# Patient Record
Sex: MALE | Race: WHITE | NOT HISPANIC OR LATINO | Employment: FULL TIME | URBAN - METROPOLITAN AREA
[De-identification: names, ages, dates, MRNs, and addresses within clinical notes are randomized per-mention and may not be internally consistent; named-entity substitution may affect disease eponyms.]

---

## 2017-02-21 ENCOUNTER — ALLSCRIPTS OFFICE VISIT (OUTPATIENT)
Dept: OTHER | Facility: OTHER | Age: 57
End: 2017-02-21

## 2017-02-27 ENCOUNTER — ALLSCRIPTS OFFICE VISIT (OUTPATIENT)
Dept: OTHER | Facility: OTHER | Age: 57
End: 2017-02-27

## 2017-04-18 ENCOUNTER — ALLSCRIPTS OFFICE VISIT (OUTPATIENT)
Dept: OTHER | Facility: OTHER | Age: 57
End: 2017-04-18

## 2017-05-02 ENCOUNTER — ALLSCRIPTS OFFICE VISIT (OUTPATIENT)
Dept: OTHER | Facility: OTHER | Age: 57
End: 2017-05-02

## 2017-05-02 DIAGNOSIS — Z12.5 ENCOUNTER FOR SCREENING FOR MALIGNANT NEOPLASM OF PROSTATE: ICD-10-CM

## 2017-06-28 ENCOUNTER — ALLSCRIPTS OFFICE VISIT (OUTPATIENT)
Dept: OTHER | Facility: OTHER | Age: 57
End: 2017-06-28

## 2017-08-02 ENCOUNTER — GENERIC CONVERSION - ENCOUNTER (OUTPATIENT)
Dept: OTHER | Facility: OTHER | Age: 57
End: 2017-08-02

## 2017-08-30 ENCOUNTER — ALLSCRIPTS OFFICE VISIT (OUTPATIENT)
Dept: OTHER | Facility: OTHER | Age: 57
End: 2017-08-30

## 2017-09-01 ENCOUNTER — APPOINTMENT (OUTPATIENT)
Dept: LAB | Facility: CLINIC | Age: 57
End: 2017-09-01
Payer: COMMERCIAL

## 2017-09-01 DIAGNOSIS — R53.83 OTHER FATIGUE: ICD-10-CM

## 2017-09-01 DIAGNOSIS — E11.65 TYPE 2 DIABETES MELLITUS WITH HYPERGLYCEMIA (HCC): ICD-10-CM

## 2017-09-01 DIAGNOSIS — R06.09 OTHER FORMS OF DYSPNEA: ICD-10-CM

## 2017-09-01 DIAGNOSIS — M25.50 PAIN IN JOINT: ICD-10-CM

## 2017-09-01 LAB
ALBUMIN SERPL BCP-MCNC: 3.6 G/DL (ref 3.5–5)
ALP SERPL-CCNC: 135 U/L (ref 46–116)
ALT SERPL W P-5'-P-CCNC: 41 U/L (ref 12–78)
ANION GAP SERPL CALCULATED.3IONS-SCNC: 9 MMOL/L (ref 4–13)
AST SERPL W P-5'-P-CCNC: 17 U/L (ref 5–45)
BILIRUB SERPL-MCNC: 1.2 MG/DL (ref 0.2–1)
BUN SERPL-MCNC: 16 MG/DL (ref 5–25)
CALCIUM SERPL-MCNC: 9.4 MG/DL (ref 8.3–10.1)
CHLORIDE SERPL-SCNC: 104 MMOL/L (ref 100–108)
CO2 SERPL-SCNC: 26 MMOL/L (ref 21–32)
CREAT SERPL-MCNC: 1.76 MG/DL (ref 0.6–1.3)
ERYTHROCYTE [DISTWIDTH] IN BLOOD BY AUTOMATED COUNT: 13.3 % (ref 11.6–15.1)
EST. AVERAGE GLUCOSE BLD GHB EST-MCNC: 166 MG/DL
GFR SERPL CREATININE-BSD FRML MDRD: 42 ML/MIN/1.73SQ M
GLUCOSE P FAST SERPL-MCNC: 131 MG/DL (ref 65–99)
HBA1C MFR BLD: 7.4 % (ref 4.2–6.3)
HCT VFR BLD AUTO: 49.8 % (ref 36.5–49.3)
HGB BLD-MCNC: 16.3 G/DL (ref 12–17)
MCH RBC QN AUTO: 29 PG (ref 26.8–34.3)
MCHC RBC AUTO-ENTMCNC: 32.7 G/DL (ref 31.4–37.4)
MCV RBC AUTO: 89 FL (ref 82–98)
PLATELET # BLD AUTO: 305 THOUSANDS/UL (ref 149–390)
PMV BLD AUTO: 9.8 FL (ref 8.9–12.7)
POTASSIUM SERPL-SCNC: 4 MMOL/L (ref 3.5–5.3)
PROT SERPL-MCNC: 7.9 G/DL (ref 6.4–8.2)
RBC # BLD AUTO: 5.62 MILLION/UL (ref 3.88–5.62)
SODIUM SERPL-SCNC: 139 MMOL/L (ref 136–145)
WBC # BLD AUTO: 7.68 THOUSAND/UL (ref 4.31–10.16)

## 2017-09-01 PROCEDURE — 85027 COMPLETE CBC AUTOMATED: CPT

## 2017-09-01 PROCEDURE — 36415 COLL VENOUS BLD VENIPUNCTURE: CPT

## 2017-09-01 PROCEDURE — 80053 COMPREHEN METABOLIC PANEL: CPT

## 2017-09-01 PROCEDURE — 83036 HEMOGLOBIN GLYCOSYLATED A1C: CPT

## 2017-09-06 ENCOUNTER — ALLSCRIPTS OFFICE VISIT (OUTPATIENT)
Dept: OTHER | Facility: OTHER | Age: 57
End: 2017-09-06

## 2017-09-18 ENCOUNTER — TRANSCRIBE ORDERS (OUTPATIENT)
Dept: ADMINISTRATIVE | Facility: HOSPITAL | Age: 57
End: 2017-09-18

## 2017-09-18 ENCOUNTER — ALLSCRIPTS OFFICE VISIT (OUTPATIENT)
Dept: OTHER | Facility: OTHER | Age: 57
End: 2017-09-18

## 2017-09-18 ENCOUNTER — TRANSCRIBE ORDERS (OUTPATIENT)
Dept: LAB | Facility: CLINIC | Age: 57
End: 2017-09-18

## 2017-09-18 ENCOUNTER — GENERIC CONVERSION - ENCOUNTER (OUTPATIENT)
Dept: OTHER | Facility: OTHER | Age: 57
End: 2017-09-18

## 2017-09-18 ENCOUNTER — HOSPITAL ENCOUNTER (OUTPATIENT)
Dept: RADIOLOGY | Facility: HOSPITAL | Age: 57
Discharge: HOME | End: 2017-09-18
Payer: COMMERCIAL

## 2017-09-18 ENCOUNTER — APPOINTMENT (OUTPATIENT)
Dept: LAB | Facility: CLINIC | Age: 57
End: 2017-09-18
Payer: COMMERCIAL

## 2017-09-18 DIAGNOSIS — R06.09 OTHER FORMS OF DYSPNEA: ICD-10-CM

## 2017-09-18 DIAGNOSIS — R53.83 OTHER FATIGUE: ICD-10-CM

## 2017-09-18 DIAGNOSIS — M25.50 PAIN IN JOINT: ICD-10-CM

## 2017-09-18 LAB
ANION GAP SERPL CALCULATED.3IONS-SCNC: 13 MMOL/L (ref 4–13)
BUN SERPL-MCNC: 18 MG/DL (ref 5–25)
CALCIUM SERPL-MCNC: 9.5 MG/DL (ref 8.3–10.1)
CHLORIDE SERPL-SCNC: 101 MMOL/L (ref 100–108)
CO2 SERPL-SCNC: 24 MMOL/L (ref 21–32)
CREAT SERPL-MCNC: 1.49 MG/DL (ref 0.6–1.3)
CRP SERPL QL: 6.3 MG/L
ERYTHROCYTE [SEDIMENTATION RATE] IN BLOOD: 7 MM/HOUR (ref 0–10)
GFR SERPL CREATININE-BSD FRML MDRD: 51 ML/MIN/1.73SQ M
GLUCOSE SERPL-MCNC: 93 MG/DL (ref 65–140)
POTASSIUM SERPL-SCNC: 3.7 MMOL/L (ref 3.5–5.3)
SODIUM SERPL-SCNC: 138 MMOL/L (ref 136–145)
VIT B12 SERPL-MCNC: 433 PG/ML (ref 100–900)

## 2017-09-18 PROCEDURE — 84403 ASSAY OF TOTAL TESTOSTERONE: CPT

## 2017-09-18 PROCEDURE — 80048 BASIC METABOLIC PNL TOTAL CA: CPT

## 2017-09-18 PROCEDURE — 71020 HB CHEST X-RAY 2VW FRONTAL&LATL: CPT

## 2017-09-18 PROCEDURE — 86038 ANTINUCLEAR ANTIBODIES: CPT

## 2017-09-18 PROCEDURE — 36415 COLL VENOUS BLD VENIPUNCTURE: CPT

## 2017-09-18 PROCEDURE — 86140 C-REACTIVE PROTEIN: CPT

## 2017-09-18 PROCEDURE — 84402 ASSAY OF FREE TESTOSTERONE: CPT

## 2017-09-18 PROCEDURE — 82607 VITAMIN B-12: CPT

## 2017-09-18 PROCEDURE — 86430 RHEUMATOID FACTOR TEST QUAL: CPT

## 2017-09-18 PROCEDURE — 85652 RBC SED RATE AUTOMATED: CPT

## 2017-09-19 ENCOUNTER — TRANSCRIBE ORDERS (OUTPATIENT)
Dept: ADMINISTRATIVE | Facility: HOSPITAL | Age: 57
End: 2017-09-19

## 2017-09-19 DIAGNOSIS — R09.89 OTHER DYSPNEA AND RESPIRATORY ABNORMALITY: Primary | ICD-10-CM

## 2017-09-19 DIAGNOSIS — R53.83 FATIGUE, UNSPECIFIED TYPE: ICD-10-CM

## 2017-09-19 DIAGNOSIS — R06.09 OTHER DYSPNEA AND RESPIRATORY ABNORMALITY: Primary | ICD-10-CM

## 2017-09-19 LAB
RHEUMATOID FACT SER QL LA: NEGATIVE
TESTOST FREE SERPL-MCNC: 5.7 PG/ML (ref 7.2–24)
TESTOST SERPL-MCNC: 177 NG/DL (ref 264–916)

## 2017-09-20 ENCOUNTER — GENERIC CONVERSION - ENCOUNTER (OUTPATIENT)
Dept: OTHER | Facility: OTHER | Age: 57
End: 2017-09-20

## 2017-09-20 LAB — RYE IGE QN: NEGATIVE

## 2017-09-22 ENCOUNTER — ALLSCRIPTS OFFICE VISIT (OUTPATIENT)
Dept: OTHER | Facility: OTHER | Age: 57
End: 2017-09-22

## 2017-09-28 ENCOUNTER — HOSPITAL ENCOUNTER (OUTPATIENT)
Dept: NON INVASIVE DIAGNOSTICS | Facility: CLINIC | Age: 57
Discharge: HOME | End: 2017-09-28
Payer: COMMERCIAL

## 2017-09-28 DIAGNOSIS — R09.89 OTHER DYSPNEA AND RESPIRATORY ABNORMALITY: ICD-10-CM

## 2017-09-28 DIAGNOSIS — R53.83 FATIGUE, UNSPECIFIED TYPE: ICD-10-CM

## 2017-09-28 DIAGNOSIS — R06.09 OTHER DYSPNEA AND RESPIRATORY ABNORMALITY: ICD-10-CM

## 2017-09-28 PROCEDURE — 93306 TTE W/DOPPLER COMPLETE: CPT

## 2017-09-29 ENCOUNTER — GENERIC CONVERSION - ENCOUNTER (OUTPATIENT)
Dept: OTHER | Facility: OTHER | Age: 57
End: 2017-09-29

## 2017-10-06 ENCOUNTER — ALLSCRIPTS OFFICE VISIT (OUTPATIENT)
Dept: OTHER | Facility: OTHER | Age: 57
End: 2017-10-06

## 2017-11-01 DIAGNOSIS — N17.9 ACUTE KIDNEY FAILURE (HCC): ICD-10-CM

## 2017-11-01 DIAGNOSIS — M25.511 PAIN IN RIGHT SHOULDER: ICD-10-CM

## 2017-11-01 DIAGNOSIS — R53.83 OTHER FATIGUE: ICD-10-CM

## 2017-11-01 DIAGNOSIS — M75.01 ADHESIVE CAPSULITIS OF RIGHT SHOULDER: ICD-10-CM

## 2017-11-01 DIAGNOSIS — E55.9 VITAMIN D DEFICIENCY: ICD-10-CM

## 2017-11-01 DIAGNOSIS — M75.02 ADHESIVE CAPSULITIS OF LEFT SHOULDER: ICD-10-CM

## 2017-11-03 ENCOUNTER — ALLSCRIPTS OFFICE VISIT (OUTPATIENT)
Dept: OTHER | Facility: OTHER | Age: 57
End: 2017-11-03

## 2017-11-04 NOTE — PROGRESS NOTES
Assessment  1  Shoulder pain, right (049 41) (M20 511)   2  Depression with anxiety (300 4) (F41 8)   · since his 35s  was on lithium, Prozac  tried Wellbutrin  common SE prologned orgasm   3  Morbid or severe obesity due to excess calories (278 01) (E66 01)   4  Hypogonadism, male (257 2) (E29 1)    Plan  Depression with anxiety    · BuPROPion HCl ER (XL) 150 MG Oral Tablet Extended Release 24 Hour; TAKE 1  TABLET BY MOUTH EVERY DAY   · Call [phone] if: The symptoms seem worse ; Status:Complete;   Done:  32NDI2732  Fatigue    · Testosterone Cypionate 200 MG/ML Intramuscular Solution  Hypogonadism, male    · (1) TESTOSTERONE, FREE (DIRECT) AND TOTAL; Status:Active; Requested  for:68Uht3600;   Shoulder pain, right    · *1 - [facility]  *  Status: Active  Requested for:  24KDR4595  Care Summary provided  : Yes    Discussion/Summary    Depression  He has tried multiple medications in the past, untreated in the past few years  Agrees to try medication again since symptoms affecting work  Trial of Wellbutrin, if with side effects try sertraline  Hypogonadism  Recheck testosterone levels next month, injection given today  Consider stopping treatment since symptoms minimally improved  Shoulder pain, right  Suspect tendinitis, refer to ortho  Prefer to avoid NSAIDs, apply ice or take Tylenol prn  CKD  Avoid NSAIDs, recheck labs  JEFF on CPAP  Obesity  f/u next month  The patient was counseled regarding instructions for management,-- impressions  Chief Complaint  pt is here to talk about testosterone      History of Present Illness  HPI: Mr Ruchi Damian complains of right shoulder pain since 2 weeks ago  is described as sharp, occasionally radiating down his arm, would disrupt sleep  He has occasional pain when reaching out for something or with certain movements  Pain would wake him up from sleep, thinks this has somewhat affected his sleep at night  He does use the CPAP regularly   He takes Aleve as needed  feels a little better since starting testosterone treatment  He has a little bit more libido  However, he continues to feel very very tired  He sleeps about 7 or 8 hours at night, would take an hour nap during his lunch break  He feels this is affecting his work, has increased difficulty with concentration  He admits he does not have any motivation, feels he is dragging every day  recalls being treated extensively for depression in his 35s  He was on lithium at one point, recalls being on Wellbutrin, Prozac and other medications  He has not been on medication for the past several years, felt he was doing well until a few months ago  Depression (Follow-Up): The patient states his depression has worsened since the last visit  He describes this as severe  Interval Symptoms: worsened depressed mood,-- worsened inability to perform normal activities,-- worsened loss of energy-- and-- denies anxiety  Social Support: the patient has good social support  Medications: The patient is not currently on any medications for his depression  Hypogonadism, Primary: The patient is being seen for follow-up of primary hypogonadism  The etiology is idiopathic  Current treatment includes intramuscular testosterone  Symptoms:  fatigue,-- lack of motivation-- and-- trouble concentrating, but-- no low libido  Shoulder Pain: The patient is being seen for an initial evaluation of shoulder pain  Symptoms:  shoulder pain-- and-- shoulder stiffness, but-- no decreased range of motion at the shoulder  The patient is currently experiencing symptoms  Associated symptoms:  arm pain, but-- no neck pain  Review of Systems    Constitutional: feeling poorly-- and-- feeling tired, but-- as noted in HPI  Cardiovascular: no chest pain  Respiratory: no cough  Musculoskeletal: arthralgias-- and-- joint stiffness, but-- as noted in HPI  Neurological: no headache-- and-- no dizziness  Active Problems  1  Acquired pes planus (734) (M21 40)   2  Acute renal failure (584 9) (N17 9)   3  Arthralgia (719 40) (M25 50)   4  BPH without urinary obstruction (600 00) (N40 0)   5  Bunion (727 1) (M21 619)   6  Callus (700) (L84)   7  Chronic systolic congestive heart failure (428 22,428 0) (I50 22)   8  Depression with anxiety (300 4) (F41 8)   9  Diabetes mellitus with neurological manifestation (250 60) (E11 49)   10  Diabetes type 2, uncontrolled (250 02) (E11 65)   11  Difficulty walking (719 7) (R26 2)   12  Dyspnea on exertion (786 09) (R06 09)   13  Encounter for prostate cancer screening (V76 44) (Z12 5)   14  Encounter for screening for malignant neoplasm of colon (V76 51) (Z12 11)   15  Fatigue (780 79) (R53 83)   16  Foot pain, bilateral (729 5) (M79 671,M79 672)   17  Hallux valgus (735 0) (M20 10)   18  Hematuria (599 70) (R31 9)   19  Hyperlipidemia (272 4) (E78 5)   20  Hypertension (401 9) (I10)   21  Hypogonadism, male (257 2) (E29 1)   22  Insomnia (780 52) (G47 00)   23  Morbid or severe obesity due to excess calories (278 01) (E66 01)   24  Need for pneumococcal vaccination (V03 82) (Z23)   25  Need for prophylactic vaccination and inoculation against influenza (V04 81) (Z23)   26  Nephrolithiasis (592 0) (N20 0)   27  Onychomycosis (110 1) (B35 1)   28  JEFF (obstructive sleep apnea) (327 23) (G47 33)   29  Sciatica (724 3) (M54 30)   30  Screening for colon cancer (V76 51) (Z12 11)   31  Seasonal allergies (477 9) (J30 2)   32  Spinal stenosis (724 00) (M48 00)   33  Tinea pedis (110 4) (B35 3)   34  Type 2 diabetes mellitus (250 00) (E11 9)   35  Vitamin D deficiency (268 9) (E55 9)    Past Medical History  Active Problems And Past Medical History Reviewed: The active problems and past medical history were reviewed and updated today        Social History   · Denied: History of Alcohol Use (History)   · Current Some Day Smoker (305 1)   · Denied: History of Exercise Habits   · Marital History - Currently    · Tobacco use (305 1) (Z72 0)   · Working Full Time  The social history was reviewed and is unchanged  Current Meds   1  AmLODIPine Besylate 10 MG Oral Tablet; TAKE 1 TABLET EVERY MORNING; Therapy: 98PZT1120 to (Oral Aron)  Requested for: 51YBH5698; Last   Rx:70Paw0155 Ordered   2  Aditya Breeze 2 Test In [employer]; test blood sugar twice daiy; Therapy: 70OIL5659 to (Leslie Verduzco)  Requested for: 32FNX2657; Last   Rx:27Vla9374 Ordered   3  CloNIDine HCl - 0 3 MG Oral Tablet; take 1 tablet twice a day; Therapy: 51LCH0924 to (Oral Aron)  Requested for: 61UUM5490; Last   Rx:90Aqd9198 Ordered   4  Enalapril Maleate 20 MG Oral Tablet; take 1 tablet twice a day; Therapy: 50PYZ9674 to (Oral Aron)  Requested for: 78BZA7547; Last   Rx:55Nyr1445 Ordered   5  Glimepiride 4 MG Oral Tablet; Take 1 tablet daily; Therapy: 80Iji3403 to (Oral Aron)  Requested for: 81OAC2692; Last   Rx:43Glu9270 Ordered   6  Januvia 100 MG Oral Tablet; take 1 tablet by mouth daily; Therapy: 60DLY7816 to (Evaluate:41Aji4899)  Requested for: 80KRZ2642; Last   Rx:15Xvm9183 Ordered   7  Jardiance 10 MG Oral Tablet; take 1 tablet by mouth every morning; Therapy: 94PDU3797 to (Oral Aron)  Requested for: 04DUB4381; Last   Rx:92Ulh2825 Ordered   8  Ketoconazole 2 % External Cream; APPLY A THIN LAYER TO AFFECTED AREA(S) TWICE   DAILY; Therapy: 22YZE3154 to ([phone])  Requested for: 44EEL0244; Last   Rx:55Jrs6052 Ordered   9  NovoFine 32G X 6 MM Miscellaneous; USE AS DIRECTED; Therapy: 20OEB0888 to ([phone])  Requested for: 12Zol3654; Last   Rx:19Zut3410 Ordered   10  OneTouch Verio In [employer]; TEST TWICE DAILY; Therapy: 71FEU2469 to (Evaluate:21Jun2016)  Requested for: 74FBK6026; Last    Rx:62Axh4868 Ordered   11  OneTouch Verio w/Device Kit; USE AS DIRECTED; Therapy: 10KQY6940 to (Last Rx:24Nov2015)  Requested for: 80FXK4402 Ordered   12  Tamsulosin HCl - 0 4 MG Oral Capsule; take 1 capsule daily; Therapy: 01WMP1454 to (Billie Ramey)  Requested for: 05ASJ4079; Last    Rx:63Htj6278 Ordered   13  Testosterone Cypionate 200 MG/ML Intramuscular Solution; inject IM every 2 weeks; Therapy: 14PWA3626 to (Last Rx:76Yje4665) Ordered   14  Vitamin D3 2000 UNIT Oral Capsule; TAKE 1 CAPSULE Daily; Therapy: 06ORJ5941 to (Last Rx:29Mar2016)  Requested for: 29Mar2016 Ordered    The medication list was reviewed and updated today  Allergies  1  No Known Drug Allergies    Vitals   Recorded: 80PLB7531 12:48PM   Temperature 98 4 F   Heart Rate 76   Respiration 18   Systolic 603   Diastolic 82   Height 5 ft 10 in   Weight 290 lb 6 oz   BMI Calculated 41 66   BSA Calculated 2 45   O2 Saturation 96     Physical Exam    Constitutional   General appearance: Abnormal   obese-- and-- appears tired  Head and Face   Head and face: Normal     Eyes   Pupils and irises: Equal, round, reactive to light  Pulmonary   Respiratory effort: No increased work of breathing or signs of respiratory distress  Auscultation of lungs: Clear to auscultation  Cardiovascular   Auscultation of heart: Normal rate and rhythm, normal S1 and S2, no murmurs  Abdomen   Abdomen: Non-tender, no masses  Musculoskeletal   Gait and station: Normal     Inspection/palpation of joints, bones, and muscles: Abnormal  -- mild tenderness right bicipital tendon  Neurologic   Cortical function: Normal mental status  Psychiatric   Mood and affect: Abnormal   Mood and Affect: depressed        Future Appointments    Date/Time Provider Specialty Site   12/15/2017 01:00 PM Lokesh Villarreal MD Internal Medicine [facility] INTER[facility] MED   12/29/2017 11:15 AM Liz Hoffman DPM Podiatry [facility]   11/17/2017 01:15 PM Nurse Talita Schedule  [facility] INTERNAL [facility]     Signatures   Electronically signed by : Brennan Ayala MD; Nov  3 2017 3:56PM EST                       (Author)

## 2017-11-17 ENCOUNTER — ALLSCRIPTS OFFICE VISIT (OUTPATIENT)
Dept: OTHER | Facility: OTHER | Age: 57
End: 2017-11-17

## 2017-11-29 ENCOUNTER — APPOINTMENT (OUTPATIENT)
Dept: RADIOLOGY | Facility: CLINIC | Age: 57
End: 2017-11-29
Payer: COMMERCIAL

## 2017-11-29 ENCOUNTER — GENERIC CONVERSION - ENCOUNTER (OUTPATIENT)
Dept: OTHER | Facility: OTHER | Age: 57
End: 2017-11-29

## 2017-11-29 DIAGNOSIS — M25.511 PAIN IN RIGHT SHOULDER: ICD-10-CM

## 2017-11-29 PROCEDURE — 73030 X-RAY EXAM OF SHOULDER: CPT

## 2017-12-08 DIAGNOSIS — E29.1 TESTICULAR HYPOFUNCTION: ICD-10-CM

## 2017-12-09 ENCOUNTER — APPOINTMENT (OUTPATIENT)
Dept: LAB | Facility: CLINIC | Age: 57
End: 2017-12-09
Payer: COMMERCIAL

## 2017-12-09 ENCOUNTER — TRANSCRIBE ORDERS (OUTPATIENT)
Dept: LAB | Facility: CLINIC | Age: 57
End: 2017-12-09

## 2017-12-09 DIAGNOSIS — N17.9 ACUTE KIDNEY FAILURE (HCC): ICD-10-CM

## 2017-12-09 DIAGNOSIS — E29.1 TESTICULAR HYPOFUNCTION: ICD-10-CM

## 2017-12-09 DIAGNOSIS — R53.83 OTHER FATIGUE: ICD-10-CM

## 2017-12-09 DIAGNOSIS — E55.9 VITAMIN D DEFICIENCY: ICD-10-CM

## 2017-12-09 LAB
25(OH)D3 SERPL-MCNC: 9.8 NG/ML (ref 30–100)
ALBUMIN SERPL BCP-MCNC: 4 G/DL (ref 3.5–5)
ALP SERPL-CCNC: 98 U/L (ref 46–116)
ALT SERPL W P-5'-P-CCNC: 35 U/L (ref 12–78)
ANION GAP SERPL CALCULATED.3IONS-SCNC: 9 MMOL/L (ref 4–13)
AST SERPL W P-5'-P-CCNC: 14 U/L (ref 5–45)
BILIRUB SERPL-MCNC: 1 MG/DL (ref 0.2–1)
BUN SERPL-MCNC: 17 MG/DL (ref 5–25)
CALCIUM SERPL-MCNC: 8.9 MG/DL (ref 8.3–10.1)
CHLORIDE SERPL-SCNC: 105 MMOL/L (ref 100–108)
CO2 SERPL-SCNC: 24 MMOL/L (ref 21–32)
CREAT SERPL-MCNC: 1.4 MG/DL (ref 0.6–1.3)
GFR SERPL CREATININE-BSD FRML MDRD: 55 ML/MIN/1.73SQ M
GLUCOSE P FAST SERPL-MCNC: 165 MG/DL (ref 65–99)
POTASSIUM SERPL-SCNC: 4.1 MMOL/L (ref 3.5–5.3)
PROT SERPL-MCNC: 8 G/DL (ref 6.4–8.2)
SODIUM SERPL-SCNC: 138 MMOL/L (ref 136–145)
TSH SERPL DL<=0.05 MIU/L-ACNC: 2.23 UIU/ML (ref 0.36–3.74)
VIT B12 SERPL-MCNC: 1081 PG/ML (ref 100–900)

## 2017-12-09 PROCEDURE — 80053 COMPREHEN METABOLIC PANEL: CPT

## 2017-12-09 PROCEDURE — 36415 COLL VENOUS BLD VENIPUNCTURE: CPT

## 2017-12-09 PROCEDURE — 82607 VITAMIN B-12: CPT

## 2017-12-09 PROCEDURE — 84443 ASSAY THYROID STIM HORMONE: CPT

## 2017-12-09 PROCEDURE — 82306 VITAMIN D 25 HYDROXY: CPT

## 2017-12-09 PROCEDURE — 84402 ASSAY OF FREE TESTOSTERONE: CPT

## 2017-12-09 PROCEDURE — 84403 ASSAY OF TOTAL TESTOSTERONE: CPT

## 2017-12-11 LAB
TESTOST FREE SERPL-MCNC: 7.4 PG/ML (ref 7.2–24)
TESTOST SERPL-MCNC: 203 NG/DL (ref 264–916)

## 2017-12-15 ENCOUNTER — ALLSCRIPTS OFFICE VISIT (OUTPATIENT)
Dept: OTHER | Facility: OTHER | Age: 57
End: 2017-12-15

## 2017-12-16 NOTE — PROGRESS NOTES
Assessment  1  JEFF (obstructive sleep apnea) (327 23) (G47 33)   · moderate   2  Morbid or severe obesity due to excess calories (278 01) (E66 01)   3  Diabetes mellitus with neurological manifestation (250 60) (E11 49)   4  Depression with anxiety (300 4) (F41 8)   · since his 35s  was on lithium, Prozac  tried Wellbutrin  common SE prologned orgasm   5  Type 2 diabetes mellitus (250 00) (E11 9)   6  Shoulder pain, right (719 41) (M25 511)   7  Hypertension (401 9) (I10)   8  Hypogonadism, male (257 2) (E29 1)   9  Difficulty concentrating (799 51) (R41 840)    Plan  Difficulty concentrating    · Methylphenidate HCl - 10 MG Oral Tablet; Take 1 tablet twice daily  Vitamin D deficiency    · Vitamin D (Ergocalciferol) 54000 UNIT Oral Capsule; TAKE ONE CAPSULE BY MOUTH ONCE AWEEK    Discussion/Summary  Discussion Summary:   1  Difficulty with concentrating  ADD score was 61/120  Trial of methylphenidate, can take twice a day p r n    A gradually titrate dose p r n 2  Depression  Tolerating Wellbutrin, recommend to continue this for now  3  Hypogonadism  Testosterone levels have improved; however, minimal improvement of symptoms  We have decided to stop injections for now  4  Adhesive capsulitis, right  Keep appointment with physical therapy next week  Instructed to start ROM exercises at home  Recommend to take Vicodin prior to therapy sessions  5  Nephrolithiasis, s/p pyelonephritis  No recent issues  6  Acute renal failure  Renal function improved  Crea remains elevated, avoid NSAIDs  7  JEFF  Recommend to see sleep specialist 8  HTN  BP stable on clonidine, amlodipine and enalapril  9  Obesity  Lost 4 lbs in the past month  10  DM  On Jardiance, Januvia and glimepiride  Due for microalb, on ACE  Sees podiatry regularly  11  Hyperlipidemia  TG slightly elevated  12  Elevated hemoglobin/hematocrit  Stable  13  BPH  On Flomax  14  Vitamin D deficiency  Start weekly replacement  15  HM  Due for eye exam and colonoscopy  Follow up in 1 month or prn  Counseling Documentation With Imm: The patient was counseled regarding diagnostic results,-- instructions for management,-- risk factor reductions  Chief Complaint  Chief Complaint Chronic Condition [facility]: Patient is here today for follow up of chronic conditions described in HPI  History of Present Illness  HPI: Mr Dereck Kocher still complains of feeling very tired  He started taking Wellbutrin, has been taking it daily for almost five weeks  He does not notice any change in sit in his symptoms  He continues to have difficulty focusing and concentrating at work  He denies any memory loss  He usually MRIs is a stack cards prior to sleeping and would go through this instead of counting she to help fall asleep  He reports that his right shoulder is getting worse  He has physical therapy scheduled next week  He has not tried Vicodin as given to him by Ortho  Obesity (Follow-Up): The patient is being seen for follow-up of obesity  The patient reports doing well and a weight loss of 4 pounds  Interval symptoms:  denies poor eating habits  The patient is not currently on medication for this problem  Depression (Follow-Up): The patient states his depression has been stable since the last visit  Interval Symptoms: stable depressed mood-- and-- denies anxiety  Social Support: the patient has good social support  Medications: the patient is adherent with his medication regimen  Review of Systems  Complete-Male:  Constitutional: feeling poorly-- and-- feeling tired  Cardiovascular: no chest pain  Respiratory: no cough  Gastrointestinal: no abdominal pain  Genitourinary: no dysuria  Musculoskeletal: arthralgias-- and-- joint stiffness, but-- as noted in HPI  Neurological: no headache-- and-- no dizziness  Psychiatric: depression  Endocrine: no muscle weakness  Active Problems  1  Acquired pes planus (734) (M21 40)   2  Acute renal failure (584 9) (N17 9)   3   Adhesive capsulitis of both shoulders (726 0) (M75 01,M75 02)   4  Arthralgia (719 40) (M25 50)   5  BPH without urinary obstruction (600 00) (N40 0)   6  Bunion (727 1) (M21 619)   7  Callus (700) (L84)   8  Chronic systolic congestive heart failure (428 22,428 0) (I50 22)   9  Depression with anxiety (300 4) (F41 8)   10  Diabetes mellitus with neurological manifestation (250 60) (E11 49)   11  Diabetes type 2, uncontrolled (250 02) (E11 65)   12  Difficulty walking (719 7) (R26 2)   13  Dyspnea on exertion (786 09) (R06 09)   14  Encounter for prostate cancer screening (V76 44) (Z12 5)   15  Encounter for screening for malignant neoplasm of colon (V76 51) (Z12 11)   16  Fatigue (780 79) (R53 83)   17  Foot pain, bilateral (729 5) (M79 671,M79 672)   18  Hallux valgus (735 0) (M20 10)   19  Hematuria (599 70) (R31 9)   20  Hyperlipidemia (272 4) (E78 5)   21  Hypertension (401 9) (I10)   22  Hypogonadism, male (257 2) (E29 1)   23  Insomnia (780 52) (G47 00)   24  Morbid or severe obesity due to excess calories (278 01) (E66 01)   25  Need for pneumococcal vaccination (V03 82) (Z23)   26  Need for prophylactic vaccination and inoculation against influenza (V04 81) (Z23)   27  Nephrolithiasis (592 0) (N20 0)   28  Onychomycosis (110 1) (B35 1)   29  JEFF (obstructive sleep apnea) (327 23) (G47 33)   30  Sciatica (724 3) (M54 30)   31  Screening for colon cancer (V76 51) (Z12 11)   32  Seasonal allergies (477 9) (J30 2)   33  Shoulder pain, right (719 41) (M25 511)   34  Spinal stenosis (724 00) (M48 00)   35  Tinea pedis (110 4) (B35 3)   36  Type 2 diabetes mellitus (250 00) (E11 9)   37  Vitamin D deficiency (268 9) (E55 9)    Past Medical History  1  History of Cellulitis of left leg (682 6) (L03 116)   2  History of chest pain (V13 89) (Z87 898)   3  History of diarrhea (V12 79) (Z87 898)   4  History of onychomycosis (V12 09) (Z86 19)   5  History of onychomycosis (V12 09) (Z86 19)   6  History of Limb pain (729 5) (M79 609)   7  History of Neck pain (723 1) (M54 2)   8  Need for pneumococcal vaccination (V03 82) (Z23)   9  History of Nephrolithiasis (V13 01)   10  History of Numbness On The Sole Of The Right Foot   11  History of Pain and swelling of left lower leg (729 5,729 81) (M79 662,M79 89)   12  History of Pain of lower extremity (729 5) (M79 606)   13  History of Pain, hand joint, right (719 44) (M79 641)   14  Sciatica (724 3) (M54 30)  Active Problems And Past Medical History Reviewed: The active problems and past medical history were reviewed and updated today  Surgical History  1  History of Knee Arthroscopy (Therapeutic)   2  History of Knee Surgery   3  History of Needle Biopsy Of Prostate    Family History  Mother    1  Family history of Alzheimer Disease   2  Family history of Family Health Status Of Mother - Alive  Father    3  Family history of Family Health Status Of Father -   Family History    4  Family history of Adopted   5  Denied: Family history of Colon Cancer   6  Denied: Family history of Crohn's Disease   7  Denied: Family history of Liver Cancer    Social History   · Denied: History of Alcohol Use (History)   · Current Some Day Smoker (305 1)   · Denied: History of Exercise Habits   · Marital History - Currently    · Tobacco use (305 1) (Z72 0)   · Working Full Time  Social History Reviewed: The social history was reviewed and is unchanged  Current Meds   1  AmLODIPine Besylate 10 MG Oral Tablet; TAKE 1 TABLET EVERY MORNING; Therapy: 97NGC7852 to (Leonila Blanco)  Requested for: 40BDG4092; Last Rx:66Gky8475 Ordered   2  Aditya Breeze 2 Test In [employer]; test blood sugar twice daiy; Therapy: 53ICI5834 to (Hanh Chan)  Requested for: 48VJQ8000; Last Rx:99Csx2563 Ordered   3  BuPROPion HCl ER (XL) 150 MG Oral Tablet Extended Release 24 Hour; take 1 tablet by mouth every day; Therapy: 40KUB5357 to (Evaluate:2018)  Requested for: 74GDL7723; Last Rx:99Anh4927 Ordered   4  CloNIDine HCl - 0 3 MG Oral Tablet; take 1 tablet twice a day; Therapy: 93HBI6247 to (Geismar Bre)  Requested for: 74MZS0636; Last Rx:82Doq0141 Ordered   5  Enalapril Maleate 20 MG Oral Tablet; take 1 tablet twice a day; Therapy: 59JML4894 to (Geismar Bre)  Requested for: 89NQQ9954; Last Rx:84Voq2055 Ordered   6  Glimepiride 4 MG Oral Tablet; Take 1 tablet daily; Therapy: 63Lnx2892 to (Geismar Bre)  Requested for: 61VMT9643; Last Rx:06Sep2017 Ordered   7  Januvia 100 MG Oral Tablet; take 1 tablet by mouth daily; Therapy: 08PEG4520 to (Evaluate:18Edo3436)  Requested for: 31WNJ5407; Last Rx:00Rdy0601 Ordered   8  Jardiance 10 MG Oral Tablet; take 1 tablet by mouth every morning; Therapy: 58FMB1474 to ([facility])  Requested for: 98TRW9052; Last Rx:06Sep2017 Ordered   9  Ketoconazole 2 % External Cream; APPLY A THIN LAYER TO AFFECTED AREA(S) TWICE DAILY; Therapy: 71QBI1886 to (225 27 752)  Requested for: 28Oct2016; Last Rx:28Oct2016 Ordered   10  NovoFine 32G X 6 MM Miscellaneous; USE AS DIRECTED; Therapy: 31KLH1251 to (75 363 135)  Requested for: 25Apr2014; Last Rx:71Bne2684  Ordered   11  OneTouch Verio In [employer]; TEST TWICE DAILY; Therapy: 32GTX8168 to (Evaluate:21Jun2016)  Requested for: 98QKV7982; Last Rx:72Onk6920  Ordered   12  OneTouch Verio w/Device Kit; USE AS DIRECTED; Therapy: 94HNY8704 to (Last Rx:24Nov2015)  Requested for: 86PHQ1134 Ordered   13  Tamsulosin HCl - 0 4 MG Oral Capsule; take 1 capsule daily; Therapy: 08FBX3599 to (Geismar Bre)  Requested for: 96CGK3800; Last Rx:22Rpr8263  Ordered   14  Testosterone Cypionate 200 MG/ML Intramuscular Solution; inject IM every 2 weeks; Therapy: 13CQF2341 to (Last Rx:20Sep2017) Ordered   15  Vicodin 5-300 MG Oral Tablet; One by mouth  half hour prior to physical therapy; Therapy: 89PSN6662 to (Evaluate:18Jan2018); Last Rx:29Nov2017 Ordered   16   Vitamin D3 2000 UNIT Oral Capsule; TAKE 1 CAPSULE Daily; Therapy: 11NIW9042 to (Last Rx:29Mar2016)  Requested for: 29Mar2016 Ordered  Medication List Reviewed: The medication list was reviewed and updated today  Allergies  1  No Known Drug Allergies    Vitals  Vital Signs    Recorded: 60Jvn9458 12:56PM   Temperature 97 6 F   Heart Rate 68   Respiration 18   Systolic 568   Diastolic 84   Height 5 ft 10 in   Weight 286 lb 4 oz   BMI Calculated 41 07   BSA Calculated 2 43   O2 Saturation 97     Physical Exam   Constitutional  General appearance: No acute distress, well appearing and well nourished  comfortable,-- obese,-- appears tired-- and-- well hydrated  Head and Face  Head and face: Normal    Eyes  Pupils and irises: Equal, round, reactive to light  Ears, Nose, Mouth, and Throat  External inspection of ears and nose: Normal    Pulmonary  Respiratory effort: No increased work of breathing or signs of respiratory distress  Auscultation of lungs: Clear to auscultation  Cardiovascular  Auscultation of heart: Normal rate and rhythm, normal S1 and S2, no murmurs  Examination of extremities for edema and/or varicosities: Normal    Abdomen  Abdomen: Non-tender, no masses  Musculoskeletal  Gait and station: Normal    Neurologic  Cortical function: Abnormal   The patient achieved a score of 25 on the MoCA  Psychiatric  Orientation to person, place and time: Normal    Mood and affect: Abnormal   Mood and Affect: not anxious,-- concerned,-- depressed-- and-- not tearful  -- ADD score 61        Results/Data  (1) COMPREHENSIVE METABOLIC PANEL [id number] 83:65WZ [facility] Order Number: [id number]     Test Name Result Flag Reference   SODIUM 138 mmol/L  136-145   POTASSIUM 4 1 mmol/L  3 5-5 3   CHLORIDE 105 mmol/L  100-108   CARBON DIOXIDE 24 mmol/L  21-32   ANION GAP (CALC) 9 mmol/L  4-13   BLOOD UREA NITROGEN 17 mg/dL  5-25   CREATININE 1 40 mg/dL H 0 60-1 30   Standardized to IDMS reference method   CALCIUM 8 9 mg/dL  8 3-10 1 BILI, TOTAL 1 00 mg/dL  0 20-1 00   ALK PHOSPHATAS 98 U/L     ALT (SGPT) 35 U/L  12-78   Specimen collection should occur prior to Sulfasalazine administration due to the potential for falsely depressed results  AST(SGOT) 14 U/L  5-45   Specimen collection should occur prior to Sulfasalazine administration due to the potential for falsely depressed results  ALBUMIN 4 0 g/dL  3 5-5 0   TOTAL PROTEIN 8 0 g/dL  6 4-8 2   eGFR 55 ml/min/1 73sq m     [facility] Disease Education Program recommendations are as follows: GFR calculation is accurate only with a steady state creatinine Chronic Kidney disease less than 60 ml/min/1 73 sq  meters Kidney failure less than 15 ml/min/1 73 sq  meters  GLUCOSE FASTING 165 mg/dL H 65-99   Specimen collection should occur prior to Sulfasalazine administration due to the potential for falsely depressed results  Specimen collection should occur prior to Sulfapyridine administration due to the potential for falsely elevated results  (1) TSH 76OVG7906 07:14AM Stephanie Wander Order Number: [id number]     Test Name Result Flag Reference   TSH 2 235 uIU/mL  0 358-3 740   Patients undergoing fluorescein dye angiography may retain small amounts of fluorescein in the body for 48-72 hours post procedure  Samples containing fluorescein can produce falsely depressed TSH values  If the patient had this procedure,a specimen should be resubmitted post fluorescein clearance       (1) VITAMIN D 25-HYDROXY 44NUG5911 07:14AM Marvel Lombard TW Order Number: [id number]     Test Name Result Flag Reference   VIT D 25-HYDROX 9 8 ng/mL L 30 0-100 0   This assay is a certified procedure of the CDC Vitamin D Standardization Certification Program (VDSCP)   Deficiency <20ng/ml  Insufficiency 20-30ng/ml  Sufficient  ng/ml   *Patients undergoing fluorescein dye angiography may retain small amounts of fluorescein in the body for 48-72 hours post procedure  Samples containing fluorescein can produce falsely elevated Vitamin D values  If the patient had this procedure, a specimen should be resubmitted post fluorescein clearance  (1) VITAMIN B12 41CNH8028 07:14AM Bennie Jones Order Number: [id number]     Test Name Result Flag Reference   VITAMIN B12 1081 pg/mL H 100-900       Health Management  Screening for colon cancer   COLONOSCOPY; every 2 years; Last 45Mpc2560; Next Due: 55Rmb5577; Overdue  Type 2 diabetes mellitus   *VB - Eye Exam; every 1 year; Last 04AFB1128; Next Due: 55ENO7251; Overdue  *VB - Foot Exam; every 1 year; Last 94JYK0235; Next Due: 05CLS2760; Active    Future Appointments    Date/Time Provider Specialty Site   01/16/2018 05:30 PM Tj Villarreal MD Internal Medicine [facility] INTERNAL MED   12/29/2017 11:15 AM Ac Salinas DPM Podiatry [facility]   01/22/2018 01:00 PM Renuka Valerio DO Orthopedic Surgery [facility] Kimberli Wood     Verified Results  (1) COMPREHENSIVE METABOLIC PANEL 44FFB5577 14:45KY Bennie Jones Order Number: [id number]     Test Name Result Flag Reference   SODIUM 138 mmol/L  136-145   POTASSIUM 4 1 mmol/L  3 5-5 3   CHLORIDE 105 mmol/L  100-108   CARBON DIOXIDE 24 mmol/L  21-32   ANION GAP (CALC) 9 mmol/L  4-13   BLOOD UREA NITROGEN 17 mg/dL  5-25   CREATININE 1 40 mg/dL H 0 60-1 30   Standardized to IDMS reference method   CALCIUM 8 9 mg/dL  8 3-10 1   BILI, TOTAL 1 00 mg/dL  0 20-1 00   ALK PHOSPHATAS 98 U/L     ALT (SGPT) 35 U/L  12-78   Specimen collection should occur prior to Sulfasalazine administration due to the potential for falsely depressed results  AST(SGOT) 14 U/L  5-45   Specimen collection should occur prior to Sulfasalazine administration due to the potential for falsely depressed results     ALBUMIN 4 0 g/dL  3 5-5 0   TOTAL PROTEIN 8 0 g/dL  6 4-8 2   eGFR 55 ml/min/1 73sq m     National Kidney Disease Education Program recommendations are as follows: GFR calculation is accurate only with a steady state creatinine Chronic Kidney disease less than 60 ml/min/1 73 sq  meters Kidney failure less than 15 ml/min/1 73 sq  meters  GLUCOSE FASTING 165 mg/dL H 65-99   Specimen collection should occur prior to Sulfasalazine administration due to the potential for falsely depressed results  Specimen collection should occur prior to Sulfapyridine administration due to the potential for falsely elevated results  (1) VITAMIN D 25-HYDROXY [id number] 07:14AM [facility] Order Number: [id number]     Test Name Result Flag Reference   VIT D 25-HYDROX 9 8 ng/mL L 30 0-100 0   This assay is a certified procedure of the CDC Vitamin D Standardization Certification Program (VDSCP)   Deficiency <20ng/ml  Insufficiency 20-30ng/ml  Sufficient  ng/ml   *Patients undergoing fluorescein dye angiography may retain small amounts of fluorescein in the body for 48-72 hours post procedure  Samples containing fluorescein can produce falsely elevated Vitamin D values  If the patient had this procedure, a specimen should be resubmitted post fluorescein clearance  (1) TESTOSTERONE, FREE (DIRECT) AND TOTAL 18MGJ4586 07:14AM [facility] Order Number: [id number]     Test Name Result Flag Reference   FREE TESTOSTERONE, DIRECT 7 4 pg/mL  7 2 - 24 0   TESTOSTERONE (TOTAL) 203 ng/dL L 264 - 65   Adult male reference interval is based on a population of healthy nonobese males (BMI <30) between 23and 44years old  [address] [place] [address], [address] [place] [address] [phone]  PMID: [id number]    Performed at:  [address] [place] [address]  [id number] : Aleks Calderon MD, Phone:  [phone]       Signatures   Electronically signed by : Marina Sanabria MD; Dec 15 2017  2:42PM EST                       (Author)

## 2017-12-18 ENCOUNTER — APPOINTMENT (OUTPATIENT)
Dept: PHYSICAL THERAPY | Facility: CLINIC | Age: 57
End: 2017-12-18
Payer: COMMERCIAL

## 2017-12-18 ENCOUNTER — GENERIC CONVERSION - ENCOUNTER (OUTPATIENT)
Dept: OBGYN CLINIC | Facility: CLINIC | Age: 57
End: 2017-12-18

## 2017-12-18 DIAGNOSIS — M75.01 ADHESIVE CAPSULITIS OF RIGHT SHOULDER: ICD-10-CM

## 2017-12-18 DIAGNOSIS — M75.02 ADHESIVE CAPSULITIS OF LEFT SHOULDER: ICD-10-CM

## 2017-12-18 PROCEDURE — G8991 OTHER PT/OT GOAL STATUS: HCPCS

## 2017-12-18 PROCEDURE — 97140 MANUAL THERAPY 1/> REGIONS: CPT

## 2017-12-18 PROCEDURE — 97110 THERAPEUTIC EXERCISES: CPT

## 2017-12-18 PROCEDURE — 97162 PT EVAL MOD COMPLEX 30 MIN: CPT

## 2017-12-18 PROCEDURE — G8990 OTHER PT/OT CURRENT STATUS: HCPCS

## 2017-12-25 ENCOUNTER — GENERIC CONVERSION - ENCOUNTER (OUTPATIENT)
Dept: OBGYN CLINIC | Facility: CLINIC | Age: 57
End: 2017-12-25

## 2017-12-26 ENCOUNTER — APPOINTMENT (OUTPATIENT)
Dept: PHYSICAL THERAPY | Facility: CLINIC | Age: 57
End: 2017-12-26
Payer: COMMERCIAL

## 2017-12-26 PROCEDURE — 97140 MANUAL THERAPY 1/> REGIONS: CPT

## 2017-12-26 PROCEDURE — 97110 THERAPEUTIC EXERCISES: CPT

## 2017-12-29 ENCOUNTER — ALLSCRIPTS OFFICE VISIT (OUTPATIENT)
Dept: OTHER | Facility: OTHER | Age: 57
End: 2017-12-29

## 2017-12-29 ENCOUNTER — APPOINTMENT (OUTPATIENT)
Dept: PHYSICAL THERAPY | Facility: CLINIC | Age: 57
End: 2017-12-29
Payer: COMMERCIAL

## 2017-12-29 PROCEDURE — 97140 MANUAL THERAPY 1/> REGIONS: CPT

## 2017-12-29 PROCEDURE — 97110 THERAPEUTIC EXERCISES: CPT

## 2017-12-30 NOTE — PROGRESS NOTES
Assessment   1  Callus (700) (L84)   2  Diabetes mellitus with neurological manifestation (250 60) (E11 49)    Plan    · Follow-up visit in 9 weeks Evaluation and Treatment  Follow-up  Status: Hold For -    Scheduling  Requested for: 58Zcy9397   · Diabetes Foot Exam Performed; Status:Complete;   Done: 36YUS7878 11:26AM   · Inspect your feet daily ; Status:Complete;   Done: 34TXX6668 11:26AM   · It is important to take good care of your feet if you have diabetes ; Status:Complete;      Done: 27SZO1164 11:26AM    Discussion/Summary   The patient was counseled regarding instructions for management,-- prognosis,-- patient and family education,-- risks and benefits of treatment options,-- importance of compliance with treatment  Patient is able to Self-Care  The treatment plan was reviewed with the patient/guardian  The patient/guardian understands and agrees with the treatment plan      Chief Complaint   Patient needs full diabetic foot exam       History of Present Illness   HPI: Patient presents for pedal evaluation  Patient complains of pain in his feet and toes with ambulation  Patient is a morbidly obese diabetic who has some electric sensations in his feet on occasion  Review of Systems        Constitutional: not feeling tired  Eyes: no eyesight problems  ENT: no nasal discharge  Cardiovascular: no chest pain,-- no palpitations-- and-- no extremity edema  Respiratory: no shortness of breath-- and-- no cough  Gastrointestinal: no abdominal pain-- and-- no constipation  Genitourinary: no dysuria-- and-- no urinary hesitancy  Integumentary: no skin wound  Neurological: no tingling-- and-- no dizziness  Psychiatric: sleeping better, stopped taking trazodone, but-- no sleep disturbances  Endocrine: no feelings of weakness  Active Problems   1  Acquired pes planus (734) (M21 40)   2  Acute renal failure (584 9) (N17 9)   3   Adhesive capsulitis of both shoulders (726 0) (M75 01,M75 02)   4  Arthralgia (719 40) (M25 50)   5  BPH without urinary obstruction (600 00) (N40 0)   6  Bunion (727 1) (M21 619)   7  Callus (700) (L84)   8  Chronic systolic congestive heart failure (428 22,428 0) (I50 22)   9  Depression with anxiety (300 4) (F41 8)   10  Diabetes mellitus with neurological manifestation (250 60) (E11 49)   11  Diabetes type 2, uncontrolled (250 02) (E11 65)   12  Difficulty concentrating (799 51) (R41 840)   13  Difficulty walking (719 7) (R26 2)   14  Dyspnea on exertion (786 09) (R06 09)   15  Encounter for prostate cancer screening (V76 44) (Z12 5)   16  Encounter for screening for malignant neoplasm of colon (V76 51) (Z12 11)   17  Fatigue (780 79) (R53 83)   18  Foot pain, bilateral (729 5) (M79 671,M79 672)   19  Hallux valgus (735 0) (M20 10)   20  Hematuria (599 70) (R31 9)   21  Hyperlipidemia (272 4) (E78 5)   22  Hypertension (401 9) (I10)   23  Hypogonadism, male (257 2) (E29 1)   24  Insomnia (780 52) (G47 00)   25  Morbid or severe obesity due to excess calories (278 01) (E66 01)   26  Need for pneumococcal vaccination (V03 82) (Z23)   27  Need for prophylactic vaccination and inoculation against influenza (V04 81) (Z23)   28  Nephrolithiasis (592 0) (N20 0)   29  Onychomycosis (110 1) (B35 1)   30  JEFF (obstructive sleep apnea) (327 23) (G47 33)   31  Sciatica (724 3) (M54 30)   32  Screening for colon cancer (V76 51) (Z12 11)   33  Seasonal allergies (477 9) (J30 2)   34  Shoulder pain, right (719 41) (M25 511)   35  Spinal stenosis (724 00) (M48 00)   36  Tinea pedis (110 4) (B35 3)   37  Type 2 diabetes mellitus (250 00) (E11 9)   38   Vitamin D deficiency (268 9) (E55 9)    Past Medical History    · History of Cellulitis of left leg (682 6) (L03 116)   · History of chest pain (V13 89) (B60 841)   · History of diarrhea (V12 79) (C82 099)   · History of onychomycosis (V12 09) (Z86 19)   · History of onychomycosis (V12 09) (Z86 19)   · History of Limb pain (729 5) (M79 609)   · History of Neck pain (723 1) (M54 2)   · Need for pneumococcal vaccination (V03 82) (Z23)   · History of Nephrolithiasis (V13 01)   · History of Numbness On The Sole Of The Right Foot   · History of Pain and swelling of left lower leg (729 5,729 81) (M79 662,M79 89)   · History of Pain of lower extremity (729 5) (M79 606)   · History of Pain, hand joint, right (719 44) (M79 641)   · Sciatica (724 3) (M54 30)     The active problems and past medical history were reviewed and updated today  Surgical History    · History of Knee Arthroscopy (Therapeutic)   · History of Knee Surgery   · History of Needle Biopsy Of Prostate    Family History   Mother    · Family history of Alzheimer Disease   · Family history of Family Health Status Of Mother - Alive  Father    · Family history of Family Health Status Of Father -   Family History    · Family history of Adopted   · Denied: Family history of Colon Cancer   · Denied: Family history of Crohn's Disease   · Denied: Family history of Liver Cancer    Social History    · Denied: History of Alcohol Use (History)   · Current Some Day Smoker (305 1)   · Denied: History of Exercise Habits   · Marital History - Currently    · Tobacco use (305 1) (Z72 0)   · Working Full Time  The social history was reviewed and is unchanged  Current Meds    1  AmLODIPine Besylate 10 MG Oral Tablet; TAKE 1 TABLET EVERY MORNING; Therapy: 12MVB4251 to (Pauly Damian)  Requested for: 42CKO5651; Last     Rx:00Vzq5564 Ordered   2  Aditya Breeze 2 Test In [facility]; test blood sugar twice daiy; Therapy: 92EWE2549 to (Geena Gueydan)  Requested for: 88TZF2422; Last     Rx:38Ijw0582 Ordered   3  BuPROPion HCl ER (XL) 150 MG Oral Tablet Extended Release 24 Hour; take 1 tablet     by mouth every day; Therapy: 21WSQ2981 to (Evaluate:2018)  Requested for: 69RBY8491; Last     Rx:23Qbi4516 Ordered   4   CloNIDine HCl - 0 3 MG Oral Tablet; take 1 tablet twice a day; Therapy: 20CDF0060 to ([facility])  Requested for: 18MKN3467; Last     Rx:26Ear2979 Ordered   5  Enalapril Maleate 20 MG Oral Tablet; take 1 tablet twice a day; Therapy: 90GJB1480 to ([facility])  Requested for: 44NNU6947; Last     Rx:68Ace2235 Ordered   6  Glimepiride 4 MG Oral Tablet; Take 1 tablet daily; Therapy: 14Hzv8491 to ([facility])  Requested for: 14UBG9322; Last     Rx:18Uhc1205 Ordered   7  Januvia 100 MG Oral Tablet; take 1 tablet by mouth daily; Therapy: 78ZEF6746 to (Evaluate:53Wpw0641)  Requested for: 84XGX4578; Last     Rx:06Pqf6002 Ordered   8  Jardiance 10 MG Oral Tablet; take 1 tablet by mouth every morning; Therapy: 18TPX9439 to ([facility])  Requested for: 38KYF1601; Last     Rx:90Lnh6003 Ordered   9  Ketoconazole 2 % External Cream; APPLY A THIN LAYER TO AFFECTED AREA(S)     TWICE DAILY; Therapy: 40ZII4687 to ([phone])  Requested for: 28Oct2016; Last     Rx:28Oct2016 Ordered   10  Methylphenidate HCl - 10 MG Oral Tablet; Take 1 tablet twice daily; Therapy: 33ZNL9202 to (Evaluate:14Jan2018); Last Rx:44Okz7611 Ordered   11  NovoFine 32G X 6 MM Miscellaneous; USE AS DIRECTED; Therapy: 82GDT1640 to [phone])  Requested for: 25Apr2014; Last      Rx:48Jde0523 Ordered   12  OneTouch Verio In [employer]; TEST TWICE DAILY; Therapy: 93PVG4673 to (Evaluate:21Jun2016)  Requested for: 64SXM3839; Last      Rx:96Nau8645 Ordered   13  OneTouch Verio w/Device Kit; USE AS DIRECTED; Therapy: 16PTF6209 to (Last Rx:24Nov2015)  Requested for: 05QZU5576 Ordered   14  Tamsulosin HCl - 0 4 MG Oral Capsule; take 1 capsule daily; Therapy: 12RXR8538 to (José Luis Camacho)  Requested for: 49YAY1280; Last      Rx:06Sep2017 Ordered   15  Testosterone Cypionate 200 MG/ML Intramuscular Solution; inject IM every 2 weeks; Therapy: 70QLT7659 to (Last Rx:20Sep2017) Ordered   16  Vicodin 5-300 MG Oral Tablet; One by mouth  half hour prior to physical therapy; Therapy: 59YHZ0522 to (Evaluate:18Jan2018); Last Rx:29Nov2017 Ordered   17  Vitamin D (Ergocalciferol) 94713 UNIT Oral Capsule; TAKE ONE CAPSULE BY MOUTH      ONCE A WEEK; Therapy: 17AHE3803 to (Domitila Dye)  Requested for: 82SXU4841; Last      Rx:34Hki2639 Ordered   18  Vitamin D3 2000 UNIT Oral Capsule; TAKE 1 CAPSULE Daily; Therapy: 96YKF5394 to (Last Rx:29Mar2016)  Requested for: 29Mar2016 Ordered     The medication list was reviewed and updated today  Allergies   1  No Known Drug Allergies    Vitals    Recorded: 29Dec2017 11:15AM   Respiration 17   Height 5 ft 10 in   Weight 286 lb    BMI Calculated 41 04   BSA Calculated 2 43     Physical Exam   Left Foot: Appearance: Normal except as noted: excessive pronation-- and-- pes planus  Great toe deformities include a bunion  Tenderness: None except the great toe-- and-- distal first metatarsal     Right Foot: Appearance: Normal except as noted: excessive pronation-- and-- pes planus  Great toe deformities include a bunion  Tenderness: None except the great toe-- and-- distal first metatarsal     Left Ankle: ROM: limited ROM in all planes    Right Ankle: ROM: limited ROM in all planes    Neurological Exam: performed  Light touch was decreased bilaterally  Vibratory sensation was decreased in both first metatarsophalangeal joints  Vascular Exam: performed Dorsalis pedis pulses were present bilaterally  Posterior tibial pulses were present bilaterally  Elevation Pallor: absent bilaterally  Dependence rubor was absent bilaterally  Edema: none  Toenails: All of the toenails were elongated,-- hypertrophied,-- discolored-- and-- Ptotic  Both first toenails were tender-- and-- Positive onychogryphosis  Socks and shoes removed, Right Foot Findings: swollen, erythematous and dry        The sensory exam showed diminished vibratory sensation at the level of the toes  Diminished tactile sensation with monofilament testing throughout the right foot  Socks and shoes removed, Left Foot Findings: swollen, erythematous and dry  The sensory exam showed diminished vibratory sensation at the level of the toes  Diminished tactile sensation with monofilament testing throughout the left foot  Capillary refills findings on the right were normal in the toes  Pulses:      2+ in the posterior tibialis on the right      2+ in the dorsalis pedis on the right  Capillary refills findings on the left were normal in the toes  Pulses:      1+ in the posterior tibialis on the left      1+ in the dorsalis pedis on the left  Assign Risk Category: 2: Loss of protective sensation with or without weakness, deformity, callus, pre-ulcer, or history of ulceration  High risk  Hyperkeratosis: present on both first toes,-- present on both first sub metatarsals,-- present on both third sub metatarsals-- and-- Severe profound moccasin tinea pedis bilateral     Shoe Gear Evaluation: performed ()  Recommendation(s): SAS style  Procedure   The patient was educated on the diabetic foot and good glucose control  All mycotic nails debrided   Bilateral pre-ulcerative lesions debrided today without pain or complication      Future Appointments      Date/Time Provider Specialty Site   01/16/2018 05:30 PM Dottie Patterson MD Internal Medicine [facility] INTERNAL MED   01/22/2018 01:00 PM Marisel Tyler DO Orthopedic Surgery [facility] [address]     Signatures    Electronically signed by : Kimberly Butts DPM; Dec 29 2017 11:27AM EST                       (Author)

## 2018-01-01 DIAGNOSIS — M75.01 ADHESIVE CAPSULITIS OF RIGHT SHOULDER: ICD-10-CM

## 2018-01-01 DIAGNOSIS — I10 ESSENTIAL (PRIMARY) HYPERTENSION: ICD-10-CM

## 2018-01-01 DIAGNOSIS — M75.02 ADHESIVE CAPSULITIS OF LEFT SHOULDER: ICD-10-CM

## 2018-01-01 DIAGNOSIS — E11.9 TYPE 2 DIABETES MELLITUS WITHOUT COMPLICATIONS (HCC): ICD-10-CM

## 2018-01-02 ENCOUNTER — APPOINTMENT (OUTPATIENT)
Dept: PHYSICAL THERAPY | Facility: CLINIC | Age: 58
End: 2018-01-02
Payer: COMMERCIAL

## 2018-01-02 PROCEDURE — 97110 THERAPEUTIC EXERCISES: CPT

## 2018-01-02 PROCEDURE — 97140 MANUAL THERAPY 1/> REGIONS: CPT

## 2018-01-03 ENCOUNTER — APPOINTMENT (OUTPATIENT)
Dept: PHYSICAL THERAPY | Facility: CLINIC | Age: 58
End: 2018-01-03
Payer: COMMERCIAL

## 2018-01-08 ENCOUNTER — APPOINTMENT (OUTPATIENT)
Dept: PHYSICAL THERAPY | Facility: CLINIC | Age: 58
End: 2018-01-08
Payer: COMMERCIAL

## 2018-01-08 PROCEDURE — 97140 MANUAL THERAPY 1/> REGIONS: CPT

## 2018-01-08 PROCEDURE — 97110 THERAPEUTIC EXERCISES: CPT

## 2018-01-10 ENCOUNTER — APPOINTMENT (OUTPATIENT)
Dept: PHYSICAL THERAPY | Facility: CLINIC | Age: 58
End: 2018-01-10
Payer: COMMERCIAL

## 2018-01-10 PROCEDURE — 97110 THERAPEUTIC EXERCISES: CPT

## 2018-01-10 PROCEDURE — 97140 MANUAL THERAPY 1/> REGIONS: CPT

## 2018-01-10 NOTE — PROGRESS NOTES
Assessment    1  Diarrhea (787 91) (R19 7)   2  Type 2 diabetes mellitus (250 00) (E11 9)   3  Morbid or severe obesity due to excess calories (278 01) (E66 01)   4  Diabetes mellitus with neurological manifestation (250 60) (E11 49)   5  Hypertension (401 9) (I10)    Plan  Diabetes type 2, uncontrolled    · Begin or continue regular aerobic exercise  Gradually work up to at least 3 sessions of 30  minutes of exercise a week ; Status:Complete;   Done: 38YCM8046   · Inspect your feet daily ; Status:Complete;   Done: 78EDX2165   · (1) COMPREHENSIVE METABOLIC PANEL; Status:Active; Requested for:07Nov2016;   Hypertension    · (1) CBC/PLT/DIFF; Status:Active; Requested for:07Nov2016;   Type 2 diabetes mellitus    · (1) HEMOGLOBIN A1C; Status:Active; Requested for:07Nov2016;    · (1) MICROALBUMIN CREATININE RATIO, RANDOM URINE; Status:Active; Requested  for:07Nov2016;   Vitamin D deficiency    · (1) VITAMIN D 25-HYDROXY; Status:Active; Requested for:07Nov2016;     Discussion/Summary  Impression: health maintenance visit  Currently, he has an inadequate exercise regimen  Prostate cancer screening: prostate cancer screening is current  Colorectal cancer screening: colorectal cancer screening is current  The immunizations are up to date  He was advised to be evaluated by an ophthalmologist  Advice and education were given regarding aerobic exercise and weight loss  1  Diarrhea  Symptoms have resolved since stopping dairy intake  May be lactose intolerant, instructed to gradually introduce dairy back to his diet  2  Obesity  Congratulated with 10 lb weight loss, he is motivated to lose more  3  DM  A1c still improving, now at 7% on Jardiance, Januvia and glimepiride  (+) microalb, on enalapril  4  HTN  BP controlled with clonidine, amlodipine and enalapril  5  Hyperlipidemia  LDL slightly elevated  6  Insomnia, JEFF  Symptoms about the same, no meds, declined sleep study  7  BPH  On Flomax  8  HM  Updated  Follow up in 5 months or prn  Chief Complaint  physical      History of Present Illness  HM, Adult Male: The patient is being seen for a health maintenance evaluation  General Health: The patient's health since the last visit is described as fair  He complains of vision problems  Immunizations status: up to date  Lifestyle:  He consumes a diverse and healthy diet  He has weight concerns  He does not exercise regularly  Screening: Prostate cancer screening includes prostate-specific antigen testing last year  Colorectal cancer screening includes a colonoscopy within the past ten years  Metabolic screening includes lipid profile performed , glucose screening performed  and thyroid function test performed   Cardiovascular risk factors: hypertension, diabetes, stress, obesity and sedentary lifestyle  HPI: Mr Reyna Forrest reports having loose stools for about 1 5 weeks  He did not have any abdominal pain, cramping or bloating  Stools describes as soft and non bloody, "like pudding " No fever or chills, no nausea or vomiting  He thinks it is because he was having too much dairy products on a daily basis  Instead of coffee, he started drink fruit smoothies, noticed a bowel movement a few hours later  He was taking Imodium at bedtime and in the morning, stopped taking it a few days ago with improved bowels  He stopped dairy intake a few days ago and has not had loose stools since  He checks his sugars regularly, usually in the 150s  He sees the podiatrist every 8 weeks for nail care  He is hoping to get better insurance, paying off last visit and tests  He reports his leg is now back to normal, no redness or pain  Obesity (Follow-Up): The patient is being seen for follow-up of obesity  The patient reports doing well and a weight loss of 10 pounds  Interval symptoms:  improved poor eating habits  Associated symptoms: no constipation   The patient is not currently on medication for this problem  Diet plan: low calorie diet, limited junk food and limited high sugar drinks  Diabetes Type II (Follow-Up): The patient states he has been doing well with his Type II Diabetes control since the last visit  Comorbid Illnesses: hypertension, hyperlipidemia and obesity  Symptoms: The patient is currently asymptomatic  Associated symptoms include no polyuria  Home monitoring: The patient checks his blood sugars regularly  Glycemic control has been good  Medications: the patient is adherent with his medication regimen  Review of Systems    Constitutional: no fever and not feeling poorly  Eyes: no eyesight problems  Cardiovascular: no chest pain, no palpitations and no extremity edema  Respiratory: no shortness of breath and no cough  Gastrointestinal: no abdominal pain and no constipation  Genitourinary: no dysuria and no incontinence  Musculoskeletal: no arthralgias  Integumentary: no rashes and no itching  Neurological: no numbness and no dizziness  Psychiatric: sleep disturbances  Endocrine: no feelings of weakness  Hematologic/Lymphatic: no tendency for easy bruising  Active Problems    1  Acquired pes planus (734) (M21 40)   2  BPH without urinary obstruction (600 00) (N40 0)   3  Bunion (727 1) (M20 10)   4  Cellulitis of left leg (682 6) (L03 116)   5  Chronic systolic congestive heart failure (428 22,428 0) (I50 22)   6  Depression with anxiety (300 4) (F41 8)   7  Diabetes mellitus with neurological manifestation (250 60) (E11 49)   8  Diabetes type 2, uncontrolled (250 02) (E11 65)   9  Diarrhea (787 91) (R19 7)   10  Difficulty walking (719 7) (R26 2)   11  Encounter for screening for malignant neoplasm of colon (V76 51) (Z12 11)   12  Hallux valgus (735 0) (M20 10)   13  Hyperlipidemia (272 4) (E78 5)   14  Hypertension (401 9) (I10)   15  Insomnia (780 52) (G47 00)   16  Limb pain (729 5) (M79 609)   17   Morbid or severe obesity due to excess calories (278 01) (E66 01)   18  Need for pneumococcal vaccination (V03 82) (Z23)   19  Need for prophylactic vaccination and inoculation against influenza (V04 81) (Z23)   20  Onychomycosis (110 1) (B35 1)   21  Pain and swelling of left lower leg (729 5,729 81) (M79 662,M79 89)   22  Pain of lower extremity (729 5) (M79 606)   23  Pain, hand joint, right (719 44) (M79 641)   24  Sciatica (724 3) (M54 30)   25  Screening for colon cancer (V76 51) (Z12 11)   26  Seasonal allergies (477 9) (J30 2)   27  Spinal stenosis (724 00) (M48 00)   28  Tinea pedis (110 4) (B35 3)   29  Type 2 diabetes mellitus (250 00) (E11 9)   30  Vitamin D deficiency (268 9) (E55 9)    Past Medical History    · History of chest pain (V13 89) (D07 009)   · History of Neck pain (723 1) (M54 2)   · Need for pneumococcal vaccination (V03 82) (Z23)   · History of Nephrolithiasis (V13 01)   · History of Numbness On The Sole Of The Right Foot    The active problems and past medical history were reviewed and updated today  Surgical History    · History of Knee Arthroscopy   · History of Knee Surgery   · History of Needle Biopsy Of Prostate    Family History  Mother    · Family history of Alzheimer Disease   · Family history of Family Health Status Of Mother - Alive  Father    · Family history of Family Health Status Of Father -   Family History    · Family history of Adopted   · Denied: Family history of Colon Cancer   · Denied: Family history of Crohn's Disease   · Denied: Family history of Liver Cancer    Social History    · Denied: History of Alcohol Use (History)   · Current Some Day Smoker (305 1)   · Denied: History of Exercise Habits   · Marital History - Currently    · Tobacco use (305 1) (Z72 0)   · Working Full Time  The social history was reviewed and is unchanged  Current Meds   1  AmLODIPine Besylate 10 MG Oral Tablet; TAKE 1 TABLET EVERY MORNING;    Therapy: 63QFE8740 to (Evaluate:84Rkz2076)  Requested for: 52ARL9009; Last   Rx:49Fyl7884 Ordered   2  Aditya Breeze 2 Test In [employer] [facility]; test blood sugar twice daiy; Therapy: 47ZFS1263 to (Allan Carter)  Requested for: 53AUD0911; Last   Rx:98Wsi1065 Ordered   3  CloNIDine HCl - 0 3 MG Oral Tablet; take 1 tablet twice a day; Therapy: 17LVM6402 to (Evaluate:04Npn0835)  Requested for: 21UQY4220; Last   Rx:00Kmk4951 Ordered   4  Enalapril Maleate 20 MG Oral Tablet; take 1 tablet twice a day; Therapy: 52ZQE2074 to (Evaluate:62Dso0369)  Requested for: 69FIQ7251; Last   Rx:40Bay9929 Ordered   5  Glimepiride 4 MG Oral Tablet; Take 1 tablet daily; Therapy: 26Jje7608 to (Evaluate:84Qqw6383)  Requested for: 64DQK6914; Last   Rx:01Fzy9020 Ordered   6  Januvia 100 MG Oral Tablet; Take 1 tablet daily; Therapy: 78XKH7360 to (Evaluate:11Mpl1810)  Requested for: 57VDF0861; Last   Rx:83Xqi5713 Ordered   7  Jardiance 10 MG Oral Tablet; TAKE 1 TABLET EVERY MORNING; Therapy: 72AGI0219 to (Evaluate:92Bdg7659)  Requested for: 84TVI7152; Last   Rx:04Mar2016 Ordered   8  NovoFine 32G X 6 MM Miscellaneous; USE AS DIRECTED; Therapy: 22CCW8573 to ([phone] [phone])  Requested for: 64Sjf7616; Last   Rx:65Rsq2715 Ordered   9  [facility]; TEST TWICE DAILY; Therapy: 77FCY0966 to (Evaluate:21Jun2016)  Requested for: 01NAQ5868; Last   Rx:47Bau9137 Ordered   10  OneTouch Verio w/Device Kit; USE AS DIRECTED; Therapy: 19MYX0301 to (Last Rx:24Nov2015)  Requested for: 89SXA5105 Ordered   11  Tamsulosin HCl - 0 4 MG Oral Capsule; take 1 capsule daily; Therapy: 21XUT8293 to (Evaluate:12Nov2016)  Requested for: 45OKP7278; Last    Rx:46Tpq7361 Ordered   12  Vitamin D3 2000 UNIT Oral Capsule; TAKE 1 CAPSULE Daily; Therapy: 03SNF0318 to (Last Rx:29Mar2016)  Requested for: 29Mar2016 Ordered    The medication list was reviewed and updated today  Allergies    1   No Known Drug Allergies    Vitals   Recorded: 73JAC8635 02:54PM   Heart Rate 83   Respiration 18 Systolic 313   Diastolic 80   Height 5 ft 10 in   Weight 297 lb    BMI Calculated 42 61   BSA Calculated 2 47   O2 Saturation 96     Physical Exam    Constitutional   General appearance: No acute distress, well appearing and well nourished  comfortable, obese and clothing appropriate  Head and Face   Head and face: Normal     Eyes   Pupils and irises: Equal, round, reactive to light  Ears, Nose, Mouth, and Throat   External inspection of ears and nose: Normal     Neck   Neck: Supple, symmetric, trachea midline, no masses  Pulmonary   Respiratory effort: No increased work of breathing or signs of respiratory distress  Auscultation of lungs: Clear to auscultation  Cardiovascular   Auscultation of heart: Normal rate and rhythm, normal S1 and S2, no murmurs  Examination of extremities for edema and/or varicosities: Normal     Abdomen   Abdomen: Non-tender, no masses  Musculoskeletal   Gait and station: Normal     Skin   Skin and subcutaneous tissue: Normal without rashes or lesions  Neurologic   Cranial nerves: Cranial nerves 2-12 intact  Cortical function: Normal mental status  Psychiatric   Orientation to person, place and time: Normal     Mood and affect: Normal        Results/Data  Yvonneshshana 29GKQ1112 03:18PM User, Ahs     Test Name Result Flag Reference   SBIRT Screen - Tobacco Screening Result Positive         Health Management  Screening for colon cancer   COLONOSCOPY; every 2 years; Last 35Ifz1605; Next Due: 88Vbt6803; Overdue  Type 2 diabetes mellitus   *VB - Eye Exam; every 1 year; Last 00DEW2833; Next Due: 21GSQ5298; Overdue  *VB-Foot Exam; every 1 year; Last 31Set8347; Next Due: 09Ylk4288;  Active    Future Appointments    Date/Time Provider Specialty Site   11/30/2016 04:00 PM Lary Trinidad MD Internal Medicine [facility] INTERNAL MED   07/01/2016 11:00 AM Smooth Devi DPM Podiatry [facility]     Signatures   Electronically signed by : Octavio Rubio MD; Jun 27 2016  3:38PM EST                       (Author)

## 2018-01-11 NOTE — MISCELLANEOUS
Message  GI Reminder Recall Kristofer Tremayne:   Date: 04/26/2016   Dear Saloni Toth:     Review of our records shows you are due for the following: Colonoscopy (With two-day prep)  Please call the following office to schedule your appointment:   [address] [facility], [facility] [address], [address] [place] [address]  [phone]  We look forward to hearing from you! Sincerely,         Signatures   Electronically signed by :  Humera Kennedy, ; Apr 26 2016  2:22PM EST                       (Author)

## 2018-01-11 NOTE — PROGRESS NOTES
Chief Complaint  Patient was here to get his first dose of his testosterone injection  Patient preferred getting on his belly vs on his buttock      Active Problems    1  Acquired pes planus (734) (M21 40)   2  Acute renal failure (584 9) (N17 9)   3  Arthralgia (719 40) (M25 50)   4  BPH without urinary obstruction (600 00) (N40 0)   5  Bunion (727 1) (M21 619)   6  Callus (700) (L84)   7  Chronic systolic congestive heart failure (428 22,428 0) (I50 22)   8  Depression with anxiety (300 4) (F41 8)   9  Diabetes mellitus with neurological manifestation (250 60) (E11 49)   10  Diabetes type 2, uncontrolled (250 02) (E11 65)   11  Difficulty walking (719 7) (R26 2)   12  Dyspnea on exertion (786 09) (R06 09)   13  Encounter for prostate cancer screening (V76 44) (Z12 5)   14  Encounter for screening for malignant neoplasm of colon (V76 51) (Z12 11)   15  Fatigue (780 79) (R53 83)   16  Foot pain, bilateral (729 5) (M79 671,M79 672)   17  Hallux valgus (735 0) (M20 10)   18  Hematuria (599 70) (R31 9)   19  Hyperlipidemia (272 4) (E78 5)   20  Hypertension (401 9) (I10)   21  Hypogonadism, male (257 2) (E29 1)   22  Insomnia (780 52) (G47 00)   23  Morbid or severe obesity due to excess calories (278 01) (E66 01)   24  Need for pneumococcal vaccination (V03 82) (Z23)   25  Need for prophylactic vaccination and inoculation against influenza (V04 81) (Z23)   26  Nephrolithiasis (592 0) (N20 0)   27  Onychomycosis (110 1) (B35 1)   28  JEFF (obstructive sleep apnea) (327 23) (G47 33)   29  Sciatica (724 3) (M54 30)   30  Screening for colon cancer (V76 51) (Z12 11)   31  Seasonal allergies (477 9) (J30 2)   32  Spinal stenosis (724 00) (M48 00)   33  Tinea pedis (110 4) (B35 3)   34  Type 2 diabetes mellitus (250 00) (E11 9)   35  Vitamin D deficiency (268 9) (E55 9)    Current Meds   1  AmLODIPine Besylate 10 MG Oral Tablet; TAKE 1 TABLET EVERY MORNING;    Therapy: 14NOE3696 to (Zoë Roman)  Requested for: 57VVU7222; Last   RH:17NWA3437 Ordered   2  Aditya Breeze 2 Test In [facility]; test blood sugar twice daiy; Therapy: 66CVC9466 to (Memo Ty)  Requested for: 62LZD8664; Last   Rx:67Dya8133 Ordered   3  CloNIDine HCl - 0 3 MG Oral Tablet; take 1 tablet twice a day; Therapy: 93HZB7854 to (Faisal Darden)  Requested for: 28OTO7715; Last   Rx:65Reb5413 Ordered   4  Enalapril Maleate 20 MG Oral Tablet ([employer]); take 1 tablet twice a day; Therapy: 14XXT1040 to (Faisal Darden)  Requested for: 56CIQ1995; Last   Rx:37Wcy0259 Ordered   5  Glimepiride 4 MG Oral Tablet; Take 1 tablet daily; Therapy: 37Tyb7661 to (Faisal Darden)  Requested for: 90XSB1464; Last   Rx:88Xky2482 Ordered   6  Januvia 100 MG Oral Tablet; take 1 tablet by mouth daily; Therapy: 70DZQ1355 to (Evaluate:52Cwi0922)  Requested for: 69BIN9003; Last   Rx:39Bue9979 Ordered   7  Jardiance 10 MG Oral Tablet; take 1 tablet by mouth every morning; Therapy: 49LJT1613 to (Faisal Darden)  Requested for: 10SVP4205; Last   Rx:72Bvz6201 Ordered   8  Ketoconazole 2 % External Cream; APPLY A THIN LAYER TO AFFECTED AREA(S)   TWICE DAILY; Therapy: 23QND1815 to (21 )  Requested for: 07TJC4541; Last   Rx:33Uie7463 Ordered   9  NovoFine 32G X 6 MM Miscellaneous; USE AS DIRECTED; Therapy: 66UEE3982 to (03 17 74 30 53)  Requested for: 05Kuf0634; Last   Rx:33Hmr2515 Ordered   10  OneTouch Verio In [facility]; TEST TWICE DAILY; Therapy: 58NSS2763 to (Evaluate:52Xzp5809)  Requested for: 44HNK0103; Last    Rx:35Jcd2408 Ordered   11  OneTouch Verio w/Device Kit; USE AS DIRECTED; Therapy: 04VRW0925 to (Last Rx:24Nov2015)  Requested for: 87BQN3483 Ordered   12  Tamsulosin HCl - 0 4 MG Oral Capsule (Flomax); take 1 capsule daily; Therapy: 62ISH5938 to (Faisal Darden)  Requested for: 90BCQ7269; Last    Rx:06Sep2017 Ordered   13   Testosterone Cypionate 200 MG/ML Intramuscular Solution; inject IM every 2 weeks; Therapy: 26MZD0169 to (Last Rx:20Sep2017) Ordered   14  Vitamin D3 2000 UNIT Oral Capsule; TAKE 1 CAPSULE Daily; Therapy: 90YAI3909 to (Last Rx:29Mar2016)  Requested for: 76CQT2237 Ordered    Allergies    1   No Known Drug Allergies    Future Appointments    Date/Time Provider Specialty Site   01/10/2018 04:00 PM Jose Mendez MD Internal Medicine [facility] INTERNAL MED   11/01/2017 02:15 PM Lizz Silva DPM Podiatry [facility]   10/06/2017 01:30 PM Nurse Samantha Schedule  [facility] INTERNAL MED     Signatures   Electronically signed by : Ramirez Acosta; Sep 22 2017  2:20PM EST                       (Co-author)    Electronically signed by : Jacobo Craig MD; Sep 22 2017  4:36PM EST                       (Author)

## 2018-01-12 VITALS
DIASTOLIC BLOOD PRESSURE: 88 MMHG | HEIGHT: 70 IN | HEART RATE: 70 BPM | SYSTOLIC BLOOD PRESSURE: 136 MMHG | TEMPERATURE: 98.3 F | WEIGHT: 284 LBS | OXYGEN SATURATION: 97 % | RESPIRATION RATE: 16 BRPM | BODY MASS INDEX: 40.66 KG/M2

## 2018-01-13 VITALS
OXYGEN SATURATION: 96 % | DIASTOLIC BLOOD PRESSURE: 84 MMHG | HEART RATE: 80 BPM | BODY MASS INDEX: 42.31 KG/M2 | WEIGHT: 295.5 LBS | RESPIRATION RATE: 18 BRPM | HEIGHT: 70 IN | SYSTOLIC BLOOD PRESSURE: 138 MMHG

## 2018-01-13 VITALS — HEIGHT: 70 IN | HEART RATE: 80 BPM | BODY MASS INDEX: 40.94 KG/M2 | WEIGHT: 286 LBS | RESPIRATION RATE: 16 BRPM

## 2018-01-13 VITALS
HEART RATE: 76 BPM | HEIGHT: 70 IN | TEMPERATURE: 98.4 F | OXYGEN SATURATION: 96 % | RESPIRATION RATE: 18 BRPM | BODY MASS INDEX: 41.57 KG/M2 | DIASTOLIC BLOOD PRESSURE: 82 MMHG | WEIGHT: 290.38 LBS | SYSTOLIC BLOOD PRESSURE: 132 MMHG

## 2018-01-13 VITALS — WEIGHT: 295 LBS | BODY MASS INDEX: 42.23 KG/M2 | HEIGHT: 70 IN | RESPIRATION RATE: 18 BRPM

## 2018-01-13 NOTE — RESULT NOTES
Verified Results  ECHO COMPLETE WITH CONTRAST IF INDICATED 70OAM6220 02:27PM Kathi Rios     Test Name Result Flag Reference   ECHO COMPLETE WITH CONTRAST IF INDICATED (Report)     [facility], [address] [place] [address]   [phone]     Transthoracic Echocardiogram   2D, M-mode, Doppler, and Color Doppler     Study date: 28-Sep-2017     Patient: Joanna Nielsen   MR number: [id number]   Account number: [de-identified]   : 1960   Age: 62 years   Gender: Male   Status: Outpatient   Location: [address] [place] [address]   Height: 70 in   Weight: 285 3 lb   BP: 138/ 88 mmHg     Indications: Dyspnea     Diagnoses: R06 00 - Dyspnea, unspecified     Sonographer: MOHINDER Herr   Primary Physician: Delio Wilson   Referring Physician: Zayra Appanoose: [facility]   Interpreting Physician: Smith Gonzalez MD     SUMMARY     LEFT VENTRICLE:   Systolic function was normal  Ejection fraction was estimated to be 60 %  There were no regional wall motion abnormalities  HISTORY: PRIOR HISTORY: Hypertension, JEFF, smoker, obesity     PROCEDURE: The study was performed in the [address] [place] [address]  This was a routine study  The transthoracic approach was used  The study included complete 2D imaging, M-mode, complete spectral Doppler, and color Doppler  The   heart rate was 62 bpm, at the start of the study  Images were obtained from the parasternal, apical, subcostal, and suprasternal notch acoustic windows  Image quality was adequate  LEFT VENTRICLE: Size was normal  Systolic function was normal  Ejection fraction was estimated to be 60 %  There were no regional wall motion abnormalities   Wall thickness was normal  DOPPLER: Left ventricular diastolic function parameters   were normal      RIGHT VENTRICLE: The size was normal  Systolic function was normal  Wall thickness was normal      LEFT ATRIUM: Size was normal      RIGHT ATRIUM: Size was normal      MITRAL VALVE: Valve structure was normal  There was normal leaflet separation  DOPPLER: The transmitral velocity was within the normal range  There was no evidence for stenosis  There was no regurgitation  AORTIC VALVE: The valve was trileaflet  Leaflets exhibited normal thickness and normal cuspal separation  DOPPLER: Transaortic velocity was within the normal range  There was no evidence for stenosis  There was no regurgitation  TRICUSPID VALVE: The valve structure was normal  There was normal leaflet separation  DOPPLER: The transtricuspid velocity was within the normal range  There was no evidence for stenosis  There was no regurgitation  PULMONIC VALVE: Leaflets exhibited normal thickness, no calcification, and normal cuspal separation  DOPPLER: The transpulmonic velocity was within the normal range  There was no regurgitation  PERICARDIUM: There was no pericardial effusion  AORTA: The root exhibited normal size  SYSTEMIC VEINS: IVC: The inferior vena cava was normal in size and course   Respirophasic changes were normal      SYSTEM MEASUREMENT TABLES     2D   %FS: 48 22 %   AV Diam: 3 28 cm   EDV(Teich): 118 5 ml   EF(Cube): 86 12 %   EF(Teich): 79 4 %   ESV(Cube): 17 4 ml   ESV(Teich): 24 41 ml   IVSd: 1 39 cm   LA Area: 15 87 cm2   LA Diam: 3 77 cm   LVEDV MOD A4C: 82 13 ml   LVEF MOD A4C: 64 47 %   LVESV MOD A4C: 29 18 ml   LVIDd: 5 cm   LVIDs: 2 59 cm   LVLd A4C: 8 05 cm   LVLs A4C: 6 65 cm   LVOT Diam: 1 98 cm   LVPWd: 1 01 cm   RA Area: 14 81 cm2   RV Diam: 3 91 cm   SI(Cube): 44 42 ml/m2   SI(Teich): 38 72 ml/m2   SV MOD A4C: 52 94 ml   SV(Cube): 107 95 ml   SV(Teich): 94 09 ml     MM   TAPSE: 2 5 cm     PW   E': 0 08 m/s   E/E': 8 55   MV A Matheus: 0 86 m/s   MV Dec [place]: 4 11 m/s2   MV DecT: 173 11 ms   MV E Matheus: 0 71 m/s   MV E/A Ratio: 0 83     [employer] Accredited Echocardiography Laboratory     Prepared and electronically signed by     Vee Estrada MD   Signed [id number] 17:34:59

## 2018-01-13 NOTE — RESULT NOTES
Verified Results  VAS LOWER LIMB VENOUS DUPLEX STUDY, UNILATERAL/LIMITED 30GIB1038 02:55PM Dottie Patterson    Order Number: [id number]     Test Name Result Flag Reference   VAS LOWER LIMB VENOUS DUPLEX STUDY, UNILATERAL/LIMITED (Report)     [facility] REPORT   CLINICAL:   Indications: Left Swelling of Limb [R22 4]  Left Limb Pain [M79 609]  with   redness and erythema starting 2 days ago  Risk Factors: The patient has history of obesity  Clinical:   Left Lower Limb   There is complaint of pain, edema, inflammation and pigmentation  CONCLUSION:   Impression:   RIGHT LOWER LIMB LIMITED: NORMAL   Evaluation shows no evidence of thrombus in the common femoral vein  Doppler evaluation shows a normal response to augmentation maneuvers  LEFT LOWER LIMB: NORMAL   No evidence of acute or chronic deep vein thrombosis   No evidence of superficial thrombophlebitis noted  Doppler evaluation shows a normal response to augmentation maneuvers  Popliteal, posterior tibial and anterior tibial arterial Doppler waveforms are   triphasic  No prior study for comparison        SIGNATURE:   Electronically Signed by: Reji Reyes MD on 2016-05-12 05:34:40 PM

## 2018-01-14 VITALS — BODY MASS INDEX: 42.66 KG/M2 | HEIGHT: 70 IN | WEIGHT: 298 LBS | HEART RATE: 74 BPM | RESPIRATION RATE: 17 BRPM

## 2018-01-14 NOTE — RESULT NOTES
Verified Results  (1) EVI SCREEN W/REFLEX TO TITER/PATTERN 79Ydk7291 04:40PM Chico Nguyen Order Number: [id number]     Test Name Result Flag Reference   EVI SCREEN  Negative  Negative     (1) TESTOSTERONE, FREE (DIRECT) AND TOTAL 22Krf9894 04:40PM Chico Nguyen Order Number: [id number]     Test Name Result Flag Reference   FREE TESTOSTERONE, DIRECT 5 7 pg/mL L 7 2 - 24 0   TESTOSTERONE (TOTAL) 177 ng/dL L 264 - 65   Adult male reference interval is based on a population of  healthy nonobese males (BMI <30) between 23and 44years old  [address] [place] [address], [address] [place] [address] [phone]  PMID: [id number]      Performed at:  [address]  [id number]  : Jasmyn Coy MD, Phone:  [phone]

## 2018-01-15 ENCOUNTER — APPOINTMENT (OUTPATIENT)
Dept: PHYSICAL THERAPY | Facility: CLINIC | Age: 58
End: 2018-01-15
Payer: COMMERCIAL

## 2018-01-15 VITALS
SYSTOLIC BLOOD PRESSURE: 134 MMHG | TEMPERATURE: 97.9 F | BODY MASS INDEX: 42.68 KG/M2 | OXYGEN SATURATION: 95 % | DIASTOLIC BLOOD PRESSURE: 90 MMHG | WEIGHT: 298.13 LBS | HEIGHT: 70 IN | HEART RATE: 74 BPM | RESPIRATION RATE: 18 BRPM

## 2018-01-15 VITALS — HEART RATE: 88 BPM | WEIGHT: 293 LBS | RESPIRATION RATE: 17 BRPM | BODY MASS INDEX: 41.95 KG/M2 | HEIGHT: 70 IN

## 2018-01-15 PROCEDURE — G8991 OTHER PT/OT GOAL STATUS: HCPCS

## 2018-01-15 PROCEDURE — 97140 MANUAL THERAPY 1/> REGIONS: CPT

## 2018-01-15 PROCEDURE — G8990 OTHER PT/OT CURRENT STATUS: HCPCS

## 2018-01-15 PROCEDURE — 97110 THERAPEUTIC EXERCISES: CPT

## 2018-01-15 NOTE — PROGRESS NOTES
Chief Complaint  Pt was here to get his testosterone shot      Active Problems    1  Acquired pes planus (734) (M21 40)   2  Acute renal failure (584 9) (N17 9)   3  Arthralgia (719 40) (M25 50)   4  BPH without urinary obstruction (600 00) (N40 0)   5  Bunion (727 1) (M21 619)   6  Callus (700) (L84)   7  Chronic systolic congestive heart failure (428 22,428 0) (I50 22)   8  Depression with anxiety (300 4) (F41 8)   9  Diabetes mellitus with neurological manifestation (250 60) (E11 49)   10  Diabetes type 2, uncontrolled (250 02) (E11 65)   11  Difficulty walking (719 7) (R26 2)   12  Dyspnea on exertion (786 09) (R06 09)   13  Encounter for prostate cancer screening (V76 44) (Z12 5)   14  Encounter for screening for malignant neoplasm of colon (V76 51) (Z12 11)   15  Fatigue (780 79) (R53 83)   16  Foot pain, bilateral (729 5) (M79 671,M79 672)   17  Hallux valgus (735 0) (M20 10)   18  Hematuria (599 70) (R31 9)   19  Hyperlipidemia (272 4) (E78 5)   20  Hypertension (401 9) (I10)   21  Hypogonadism, male (257 2) (E29 1)   22  Insomnia (780 52) (G47 00)   23  Morbid or severe obesity due to excess calories (278 01) (E66 01)   24  Need for pneumococcal vaccination (V03 82) (Z23)   25  Need for prophylactic vaccination and inoculation against influenza (V04 81) (Z23)   26  Nephrolithiasis (592 0) (N20 0)   27  Onychomycosis (110 1) (B35 1)   28  JEFF (obstructive sleep apnea) (327 23) (G47 33)   29  Sciatica (724 3) (M54 30)   30  Screening for colon cancer (V76 51) (Z12 11)   31  Seasonal allergies (477 9) (J30 2)   32  Spinal stenosis (724 00) (M48 00)   33  Tinea pedis (110 4) (B35 3)   34  Type 2 diabetes mellitus (250 00) (E11 9)   35  Vitamin D deficiency (268 9) (E55 9)    Current Meds   1  AmLODIPine Besylate 10 MG Oral Tablet; TAKE 1 TABLET EVERY MORNING; Therapy: 14OFD6201 to (Rajani Asencio)  Requested for: 00UKJ8977; Last   Rx:09Roj7082 Ordered   2   [employer] 2 Test In [employer]; test blood sugar twice daiy; Therapy: 21HAJ8987 to (Griffin File)  Requested for: 74ETH3401; Last   Rx:29Grc9529 Ordered   3  CloNIDine HCl - 0 3 MG Oral Tablet; take 1 tablet twice a day; Therapy: 98KWH0675 to (Sushil Tobar)  Requested for: 78VEI8244; Last   Rx:73Rvm6500 Ordered   4  Enalapril Maleate 20 MG Oral Tablet; take 1 tablet twice a day; Therapy: 17OQD8351 to (Sushil Tobar)  Requested for: 21EMJ1589; Last   Rx:24Zwo3360 Ordered   5  Glimepiride 4 MG Oral Tablet; Take 1 tablet daily; Therapy: 56Pgz1882 to (Sushil Tobar)  Requested for: 94AOD1186; Last   Rx:59Jju9498 Ordered   6  Januvia 100 MG Oral Tablet; take 1 tablet by mouth daily; Therapy: 03GTZ3183 to (Evaluate:54Syd4958)  Requested for: 18WAH0329; Last   Rx:37Vky8747 Ordered   7  Jardiance 10 MG Oral Tablet; take 1 tablet by mouth every morning; Therapy: 61MEK6221 to (Sushil Tobar)  Requested for: 70UQP8028; Last   Rx:94Zds9724 Ordered   8  Ketoconazole 2 % External Cream; APPLY A THIN LAYER TO AFFECTED AREA(S)   TWICE DAILY; Therapy: 14THY7857 to [phone])  Requested for: 36NBY1672; Last   Rx:32Enf8634 Ordered   9  NovoFine 32G X 6 MM Miscellaneous; USE AS DIRECTED; Therapy: 31CHO8630 to ()  Requested for: 25Apr2014; Last   Rx:97Vbp0102 Ordered   10  OneTouch Verio In [employer]; TEST TWICE DAILY; Therapy: 24KLE4050 to (Evaluate:21Jun2016)  Requested for: 87YXF8608; Last    Rx:22Eik8369 Ordered   11  OneTouch Verio w/Device Kit; USE AS DIRECTED; Therapy: 15JGX7805 to (Last Rx:24Nov2015)  Requested for: 47FKJ7575 Ordered   12  Tamsulosin HCl - 0 4 MG Oral Capsule; take 1 capsule daily; Therapy: 19UPO4364 to (Sushil Tobar)  Requested for: 08ZXH6162; Last    Rx:93Kuw9818 Ordered   13  Testosterone Cypionate 200 MG/ML Intramuscular Solution; inject IM every 2 weeks; Therapy: 91VLU1995 to (Last Rx:20Sep2017) Ordered   14   Vitamin D3 2000 UNIT Oral Capsule; TAKE 1 CAPSULE Daily; Therapy: 83KCT8280 to (Last Rx:29Mar2016)  Requested for: 08RNK1795 Ordered    Allergies    1   No Known Drug Allergies    Plan  Fatigue    · Testosterone Cypionate 200 MG/ML Intramuscular Solution    Future Appointments    Date/Time Provider Specialty Site   12/15/2017 01:00 PM Yelena Villarreal MD Internal Medicine [facility]   11/01/2017 02:15 PM Francisca Grover DPM Podiatry [facility]   10/20/2017 01:00 PM Juan Newby, Nurse Schedule  [facility]     Signatures   Electronically signed by : Ramirez Sullivan; Oct  6 2017  1:37PM EST                       (Co-author)    Electronically signed by : Wing Elle MD; Oct  6 2017  1:38PM EST                       (Author)

## 2018-01-15 NOTE — PROGRESS NOTES
Chief Complaint  Patient is here to get his testosterone shot      Active Problems    1  Acquired pes planus (734) (M21 40)   2  Acute renal failure (584 9) (N17 9)   3  Arthralgia (719 40) (M25 50)   4  BPH without urinary obstruction (600 00) (N40 0)   5  Bunion (727 1) (M21 619)   6  Callus (700) (L84)   7  Chronic systolic congestive heart failure (428 22,428 0) (I50 22)   8  Depression with anxiety (300 4) (F41 8)   9  Diabetes mellitus with neurological manifestation (250 60) (E11 49)   10  Diabetes type 2, uncontrolled (250 02) (E11 65)   11  Difficulty walking (719 7) (R26 2)   12  Dyspnea on exertion (786 09) (R06 09)   13  Encounter for prostate cancer screening (V76 44) (Z12 5)   14  Encounter for screening for malignant neoplasm of colon (V76 51) (Z12 11)   15  Fatigue (780 79) (R53 83)   16  Foot pain, bilateral (729 5) (M79 671,M79 672)   17  Hallux valgus (735 0) (M20 10)   18  Hematuria (599 70) (R31 9)   19  Hyperlipidemia (272 4) (E78 5)   20  Hypertension (401 9) (I10)   21  Hypogonadism, male (257 2) (E29 1)   22  Insomnia (780 52) (G47 00)   23  Morbid or severe obesity due to excess calories (278 01) (E66 01)   24  Need for pneumococcal vaccination (V03 82) (Z23)   25  Need for prophylactic vaccination and inoculation against influenza (V04 81) (Z23)   26  Nephrolithiasis (592 0) (N20 0)   27  Onychomycosis (110 1) (B35 1)   28  JEFF (obstructive sleep apnea) (327 23) (G47 33)   29  Sciatica (724 3) (M54 30)   30  Screening for colon cancer (V76 51) (Z12 11)   31  Seasonal allergies (477 9) (J30 2)   32  Shoulder pain, right (719 41) (M25 511)   33  Spinal stenosis (724 00) (M48 00)   34  Tinea pedis (110 4) (B35 3)   35  Type 2 diabetes mellitus (250 00) (E11 9)   36  Vitamin D deficiency (268 9) (E55 9)    Current Meds   1  AmLODIPine Besylate 10 MG Oral Tablet; TAKE 1 TABLET EVERY MORNING;    Therapy: 07ZCD0429 to (Fabiana Pringle)  Requested for: 07YSF0075; Last   Rx:27Qgk3196 Ordered 2  Aditya Breeze 2 Test In [employer]; test blood sugar twice daiy; Therapy: 44JHC1737 to ([facility])  Requested for: 73XLO2517; Last   Rx:40Euv1575 Ordered   3  BuPROPion HCl ER (XL) 150 MG Oral Tablet Extended Release 24 Hour; TAKE 1   TABLET BY MOUTH EVERY DAY; Therapy: 08QCP8945 to (Last Rx:03Nov2017)  Requested for: 12VXK2265 Ordered   4  CloNIDine HCl - 0 3 MG Oral Tablet; take 1 tablet twice a day; Therapy: 73JXD3055 to (Chelsie Bernabe)  Requested for: 53OXB8418; Last   Rx:45Zyc3243 Ordered   5  Enalapril Maleate 20 MG Oral Tablet (Vasotec); take 1 tablet twice a day; Therapy: 14WKI6437 to (Chelsie Bernabe)  Requested for: 42FTY9803; Last   Rx:33Cxy3345 Ordered   6  Glimepiride 4 MG Oral Tablet; Take 1 tablet daily; Therapy: 09Tfv3371 to (Chelsie Bernabe)  Requested for: 40GQL8050; Last   Rx:82Whe5230 Ordered   7  Januvia 100 MG Oral Tablet; take 1 tablet by mouth daily; Therapy: 16WFF7964 to (Evaluate:11Mlt9556)  Requested for: 54EDE9967; Last   Rx:67Luf8852 Ordered   8  Jardiance 10 MG Oral Tablet; take 1 tablet by mouth every morning; Therapy: 00HFY6305 to (Chelsie Bernabe)  Requested for: 36XPA3129; Last   Rx:22Jod6431 Ordered   9  Ketoconazole 2 % External Cream; APPLY A THIN LAYER TO AFFECTED AREA(S)   TWICE DAILY; Therapy: 93FZA7844 to ([phone])  Requested for: 28Oct2016; Last   Rx:28Oct2016 Ordered   10  NovoFine 32G X 6 MM Miscellaneous; USE AS DIRECTED; Therapy: 58PUA6745 to ([phone])  Requested for: 25Apr2014; Last    Rx:17Rql7647 Ordered   11  OneTouch Verio In [employer]; TEST TWICE DAILY; Therapy: 70NYK4721 to (Evaluate:21Jun2016)  Requested for: 73JLM5472; Last    Rx:31Wdb4131 Ordered   12  OneTouch Verio w/Device Kit; USE AS DIRECTED; Therapy: 58WLQ9735 to (Last Rx:24Nov2015)  Requested for: 25VPE8432 Ordered   13  Tamsulosin HCl - 0 4 MG Oral Capsule (Flomax); take 1 capsule daily;     Therapy: 65TFH8288 to (Maria Antonia Oneill)  Requested for: 61HWG7162; Last    Rx:41Vzh3638 Ordered   14  Testosterone Cypionate 200 MG/ML Intramuscular Solution; inject IM every 2 weeks; Therapy: 21VTC4792 to (Last Rx:25Vis6484) Ordered   15  Vitamin D3 2000 UNIT Oral Capsule; TAKE 1 CAPSULE Daily; Therapy: 59PHS9154 to (Last Rx:29Mar2016)  Requested for: 17ECI0865 Ordered    Allergies    1   No Known Drug Allergies    Future Appointments    Date/Time Provider Specialty Site   12/15/2017 01:00 PM Allison Villarreal MD Internal Medicine [facility] INTERNAL MED   12/29/2017 11:15 AM Ajay Naqvi DPM Podiatry [facility]   11/22/2017 10:00 AM Kathy Chapin DO Orthopedic Surgery [facility] [address]     Signatures   Electronically signed by : Ramirez Sanchez; Nov 17 2017  1:19PM EST                       (Co-author)    Electronically signed by : Sophia Medeiros MD; Nov 17 2017  3:41PM EST                       (Author)

## 2018-01-16 ENCOUNTER — GENERIC CONVERSION - ENCOUNTER (OUTPATIENT)
Dept: OTHER | Facility: OTHER | Age: 58
End: 2018-01-16

## 2018-01-17 ENCOUNTER — APPOINTMENT (OUTPATIENT)
Dept: PHYSICAL THERAPY | Facility: CLINIC | Age: 58
End: 2018-01-17
Payer: COMMERCIAL

## 2018-01-17 PROCEDURE — 97110 THERAPEUTIC EXERCISES: CPT

## 2018-01-18 NOTE — RESULT NOTES
Message   let patient know hand x-ray results are back and look fine  recommend to continue with current treatment plan & see hand specialist for evaluation     Verified Results  * XR HAND 3+ VIEW RIGHT 73LED4571 07:00AM Griselda Sheldon    Order Number: [id number]   Performing Comments: right hand digits pain for 2 weeks with localized swelling of proximal 5th digit - r/o bony etiology     Test Name Result Flag Reference   XR HAND 3+ VW RIGHT (Report)     RIGHT HAND     INDICATION: Joint pain and swelling  COMPARISON: None     VIEWS: 3; 3 images     For the purposes of institution wide universal language the following terms will apply: (thumb=1st digit/finger, index finger=2nd digit/finger, long finger=3rd digit/finger, ring=4th digit/finger and small finger=5th digit/finger)     FINDINGS:     There is no acute fracture or dislocation  No degenerative changes  No lytic or blastic lesions are seen  Soft tissues are unremarkable  IMPRESSION:     Normal examination         Workstation performed: [id number]     Signed by:   Elisabeth Hill MD   2/19/16

## 2018-01-22 ENCOUNTER — GENERIC CONVERSION - ENCOUNTER (OUTPATIENT)
Dept: OTHER | Facility: OTHER | Age: 58
End: 2018-01-22

## 2018-01-22 ENCOUNTER — ALLSCRIPTS OFFICE VISIT (OUTPATIENT)
Dept: OTHER | Facility: OTHER | Age: 58
End: 2018-01-22

## 2018-01-22 ENCOUNTER — APPOINTMENT (OUTPATIENT)
Dept: PHYSICAL THERAPY | Facility: CLINIC | Age: 58
End: 2018-01-22
Payer: COMMERCIAL

## 2018-01-22 VITALS
DIASTOLIC BLOOD PRESSURE: 84 MMHG | SYSTOLIC BLOOD PRESSURE: 130 MMHG | OXYGEN SATURATION: 97 % | HEIGHT: 70 IN | BODY MASS INDEX: 40.98 KG/M2 | WEIGHT: 286.25 LBS | RESPIRATION RATE: 18 BRPM | HEART RATE: 68 BPM | TEMPERATURE: 97.6 F

## 2018-01-22 VITALS
WEIGHT: 286 LBS | RESPIRATION RATE: 16 BRPM | HEIGHT: 70 IN | OXYGEN SATURATION: 98 % | SYSTOLIC BLOOD PRESSURE: 138 MMHG | DIASTOLIC BLOOD PRESSURE: 88 MMHG | TEMPERATURE: 97.9 F | HEART RATE: 80 BPM | BODY MASS INDEX: 40.94 KG/M2

## 2018-01-22 VITALS
WEIGHT: 290.38 LBS | HEIGHT: 70 IN | SYSTOLIC BLOOD PRESSURE: 177 MMHG | HEART RATE: 86 BPM | BODY MASS INDEX: 41.57 KG/M2 | DIASTOLIC BLOOD PRESSURE: 112 MMHG

## 2018-01-23 VITALS — BODY MASS INDEX: 40.94 KG/M2 | HEIGHT: 70 IN | RESPIRATION RATE: 17 BRPM | WEIGHT: 286 LBS

## 2018-01-23 NOTE — PROGRESS NOTES
Plan   Adhesive capsulitis of both shoulders    · *1 - [facility]  * continue with current treatment please work    on internal external rotation as much as possible  Patient has significantly improved in    the 8 weeks that he has been working with you  2 times a week    to 3 times a week if needed for next 6-8 weeks if necessary  Status: Active  Requested    for: 62RHQ8687  Care Summary provided  : Yes    Chief Complaint   1  Shoulder Pain    Discussion/Summary      Right shoulder subacromial injection Continue with physical therapy follow up with us in another 8 weeks  I did talk to her about possibly give him an injection in the glenohumeral joint if he still having pain that requires it  At this point we gave him a injection in the subacromial space I think that will work nicely for him  History of Present Illness   Patient comes back with regards to his right shoulder he has adhesive capsulitis  He has been doing physical therapy  He reports his motion has improved and his pain is improved somewhat but not where he would like it to be  He is able to sleep on that side more than he had ever been since the irritation      Active Problems   1  Acquired pes planus (734) (M21 40)   2  Adhesive capsulitis of both shoulders (726 0) (M75 01,M75 02)   3  Arthralgia (719 40) (M25 50)   4  BPH without urinary obstruction (600 00) (N40 0)   5  Bunion (727 1) (M21 619)   6  Callus (700) (L84)   7  Chronic systolic congestive heart failure (428 22,428 0) (I50 22)   8  Depression with anxiety (300 4) (F41 8)   9  Diabetes mellitus with neurological manifestation (250 60) (E11 49)   10  Diabetes type 2, uncontrolled (250 02) (E11 65)   11  Difficulty concentrating (799 51) (R41 840)   12  Difficulty walking (719 7) (R26 2)   13  Encounter for prostate cancer screening (V76 44) (Z12 5)   14  Encounter for screening for malignant neoplasm of colon (V76 51) (Z12 11)   15   Fatigue (780 79) (R53 83) 16  Foot pain, bilateral (729 5) (M79 671,M79 672)   17  Hallux valgus (735 0) (M20 10)   18  Hyperlipidemia (272 4) (E78 5)   19  Hypertension (401 9) (I10)   20  Hypogonadism, male (257 2) (E29 1)   21  Insomnia (780 52) (G47 00)   22  Morbid obesity with body mass index (BMI) of 40 0 to 44 9 in adult (278 01,V85 41)      (E66 01,Z68 41)   23  Need for pneumococcal vaccination (V03 82) (Z23)   24  Need for prophylactic vaccination and inoculation against influenza (V04 81) (Z23)   25  Nephrolithiasis (592 0) (N20 0)   26  Onychomycosis (110 1) (B35 1)   27  JEFF (obstructive sleep apnea) (327 23) (G47 33)   28  Sciatica (724 3) (M54 30)   29  Screening for colon cancer (V76 51) (Z12 11)   30  Seasonal allergies (477 9) (J30 2)   31  Spinal stenosis (724 00) (M48 00)   32  Tinea pedis (110 4) (B35 3)   33   Vitamin D deficiency (268 9) (E55 9)    Past Medical History    · History of Cellulitis of left leg (682 6) (L03 116)   · History of Dyspnea on exertion (786 09) (R06 09)   · History of acute renal failure (V13 09) (Z87 448)   · History of chest pain (V13 89) (P84 485)   · History of diarrhea (V12 79) (Z05 623)   · History of onychomycosis (V12 09) (Z86 19)   · History of onychomycosis (V12 09) (Z86 19)   · History of Limb pain (729 5) (M79 609)   · History of Neck pain (723 1) (M54 2)   · Need for pneumococcal vaccination (V03 82) (Z23)   · History of Nephrolithiasis (V13 01)   · History of Numbness On The Sole Of The Right Foot   · History of Pain and swelling of left lower leg (729 5,729 81) (M79 662,M79 89)   · History of Pain of lower extremity (729 5) (M79 606)   · History of Pain, hand joint, right (719 44) (M79 641)   · Sciatica (724 3) (M54 30)    Surgical History    · History of Knee Arthroscopy (Therapeutic)   · History of Knee Surgery   · History of Needle Biopsy Of Prostate    Family History   Mother    · Family history of Alzheimer Disease   · Family history of Family Health Status Of Mother - Alive  Father    · Family history of Family Health Status Of Father -   Family History    · Family history of Adopted   · Denied: Family history of Colon Cancer   · Denied: Family history of Crohn's Disease   · Denied: Family history of Liver Cancer    Social History    · Denied: History of Alcohol Use (History)   · Current Some Day Smoker (305 1)   · Denied: History of Exercise Habits   · Marital History - Currently    · Tobacco use (305 1) (Z72 0)   · Working Full Time    Current Meds    1  AmLODIPine Besylate 10 MG Oral Tablet; TAKE 1 TABLET EVERY MORNING; Therapy: 71XYM1907 to (Jai Ponce)  Requested for: 53TSJ5742; Last     Rx:76Pua4282 Ordered   2  Aditya Breeze 2 Test In [employer]; test blood sugar twice daiy; Therapy: 15XLF7375 to (Fawn Braden)  Requested for: 94FRP1009; Last     Rx:2015 Ordered   3  BuPROPion HCl ER (XL) 150 MG Oral Tablet Extended Release 24 Hour; take 1 tablet     by mouth every day; Therapy: 11TDP2286 to (Nafisa Santiago)  Requested for: 30SBL2084; Last     Rx:2018 Ordered   4  CloNIDine HCl - 0 3 MG Oral Tablet; take 1 tablet twice a day; Therapy: 58RIE6450 to (Jai Ponce)  Requested for: 38UOE8633; Last     Rx:23Vsf9121 Ordered   5  Enalapril Maleate 20 MG Oral Tablet (Vasotec); take 1 tablet twice a day; Therapy: 58ENY2924 to (Jai Ponce)  Requested for: 49XKG1148; Last     Rx:94Kcm7938 Ordered   6  Glimepiride 4 MG Oral Tablet; Take 1 tablet daily; Therapy: 58Ymr5768 to (Jai Ponce)  Requested for: 47SUX9719; Last     Rx:23Eqj5876 Ordered   7  Januvia 100 MG Oral Tablet; take 1 tablet by mouth daily; Therapy: 20TYF4676 to (Evaluate:63Jqr2455)  Requested for: 27DGZ3834; Last     Rx:82Gdc0977 Ordered   8  Jardiance 10 MG Oral Tablet; take 1 tablet by mouth every morning; Therapy: 03LFU4824 to (Jai Ponce)  Requested for: 50MXH6489; Last     Rx:53Nzw7846 Ordered   9  Methylphenidate HCl - 10 MG Oral Tablet; take 1 tablet in am;     Therapy: 92NWJ1006 to (Evaluate:47Uwe6499); Last Rx:16Jan2018 Ordered   10  NovoFine 32G X 6 MM Miscellaneous; USE AS DIRECTED; Therapy: 21AIB9779 to ([phone])  Requested for: 53Ius5986; Last      Rx:68Ggq9983 Ordered   11  OneTouch Verio In [employer]; TEST TWICE DAILY; Therapy: 48OQQ9005 to (Evaluate:21Jun2016)  Requested for: 25CTM4342; Last      Rx:61Jdy5220 Ordered   12  OneTouch Verio w/Device Kit; USE AS DIRECTED; Therapy: 40XXB8685 to (Last Rx:24Nov2015)  Requested for: 65YEB4611 Ordered   13  Sertraline HCl - 50 MG Oral Tablet; TAKE 1 TABLET DAILY; Therapy: 40VWJ4601 to 322 2925)  Requested for: 21WDW5486; Last      Rx:16Jan2018 Ordered   14  Tamsulosin HCl - 0 4 MG Oral Capsule (Flomax); take 1 capsule daily; Therapy: 38LPW0163 to (Ashli Plump)  Requested for: 87RAG5563; Last      Rx:60Zqx1724 Ordered   15  Testosterone Cypionate 200 MG/ML Intramuscular Solution; inject IM every 2 weeks; Therapy: 35FJB9230 to (Last Rx:56Rql6607) Ordered   16  Vicodin 5-300 MG Oral Tablet; One by mouth  half hour prior to physical therapy; Therapy: 28CNV5602 to (Evaluate:18Jan2018); Last Rx:29Nov2017 Ordered   17  Vitamin D (Ergocalciferol) 30676 UNIT Oral Capsule; TAKE ONE CAPSULE BY MOUTH      ONCE A WEEK; Therapy: 26LQL2619 to ([facility])  Requested for: 18XVI6766; Last      Rx:47Pgw8435 Ordered   18  Vitamin D3 2000 UNIT Oral Capsule; TAKE 1 CAPSULE Daily; Therapy: 74QPC5580 to (Last Rx:29Mar2016)  Requested for: 12RVR9394 Ordered    Allergies   1  No Known Drug Allergies    Vitals    Recorded: 74AMQ9472 12:42PM   Heart Rate 85   Systolic 553   Diastolic 98   Height 5 ft 10 in   Weight 292 lb 8 0 oz   BMI Calculated 41 97   BSA Calculated 2 46   Pain Scale 7     Physical Exam   To about 80Â° and internally about 60 or 70Â°  Last time he was here is only 45Â°        Rotate him Passively I am able to externally      Procedure      Procedure: Injection of the right subacromial bursa  Indication:  inflammation  Potential complications include bleeding  Risk were discussed with the patient  Verbal consent was obtained prior to the procedure  Alcohol was used to prep the area  ethyl chloride spray was used as a topical anesthetic  Using sterile technique, the aspiration/injection needle was then directed from a posterior aspect  A 22-gauge was used to inject 1 mL of 1% Lidocaine,-- 1 mL 0 25% Bupivacaine-- and-- 1 mL of 6mg/mL betamethasone  A bandage was applied  the patient tolerated the procedure well  Complications: none        Future Appointments      Date/Time Provider Specialty Site   02/23/2018 11:15 AM Ajay Na[facility]     Signatures    Electronically signed by : Maria Antonia Holder DO; Jan 22 2018  1:03PM EST                       (Author)

## 2018-01-24 ENCOUNTER — OFFICE VISIT (OUTPATIENT)
Dept: PHYSICAL THERAPY | Facility: CLINIC | Age: 58
End: 2018-01-24
Payer: COMMERCIAL

## 2018-01-24 VITALS
HEART RATE: 84 BPM | RESPIRATION RATE: 18 BRPM | TEMPERATURE: 97.9 F | DIASTOLIC BLOOD PRESSURE: 80 MMHG | OXYGEN SATURATION: 98 % | SYSTOLIC BLOOD PRESSURE: 134 MMHG | BODY MASS INDEX: 41.7 KG/M2 | WEIGHT: 291.25 LBS | HEIGHT: 70 IN

## 2018-01-24 DIAGNOSIS — M75.01 ADHESIVE BURSITIS OF RIGHT SHOULDER: Primary | ICD-10-CM

## 2018-01-24 DIAGNOSIS — M25.511 ACUTE PAIN OF RIGHT SHOULDER: ICD-10-CM

## 2018-01-24 PROCEDURE — 97140 MANUAL THERAPY 1/> REGIONS: CPT | Performed by: PHYSICAL THERAPIST

## 2018-01-24 PROCEDURE — 97010 HOT OR COLD PACKS THERAPY: CPT | Performed by: PHYSICAL THERAPIST

## 2018-01-24 PROCEDURE — 97110 THERAPEUTIC EXERCISES: CPT | Performed by: PHYSICAL THERAPIST

## 2018-01-24 NOTE — PROGRESS NOTES
Daily Note     Today's date: 2018  Patient name: Ava Dunlap  : 1960  MRN: [id number]  Referring provider: Ellie Sal DO  Dx:   Encounter Diagnoses   Name Primary?  Adhesive bursitis of right shoulder Yes    Acute pain of right shoulder                 Subjective: He states his shoulder had about 1 day of relief of pain after injection then pain to the arm started to come back  He still feels pain down arm to hand when he reaches out  Objective: See treatment diary below  Pulleys showed flexion to get to about 150  PROM only to 50 flex, 75 abd, 60 ER      Assessment: Tolerated treatment poor  Patient had pain with TE and did not tolerate manual too well this visit  Patient given 5 min break before       Plan: Continue per plan of care  and Progress treatment as tolerated        Precautions: high irritability, HTN, DM    Daily Treatment Diary     Pulleys fb TE fb MHP fb MT    Manual              PROM 15 min            Light GH mobs inferior 5 min                                                       Exercise Diary              scap retraction 20x            Pulleys 3 min            Table slides- flex 20x            Finger ladder             Wall slides             Pball rollouts- abd 2 x10            Wand ER                                                                                                                                                                                          Modalities              MHP to start 10 min

## 2018-01-29 ENCOUNTER — OFFICE VISIT (OUTPATIENT)
Dept: PHYSICAL THERAPY | Facility: CLINIC | Age: 58
End: 2018-01-29
Payer: COMMERCIAL

## 2018-01-29 DIAGNOSIS — M75.01 ADHESIVE BURSITIS OF RIGHT SHOULDER: Primary | ICD-10-CM

## 2018-01-29 DIAGNOSIS — M25.511 ACUTE PAIN OF RIGHT SHOULDER: ICD-10-CM

## 2018-01-29 PROCEDURE — 97140 MANUAL THERAPY 1/> REGIONS: CPT | Performed by: PHYSICAL THERAPIST

## 2018-01-29 PROCEDURE — 97110 THERAPEUTIC EXERCISES: CPT | Performed by: PHYSICAL THERAPIST

## 2018-01-29 NOTE — PROGRESS NOTES
Daily Note      Today's date: 2018  Patient name: Ml Narayan  : 1960  MRN: [id number]  Referring provider: Ioana Vaca DO  Dx:        Encounter Diagnoses   Name Primary?  Adhesive bursitis of right shoulder Yes    Acute pain of right shoulder           Subjective: He states his shoulder had about 1 day of relief of pain after injection then pain to the arm started to come back  He still feels pain down arm to hand when he reaches out          Objective: See treatment diary below  Pulleys showed flexion to get to about 150  PROM only to 50 flex, 75 abd, 60 ER        Assessment: Tolerated treatment poor  Patient had pain with TE and did not tolerate manual too well this visit  Patient given 5 min break before         Plan: Continue per plan of care    and Progress treatment as tolerated        Precautions: high irritability, HTN, DM     Daily Treatment Diary      Pulleys fb TE fb MHP fb MT     Manual                     PROM 15 min  15 min                   Light GH mobs inferior 5 min  5 min                                                                                                 Exercise Diary                     scap retraction 20x  20x                   Pulleys 3 min  3 min                   Table slides- flex 20x                     Finger ladder   5x                    Wall slides                       Pball rollouts- abd 2 x10  2x10                   Wand ER    5 :10                                                                                                                                                                                                                                                                                                                                                 Modalities                        MHP to start 10 min    10 min

## 2018-01-31 ENCOUNTER — OFFICE VISIT (OUTPATIENT)
Dept: PHYSICAL THERAPY | Facility: CLINIC | Age: 58
End: 2018-01-31
Payer: COMMERCIAL

## 2018-01-31 DIAGNOSIS — M75.01 ADHESIVE BURSITIS OF RIGHT SHOULDER: Primary | ICD-10-CM

## 2018-01-31 DIAGNOSIS — M25.511 ACUTE PAIN OF RIGHT SHOULDER: ICD-10-CM

## 2018-01-31 PROCEDURE — 97140 MANUAL THERAPY 1/> REGIONS: CPT | Performed by: PHYSICAL THERAPIST

## 2018-01-31 PROCEDURE — 97110 THERAPEUTIC EXERCISES: CPT | Performed by: PHYSICAL THERAPIST

## 2018-02-01 NOTE — PROGRESS NOTES
Encounter Date: 2018 6:00 PM   Daily Note      Today's date: 2018  Patient name: Lazaro Gaitan  : 1960  MRN: [id number]  Referring provider: Nida Waggoner DO  Dx:           Encounter Diagnoses   Name Primary?  Adhesive bursitis of right shoulder Yes    Acute pain of right shoulder           Subjective: He states he was a bit sore after stretching lv into crossbody abd as this and abduction are his hardest movements  Objective: See treatment diary below  Pulleys showed flexion to get to about 150  PROM to 110 flex, 80 abd, 50 ER        Assessment: Tolerated treatment fairly well  Patient had pain with TE but did tolerate MT better  Plan: Continue per plan of care  and Progress treatment as tolerated         Precautions: high irritability, HTN, DM     Daily Treatment Diary      Pulleys fb TE fb MHP fb MT     Manual                   PROM 15 min  15 min  15 min                 Light GH mobs inferior 5 min  5 min  5 min                                                                                               Exercise Diary                   scap retraction 20x  20x  d/c                 Pulleys 3 min  3 min  3 min                 Table slides- flex 20x                     Finger ladder   5x   np                 Wall slides                       Pball rollouts- abd 2 x10  2x10  2x10                 Wand ER    5 :10  5 :10                  AROM flex      2x10                                                                                                                                                                                                                                                                                                                       Modalities                      MHP 10 min before MT    10 min 10 min

## 2018-02-04 DIAGNOSIS — E11.40 TYPE 2 DIABETES MELLITUS WITH DIABETIC NEUROPATHY, WITHOUT LONG-TERM CURRENT USE OF INSULIN (HCC): Primary | ICD-10-CM

## 2018-02-05 ENCOUNTER — OFFICE VISIT (OUTPATIENT)
Dept: PHYSICAL THERAPY | Facility: CLINIC | Age: 58
End: 2018-02-05
Payer: COMMERCIAL

## 2018-02-05 DIAGNOSIS — M25.511 ACUTE PAIN OF RIGHT SHOULDER: ICD-10-CM

## 2018-02-05 DIAGNOSIS — M75.01 ADHESIVE BURSITIS OF RIGHT SHOULDER: Primary | ICD-10-CM

## 2018-02-05 PROBLEM — E66.01 MORBID OBESITY WITH BODY MASS INDEX (BMI) OF 40.0 TO 44.9 IN ADULT (HCC): Status: ACTIVE | Noted: 2018-01-16

## 2018-02-05 PROCEDURE — 97140 MANUAL THERAPY 1/> REGIONS: CPT | Performed by: PHYSICAL THERAPIST

## 2018-02-05 PROCEDURE — 97110 THERAPEUTIC EXERCISES: CPT | Performed by: PHYSICAL THERAPIST

## 2018-02-05 RX ORDER — SITAGLIPTIN 100 MG/1
TABLET, FILM COATED ORAL
Qty: 30 TABLET | Refills: 5 | Status: SHIPPED | OUTPATIENT
Start: 2018-02-05 | End: 2018-06-13 | Stop reason: SDUPTHER

## 2018-02-06 NOTE — PROGRESS NOTES
Daily Note      Today's date: 2018  Patient name: Patsie Osgood  : 1960  MRN: [id number]  Referring provider: Lorna Ordonez DO  Dx:           Encounter Diagnoses   Name Primary?  Adhesive bursitis of right shoulder Yes    Acute pain of right shoulder           Subjective: He states he was a bit sore after stretching and he did have some difficulty sleeping last night due to pain  Objective: See treatment diary below  Pulleys showed flexion to get to about 150  PROM to 120 flex, 110 abd, 60 ER        Assessment: Tolerated treatment fairly well  Patient had pain with TE but did tolerate MT better  Plan: Continue per plan of care  and Progress treatment as tolerated         Precautions: high irritability, HTN, DM     Daily Treatment Diary      Pulleys fb TE fb MHP fb MT     Manual    2/5               PROM 15 min  15 min  15 min  12 min               Light GH mobs inferior 5 min  5 min  5 min  5 min                                                                                             Exercise Diary    2/5               scap retraction 20x  20x  d/c                 Pulleys 3 min  3 min  3 min  5 min               Table slides- flex 20x      20x               Finger ladder   5x   np                 Wall slides                       Pball rollouts- abd 2 x10  2x10  2x10  2x10               Wand ER    5 :10  5 :10  5 :10                AROM flex      2x10  5x                                                                                                                                                                                                                                                                                                                     Modalities       2/5               MHP 10 min before MT    10 min 10 min   10 min

## 2018-02-07 ENCOUNTER — TELEPHONE (OUTPATIENT)
Dept: INTERNAL MEDICINE CLINIC | Facility: CLINIC | Age: 58
End: 2018-02-07

## 2018-02-07 DIAGNOSIS — I10 ESSENTIAL HYPERTENSION: ICD-10-CM

## 2018-02-07 DIAGNOSIS — N40.0 BPH WITHOUT URINARY OBSTRUCTION: Primary | ICD-10-CM

## 2018-02-07 DIAGNOSIS — E11.40 TYPE 2 DIABETES MELLITUS WITH DIABETIC NEUROPATHY, WITHOUT LONG-TERM CURRENT USE OF INSULIN (HCC): ICD-10-CM

## 2018-02-07 PROBLEM — N20.0 NEPHROLITHIASIS: Status: ACTIVE | Noted: 2017-09-06

## 2018-02-07 PROBLEM — E29.1 HYPOGONADISM, MALE: Status: ACTIVE | Noted: 2017-09-20

## 2018-02-07 PROBLEM — M75.01 ADHESIVE CAPSULITIS OF BOTH SHOULDERS: Status: ACTIVE | Noted: 2017-11-29

## 2018-02-07 PROBLEM — M75.02 ADHESIVE CAPSULITIS OF BOTH SHOULDERS: Status: ACTIVE | Noted: 2017-11-29

## 2018-02-07 RX ORDER — TAMSULOSIN HYDROCHLORIDE 0.4 MG/1
1 CAPSULE ORAL DAILY
COMMUNITY
Start: 2012-07-31 | End: 2018-02-07 | Stop reason: SDUPTHER

## 2018-02-07 RX ORDER — AMLODIPINE BESYLATE 10 MG/1
1 TABLET ORAL DAILY
COMMUNITY
Start: 2012-12-04 | End: 2018-02-07 | Stop reason: SDUPTHER

## 2018-02-07 RX ORDER — ENALAPRIL MALEATE 20 MG/1
1 TABLET ORAL 2 TIMES DAILY
COMMUNITY
Start: 2012-07-31 | End: 2018-02-07 | Stop reason: SDUPTHER

## 2018-02-07 RX ORDER — CLONIDINE HYDROCHLORIDE 0.3 MG/1
0.3 TABLET ORAL 2 TIMES DAILY
Qty: 180 TABLET | Refills: 1 | Status: SHIPPED | OUTPATIENT
Start: 2018-02-07 | End: 2018-06-13 | Stop reason: SDUPTHER

## 2018-02-07 RX ORDER — GLIMEPIRIDE 4 MG/1
1 TABLET ORAL DAILY
COMMUNITY
Start: 2014-02-21 | End: 2018-02-07 | Stop reason: SDUPTHER

## 2018-02-07 RX ORDER — CLONIDINE HYDROCHLORIDE 0.3 MG/1
1 TABLET ORAL 2 TIMES DAILY
COMMUNITY
Start: 2012-07-31 | End: 2018-02-07 | Stop reason: SDUPTHER

## 2018-02-07 RX ORDER — AMLODIPINE BESYLATE 10 MG/1
10 TABLET ORAL DAILY
Qty: 90 TABLET | Refills: 1 | Status: SHIPPED | OUTPATIENT
Start: 2018-02-07 | End: 2018-06-13 | Stop reason: SDUPTHER

## 2018-02-07 RX ORDER — TAMSULOSIN HYDROCHLORIDE 0.4 MG/1
0.4 CAPSULE ORAL DAILY
Qty: 90 CAPSULE | Refills: 1 | Status: SHIPPED | OUTPATIENT
Start: 2018-02-07 | End: 2018-06-13 | Stop reason: SDUPTHER

## 2018-02-07 RX ORDER — ENALAPRIL MALEATE 20 MG/1
20 TABLET ORAL 2 TIMES DAILY
Qty: 90 TABLET | Refills: 1 | Status: SHIPPED | OUTPATIENT
Start: 2018-02-07 | End: 2018-06-13 | Stop reason: SDUPTHER

## 2018-02-07 RX ORDER — GLIMEPIRIDE 4 MG/1
4 TABLET ORAL DAILY
Qty: 90 TABLET | Refills: 1 | Status: SHIPPED | OUTPATIENT
Start: 2018-02-07 | End: 2018-06-13 | Stop reason: SDUPTHER

## 2018-02-07 NOTE — TELEPHONE ENCOUNTER
Pt  Needs refills for: Clonidine 0 3mg one tab bid; Glimeperide 4mg one tab qd; Enalapril 20mg one tab bid; Amlodipine 10mg one tab q am; Tamsulosin 0 4mg one tab qd

## 2018-02-19 ENCOUNTER — OFFICE VISIT (OUTPATIENT)
Dept: PHYSICAL THERAPY | Facility: CLINIC | Age: 58
End: 2018-02-19
Payer: COMMERCIAL

## 2018-02-19 DIAGNOSIS — E11.9 TYPE 2 DIABETES MELLITUS WITHOUT COMPLICATIONS (HCC): ICD-10-CM

## 2018-02-19 DIAGNOSIS — M75.01 ADHESIVE BURSITIS OF RIGHT SHOULDER: Primary | ICD-10-CM

## 2018-02-19 DIAGNOSIS — I10 ESSENTIAL (PRIMARY) HYPERTENSION: ICD-10-CM

## 2018-02-19 DIAGNOSIS — M25.511 ACUTE PAIN OF RIGHT SHOULDER: ICD-10-CM

## 2018-02-19 PROCEDURE — 97110 THERAPEUTIC EXERCISES: CPT | Performed by: PHYSICAL THERAPIST

## 2018-02-19 PROCEDURE — 97140 MANUAL THERAPY 1/> REGIONS: CPT | Performed by: PHYSICAL THERAPIST

## 2018-02-19 NOTE — PROGRESS NOTES
Daily Note      Today's date: 2018  Patient name: Joanne Lindsay  : 1960  MRN: [id number]  Referring provider: Leonid Vasques DO  Dx:           Encounter Diagnoses   Name Primary?  Adhesive bursitis of right shoulder Yes    Acute pain of right shoulder           Subjective: He states he has been improving well and he states         Objective: See treatment diary below  Pulleys showed flexion to get to about 150  PROM to 120 flex, 110 abd, 60 ER        Assessment: Tolerated treatment fairly well  Patient had pain with TE but did tolerate MT better  Plan: Continue per plan of care  and Progress treatment as tolerated         Precautions: high irritability, HTN, DM     Daily Treatment Diary      Pulleys fb TE fb MHP fb MT     Manual               PROM 15 min  15 min  15 min  12 min  15 min             Light GH mobs inferior 5 min  5 min  5 min  5 min  5 min                                                                                           Exercise Diary               scap retraction 20x  20x  d/c                 Pulleys 3 min  3 min  3 min  5 min  5 min             wall slides- flex/abd 20x      20x  10x             Finger ladder   5x   np    10x             Wall slides                       Pball rollouts- abd 2 x10  2x10  2x10  2x10  2x10             Wall ER ST    5 :10  5 :10  5 :10  5 :10              AROM flex      2x10  5x  2x10              AROM abd          10x                                                                                                                                                                                                                                                                                           Modalities                  MHP 10 min before MT    10 min 10 min   10 min  10 min

## 2018-02-21 ENCOUNTER — OFFICE VISIT (OUTPATIENT)
Dept: PHYSICAL THERAPY | Facility: CLINIC | Age: 58
End: 2018-02-21
Payer: COMMERCIAL

## 2018-02-21 DIAGNOSIS — M25.511 ACUTE PAIN OF RIGHT SHOULDER: ICD-10-CM

## 2018-02-21 DIAGNOSIS — M75.01 ADHESIVE BURSITIS OF RIGHT SHOULDER: Primary | ICD-10-CM

## 2018-02-21 PROCEDURE — 97112 NEUROMUSCULAR REEDUCATION: CPT | Performed by: PHYSICAL THERAPIST

## 2018-02-21 PROCEDURE — 97140 MANUAL THERAPY 1/> REGIONS: CPT | Performed by: PHYSICAL THERAPIST

## 2018-02-21 PROCEDURE — 97110 THERAPEUTIC EXERCISES: CPT | Performed by: PHYSICAL THERAPIST

## 2018-02-21 NOTE — PROGRESS NOTES
Daily Note      Today's date: 2018  Patient name: Farshad El  : 1960  MRN: [id number]  Referring provider: Jorge Pulido DO  Dx:           Encounter Diagnoses   Name Primary?  Adhesive bursitis of right shoulder Yes    Acute pain of right shoulder           Subjective: He states he has been improving well and he states he still has some tightness at end range  Objective: See treatment diary below  PROM to 160 flex, 130 abd, 70 ER        Assessment: Tolerated treatment fairly well  Patient had pain with abduction and         Plan: Continue per plan of care  and Progress treatment as tolerated         Precautions: high irritability, HTN, DM     Daily Treatment Diary      Pulleys fb TE fb MHP fb MT     Manual             PROM 15 min  15 min  15 min  12 min  15 min 10 min           Light GH mobs inferior 5 min  5 min  5 min  5 min  5 min  5 min                                                                                         Exercise Diary             scap retraction 20x  20x  d/c                 Pulleys 3 min  3 min  3 min  5 min  5 min  5 min           wall slides- flex/abd 20x      20x  10x  10x           Finger ladder   5x   np    10x  10x                                  Pball rollouts- abd 2 x10  2x10  2x10  2x10  2x10  2x10           Wall ER ST    5 :10  5 :10  5 :10  5 :10  5 :10            AROM flex      2x10  5x  2x10  10x            AROM abd          10x  10x            TB ER nv                        IR strap ST            5x :10                                                                                                                                                                                                                                         Modalities       2/           MHP 10 min before MT    10 min 10 min   10 min  10 min  10 min

## 2018-02-23 ENCOUNTER — OFFICE VISIT (OUTPATIENT)
Dept: PODIATRY | Facility: CLINIC | Age: 58
End: 2018-02-23
Payer: COMMERCIAL

## 2018-02-23 DIAGNOSIS — M79.672 PAIN IN BOTH FEET: ICD-10-CM

## 2018-02-23 DIAGNOSIS — M77.41 METATARSALGIA OF BOTH FEET: ICD-10-CM

## 2018-02-23 DIAGNOSIS — M77.42 METATARSALGIA OF BOTH FEET: ICD-10-CM

## 2018-02-23 DIAGNOSIS — M79.671 PAIN IN BOTH FEET: ICD-10-CM

## 2018-02-23 DIAGNOSIS — M21.969 ACQUIRED DEFORMITY OF FOOT, UNSPECIFIED LATERALITY: Primary | ICD-10-CM

## 2018-02-23 DIAGNOSIS — E11.42 DIABETIC POLYNEUROPATHY ASSOCIATED WITH TYPE 2 DIABETES MELLITUS (HCC): ICD-10-CM

## 2018-02-23 PROCEDURE — 99213 OFFICE O/P EST LOW 20 MIN: CPT | Performed by: PODIATRIST

## 2018-02-23 NOTE — PROGRESS NOTES
Assessment/Plan:    No problem-specific Assessment & Plan notes found for this encounter  Discussion/Summary   The patient was counseled regarding instructions for management,-- prognosis,-- patient and family education,-- risks and benefits of treatment options,-- importance of compliance with treatment  Patient is able to Self-Care  The treatment plan was reviewed with the patient/guardian  The patient/guardian understands and agrees with the treatment plan      Chief Complaint   Patient needs full diabetic foot exam       History of Present Illness   HPI: Patient presents for pedal evaluation  Patient complains of pain in his feet and toes with ambulation  Patient is a morbidly obese diabetic who has some electric sensations in his feet on occasion  Review of Systems        Constitutional: not feeling tired  Eyes: no eyesight problems  ENT: no nasal discharge  Cardiovascular: no chest pain,-- no palpitations-- and-- no extremity edema  Respiratory: no shortness of breath-- and-- no cough  Gastrointestinal: no abdominal pain-- and-- no constipation  Genitourinary: no dysuria-- and-- no urinary hesitancy  Integumentary: no skin wound  Neurological: no tingling-- and-- no dizziness  Psychiatric: sleeping better, stopped taking trazodone, but-- no sleep disturbances  Endocrine: no feelings of weakness  Active Problems   1  Acquired pes planus (734) (M21 40)   2  Acute renal failure (584 9) (N17 9)   3  Adhesive capsulitis of both shoulders (726 0) (M75 01,M75 02)   4  Arthralgia (719 40) (M25 50)   5  BPH without urinary obstruction (600 00) (N40 0)   6  Bunion (727 1) (M21 619)   7  Callus (700) (L84)   8  Chronic systolic congestive heart failure (428 22,428 0) (I50 22)   9  Depression with anxiety (300 4) (F41 8)   10  Diabetes mellitus with neurological manifestation (250 60) (E11 49)   11  Diabetes type 2, uncontrolled (250 02) (E11 65)   12  Difficulty concentrating (799 51) (R41 840)   13  Difficulty walking (719 7) (R26 2)   14  Dyspnea on exertion (786 09) (R06 09)   15  Encounter for prostate cancer screening (V76 44) (Z12 5)   16  Encounter for screening for malignant neoplasm of colon (V76 51) (Z12 11)   17  Fatigue (780 79) (R53 83)   18  Foot pain, bilateral (729 5) (M79 671,M79 672)   19  Hallux valgus (735 0) (M20 10)   20  Hematuria (599 70) (R31 9)   21  Hyperlipidemia (272 4) (E78 5)   22  Hypertension (401 9) (I10)   23  Hypogonadism, male (257 2) (E29 1)   24  Insomnia (780 52) (G47 00)   25  Morbid or severe obesity due to excess calories (278 01) (E66 01)   26  Need for pneumococcal vaccination (V03 82) (Z23)   27  Need for prophylactic vaccination and inoculation against influenza (V04 81) (Z23)   28  Nephrolithiasis (592 0) (N20 0)   29  Onychomycosis (110 1) (B35 1)   30  JEFF (obstructive sleep apnea) (327 23) (G47 33)   31  Sciatica (724 3) (M54 30)   32  Screening for colon cancer (V76 51) (Z12 11)   33  Seasonal allergies (477 9) (J30 2)   34  Shoulder pain, right (719 41) (M25 511)   35  Spinal stenosis (724 00) (M48 00)   36  Tinea pedis (110 4) (B35 3)   37  Type 2 diabetes mellitus (250 00) (E11 9)   38   Vitamin D deficiency (268 9) (E55 9)     Past Medical History    · History of Cellulitis of left leg (682 6) (L03 116)   · History of chest pain (V13 89) (F54 433)   · History of diarrhea (V12 79) (V96 307)   · History of onychomycosis (V12 09) (Z86 19)   · History of onychomycosis (V12 09) (Z86 19)   · History of Limb pain (729 5) (M79 609)   · History of Neck pain (723 1) (M54 2)   · Need for pneumococcal vaccination (V03 82) (Z23)   · History of Nephrolithiasis (V13 01)   · History of Numbness On The Sole Of The Right Foot   · History of Pain and swelling of left lower leg (729 5,729 81) (M79 662,M79 89)   · History of Pain of lower extremity (729 5) (M79 606)   · History of Pain, hand joint, right (719 44) (M79 641)   · Sciatica (724 3) (M54 30)     The active problems and past medical history were reviewed and updated today  Surgical History    · History of Knee Arthroscopy (Therapeutic)   · History of Knee Surgery   · History of Needle Biopsy Of Prostate     Family History   Mother    · Family history of Alzheimer Disease   · Family history of Family Health Status Of Mother - Alive  Father    · Family history of Family Health Status Of Father -   Family History    · Family history of Adopted   · Denied: Family history of Colon Cancer   · Denied: Family history of Crohn's Disease   · Denied: Family history of Liver Cancer     Social History    · Denied: History of Alcohol Use (History)   · Current Some Day Smoker (305 1)   · Denied: History of Exercise Habits   · Marital History - Currently    · Tobacco use (305 1) (Z72 0)   · Working Full Time  The social history was reviewed and is unchanged  Current Meds    1  AmLODIPine Besylate 10 MG Oral Tablet; TAKE 1 TABLET EVERY MORNING; Therapy: 10RKW3843 to (Carnella Halsted)  Requested for: 60EFE3772; Last     Rx:12Cin2239 Ordered   2  Aditya Breeze 2 Test In [employer]; test blood sugar twice daiy; Therapy: 68STK6794 to (Marcia Wynn)  Requested for: 74JHK2569; Last     Rx:46Pqu9217 Ordered   3  BuPROPion HCl ER (XL) 150 MG Oral Tablet Extended Release 24 Hour; take 1 tablet     by mouth every day; Therapy: 62RBF9183 to (Evaluate:2018)  Requested for: 20LFT2779; Last     Rx:66Xhc0369 Ordered   4  CloNIDine HCl - 0 3 MG Oral Tablet; take 1 tablet twice a day; Therapy: 63NJZ7795 to (Carnella Halsted)  Requested for: 81QZB0724; Last     Rx:35Wdh1462 Ordered   5  Enalapril Maleate 20 MG Oral Tablet; take 1 tablet twice a day; Therapy: 83PNM9606 to (Carnella Halsted)  Requested for: 11MNM1946; Last     Rx:44Ghx5206 Ordered   6  Glimepiride 4 MG Oral Tablet; Take 1 tablet daily;      Therapy: 83Qxd6018 to (Carnella Halsted) Requested for: 21BAC6451; Last     Rx:55Hcq7303 Ordered   7  Januvia 100 MG Oral Tablet; take 1 tablet by mouth daily; Therapy: 47FMM4298 to (Evaluate:79Aqg4494)  Requested for: 53XHE1721; Last     Rx:56Oyo8704 Ordered   8  Jardiance 10 MG Oral Tablet; take 1 tablet by mouth every morning; Therapy: 87OCX3228 to (Brenden Hernandez)  Requested for: 28AKA1345; Last     Rx:01Caw7876 Ordered   9  Ketoconazole 2 % External Cream; APPLY A THIN LAYER TO AFFECTED AREA(S)     TWICE DAILY; Therapy: 47SAR5363 to ([phone])  Requested for: 28Oct2016; Last     Rx:28Oct2016 Ordered   10  Methylphenidate HCl - 10 MG Oral Tablet; Take 1 tablet twice daily; Therapy: 67TDD0797 to (Evaluate:14Jan2018); Last Rx:64Syo5099 Ordered   11  NovoFine 32G X 6 MM Miscellaneous; USE AS DIRECTED; Therapy: 69VJH9508 to [phone])  Requested for: 78Oey4999; Last      Rx:73Zmc2872 Ordered   12  OneTouch Verio In [employer]; TEST TWICE DAILY; Therapy: 25SKZ9106 to (Evaluate:21Jun2016)  Requested for: 88FBC8621; Last      Rx:67Nsz2230 Ordered   13  OneTouch Verio w/Device Kit; USE AS DIRECTED; Therapy: 67PMY4725 to (Last Rx:45Zpt7967)  Requested for: 05HVL5030 Ordered   14  Tamsulosin HCl - 0 4 MG Oral Capsule; take 1 capsule daily; Therapy: 30HFU8657 to (Brenden Hernandez)  Requested for: 71PUO8866; Last      Rx:82Ggh5127 Ordered   15  Testosterone Cypionate 200 MG/ML Intramuscular Solution; inject IM every 2 weeks; Therapy: 60GLO8891 to (Last Rx:96Yqu7079) Ordered   16  Vicodin 5-300 MG Oral Tablet; One by mouth  half hour prior to physical therapy; Therapy: 16KUC4354 to (Evaluate:18Jan2018); Last Rx:51Uet2295 Ordered   17  Vitamin D (Ergocalciferol) 98855 UNIT Oral Capsule; TAKE ONE CAPSULE BY MOUTH      ONCE A WEEK; Therapy: 48VSV5520 to (Vanetta Mcburney)  Requested for: 37TLC8674; Last      Rx:10Khn1413 Ordered   18   Vitamin D3 2000 UNIT Oral Capsule; TAKE 1 CAPSULE Daily; Therapy: 18JMT7312 to (Last Rx:29Mar2016)  Requested for: 29Mar2016 Ordered     The medication list was reviewed and updated today  Allergies   1  No Known Drug Allergies     Vitals     Recorded: 17Dnp5105 11:15AM   Respiration 17   Height 5 ft 10 in   Weight 286 lb    BMI Calculated 41 04   BSA Calculated 2 43      Physical Exam   Left Foot: Appearance: Normal except as noted: excessive pronation-- and-- pes planus  Great toe deformities include a bunion  Tenderness: None except the great toe-- and-- distal first metatarsal     Right Foot: Appearance: Normal except as noted: excessive pronation-- and-- pes planus  Great toe deformities include a bunion  Tenderness: None except the great toe-- and-- distal first metatarsal     Left Ankle: ROM: limited ROM in all planes    Right Ankle: ROM: limited ROM in all planes    Neurological Exam: performed  Light touch was decreased bilaterally  Vibratory sensation was decreased in both first metatarsophalangeal joints  Vascular Exam: performed Dorsalis pedis pulses were present bilaterally  Posterior tibial pulses were present bilaterally  Elevation Pallor: absent bilaterally  Dependence rubor was absent bilaterally  Edema: none  Toenails: All of the toenails were elongated,-- hypertrophied,-- discolored-- and-- Ptotic  Both first toenails were tender-- and-- Positive onychogryphosis  Socks and shoes removed, Right Foot Findings: swollen, erythematous and dry  The sensory exam showed diminished vibratory sensation at the level of the toes  Diminished tactile sensation with monofilament testing throughout the right foot  Socks and shoes removed, Left Foot Findings: swollen, erythematous and dry  The sensory exam showed diminished vibratory sensation at the level of the toes  Diminished tactile sensation with monofilament testing throughout the left foot  Capillary refills findings on the right were normal in the toes  Pulses:      2+ in the posterior tibialis on the right      2+ in the dorsalis pedis on the right  Capillary refills findings on the left were normal in the toes  Pulses:      1+ in the posterior tibialis on the left      1+ in the dorsalis pedis on the left  Assign Risk Category: 2: Loss of protective sensation with or without weakness, deformity, callus, pre-ulcer, or history of ulceration  High risk  Hyperkeratosis: present on both first toes,-- present on both first sub metatarsals,-- present on both third sub metatarsals-- and-- Severe profound moccasin tinea pedis bilateral     Shoe Gear Evaluation: performed ()  Recommendation(s): SAS style  Procedure   The patient was educated on the diabetic foot and good glucose control  All mycotic nails debrided  Bilateral pre-ulcerative lesions debrided today without pain or complication      There are no diagnoses linked to this encounter  Subjective:      Patient ID: Schuyler Saldaña is a 62 y o  male  HPI    The following portions of the patient's history were reviewed and updated as appropriate: allergies, current medications, past family history, past medical history, past social history, past surgical history and problem list     Review of Systems      Objective:      Foot ExamPhysical Exam

## 2018-02-24 PROBLEM — R53.83 FATIGUE: Status: ACTIVE | Noted: 2017-09-06

## 2018-02-26 ENCOUNTER — OFFICE VISIT (OUTPATIENT)
Dept: PHYSICAL THERAPY | Facility: CLINIC | Age: 58
End: 2018-02-26
Payer: COMMERCIAL

## 2018-02-26 DIAGNOSIS — M25.511 ACUTE PAIN OF RIGHT SHOULDER: ICD-10-CM

## 2018-02-26 DIAGNOSIS — M75.01 ADHESIVE BURSITIS OF RIGHT SHOULDER: Primary | ICD-10-CM

## 2018-02-26 PROCEDURE — 97140 MANUAL THERAPY 1/> REGIONS: CPT | Performed by: PHYSICAL THERAPIST

## 2018-02-27 ENCOUNTER — OFFICE VISIT (OUTPATIENT)
Dept: INTERNAL MEDICINE CLINIC | Facility: CLINIC | Age: 58
End: 2018-02-27
Payer: COMMERCIAL

## 2018-02-27 VITALS
OXYGEN SATURATION: 96 % | SYSTOLIC BLOOD PRESSURE: 130 MMHG | RESPIRATION RATE: 18 BRPM | TEMPERATURE: 97.9 F | HEART RATE: 72 BPM | DIASTOLIC BLOOD PRESSURE: 76 MMHG | HEIGHT: 71 IN | WEIGHT: 288.6 LBS | BODY MASS INDEX: 40.4 KG/M2

## 2018-02-27 DIAGNOSIS — E66.01 MORBID OBESITY WITH BODY MASS INDEX (BMI) OF 40.0 TO 44.9 IN ADULT (HCC): ICD-10-CM

## 2018-02-27 DIAGNOSIS — F41.8 DEPRESSION WITH ANXIETY: ICD-10-CM

## 2018-02-27 DIAGNOSIS — Z71.9 HEALTH COUNSELING: ICD-10-CM

## 2018-02-27 DIAGNOSIS — M75.01 ADHESIVE CAPSULITIS OF BOTH SHOULDERS: ICD-10-CM

## 2018-02-27 DIAGNOSIS — E11.42 DIABETIC POLYNEUROPATHY ASSOCIATED WITH TYPE 2 DIABETES MELLITUS (HCC): Primary | ICD-10-CM

## 2018-02-27 DIAGNOSIS — M75.02 ADHESIVE CAPSULITIS OF BOTH SHOULDERS: ICD-10-CM

## 2018-02-27 DIAGNOSIS — G47.33 OSA (OBSTRUCTIVE SLEEP APNEA): ICD-10-CM

## 2018-02-27 DIAGNOSIS — I10 ESSENTIAL HYPERTENSION: ICD-10-CM

## 2018-02-27 PROCEDURE — 99214 OFFICE O/P EST MOD 30 MIN: CPT | Performed by: INTERNAL MEDICINE

## 2018-02-27 RX ORDER — METHYLPHENIDATE HYDROCHLORIDE 10 MG/1
1 TABLET ORAL DAILY
COMMUNITY
Start: 2017-12-15 | End: 2018-02-27 | Stop reason: SDUPTHER

## 2018-02-27 RX ORDER — SERTRALINE HYDROCHLORIDE 100 MG/1
100 TABLET, FILM COATED ORAL DAILY
Qty: 90 TABLET | Refills: 1 | Status: SHIPPED | OUTPATIENT
Start: 2018-02-27 | End: 2018-04-30 | Stop reason: SDUPTHER

## 2018-02-27 RX ORDER — SERTRALINE HYDROCHLORIDE 100 MG/1
50 TABLET, FILM COATED ORAL DAILY
Qty: 30 TABLET | Refills: 0 | Status: SHIPPED | OUTPATIENT
Start: 2018-02-27 | End: 2018-02-27 | Stop reason: SDUPTHER

## 2018-02-27 RX ORDER — BUPROPION HYDROCHLORIDE 150 MG/1
1 TABLET ORAL DAILY
COMMUNITY
Start: 2017-11-03 | End: 2018-08-03 | Stop reason: SDUPTHER

## 2018-02-27 RX ORDER — METHYLPHENIDATE HYDROCHLORIDE 10 MG/1
10 TABLET ORAL DAILY
Qty: 30 TABLET | Refills: 0 | Status: SHIPPED | OUTPATIENT
Start: 2018-02-27 | End: 2018-06-13 | Stop reason: ALTCHOICE

## 2018-02-27 RX ORDER — SERTRALINE HYDROCHLORIDE 100 MG/1
100 TABLET, FILM COATED ORAL DAILY
Qty: 30 TABLET | Refills: 0 | Status: SHIPPED | OUTPATIENT
Start: 2018-02-27 | End: 2018-02-27 | Stop reason: SDUPTHER

## 2018-02-27 NOTE — PROGRESS NOTES
Assessment/Plan:    Depression with anxiety  Symptoms slightly improved, increase sertraline to 100 mg daily, continue with bupropion 150 mg  Diabetic polyneuropathy associated with type 2 diabetes mellitus ([address] )  Awaiting lab results  Due for eye exam  On Jardiance, Januvia and glimepiride  On ACE  Hypertension  BP controlled on clonidine, amlodipine and enalapril  Morbid obesity with body mass index (BMI) of 40 0 to 44 9 in [facility])  Lost 3 lb since last visit  Vitamin D deficiency  Complete weekly replacement in start daily supplement  Adhesive capsulitis of both shoulders  Complete physical therapy then follow up with Ortho  BPH without urinary obstruction  On Flomax  Diagnoses and all orders for this visit:    Diabetic polyneuropathy associated with type 2 diabetes mellitus (HCC)    JEFF (obstructive sleep apnea)    Essential hypertension    Adhesive capsulitis of both shoulders    Morbid obesity with body mass index (BMI) of 40 0 to 44 9 in [facility])    Depression with anxiety  -     methylphenidate (RITALIN) 10 mg tablet; Take 1 tablet (10 mg total) by mouth daily Max Daily Amount: 10 mg    Health counseling  Comments:  Due for colonoscopy  Other orders  -     buPROPion (WELLBUTRIN XL) 150 mg 24 hr tablet; Take 1 tablet by mouth daily  -     Discontinue: methylphenidate (RITALIN) 10 mg tablet; Take 1 tablet by mouth daily  -     sertraline (ZOLOFT) 50 mg tablet; Take 1 tablet by mouth daily  -     Empagliflozin (JARDIANCE) 10 MG TABS; Take 1 tablet by mouth daily          Subjective:      Patient ID: Farzaneh Juarez is a 62 y o  male  Mr Jesica Castellano is feeling a bit better  He feels he is less tired  He is able to concentrate more when he is on the medication  He ran out of it and the sertraline about a week ago, noticed the difference, mostly durding work  He noticed that he is not making as much mistakes at work anymore    Taking the Ritalin during the weekend also helps him focus  He reports sleep has been good  He finds himself eating out more often during lunch break, not taking naps anymore  He reports that work is alert more stressful, worried about job security  He reports his right shoulder is much better, still going to physical therapy twice a week  The following portions of the patient's history were reviewed and updated as appropriate: allergies, current medications, past medical history, past social history and problem list     Review of Systems   Constitutional: Positive for fatigue  Negative for activity change and appetite change  Respiratory: Negative for cough and shortness of breath  Cardiovascular: Negative for chest pain  Gastrointestinal: Negative for abdominal pain  Musculoskeletal: Positive for arthralgias  Neurological: Negative for weakness  Psychiatric/Behavioral: Negative for behavioral problems, confusion, dysphoric mood and sleep disturbance  The patient is not nervous/anxious  Objective:      /76   Pulse 72   Temp 97 9 °F (36 6 °C)   Resp 18   Ht 5' 10 5" (1 791 m)   Wt 131 kg (288 lb 9 6 oz)   SpO2 96%   BMI 40 82 kg/m²          Physical Exam   Constitutional: He is oriented to person, place, and time  He appears well-developed and well-nourished  HENT:   Head: Normocephalic and atraumatic  Mouth/Throat: Uvula is midline and mucous membranes are normal    Eyes: Conjunctivae are normal  Pupils are equal, round, and reactive to light  Neck: Neck supple  Cardiovascular: Normal rate, regular rhythm and normal heart sounds  Pulmonary/Chest: Effort normal  He has no wheezes  He has no rhonchi  Abdominal: Soft  Bowel sounds are normal    Neurological: He is alert and oriented to person, place, and time  Skin: Skin is warm  No rash noted  Psychiatric: He has a normal mood and affect  His behavior is normal    Nursing note and vitals reviewed

## 2018-02-27 NOTE — ASSESSMENT & PLAN NOTE
Symptoms slightly improved, increase sertraline to 100 mg daily, continue with bupropion 150 mg  Will continue Ritalin for now  PDMP reviewed

## 2018-02-27 NOTE — PROGRESS NOTES
Daily Note      Today's date: 2018  Patient name: Foreign Lyn  : 1960  MRN: [id number]  Referring provider: Liyah Thomas DO  Dx:           Encounter Diagnoses   Name Primary?  Adhesive bursitis of right shoulder Yes    Acute pain of right shoulder           Subjective: Patient had to leave appt early this visit due to attending to his dog  He is very pleased with his progress thus far  Objective: See treatment diary below  PROM to 160 flex, 140 abd, 70 ER  IR still painful at about 35        Assessment: Tolerated treatment fairly well and progressed with band exercises  Plan: Continue per plan of care  and Progress treatment as tolerated         Precautions: high irritability, HTN, DM     Daily Treatment Diary      Pulleys fb TE fb MHP fb MT     Manual             PROM 8min  15 min  15 min  12 min  15 min 10 min           Light GH mobs inferior 3  min  5 min  5 min  5 min  5 min  5 min                                                                                         Exercise Diary                                 Pulleys 3/3 min  3 min  3 min  5 min  5 min  5 min           wall slides- flex/abd 20x      20x  10x  10x           Finger ladder  10x 5x   np    10x  10x                                                   Wall ER ST    5 :10  5 :10  5 :10  5 :10  5 :10            AROM flex- add wt nv  2x10    2x10  5x  2x10  10x            AROM abd          10x  10x            TB ER RTB  2x10                      IR strap ST            5x :10                                                                                                                                                                                                                                         Modalities       2/           MHP 10 min before MT np-due to time  10 min 10 min   10 min  10 min  10 min

## 2018-03-01 ENCOUNTER — OFFICE VISIT (OUTPATIENT)
Dept: PHYSICAL THERAPY | Facility: CLINIC | Age: 58
End: 2018-03-01
Payer: COMMERCIAL

## 2018-03-01 DIAGNOSIS — M25.511 ACUTE PAIN OF RIGHT SHOULDER: ICD-10-CM

## 2018-03-01 DIAGNOSIS — M75.01 ADHESIVE BURSITIS OF RIGHT SHOULDER: Primary | ICD-10-CM

## 2018-03-01 PROCEDURE — 97140 MANUAL THERAPY 1/> REGIONS: CPT

## 2018-03-01 PROCEDURE — 97110 THERAPEUTIC EXERCISES: CPT

## 2018-03-01 NOTE — PROGRESS NOTES
Daily Note     Today's date: 3/1/2018  Patient name: Ml Narayan  : 1960  MRN: [id number]  Referring provider: Ioana Vaca DO  Dx:   Encounter Diagnosis     ICD-10-CM    1  Adhesive bursitis of right shoulder M75 01    2  Acute pain of right shoulder M25 511                   Subjective: Patient states that he has noticed an improvement with his ROM and less pain but continues to feel some discomfort when reaching behind his head  Objective: See treatment diary below  Patient requested to leave early and was accommodated    Manual    2/5  2/19  2/21  3/1         PROM 8min  15 min  15 min  12 min  15 min 10 min  10 min          Light GH mobs inferior 3  min  5 min  5 min  5 min  5 min  5 min  np                                                                                       Exercise Diary    2/5  2/19  2/21  3/1                             Pulleys 3/3 min  3 min  3 min  5 min  5 min  5 min  5 min          wall slides- flex/abd 20x      20x  10x  10x           Finger ladder  10x 5x   np    10x  10x  10x                                                 Wall ER ST    5 :10  5 :10  5 :10  5 :10  5 :10  5 x :10           AROM flex- add wt nv  2x10    2x10  5x  2x10  10x  10           AROM abd          10x  10x  10          TB ER RTB  2x10            x 15          IR strap ST            5x :10  5x :10                                                                                                                                                                                                                                    Assessment: Tolerated treatment well  Patient exhibited good technique with therapeutic exercises      Plan: Continue per plan of care

## 2018-03-03 LAB — HBA1C MFR BLD HPLC: 9.2 %

## 2018-03-05 ENCOUNTER — TELEPHONE (OUTPATIENT)
Dept: INTERNAL MEDICINE CLINIC | Facility: CLINIC | Age: 58
End: 2018-03-05

## 2018-03-05 ENCOUNTER — OFFICE VISIT (OUTPATIENT)
Dept: PHYSICAL THERAPY | Facility: CLINIC | Age: 58
End: 2018-03-05
Payer: COMMERCIAL

## 2018-03-05 DIAGNOSIS — E11.40 TYPE 2 DIABETES MELLITUS WITH DIABETIC NEUROPATHY, WITHOUT LONG-TERM CURRENT USE OF INSULIN (HCC): Primary | ICD-10-CM

## 2018-03-05 DIAGNOSIS — M25.511 ACUTE PAIN OF RIGHT SHOULDER: Primary | ICD-10-CM

## 2018-03-05 DIAGNOSIS — M75.01 ADHESIVE BURSITIS OF RIGHT SHOULDER: ICD-10-CM

## 2018-03-05 PROCEDURE — 97110 THERAPEUTIC EXERCISES: CPT | Performed by: PHYSICAL THERAPIST

## 2018-03-05 PROCEDURE — 97112 NEUROMUSCULAR REEDUCATION: CPT | Performed by: PHYSICAL THERAPIST

## 2018-03-05 PROCEDURE — 97140 MANUAL THERAPY 1/> REGIONS: CPT | Performed by: PHYSICAL THERAPIST

## 2018-03-05 NOTE — PROGRESS NOTES
Daily Note     Today's date: 3/5/2018  Patient name: Leslie Jones  : 1960  MRN: [id number]  Referring provider: Pamela Jasmine DO  Dx:   Encounter Diagnosis     ICD-10-CM    1  Acute pain of right shoulder M25 511    2  Adhesive bursitis of right shoulder M75 01                   Subjective: Patient feels he is making progress, consistently compliant with HEP    Objective: See treatment diary below  Patient requested to leave early and was accommodated    Manual    2/5  2/19  2/21  3/1  3/5       PROM 8min  15 min  15 min  12 min  15 min 10 min  10 min   10 min       Light GH mobs inferior 3  min  5 min  5 min  5 min  5 min  5 min  np                                                                                       Exercise Diary  2/26  1/29  1/31  2/5  2/19  2/21  3/1  3                           Pulleys 3/3 min  3 min  3 min  5 min  5 min  5 min  5 min  5 min       wall slides- flex/abd 20x      20x  10x  10x           Finger ladder  10x 5x   np    10x  10x  10x  10x                                               Wall ER ST    5 :10  5 :10  5 :10  5 :10  5 :10  5 x :10   5x`10        AROM flex- add wt nv  2x10    2x10  5x  2x10  10x  10   10x 1#2        AROM abd          10x  10x  10  10x 1#        TB ER RTB  2x10            x 15  GTB 15x        IR strap ST            5x :10  5x :10   5x:10                                                                                                                                                                                                                                 Assessment: Tolerated treatment well  Patient exhibited good technique with therapeutic exercises      Plan: Continue per plan of care

## 2018-03-06 NOTE — TELEPHONE ENCOUNTER
Notified patient, patient stated to can send new rx to mail order  Does not need samples  Provided # to schedule Us

## 2018-03-08 ENCOUNTER — OFFICE VISIT (OUTPATIENT)
Dept: PHYSICAL THERAPY | Facility: CLINIC | Age: 58
End: 2018-03-08
Payer: COMMERCIAL

## 2018-03-08 DIAGNOSIS — M25.511 ACUTE PAIN OF RIGHT SHOULDER: Primary | ICD-10-CM

## 2018-03-08 DIAGNOSIS — M75.01 ADHESIVE BURSITIS OF RIGHT SHOULDER: ICD-10-CM

## 2018-03-08 PROCEDURE — 97110 THERAPEUTIC EXERCISES: CPT | Performed by: PHYSICAL THERAPIST

## 2018-03-08 PROCEDURE — 97140 MANUAL THERAPY 1/> REGIONS: CPT | Performed by: PHYSICAL THERAPIST

## 2018-03-08 NOTE — PROGRESS NOTES
Daily Note     Today's date: 3/8/2018  Patient name: Linda Lesches  : 1960  MRN: [id number]  Referring provider: Nicole Truong DO  Dx:   Encounter Diagnosis     ICD-10-CM    1  Acute pain of right shoulder M25 511    2  Adhesive bursitis of right shoulder M75 01                   Subjective: Patient presents today stating he has noticed significant improvement in his shoulder  Very content with his progression  Objective: See treatment diary below  Patient requested to leave early and was accommodated    Manual  2/26  1/29  1/31  2/5  2/19  2/21  3/1  3  3/8         PROM 8min  15 min  15 min  12 min  15 min 10 min  10 min   10 min  10 mins     Light GH mobs inferior 3  min  5 min  5 min  5 min  5 min  5 min  np                                                                                       Exercise Diary    2/5  2/19  2/21  3/1  3  3/8                         Pulleys 3/3 min  3 min  3 min  5 min  5 min  5 min  5 min  5 min  5 mins     wall slides- flex/abd 20x      20x  10x  10x      10x     Finger ladder  10x 5x   np    10x  10x  10x  10x  10x                                             Wall ER ST    5 :10  5 :10  5 :10  5 :10  5 :10  5 x :10   5x`10  5 x 10 min      AROM flex- add wt nv  2x10    2x10  5x  2x10  10x  10   10x 1#2  10 2#      AROM abd          10x  10x  10  10x 1#  10 1#      TB ER RTB  2x10            x 15  GTB 15x  GTB 15      IR strap ST            5x :10  5x :10   5x:10  5 x 10                                                                                                                                                                                                                               Assessment: Minor restriction with ER otherwise progressing very well with ROM  Plan: Continue per plan of care

## 2018-03-09 RX ORDER — ERGOCALCIFEROL 1.25 MG/1
CAPSULE ORAL
Qty: 12 CAPSULE | Refills: 0 | OUTPATIENT
Start: 2018-03-09

## 2018-03-10 ENCOUNTER — HOSPITAL ENCOUNTER (OUTPATIENT)
Dept: ULTRASOUND IMAGING | Facility: HOSPITAL | Age: 58
Discharge: HOME | End: 2018-03-10
Payer: COMMERCIAL

## 2018-03-10 PROCEDURE — 76770 US EXAM ABDO BACK WALL COMP: CPT

## 2018-03-12 ENCOUNTER — OFFICE VISIT (OUTPATIENT)
Dept: PHYSICAL THERAPY | Facility: CLINIC | Age: 58
End: 2018-03-12
Payer: COMMERCIAL

## 2018-03-12 DIAGNOSIS — M25.511 ACUTE PAIN OF RIGHT SHOULDER: ICD-10-CM

## 2018-03-12 DIAGNOSIS — M75.01 ADHESIVE BURSITIS OF RIGHT SHOULDER: Primary | ICD-10-CM

## 2018-03-12 PROCEDURE — 97140 MANUAL THERAPY 1/> REGIONS: CPT | Performed by: PHYSICAL THERAPIST

## 2018-03-12 PROCEDURE — G8992 OTHER PT/OT  D/C STATUS: HCPCS | Performed by: PHYSICAL THERAPIST

## 2018-03-12 PROCEDURE — 97110 THERAPEUTIC EXERCISES: CPT | Performed by: PHYSICAL THERAPIST

## 2018-03-12 PROCEDURE — G8991 OTHER PT/OT GOAL STATUS: HCPCS | Performed by: PHYSICAL THERAPIST

## 2018-03-12 NOTE — PROGRESS NOTES
Daily Note     Today's date: 3/12/2018  Patient name: Renetta Arreola  : 1960  MRN: [id number]  Referring provider: Kera Garcia DO  Dx:   Encounter Diagnosis     ICD-10-CM    1  Adhesive bursitis of right shoulder M75 01    2  Acute pain of right shoulder M25 511                   Subjective: Patient presents today stating he feels major improvements  Objective: See treatment diary below      Manual  2/26  1/29  1/31  2/5  2/19  2/21  3/1  3/5  3/8      3/12   PROM 8min  15 min  15 min  12 min  15 min 10 min  10 min   10 min  10 mins  10 min   Light GH mobs inferior 3  min  5 min  5 min  5 min  5 min  5 min  np                                                                                       Exercise Diary    2/5  2/19  2/21  3/1  3/5  3/8  3/12                       Pulleys 3/3 min  3 min  3 min  5 min  5 min  5 min  5 min  5 min  5 mins  3 min   wall slides- flex/abd 20x      20x  10x  10x      10x     Finger ladder  10x 5x   np    10x  10x  10x  10x  10x                                             Wall ER ST    5 :10  5 :10  5 :10  5 :10  5 :10  5 x :10   5x`10  5 x 10 min      AROM flex- add wt nv  2x10    2x10  5x  2x10  10x  10   10x 1#2  10 2#  #2    AROM abd          10x  10x  10  10x 1#  10 1#  #2    TB ER RTB  2x10            x 15  GTB 15x  GTB 15  GTB 2x15    IR strap ST            5x :10  5x :10   5x:10  5 x 10  5x :10                                                                                                                                                                                                                             Assessment: Minor restriction with ER otherwise progressing very well with ROM  Strength and AROM WNL  PROM slightly limited at end range  Plan: Continue per plan of care  Patient will be d/c to Hep at this time and was updated his HEP

## 2018-03-13 ENCOUNTER — TELEPHONE (OUTPATIENT)
Dept: INTERNAL MEDICINE CLINIC | Facility: CLINIC | Age: 58
End: 2018-03-13

## 2018-03-13 NOTE — TELEPHONE ENCOUNTER
----- Message from Hadley Bergman MD sent at 3/13/2018 11:23 AM EDT -----  Kidney ultrasound showed multiple cyst in both kidneys, no further work up necessary  Will just monitor kidney function

## 2018-03-14 ENCOUNTER — APPOINTMENT (OUTPATIENT)
Dept: PHYSICAL THERAPY | Facility: CLINIC | Age: 58
End: 2018-03-14
Payer: COMMERCIAL

## 2018-04-13 ENCOUNTER — OFFICE VISIT (OUTPATIENT)
Dept: INTERNAL MEDICINE CLINIC | Facility: CLINIC | Age: 58
End: 2018-04-13
Payer: COMMERCIAL

## 2018-04-13 VITALS
OXYGEN SATURATION: 95 % | TEMPERATURE: 97.9 F | RESPIRATION RATE: 18 BRPM | SYSTOLIC BLOOD PRESSURE: 138 MMHG | WEIGHT: 297.4 LBS | HEIGHT: 71 IN | DIASTOLIC BLOOD PRESSURE: 88 MMHG | BODY MASS INDEX: 41.64 KG/M2 | HEART RATE: 100 BPM

## 2018-04-13 DIAGNOSIS — E66.01 MORBID OBESITY WITH BODY MASS INDEX (BMI) OF 40.0 TO 44.9 IN ADULT (HCC): ICD-10-CM

## 2018-04-13 DIAGNOSIS — E11.42 DIABETIC POLYNEUROPATHY ASSOCIATED WITH TYPE 2 DIABETES MELLITUS (HCC): ICD-10-CM

## 2018-04-13 DIAGNOSIS — E55.9 VITAMIN D DEFICIENCY: ICD-10-CM

## 2018-04-13 DIAGNOSIS — E78.49 OTHER HYPERLIPIDEMIA: Primary | ICD-10-CM

## 2018-04-13 PROCEDURE — 99214 OFFICE O/P EST MOD 30 MIN: CPT | Performed by: INTERNAL MEDICINE

## 2018-04-13 NOTE — ASSESSMENT & PLAN NOTE
Symptoms improved later in the day, may be due to recent medication change  Overall fatigue is not as bad as a few months ago

## 2018-04-13 NOTE — PROGRESS NOTES
Assessment/Plan: Morbid obesity with body mass index (BMI) of 40 0 to 44 9 in adult ([facility])  Gained 9 lbs since last visit  Discussed    Diabetic polyneuropathy associated with type 2 diabetes mellitus ([facility] )  Increased urination since Jardiance increased  Recheck A1c, also on Januvia and glimepiride  Depression with anxiety  Sleep patterns changed since medication change  Instructed to take sertraline and bupropion at night, if no change in 2 weeks, take sertraline at night, bupropion in AM     Hypertension  BP controlled on clonidine, amlodipine and enalapril    Adhesive capsulitis of both shoulders  Resolved  Completed therapy  Instructed to do ROM exercises regularly at home  Fatigue  Symptoms improved later in the day, may be due to recent medication change  Overall fatigue is not as bad as a few months ago  Diagnoses and all orders for this visit:    Other hyperlipidemia  -     Basic metabolic panel; Future  -     Lipid panel; Future    Morbid obesity with body mass index (BMI) of 40 0 to 44 9 in adult [facility])    Vitamin D deficiency  -     Vitamin D 25 hydroxy; Future    Diabetic polyneuropathy associated with type 2 diabetes mellitus ([facility] )  -     HEMOGLOBIN A1C W/ EAG ESTIMATION; Future  -     Basic metabolic panel; Future          Subjective:      Patient ID: Mecca Almanza is a 62 y o  male  Mr José Shanks is feeling alright  He noticed that he is waking up 1-2x at night, feels not rested when he wakes in the morning  He is using his CPAP every night  He feels tired in the morning, symptoms would improve by late morning and early afternoon  He reports he has been stress eating, blames it on work  He drinks coffee and soda every morning  He drinks a 2 L of diet coke almost daily  Since increasing started ends, he noticed that he has been urinating every hour in the morning  No symptoms at night  He also noticed low back and right hip pain when he walks more than a block  This has not happened in the past   Denies any recent trauma  The following portions of the patient's history were reviewed and updated as appropriate: allergies, current medications, past medical history, past social history and problem list     Review of Systems   Constitutional: Negative for appetite change and fatigue  HENT: Negative for congestion, hearing loss and postnasal drip  Eyes: Negative  Respiratory: Negative for cough, chest tightness and shortness of breath  Cardiovascular: Negative for chest pain, palpitations and leg swelling  Gastrointestinal: Negative for abdominal pain and constipation  Genitourinary: Negative for dysuria, frequency and urgency  Musculoskeletal: Negative for arthralgias  Skin: Negative for rash and wound  Neurological: Negative for dizziness, numbness and headaches  Hematological: Negative for adenopathy  Does not bruise/bleed easily  Psychiatric/Behavioral: Positive for sleep disturbance  The patient is not nervous/anxious  Objective:      /88   Pulse 100   Temp 97 9 °F (36 6 °C)   Resp 18   Ht 5' 10 5" (1 791 m)   Wt 135 kg (297 lb 6 4 oz)   SpO2 95%   BMI 42 07 kg/m²          Physical Exam   Constitutional: He is oriented to person, place, and time  He appears well-developed and well-nourished  HENT:   Head: Normocephalic and atraumatic  Mouth/Throat: Mucous membranes are normal    Eyes: Conjunctivae are normal  Pupils are equal, round, and reactive to light  Neck: Neck supple  Cardiovascular: Normal rate, regular rhythm and normal heart sounds  No edema  Pulmonary/Chest: Effort normal  He has no wheezes  He has no rhonchi  Abdominal: Soft  Bowel sounds are normal    Neurological: He is alert and oriented to person, place, and time  Skin: Skin is warm  No rash noted  Psychiatric: He has a normal mood and affect  His behavior is normal    Nursing note and vitals reviewed          Medications and lab results reviewed with patient

## 2018-04-13 NOTE — PATIENT INSTRUCTIONS
Take sertraline and bupropion at bedtime for next 2 weeks  If no improvement, take sertraline at night, bupropion in the morning  Stop drinking sodas!

## 2018-04-13 NOTE — ASSESSMENT & PLAN NOTE
Sleep patterns changed since medication change    Instructed to take sertraline and bupropion at night, if no change in 2 weeks, take sertraline at night, bupropion in AM

## 2018-04-18 NOTE — PROGRESS NOTES
Andria Wilson has been compliant with attending PT and home exercise program since initial eval   Andria Wilson  has made good functional progress with physical therapy  he was educated and updated in their home exercise program  Archer Gitelman will be discharged from formal therapy but will follow up as needed

## 2018-04-20 ENCOUNTER — OFFICE VISIT (OUTPATIENT)
Dept: PODIATRY | Facility: CLINIC | Age: 58
End: 2018-04-20
Payer: COMMERCIAL

## 2018-04-20 VITALS
DIASTOLIC BLOOD PRESSURE: 88 MMHG | RESPIRATION RATE: 17 BRPM | BODY MASS INDEX: 41.64 KG/M2 | HEIGHT: 71 IN | WEIGHT: 297.4 LBS | SYSTOLIC BLOOD PRESSURE: 138 MMHG

## 2018-04-20 DIAGNOSIS — M79.672 PAIN IN BOTH FEET: ICD-10-CM

## 2018-04-20 DIAGNOSIS — B35.1 ONYCHOMYCOSIS: ICD-10-CM

## 2018-04-20 DIAGNOSIS — E11.40 TYPE 2 DIABETES MELLITUS WITH DIABETIC NEUROPATHY, WITHOUT LONG-TERM CURRENT USE OF INSULIN (HCC): Primary | ICD-10-CM

## 2018-04-20 DIAGNOSIS — L84 CORNS: ICD-10-CM

## 2018-04-20 DIAGNOSIS — M77.42 METATARSALGIA OF BOTH FEET: ICD-10-CM

## 2018-04-20 DIAGNOSIS — M79.671 PAIN IN BOTH FEET: ICD-10-CM

## 2018-04-20 DIAGNOSIS — M77.41 METATARSALGIA OF BOTH FEET: ICD-10-CM

## 2018-04-20 PROCEDURE — 99213 OFFICE O/P EST LOW 20 MIN: CPT | Performed by: PODIATRIST

## 2018-04-20 NOTE — PROGRESS NOTES
Procedures   Foot Exam     Discussion/Summary   The patient was counseled regarding instructions for management,-- prognosis,-- patient and family education,-- risks and benefits of treatment options,-- importance of compliance with treatment     Patient is able to Self-Care     The treatment plan was reviewed with the patient/guardian  The patient/guardian understands and agrees with the treatment plan      Chief Complaint   Patient needs full diabetic foot exam       History of Present Illness   HPI: Patient presents for pedal evaluation  Patient complains of pain in his feet and toes with ambulation   Patient is a morbidly obese diabetic who has some electric sensations in his feet on occasion       Review of Systems        Constitutional: not feeling tired       Eyes: no eyesight problems       ENT: no nasal discharge       Cardiovascular: no chest pain,-- no palpitations-- and-- no extremity edema       Respiratory: no shortness of breath-- and-- no cough       Gastrointestinal: no abdominal pain-- and-- no constipation       Genitourinary: no dysuria-- and-- no urinary hesitancy       Integumentary: no skin wound       Neurological: no tingling-- and-- no dizziness       Psychiatric: sleeping better, stopped taking trazodone, but-- no sleep disturbances       Endocrine: no feelings of weakness       Active Problems   1  Acquired pes planus (734) (M21 40)   2  Acute renal failure (584 9) (N17 9)   3  Adhesive capsulitis of both shoulders (726 0) (M75 01,M75 02)   4  Arthralgia (719 40) (M25 50)   5  BPH without urinary obstruction (600 00) (N40 0)   6  Bunion (727 1) (M21 619)   7  Callus (700) (L84)   8  FQJKOBE systolic congestive heart failure (428 22,428 0) (I50 22)   9  Depression with anxiety (300 4) (F41 8)   10  Diabetes mellitus with neurological manifestation (250 60) (E11 49)   11  Diabetes type 2, uncontrolled (250 02) (E11 65)   12  Difficulty concentrating (799 51) (R41 840)   13  Difficulty walking (719 7) (R26 2)   14  Dyspnea on exertion (786 09) (R06 09)   15  Encounter for prostate cancer screening (V76 44) (Z12 5)   16  Encounter for screening for malignant neoplasm of colon (V76 51) (Z12 11)   17  Fatigue (780 79) (R53 83)   18  Foot pain, bilateral (729 5) (M79 671,M79 672)   19  Hallux valgus (735 0) (M20 10)   20  Hematuria (599 70) (R31 9)   21  Hyperlipidemia (272 4) (E78 5)   22  Hypertension (401 9) (I10)   23  Hypogonadism, male (257 2) (E29 1)   24  Insomnia (780 52) (G47 00)   25  Morbid or severe obesity due to excess calories (278 01) (E66 01)   26  Need for pneumococcal vaccination (V03 82) (Z23)   27  Need for prophylactic vaccination and inoculation against influenza (V04 81) (Z23)   28  Nephrolithiasis (592 0) (N20 0)   29  Onychomycosis (110 1) (B35 1)   30  JEFF (obstructive sleep apnea) (327 23) (G47 33)   31  Sciatica (724 3) (M54 30)   32  Screening for colon cancer (V76 51) (Z12 11)   33  Seasonal allergies (477 9) (J30 2)   34  Shoulder pain, right (719 41) (M25 511)   35  Spinal stenosis (724 00) (M48 00)   36  Tinea pedis (110 4) (B35 3)   37  Type 2 diabetes mellitus (250 00) (E11 9)   38  Vitamin D deficiency (268 9) (E55 9)     Past Medical History    · History of Cellulitis of left leg (682 6) (L03 116)   · History of chest pain (V13 89) (D53 577)   · History of diarrhea (V12 79) (D74 225)   · History of onychomycosis (V12 09) (Z86 19)   · History of onychomycosis (V12 09) (Z86 19)   · History of Limb pain (729 5) (M79 609)   · History of Neck pain (723 1) (M54 2)   · Need for pneumococcal vaccination (V03 82) (Z23)   · History of Nephrolithiasis (V13 01)   · History of Numbness On The Sole Of The Right Foot   · History of Pain and swelling of left lower leg (729 5,729 81) (M79 662,M79 89)   · History of Pain of lower extremity (729 5) (M79 606)   · History of Pain, hand joint, right (719 44) (M79 641)   · Sciatica (724 3) (M54 30)     The active problems and past medical history were reviewed and updated today       Surgical History    · History of Knee Arthroscopy (Therapeutic)   · History of Knee Surgery   · History of Needle Biopsy Of Prostate     Family History   Mother    · Family history of Alzheimer Disease   · Family history of Family Health Status Of Mother - Alive  Father    · Family history of Family Health Status Of Father -   Family History    · Family history of Adopted   · Denied: Family history of Colon Cancer   · Denied: Family history of Crohn's Disease   · Denied: Family history of Liver Cancer     Social History    · Denied: History of Alcohol Use (History)   · Current Some Day Smoker (305 1)   · Denied: History of Exercise Habits   · Marital History - Currently    · Tobacco use (305 1) (Z72 0)   · Working Full Time  The social history was reviewed and is unchanged       Current Meds    1  AmLODIPine Besylate 10 MG Oral Tablet; TAKE 1 TABLET EVERY MORNING;     Therapy: 54GLD1835 to (Maru Etienne)  Requested for: 50CYX8301; Last     Rx:63Ucx3875 Ordered   2  Aditya Breeze 2 Test In Vitro Disk; test blood sugar twice daiy;     Therapy: 23EUR8727 to (Dany Coelho)  Requested for: 29XJO2315; Last     Rx:2015 Ordered   3  BuPROPion HCl ER (XL) 150 MG Oral Tablet Extended Release 24 Hour; take 1 tablet     by mouth every day;     Therapy: 12RSA5241 to (Evaluate:2018)  Requested for: 36HTL3667; Last     Rx:65Ufj5170 Ordered   4  CloNIDine HCl - 0 3 MG Oral Tablet; take 1 tablet twice a day;     Therapy: 59TPJ2628 to (Maru Etienne)  Requested for: 65CZK3057; Last     Rx:12Kwm0211 Ordered   5  Enalapril Maleate 20 MG Oral Tablet; take 1 tablet twice a day;     Therapy: 38SZR9994 to (Maru Etienne)  Requested for: 05DTV0641; Last     Rx:86Tzm8332 Ordered   6  Glimepiride 4 MG Oral Tablet;  Take 1 tablet daily;     Therapy: 29XEI7887 to (Maru Etienne)  Requested for: 41ATJ7382; Last     Rx:83Tgt1326 Ordered   7  Januvia 100 MG Oral Tablet; take 1 tablet by mouth daily;     Therapy: 51BWS3551 to (Evaluate:85Iqm1485)  Requested for: 59Mcz7488; Last     Rx:99Esz9322 Ordered   8  Jardiance 10 MG Oral Tablet; take 1 tablet by mouth every morning;     Therapy: 34ATM8891 to (Evaluate:05Mar2018)  Requested for: 50TJN2659; Last     Rx:51Ebv7911 Ordered   9  Ketoconazole 2 % External Cream; APPLY A THIN LAYER TO AFFECTED AREA(S)     TWICE DAILY;     Therapy: 67Pyb1700 to (Evaluate:26Jan2017)  Requested for: 55Wpu9463; Last     Rx:50Qaw5868 Ordered   10  Methylphenidate HCl - 10 MG Oral Tablet; Take 1 tablet twice daily;      Therapy: 19XUG4276 to (Evaluate:14Jan2018); Last Rx:45Dzv3599 Ordered   11  NovoFine 32G X 6 MM Miscellaneous; USE AS DIRECTED;      Therapy: 78MNF1075 to (ZXUGNNCZ:66YGG1007)  Requested for: 03Szb4662; Last      Rx:36Fvp8062 Ordered   12  OneTouch Verio In Vitro Strip; TEST TWICE DAILY;      Therapy: 53XSL4316 to (Evaluate:21Jun2016)  Requested for: 28AAD7218; Last      Rx:89Ysm9775 Ordered   13  OneTouch Verio w/Device Kit; USE AS DIRECTED;      Therapy: 64QIC5665 to (Last Rx:24Nov2015)  Requested for: 24Nov2015 Ordered   14  Tamsulosin HCl - 0 4 MG Oral Capsule; take 1 capsule daily;      Therapy: 56JCQ6441 to (Evaluate:05Mar2018)  Requested for: 54FQK0297; Last      Rx:34Caj2858 Ordered   15  Testosterone Cypionate 200 MG/ML Intramuscular Solution; inject IM every 2 weeks;      Therapy: 35GPC6220 to (Last Rx:51Ngk4037) Ordered   16  Vicodin 5-300 MG Oral Tablet; One by mouth  half hour prior to physical therapy;      Therapy: 97BYD1634 to (Evaluate:18Jan2018);  Last Rx:29Nov2017 Ordered   17  Vitamin D (Ergocalciferol) 30070 UNIT Oral Capsule; TAKE ONE CAPSULE BY MOUTH      ONCE A WEEK;      Therapy: 21FUF6769 to (Alfredo Awan)  Requested for: 96Yne0453; Last      Rx:11Owv5329 Ordered   18  Vitamin D3 2000 UNIT Oral Capsule; TAKE 1 CAPSULE Daily;      Therapy: 68TQT8283 to (Last Rx:80Cuo6498)  Requested for: 03DPJ0076 Ordered     The medication list was reviewed and updated today       Allergies   1  No Known Drug Allergies     Vitals        Respiration 17   Height 5 ft 10 in   Weight 286 lb    BMI Calculated 41 04   BSA Calculated 2 43      Physical Exam   Left Foot: Appearance: Normal except as noted: excessive pronation-- and-- pes planus  Great toe deformities include a bunion  Tenderness: None except the great toe-- and-- distal first metatarsal     Right Foot: Appearance: Normal except as noted: excessive pronation-- and-- pes planus  Great toe deformities include a bunion  Tenderness: None except the great toe-- and-- distal first metatarsal     Left Ankle: ROM: limited ROM in all planes    Right Ankle: ROM: limited ROM in all planes    Neurological Exam: performed  Light touch was decreased bilaterally  Vibratory sensation was decreased in both first metatarsophalangeal joints     Vascular Exam: performed Dorsalis pedis pulses were present bilaterally  Posterior tibial pulses were present bilaterally  Elevation Pallor: absent bilaterally  Dependence rubor was absent bilaterally  Edema: none     Toenails: All of the toenails were elongated,-- hypertrophied,-- discolored-- and-- Ptotic  Both first toenails were tender-- and-- Positive onychogryphosis          Socks and shoes removed, Right Foot Findings: swollen, erythematous and dry       The sensory exam showed diminished vibratory sensation at the level of the toes  Diminished tactile sensation with monofilament testing throughout the right foot       Socks and shoes removed, Left Foot Findings: swollen, erythematous and dry       The sensory exam showed diminished vibratory sensation at the level of the toes  Diminished tactile sensation with monofilament testing throughout the left foot      Capillary refills findings on the right were normal in the toes        Pulses:      2+ in the posterior tibialis on the right      2+ in the dorsalis pedis on the right       Capillary refills findings on the left were normal in the toes       Pulses:      1+ in the posterior tibialis on the left      1+ in the dorsalis pedis on the left       Assign Risk Category: 2: Loss of protective sensation with or without weakness, deformity, callus, pre-ulcer, or history of ulceration  High risk     Hyperkeratosis: present on both first toes,-- present on both first sub metatarsals,-- present on both third sub metatarsals-- and-- Severe profound moccasin tinea pedis bilateral     Shoe Gear Evaluation: performed ()  Recommendation(s): SAS style       Procedure   The patient was educated on the diabetic foot and good glucose control  All mycotic nails debrided   Bilateral pre-ulcerative lesions debrided today without pain or complication

## 2018-04-29 DIAGNOSIS — F41.8 DEPRESSION WITH ANXIETY: ICD-10-CM

## 2018-04-30 RX ORDER — SERTRALINE HYDROCHLORIDE 100 MG/1
100 TABLET, FILM COATED ORAL DAILY
Qty: 90 TABLET | Refills: 1 | Status: SHIPPED | OUTPATIENT
Start: 2018-04-30 | End: 2018-09-13 | Stop reason: SDUPTHER

## 2018-06-03 PROBLEM — M25.50 ARTHRALGIA: Status: ACTIVE | Noted: 2017-09-18

## 2018-06-13 ENCOUNTER — OFFICE VISIT (OUTPATIENT)
Dept: INTERNAL MEDICINE CLINIC | Facility: CLINIC | Age: 58
End: 2018-06-13
Payer: COMMERCIAL

## 2018-06-13 VITALS
HEART RATE: 84 BPM | TEMPERATURE: 98.6 F | HEIGHT: 71 IN | DIASTOLIC BLOOD PRESSURE: 70 MMHG | SYSTOLIC BLOOD PRESSURE: 128 MMHG | OXYGEN SATURATION: 95 % | BODY MASS INDEX: 42.39 KG/M2 | RESPIRATION RATE: 18 BRPM | WEIGHT: 302.8 LBS

## 2018-06-13 DIAGNOSIS — E66.01 MORBID OBESITY WITH BODY MASS INDEX (BMI) OF 40.0 TO 44.9 IN ADULT (HCC): ICD-10-CM

## 2018-06-13 DIAGNOSIS — F41.8 DEPRESSION WITH ANXIETY: Primary | ICD-10-CM

## 2018-06-13 DIAGNOSIS — I10 ESSENTIAL HYPERTENSION: ICD-10-CM

## 2018-06-13 DIAGNOSIS — E11.40 TYPE 2 DIABETES MELLITUS WITH DIABETIC NEUROPATHY, WITHOUT LONG-TERM CURRENT USE OF INSULIN (HCC): ICD-10-CM

## 2018-06-13 DIAGNOSIS — E11.42 DIABETIC POLYNEUROPATHY ASSOCIATED WITH TYPE 2 DIABETES MELLITUS (HCC): ICD-10-CM

## 2018-06-13 DIAGNOSIS — E78.49 OTHER HYPERLIPIDEMIA: ICD-10-CM

## 2018-06-13 DIAGNOSIS — N40.0 BPH WITHOUT URINARY OBSTRUCTION: ICD-10-CM

## 2018-06-13 PROCEDURE — 99396 PREV VISIT EST AGE 40-64: CPT | Performed by: INTERNAL MEDICINE

## 2018-06-13 RX ORDER — TAMSULOSIN HYDROCHLORIDE 0.4 MG/1
0.4 CAPSULE ORAL DAILY
Qty: 90 CAPSULE | Refills: 1 | Status: SHIPPED | OUTPATIENT
Start: 2018-06-13 | End: 2018-09-13 | Stop reason: SDUPTHER

## 2018-06-13 RX ORDER — AMLODIPINE BESYLATE 10 MG/1
10 TABLET ORAL DAILY
Qty: 90 TABLET | Refills: 1 | Status: SHIPPED | OUTPATIENT
Start: 2018-06-13 | End: 2018-09-13 | Stop reason: SDUPTHER

## 2018-06-13 RX ORDER — GLIMEPIRIDE 4 MG/1
4 TABLET ORAL DAILY
Qty: 90 TABLET | Refills: 1 | Status: SHIPPED | OUTPATIENT
Start: 2018-06-13 | End: 2018-09-13 | Stop reason: SDUPTHER

## 2018-06-13 RX ORDER — ENALAPRIL MALEATE 20 MG/1
20 TABLET ORAL 2 TIMES DAILY
Qty: 90 TABLET | Refills: 1 | Status: SHIPPED | OUTPATIENT
Start: 2018-06-13 | End: 2018-08-28 | Stop reason: SDUPTHER

## 2018-06-13 RX ORDER — CLONIDINE HYDROCHLORIDE 0.3 MG/1
0.3 TABLET ORAL 2 TIMES DAILY
Qty: 180 TABLET | Refills: 1 | Status: SHIPPED | OUTPATIENT
Start: 2018-06-13 | End: 2018-09-13 | Stop reason: SDUPTHER

## 2018-06-13 NOTE — PROGRESS NOTES
Assessment/Plan: Morbid obesity with body mass index (BMI) of 40 0 to 44 9 in [facility])  Gained another 5 lb since last visit  He is more active  Discussed portion control, limit sodas  Fatigue  Improved, he has more energy recently  Diabetic polyneuropathy associated with type 2 diabetes mellitus ([facility] 75 )  Due for A1c, was 9 2%  Already maximized on dreading ends and Januvia, glimepiride can be increased to 4 mg b i d  Hypertension  BP stable on clonidine, amlodipine and enalapril  Depression with anxiety  Symptoms improved, maintain sertraline and bupropion QHS  JEFF (obstructive sleep apnea)  Using CPAP qHS  Diagnoses and all orders for this visit:    Depression with anxiety    Essential hypertension  -     amLODIPine (NORVASC) 10 mg tablet; Take 1 tablet (10 mg total) by mouth daily  -     cloNIDine (CATAPRES) 0 3 mg tablet; Take 1 tablet (0 3 mg total) by mouth 2 (two) times a day  -     enalapril (VASOTEC) 20 mg tablet; Take 1 tablet (20 mg total) by mouth 2 (two) times a day    Type 2 diabetes mellitus with diabetic neuropathy, without long-term current use of insulin ([facility])  -     Empagliflozin 25 MG TABS; Take 1 tablet (25 mg total) by mouth every morning  -     enalapril (VASOTEC) 20 mg tablet; Take 1 tablet (20 mg total) by mouth 2 (two) times a day  -     glimepiride (AMARYL) 4 mg tablet; Take 1 tablet (4 mg total) by mouth daily  -     sitaGLIPtin (JANUVIA) 100 mg tablet; Take 1 tablet (100 mg total) by mouth daily    BPH without urinary obstruction  -     tamsulosin (FLOMAX) 0 4 mg; Take 1 capsule (0 4 mg total) by mouth daily    Diabetic polyneuropathy associated with type 2 diabetes mellitus ([place] [address] )    Morbid obesity with body mass index (BMI) of 40 0 to 44 9 in [facility])    Other hyperlipidemia      Follow up in 3 months or as needed  Subjective:      Patient ID: Elizabeth Ramos is a 62 y o  male  Mr Roland Church is feeling well    He reports that he has more energy lately, has been mowing the lawn once a week and doing more yd work  He feels that he is more active compared to a few months ago  He sleeps well  He has not changed his eating habits  He continues to drink about 2 L of diet soda 5 days a week at work  He does not really check his sugars  He takes all his medications regularly  The following portions of the patient's history were reviewed and updated as appropriate: allergies, current medications, past medical history, past social history and problem list     Review of Systems   Constitutional: Negative for appetite change and fatigue  HENT: Negative for congestion, hearing loss and postnasal drip  Eyes: Negative  Respiratory: Negative for cough, chest tightness and shortness of breath  Cardiovascular: Negative for chest pain, palpitations and leg swelling  Gastrointestinal: Negative for abdominal pain and constipation  Genitourinary: Negative for dysuria, frequency and urgency  Musculoskeletal: Positive for arthralgias  Skin: Negative for rash and wound  Neurological: Negative for dizziness, numbness and headaches  Hematological: Negative for adenopathy  Does not bruise/bleed easily  Psychiatric/Behavioral: Negative for sleep disturbance  The patient is not nervous/anxious  Objective:      /70   Pulse 84   Temp 98 6 °F (37 °C)   Resp 18   Ht 5' 10 5" (1 791 m)   Wt (!) 137 kg (302 lb 12 8 oz)   SpO2 95%   BMI 42 83 kg/m²          Physical Exam   Constitutional: He is oriented to person, place, and time  He appears well-developed and well-nourished  HENT:   Head: Normocephalic and atraumatic  Mouth/Throat: Mucous membranes are normal    Eyes: Conjunctivae are normal  Pupils are equal, round, and reactive to light  Neck: Neck supple  Cardiovascular: Normal rate, regular rhythm and normal heart sounds  No edema  Pulmonary/Chest: Effort normal  He has no wheezes  He has no rhonchi     Abdominal: Soft  Bowel sounds are normal    Neurological: He is alert and oriented to person, place, and time  Skin: Skin is warm  No rash noted  Psychiatric: He has a normal mood and affect  His behavior is normal    Nursing note and vitals reviewed

## 2018-06-13 NOTE — ASSESSMENT & PLAN NOTE
Due for A1c, was 9 2%  Already maximized on dreading ends and Januvia, glimepiride can be increased to 4 mg b i d

## 2018-06-15 ENCOUNTER — OFFICE VISIT (OUTPATIENT)
Dept: PODIATRY | Facility: CLINIC | Age: 58
End: 2018-06-15
Payer: COMMERCIAL

## 2018-06-15 VITALS
DIASTOLIC BLOOD PRESSURE: 70 MMHG | HEIGHT: 71 IN | RESPIRATION RATE: 17 BRPM | SYSTOLIC BLOOD PRESSURE: 128 MMHG | WEIGHT: 302.8 LBS | HEART RATE: 84 BPM | BODY MASS INDEX: 42.39 KG/M2

## 2018-06-15 DIAGNOSIS — M77.42 METATARSALGIA OF BOTH FEET: ICD-10-CM

## 2018-06-15 DIAGNOSIS — M79.671 PAIN IN BOTH FEET: Primary | ICD-10-CM

## 2018-06-15 DIAGNOSIS — E11.42 DIABETIC POLYNEUROPATHY ASSOCIATED WITH TYPE 2 DIABETES MELLITUS (HCC): ICD-10-CM

## 2018-06-15 DIAGNOSIS — B35.1 ONYCHOMYCOSIS: ICD-10-CM

## 2018-06-15 DIAGNOSIS — M77.41 METATARSALGIA OF BOTH FEET: ICD-10-CM

## 2018-06-15 DIAGNOSIS — M21.969 ACQUIRED DEFORMITY OF FOOT, UNSPECIFIED LATERALITY: ICD-10-CM

## 2018-06-15 DIAGNOSIS — M79.672 PAIN IN BOTH FEET: Primary | ICD-10-CM

## 2018-06-15 PROCEDURE — 99213 OFFICE O/P EST LOW 20 MIN: CPT | Performed by: PODIATRIST

## 2018-06-15 NOTE — PROGRESS NOTES
Procedures   Foot Exam       Discussion/Summary   The patient was counseled regarding instructions for management,-- prognosis,-- patient and family education,-- risks and benefits of treatment options,-- importance of compliance with treatment     Patient is able to Self-Care     The treatment plan was reviewed with the patient/guardian  The patient/guardian understands and agrees with the treatment plan      Chief Complaint   Patient needs full diabetic foot exam       History of Present Illness   HPI: Patient presents for pedal evaluation  Patient complains of pain in his feet and toes with ambulation   Patient is a morbidly obese diabetic who has some electric sensations in his feet on occasion       Review of Systems        Constitutional: not feeling tired       Eyes: no eyesight problems       ENT: no nasal discharge       Cardiovascular: no chest pain,-- no palpitations-- and-- no extremity edema       Respiratory: no shortness of breath-- and-- no cough       Gastrointestinal: no abdominal pain-- and-- no constipation       Genitourinary: no dysuria-- and-- no urinary hesitancy       Integumentary: no skin wound       Neurological: no tingling-- and-- no dizziness       Psychiatric: sleeping better, stopped taking trazodone, but-- no sleep disturbances       Endocrine: no feelings of weakness       Active Problems   1  Acquired pes planus (734) (M21 40)   2  Acute renal failure (584 9) (N17 9)   3  Adhesive capsulitis of both shoulders (726 0) (M75 01,M75 02)   4  Arthralgia (719 40) (M25 50)   5  BPH without urinary obstruction (600 00) (N40 0)   6  Bunion (727 1) (M21 619)   7  Callus (700) (L84)   3  BPMWMNH systolic congestive heart failure (428 22,428 0) (I50 22)   9  Depression with anxiety (300 4) (F41 8)   10  Diabetes mellitus with neurological manifestation (250 60) (E11 49)   11  Diabetes type 2, uncontrolled (250 02) (E11 65)   12  Difficulty concentrating (799 51) (R41 840)   13  Difficulty walking (719 7) (R26 2)   14  Dyspnea on exertion (786 09) (R06 09)   15  Encounter for prostate cancer screening (V76 44) (Z12 5)   16  Encounter for screening for malignant neoplasm of colon (V76 51) (Z12 11)   17  Fatigue (780 79) (R53 83)   18  Foot pain, bilateral (729 5) (M79 671,M79 672)   19  Hallux valgus (735 0) (M20 10)   20  Hematuria (599 70) (R31 9)   21  Hyperlipidemia (272 4) (E78 5)   22  Hypertension (401 9) (I10)   23  Hypogonadism, male (257 2) (E29 1)   24  Insomnia (780 52) (G47 00)   25  Morbid or severe obesity due to excess calories (278 01) (E66 01)   26  Need for pneumococcal vaccination (V03 82) (Z23)   27  Need for prophylactic vaccination and inoculation against influenza (V04 81) (Z23)   28  Nephrolithiasis (592 0) (N20 0)   29  Onychomycosis (110 1) (B35 1)   30  JEFF (obstructive sleep apnea) (327 23) (G47 33)   31  Sciatica (724 3) (M54 30)   32  Screening for colon cancer (V76 51) (Z12 11)   33  Seasonal allergies (477 9) (J30 2)   34  Shoulder pain, right (719 41) (M25 511)   35  Spinal stenosis (724 00) (M48 00)   36  Tinea pedis (110 4) (B35 3)   37  Type 2 diabetes mellitus (250 00) (E11 9)   38  Vitamin D deficiency (268 9) (E55 9)     Past Medical History    · History of Cellulitis of left leg (682 6) (L03 116)   · History of chest pain (V13 89) (I01 386)   · History of diarrhea (V12 79) (H46 045)   · History of onychomycosis (V12 09) (Z86 19)   · History of onychomycosis (V12 09) (Z86 19)   · History of Limb pain (729 5) (M79 609)   · History of Neck pain (723 1) (M54 2)   · Need for pneumococcal vaccination (V03 82) (Z23)   · History of Nephrolithiasis (V13 01)   · History of Numbness On The Sole Of The Right Foot   · History of Pain and swelling of left lower leg (729 5,729 81) (M79 662,M79 89)   · History of Pain of lower extremity (729 5) (M79 606)   · History of Pain, hand joint, right (719 44) (M79 641)   · Sciatica (724 3) (M54 30)     The active problems and past medical history were reviewed and updated today       Surgical History    · History of Knee Arthroscopy (Therapeutic)   · History of Knee Surgery   · History of Needle Biopsy Of Prostate     Family History   Mother    · Family history of Alzheimer Disease   · Family history of Family Health Status Of Mother - Alive  Father    · Family history of Family Health Status Of Father -   Family History    · Family history of Adopted   · Denied: Family history of Colon Cancer   · Denied: Family history of Crohn's Disease   · Denied: Family history of Liver Cancer     Social History    · Denied: History of Alcohol Use (History)   · Current Some Day Smoker (305 1)   · Denied: History of Exercise Habits   · Marital History - Currently    · Tobacco use (305 1) (Z72 0)   · Working Full Time  The social history was reviewed and is unchanged       Current Meds    1  AmLODIPine Besylate 10 MG Oral Tablet; TAKE 1 TABLET EVERY MORNING;     Therapy: 43JZK6149 to (Steffany Anger)  Requested for: 53LMT0353; Last     Rx:59Rux7406 Ordered   2  Aditya Breeze 2 Test In Vitro Disk; test blood sugar twice daiy;     Therapy: 33EIK1405 to (Hanh Chan)  Requested for: 77FGD2735; Last     Rx:47Orn6946 Ordered   3  BuPROPion HCl ER (XL) 150 MG Oral Tablet Extended Release 24 Hour; take 1 tablet     by mouth every day;     Therapy: 73LHD4838 to (Evaluate:2018)  Requested for: 55SCD4797; Last     Rx:73Olf1322 Ordered   4  CloNIDine HCl - 0 3 MG Oral Tablet; take 1 tablet twice a day;     Therapy: 60VJR4876 to (Steffany Anger)  Requested for: 91NDM3777; Last     Rx:78Bgd3359 Ordered   5  Enalapril Maleate 20 MG Oral Tablet; take 1 tablet twice a day;     Therapy: 67ORZ5923 to (Steffany Anger)  Requested for: 24IMJ2934; Last     Rx:08Ruz3776 Ordered   6  Glimepiride 4 MG Oral Tablet;  Take 1 tablet daily;     Therapy: 55WTL0676 to (Steffany Anger)  Requested for: 04AKL1505; Last     Rx:08Qig9905 Ordered   7  Januvia 100 MG Oral Tablet; take 1 tablet by mouth daily;     Therapy: 67MYC6209 to (Evaluate:10Feb2018)  Requested for: 47Yvd9109; Last     Rx:94Xrp4116 Ordered   8  Jardiance 10 MG Oral Tablet; take 1 tablet by mouth every morning;     Therapy: 87SVP6265 to (Evaluate:05Mar2018)  Requested for: 73HMM1728; Last     Rx:50Cuo3624 Ordered   9  Ketoconazole 2 % External Cream; APPLY A THIN LAYER TO AFFECTED AREA(S)     TWICE DAILY;     Therapy: 28Oct2016 to (Evaluate:26Jan2017)  Requested for: 28Oct2016; Last     Rx:09Oly1659 Ordered   10  Methylphenidate HCl - 10 MG Oral Tablet; Take 1 tablet twice daily;      Therapy: 59NRU4348 to (Evaluate:14Jan2018); Last Rx:28Esf9182 Ordered   11  NovoFine 32G X 6 MM Miscellaneous; USE AS DIRECTED;      Therapy: 94GYG0927 to (ZHHIJENX:88OLP9560)  Requested for: 32Vux9685; Last      Rx:46Bxl0213 Ordered   12  OneTouch Verio In Vitro Strip; TEST TWICE DAILY;      Therapy: 58CWB8658 to (Evaluate:21Jun2016)  Requested for: 52YPA7264; Last      Rx:07Pfz0650 Ordered   13  OneTouch Verio w/Device Kit; USE AS DIRECTED;      Therapy: 53VGY8612 to (Last Rx:24Nov2015)  Requested for: 24Nov2015 Ordered   14  Tamsulosin HCl - 0 4 MG Oral Capsule; take 1 capsule daily;      Therapy: 85RSI2518 to (Evaluate:05Mar2018)  Requested for: 30DFP2988; Last      Rx:90Xli7291 Ordered   15  Testosterone Cypionate 200 MG/ML Intramuscular Solution; inject IM every 2 weeks;      Therapy: 96NRC2591 to (Last Rx:15Okv2616) Ordered   16  Vicodin 5-300 MG Oral Tablet; One by mouth  half hour prior to physical therapy;      Therapy: 39IAW7798 to (Evaluate:18Jan2018);  Last Rx:29Nov2017 Ordered   17  Vitamin D (Ergocalciferol) 45221 UNIT Oral Capsule; TAKE ONE CAPSULE BY MOUTH      ONCE A WEEK;      Therapy: 82GQM4048 to (Himanshu Hebert)  Requested for: 39Eqi5945; Last      Rx:98Zwr9232 Ordered   18  Vitamin D3 2000 UNIT Oral Capsule; TAKE 1 CAPSULE Daily;      Therapy: 12RXB1546 to (Last Rx:86Voy7587)  Requested for: 71CMY2361 Ordered     The medication list was reviewed and updated today       Allergies   1  No Known Drug Allergies     Vitals         Respiration 17   Height 5 ft 10 in   Weight 286 lb    BMI Calculated 41 04   BSA Calculated 2 43      Physical Exam   Left Foot: Appearance: Normal except as noted: excessive pronation-- and-- pes planus  Great toe deformities include a bunion  Tenderness: None except the great toe-- and-- distal first metatarsal     Right Foot: Appearance: Normal except as noted: excessive pronation-- and-- pes planus  Great toe deformities include a bunion  Tenderness: None except the great toe-- and-- distal first metatarsal     Left Ankle: ROM: limited ROM in all planes    Right Ankle: ROM: limited ROM in all planes    Neurological Exam: performed  Light touch was decreased bilaterally  Vibratory sensation was decreased in both first metatarsophalangeal joints     Vascular Exam: performed Dorsalis pedis pulses were present bilaterally  Posterior tibial pulses were present bilaterally  Elevation Pallor: absent bilaterally  Dependence rubor was absent bilaterally  Edema: none     Toenails: All of the toenails were elongated,-- hypertrophied,-- discolored-- and-- Ptotic  Both first toenails were tender-- and-- Positive onychogryphosis          Socks and shoes removed, Right Foot Findings: swollen, erythematous and dry       The sensory exam showed diminished vibratory sensation at the level of the toes  Diminished tactile sensation with monofilament testing throughout the right foot       Socks and shoes removed, Left Foot Findings: swollen, erythematous and dry       The sensory exam showed diminished vibratory sensation at the level of the toes  Diminished tactile sensation with monofilament testing throughout the left foot      Capillary refills findings on the right were normal in the toes        Pulses:      2+ in the posterior tibialis on the right      2+ in the dorsalis pedis on the right       Capillary refills findings on the left were normal in the toes       Pulses:      1+ in the posterior tibialis on the left      1+ in the dorsalis pedis on the left       Assign Risk Category: 2: Loss of protective sensation with or without weakness, deformity, callus, pre-ulcer, or history of ulceration  High risk     Hyperkeratosis: present on both first toes,-- present on both first sub metatarsals,-- present on both third sub metatarsals-- and-- Severe profound moccasin tinea pedis bilateral     Shoe Gear Evaluation: performed ()  Recommendation(s): SAS style       Procedure   The patient was educated on the diabetic foot and good glucose control  All mycotic nails debrided   Bilateral pre-ulcerative lesions debrided today without pain or complication

## 2018-06-16 ENCOUNTER — APPOINTMENT (OUTPATIENT)
Dept: LAB | Facility: CLINIC | Age: 58
End: 2018-06-16
Payer: COMMERCIAL

## 2018-06-16 DIAGNOSIS — E11.42 DIABETIC POLYNEUROPATHY ASSOCIATED WITH TYPE 2 DIABETES MELLITUS (HCC): ICD-10-CM

## 2018-06-16 DIAGNOSIS — E78.49 OTHER HYPERLIPIDEMIA: ICD-10-CM

## 2018-06-16 DIAGNOSIS — E55.9 VITAMIN D DEFICIENCY: ICD-10-CM

## 2018-06-16 LAB
25(OH)D3 SERPL-MCNC: 12.6 NG/ML (ref 30–100)
ANION GAP SERPL CALCULATED.3IONS-SCNC: 11 MMOL/L (ref 4–13)
BUN SERPL-MCNC: 20 MG/DL (ref 5–25)
CALCIUM SERPL-MCNC: 9.1 MG/DL (ref 8.3–10.1)
CHLORIDE SERPL-SCNC: 105 MMOL/L (ref 100–108)
CHOLEST SERPL-MCNC: 182 MG/DL (ref 50–200)
CO2 SERPL-SCNC: 24 MMOL/L (ref 21–32)
CREAT SERPL-MCNC: 1.54 MG/DL (ref 0.6–1.3)
EST. AVERAGE GLUCOSE BLD GHB EST-MCNC: 223 MG/DL
GFR SERPL CREATININE-BSD FRML MDRD: 49 ML/MIN/1.73SQ M
GLUCOSE P FAST SERPL-MCNC: 253 MG/DL (ref 65–99)
HBA1C MFR BLD: 9.4 % (ref 4.2–6.3)
HDLC SERPL-MCNC: 31 MG/DL (ref 40–60)
LDLC SERPL CALC-MCNC: 98 MG/DL (ref 0–100)
NONHDLC SERPL-MCNC: 151 MG/DL
POTASSIUM SERPL-SCNC: 4 MMOL/L (ref 3.5–5.3)
SODIUM SERPL-SCNC: 140 MMOL/L (ref 136–145)
TRIGL SERPL-MCNC: 264 MG/DL

## 2018-06-16 PROCEDURE — 80048 BASIC METABOLIC PNL TOTAL CA: CPT

## 2018-06-16 PROCEDURE — 36415 COLL VENOUS BLD VENIPUNCTURE: CPT

## 2018-06-16 PROCEDURE — 83036 HEMOGLOBIN GLYCOSYLATED A1C: CPT

## 2018-06-16 PROCEDURE — 80061 LIPID PANEL: CPT

## 2018-06-16 PROCEDURE — 82306 VITAMIN D 25 HYDROXY: CPT

## 2018-06-19 ENCOUNTER — TELEPHONE (OUTPATIENT)
Dept: INTERNAL MEDICINE CLINIC | Facility: CLINIC | Age: 58
End: 2018-06-19

## 2018-06-19 DIAGNOSIS — E55.9 VITAMIN D DEFICIENCY: Primary | ICD-10-CM

## 2018-06-19 RX ORDER — ERGOCALCIFEROL 1.25 MG/1
50000 CAPSULE ORAL WEEKLY
Qty: 12 CAPSULE | Refills: 0 | Status: SHIPPED | OUTPATIENT
Start: 2018-06-19 | End: 2019-11-22 | Stop reason: ALTCHOICE

## 2018-06-19 NOTE — TELEPHONE ENCOUNTER
Sugars worse despite med adjustment  Please take glimepiride twice a day  Continue Januvia and Jardiance (empagliflozin)    Need to start another medication, an injection once a week or daily that is not insulin  Please check what insurance covers: Victoza, Trulicity, Bydureon  Need to start high dose weekly vitamin D again, sent to pharmacy  Kidney function stable  Cholesterol ok, triglycerides a bit high  No medication changes

## 2018-06-20 NOTE — TELEPHONE ENCOUNTER
Pt called and states Trulicity will be the least expensive and he has a prescription card for it as well from the company   [address]

## 2018-06-20 NOTE — TELEPHONE ENCOUNTER
Good  I can give you a sample of the starting dose  Since it is an injection once a week, can come in for teaching

## 2018-06-21 ENCOUNTER — CLINICAL SUPPORT (OUTPATIENT)
Dept: INTERNAL MEDICINE CLINIC | Facility: CLINIC | Age: 58
End: 2018-06-21

## 2018-06-21 DIAGNOSIS — E11.42 TYPE 2 DIABETES MELLITUS WITH DIABETIC POLYNEUROPATHY, UNSPECIFIED WHETHER LONG TERM INSULIN USE (HCC): Primary | ICD-10-CM

## 2018-06-21 PROCEDURE — RECHECK

## 2018-06-21 NOTE — PROGRESS NOTES
Pt is here for Trulicity samples and training  Showed pt how to use Trulicity with demo pen, pt had no questions at this time

## 2018-07-11 ENCOUNTER — TELEPHONE (OUTPATIENT)
Dept: INTERNAL MEDICINE CLINIC | Facility: CLINIC | Age: 58
End: 2018-07-11

## 2018-07-11 DIAGNOSIS — E11.40 TYPE 2 DIABETES MELLITUS WITH DIABETIC NEUROPATHY, WITHOUT LONG-TERM CURRENT USE OF INSULIN (HCC): Primary | ICD-10-CM

## 2018-08-03 DIAGNOSIS — F33.1 MODERATE EPISODE OF RECURRENT MAJOR DEPRESSIVE DISORDER (HCC): Primary | ICD-10-CM

## 2018-08-03 RX ORDER — BUPROPION HYDROCHLORIDE 150 MG/1
TABLET ORAL
Qty: 90 TABLET | Refills: 1 | Status: SHIPPED | OUTPATIENT
Start: 2018-08-03 | End: 2018-09-13 | Stop reason: SDUPTHER

## 2018-08-10 ENCOUNTER — OFFICE VISIT (OUTPATIENT)
Dept: PODIATRY | Facility: CLINIC | Age: 58
End: 2018-08-10
Payer: COMMERCIAL

## 2018-08-10 VITALS
HEIGHT: 71 IN | WEIGHT: 302.8 LBS | SYSTOLIC BLOOD PRESSURE: 128 MMHG | DIASTOLIC BLOOD PRESSURE: 70 MMHG | HEART RATE: 84 BPM | BODY MASS INDEX: 42.39 KG/M2 | RESPIRATION RATE: 17 BRPM

## 2018-08-10 DIAGNOSIS — M77.42 METATARSALGIA OF BOTH FEET: Primary | ICD-10-CM

## 2018-08-10 DIAGNOSIS — M21.969 ACQUIRED DEFORMITY OF FOOT, UNSPECIFIED LATERALITY: ICD-10-CM

## 2018-08-10 DIAGNOSIS — E11.42 DIABETIC POLYNEUROPATHY ASSOCIATED WITH TYPE 2 DIABETES MELLITUS (HCC): ICD-10-CM

## 2018-08-10 DIAGNOSIS — L84 CORNS: ICD-10-CM

## 2018-08-10 DIAGNOSIS — M77.41 METATARSALGIA OF BOTH FEET: Primary | ICD-10-CM

## 2018-08-10 DIAGNOSIS — M79.671 PAIN IN BOTH FEET: ICD-10-CM

## 2018-08-10 DIAGNOSIS — M79.672 PAIN IN BOTH FEET: ICD-10-CM

## 2018-08-10 PROCEDURE — 99213 OFFICE O/P EST LOW 20 MIN: CPT | Performed by: PODIATRIST

## 2018-08-10 NOTE — PROGRESS NOTES
Procedures   Foot Exam     Discussion/Summary   The patient was counseled regarding instructions for management,-- prognosis,-- patient and family education,-- risks and benefits of treatment options,-- importance of compliance with treatment     Patient is able to Self-Care     The treatment plan was reviewed with the patient/guardian  The patient/guardian understands and agrees with the treatment plan      Chief Complaint   Patient needs full diabetic foot exam       History of Present Illness   HPI: Patient presents for pedal evaluation  Patient complains of pain in his feet and toes with ambulation   Patient is a morbidly obese diabetic who has some electric sensations in his feet on occasion       Review of Systems        Constitutional: not feeling tired       Eyes: no eyesight problems       ENT: no nasal discharge       Cardiovascular: no chest pain,-- no palpitations-- and-- no extremity edema       Respiratory: no shortness of breath-- and-- no cough       Gastrointestinal: no abdominal pain-- and-- no constipation       Genitourinary: no dysuria-- and-- no urinary hesitancy       Integumentary: no skin wound       Neurological: no tingling-- and-- no dizziness       Psychiatric: sleeping better, stopped taking trazodone, but-- no sleep disturbances       Endocrine: no feelings of weakness       Active Problems   1  Acquired pes planus (734) (M21 40)   2  Acute renal failure (584 9) (N17 9)   3  Adhesive capsulitis of both shoulders (726 0) (M75 01,M75 02)   4  Arthralgia (719 40) (M25 50)   5  BPH without urinary obstruction (600 00) (N40 0)   6  Bunion (727 1) (M21 619)   7  Callus (700) (L84)   0  HONRBUE systolic congestive heart failure (428 22,428 0) (I50 22)   9  Depression with anxiety (300 4) (F41 8)   10  Diabetes mellitus with neurological manifestation (250 60) (E11 49)   11  Diabetes type 2, uncontrolled (250 02) (E11 65)   12  Difficulty concentrating (799 51) (R41 840)   13  Difficulty walking (719 7) (R26 2)   14  Dyspnea on exertion (786 09) (R06 09)   15  Encounter for prostate cancer screening (V76 44) (Z12 5)   16  Encounter for screening for malignant neoplasm of colon (V76 51) (Z12 11)   17  Fatigue (780 79) (R53 83)   18  Foot pain, bilateral (729 5) (M79 671,M79 672)   19  Hallux valgus (735 0) (M20 10)   20  Hematuria (599 70) (R31 9)   21  Hyperlipidemia (272 4) (E78 5)   22  Hypertension (401 9) (I10)   23  Hypogonadism, male (257 2) (E29 1)   24  Insomnia (780 52) (G47 00)   25  Morbid or severe obesity due to excess calories (278 01) (E66 01)   26  Need for pneumococcal vaccination (V03 82) (Z23)   27  Need for prophylactic vaccination and inoculation against influenza (V04 81) (Z23)   28  Nephrolithiasis (592 0) (N20 0)   29  Onychomycosis (110 1) (B35 1)   30  JEFF (obstructive sleep apnea) (327 23) (G47 33)   31  Sciatica (724 3) (M54 30)   32  Screening for colon cancer (V76 51) (Z12 11)   33  Seasonal allergies (477 9) (J30 2)   34  Shoulder pain, right (719 41) (M25 511)   35  Spinal stenosis (724 00) (M48 00)   36  Tinea pedis (110 4) (B35 3)   37  Type 2 diabetes mellitus (250 00) (E11 9)   38  Vitamin D deficiency (268 9) (E55 9)     Past Medical History    · History of Cellulitis of left leg (682 6) (L03 116)   · History of chest pain (V13 89) (F26 907)   · History of diarrhea (V12 79) (S99 472)   · History of onychomycosis (V12 09) (Z86 19)   · History of onychomycosis (V12 09) (Z86 19)   · History of Limb pain (729 5) (M79 609)   · History of Neck pain (723 1) (M54 2)   · Need for pneumococcal vaccination (V03 82) (Z23)   · History of Nephrolithiasis (V13 01)   · History of Numbness On The Sole Of The Right Foot   · History of Pain and swelling of left lower leg (729 5,729 81) (M79 662,M79 89)   · History of Pain of lower extremity (729 5) (M79 606)   · History of Pain, hand joint, right (719 44) (M79 641)   · Sciatica (724 3) (M54 30)     The active problems and past medical history were reviewed and updated today       Surgical History    · History of Knee Arthroscopy (Therapeutic)   · History of Knee Surgery   · History of Needle Biopsy Of Prostate     Family History   Mother    · Family history of Alzheimer Disease   · Family history of Family Health Status Of Mother - Alive  Father    · Family history of Family Health Status Of Father -   Family History    · Family history of Adopted   · Denied: Family history of Colon Cancer   · Denied: Family history of Crohn's Disease   · Denied: Family history of Liver Cancer     Social History    · Denied: History of Alcohol Use (History)   · Current Some Day Smoker (305 1)   · Denied: History of Exercise Habits   · Marital History - Currently    · Tobacco use (305 1) (Z72 0)   · Working Full Time  The social history was reviewed and is unchanged       Current Meds    1  AmLODIPine Besylate 10 MG Oral Tablet; TAKE 1 TABLET EVERY MORNING;     Therapy: 06IZW0122 to (Allen Gillis)  Requested for: 88LMD1909; Last     Rx:43Afv3327 Ordered   2  Aditya Breeze 2 Test In Vitro Disk; test blood sugar twice daiy;     Therapy: 05FIQ1217 to (Samantha Roberson)  Requested for: 56BXR4422; Last     Rx:59Koe4777 Ordered   3  BuPROPion HCl ER (XL) 150 MG Oral Tablet Extended Release 24 Hour; take 1 tablet     by mouth every day;     Therapy: 59FWC3318 to (Evaluate:2018)  Requested for: 90DYF0510; Last     Rx:89Uli9731 Ordered   4  CloNIDine HCl - 0 3 MG Oral Tablet; take 1 tablet twice a day;     Therapy: 48FLZ6505 to (Allen Gillis)  Requested for: 93TNS5313; Last     Rx:76Xaj6929 Ordered   5  Enalapril Maleate 20 MG Oral Tablet; take 1 tablet twice a day;     Therapy: 49GUV3453 to (Allen Gillis)  Requested for: 78YEX9706; Last     Rx:77Kuk1587 Ordered   6  Glimepiride 4 MG Oral Tablet;  Take 1 tablet daily;     Therapy: 23UMF9116 to (Allen Gillis)  Requested for: 24ZSH2099; Last     Rx:08Caz1806 Ordered   7  Januvia 100 MG Oral Tablet; take 1 tablet by mouth daily;     Therapy: 63XTW9113 to (Evaluate:10Feb2018)  Requested for: 56Fmc0326; Last     Rx:14Dto0723 Ordered   8  Jardiance 10 MG Oral Tablet; take 1 tablet by mouth every morning;     Therapy: 99PDO3992 to (Evaluate:05Mar2018)  Requested for: 51DDK2089; Last     Rx:62Xaf2337 Ordered   9  Ketoconazole 2 % External Cream; APPLY A THIN LAYER TO AFFECTED AREA(S)     TWICE DAILY;     Therapy: 28Oct2016 to (Evaluate:26Jan2017)  Requested for: 28Oct2016; Last     Rx:71Eub8553 Ordered   10  Methylphenidate HCl - 10 MG Oral Tablet; Take 1 tablet twice daily;      Therapy: 30BAF2499 to (Evaluate:14Jan2018); Last Rx:28Ywy0809 Ordered   11  NovoFine 32G X 6 MM Miscellaneous; USE AS DIRECTED;      Therapy: 85QTR6021 to (JEJXHOYU:23GJA2157)  Requested for: 25Apr2014; Last      Rx:05Dty1317 Ordered   12  OneTouch Verio In Vitro Strip; TEST TWICE DAILY;      Therapy: 24RGH8166 to (Evaluate:21Jun2016)  Requested for: 03IGF1372; Last      Rx:34Vrl3061 Ordered   13  OneTouch Verio w/Device Kit; USE AS DIRECTED;      Therapy: 72XCM8940 to (Last Rx:24Nov2015)  Requested for: 24Nov2015 Ordered   14  Tamsulosin HCl - 0 4 MG Oral Capsule; take 1 capsule daily;      Therapy: 50EUF8598 to (Evaluate:05Mar2018)  Requested for: 80GNX2255; Last      Rx:87Ixa8718 Ordered   15  Testosterone Cypionate 200 MG/ML Intramuscular Solution; inject IM every 2 weeks;      Therapy: 73EGU2579 to (Last Rx:11Cxr6901) Ordered   16  Vicodin 5-300 MG Oral Tablet; One by mouth  half hour prior to physical therapy;      Therapy: 98EJC1705 to (Evaluate:18Jan2018);  Last Rx:29Nov2017 Ordered   17  Vitamin D (Ergocalciferol) 00301 UNIT Oral Capsule; TAKE ONE CAPSULE BY MOUTH      ONCE A WEEK;      Therapy: 87IPS9119 to (Carmen Mcdermott)  Requested for: 16Puf8633; Last      Rx:10Owa1085 Ordered   18  Vitamin D3 2000 UNIT Oral Capsule; TAKE 1 CAPSULE Daily;      Therapy: 76PWT6476 to (Last Rx:62Jfl8257)  Requested for: 89GLX9349 Ordered     The medication list was reviewed and updated today       Allergies   1  No Known Drug Allergies     Vitals         Respiration 17   Height 5 ft 10 in   Weight 286 lb    BMI Calculated 41 04   BSA Calculated 2 43      Physical Exam   Left Foot: Appearance: Normal except as noted: excessive pronation-- and-- pes planus  Great toe deformities include a bunion  Tenderness: None except the great toe-- and-- distal first metatarsal     Right Foot: Appearance: Normal except as noted: excessive pronation-- and-- pes planus  Great toe deformities include a bunion  Tenderness: None except the great toe-- and-- distal first metatarsal     Left Ankle: ROM: limited ROM in all planes    Right Ankle: ROM: limited ROM in all planes    Neurological Exam: performed  Light touch was decreased bilaterally  Vibratory sensation was decreased in both first metatarsophalangeal joints     Vascular Exam: performed Dorsalis pedis pulses were present bilaterally  Posterior tibial pulses were present bilaterally  Elevation Pallor: absent bilaterally  Dependence rubor was absent bilaterally  Edema: none     Toenails: All of the toenails were elongated,-- hypertrophied,-- discolored-- and-- Ptotic  Both first toenails were tender-- and-- Positive onychogryphosis          Socks and shoes removed, Right Foot Findings: swollen, erythematous and dry       The sensory exam showed diminished vibratory sensation at the level of the toes  Diminished tactile sensation with monofilament testing throughout the right foot       Socks and shoes removed, Left Foot Findings: swollen, erythematous and dry       The sensory exam showed diminished vibratory sensation at the level of the toes  Diminished tactile sensation with monofilament testing throughout the left foot      Capillary refills findings on the right were normal in the toes        Pulses:      2+ in the posterior tibialis on the right      2+ in the dorsalis pedis on the right       Capillary refills findings on the left were normal in the toes       Pulses:      1+ in the posterior tibialis on the left      1+ in the dorsalis pedis on the left       Assign Risk Category: 2: Loss of protective sensation with or without weakness, deformity, callus, pre-ulcer, or history of ulceration  High risk     Hyperkeratosis: present on both first toes,-- present on both first sub metatarsals,-- present on both third sub metatarsals-- and-- Severe profound moccasin tinea pedis bilateral     Shoe Gear Evaluation: performed ()  Recommendation(s): SAS style       Procedure   The patient was educated on the diabetic foot and good glucose control  All mycotic nails debrided   Bilateral pre-ulcerative lesions debrided today without pain or complication

## 2018-08-28 DIAGNOSIS — I10 ESSENTIAL HYPERTENSION: ICD-10-CM

## 2018-08-28 DIAGNOSIS — E11.40 TYPE 2 DIABETES MELLITUS WITH DIABETIC NEUROPATHY, WITHOUT LONG-TERM CURRENT USE OF INSULIN (HCC): ICD-10-CM

## 2018-08-28 RX ORDER — ENALAPRIL MALEATE 20 MG/1
TABLET ORAL
Qty: 180 TABLET | Refills: 1 | Status: SHIPPED | OUTPATIENT
Start: 2018-08-28 | End: 2018-09-13 | Stop reason: SDUPTHER

## 2018-09-04 DIAGNOSIS — E55.9 VITAMIN D DEFICIENCY: ICD-10-CM

## 2018-09-05 RX ORDER — ERGOCALCIFEROL 1.25 MG/1
CAPSULE ORAL
Qty: 12 CAPSULE | Refills: 0 | OUTPATIENT
Start: 2018-09-05

## 2018-09-13 ENCOUNTER — OFFICE VISIT (OUTPATIENT)
Dept: INTERNAL MEDICINE CLINIC | Facility: CLINIC | Age: 58
End: 2018-09-13
Payer: COMMERCIAL

## 2018-09-13 VITALS
DIASTOLIC BLOOD PRESSURE: 76 MMHG | RESPIRATION RATE: 18 BRPM | OXYGEN SATURATION: 98 % | WEIGHT: 295.4 LBS | SYSTOLIC BLOOD PRESSURE: 132 MMHG | HEIGHT: 71 IN | HEART RATE: 72 BPM | TEMPERATURE: 98.5 F | BODY MASS INDEX: 41.35 KG/M2

## 2018-09-13 DIAGNOSIS — E11.40 TYPE 2 DIABETES MELLITUS WITH DIABETIC NEUROPATHY, WITHOUT LONG-TERM CURRENT USE OF INSULIN (HCC): ICD-10-CM

## 2018-09-13 DIAGNOSIS — N40.0 BPH WITHOUT URINARY OBSTRUCTION: ICD-10-CM

## 2018-09-13 DIAGNOSIS — F33.1 MODERATE EPISODE OF RECURRENT MAJOR DEPRESSIVE DISORDER (HCC): ICD-10-CM

## 2018-09-13 DIAGNOSIS — Z23 NEED FOR INFLUENZA VACCINATION: ICD-10-CM

## 2018-09-13 DIAGNOSIS — I10 ESSENTIAL HYPERTENSION: ICD-10-CM

## 2018-09-13 DIAGNOSIS — F41.8 DEPRESSION WITH ANXIETY: ICD-10-CM

## 2018-09-13 DIAGNOSIS — E66.01 MORBID OBESITY WITH BODY MASS INDEX (BMI) OF 40.0 TO 44.9 IN ADULT (HCC): Primary | ICD-10-CM

## 2018-09-13 DIAGNOSIS — E78.2 MIXED HYPERLIPIDEMIA: ICD-10-CM

## 2018-09-13 PROBLEM — M75.01 ADHESIVE CAPSULITIS OF BOTH SHOULDERS: Status: RESOLVED | Noted: 2017-11-29 | Resolved: 2018-09-13

## 2018-09-13 PROBLEM — M75.02 ADHESIVE CAPSULITIS OF BOTH SHOULDERS: Status: RESOLVED | Noted: 2017-11-29 | Resolved: 2018-09-13

## 2018-09-13 PROCEDURE — 90471 IMMUNIZATION ADMIN: CPT

## 2018-09-13 PROCEDURE — 3075F SYST BP GE 130 - 139MM HG: CPT | Performed by: INTERNAL MEDICINE

## 2018-09-13 PROCEDURE — 90682 RIV4 VACC RECOMBINANT DNA IM: CPT

## 2018-09-13 PROCEDURE — 99214 OFFICE O/P EST MOD 30 MIN: CPT | Performed by: INTERNAL MEDICINE

## 2018-09-13 PROCEDURE — 4010F ACE/ARB THERAPY RXD/TAKEN: CPT | Performed by: INTERNAL MEDICINE

## 2018-09-13 PROCEDURE — 3008F BODY MASS INDEX DOCD: CPT | Performed by: INTERNAL MEDICINE

## 2018-09-13 PROCEDURE — 3078F DIAST BP <80 MM HG: CPT | Performed by: INTERNAL MEDICINE

## 2018-09-13 RX ORDER — SERTRALINE HYDROCHLORIDE 100 MG/1
100 TABLET, FILM COATED ORAL DAILY
Qty: 90 TABLET | Refills: 1 | Status: SHIPPED | OUTPATIENT
Start: 2018-09-13 | End: 2019-01-14 | Stop reason: SDUPTHER

## 2018-09-13 RX ORDER — BUPROPION HYDROCHLORIDE 150 MG/1
150 TABLET ORAL DAILY
Qty: 90 TABLET | Refills: 1 | Status: SHIPPED | OUTPATIENT
Start: 2018-09-13 | End: 2019-01-14 | Stop reason: SDUPTHER

## 2018-09-13 RX ORDER — ENALAPRIL MALEATE 20 MG/1
20 TABLET ORAL 2 TIMES DAILY
Qty: 180 TABLET | Refills: 1 | Status: SHIPPED | OUTPATIENT
Start: 2018-09-13 | End: 2019-01-14 | Stop reason: SDUPTHER

## 2018-09-13 RX ORDER — AMLODIPINE BESYLATE 10 MG/1
10 TABLET ORAL DAILY
Qty: 90 TABLET | Refills: 1 | Status: SHIPPED | OUTPATIENT
Start: 2018-09-13 | End: 2018-11-19 | Stop reason: SDUPTHER

## 2018-09-13 RX ORDER — CLONIDINE HYDROCHLORIDE 0.3 MG/1
0.3 TABLET ORAL 2 TIMES DAILY
Qty: 180 TABLET | Refills: 1 | Status: SHIPPED | OUTPATIENT
Start: 2018-09-13 | End: 2019-01-14 | Stop reason: SDUPTHER

## 2018-09-13 RX ORDER — TAMSULOSIN HYDROCHLORIDE 0.4 MG/1
0.4 CAPSULE ORAL DAILY
Qty: 90 CAPSULE | Refills: 1 | Status: SHIPPED | OUTPATIENT
Start: 2018-09-13 | End: 2018-11-19 | Stop reason: SDUPTHER

## 2018-09-13 RX ORDER — GLIMEPIRIDE 4 MG/1
4 TABLET ORAL 2 TIMES DAILY
Qty: 180 TABLET | Refills: 1 | Status: SHIPPED | OUTPATIENT
Start: 2018-09-13 | End: 2019-01-14 | Stop reason: SDUPTHER

## 2018-09-13 NOTE — ASSESSMENT & PLAN NOTE
Lab Results   Component Value Date    HGBA1C 9 4 (H) 06/16/2018       No results for input(s): POCGLU in the last 72 hours      Blood Sugar Average: Last 72 hrs:

## 2018-09-13 NOTE — PROGRESS NOTES
Assessment/Plan:    Diabetic polyneuropathy associated with type 2 diabetes mellitus ([facility] 75 )  Repeat A1c, he has lost some weight since starting Trulicity  On Januvia, glimepiride, Jardiance  Morbid obesity with body mass index (BMI) of 40 0 to 44 9 in [facility])  Lost 7 lbs since last visit  Encouraged to walk more at work  Hypertension  BP stable on amlodipine, clonidine and enalapril  Mixed hyperlipidemia  TG slightly elevated  Not on medication  Depression with anxiety  Stable, on sertraline and bupropion  Fatigue  Stable  JEFF on CPAP  Compliant  Diabetes mellitus with neurological manifestation ([facility] 75 )  Lab Results   Component Value Date    HGBA1C 9 4 (H) 06/16/2018       No results for input(s): POCGLU in the last 72 hours  Blood Sugar Average: Last 72 hrs:         Diagnoses and all orders for this visit:    Morbid obesity with body mass index (BMI) of 40 0 to 44 9 in [facility])    Essential hypertension  -     amLODIPine (NORVASC) 10 mg tablet; Take 1 tablet (10 mg total) by mouth daily  -     cloNIDine (CATAPRES) 0 3 mg tablet; Take 1 tablet (0 3 mg total) by mouth 2 (two) times a day  -     enalapril (VASOTEC) 20 mg tablet; Take 1 tablet (20 mg total) by mouth 2 (two) times a day  -     CBC and differential  -     Comprehensive metabolic panel    Moderate episode of recurrent major depressive disorder (HCC)  -     buPROPion (WELLBUTRIN XL) 150 mg 24 hr tablet; Take 1 tablet (150 mg total) by mouth daily    Type 2 diabetes mellitus with diabetic neuropathy, without long-term current use of insulin (HCC)  -     Dulaglutide 0 75 MG/0 5ML SOPN; Inject 0 5 mL (0 75 mg total) under the skin once a week  -     enalapril (VASOTEC) 20 mg tablet; Take 1 tablet (20 mg total) by mouth 2 (two) times a day  -     glimepiride (AMARYL) 4 mg tablet; Take 1 tablet (4 mg total) by mouth 2 (two) times a day  -     sitaGLIPtin (JANUVIA) 100 mg tablet;  Take 1 tablet (100 mg total) by mouth daily  - Empagliflozin 25 MG TABS; Take 1 tablet (25 mg total) by mouth every morning  -     Comprehensive metabolic panel  -     Hemoglobin A1C    BPH without urinary obstruction  -     tamsulosin (FLOMAX) 0 4 mg; Take 1 capsule (0 4 mg total) by mouth daily    Depression with anxiety  -     sertraline (ZOLOFT) 100 mg tablet; Take 1 tablet (100 mg total) by mouth daily    Mixed hyperlipidemia    Need for influenza vaccination  -     influenza vaccine, [id number], quadrivalent, recombinant, PF, 0 5 mL, for patients 18 yr+ (FLUBLOK)      Follow up in 4 months or as needed  Subjective:      Patient ID: Natalya Pete is a 62 y o  male  Mr Natalya Yang has been feeling alright  He reports that he does not sleep well at night sometimes, a few days a week  He remains tired when he wakes in the morning  He feels his mood is better, does not nap during the day anymore  He stays in his car during lunch break to avoid eating out  He continues to drink about a liter of soda daily  He does not exercise on a regular basis  He reports that his sugars have gone down since starting Trulicity  He does not check this regularly  He uses his CPAP every night  The following portions of the patient's history were reviewed and updated as appropriate: allergies, current medications, past medical history, past social history and problem list     Review of Systems   Constitutional: Positive for fatigue  Negative for activity change and appetite change  HENT: Negative for congestion  Respiratory: Negative for cough and shortness of breath  Cardiovascular: Negative for chest pain, palpitations and leg swelling  Gastrointestinal: Negative for abdominal pain and constipation  Genitourinary: Negative for difficulty urinating  Musculoskeletal: Negative for arthralgias  Neurological: Negative for dizziness and weakness  Psychiatric/Behavioral: Positive for sleep disturbance  Negative for dysphoric mood   The patient is not nervous/anxious  Objective:      /76   Pulse 72   Temp 98 5 °F (36 9 °C)   Resp 18   Ht 5' 10 5" (1 791 m)   Wt 134 kg (295 lb 6 4 oz)   SpO2 98%   BMI 41 79 kg/m²          Physical Exam   Constitutional: He is oriented to person, place, and time  He appears well-developed and well-nourished  HENT:   Head: Normocephalic and atraumatic  Mouth/Throat: Mucous membranes are normal    Eyes: Conjunctivae are normal  Pupils are equal, round, and reactive to light  Neck: Neck supple  Cardiovascular: Normal rate, regular rhythm and normal heart sounds  No edema  Pulmonary/Chest: Effort normal  He has no wheezes  He has no rhonchi  Abdominal: Soft  Bowel sounds are normal    Neurological: He is alert and oriented to person, place, and time  Skin: Skin is warm  No rash noted  Psychiatric: He has a normal mood and affect  His behavior is normal    Nursing note and vitals reviewed

## 2018-09-15 ENCOUNTER — APPOINTMENT (OUTPATIENT)
Dept: LAB | Facility: CLINIC | Age: 58
End: 2018-09-15
Payer: COMMERCIAL

## 2018-09-15 LAB
ALBUMIN SERPL BCP-MCNC: 3.5 G/DL (ref 3.5–5)
ALP SERPL-CCNC: 88 U/L (ref 46–116)
ALT SERPL W P-5'-P-CCNC: 48 U/L (ref 12–78)
ANION GAP SERPL CALCULATED.3IONS-SCNC: 7 MMOL/L (ref 4–13)
AST SERPL W P-5'-P-CCNC: 116 U/L (ref 5–45)
BASOPHILS # BLD AUTO: 0.06 THOUSANDS/ΜL (ref 0–0.1)
BASOPHILS NFR BLD AUTO: 1 % (ref 0–1)
BILIRUB SERPL-MCNC: 1.2 MG/DL (ref 0.2–1)
BUN SERPL-MCNC: 19 MG/DL (ref 5–25)
CALCIUM SERPL-MCNC: 8.5 MG/DL (ref 8.3–10.1)
CHLORIDE SERPL-SCNC: 103 MMOL/L (ref 100–108)
CO2 SERPL-SCNC: 25 MMOL/L (ref 21–32)
CREAT SERPL-MCNC: 1.58 MG/DL (ref 0.6–1.3)
EOSINOPHIL # BLD AUTO: 0.13 THOUSAND/ΜL (ref 0–0.61)
EOSINOPHIL NFR BLD AUTO: 1 % (ref 0–6)
ERYTHROCYTE [DISTWIDTH] IN BLOOD BY AUTOMATED COUNT: 12.8 % (ref 11.6–15.1)
EST. AVERAGE GLUCOSE BLD GHB EST-MCNC: 197 MG/DL
GFR SERPL CREATININE-BSD FRML MDRD: 48 ML/MIN/1.73SQ M
GLUCOSE P FAST SERPL-MCNC: 175 MG/DL (ref 65–99)
HBA1C MFR BLD: 8.5 % (ref 4.2–6.3)
HCT VFR BLD AUTO: 48.6 % (ref 36.5–49.3)
HGB BLD-MCNC: 16.1 G/DL (ref 12–17)
IMM GRANULOCYTES # BLD AUTO: 0.05 THOUSAND/UL (ref 0–0.2)
IMM GRANULOCYTES NFR BLD AUTO: 0 % (ref 0–2)
LYMPHOCYTES # BLD AUTO: 1.73 THOUSANDS/ΜL (ref 0.6–4.47)
LYMPHOCYTES NFR BLD AUTO: 14 % (ref 14–44)
MCH RBC QN AUTO: 30.5 PG (ref 26.8–34.3)
MCHC RBC AUTO-ENTMCNC: 33.1 G/DL (ref 31.4–37.4)
MCV RBC AUTO: 92 FL (ref 82–98)
MONOCYTES # BLD AUTO: 1.18 THOUSAND/ΜL (ref 0.17–1.22)
MONOCYTES NFR BLD AUTO: 10 % (ref 4–12)
NEUTROPHILS # BLD AUTO: 9.04 THOUSANDS/ΜL (ref 1.85–7.62)
NEUTS SEG NFR BLD AUTO: 74 % (ref 43–75)
NRBC BLD AUTO-RTO: 0 /100 WBCS
PLATELET # BLD AUTO: 279 THOUSANDS/UL (ref 149–390)
PMV BLD AUTO: 9.3 FL (ref 8.9–12.7)
POTASSIUM SERPL-SCNC: 4.2 MMOL/L (ref 3.5–5.3)
PROT SERPL-MCNC: 7.3 G/DL (ref 6.4–8.2)
RBC # BLD AUTO: 5.28 MILLION/UL (ref 3.88–5.62)
SODIUM SERPL-SCNC: 135 MMOL/L (ref 136–145)
WBC # BLD AUTO: 12.19 THOUSAND/UL (ref 4.31–10.16)

## 2018-09-15 PROCEDURE — 36415 COLL VENOUS BLD VENIPUNCTURE: CPT | Performed by: INTERNAL MEDICINE

## 2018-09-15 PROCEDURE — 85025 COMPLETE CBC W/AUTO DIFF WBC: CPT | Performed by: INTERNAL MEDICINE

## 2018-09-15 PROCEDURE — 80053 COMPREHEN METABOLIC PANEL: CPT | Performed by: INTERNAL MEDICINE

## 2018-09-15 PROCEDURE — 3045F PR MOST RECENT HEMOGLOBIN A1C LEVEL 7.0-9.0%: CPT | Performed by: INTERNAL MEDICINE

## 2018-09-15 PROCEDURE — 83036 HEMOGLOBIN GLYCOSYLATED A1C: CPT | Performed by: INTERNAL MEDICINE

## 2018-09-18 ENCOUNTER — TELEPHONE (OUTPATIENT)
Dept: INTERNAL MEDICINE CLINIC | Facility: CLINIC | Age: 58
End: 2018-09-18

## 2018-09-18 DIAGNOSIS — E11.40 TYPE 2 DIABETES MELLITUS WITH DIABETIC NEUROPATHY, WITHOUT LONG-TERM CURRENT USE OF INSULIN (HCC): ICD-10-CM

## 2018-09-18 NOTE — TELEPHONE ENCOUNTER
A1c improved from 9 2 to 8 5%  Will increase Trulicity, new dose sent to   [place]  Kidney function stable  Avoid NSAIDs such as ibuprofen, naproxen, Advil, Aleve or Motrin  Take Tylenol for pain  One of the liver enzymes a bit high, will just monitor this      Rest of labs ok

## 2018-10-10 DIAGNOSIS — E11.40 TYPE 2 DIABETES MELLITUS WITH DIABETIC NEUROPATHY, WITHOUT LONG-TERM CURRENT USE OF INSULIN (HCC): ICD-10-CM

## 2018-10-12 RX ORDER — DULAGLUTIDE 1.5 MG/.5ML
INJECTION, SOLUTION SUBCUTANEOUS
Qty: 2 ML | Refills: 0 | OUTPATIENT
Start: 2018-10-12

## 2018-10-12 NOTE — TELEPHONE ENCOUNTER
Spoke to pharmacist(female) at [facility] at 6:20pm    rx sent on 9/18 for 3+ mos supply    rx will be filled from order on 9/18, no refill needed

## 2018-10-19 ENCOUNTER — OFFICE VISIT (OUTPATIENT)
Dept: PODIATRY | Facility: CLINIC | Age: 58
End: 2018-10-19
Payer: COMMERCIAL

## 2018-10-19 VITALS — HEIGHT: 71 IN | BODY MASS INDEX: 41.35 KG/M2 | RESPIRATION RATE: 17 BRPM | WEIGHT: 295.4 LBS

## 2018-10-19 DIAGNOSIS — B35.1 ONYCHOMYCOSIS: ICD-10-CM

## 2018-10-19 DIAGNOSIS — M21.969 ACQUIRED DEFORMITY OF FOOT, UNSPECIFIED LATERALITY: ICD-10-CM

## 2018-10-19 DIAGNOSIS — M21.40 ACQUIRED FLAT FOOT, UNSPECIFIED LATERALITY: ICD-10-CM

## 2018-10-19 DIAGNOSIS — L84 CORNS: ICD-10-CM

## 2018-10-19 DIAGNOSIS — M77.41 METATARSALGIA OF BOTH FEET: Primary | ICD-10-CM

## 2018-10-19 DIAGNOSIS — M77.42 METATARSALGIA OF BOTH FEET: Primary | ICD-10-CM

## 2018-10-19 PROCEDURE — 99213 OFFICE O/P EST LOW 20 MIN: CPT | Performed by: PODIATRIST

## 2018-11-19 DIAGNOSIS — I10 ESSENTIAL HYPERTENSION: ICD-10-CM

## 2018-11-19 DIAGNOSIS — N40.0 BPH WITHOUT URINARY OBSTRUCTION: ICD-10-CM

## 2018-11-19 RX ORDER — AMLODIPINE BESYLATE 10 MG/1
TABLET ORAL
Qty: 90 TABLET | Refills: 1 | Status: SHIPPED | OUTPATIENT
Start: 2018-11-19 | End: 2019-07-29 | Stop reason: SDUPTHER

## 2018-11-19 RX ORDER — TAMSULOSIN HYDROCHLORIDE 0.4 MG/1
CAPSULE ORAL
Qty: 90 CAPSULE | Refills: 1 | Status: SHIPPED | OUTPATIENT
Start: 2018-11-19 | End: 2019-07-29 | Stop reason: SDUPTHER

## 2018-12-28 ENCOUNTER — OFFICE VISIT (OUTPATIENT)
Dept: PODIATRY | Facility: CLINIC | Age: 58
End: 2018-12-28
Payer: COMMERCIAL

## 2018-12-28 VITALS
DIASTOLIC BLOOD PRESSURE: 76 MMHG | HEART RATE: 72 BPM | WEIGHT: 295 LBS | RESPIRATION RATE: 17 BRPM | BODY MASS INDEX: 41.3 KG/M2 | HEIGHT: 71 IN | SYSTOLIC BLOOD PRESSURE: 132 MMHG

## 2018-12-28 DIAGNOSIS — M79.672 PAIN IN BOTH FEET: ICD-10-CM

## 2018-12-28 DIAGNOSIS — E11.42 DIABETIC POLYNEUROPATHY ASSOCIATED WITH TYPE 2 DIABETES MELLITUS (HCC): ICD-10-CM

## 2018-12-28 DIAGNOSIS — M77.41 METATARSALGIA OF BOTH FEET: ICD-10-CM

## 2018-12-28 DIAGNOSIS — M21.969 ACQUIRED DEFORMITY OF FOOT, UNSPECIFIED LATERALITY: Primary | ICD-10-CM

## 2018-12-28 DIAGNOSIS — M79.671 PAIN IN BOTH FEET: ICD-10-CM

## 2018-12-28 DIAGNOSIS — M77.42 METATARSALGIA OF BOTH FEET: ICD-10-CM

## 2018-12-28 PROCEDURE — 99213 OFFICE O/P EST LOW 20 MIN: CPT | Performed by: PODIATRIST

## 2018-12-28 NOTE — PROGRESS NOTES
Procedures   Foot Exam       Discussion/Summary   The patient was counseled regarding instructions for management,-- prognosis,-- patient and family education,-- risks and benefits of treatment options,-- importance of compliance with treatment     Patient is able to Self-Care     The treatment plan was reviewed with the patient/guardian  The patient/guardian understands and agrees with the treatment plan      Chief Complaint   Patient needs full diabetic foot exam   Patient has pain upon ambulation     History of Present Illness   HPI: Patient presents for pedal evaluation  Patient complains of pain in his feet and toes with ambulation   Patient is a morbidly obese diabetic who has some electric sensations in his feet on occasion       Review of Systems        Constitutional: not feeling tired       Eyes: no eyesight problems       ENT: no nasal discharge       Cardiovascular: no chest pain,-- no palpitations-- and-- no extremity edema       Respiratory: no shortness of breath-- and-- no cough       Gastrointestinal: no abdominal pain-- and-- no constipation       Genitourinary: no dysuria-- and-- no urinary hesitancy       Integumentary: no skin wound       Neurological: no tingling-- and-- no dizziness       Psychiatric: sleeping better, stopped taking trazodone, but-- no sleep disturbances       Endocrine: no feelings of weakness       Active Problems   1  Acquired pes planus (734) (M21 40)   2  Acute renal failure (584 9) (N17 9)   3  Adhesive capsulitis of both shoulders (726 0) (M75 01,M75 02)   4  Arthralgia (719 40) (M25 50)   5  BPH without urinary obstruction (600 00) (N40 0)   6  Bunion (727 1) (M21 619)   7  Callus (700) (L84)   7  YKYPJIK systolic congestive heart failure (428 22,428 0) (I50 22)   9  Depression with anxiety (300 4) (F41 8)   10  Diabetes mellitus with neurological manifestation (250 60) (E11 49)   11  Diabetes type 2, uncontrolled (250 02) (E11 65)   12  Difficulty concentrating (799 51) (R41 840)   13  Difficulty walking (719 7) (R26 2)   14  Dyspnea on exertion (786 09) (R06 09)   15  Encounter for prostate cancer screening (V76 44) (Z12 5)   16  Encounter for screening for malignant neoplasm of colon (V76 51) (Z12 11)   17  Fatigue (780 79) (R53 83)   18  Foot pain, bilateral (729 5) (M79 671,M79 672)   19  Hallux valgus (735 0) (M20 10)   20  Hematuria (599 70) (R31 9)   21  Hyperlipidemia (272 4) (E78 5)   22  Hypertension (401 9) (I10)   23  Hypogonadism, male (257 2) (E29 1)   24  Insomnia (780 52) (G47 00)   25  Morbid or severe obesity due to excess calories (278 01) (E66 01)   26  Need for pneumococcal vaccination (V03 82) (Z23)   27  Need for prophylactic vaccination and inoculation against influenza (V04 81) (Z23)   28  Nephrolithiasis (592 0) (N20 0)   29  Onychomycosis (110 1) (B35 1)   30  JEFF (obstructive sleep apnea) (327 23) (G47 33)   31  Sciatica (724 3) (M54 30)   32  Screening for colon cancer (V76 51) (Z12 11)   33  Seasonal allergies (477 9) (J30 2)   34  Shoulder pain, right (719 41) (M25 511)   35  Spinal stenosis (724 00) (M48 00)   36  Tinea pedis (110 4) (B35 3)   37  Type 2 diabetes mellitus (250 00) (E11 9)   38  Vitamin D deficiency (268 9) (E55 9)     Past Medical History    · History of Cellulitis of left leg (682 6) (L03 116)   · History of chest pain (V13 89) (K25 921)   · History of diarrhea (V12 79) (D83 491)   · History of onychomycosis (V12 09) (Z86 19)   · History of onychomycosis (V12 09) (Z86 19)   · History of Limb pain (729 5) (M79 609)   · History of Neck pain (723 1) (M54 2)   · Need for pneumococcal vaccination (V03 82) (Z23)   · History of Nephrolithiasis (V13 01)   · History of Numbness On The Sole Of The Right Foot   · History of Pain and swelling of left lower leg (729 5,729 81) (M79 662,M79 89)   · History of Pain of lower extremity (729 5) (M79 606)   · History of Pain, hand joint, right (719 44) (M79 641)   · Sciatica (724 3) (M54 30)     The active problems and past medical history were reviewed and updated today       Surgical History    · History of Knee Arthroscopy (Therapeutic)   · History of Knee Surgery   · History of Needle Biopsy Of Prostate     Family History   Mother    · Family history of Alzheimer Disease   · Family history of Family Health Status Of Mother - Alive  Father    · Family history of Family Health Status Of Father -   Family History    · Family history of Adopted   · Denied: Family history of Colon Cancer   · Denied: Family history of Crohn's Disease   · Denied: Family history of Liver Cancer     Social History    · Denied: History of Alcohol Use (History)   · Current Some Day Smoker (305 1)   · Denied: History of Exercise Habits   · Marital History - Currently    · Tobacco use (305 1) (Z72 0)   · Working Full Time  The social history was reviewed and is unchanged       Current Meds    1  AmLODIPine Besylate 10 MG Oral Tablet; TAKE 1 TABLET EVERY MORNING;     Therapy: 11VOU2737 to (Kelsy Ave)  Requested for: 10YZK3858; Last     Rx:31Jug3175 Ordered   2  Aditya Breeze 2 Test In Vitro Disk; test blood sugar twice daiy;     Therapy: 21GKM0886 to (Waldemar Barrett)  Requested for: 47KYS0711; Last     Rx:27Ecy7945 Ordered   3  BuPROPion HCl ER (XL) 150 MG Oral Tablet Extended Release 24 Hour; take 1 tablet     by mouth every day;     Therapy: 05NBY4714 to (Evaluate:2018)  Requested for: 16HRV8709; Last     Rx:16Wph0550 Ordered   4  CloNIDine HCl - 0 3 MG Oral Tablet; take 1 tablet twice a day;     Therapy: 75GVQ2959 to (Kelsy White)  Requested for: 71WNP3720; Last     Rx:65Cby0529 Ordered   5  Enalapril Maleate 20 MG Oral Tablet; take 1 tablet twice a day;     Therapy: 59FMV8595 to (Kelsy Ave)  Requested for: 55LPQ5903; Last     Rx:83Koy1285 Ordered   6  Glimepiride 4 MG Oral Tablet;  Take 1 tablet daily;     Therapy: 54SXD2545 to (Evaluate:2018)  Requested for: 29ZBE3333; Last     Rx:2017 Ordered   7  Januvia 100 MG Oral Tablet; take 1 tablet by mouth daily;     Therapy: 11TJK4781 to (Evaluate:95Fkd7089)  Requested for: 64Doe4658; Last     Rx:82Wbp8015 Ordered   8  Jardiance 10 MG Oral Tablet; take 1 tablet by mouth every morning;     Therapy: 44NND6569 to (Evaluate:2018)  Requested for: 47MFC6229; Last     Rx:00Wot6156 Ordered   9  Ketoconazole 2 % External Cream; APPLY A THIN LAYER TO AFFECTED AREA(S)     TWICE DAILY;     Therapy: 2016 to (Evaluate:2017)  Requested for: 05Sol5735; Last     Rx:64Oht4780 Ordered   10  Methylphenidate HCl - 10 MG Oral Tablet; Take 1 tablet twice daily;      Therapy: 26SWQ7779 to (Evaluate:2018); Last Rx:73Jwh5856 Ordered   11  NovoFine 32G X 6 MM Miscellaneous; USE AS DIRECTED;      Therapy: 74XAH6368 to ([id number])  Requested for: 2014; Last      Rx:57Xzh2468 Ordered   12  OneTouch Verio In Vitro Strip; TEST TWICE DAILY;      Therapy: 70MDS4398 to (Evaluate:2016)  Requested for: 60ZDR5899; Last      Rx:16Qme6456 Ordered   13  OneTouch Verio w/Device Kit; USE AS DIRECTED;      Therapy: 78ZXT9223 to (Last Rx:2015)  Requested for: 2015 Ordered   14  Tamsulosin HCl - 0 4 MG Oral Capsule; take 1 capsule daily;      Therapy: 58SVR1344 to (Evaluate:2018)  Requested for: 74JCG8747; Last      Rx:17Seu7464 Ordered   15  Testosterone Cypionate 200 MG/ML Intramuscular Solution; inject IM every 2 weeks;      Therapy: 43GIS7575 to (Last Rx:00Xke7567) Ordered   16  Vicodin 5-300 MG Oral Tablet; One by mouth  half hour prior to physical therapy;      Therapy: 46CBK8984 to (Evaluate:2018);  Last Rx:2017 Ordered   17  Vitamin D (Ergocalciferol) 17936 UNIT Oral Capsule; TAKE ONE CAPSULE BY MOUTH      ONCE A WEEK;      Therapy: 91XUW3756 to (Deanne Davies)  Requested for: 38Lnn1467; Last      Rx:16Qpn9704 Ordered   18  Vitamin D3 2000 UNIT Oral Capsule; TAKE 1 CAPSULE Daily;    Therapy: 59BJX4500 to (Last Rx:29Mar2016)  Requested for: 29Mar2016 Ordered     The medication list was reviewed and updated today       Allergies   1  No Known Drug Allergies     Vitals         Respiration 17   Height 5 ft 10 in   Weight 286 lb    BMI Calculated 41 04   BSA Calculated 2 43      Physical Exam   Left Foot: Appearance: Normal except as noted: excessive pronation-- and-- pes planus  Great toe deformities include a bunion  Tenderness: None except the great toe-- and-- distal first metatarsal     Right Foot: Appearance: Normal except as noted: excessive pronation-- and-- pes planus  Great toe deformities include a bunion  Tenderness: None except the great toe-- and-- distal first metatarsal     Left Ankle: ROM: limited ROM in all planes    Right Ankle: ROM: limited ROM in all planes    Neurological Exam: performed  Light touch was decreased bilaterally  Vibratory sensation was decreased in both first metatarsophalangeal joints     Vascular Exam: performed Dorsalis pedis pulses were present bilaterally  Posterior tibial pulses were present bilaterally  Elevation Pallor: absent bilaterally  Dependence rubor was absent bilaterally  Edema: none     Toenails: All of the toenails were elongated,-- hypertrophied,-- discolored-- and-- Ptotic  Both first toenails were tender-- and-- Positive onychogryphosis          Socks and shoes removed, Right Foot Findings: swollen, erythematous and dry       The sensory exam showed diminished vibratory sensation at the level of the toes  Diminished tactile sensation with monofilament testing throughout the right foot       Socks and shoes removed, Left Foot Findings: swollen, erythematous and dry       The sensory exam showed diminished vibratory sensation at the level of the toes  Diminished tactile sensation with monofilament testing throughout the left foot      Capillary refills findings on the right were normal in the toes        Pulses:      2+ in the posterior tibialis on the right      2+ in the dorsalis pedis on the right       Capillary refills findings on the left were normal in the toes       Pulses:      1+ in the posterior tibialis on the left      1+ in the dorsalis pedis on the left       Assign Risk Category: 2: Loss of protective sensation with or without weakness, deformity, callus, pre-ulcer, or history of ulceration  High risk     Hyperkeratosis: present on both first toes,-- present on both first sub metatarsals,-- present on both third sub metatarsals-- and-- Severe profound moccasin tinea pedis bilateral     Shoe Gear Evaluation: performed ()  Recommendation(s): SAS style       Procedure   The patient was educated on the diabetic foot and good glucose control  All mycotic nails debrided   Bilateral pre-ulcerative lesions debrided today without pain or complication

## 2019-01-14 ENCOUNTER — OFFICE VISIT (OUTPATIENT)
Dept: INTERNAL MEDICINE CLINIC | Facility: CLINIC | Age: 59
End: 2019-01-14
Payer: COMMERCIAL

## 2019-01-14 VITALS
OXYGEN SATURATION: 98 % | DIASTOLIC BLOOD PRESSURE: 70 MMHG | RESPIRATION RATE: 18 BRPM | HEART RATE: 74 BPM | SYSTOLIC BLOOD PRESSURE: 136 MMHG | HEIGHT: 71 IN | WEIGHT: 279.8 LBS | TEMPERATURE: 98.6 F | BODY MASS INDEX: 39.17 KG/M2

## 2019-01-14 DIAGNOSIS — E11.40 TYPE 2 DIABETES MELLITUS WITH DIABETIC NEUROPATHY, WITHOUT LONG-TERM CURRENT USE OF INSULIN (HCC): ICD-10-CM

## 2019-01-14 DIAGNOSIS — E66.01 MORBID OBESITY WITH BODY MASS INDEX (BMI) OF 40.0 TO 44.9 IN ADULT (HCC): ICD-10-CM

## 2019-01-14 DIAGNOSIS — F41.8 DEPRESSION WITH ANXIETY: ICD-10-CM

## 2019-01-14 DIAGNOSIS — E55.9 VITAMIN D DEFICIENCY: ICD-10-CM

## 2019-01-14 DIAGNOSIS — F33.1 MODERATE EPISODE OF RECURRENT MAJOR DEPRESSIVE DISORDER (HCC): ICD-10-CM

## 2019-01-14 DIAGNOSIS — G47.33 OSA ON CPAP: ICD-10-CM

## 2019-01-14 DIAGNOSIS — Z99.89 OSA ON CPAP: ICD-10-CM

## 2019-01-14 DIAGNOSIS — E78.2 MIXED HYPERLIPIDEMIA: ICD-10-CM

## 2019-01-14 DIAGNOSIS — Z79.899 ENCOUNTER FOR LONG-TERM (CURRENT) USE OF MEDICATIONS: ICD-10-CM

## 2019-01-14 DIAGNOSIS — R10.84 GENERALIZED ABDOMINAL PAIN: Primary | ICD-10-CM

## 2019-01-14 DIAGNOSIS — I10 ESSENTIAL HYPERTENSION: ICD-10-CM

## 2019-01-14 PROCEDURE — 4010F ACE/ARB THERAPY RXD/TAKEN: CPT | Performed by: INTERNAL MEDICINE

## 2019-01-14 PROCEDURE — 99214 OFFICE O/P EST MOD 30 MIN: CPT | Performed by: INTERNAL MEDICINE

## 2019-01-14 RX ORDER — RANITIDINE 300 MG/1
300 TABLET ORAL
Qty: 30 TABLET | Refills: 0 | Status: SHIPPED | OUTPATIENT
Start: 2019-01-14 | End: 2019-02-11 | Stop reason: SDUPTHER

## 2019-01-14 RX ORDER — RANITIDINE 300 MG/1
300 TABLET ORAL
Qty: 30 TABLET | Refills: 0 | Status: SHIPPED | OUTPATIENT
Start: 2019-01-14 | End: 2019-01-14 | Stop reason: SDUPTHER

## 2019-01-14 RX ORDER — ENALAPRIL MALEATE 20 MG/1
20 TABLET ORAL 2 TIMES DAILY
Qty: 180 TABLET | Refills: 1 | Status: SHIPPED | OUTPATIENT
Start: 2019-01-14 | End: 2019-09-12 | Stop reason: SDUPTHER

## 2019-01-14 RX ORDER — GLIMEPIRIDE 4 MG/1
4 TABLET ORAL 2 TIMES DAILY
Qty: 180 TABLET | Refills: 1 | Status: SHIPPED | OUTPATIENT
Start: 2019-01-14 | End: 2019-02-06 | Stop reason: SDUPTHER

## 2019-01-14 RX ORDER — SERTRALINE HYDROCHLORIDE 100 MG/1
100 TABLET, FILM COATED ORAL DAILY
Qty: 90 TABLET | Refills: 1 | Status: SHIPPED | OUTPATIENT
Start: 2019-01-14 | End: 2019-03-07 | Stop reason: SDUPTHER

## 2019-01-14 RX ORDER — BUPROPION HYDROCHLORIDE 150 MG/1
150 TABLET ORAL DAILY
Qty: 90 TABLET | Refills: 0 | Status: SHIPPED | OUTPATIENT
Start: 2019-01-14 | End: 2019-04-30 | Stop reason: SDUPTHER

## 2019-01-14 RX ORDER — CLONIDINE HYDROCHLORIDE 0.3 MG/1
0.3 TABLET ORAL 2 TIMES DAILY
Qty: 180 TABLET | Refills: 1 | Status: SHIPPED | OUTPATIENT
Start: 2019-01-14 | End: 2019-10-13 | Stop reason: SDUPTHER

## 2019-01-14 NOTE — PROGRESS NOTES
Assessment/Plan: Morbid obesity with body mass index (BMI) of 40 0 to 44 9 in adult [facility])  Congratulated with 16 lbs weight loss since last visit  He has cut back on junk food and soda  Diabetes mellitus with neurological manifestation ([facility])  Recheck A1c, due for A1c  On glimepiride, Trulicity, Januvia and Jardiance  Essential hypertension  BP controlled on current regimen: amlodipine, enalapril bid, clonidine bid  Mixed hyperlipidemia  Lipids due, not on medication  JEFF on CPAP  Compliant  Depression with anxiety  Stable, on sertraline and bupropion  Diagnoses and all orders for this visit:    Generalized abdominal pain  Comments:  Diarrhea has resolved  Differential Dx: gastritis, constipation  Start ranitidine daily, take stool softener for next few days  Orders:  -     UA (URINE) with reflex to Microscopic  -     US abdomen complete; Future  -     Discontinue: ranitidine (ZANTAC) 300 MG tablet; Take 1 tablet (300 mg total) by mouth daily at bedtime  -     ranitidine (ZANTAC) 300 MG tablet; Take 1 tablet (300 mg total) by mouth daily at bedtime    Moderate episode of recurrent major depressive disorder (HCC)  -     buPROPion (WELLBUTRIN XL) 150 mg 24 hr tablet; Take 1 tablet (150 mg total) by mouth daily    Essential hypertension  -     cloNIDine (CATAPRES) 0 3 mg tablet; Take 1 tablet (0 3 mg total) by mouth 2 (two) times a day  -     enalapril (VASOTEC) 20 mg tablet; Take 1 tablet (20 mg total) by mouth 2 (two) times a day  -     CBC and differential  -     Comprehensive metabolic panel  -     Lipid panel  -     TSH, 3rd generation with Free T4 reflex    Type 2 diabetes mellitus with diabetic neuropathy, without long-term current use of insulin ([facility])  -     Dulaglutide 0 75 MG/0 5ML SOPN; Inject 1 mL (1 5 mg total) under the skin once a week  -     Empagliflozin 25 MG TABS; Take 1 tablet (25 mg total) by mouth every morning  -     enalapril (VASOTEC) 20 mg tablet;  Take 1 tablet (20 mg total) by mouth 2 (two) times a day  -     glimepiride (AMARYL) 4 mg tablet; Take 1 tablet (4 mg total) by mouth 2 (two) times a day  -     sitaGLIPtin (JANUVIA) 100 mg tablet; Take 1 tablet (100 mg total) by mouth daily  -     Comprehensive metabolic panel  -     Hemoglobin A1C  -     Microalbumin / creatinine urine ratio    Depression with anxiety  -     sertraline (ZOLOFT) 100 mg tablet; Take 1 tablet (100 mg total) by mouth daily    Encounter for long-term (current) use of medications  -     Vitamin B12    Morbid obesity with body mass index (BMI) of 40 0 to 44 9 in adult [facility])    Mixed hyperlipidemia  -     Comprehensive metabolic panel  -     Lipid panel  -     TSH, 3rd generation with Free T4 reflex    JEFF on CPAP    Vitamin D deficiency  -     Vitamin D 25 hydroxy      Recommend liquid diet for next 24 to 48 hours then soft diet  Avoid junk, oily and fried foods, no dairy  Maintain adequate daily fluid intake  Follow up in 2 months or as needed  Subjective:      Patient ID: Leslie Jones is a 62 y o  male  Mr Jensen Sauceda is accompanied by his wife today  He complains of abdominal pain, started about 5 days ago  He reports having diarrhea for 2 days, about 5 movements a day, stools were very loose and water  Afterwards, he has not had a bowel movement since  He still does not have an appetite, did eat pizza about 3 days ago  Denies any nausea or vomiting  No fevers, denies any bloody stools  He reports he is urinating better, not as dark, no symptoms  Prior to this, he was doing well  Wife reports that he is eating much better, not drinking as much soda  He has lost some weight since his last visit  The following portions of the patient's history were reviewed and updated as appropriate: allergies, current medications, past medical history, past social history and problem list     Review of Systems   Constitutional: Positive for activity change and appetite change   Negative for fatigue and fever  HENT: Negative for congestion  Eyes: Negative  Respiratory: Negative for cough, chest tightness and shortness of breath  Cardiovascular: Negative for chest pain, palpitations and leg swelling  Gastrointestinal: Positive for abdominal distention, abdominal pain and constipation  Negative for nausea, rectal pain and vomiting  Genitourinary: Negative for dysuria, frequency and urgency  Musculoskeletal: Negative for arthralgias  Skin: Negative for rash and wound  Neurological: Negative for dizziness, numbness and headaches  Psychiatric/Behavioral: Negative for dysphoric mood and sleep disturbance  The patient is not nervous/anxious  Objective:      /70   Pulse 74   Temp 98 6 °F (37 °C)   Resp 18   Ht 5' 10 5" (1 791 m)   Wt 127 kg (279 lb 12 8 oz)   SpO2 98%   BMI 39 58 kg/m²          Physical Exam   Constitutional: He is oriented to person, place, and time  He appears well-developed and well-nourished  HENT:   Head: Normocephalic and atraumatic  Mouth/Throat: Mucous membranes are normal    Eyes: Pupils are equal, round, and reactive to light  Conjunctivae are normal    Neck: Neck supple  Cardiovascular: Normal rate, regular rhythm and normal heart sounds  No edema  Pulmonary/Chest: Effort normal  He has no wheezes  He has no rhonchi  Abdominal: Soft  He exhibits distension  Bowel sounds are increased  There is tenderness in the epigastric area and left upper quadrant  There is no rebound and no CVA tenderness  Neurological: He is alert and oriented to person, place, and time  Skin: Skin is warm  No rash noted  Psychiatric: He has a normal mood and affect  His behavior is normal    Nursing note and vitals reviewed

## 2019-01-14 NOTE — PATIENT INSTRUCTIONS
Start clear liquid diet for 1-2 days then start soft diet  You can restart regular diet once symptoms have resolved  Start ranitidine once a day  Clear Liquid Diet   WHAT YOU NEED TO KNOW:   A clear liquid diet is made up of clear liquids and foods that are liquid at room temperature  The clear liquid diet provides liquids, sugar, salt, and some nutrients until you can eat solid food  The clear liquid diet does not provide all the nutrients, vitamins, minerals, or calories that your body needs  Your healthcare provider will tell you when you can start to eat regular foods     DISCHARGE INSTRUCTIONS:   Liquids and foods to include:   · Clear juices (such as apple, cranberry, or grape), strained citrus juices or fruit punch    · Coffee or tea (without cream or milk)    · Water    · Clear sports drinks or soft drinks, such as ginger ale, lemon-lime soda, or club soda (no cola or root beer)    · Clear broth, bouillon, or consommé    · Plain popsicles (no popsicles with pureed fruit or fiber)    · Hard candy    · Flavored gelatin without fruit  Liquids and foods to avoid:   · All solid foods, such as bread, pasta, fruit, vegetables, dairy, and protein foods    · Beverages containing alcohol    · Dairy products, such as milk, hot cocoa, buttermilk, and cream    · Fruit smoothies, nectars, fruit juices with pulp, and prune juice    · Tomato and vegetable juices    · Soups that contain vegetables, noodles, rice, or meat  Sample menu for a clear liquid diet:   · Breakfast:  1 cup of juice, ¾ cup of clear broth, ½ cup of lemon gelatin, and 1 cup of coffee with sugar    · Morning snack:  1 cup of a clear sports drink    · Lunch:  ½ cup of juice, ¾ cup of clear broth, ¾ cup of lemon-lime soda, and 1 popsicle (equals about 2 ounces of liquid)    · Afternoon snack:  1 popsicle     · Evening meal:  ½ cup of juice, ¾ cup of clear broth, ¾ cup of ginger ale, ½ cup of flavored gelatin, and 1 cup of herbal tea with honey or sugar    · Evening snack:  1 cup of flavored gelatin  Risks: The clear liquid diet does not provide all the nutrients you need  You may need to drink a nutrition supplement if you have to follow this diet for more than 3 days  If you do not follow this diet, you may continue to have diarrhea, nausea, and vomiting if you were asked to follow this diet because of these problems  © 2017 [employer] Information is for End User's use only and may not be sold, redistributed or otherwise used for commercial purposes  All illustrations and images included in CareNotes® are the copyrighted property of [employer]  or Zachary Harrison  The above information is an  only  It is not intended as medical advice for individual conditions or treatments  Talk to your doctor, nurse or pharmacist before following any medical regimen to see if it is safe and effective for you  Soft Diet   WHAT YOU NEED TO KNOW:   A soft diet is made up of foods that are soft and easy to chew and swallow  These foods may be chopped, ground, mashed, pureed, and moist  You may need to follow this diet if you have had certain types of surgery, such as head, neck, or stomach surgery  You may also need to follow this diet if you have problems with your teeth or mouth that make it hard for you to chew or swallow food  Your dietitian will tell you how to follow this diet and what consistency of liquids you may have  DISCHARGE INSTRUCTIONS:   How to prepare soft food:   · Cut food into small pieces that are ½ inch or smaller in size because they are easier to swallow  · Use chicken broth, beef broth, gravy, or sauces to cook or moisten meats and vegetables  Cook vegetables until they are soft enough to be mashed with a fork  · Use a  to grind or puree foods to make them easier to chew and swallow  · Use fruit juice to blend fruit       · Strain soups that have pieces of meat or vegetables that are larger than ½ inch    Foods you can include:   · Breads, cereals, rice, and pasta:      ¨ Breads, muffins, pancakes, or waffles moistened with syrup, jelly, margarine or butter    ¨ Moist dry or cooked cereal    ¨ Macaroni, pasta, noodles, or rice    ¨ Saltine crackers moistened in soup or other liquid    · Fruits and vegetables:      ¨ Applesauce or canned fruit without seeds or skin    ¨ Cooked fruits or ripe, soft peeled fruits, such as bananas, peaches, or melon    ¨ Soft, well-cooked vegetables without seeds or skin    · Meat and other protein sources:      ¨ Poached, scrambled, or cooked eggs    ¨ Moist, tender meat, fish, or poultry that is ground or chopped into small pieces    ¨ Soups with small soft pieces of vegetables and meat    ¨ Tofu or well-cooked, slightly mashed, moist legumes, such as baked beans    · Dairy:      ¨ Cheese (in sauces or melted in other dishes), cottage cheese, or ricotta cheese    ¨ Milk or milk drinks, milkshakes    ¨ Ice cream, sherbet, or frozen yogurt without fruit or nuts    ¨ Yogurt (plain or with soft fruits)    · Desserts:      ¨ Gelatin dessert with soft canned fruit, pudding, or custard    ¨ Fruit cobbler with soft breading or crumb mixture (no seeds or nuts), or fruit pie with soft bottom crust only    ¨ Soft, moist cake or cookie that has been moistened in milk, coffee, or other liquid  Foods to avoid:  Avoid any foods that are hard for you to chew or swallow, such as the following:  · Starches:      ¨ Dry bread, toast, crackers, and cereal    ¨ Cereal, cake, and breads with coconut, dried fruit, nuts, and other seeds    ¨ Corn, potato, and tortilla chips and taco shells    ¨ Breads with tough crusts, such as bagels, [employer] bread, and sourdough bread    ¨ Popcorn    · Vegetables:      ¨ Corn and peas    ¨ Raw, hard vegetables that cannot be mashed easily, such as carrots, broccoli, cauliflower, and celery    ¨ Crisp fried vegetables, such as potatoes    · Fruits:      ¨ Raw, crisp fruits, such as apples and pears and dried fruit    ¨ Stringy fruits, such as pineapple and santa    ¨ Cooked fruit with skin and seeds    · Dairy, meats, and protein foods:      ¨ Yogurt or ice cream with coconut, nuts, and granola    ¨ Dry meats (beef jerky) and tough meats (such as cid, sausage, hot dogs, and bratwurst)    ¨ Casseroles with large chunks of meat    ¨ Peanut butter (creamy and crunchy)  © 2017 [employer]  Information is for End User's use only and may not be sold, redistributed or otherwise used for commercial purposes  All illustrations and images included in CareNotes® are the copyrighted property of [employer]  or [employer] Harrison  The above information is an  only  It is not intended as medical advice for individual conditions or treatments  Talk to your doctor, nurse or pharmacist before following any medical regimen to see if it is safe and effective for you

## 2019-01-16 ENCOUNTER — HOSPITAL ENCOUNTER (OUTPATIENT)
Dept: ULTRASOUND IMAGING | Facility: HOSPITAL | Age: 59
Discharge: HOME | End: 2019-01-16
Payer: COMMERCIAL

## 2019-01-16 ENCOUNTER — LAB (OUTPATIENT)
Dept: LAB | Facility: CLINIC | Age: 59
End: 2019-01-16
Payer: COMMERCIAL

## 2019-01-16 DIAGNOSIS — R10.84 GENERALIZED ABDOMINAL PAIN: ICD-10-CM

## 2019-01-16 LAB
25(OH)D3 SERPL-MCNC: 21.6 NG/ML (ref 30–100)
ALBUMIN SERPL BCP-MCNC: 3.9 G/DL (ref 3.5–5)
ALP SERPL-CCNC: 86 U/L (ref 46–116)
ALT SERPL W P-5'-P-CCNC: 71 U/L (ref 12–78)
ANION GAP SERPL CALCULATED.3IONS-SCNC: 11 MMOL/L (ref 4–13)
AST SERPL W P-5'-P-CCNC: 40 U/L (ref 5–45)
BASOPHILS # BLD AUTO: 0.04 THOUSANDS/ΜL (ref 0–0.1)
BASOPHILS NFR BLD AUTO: 1 % (ref 0–1)
BILIRUB SERPL-MCNC: 1.2 MG/DL (ref 0.2–1)
BILIRUB UR QL STRIP: ABNORMAL
BUN SERPL-MCNC: 13 MG/DL (ref 5–25)
CALCIUM SERPL-MCNC: 9.3 MG/DL (ref 8.3–10.1)
CHLORIDE SERPL-SCNC: 103 MMOL/L (ref 100–108)
CHOLEST SERPL-MCNC: 130 MG/DL (ref 50–200)
CLARITY UR: CLEAR
CO2 SERPL-SCNC: 25 MMOL/L (ref 21–32)
COLOR UR: YELLOW
CREAT SERPL-MCNC: 1.44 MG/DL (ref 0.6–1.3)
CREAT UR-MCNC: 232 MG/DL
EOSINOPHIL # BLD AUTO: 0.16 THOUSAND/ΜL (ref 0–0.61)
EOSINOPHIL NFR BLD AUTO: 2 % (ref 0–6)
ERYTHROCYTE [DISTWIDTH] IN BLOOD BY AUTOMATED COUNT: 13 % (ref 11.6–15.1)
EST. AVERAGE GLUCOSE BLD GHB EST-MCNC: 171 MG/DL
GFR SERPL CREATININE-BSD FRML MDRD: 53 ML/MIN/1.73SQ M
GLUCOSE P FAST SERPL-MCNC: 52 MG/DL (ref 65–99)
GLUCOSE UR STRIP-MCNC: ABNORMAL MG/DL
HBA1C MFR BLD: 7.6 % (ref 4.2–6.3)
HCT VFR BLD AUTO: 52.3 % (ref 36.5–49.3)
HDLC SERPL-MCNC: 26 MG/DL (ref 40–60)
HGB BLD-MCNC: 17.6 G/DL (ref 12–17)
HGB UR QL STRIP.AUTO: NEGATIVE
IMM GRANULOCYTES # BLD AUTO: 0.05 THOUSAND/UL (ref 0–0.2)
IMM GRANULOCYTES NFR BLD AUTO: 1 % (ref 0–2)
KETONES UR STRIP-MCNC: ABNORMAL MG/DL
LDLC SERPL CALC-MCNC: 85 MG/DL (ref 0–100)
LEUKOCYTE ESTERASE UR QL STRIP: NEGATIVE
LYMPHOCYTES # BLD AUTO: 1.58 THOUSANDS/ΜL (ref 0.6–4.47)
LYMPHOCYTES NFR BLD AUTO: 20 % (ref 14–44)
MCH RBC QN AUTO: 30.2 PG (ref 26.8–34.3)
MCHC RBC AUTO-ENTMCNC: 33.7 G/DL (ref 31.4–37.4)
MCV RBC AUTO: 90 FL (ref 82–98)
MICROALBUMIN UR-MCNC: 105 MG/L (ref 0–20)
MICROALBUMIN/CREAT 24H UR: 45 MG/G CREATININE (ref 0–30)
MONOCYTES # BLD AUTO: 0.72 THOUSAND/ΜL (ref 0.17–1.22)
MONOCYTES NFR BLD AUTO: 9 % (ref 4–12)
NEUTROPHILS # BLD AUTO: 5.32 THOUSANDS/ΜL (ref 1.85–7.62)
NEUTS SEG NFR BLD AUTO: 67 % (ref 43–75)
NITRITE UR QL STRIP: NEGATIVE
NONHDLC SERPL-MCNC: 104 MG/DL
NRBC BLD AUTO-RTO: 0 /100 WBCS
PH UR STRIP.AUTO: 5.5 [PH] (ref 4.5–8)
PLATELET # BLD AUTO: 370 THOUSANDS/UL (ref 149–390)
PMV BLD AUTO: 9.2 FL (ref 8.9–12.7)
POTASSIUM SERPL-SCNC: 4 MMOL/L (ref 3.5–5.3)
PROT SERPL-MCNC: 8.2 G/DL (ref 6.4–8.2)
PROT UR STRIP-MCNC: NEGATIVE MG/DL
RBC # BLD AUTO: 5.82 MILLION/UL (ref 3.88–5.62)
SODIUM SERPL-SCNC: 139 MMOL/L (ref 136–145)
SP GR UR STRIP.AUTO: >=1.03 (ref 1–1.03)
TRIGL SERPL-MCNC: 94 MG/DL
TSH SERPL DL<=0.05 MIU/L-ACNC: 1.06 UIU/ML (ref 0.36–3.74)
UROBILINOGEN UR QL STRIP.AUTO: 0.2 E.U./DL
VIT B12 SERPL-MCNC: 1015 PG/ML (ref 100–900)
WBC # BLD AUTO: 7.87 THOUSAND/UL (ref 4.31–10.16)

## 2019-01-16 PROCEDURE — 82043 UR ALBUMIN QUANTITATIVE: CPT | Performed by: INTERNAL MEDICINE

## 2019-01-16 PROCEDURE — 82570 ASSAY OF URINE CREATININE: CPT | Performed by: INTERNAL MEDICINE

## 2019-01-16 PROCEDURE — 3060F POS MICROALBUMINURIA REV: CPT | Performed by: INTERNAL MEDICINE

## 2019-01-16 PROCEDURE — 84443 ASSAY THYROID STIM HORMONE: CPT | Performed by: INTERNAL MEDICINE

## 2019-01-16 PROCEDURE — 82607 VITAMIN B-12: CPT | Performed by: INTERNAL MEDICINE

## 2019-01-16 PROCEDURE — 3045F PR MOST RECENT HEMOGLOBIN A1C LEVEL 7.0-9.0%: CPT | Performed by: INTERNAL MEDICINE

## 2019-01-16 PROCEDURE — 85025 COMPLETE CBC W/AUTO DIFF WBC: CPT | Performed by: INTERNAL MEDICINE

## 2019-01-16 PROCEDURE — 83036 HEMOGLOBIN GLYCOSYLATED A1C: CPT | Performed by: INTERNAL MEDICINE

## 2019-01-16 PROCEDURE — 81003 URINALYSIS AUTO W/O SCOPE: CPT | Performed by: INTERNAL MEDICINE

## 2019-01-16 PROCEDURE — 82306 VITAMIN D 25 HYDROXY: CPT | Performed by: INTERNAL MEDICINE

## 2019-01-16 PROCEDURE — 80061 LIPID PANEL: CPT | Performed by: INTERNAL MEDICINE

## 2019-01-16 PROCEDURE — 36415 COLL VENOUS BLD VENIPUNCTURE: CPT | Performed by: INTERNAL MEDICINE

## 2019-01-16 PROCEDURE — 80053 COMPREHEN METABOLIC PANEL: CPT | Performed by: INTERNAL MEDICINE

## 2019-01-16 PROCEDURE — 76700 US EXAM ABDOM COMPLETE: CPT

## 2019-01-17 ENCOUNTER — TELEPHONE (OUTPATIENT)
Dept: INTERNAL MEDICINE CLINIC | Facility: CLINIC | Age: 59
End: 2019-01-17

## 2019-01-17 ENCOUNTER — TRANSCRIBE ORDERS (OUTPATIENT)
Dept: LAB | Facility: CLINIC | Age: 59
End: 2019-01-17

## 2019-01-17 ENCOUNTER — OFFICE VISIT (OUTPATIENT)
Dept: LAB | Facility: CLINIC | Age: 59
End: 2019-01-17
Payer: COMMERCIAL

## 2019-01-17 DIAGNOSIS — R10.84 GENERALIZED ABDOMINAL PAIN: Primary | ICD-10-CM

## 2019-01-17 DIAGNOSIS — E11.40 TYPE 2 DIABETES MELLITUS WITH DIABETIC NEUROPATHY, WITHOUT LONG-TERM CURRENT USE OF INSULIN (HCC): ICD-10-CM

## 2019-01-17 DIAGNOSIS — N28.1 BILATERAL RENAL CYSTS: Primary | ICD-10-CM

## 2019-01-17 DIAGNOSIS — R10.84 GENERALIZED ABDOMINAL PAIN: ICD-10-CM

## 2019-01-17 PROCEDURE — 93005 ELECTROCARDIOGRAM TRACING: CPT

## 2019-01-17 NOTE — TELEPHONE ENCOUNTER
You wrote from Trulicity 2 05SK/7 9DR inject 1 5mg once a week so the patient would have to use 2 pens to equal the 1 5mg  If you write the script for Trulicity 1 0OF/0 4OS inject 1 5mg once a week the patient would only have to use 1 pen  They come 4 pens in a box so 1 box would be for 1 month vs  2 boxes for 1 month  The pharmacy said this would be cheaper for the patient and would require proir-authorization

## 2019-01-17 NOTE — TELEPHONE ENCOUNTER
Ultrasound showed gallstones  Since with more frequent abdominal pain, recommend to see surgery  I can refer you to one  Also seen, several kidney cysts   Repeat ultrasound in 6 months    Lab results:  Sugars and cholesterol much better  Liver enzymes are normal, kidney function stable  Need to drink more water during the day  Vitamin D is better, continue daily supplement  Rest of labs ok

## 2019-01-18 LAB
ATRIAL RATE: 82 BPM
ATRIAL RATE: 82 BPM
P AXIS: -22 DEGREES
P AXIS: 20 DEGREES
PR INTERVAL: 164 MS
PR INTERVAL: 166 MS
QRS AXIS: -18 DEGREES
QRS AXIS: -23 DEGREES
QRSD INTERVAL: 96 MS
QRSD INTERVAL: 98 MS
QT INTERVAL: 374 MS
QT INTERVAL: 378 MS
QTC INTERVAL: 436 MS
QTC INTERVAL: 441 MS
T WAVE AXIS: -19 DEGREES
T WAVE AXIS: -23 DEGREES
VENTRICULAR RATE: 82 BPM
VENTRICULAR RATE: 82 BPM

## 2019-01-18 PROCEDURE — 93010 ELECTROCARDIOGRAM REPORT: CPT | Performed by: INTERNAL MEDICINE

## 2019-02-04 NOTE — PRE-PROCEDURE INSTRUCTIONS
Pre-Surgery Instructions:   Medication Instructions    amLODIPine (NORVASC) 10 mg tablet Instructed patient per Anesthesia Guidelines   buPROPion (WELLBUTRIN XL) 150 mg 24 hr tablet Instructed patient per Anesthesia Guidelines   cloNIDine (CATAPRES) 0 3 mg tablet Instructed patient per Anesthesia Guidelines   Dulaglutide (TRULICITY) 1 5 PA/6 8PY SOPN Instructed patient per Anesthesia Guidelines   Empagliflozin 25 MG TABS Instructed patient per Anesthesia Guidelines   enalapril (VASOTEC) 20 mg tablet Instructed patient per Anesthesia Guidelines   ergocalciferol (VITAMIN D2) 50,000 units Instructed patient per Anesthesia Guidelines   glimepiride (AMARYL) 4 mg tablet Instructed patient per Anesthesia Guidelines   ranitidine (ZANTAC) 300 MG tablet Instructed patient per Anesthesia Guidelines   sertraline (ZOLOFT) 100 mg tablet Instructed patient per Anesthesia Guidelines   sitaGLIPtin (JANUVIA) 100 mg tablet Instructed patient per Anesthesia Guidelines   tamsulosin (FLOMAX) 0 4 mg Instructed patient per Anesthesia Guidelines      Pre op and showering instructions reviewed-Patient has hibiclens

## 2019-02-06 DIAGNOSIS — E11.40 TYPE 2 DIABETES MELLITUS WITH DIABETIC NEUROPATHY, WITHOUT LONG-TERM CURRENT USE OF INSULIN (HCC): ICD-10-CM

## 2019-02-06 RX ORDER — GLIMEPIRIDE 4 MG/1
4 TABLET ORAL 2 TIMES DAILY
Qty: 180 TABLET | Refills: 1 | Status: SHIPPED | OUTPATIENT
Start: 2019-02-06 | End: 2019-03-07 | Stop reason: SDUPTHER

## 2019-02-07 ENCOUNTER — ANESTHESIA EVENT (OUTPATIENT)
Dept: PERIOP | Facility: HOSPITAL | Age: 59
End: 2019-02-07
Payer: COMMERCIAL

## 2019-02-07 NOTE — ANESTHESIA PREPROCEDURE EVALUATION
FBS-93  Review of Systems/Medical History          Cardiovascular  EKG reviewed, Hyperlipidemia, Hypertension , CHF (Distant Past) ,   Comment: EF-60%-2017,  Pulmonary  Smoker cigar smoker  , Shortness of breath, Sleep apnea CPAP,   Comment: 3/week  Last 2 days ago     GI/Hepatic    Bowel prep       Kidney stones,        Endo/Other  Diabetes ,   Obesity  morbid obesity   GYN       Hematology   Musculoskeletal       Neurology    Diabetic neuropathy, No fibromyalgia   Psychology   Anxiety, Depression ,              Physical Exam    Airway  Comment: Bearded  Mallampati score: II  TM Distance: >3 FB  Neck ROM: full     Dental   No notable dental hx     Cardiovascular      Pulmonary      Other Findings        Anesthesia Plan  ASA Score- 3     Anesthesia Type- general with ASA Monitors  Additional Monitors:   Airway Plan: ETT  Plan Factors-Patient not instructed to abstain from smoking on day of procedure  Patient did not smoke on day of surgery  Induction- intravenous  Postoperative Plan-     Informed Consent- Anesthetic plan and risks discussed with patient and spouse  I personally reviewed this patient with the CRNA  Discussed and agreed on the Anesthesia Plan with the CRNA             Lab Results   Component Value Date    GLUC 93 09/18/2017    GLUF 52 (L) 01/16/2019    ALT 71 01/16/2019    AST 40 01/16/2019    BUN 13 01/16/2019    CALCIUM 9 3 01/16/2019     01/16/2019    CHOL 181 02/01/2014    CO2 25 01/16/2019    CREATININE 1 44 (H) 01/16/2019    HDL 26 (L) 01/16/2019    HCT 52 3 (H) 01/16/2019    HGB 17 6 (H) 01/16/2019    PROT 7 7 02/01/2014    HGBA1C 7 6 (H) 01/16/2019     01/16/2019    K 4 0 01/16/2019    PSA 1 6 02/01/2014     02/01/2014    TRIG 94 01/16/2019    WBC 7 87 01/16/2019

## 2019-02-08 ENCOUNTER — HOSPITAL ENCOUNTER (OUTPATIENT)
Facility: HOSPITAL | Age: 59
Setting detail: OUTPATIENT SURGERY
Discharge: HOME | End: 2019-02-08
Attending: SURGERY | Admitting: SURGERY
Payer: COMMERCIAL

## 2019-02-08 ENCOUNTER — ANESTHESIA (OUTPATIENT)
Dept: PERIOP | Facility: HOSPITAL | Age: 59
End: 2019-02-08
Payer: COMMERCIAL

## 2019-02-08 VITALS
WEIGHT: 279 LBS | TEMPERATURE: 97.6 F | BODY MASS INDEX: 39.06 KG/M2 | DIASTOLIC BLOOD PRESSURE: 63 MMHG | RESPIRATION RATE: 16 BRPM | SYSTOLIC BLOOD PRESSURE: 109 MMHG | HEIGHT: 71 IN | OXYGEN SATURATION: 92 % | HEART RATE: 75 BPM

## 2019-02-08 DIAGNOSIS — K80.10 CALCULUS OF GALLBLADDER WITH CHRONIC CHOLECYSTITIS WITHOUT OBSTRUCTION: ICD-10-CM

## 2019-02-08 LAB — GLUCOSE SERPL-MCNC: 93 MG/DL (ref 65–140)

## 2019-02-08 PROCEDURE — 88304 TISSUE EXAM BY PATHOLOGIST: CPT | Performed by: PATHOLOGY

## 2019-02-08 PROCEDURE — 47562 LAPAROSCOPIC CHOLECYSTECTOMY: CPT | Performed by: PHYSICIAN ASSISTANT

## 2019-02-08 PROCEDURE — 82948 REAGENT STRIP/BLOOD GLUCOSE: CPT

## 2019-02-08 PROCEDURE — S2900 ROBOTIC SURGICAL SYSTEM: HCPCS | Performed by: PHYSICIAN ASSISTANT

## 2019-02-08 RX ORDER — ONDANSETRON 2 MG/ML
INJECTION INTRAMUSCULAR; INTRAVENOUS AS NEEDED
Status: DISCONTINUED | OUTPATIENT
Start: 2019-02-08 | End: 2019-02-08 | Stop reason: SURG

## 2019-02-08 RX ORDER — CEFAZOLIN SODIUM 2 G/50ML
2000 SOLUTION INTRAVENOUS ONCE
Status: COMPLETED | OUTPATIENT
Start: 2019-02-08 | End: 2019-02-08

## 2019-02-08 RX ORDER — MAGNESIUM HYDROXIDE 1200 MG/15ML
LIQUID ORAL AS NEEDED
Status: DISCONTINUED | OUTPATIENT
Start: 2019-02-08 | End: 2019-02-08 | Stop reason: HOSPADM

## 2019-02-08 RX ORDER — ONDANSETRON 2 MG/ML
4 INJECTION INTRAMUSCULAR; INTRAVENOUS ONCE AS NEEDED
Status: DISCONTINUED | OUTPATIENT
Start: 2019-02-08 | End: 2019-02-08 | Stop reason: HOSPADM

## 2019-02-08 RX ORDER — NEOSTIGMINE METHYLSULFATE 1 MG/ML
INJECTION INTRAVENOUS AS NEEDED
Status: DISCONTINUED | OUTPATIENT
Start: 2019-02-08 | End: 2019-02-08 | Stop reason: SURG

## 2019-02-08 RX ORDER — HYDROMORPHONE HCL/PF 1 MG/ML
0.4 SYRINGE (ML) INJECTION
Status: DISCONTINUED | OUTPATIENT
Start: 2019-02-08 | End: 2019-02-08 | Stop reason: HOSPADM

## 2019-02-08 RX ORDER — MEPERIDINE HYDROCHLORIDE 25 MG/ML
25 INJECTION INTRAMUSCULAR; INTRAVENOUS; SUBCUTANEOUS AS NEEDED
Status: DISCONTINUED | OUTPATIENT
Start: 2019-02-08 | End: 2019-02-08 | Stop reason: HOSPADM

## 2019-02-08 RX ORDER — OXYCODONE HYDROCHLORIDE AND ACETAMINOPHEN 5; 325 MG/1; MG/1
2 TABLET ORAL EVERY 4 HOURS PRN
Status: DISCONTINUED | OUTPATIENT
Start: 2019-02-08 | End: 2019-02-08 | Stop reason: HOSPADM

## 2019-02-08 RX ORDER — PROPOFOL 10 MG/ML
INJECTION, EMULSION INTRAVENOUS AS NEEDED
Status: DISCONTINUED | OUTPATIENT
Start: 2019-02-08 | End: 2019-02-08 | Stop reason: SURG

## 2019-02-08 RX ORDER — ONDANSETRON 2 MG/ML
4 INJECTION INTRAMUSCULAR; INTRAVENOUS EVERY 4 HOURS PRN
Status: DISCONTINUED | OUTPATIENT
Start: 2019-02-08 | End: 2019-02-08 | Stop reason: HOSPADM

## 2019-02-08 RX ORDER — SODIUM CHLORIDE 9 MG/ML
125 INJECTION, SOLUTION INTRAVENOUS CONTINUOUS
Status: DISCONTINUED | OUTPATIENT
Start: 2019-02-08 | End: 2019-02-08 | Stop reason: HOSPADM

## 2019-02-08 RX ORDER — BUPIVACAINE HYDROCHLORIDE AND EPINEPHRINE 5; 5 MG/ML; UG/ML
INJECTION, SOLUTION EPIDURAL; INTRACAUDAL; PERINEURAL AS NEEDED
Status: DISCONTINUED | OUTPATIENT
Start: 2019-02-08 | End: 2019-02-08 | Stop reason: HOSPADM

## 2019-02-08 RX ORDER — GLYCOPYRROLATE 0.2 MG/ML
INJECTION INTRAMUSCULAR; INTRAVENOUS AS NEEDED
Status: DISCONTINUED | OUTPATIENT
Start: 2019-02-08 | End: 2019-02-08 | Stop reason: SURG

## 2019-02-08 RX ORDER — KETOROLAC TROMETHAMINE 30 MG/ML
INJECTION, SOLUTION INTRAMUSCULAR; INTRAVENOUS AS NEEDED
Status: DISCONTINUED | OUTPATIENT
Start: 2019-02-08 | End: 2019-02-08 | Stop reason: SURG

## 2019-02-08 RX ORDER — MIDAZOLAM HYDROCHLORIDE 1 MG/ML
INJECTION INTRAMUSCULAR; INTRAVENOUS AS NEEDED
Status: DISCONTINUED | OUTPATIENT
Start: 2019-02-08 | End: 2019-02-08 | Stop reason: SURG

## 2019-02-08 RX ORDER — SODIUM CHLORIDE, SODIUM LACTATE, POTASSIUM CHLORIDE, CALCIUM CHLORIDE 600; 310; 30; 20 MG/100ML; MG/100ML; MG/100ML; MG/100ML
125 INJECTION, SOLUTION INTRAVENOUS CONTINUOUS
Status: DISCONTINUED | OUTPATIENT
Start: 2019-02-08 | End: 2019-02-08 | Stop reason: HOSPADM

## 2019-02-08 RX ORDER — INDOCYANINE GREEN AND WATER 25 MG
5 KIT INJECTION ONCE
Status: COMPLETED | OUTPATIENT
Start: 2019-02-08 | End: 2019-02-08

## 2019-02-08 RX ORDER — SUCCINYLCHOLINE CHLORIDE 20 MG/ML
INJECTION INTRAMUSCULAR; INTRAVENOUS AS NEEDED
Status: DISCONTINUED | OUTPATIENT
Start: 2019-02-08 | End: 2019-02-08 | Stop reason: SURG

## 2019-02-08 RX ORDER — FENTANYL CITRATE 50 UG/ML
INJECTION, SOLUTION INTRAMUSCULAR; INTRAVENOUS AS NEEDED
Status: DISCONTINUED | OUTPATIENT
Start: 2019-02-08 | End: 2019-02-08 | Stop reason: SURG

## 2019-02-08 RX ORDER — OXYCODONE HYDROCHLORIDE AND ACETAMINOPHEN 5; 325 MG/1; MG/1
2 TABLET ORAL EVERY 4 HOURS PRN
Qty: 20 TABLET | Refills: 0 | Status: SHIPPED | OUTPATIENT
Start: 2019-02-08 | End: 2019-03-14 | Stop reason: ALTCHOICE

## 2019-02-08 RX ORDER — ROCURONIUM BROMIDE 10 MG/ML
INJECTION, SOLUTION INTRAVENOUS AS NEEDED
Status: DISCONTINUED | OUTPATIENT
Start: 2019-02-08 | End: 2019-02-08 | Stop reason: SURG

## 2019-02-08 RX ORDER — FENTANYL CITRATE/PF 50 MCG/ML
25 SYRINGE (ML) INJECTION
Status: DISCONTINUED | OUTPATIENT
Start: 2019-02-08 | End: 2019-02-08 | Stop reason: HOSPADM

## 2019-02-08 RX ORDER — MORPHINE SULFATE 10 MG/ML
4 INJECTION, SOLUTION INTRAMUSCULAR; INTRAVENOUS
Status: DISCONTINUED | OUTPATIENT
Start: 2019-02-08 | End: 2019-02-08 | Stop reason: HOSPADM

## 2019-02-08 RX ADMIN — INDOCYANINE GREEN AND WATER 5 MG: KIT at 08:59

## 2019-02-08 RX ADMIN — NEOSTIGMINE METHYLSULFATE 3 MG: 1 INJECTION INTRAVENOUS at 10:55

## 2019-02-08 RX ADMIN — SUCCINYLCHOLINE CHLORIDE 140 MG: 20 INJECTION, SOLUTION INTRAMUSCULAR; INTRAVENOUS at 09:34

## 2019-02-08 RX ADMIN — CEFAZOLIN SODIUM 3000 MG: 2 SOLUTION INTRAVENOUS at 09:25

## 2019-02-08 RX ADMIN — ONDANSETRON 4 MG: 2 INJECTION INTRAMUSCULAR; INTRAVENOUS at 10:44

## 2019-02-08 RX ADMIN — DEXAMETHASONE SODIUM PHOSPHATE 4 MG: 10 INJECTION INTRAMUSCULAR; INTRAVENOUS at 09:40

## 2019-02-08 RX ADMIN — ROCURONIUM BROMIDE 30 MG: 10 INJECTION INTRAVENOUS at 09:44

## 2019-02-08 RX ADMIN — KETOROLAC TROMETHAMINE 30 MG: 30 INJECTION, SOLUTION INTRAMUSCULAR at 10:54

## 2019-02-08 RX ADMIN — PROPOFOL 200 MG: 10 INJECTION, EMULSION INTRAVENOUS at 09:34

## 2019-02-08 RX ADMIN — MIDAZOLAM HYDROCHLORIDE 2 MG: 1 INJECTION, SOLUTION INTRAMUSCULAR; INTRAVENOUS at 09:30

## 2019-02-08 RX ADMIN — GLYCOPYRROLATE 0.4 MG: 0.2 INJECTION, SOLUTION INTRAMUSCULAR; INTRAVENOUS at 10:55

## 2019-02-08 RX ADMIN — FENTANYL CITRATE 100 MCG: 50 INJECTION INTRAMUSCULAR; INTRAVENOUS at 09:34

## 2019-02-08 RX ADMIN — OXYCODONE HYDROCHLORIDE AND ACETAMINOPHEN 2 TABLET: 5; 325 TABLET ORAL at 12:43

## 2019-02-08 RX ADMIN — LIDOCAINE HYDROCHLORIDE 100 MG: 20 INJECTION, SOLUTION INTRAVENOUS at 09:34

## 2019-02-08 RX ADMIN — SODIUM CHLORIDE: 0.9 INJECTION, SOLUTION INTRAVENOUS at 09:32

## 2019-02-08 NOTE — OP NOTE
OPERATIVE REPORT  PATIENT NAME: Bang Drummondllor    :  1960  MRN: [id number]  Pt Location: [facility] OR ROOM 01    SURGERY DATE: 2019    Surgeon(s) and Role:     * Martha Blackwell, DO - Primary     * [place] Davila, [address]  Her assistance was required for exposure retraction throughout the case no qualified resident was available    Preop Diagnosis:  Calculus of gallbladder with chronic cholecystitis without obstruction [K80 10]    Post-Op Diagnosis Codes:     * Calculus of gallbladder with chronic cholecystitis without obstruction [K80 10]    Procedure(s) (LRB):  ROBOTIC ASSISTED LAPAROSCOPIC CHOLECYSTECTOMY (N/A)    Specimen(s):  ID Type Source Tests Collected by Time Destination   1 :  Tissue Gallbladder TISSUE EXAM Cristiano Lizarraga DO 2019 1000        Estimated Blood Loss:   Minimal    Drains:       Anesthesia Type:   General/local    Operative Indications:  Calculus of gallbladder with chronic cholecystitis without obstruction [K80 10]      Operative Findings:  Changes consistent with chronic calculous cholecystitis  Two large stones noted the gallbladder    Review of Systems/Medical History      Cardiovascular  EKG reviewed, Hyperlipidemia, Hypertension , CHF (Distant Past) ,   Comment: EF-60%-2017,  Pulmonary  Smoker cigar smoker  , Shortness of breath, Sleep apnea CPAP,   Comment: 3/week  Last 2 days ago      GI/Hepatic  Bowel prep      Kidney stones,       Endo/Other  Diabetes ,   Obesity  morbid obesity    GYN      Hematology Musculoskeletal      Neurology  Diabetic neuropathy, No fibromyalgia Psychology   Anxiety, Depression ,          Height 71 inches weight 127 kg/279 lb BMI 39  ASA 2  Wound class 2    Complications:   None    Procedure and Technique:  Patient was brought the operative suite and identified by visualization, conversation, by armband  Sequential compression pumps were placed  He was given perioperative antibiotics    Once under anesthesia abdomen is then prepped and draped in a sterile fashion  Time-out was performed and was assured that the prep was dry  Local was instilled just above the umbilicus  Vertical skin incision was made subcutaneous tissues were bluntly dissected with Kocher clamps to the fascia fascia lifted up and divided the midline I carefully poked through the underlying peritoneum gaining access into the abdominal cavity  A 12 mm trocar was placed under direct visualization  Laparoscoped was inserted and under direct visualization 2 8 mm robotic trocars were placed in the appropriate positions under direct visualization  Another 5 mm anterior axillary line trocar was also placed under direct visualization  The robot was then docked  The appropriate grasper and hook and hot cautery instruments were then placed under direct visualization  I then went to the robotic console  Gauze was lifted cephalad  Careful dissection was carried out to identify the cystic duct and cystic artery  These were divided between robotic clips  Once divided the gallbladder was carefully taken off the liver using hook and hot cautery  Once off the liver the careful inspection was carried out there was good hemostasis  I then returned to the operative field  Gallbladder was placed into an Endo-Catch bag  Copious irrigation was carried out  Gallbladder was then brought through the umbilical port site within the Endo-Catch bag  Trocars were then removed releasing pneumoperitoneum fascia umbilical site closed using 0 Vicryl in a figure-of-eight fashion x2  Local was instilled irrigation is carried out 4 Monocryl used to close skin incisions in a subcuticular fashion  Wounds washed and dried  Sterile histoacryl was applied  Was awakened in the operating returned to the recovery area in stable condition having tolerated procedure well     I was present for the entire procedure    Patient Disposition:  PACU     SIGNATURE: Dany Alarcon DO  DATE: February 8, 2019  TIME: 11:03 AM

## 2019-02-11 DIAGNOSIS — R10.84 GENERALIZED ABDOMINAL PAIN: ICD-10-CM

## 2019-02-11 RX ORDER — RANITIDINE 300 MG/1
300 TABLET ORAL
Qty: 90 TABLET | Refills: 0 | Status: SHIPPED | OUTPATIENT
Start: 2019-02-11 | End: 2019-07-17 | Stop reason: ALTCHOICE

## 2019-03-07 DIAGNOSIS — E11.40 TYPE 2 DIABETES MELLITUS WITH DIABETIC NEUROPATHY, WITHOUT LONG-TERM CURRENT USE OF INSULIN (HCC): ICD-10-CM

## 2019-03-07 DIAGNOSIS — F41.8 DEPRESSION WITH ANXIETY: ICD-10-CM

## 2019-03-07 RX ORDER — GLIMEPIRIDE 4 MG/1
TABLET ORAL
Qty: 180 TABLET | Refills: 1 | Status: SHIPPED | OUTPATIENT
Start: 2019-03-07 | End: 2019-10-13 | Stop reason: SDUPTHER

## 2019-03-07 RX ORDER — SERTRALINE HYDROCHLORIDE 100 MG/1
TABLET, FILM COATED ORAL
Qty: 90 TABLET | Refills: 1 | Status: SHIPPED | OUTPATIENT
Start: 2019-03-07 | End: 2020-03-20 | Stop reason: SDUPTHER

## 2019-03-08 ENCOUNTER — OFFICE VISIT (OUTPATIENT)
Dept: PODIATRY | Facility: CLINIC | Age: 59
End: 2019-03-08
Payer: COMMERCIAL

## 2019-03-08 VITALS
HEART RATE: 75 BPM | WEIGHT: 279 LBS | SYSTOLIC BLOOD PRESSURE: 109 MMHG | DIASTOLIC BLOOD PRESSURE: 63 MMHG | HEIGHT: 71 IN | BODY MASS INDEX: 39.06 KG/M2 | RESPIRATION RATE: 17 BRPM

## 2019-03-08 DIAGNOSIS — B35.1 ONYCHOMYCOSIS: ICD-10-CM

## 2019-03-08 DIAGNOSIS — M79.671 PAIN IN BOTH FEET: ICD-10-CM

## 2019-03-08 DIAGNOSIS — M79.672 PAIN IN BOTH FEET: ICD-10-CM

## 2019-03-08 DIAGNOSIS — L84 CORNS: ICD-10-CM

## 2019-03-08 DIAGNOSIS — M21.40 ACQUIRED FLAT FOOT, UNSPECIFIED LATERALITY: ICD-10-CM

## 2019-03-08 DIAGNOSIS — E11.42 DIABETIC POLYNEUROPATHY ASSOCIATED WITH TYPE 2 DIABETES MELLITUS (HCC): Primary | ICD-10-CM

## 2019-03-08 PROCEDURE — 99213 OFFICE O/P EST LOW 20 MIN: CPT | Performed by: PODIATRIST

## 2019-03-08 NOTE — PROGRESS NOTES
Procedures   Foot Exam          Discussion/Summary   The patient was counseled regarding instructions for management,-- prognosis,-- patient and family education,-- risks and benefits of treatment options,-- importance of compliance with treatment     Patient is able to Self-Care     The treatment plan was reviewed with the patient/guardian  The patient/guardian understands and agrees with the treatment plan      Chief Complaint   Patient needs full diabetic foot exam   Patient has pain upon ambulation     History of Present Illness   HPI: Patient presents for pedal evaluation  Patient complains of pain in his feet and toes with ambulation   Patient is a morbidly obese diabetic who has some electric sensations in his feet on occasion       Review of Systems        Constitutional: not feeling tired       Eyes: no eyesight problems       ENT: no nasal discharge       Cardiovascular: no chest pain,-- no palpitations-- and-- no extremity edema       Respiratory: no shortness of breath-- and-- no cough       Gastrointestinal: no abdominal pain-- and-- no constipation       Genitourinary: no dysuria-- and-- no urinary hesitancy       Integumentary: no skin wound       Neurological: no tingling-- and-- no dizziness       Psychiatric: sleeping better, stopped taking trazodone, but-- no sleep disturbances       Endocrine: no feelings of weakness       Active Problems   1  Acquired pes planus (734) (M21 40)   2  Acute renal failure (584 9) (N17 9)   3  Adhesive capsulitis of both shoulders (726 0) (M75 01,M75 02)   4  Arthralgia (719 40) (M25 50)   5  BPH without urinary obstruction (600 00) (N40 0)   6  Bunion (727 1) (M21 619)   7  Callus (700) (L84)   7  HEIATLB systolic congestive heart failure (428 22,428 0) (I50 22)   9  Depression with anxiety (300 4) (F41 8)   10  Diabetes mellitus with neurological manifestation (250 60) (E11 49)   11  Diabetes type 2, uncontrolled (250 02) (E11 65)   12  Difficulty concentrating (799 51) (R41 840)   13  Difficulty walking (719 7) (R26 2)   14  Dyspnea on exertion (786 09) (R06 09)   15  Encounter for prostate cancer screening (V76 44) (Z12 5)   16  Encounter for screening for malignant neoplasm of colon (V76 51) (Z12 11)   17  Fatigue (780 79) (R53 83)   18  Foot pain, bilateral (729 5) (M79 671,M79 672)   19  Hallux valgus (735 0) (M20 10)   20  Hematuria (599 70) (R31 9)   21  Hyperlipidemia (272 4) (E78 5)   22  Hypertension (401 9) (I10)   23  Hypogonadism, male (257 2) (E29 1)   24  Insomnia (780 52) (G47 00)   25  Morbid or severe obesity due to excess calories (278 01) (E66 01)   26  Need for pneumococcal vaccination (V03 82) (Z23)   27  Need for prophylactic vaccination and inoculation against influenza (V04 81) (Z23)   28  Nephrolithiasis (592 0) (N20 0)   29  Onychomycosis (110 1) (B35 1)   30  JEFF (obstructive sleep apnea) (327 23) (G47 33)   31  Sciatica (724 3) (M54 30)   32  Screening for colon cancer (V76 51) (Z12 11)   33  Seasonal allergies (477 9) (J30 2)   34  Shoulder pain, right (719 41) (M25 511)   35  Spinal stenosis (724 00) (M48 00)   36  Tinea pedis (110 4) (B35 3)   37  Type 2 diabetes mellitus (250 00) (E11 9)   38  Vitamin D deficiency (268 9) (E55 9)     Past Medical History    · History of Cellulitis of left leg (682 6) (L03 116)   · History of chest pain (V13 89) (S98 453)   · History of diarrhea (V12 79) (S07 851)   · History of onychomycosis (V12 09) (Z86 19)   · History of onychomycosis (V12 09) (Z86 19)   · History of Limb pain (729 5) (M79 609)   · History of Neck pain (723 1) (M54 2)   · Need for pneumococcal vaccination (V03 82) (Z23)   · History of Nephrolithiasis (V13 01)   · History of Numbness On The Sole Of The Right Foot   · History of Pain and swelling of left lower leg (729 5,729 81) (M79 662,M79 89)   · History of Pain of lower extremity (729 5) (M79 606)   · History of Pain, hand joint, right (719 44) (M79 641)   · Sciatica (724 3) (M54 30)     The active problems and past medical history were reviewed and updated today       Surgical History    · History of Knee Arthroscopy (Therapeutic)   · History of Knee Surgery   · History of Needle Biopsy Of Prostate     Family History   Mother    · Family history of Alzheimer Disease   · Family history of Family Health Status Of Mother - Alive  Father    · Family history of Family Health Status Of Father -   Family History    · Family history of Adopted   · Denied: Family history of Colon Cancer   · Denied: Family history of Crohn's Disease   · Denied: Family history of Liver Cancer     Social History    · Denied: History of Alcohol Use (History)   · Current Some Day Smoker (305 1)   · Denied: History of Exercise Habits   · Marital History - Currently    · Tobacco use (305 1) (Z72 0)   · Working Full Time  The social history was reviewed and is unchanged       CThe medication list was reviewed and updated today       Allergies   1  No Known Drug Allergies     Vitals                                Physical Exam   Left Foot: Appearance: Normal except as noted: excessive pronation-- and-- pes planus  Great toe deformities include a bunion  Tenderness: None except the great toe-- and-- distal first metatarsal     Right Foot: Appearance: Normal except as noted: excessive pronation-- and-- pes planus  Great toe deformities include a bunion  Tenderness: None except the great toe-- and-- distal first metatarsal     Left Ankle: ROM: limited ROM in all planes    Right Ankle: ROM: limited ROM in all planes    Neurological Exam: performed  Light touch was decreased bilaterally  Vibratory sensation was decreased in both first metatarsophalangeal joints     Vascular Exam: performed Dorsalis pedis pulses were present bilaterally  Posterior tibial pulses were present bilaterally  Elevation Pallor: absent bilaterally  Dependence rubor was absent bilaterally  Edema: none     Toenails:  All of the toenails were elongated,-- hypertrophied,-- discolored-- and-- Ptotic  Both first toenails were tender-- and-- Positive onychogryphosis          Socks and shoes removed, Right Foot Findings: swollen, erythematous and dry       The sensory exam showed diminished vibratory sensation at the level of the toes  Diminished tactile sensation with monofilament testing throughout the right foot       Socks and shoes removed, Left Foot Findings: swollen, erythematous and dry       The sensory exam showed diminished vibratory sensation at the level of the toes  Diminished tactile sensation with monofilament testing throughout the left foot      Capillary refills findings on the right were normal in the toes       Pulses:      2+ in the posterior tibialis on the right      2+ in the dorsalis pedis on the right       Capillary refills findings on the left were normal in the toes       Pulses:      1+ in the posterior tibialis on the left      1+ in the dorsalis pedis on the left       Assign Risk Category: 2: Loss of protective sensation with or without weakness, deformity, callus, pre-ulcer, or history of ulceration  High risk     Hyperkeratosis: present on both first toes,-- present on both first sub metatarsals,-- present on both third sub metatarsals-- and-- Severe profound moccasin tinea pedis bilateral     Shoe Gear Evaluation: performed ()  Recommendation(s): SAS style       Procedure   The patient was educated on the diabetic foot and good glucose control  All mycotic nails debrided   Bilateral pre-ulcerative lesions debrided today without pain or complication

## 2019-03-14 ENCOUNTER — OFFICE VISIT (OUTPATIENT)
Dept: INTERNAL MEDICINE CLINIC | Facility: CLINIC | Age: 59
End: 2019-03-14
Payer: COMMERCIAL

## 2019-03-14 VITALS
HEIGHT: 71 IN | DIASTOLIC BLOOD PRESSURE: 80 MMHG | HEART RATE: 79 BPM | BODY MASS INDEX: 38.39 KG/M2 | OXYGEN SATURATION: 98 % | SYSTOLIC BLOOD PRESSURE: 130 MMHG | TEMPERATURE: 97.4 F | WEIGHT: 274.2 LBS | RESPIRATION RATE: 18 BRPM

## 2019-03-14 DIAGNOSIS — R53.83 OTHER FATIGUE: ICD-10-CM

## 2019-03-14 DIAGNOSIS — E78.2 MIXED HYPERLIPIDEMIA: ICD-10-CM

## 2019-03-14 DIAGNOSIS — E66.01 MORBID OBESITY WITH BODY MASS INDEX (BMI) OF 40.0 TO 44.9 IN ADULT (HCC): ICD-10-CM

## 2019-03-14 DIAGNOSIS — E11.42 DIABETIC POLYNEUROPATHY ASSOCIATED WITH TYPE 2 DIABETES MELLITUS (HCC): Primary | ICD-10-CM

## 2019-03-14 DIAGNOSIS — I10 ESSENTIAL HYPERTENSION: ICD-10-CM

## 2019-03-14 DIAGNOSIS — G47.33 OSA ON CPAP: ICD-10-CM

## 2019-03-14 DIAGNOSIS — Z11.59 NEED FOR HEPATITIS C SCREENING TEST: ICD-10-CM

## 2019-03-14 DIAGNOSIS — Z99.89 OSA ON CPAP: ICD-10-CM

## 2019-03-14 DIAGNOSIS — F41.8 DEPRESSION WITH ANXIETY: ICD-10-CM

## 2019-03-14 PROCEDURE — 3079F DIAST BP 80-89 MM HG: CPT | Performed by: INTERNAL MEDICINE

## 2019-03-14 PROCEDURE — 3075F SYST BP GE 130 - 139MM HG: CPT | Performed by: INTERNAL MEDICINE

## 2019-03-14 PROCEDURE — 99214 OFFICE O/P EST MOD 30 MIN: CPT | Performed by: INTERNAL MEDICINE

## 2019-03-14 PROCEDURE — 3008F BODY MASS INDEX DOCD: CPT | Performed by: INTERNAL MEDICINE

## 2019-03-14 NOTE — ASSESSMENT & PLAN NOTE
BP well controlled, monitory for symptoms, may be able to decrease amlodipine  Also on enalapril bid and clonidine bid

## 2019-03-14 NOTE — PATIENT INSTRUCTIONS
Schedule eye exam     Once weekly vitamin D done (12 weeks), start vitamin D3 2000 units daily  Apply petroleum jelly on dry skin  May use lotion as needed

## 2019-03-14 NOTE — PROGRESS NOTES
Assessment/Plan:    Calculus of gallbladder with chronic cholecystitis without obstruction  S/p cholecystectomy  Doing well, no symptoms  Diabetic polyneuropathy associated with type 2 diabetes mellitus ([facility] 75 )  Lab Results   Component Value Date    HGBA1C 7 6 (H) 01/16/2019     Doing well  On Januvia, glimepiride, Trulicity and Jardiance  (+) microalb, on enalapril  Eye exam due  Essential hypertension  BP well controlled, monitory for symptoms, may be able to decrease amlodipine  Also on enalapril bid and clonidine bid  Morbid obesity with body mass index (BMI) of 40 0 to 44 9 in [facility])  Lost another 5 lbs, lost about 28 lbs since 6 months ago  Limit soda intake, start daily walks  BPH without urinary obstruction  On Flomax  Depression with anxiety  Stable, on sertraline and bupropion  JEFF on CPAP  Using CPAP qHS  Wakes up at least once at night  Mixed hyperlipidemia  Lipids at goal, not on medication  Diagnoses and all orders for this visit:    Diabetic polyneuropathy associated with type 2 diabetes mellitus ([place] [address] )  -     CBC and differential; Future  -     Hemoglobin A1C; Future  -     Comprehensive metabolic panel; Future    Other fatigue  -     CBC and differential; Future    JEFF on CPAP    Essential hypertension  -     CBC and differential; Future  -     Comprehensive metabolic panel; Future    Mixed hyperlipidemia    Morbid obesity with body mass index (BMI) of 40 0 to 44 9 in [facility])    Depression with anxiety    Need for hepatitis C screening test  -     Hepatitis C antibody; Future      Follow up in 4 months or as needed  Subjective:      Patient ID: Deandra Riddle is a 62 y o  male  Mr Gonzalo Wood feels well  Reports that he recovered well after his gallbladder surgery  He took a week off from work then return back to work full time  He reports the scab has not fallen out yet    He denies any abdominal pain, discomfort, cramping, no nausea or vomiting  He is apprised to have lost weight since his appetite returned after the surgery  He admits drinking occasional soda, has not been drinking a liter at a time  He does not exercise regularly  He continues to experience frequent fatigue, would wake up at least once at night which she blames on the dog  He uses his CPAP regularly  He denies any chest pain, shortness of breath or other symptoms  The following portions of the patient's history were reviewed and updated as appropriate: allergies, current medications, past medical history, past social history and problem list     Review of Systems   Constitutional: Negative for appetite change and fatigue  HENT: Negative for congestion, hearing loss and postnasal drip  Eyes: Negative  Respiratory: Negative for cough, chest tightness and shortness of breath  Cardiovascular: Negative for chest pain, palpitations and leg swelling  Gastrointestinal: Negative for abdominal pain and constipation  Genitourinary: Negative for dysuria, frequency and urgency  Musculoskeletal: Negative for arthralgias  Skin: Negative for rash and wound  Neurological: Negative for dizziness, numbness and headaches  Hematological: Negative for adenopathy  Does not bruise/bleed easily  Psychiatric/Behavioral: Negative for sleep disturbance  The patient is not nervous/anxious  Objective:      /80   Pulse 79   Temp (!) 97 4 °F (36 3 °C) (Oral)   Resp 18   Ht 5' 10 5" (1 791 m)   Wt 124 kg (274 lb 3 2 oz)   SpO2 98%   BMI 38 79 kg/m²          Physical Exam   Constitutional: He is oriented to person, place, and time  He appears well-developed and well-nourished  HENT:   Head: Normocephalic and atraumatic  Mouth/Throat: Mucous membranes are normal    Eyes: Pupils are equal, round, and reactive to light  Conjunctivae are normal    Neck: Neck supple  Cardiovascular: Normal rate, regular rhythm and normal heart sounds  Pulmonary/Chest: Effort normal  He has no wheezes  He has no rhonchi  Abdominal: Soft  Bowel sounds are normal    Musculoskeletal: He exhibits no edema  Neurological: He is alert and oriented to person, place, and time  Skin: Skin is warm  No rash noted  Psychiatric: He has a normal mood and affect  His behavior is normal    Nursing note and vitals reviewed  Lab &/or imaging results reviewed with patient

## 2019-04-30 DIAGNOSIS — F33.1 MODERATE EPISODE OF RECURRENT MAJOR DEPRESSIVE DISORDER (HCC): ICD-10-CM

## 2019-05-01 RX ORDER — BUPROPION HYDROCHLORIDE 150 MG/1
TABLET ORAL
Qty: 90 TABLET | Refills: 1 | Status: SHIPPED | OUTPATIENT
Start: 2019-05-01 | End: 2019-10-12 | Stop reason: SDUPTHER

## 2019-05-10 ENCOUNTER — OFFICE VISIT (OUTPATIENT)
Dept: PODIATRY | Facility: CLINIC | Age: 59
End: 2019-05-10
Payer: COMMERCIAL

## 2019-05-10 VITALS
BODY MASS INDEX: 38.36 KG/M2 | WEIGHT: 274 LBS | HEIGHT: 71 IN | DIASTOLIC BLOOD PRESSURE: 80 MMHG | SYSTOLIC BLOOD PRESSURE: 130 MMHG

## 2019-05-10 DIAGNOSIS — L84 CORNS: ICD-10-CM

## 2019-05-10 DIAGNOSIS — M79.671 PAIN IN BOTH FEET: ICD-10-CM

## 2019-05-10 DIAGNOSIS — M79.672 PAIN IN BOTH FEET: ICD-10-CM

## 2019-05-10 DIAGNOSIS — E11.42 DIABETIC POLYNEUROPATHY ASSOCIATED WITH TYPE 2 DIABETES MELLITUS (HCC): Primary | ICD-10-CM

## 2019-05-10 DIAGNOSIS — B35.1 ONYCHOMYCOSIS: ICD-10-CM

## 2019-05-10 DIAGNOSIS — M76.71 PERONEAL TENDINITIS OF RIGHT LOWER EXTREMITY: ICD-10-CM

## 2019-05-10 DIAGNOSIS — M77.42 METATARSALGIA OF BOTH FEET: ICD-10-CM

## 2019-05-10 DIAGNOSIS — M77.41 METATARSALGIA OF BOTH FEET: ICD-10-CM

## 2019-05-10 PROCEDURE — 20550 NJX 1 TENDON SHEATH/LIGAMENT: CPT | Performed by: PODIATRIST

## 2019-05-10 PROCEDURE — 99213 OFFICE O/P EST LOW 20 MIN: CPT | Performed by: PODIATRIST

## 2019-05-31 LAB
LEFT EYE DIABETIC RETINOPATHY: NORMAL
RIGHT EYE DIABETIC RETINOPATHY: NORMAL

## 2019-07-02 DIAGNOSIS — E11.40 TYPE 2 DIABETES MELLITUS WITH DIABETIC NEUROPATHY, WITHOUT LONG-TERM CURRENT USE OF INSULIN (HCC): ICD-10-CM

## 2019-07-02 RX ORDER — DULAGLUTIDE 1.5 MG/.5ML
INJECTION, SOLUTION SUBCUTANEOUS
Qty: 6 ML | Refills: 1 | Status: SHIPPED | OUTPATIENT
Start: 2019-07-02 | End: 2019-12-03 | Stop reason: SDUPTHER

## 2019-07-12 ENCOUNTER — OFFICE VISIT (OUTPATIENT)
Dept: PODIATRY | Facility: CLINIC | Age: 59
End: 2019-07-12
Payer: COMMERCIAL

## 2019-07-12 VITALS — WEIGHT: 274 LBS | RESPIRATION RATE: 17 BRPM | HEIGHT: 71 IN | BODY MASS INDEX: 38.36 KG/M2

## 2019-07-12 DIAGNOSIS — E11.40 TYPE 2 DIABETES MELLITUS WITH DIABETIC NEUROPATHY, WITHOUT LONG-TERM CURRENT USE OF INSULIN (HCC): Primary | ICD-10-CM

## 2019-07-12 DIAGNOSIS — M20.12 HALLUX VALGUS OF LEFT FOOT: ICD-10-CM

## 2019-07-12 DIAGNOSIS — M77.42 METATARSALGIA OF BOTH FEET: ICD-10-CM

## 2019-07-12 DIAGNOSIS — M79.672 PAIN IN BOTH FEET: ICD-10-CM

## 2019-07-12 DIAGNOSIS — M79.671 PAIN IN BOTH FEET: ICD-10-CM

## 2019-07-12 DIAGNOSIS — M77.41 METATARSALGIA OF BOTH FEET: ICD-10-CM

## 2019-07-12 DIAGNOSIS — M21.969 ACQUIRED DEFORMITY OF FOOT, UNSPECIFIED LATERALITY: ICD-10-CM

## 2019-07-12 PROCEDURE — 99213 OFFICE O/P EST LOW 20 MIN: CPT | Performed by: PODIATRIST

## 2019-07-12 NOTE — PROGRESS NOTES
Assessment  Pain upon ambulation  Peripheral artery disease  Diabetic neuropathy  Callus formation  Mycosis of nail  Peroneal tendinitis right foot      Plan  Foot exam performed  Nails debrided  Calluses debrided  Patient will stretch daily  Today, 1 cc Kenalog 10 injected area of peroneus brevis tendon course around the lateral malleolus         Chief Complaint   Patient needs full diabetic foot exam   Patient has pain upon ambulation     History of Present Illness   HPI: Patient presents for pedal evaluation  Patient complains of pain in his feet and toes with ambulation   Patient is a morbidly obese diabetic who has some electric sensations in his feet on occasion       Review of Systems        Constitutional: not feeling tired       Eyes: no eyesight problems       ENT: no nasal discharge       Cardiovascular: no chest pain,-- no palpitations-- and-- no extremity edema       Respiratory: no shortness of breath-- and-- no cough       Gastrointestinal: no abdominal pain-- and-- no constipation       Genitourinary: no dysuria-- and-- no urinary hesitancy       Integumentary: no skin wound       Neurological: no tingling-- and-- no dizziness       Psychiatric: sleeping better, stopped taking trazodone, but-- no sleep disturbances       Endocrine: no feelings of weakness       Active Problems   1  Acquired pes planus (734) (M21 40)   2  Acute renal failure (584 9) (N17 9)   3  Adhesive capsulitis of both shoulders (726 0) (M75 01,M75 02)   4  Arthralgia (719 40) (M25 50)   5  BPH without urinary obstruction (600 00) (N40 0)   6  Bunion (727 1) (M21 619)   7  Callus (700) (L84)   5  CULMWNU systolic congestive heart failure (428 22,428 0) (I50 22)   9  Depression with anxiety (300 4) (F41 8)   10  Diabetes mellitus with neurological manifestation (250 60) (E11 49)   11  Diabetes type 2, uncontrolled (250 02) (E11 65)   12  Difficulty concentrating (799 51) (R41 840)   13  Difficulty walking (719 7) (R26 2)   14  Dyspnea on exertion (786 09) (R06 09)   15  Encounter for prostate cancer screening (V76 44) (Z12 5)   16  Encounter for screening for malignant neoplasm of colon (V76 51) (Z12 11)   17  Fatigue (780 79) (R53 83)   18  Foot pain, bilateral (729 5) (M79 671,M79 672)   19  Hallux valgus (735 0) (M20 10)   20  Hematuria (599 70) (R31 9)   21  Hyperlipidemia (272 4) (E78 5)   22  Hypertension (401 9) (I10)   23  Hypogonadism, male (257 2) (E29 1)   24  Insomnia (780 52) (G47 00)   25  Morbid or severe obesity due to excess calories (278 01) (E66 01)   26  Need for pneumococcal vaccination (V03 82) (Z23)   27  Need for prophylactic vaccination and inoculation against influenza (V04 81) (Z23)   28  Nephrolithiasis (592 0) (N20 0)   29  Onychomycosis (110 1) (B35 1)   30  JEFF (obstructive sleep apnea) (327 23) (G47 33)   31  Sciatica (724 3) (M54 30)   32  Screening for colon cancer (V76 51) (Z12 11)   33  Seasonal allergies (477 9) (J30 2)   34  Shoulder pain, right (719 41) (M25 511)   35  Spinal stenosis (724 00) (M48 00)   36  Tinea pedis (110 4) (B35 3)   37  Type 2 diabetes mellitus (250 00) (E11 9)   38  Vitamin D deficiency (268 9) (E55 9)     Past Medical History    · History of Cellulitis of left leg (682 6) (L03 116)   · History of chest pain (V13 89) (W06 533)   · History of diarrhea (V12 79) (C82 061)   · History of onychomycosis (V12 09) (Z86 19)   · History of onychomycosis (V12 09) (Z86 19)   · History of Limb pain (729 5) (M79 609)   · History of Neck pain (723 1) (M54 2)   · Need for pneumococcal vaccination (V03 82) (Z23)   · History of Nephrolithiasis (V13 01)   · History of Numbness On The Sole Of The Right Foot   · History of Pain and swelling of left lower leg (729 5,729 81) (M79 662,M79 89)   · History of Pain of lower extremity (729 5) (M79 606)   · History of Pain, hand joint, right (719 44) (M79 641)   · Sciatica (724 3) (M54 30)     The active problems and past medical history were reviewed and updated today       Surgical History    · History of Knee Arthroscopy (Therapeutic)   · History of Knee Surgery   · History of Needle Biopsy Of Prostate     Family History   Mother    · Family history of Alzheimer Disease   · Family history of Family Health Status Of Mother - Alive  Father    · Family history of Family Health Status Of Father -   Family History    · Family history of Adopted   · Denied: Family history of Colon Cancer   · Denied: Family history of Crohn's Disease   · Denied: Family history of Liver Cancer     Social History    · Denied: History of Alcohol Use (History)   · Current Some Day Smoker (305 1)   · Denied: History of Exercise Habits   · Marital History - Currently    · Tobacco use (305 1) (Z72 0)   · Working Full Time  The social history was reviewed and is unchanged       CThe medication list was reviewed and updated today       Allergies   1  No Known Drug Allergies   Physical Exam   Left Foot: Appearance: Normal except as noted: excessive pronation-- and-- pes planus  Great toe deformities include a bunion  Tenderness: None except the great toe-- and-- distal first metatarsal     Right Foot: Appearance: Normal except as noted: excessive pronation-- and-- pes planus  Great toe deformities include a bunion  Tenderness: None except the great toe-- and-- distal first metatarsal     Left Ankle: ROM: limited ROM in all planes    Right Ankle: ROM: limited ROM in all planes    Neurological Exam: performed  Light touch was decreased bilaterally  Vibratory sensation was decreased in both first metatarsophalangeal joints     Vascular Exam: performed Dorsalis pedis pulses were present bilaterally  Posterior tibial pulses were present bilaterally  Elevation Pallor: absent bilaterally  Dependence rubor was absent bilaterally  Edema: none     Toenails: All of the toenails were elongated,-- hypertrophied,-- discolored-- and-- Ptotic   Both first toenails were tender-- and-- Positive onychogryphosis          Socks and shoes removed, Right Foot Findings: swollen, erythematous and dry       The sensory exam showed diminished vibratory sensation at the level of the toes  Diminished tactile sensation with monofilament testing throughout the right foot       Socks and shoes removed, Left Foot Findings: swollen, erythematous and dry       The sensory exam showed diminished vibratory sensation at the level of the toes  Diminished tactile sensation with monofilament testing throughout the left foot      Capillary refills findings on the right were normal in the toes       Pulses:      2+ in the posterior tibialis on the right      2+ in the dorsalis pedis on the right       Capillary refills findings on the left were normal in the toes       Pulses:      1+ in the posterior tibialis on the left      1+ in the dorsalis pedis on the left       Assign Risk Category: 2: Loss of protective sensation with or without weakness, deformity, callus, pre-ulcer, or history of ulceration  High risk     Hyperkeratosis: present on both first toes,-- present on both first sub metatarsals,-- present on both third sub metatarsals-- and-- Severe profound moccasin tinea pedis bilateral     Shoe Gear Evaluation: performed ()  Recommendation(s): SAS style       Procedure   The patient was educated on the diabetic foot and good glucose control  All mycotic nails debrided   Bilateral pre-ulcerative lesions debrided today without pain or complication

## 2019-07-17 ENCOUNTER — OFFICE VISIT (OUTPATIENT)
Dept: INTERNAL MEDICINE CLINIC | Facility: CLINIC | Age: 59
End: 2019-07-17
Payer: COMMERCIAL

## 2019-07-17 ENCOUNTER — TRANSCRIBE ORDERS (OUTPATIENT)
Dept: LAB | Facility: CLINIC | Age: 59
End: 2019-07-17

## 2019-07-17 ENCOUNTER — APPOINTMENT (OUTPATIENT)
Dept: LAB | Facility: CLINIC | Age: 59
End: 2019-07-17
Payer: COMMERCIAL

## 2019-07-17 VITALS
RESPIRATION RATE: 18 BRPM | BODY MASS INDEX: 38.64 KG/M2 | HEIGHT: 71 IN | SYSTOLIC BLOOD PRESSURE: 132 MMHG | HEART RATE: 68 BPM | WEIGHT: 276 LBS | OXYGEN SATURATION: 98 % | DIASTOLIC BLOOD PRESSURE: 82 MMHG | TEMPERATURE: 97.9 F

## 2019-07-17 DIAGNOSIS — E78.2 MIXED HYPERLIPIDEMIA: ICD-10-CM

## 2019-07-17 DIAGNOSIS — E11.40 TYPE 2 DIABETES MELLITUS WITH DIABETIC NEUROPATHY, WITHOUT LONG-TERM CURRENT USE OF INSULIN (HCC): Primary | ICD-10-CM

## 2019-07-17 DIAGNOSIS — I10 ESSENTIAL HYPERTENSION: ICD-10-CM

## 2019-07-17 DIAGNOSIS — E11.42 DIABETIC POLYNEUROPATHY ASSOCIATED WITH TYPE 2 DIABETES MELLITUS (HCC): ICD-10-CM

## 2019-07-17 DIAGNOSIS — F41.8 DEPRESSION WITH ANXIETY: ICD-10-CM

## 2019-07-17 DIAGNOSIS — Z11.59 NEED FOR HEPATITIS C SCREENING TEST: ICD-10-CM

## 2019-07-17 DIAGNOSIS — R53.83 OTHER FATIGUE: ICD-10-CM

## 2019-07-17 LAB
ALBUMIN SERPL BCP-MCNC: 3.9 G/DL (ref 3.5–5)
ALP SERPL-CCNC: 94 U/L (ref 46–116)
ALT SERPL W P-5'-P-CCNC: 30 U/L (ref 12–78)
ANION GAP SERPL CALCULATED.3IONS-SCNC: 9 MMOL/L (ref 4–13)
AST SERPL W P-5'-P-CCNC: 12 U/L (ref 5–45)
BASOPHILS # BLD AUTO: 0.09 THOUSANDS/ΜL (ref 0–0.1)
BASOPHILS NFR BLD AUTO: 1 % (ref 0–1)
BILIRUB SERPL-MCNC: 1.1 MG/DL (ref 0.2–1)
BUN SERPL-MCNC: 18 MG/DL (ref 5–25)
CALCIUM SERPL-MCNC: 8.7 MG/DL (ref 8.3–10.1)
CHLORIDE SERPL-SCNC: 105 MMOL/L (ref 100–108)
CO2 SERPL-SCNC: 25 MMOL/L (ref 21–32)
CREAT SERPL-MCNC: 1.4 MG/DL (ref 0.6–1.3)
EOSINOPHIL # BLD AUTO: 0.29 THOUSAND/ΜL (ref 0–0.61)
EOSINOPHIL NFR BLD AUTO: 3 % (ref 0–6)
ERYTHROCYTE [DISTWIDTH] IN BLOOD BY AUTOMATED COUNT: 13.2 % (ref 11.6–15.1)
EST. AVERAGE GLUCOSE BLD GHB EST-MCNC: 160 MG/DL
GFR SERPL CREATININE-BSD FRML MDRD: 55 ML/MIN/1.73SQ M
GLUCOSE P FAST SERPL-MCNC: 123 MG/DL (ref 65–99)
HBA1C MFR BLD: 7.2 % (ref 4.2–6.3)
HCT VFR BLD AUTO: 51.4 % (ref 36.5–49.3)
HGB BLD-MCNC: 17 G/DL (ref 12–17)
IMM GRANULOCYTES # BLD AUTO: 0.03 THOUSAND/UL (ref 0–0.2)
IMM GRANULOCYTES NFR BLD AUTO: 0 % (ref 0–2)
LYMPHOCYTES # BLD AUTO: 2.17 THOUSANDS/ΜL (ref 0.6–4.47)
LYMPHOCYTES NFR BLD AUTO: 25 % (ref 14–44)
MCH RBC QN AUTO: 30.9 PG (ref 26.8–34.3)
MCHC RBC AUTO-ENTMCNC: 33.1 G/DL (ref 31.4–37.4)
MCV RBC AUTO: 93 FL (ref 82–98)
MONOCYTES # BLD AUTO: 0.71 THOUSAND/ΜL (ref 0.17–1.22)
MONOCYTES NFR BLD AUTO: 8 % (ref 4–12)
NEUTROPHILS # BLD AUTO: 5.47 THOUSANDS/ΜL (ref 1.85–7.62)
NEUTS SEG NFR BLD AUTO: 63 % (ref 43–75)
NRBC BLD AUTO-RTO: 0 /100 WBCS
PLATELET # BLD AUTO: 324 THOUSANDS/UL (ref 149–390)
PMV BLD AUTO: 9.1 FL (ref 8.9–12.7)
POTASSIUM SERPL-SCNC: 4.3 MMOL/L (ref 3.5–5.3)
PROT SERPL-MCNC: 7.9 G/DL (ref 6.4–8.2)
RBC # BLD AUTO: 5.51 MILLION/UL (ref 3.88–5.62)
SODIUM SERPL-SCNC: 139 MMOL/L (ref 136–145)
WBC # BLD AUTO: 8.76 THOUSAND/UL (ref 4.31–10.16)

## 2019-07-17 PROCEDURE — 3008F BODY MASS INDEX DOCD: CPT | Performed by: INTERNAL MEDICINE

## 2019-07-17 PROCEDURE — 99214 OFFICE O/P EST MOD 30 MIN: CPT | Performed by: INTERNAL MEDICINE

## 2019-07-17 PROCEDURE — 36415 COLL VENOUS BLD VENIPUNCTURE: CPT

## 2019-07-17 PROCEDURE — 80053 COMPREHEN METABOLIC PANEL: CPT

## 2019-07-17 PROCEDURE — 3075F SYST BP GE 130 - 139MM HG: CPT | Performed by: INTERNAL MEDICINE

## 2019-07-17 PROCEDURE — 86803 HEPATITIS C AB TEST: CPT

## 2019-07-17 PROCEDURE — 83036 HEMOGLOBIN GLYCOSYLATED A1C: CPT

## 2019-07-17 PROCEDURE — 85025 COMPLETE CBC W/AUTO DIFF WBC: CPT

## 2019-07-17 NOTE — PROGRESS NOTES
Assessment/Plan:    Diabetic polyneuropathy associated with type 2 diabetes mellitus (HCC)  A1c due  Consider stopping Januvia, maximized on Truliciy, Jardiance and glimepiride  Eye exam updated  On ACE  Essential hypertension  BP stable, on amlodipine, enalapril and clonidine bid  Morbid obesity with body mass index (BMI) of 40 0 to 44 9 in adult ([facility] 75 )  No weight change since last visit  Encouraged to do more outdoor activities  Depression with anxiety  Stable, on sertraline and bupropion  JEFF on CPAP  Using CPAP  Fatigue  Stable  BPH without urinary obstruction  On Flomax  Diagnoses and all orders for this visit:    Type 2 diabetes mellitus with diabetic neuropathy, without long-term current use of insulin ([facility] 75 )    Depression with anxiety    Essential hypertension    Mixed hyperlipidemia    Diabetic polyneuropathy associated with type 2 diabetes mellitus ([facility] 75 )      Follow up in 4 months or as needed  Subjective:      Patient ID: Dalila Munson is a 61 y o  male  Mr Emelyn Bell is feeling well  He reports not feeling as tired like before  He has been working through his lunch hour  He would sleep about once a week during his break  He catches up with sleep during the weekend, would sleep up to 10 hours in a day  He has been taking Metamucil twice a day  He denies any abdominal pain, cramping or bloating  He does not check his sugars, takes all his medications  The following portions of the patient's history were reviewed and updated as appropriate: allergies, current medications, past medical history, past social history and problem list     Review of Systems   Constitutional: Negative for appetite change and fatigue  HENT: Negative for congestion, hearing loss and postnasal drip  Eyes: Negative  Respiratory: Negative for cough, chest tightness and shortness of breath  Cardiovascular: Negative for chest pain, palpitations and leg swelling     Gastrointestinal: Negative for abdominal pain and constipation  Genitourinary: Negative for dysuria and frequency  Musculoskeletal: Negative for arthralgias and gait problem  Skin: Negative for rash and wound  Neurological: Negative for dizziness, numbness and headaches  Psychiatric/Behavioral: Negative for decreased concentration, dysphoric mood and sleep disturbance  The patient is not nervous/anxious  Objective:      /82   Pulse 68   Temp 97 9 °F (36 6 °C)   Resp 18   Ht 5' 10 5" (1 791 m)   Wt 125 kg (276 lb)   SpO2 98%   BMI 39 04 kg/m²          Physical Exam   Constitutional: He is oriented to person, place, and time  He appears well-developed and well-nourished  HENT:   Head: Normocephalic and atraumatic  Mouth/Throat: Mucous membranes are normal    Eyes: Pupils are equal, round, and reactive to light  Conjunctivae are normal    Neck: Neck supple  Cardiovascular: Normal rate, regular rhythm and normal heart sounds  Pulmonary/Chest: Effort normal  He has no wheezes  He has no rhonchi  Abdominal: Soft  Bowel sounds are normal    Musculoskeletal: He exhibits no edema  Neurological: He is alert and oriented to person, place, and time  Skin: Skin is warm  No rash noted  Psychiatric: He has a normal mood and affect  His behavior is normal    Nursing note and vitals reviewed  Lab results reviewed with patient  BMI Counseling: Body mass index is 39 04 kg/m²  Discussed the patient's BMI with him  The BMI is above average  BMI counseling and education was provided to the patient  Nutrition recommendations include reducing fast food intake, consuming healthier snacks and decreasing soda and/or juice intake  Exercise recommendations include exercising 3-5 times per week

## 2019-07-17 NOTE — ASSESSMENT & PLAN NOTE
A1c due  Consider stopping Januvia, maximized on Truliciy, Jardiance and glimepiride  Eye exam updated  On ACE

## 2019-07-18 LAB — HCV AB SER QL: NORMAL

## 2019-07-19 ENCOUNTER — TELEPHONE (OUTPATIENT)
Dept: INTERNAL MEDICINE CLINIC | Facility: CLINIC | Age: 59
End: 2019-07-19

## 2019-07-19 NOTE — TELEPHONE ENCOUNTER
Sugars are better, A1c is 7 2%  Recommend to take 1/2 tablet of Januvia (50 mg instead of full 100 mg) daily  Continue rest of medications  Kidney function stable, rest of labs ok

## 2019-07-29 DIAGNOSIS — I10 ESSENTIAL HYPERTENSION: ICD-10-CM

## 2019-07-29 DIAGNOSIS — N40.0 BPH WITHOUT URINARY OBSTRUCTION: ICD-10-CM

## 2019-07-29 RX ORDER — AMLODIPINE BESYLATE 10 MG/1
TABLET ORAL
Qty: 90 TABLET | Refills: 1 | Status: SHIPPED | OUTPATIENT
Start: 2019-07-29 | End: 2019-10-08 | Stop reason: SDUPTHER

## 2019-07-29 RX ORDER — TAMSULOSIN HYDROCHLORIDE 0.4 MG/1
CAPSULE ORAL
Qty: 90 CAPSULE | Refills: 1 | Status: SHIPPED | OUTPATIENT
Start: 2019-07-29 | End: 2019-10-08 | Stop reason: SDUPTHER

## 2019-07-31 DIAGNOSIS — E11.40 TYPE 2 DIABETES MELLITUS WITH DIABETIC NEUROPATHY, WITHOUT LONG-TERM CURRENT USE OF INSULIN (HCC): ICD-10-CM

## 2019-09-12 DIAGNOSIS — E11.40 TYPE 2 DIABETES MELLITUS WITH DIABETIC NEUROPATHY, WITHOUT LONG-TERM CURRENT USE OF INSULIN (HCC): ICD-10-CM

## 2019-09-12 DIAGNOSIS — I10 ESSENTIAL HYPERTENSION: ICD-10-CM

## 2019-09-12 RX ORDER — ENALAPRIL MALEATE 20 MG/1
20 TABLET ORAL 2 TIMES DAILY
Qty: 180 TABLET | Refills: 1 | Status: SHIPPED | OUTPATIENT
Start: 2019-09-12 | End: 2020-03-20 | Stop reason: SDUPTHER

## 2019-09-17 ENCOUNTER — TELEPHONE (OUTPATIENT)
Dept: INTERNAL MEDICINE CLINIC | Facility: CLINIC | Age: 59
End: 2019-09-17

## 2019-09-17 NOTE — TELEPHONE ENCOUNTER
Please remind px to do bladder ultrasound, as a follow up from the one done earlier this year  Can give # to schedule, ordered already

## 2019-09-20 ENCOUNTER — OFFICE VISIT (OUTPATIENT)
Dept: PODIATRY | Facility: CLINIC | Age: 59
End: 2019-09-20
Payer: COMMERCIAL

## 2019-09-20 VITALS — WEIGHT: 276 LBS | RESPIRATION RATE: 17 BRPM | BODY MASS INDEX: 38.64 KG/M2 | HEIGHT: 71 IN

## 2019-09-20 DIAGNOSIS — M79.671 PAIN IN BOTH FEET: ICD-10-CM

## 2019-09-20 DIAGNOSIS — M21.969 ACQUIRED DEFORMITY OF FOOT, UNSPECIFIED LATERALITY: ICD-10-CM

## 2019-09-20 DIAGNOSIS — E11.42 DIABETIC POLYNEUROPATHY ASSOCIATED WITH TYPE 2 DIABETES MELLITUS (HCC): Primary | ICD-10-CM

## 2019-09-20 DIAGNOSIS — L84 CORNS: ICD-10-CM

## 2019-09-20 DIAGNOSIS — M79.672 PAIN IN BOTH FEET: ICD-10-CM

## 2019-09-20 DIAGNOSIS — B35.1 ONYCHOMYCOSIS: ICD-10-CM

## 2019-09-20 PROCEDURE — 99213 OFFICE O/P EST LOW 20 MIN: CPT | Performed by: PODIATRIST

## 2019-09-20 NOTE — PROGRESS NOTES
Assessment   Pain upon ambulation   Peripheral artery disease   Diabetic neuropathy   Callus formation   Mycosis of nail   Peroneal tendinitis right foot      Plan   Foot exam performed   Nails debrided   Calluses debrided   Patient will stretch daily   Procedures performed without pain or complication         Chief Complaint   Patient needs full diabetic foot exam   Patient has pain upon ambulation     History of Present Illness   HPI: Patient presents for pedal evaluation  Patient complains of pain in his feet and toes with ambulation   Patient is a morbidly obese diabetic who has some electric sensations in his feet on occasion       Review of Systems        Constitutional: not feeling tired       Eyes: no eyesight problems       ENT: no nasal discharge       Cardiovascular: no chest pain,-- no palpitations-- and-- no extremity edema       Respiratory: no shortness of breath-- and-- no cough       Gastrointestinal: no abdominal pain-- and-- no constipation       Genitourinary: no dysuria-- and-- no urinary hesitancy       Integumentary: no skin wound       Neurological: no tingling-- and-- no dizziness       Psychiatric: sleeping better, stopped taking trazodone, but-- no sleep disturbances       Endocrine: no feelings of weakness       Active Problems   1  Acquired pes planus (734) (M21 40)   2  Acute renal failure (584 9) (N17 9)   3  Adhesive capsulitis of both shoulders (726 0) (M75 01,M75 02)   4  Arthralgia (719 40) (M25 50)   5  BPH without urinary obstruction (600 00) (N40 0)   6  Bunion (727 1) (M21 619)   7  Callus (700) (L84)   5  VCXXNKI systolic congestive heart failure (428 22,428 0) (I50 22)   9  Depression with anxiety (300 4) (F41 8)   10  Diabetes mellitus with neurological manifestation (250 60) (E11 49)   11  Diabetes type 2, uncontrolled (250 02) (E11 65)   12  Difficulty concentrating (799 51) (R41 840)   13  Difficulty walking (719 7) (R26 2)   14  Dyspnea on exertion (786 09) (R06 09)   15  Encounter for prostate cancer screening (V76 44) (Z12 5)   16  Encounter for screening for malignant neoplasm of colon (V76 51) (Z12 11)   17  Fatigue (780 79) (R53 83)   18  Foot pain, bilateral (729 5) (M79 671,M79 672)   19  Hallux valgus (735 0) (M20 10)   20  Hematuria (599 70) (R31 9)   21  Hyperlipidemia (272 4) (E78 5)   22  Hypertension (401 9) (I10)   23  Hypogonadism, male (257 2) (E29 1)   24  Insomnia (780 52) (G47 00)   25  Morbid or severe obesity due to excess calories (278 01) (E66 01)   26  Need for pneumococcal vaccination (V03 82) (Z23)   27  Need for prophylactic vaccination and inoculation against influenza (V04 81) (Z23)   28  Nephrolithiasis (592 0) (N20 0)   29  Onychomycosis (110 1) (B35 1)   30  JEFF (obstructive sleep apnea) (327 23) (G47 33)   31  Sciatica (724 3) (M54 30)   32  Screening for colon cancer (V76 51) (Z12 11)   33  Seasonal allergies (477 9) (J30 2)   34  Shoulder pain, right (719 41) (M25 511)   35  Spinal stenosis (724 00) (M48 00)   36  Tinea pedis (110 4) (B35 3)   37  Type 2 diabetes mellitus (250 00) (E11 9)   38  Vitamin D deficiency (268 9) (E55 9)     Past Medical History    · History of Cellulitis of left leg (682 6) (L03 116)   · History of chest pain (V13 89) (G82 346)   · History of diarrhea (V12 79) (B50 741)   · History of onychomycosis (V12 09) (Z86 19)   · History of onychomycosis (V12 09) (Z86 19)   · History of Limb pain (729 5) (M79 609)   · History of Neck pain (723 1) (M54 2)   · Need for pneumococcal vaccination (V03 82) (Z23)   · History of Nephrolithiasis (V13 01)   · History of Numbness On The Sole Of The Right Foot   · History of Pain and swelling of left lower leg (729 5,729 81) (M79 662,M79 89)   · History of Pain of lower extremity (729 5) (M79 606)   · History of Pain, hand joint, right (719 44) (M79 641)   · Sciatica (724 3) (M54 30)     The active problems and past medical history were reviewed and updated today       Surgical History    · History of Knee Arthroscopy (Therapeutic)   · History of Knee Surgery   · History of Needle Biopsy Of Prostate     Family History   Mother    · Family history of Alzheimer Disease   · Family history of Family Health Status Of Mother - Alive  Father    · Family history of Family Health Status Of Father -   Family History    · Family history of Adopted   · Denied: Family history of Colon Cancer   · Denied: Family history of Crohn's Disease   · Denied: Family history of Liver Cancer     Social History    · Denied: History of Alcohol Use (History)   · Current Some Day Smoker (305 1)   · Denied: History of Exercise Habits   · Marital History - Currently    · Tobacco use (305 1) (Z72 0)   · Working Full Time  The social history was reviewed and is unchanged       The medication list was reviewed and updated today       Allergies   1  No Known Drug Allergies   Physical Exam   Left Foot: Appearance: Normal except as noted: excessive pronation-- and-- pes planus  Great toe deformities include a bunion  Tenderness: None except the great toe-- and-- distal first metatarsal     Right Foot: Appearance: Normal except as noted: excessive pronation-- and-- pes planus  Great toe deformities include a bunion  Tenderness: None except the great toe-- and-- distal first metatarsal     Left Ankle: ROM: limited ROM in all planes    Right Ankle: ROM: limited ROM in all planes    Neurological Exam: performed  Light touch was decreased bilaterally  Vibratory sensation was decreased in both first metatarsophalangeal joints     Vascular Exam: performed Dorsalis pedis pulses were present bilaterally  Posterior tibial pulses were present bilaterally  Elevation Pallor: absent bilaterally  Dependence rubor was absent bilaterally  Edema: none     Toenails: All of the toenails were elongated,-- hypertrophied,-- discolored-- and-- Ptotic  Both first toenails were tender-- and-- Positive onychogryphosis           Socks and shoes removed, Right Foot Findings: swollen, erythematous and dry       The sensory exam showed diminished vibratory sensation at the level of the toes  Diminished tactile sensation with monofilament testing throughout the right foot       Socks and shoes removed, Left Foot Findings: swollen, erythematous and dry       The sensory exam showed diminished vibratory sensation at the level of the toes  Diminished tactile sensation with monofilament testing throughout the left foot      Capillary refills findings on the right were normal in the toes       Pulses:      2+ in the posterior tibialis on the right      2+ in the dorsalis pedis on the right       Capillary refills findings on the left were normal in the toes       Pulses:      1+ in the posterior tibialis on the left      1+ in the dorsalis pedis on the left       Assign Risk Category: 2: Loss of protective sensation with or without weakness, deformity, callus, pre-ulcer, or history of ulceration  High risk     Hyperkeratosis: present on both first toes,-- present on both first sub metatarsals,-- present on both third sub metatarsals-- and-- Severe profound moccasin tinea pedis bilateral     Shoe Gear Evaluation: performed ()  Recommendation(s): SAS style       Procedure   The patient was educated on the diabetic foot and good glucose control  All mycotic nails debrided   Bilateral pre-ulcerative lesions debrided today without pain or complication

## 2019-09-24 ENCOUNTER — HOSPITAL ENCOUNTER (OUTPATIENT)
Dept: ULTRASOUND IMAGING | Facility: HOSPITAL | Age: 59
Discharge: HOME | End: 2019-09-24
Payer: COMMERCIAL

## 2019-09-24 DIAGNOSIS — N28.1 BILATERAL RENAL CYSTS: ICD-10-CM

## 2019-09-24 PROCEDURE — 76770 US EXAM ABDO BACK WALL COMP: CPT

## 2019-09-26 ENCOUNTER — TELEPHONE (OUTPATIENT)
Dept: INTERNAL MEDICINE CLINIC | Facility: CLINIC | Age: 59
End: 2019-09-26

## 2019-10-08 DIAGNOSIS — N40.0 BPH WITHOUT URINARY OBSTRUCTION: ICD-10-CM

## 2019-10-08 DIAGNOSIS — I10 ESSENTIAL HYPERTENSION: ICD-10-CM

## 2019-10-08 DIAGNOSIS — E11.40 TYPE 2 DIABETES MELLITUS WITH DIABETIC NEUROPATHY, WITHOUT LONG-TERM CURRENT USE OF INSULIN (HCC): ICD-10-CM

## 2019-10-08 RX ORDER — TAMSULOSIN HYDROCHLORIDE 0.4 MG/1
CAPSULE ORAL
Qty: 90 CAPSULE | Refills: 0 | Status: SHIPPED | OUTPATIENT
Start: 2019-10-08 | End: 2020-01-03

## 2019-10-08 RX ORDER — SITAGLIPTIN 100 MG/1
TABLET, FILM COATED ORAL
Qty: 90 TABLET | Refills: 0 | Status: SHIPPED | OUTPATIENT
Start: 2019-10-08 | End: 2019-11-22 | Stop reason: ALTCHOICE

## 2019-10-08 RX ORDER — AMLODIPINE BESYLATE 10 MG/1
TABLET ORAL
Qty: 90 TABLET | Refills: 0 | Status: SHIPPED | OUTPATIENT
Start: 2019-10-08 | End: 2020-01-03

## 2019-10-08 RX ORDER — DULAGLUTIDE 0.75 MG/.5ML
INJECTION, SOLUTION SUBCUTANEOUS
Qty: 2 ML | Refills: 0 | Status: SHIPPED | OUTPATIENT
Start: 2019-10-08 | End: 2019-11-04 | Stop reason: SDUPTHER

## 2019-10-12 DIAGNOSIS — F33.1 MODERATE EPISODE OF RECURRENT MAJOR DEPRESSIVE DISORDER (HCC): ICD-10-CM

## 2019-10-12 DIAGNOSIS — I10 ESSENTIAL HYPERTENSION: ICD-10-CM

## 2019-10-12 DIAGNOSIS — E11.40 TYPE 2 DIABETES MELLITUS WITH DIABETIC NEUROPATHY, WITHOUT LONG-TERM CURRENT USE OF INSULIN (HCC): ICD-10-CM

## 2019-10-13 RX ORDER — BUPROPION HYDROCHLORIDE 150 MG/1
TABLET ORAL
Qty: 90 TABLET | Refills: 1 | Status: SHIPPED | OUTPATIENT
Start: 2019-10-13 | End: 2020-02-04 | Stop reason: SDUPTHER

## 2019-10-13 RX ORDER — GLIMEPIRIDE 4 MG/1
TABLET ORAL
Qty: 180 TABLET | Refills: 1 | Status: SHIPPED | OUTPATIENT
Start: 2019-10-13 | End: 2020-03-20 | Stop reason: SDUPTHER

## 2019-10-13 RX ORDER — CLONIDINE HYDROCHLORIDE 0.3 MG/1
TABLET ORAL
Qty: 180 TABLET | Refills: 1 | Status: SHIPPED | OUTPATIENT
Start: 2019-10-13 | End: 2020-03-20 | Stop reason: SDUPTHER

## 2019-11-04 DIAGNOSIS — E11.40 TYPE 2 DIABETES MELLITUS WITH DIABETIC NEUROPATHY, WITHOUT LONG-TERM CURRENT USE OF INSULIN (HCC): ICD-10-CM

## 2019-11-04 RX ORDER — DULAGLUTIDE 0.75 MG/.5ML
INJECTION, SOLUTION SUBCUTANEOUS
Qty: 2 ML | Refills: 5 | Status: SHIPPED | OUTPATIENT
Start: 2019-11-04 | End: 2019-11-22 | Stop reason: ALTCHOICE

## 2019-11-15 ENCOUNTER — OFFICE VISIT (OUTPATIENT)
Dept: PODIATRY | Facility: CLINIC | Age: 59
End: 2019-11-15
Payer: COMMERCIAL

## 2019-11-15 VITALS — RESPIRATION RATE: 17 BRPM | WEIGHT: 276 LBS | HEIGHT: 71 IN | BODY MASS INDEX: 38.64 KG/M2

## 2019-11-15 DIAGNOSIS — M79.671 PAIN IN BOTH FEET: ICD-10-CM

## 2019-11-15 DIAGNOSIS — E11.42 DIABETIC POLYNEUROPATHY ASSOCIATED WITH TYPE 2 DIABETES MELLITUS (HCC): Primary | ICD-10-CM

## 2019-11-15 DIAGNOSIS — M21.969 ACQUIRED DEFORMITY OF FOOT, UNSPECIFIED LATERALITY: ICD-10-CM

## 2019-11-15 DIAGNOSIS — M79.672 PAIN IN BOTH FEET: ICD-10-CM

## 2019-11-15 DIAGNOSIS — B35.1 ONYCHOMYCOSIS: ICD-10-CM

## 2019-11-15 PROCEDURE — 99213 OFFICE O/P EST LOW 20 MIN: CPT | Performed by: PODIATRIST

## 2019-11-15 NOTE — PROGRESS NOTES
Assessment   Pain upon ambulation   Peripheral artery disease   Diabetic neuropathy   Callus formation   Mycosis of nail   Peroneal tendinitis right foot      Plan   Foot exam performed   Nails debrided   Calluses debrided   Patient will stretch daily   Procedures performed without pain or complication         Chief Complaint   Patient needs full diabetic foot exam   Patient has pain upon ambulation     History of Present Illness   HPI: Patient presents for pedal evaluation  Patient complains of pain in his feet and toes with ambulation   Patient is a morbidly obese diabetic who has some electric sensations in his feet on occasion       Review of Systems        Constitutional: not feeling tired       Eyes: no eyesight problems       ENT: no nasal discharge       Cardiovascular: no chest pain,-- no palpitations-- and-- no extremity edema       Respiratory: no shortness of breath-- and-- no cough       Gastrointestinal: no abdominal pain-- and-- no constipation       Genitourinary: no dysuria-- and-- no urinary hesitancy       Integumentary: no skin wound       Neurological: no tingling-- and-- no dizziness       Psychiatric: sleeping better, stopped taking trazodone, but-- no sleep disturbances       Endocrine: no feelings of weakness       Active Problems   1  Acquired pes planus (734) (M21 40)   2  Acute renal failure (584 9) (N17 9)   3  Adhesive capsulitis of both shoulders (726 0) (M75 01,M75 02)   4  Arthralgia (719 40) (M25 50)   5  BPH without urinary obstruction (600 00) (N40 0)   6  Bunion (727 1) (M21 619)   7  Callus (700) (L84)   8  VGQDLPN systolic congestive heart failure (428 22,428 0) (I50 22)   9  Depression with anxiety (300 4) (F41 8)   10  Diabetes mellitus with neurological manifestation (250 60) (E11 49)   11  Diabetes type 2, uncontrolled (250 02) (E11 65)   12  Difficulty concentrating (799 51) (R41 840)   13  Difficulty walking (719 7) (R26 2)   14  Dyspnea on exertion (786 09) (R06 09)   15  Encounter for prostate cancer screening (V76 44) (Z12 5)   16  Encounter for screening for malignant neoplasm of colon (V76 51) (Z12 11)   17  Fatigue (780 79) (R53 83)   18  Foot pain, bilateral (729 5) (M79 671,M79 672)   19  Hallux valgus (735 0) (M20 10)   20  Hematuria (599 70) (R31 9)   21  Hyperlipidemia (272 4) (E78 5)   22  Hypertension (401 9) (I10)   23  Hypogonadism, male (257 2) (E29 1)   24  Insomnia (780 52) (G47 00)   25  Morbid or severe obesity due to excess calories (278 01) (E66 01)   26  Need for pneumococcal vaccination (V03 82) (Z23)   27  Need for prophylactic vaccination and inoculation against influenza (V04 81) (Z23)   28  Nephrolithiasis (592 0) (N20 0)   29  Onychomycosis (110 1) (B35 1)   30  JEFF (obstructive sleep apnea) (327 23) (G47 33)   31  Sciatica (724 3) (M54 30)   32  Screening for colon cancer (V76 51) (Z12 11)   33  Seasonal allergies (477 9) (J30 2)   34  Shoulder pain, right (719 41) (M25 511)   35  Spinal stenosis (724 00) (M48 00)   36  Tinea pedis (110 4) (B35 3)   37  Type 2 diabetes mellitus (250 00) (E11 9)   38  Vitamin D deficiency (268 9) (E55 9)     Past Medical History    · History of Cellulitis of left leg (682 6) (L03 116)   · History of chest pain (V13 89) (N95 046)   · History of diarrhea (V12 79) (P13 783)   · History of onychomycosis (V12 09) (Z86 19)   · History of onychomycosis (V12 09) (Z86 19)   · History of Limb pain (729 5) (M79 609)   · History of Neck pain (723 1) (M54 2)   · Need for pneumococcal vaccination (V03 82) (Z23)   · History of Nephrolithiasis (V13 01)   · History of Numbness On The Sole Of The Right Foot   · History of Pain and swelling of left lower leg (729 5,729 81) (M79 662,M79 89)   · History of Pain of lower extremity (729 5) (M79 606)   · History of Pain, hand joint, right (719 44) (M79 641)   · Sciatica (724 3) (M54 30)     The active problems and past medical history were reviewed and updated today       Surgical History    · History of Knee Arthroscopy (Therapeutic)   · History of Knee Surgery   · History of Needle Biopsy Of Prostate     Family History   Mother    · Family history of Alzheimer Disease   · Family history of Family Health Status Of Mother - Alive  Father    · Family history of Family Health Status Of Father -   Family History    · Family history of Adopted   · Denied: Family history of Colon Cancer   · Denied: Family history of Crohn's Disease   · Denied: Family history of Liver Cancer     Social History    · Denied: History of Alcohol Use (History)   · Current Some Day Smoker (305 1)   · Denied: History of Exercise Habits   · Marital History - Currently    · Tobacco use (305 1) (Z72 0)   · Working Full Time  The social history was reviewed and is unchanged       The medication list was reviewed and updated today       Allergies   1  No Known Drug Allergies   Physical Exam   Left Foot: Appearance: Normal except as noted: excessive pronation-- and-- pes planus  Great toe deformities include a bunion  Tenderness: None except the great toe-- and-- distal first metatarsal     Right Foot: Appearance: Normal except as noted: excessive pronation-- and-- pes planus  Great toe deformities include a bunion  Tenderness: None except the great toe-- and-- distal first metatarsal     Left Ankle: ROM: limited ROM in all planes    Right Ankle: ROM: limited ROM in all planes    Neurological Exam: performed  Light touch was decreased bilaterally  Vibratory sensation was decreased in both first metatarsophalangeal joints     Vascular Exam: performed Dorsalis pedis pulses were present bilaterally  Posterior tibial pulses were present bilaterally  Elevation Pallor: absent bilaterally  Dependence rubor was absent bilaterally  Edema: none     Toenails: All of the toenails were elongated,-- hypertrophied,-- discolored-- and-- Ptotic  Both first toenails were tender-- and-- Positive onychogryphosis           Socks and shoes removed, Right Foot Findings: swollen, erythematous and dry       The sensory exam showed diminished vibratory sensation at the level of the toes  Diminished tactile sensation with monofilament testing throughout the right foot       Socks and shoes removed, Left Foot Findings: swollen, erythematous and dry       The sensory exam showed diminished vibratory sensation at the level of the toes  Diminished tactile sensation with monofilament testing throughout the left foot      Capillary refills findings on the right were normal in the toes       Pulses:      2+ in the posterior tibialis on the right      2+ in the dorsalis pedis on the right       Capillary refills findings on the left were normal in the toes       Pulses:      1+ in the posterior tibialis on the left      1+ in the dorsalis pedis on the left       Assign Risk Category: 2: Loss of protective sensation with or without weakness, deformity, callus, pre-ulcer, or history of ulceration  High risk     Hyperkeratosis: present on both first toes,-- present on both first sub metatarsals,-- present on both third sub metatarsals-- and-- Severe profound moccasin tinea pedis bilateral     Shoe Gear Evaluation: performed ()  Recommendation(s): SAS style       Procedure   The patient was educated on the diabetic foot and good glucose control  All mycotic nails debrided   Bilateral pre-ulcerative lesions debrided today without pain or complication

## 2019-11-22 ENCOUNTER — OFFICE VISIT (OUTPATIENT)
Dept: INTERNAL MEDICINE CLINIC | Facility: CLINIC | Age: 59
End: 2019-11-22
Payer: COMMERCIAL

## 2019-11-22 VITALS
DIASTOLIC BLOOD PRESSURE: 76 MMHG | TEMPERATURE: 97 F | BODY MASS INDEX: 39.14 KG/M2 | RESPIRATION RATE: 18 BRPM | OXYGEN SATURATION: 96 % | SYSTOLIC BLOOD PRESSURE: 126 MMHG | WEIGHT: 279.6 LBS | HEIGHT: 71 IN | HEART RATE: 81 BPM

## 2019-11-22 DIAGNOSIS — Z99.89 OSA ON CPAP: ICD-10-CM

## 2019-11-22 DIAGNOSIS — E66.01 MORBID OBESITY WITH BODY MASS INDEX (BMI) OF 40.0 TO 44.9 IN ADULT (HCC): ICD-10-CM

## 2019-11-22 DIAGNOSIS — Z71.9 HEALTH COUNSELING: ICD-10-CM

## 2019-11-22 DIAGNOSIS — E11.40 TYPE 2 DIABETES MELLITUS WITH DIABETIC NEUROPATHY, WITHOUT LONG-TERM CURRENT USE OF INSULIN (HCC): Primary | ICD-10-CM

## 2019-11-22 DIAGNOSIS — Z23 NEED FOR INFLUENZA VACCINATION: ICD-10-CM

## 2019-11-22 DIAGNOSIS — E55.9 VITAMIN D DEFICIENCY: ICD-10-CM

## 2019-11-22 DIAGNOSIS — E11.42 DIABETIC POLYNEUROPATHY ASSOCIATED WITH TYPE 2 DIABETES MELLITUS (HCC): ICD-10-CM

## 2019-11-22 DIAGNOSIS — I10 ESSENTIAL HYPERTENSION: ICD-10-CM

## 2019-11-22 DIAGNOSIS — F41.8 DEPRESSION WITH ANXIETY: ICD-10-CM

## 2019-11-22 DIAGNOSIS — N40.0 BPH WITHOUT URINARY OBSTRUCTION: ICD-10-CM

## 2019-11-22 DIAGNOSIS — G47.33 OSA ON CPAP: ICD-10-CM

## 2019-11-22 PROCEDURE — 90682 RIV4 VACC RECOMBINANT DNA IM: CPT | Performed by: INTERNAL MEDICINE

## 2019-11-22 PROCEDURE — 99214 OFFICE O/P EST MOD 30 MIN: CPT | Performed by: INTERNAL MEDICINE

## 2019-11-22 PROCEDURE — 90471 IMMUNIZATION ADMIN: CPT | Performed by: INTERNAL MEDICINE

## 2019-11-22 NOTE — PROGRESS NOTES
Assessment/Plan:    Diabetic polyneuropathy associated with type 2 diabetes mellitus ([facility] [address] )    Lab Results   Component Value Date    HGBA1C 7 2 (H) 07/17/2019     A1c continues to improve  Stop Januvia  Continue Trulicity, Jardiance and glimepiride  Essential hypertension  BP remains controlled, on amlodipine, enalapril and clonidine  Depression with anxiety  Stable, on bupropion and sertraline  BPH without urinary obstruction  Stable, on Flomax  Morbid obesity with body mass index (BMI) of 40 0 to 44 9 in adult ([facility])  Gained 3 lbs since last visit  JEFF on CPAP  Compliant  Diagnoses and all orders for this visit:    Type 2 diabetes mellitus with diabetic neuropathy, without long-term current use of insulin ([facility])  -     Hemoglobin A1C; Future  -     Comprehensive metabolic panel; Future  -     Lipid panel; Future  -     Microalbumin / creatinine urine ratio; Future    Depression with anxiety    Morbid obesity with body mass index (BMI) of 40 0 to 44 9 in adult [facility])    Essential hypertension  -     Comprehensive metabolic panel; Future  -     CBC and differential; Future  -     Lipid panel; Future  -     TSH, 3rd generation with Free T4 reflex; Future    Need for influenza vaccination  -     influenza vaccine, 0291-0542, quadrivalent, recombinant, PF, 0 5 mL, for patients 18 yr+ ([facility])    JEFF on CPAP    Vitamin D deficiency  -     Vitamin D 25 hydroxy; Future    Diabetic polyneuropathy associated with type 2 diabetes mellitus ([facility] )    BPH without urinary obstruction    Health counseling  Comments:  Flu vaccine today  Follow up in 4 months or as needed  Subjective:      Patient ID: Sol Orozco is a 61 y o  male  Mr Stephon Blankenship has been feeling stressed from work  He has been working more hours, sleeping last   He reports sleeping about 4-5 hours at a time only  He does use his CPAP regularly  He has cut back on soda  He does not walk or exercise regularly    He feels very tired after work  He denies any chest pain, shortness of breath or other symptoms  He does not check his sugars  He has decreased the Januvia to 50 mg  The following portions of the patient's history were reviewed and updated as appropriate: allergies, current medications, past medical history, past social history and problem list     Review of Systems   Constitutional: Positive for fatigue  Negative for appetite change  HENT: Negative for congestion and hearing loss  Eyes: Negative  Negative for visual disturbance  Respiratory: Negative for cough, chest tightness and shortness of breath  Cardiovascular: Negative for chest pain, palpitations and leg swelling  Gastrointestinal: Negative for abdominal pain and constipation  Genitourinary: Negative for dysuria and frequency  Musculoskeletal: Negative for arthralgias  Skin: Negative for rash and wound  Neurological: Negative for dizziness and headaches  Hematological: Negative for adenopathy  Does not bruise/bleed easily  Psychiatric/Behavioral: Negative for decreased concentration and sleep disturbance  The patient is not nervous/anxious  Objective:      /76   Pulse 81   Temp (!) 97 °F (36 1 °C) (Oral)   Resp 18   Ht 5' 10 5" (1 791 m)   Wt 127 kg (279 lb 9 6 oz)   SpO2 96%   BMI 39 55 kg/m²          Physical Exam   Constitutional: He is oriented to person, place, and time  He appears well-developed and well-nourished  HENT:   Head: Normocephalic and atraumatic  Mouth/Throat: Mucous membranes are normal    Eyes: Pupils are equal, round, and reactive to light  Conjunctivae are normal    Cardiovascular: Normal rate, regular rhythm and normal heart sounds  Pulmonary/Chest: Effort normal  He has no wheezes  He has no rhonchi  Abdominal: Soft  Bowel sounds are normal    Musculoskeletal: He exhibits no edema  Neurological: He is alert and oriented to person, place, and time  Skin: Skin is warm   No rash noted    Psychiatric: He has a normal mood and affect  His behavior is normal    Nursing note and vitals reviewed  Lab &/or imaging results reviewed with patient

## 2019-12-03 DIAGNOSIS — E11.40 TYPE 2 DIABETES MELLITUS WITH DIABETIC NEUROPATHY, WITHOUT LONG-TERM CURRENT USE OF INSULIN (HCC): ICD-10-CM

## 2019-12-03 RX ORDER — DULAGLUTIDE 1.5 MG/.5ML
INJECTION, SOLUTION SUBCUTANEOUS
Qty: 6 ML | Refills: 1 | Status: SHIPPED | OUTPATIENT
Start: 2019-12-03 | End: 2020-03-20 | Stop reason: SDUPTHER

## 2020-01-03 DIAGNOSIS — I10 ESSENTIAL HYPERTENSION: ICD-10-CM

## 2020-01-03 DIAGNOSIS — N40.0 BPH WITHOUT URINARY OBSTRUCTION: ICD-10-CM

## 2020-01-03 DIAGNOSIS — E11.40 TYPE 2 DIABETES MELLITUS WITH DIABETIC NEUROPATHY, WITHOUT LONG-TERM CURRENT USE OF INSULIN (HCC): ICD-10-CM

## 2020-01-03 RX ORDER — TAMSULOSIN HYDROCHLORIDE 0.4 MG/1
CAPSULE ORAL
Qty: 90 CAPSULE | Refills: 0 | Status: SHIPPED | OUTPATIENT
Start: 2020-01-03 | End: 2020-03-20 | Stop reason: SDUPTHER

## 2020-01-03 RX ORDER — AMLODIPINE BESYLATE 10 MG/1
TABLET ORAL
Qty: 90 TABLET | Refills: 0 | Status: SHIPPED | OUTPATIENT
Start: 2020-01-03 | End: 2020-03-20 | Stop reason: SDUPTHER

## 2020-01-06 RX ORDER — SITAGLIPTIN 50 MG/1
TABLET, FILM COATED ORAL
Qty: 90 TABLET | Refills: 0 | OUTPATIENT
Start: 2020-01-06

## 2020-01-07 DIAGNOSIS — E11.40 TYPE 2 DIABETES MELLITUS WITH DIABETIC NEUROPATHY, WITHOUT LONG-TERM CURRENT USE OF INSULIN (HCC): ICD-10-CM

## 2020-01-07 RX ORDER — SITAGLIPTIN 100 MG/1
TABLET, FILM COATED ORAL
Qty: 90 TABLET | Refills: 0 | OUTPATIENT
Start: 2020-01-07

## 2020-01-17 ENCOUNTER — OFFICE VISIT (OUTPATIENT)
Dept: PODIATRY | Facility: CLINIC | Age: 60
End: 2020-01-17
Payer: COMMERCIAL

## 2020-01-17 VITALS
BODY MASS INDEX: 39.06 KG/M2 | RESPIRATION RATE: 17 BRPM | HEART RATE: 81 BPM | DIASTOLIC BLOOD PRESSURE: 76 MMHG | SYSTOLIC BLOOD PRESSURE: 126 MMHG | WEIGHT: 279 LBS | HEIGHT: 71 IN

## 2020-01-17 DIAGNOSIS — M79.671 PAIN IN BOTH FEET: ICD-10-CM

## 2020-01-17 DIAGNOSIS — B35.3 TINEA PEDIS OF BOTH FEET: ICD-10-CM

## 2020-01-17 DIAGNOSIS — M21.969 ACQUIRED DEFORMITY OF FOOT, UNSPECIFIED LATERALITY: ICD-10-CM

## 2020-01-17 DIAGNOSIS — M79.672 PAIN IN BOTH FEET: ICD-10-CM

## 2020-01-17 DIAGNOSIS — E11.42 DIABETIC POLYNEUROPATHY ASSOCIATED WITH TYPE 2 DIABETES MELLITUS (HCC): ICD-10-CM

## 2020-01-17 DIAGNOSIS — B35.1 ONYCHOMYCOSIS: Primary | ICD-10-CM

## 2020-01-17 PROCEDURE — 99213 OFFICE O/P EST LOW 20 MIN: CPT | Performed by: PODIATRIST

## 2020-01-17 RX ORDER — KETOCONAZOLE 20 MG/G
CREAM TOPICAL DAILY
Qty: 60 G | Refills: 1 | Status: SHIPPED | OUTPATIENT
Start: 2020-01-17 | End: 2020-09-05

## 2020-01-17 NOTE — PROGRESS NOTES
Assessment   Pain upon ambulation   Peripheral artery disease   Diabetic neuropathy   Callus formation   Mycosis of nail   Peroneal tendinitis right foot      Plan   Foot exam performed   Nails debrided   Calluses debrided   Patient will stretch daily   Procedures performed without pain or complication  Topical antifungal added        Chief Complaint   Patient needs full diabetic foot exam   Patient has pain upon ambulation     History of Present Illness   HPI: Patient presents for pedal evaluation  Patient complains of pain in his feet and toes with ambulation   Patient is a morbidly obese diabetic who has some electric sensations in his feet on occasion       Review of Systems        Constitutional: not feeling tired       Eyes: no eyesight problems       ENT: no nasal discharge       Cardiovascular: no chest pain,-- no palpitations-- and-- no extremity edema       Respiratory: no shortness of breath-- and-- no cough       Gastrointestinal: no abdominal pain-- and-- no constipation       Genitourinary: no dysuria-- and-- no urinary hesitancy       Integumentary: no skin wound       Neurological: no tingling-- and-- no dizziness       Psychiatric: sleeping better, stopped taking trazodone, but-- no sleep disturbances       Endocrine: no feelings of weakness       Active Problems   1  Acquired pes planus (734) (M21 40)   2  Acute renal failure (584 9) (N17 9)   3  Adhesive capsulitis of both shoulders (726 0) (M75 01,M75 02)   4  Arthralgia (719 40) (M25 50)   5  BPH without urinary obstruction (600 00) (N40 0)   6  Bunion (727 1) (M21 619)   7  Callus (700) (L84)   7  QUXPMEZ systolic congestive heart failure (428 22,428 0) (I50 22)   9  Depression with anxiety (300 4) (F41 8)   10  Diabetes mellitus with neurological manifestation (250 60) (E11 49)   11  Diabetes type 2, uncontrolled (250 02) (E11 65)   12  Difficulty concentrating (799 51) (R41 840)   13  Difficulty walking (719 7) (R26 2)   14  Dyspnea on exertion (786 09) (R06 09)   15  Encounter for prostate cancer screening (V76 44) (Z12 5)   16  Encounter for screening for malignant neoplasm of colon (V76 51) (Z12 11)   17  Fatigue (780 79) (R53 83)   18  Foot pain, bilateral (729 5) (M79 671,M79 672)   19  Hallux valgus (735 0) (M20 10)   20  Hematuria (599 70) (R31 9)   21  Hyperlipidemia (272 4) (E78 5)   22  Hypertension (401 9) (I10)   23  Hypogonadism, male (257 2) (E29 1)   24  Insomnia (780 52) (G47 00)   25  Morbid or severe obesity due to excess calories (278 01) (E66 01)   26  Need for pneumococcal vaccination (V03 82) (Z23)   27  Need for prophylactic vaccination and inoculation against influenza (V04 81) (Z23)   28  Nephrolithiasis (592 0) (N20 0)   29  Onychomycosis (110 1) (B35 1)   30  JEFF (obstructive sleep apnea) (327 23) (G47 33)   31  Sciatica (724 3) (M54 30)   32  Screening for colon cancer (V76 51) (Z12 11)   33  Seasonal allergies (477 9) (J30 2)   34  Shoulder pain, right (719 41) (M25 511)   35  Spinal stenosis (724 00) (M48 00)   36  Tinea pedis (110 4) (B35 3)   37  Type 2 diabetes mellitus (250 00) (E11 9)   38  Vitamin D deficiency (268 9) (E55 9)     Past Medical History    · History of Cellulitis of left leg (682 6) (L03 116)   · History of chest pain (V13 89) (P51 078)   · History of diarrhea (V12 79) (C30 279)   · History of onychomycosis (V12 09) (Z86 19)   · History of onychomycosis (V12 09) (Z86 19)   · History of Limb pain (729 5) (M79 609)   · History of Neck pain (723 1) (M54 2)   · Need for pneumococcal vaccination (V03 82) (Z23)   · History of Nephrolithiasis (V13 01)   · History of Numbness On The Sole Of The Right Foot   · History of Pain and swelling of left lower leg (729 5,729 81) (M79 662,M79 89)   · History of Pain of lower extremity (729 5) (M79 606)   · History of Pain, hand joint, right (719 44) (M79 641)   · Sciatica (724 3) (M54 30)     The active problems and past medical history were reviewed and updated today       Surgical History    · History of Knee Arthroscopy (Therapeutic)   · History of Knee Surgery   · History of Needle Biopsy Of Prostate     Family History   Mother    · Family history of Alzheimer Disease   · Family history of Family Health Status Of Mother - Alive  Father    · Family history of Family Health Status Of Father -   Family History    · Family history of Adopted   · Denied: Family history of Colon Cancer   · Denied: Family history of Crohn's Disease   · Denied: Family history of Liver Cancer     Social History    · Denied: History of Alcohol Use (History)   · Current Some Day Smoker (305 1)   · Denied: History of Exercise Habits   · Marital History - Currently    · Tobacco use (305 1) (Z72 0)   · Working Full Time  The social history was reviewed and is unchanged       The medication list was reviewed and updated today       Allergies   1  No Known Drug Allergies   Physical Exam   Left Foot: Appearance: Normal except as noted: excessive pronation-- and-- pes planus  Great toe deformities include a bunion  Tenderness: None except the great toe-- and-- distal first metatarsal     Right Foot: Appearance: Normal except as noted: excessive pronation-- and-- pes planus  Great toe deformities include a bunion  Tenderness: None except the great toe-- and-- distal first metatarsal     Left Ankle: ROM: limited ROM in all planes    Right Ankle: ROM: limited ROM in all planes    Neurological Exam: performed  Light touch was decreased bilaterally  Vibratory sensation was decreased in both first metatarsophalangeal joints     Vascular Exam: performed Dorsalis pedis pulses were present bilaterally  Posterior tibial pulses were present bilaterally  Elevation Pallor: absent bilaterally  Dependence rubor was absent bilaterally  Edema: none     Toenails: All of the toenails were elongated,-- hypertrophied,-- discolored-- and-- Ptotic   Both first toenails were tender-- and-- Positive onychogryphosis          Socks and shoes removed, Right Foot Findings: swollen, erythematous and dry       The sensory exam showed diminished vibratory sensation at the level of the toes  Diminished tactile sensation with monofilament testing throughout the right foot       Socks and shoes removed, Left Foot Findings: swollen, erythematous and dry       The sensory exam showed diminished vibratory sensation at the level of the toes  Diminished tactile sensation with monofilament testing throughout the left foot      Capillary refills findings on the right were normal in the toes       Pulses:      2+ in the posterior tibialis on the right      2+ in the dorsalis pedis on the right       Capillary refills findings on the left were normal in the toes       Pulses:      1+ in the posterior tibialis on the left      1+ in the dorsalis pedis on the left       Assign Risk Category: 2: Loss of protective sensation with or without weakness, deformity, callus, pre-ulcer, or history of ulceration  High risk     Hyperkeratosis: present on both first toes,-- present on both first sub metatarsals,-- present on both third sub metatarsals-- and-- Severe profound moccasin tinea pedis bilateral     Shoe Gear Evaluation: performed ()  Recommendation(s): SAS style       Procedure   The patient was educated on the diabetic foot and good glucose control  All mycotic nails debrided   Bilateral pre-ulcerative lesions debrided today without pain or complication

## 2020-02-04 DIAGNOSIS — F33.1 MODERATE EPISODE OF RECURRENT MAJOR DEPRESSIVE DISORDER (HCC): ICD-10-CM

## 2020-02-05 RX ORDER — BUPROPION HYDROCHLORIDE 150 MG/1
150 TABLET ORAL DAILY
Qty: 90 TABLET | Refills: 1 | Status: SHIPPED | OUTPATIENT
Start: 2020-02-05 | End: 2020-03-20 | Stop reason: SDUPTHER

## 2020-02-12 ENCOUNTER — TELEPHONE (OUTPATIENT)
Dept: INTERNAL MEDICINE CLINIC | Facility: CLINIC | Age: 60
End: 2020-02-12

## 2020-02-12 DIAGNOSIS — E11.40 TYPE 2 DIABETES MELLITUS WITH DIABETIC NEUROPATHY, WITHOUT LONG-TERM CURRENT USE OF INSULIN (HCC): ICD-10-CM

## 2020-03-17 ENCOUNTER — APPOINTMENT (OUTPATIENT)
Dept: LAB | Facility: CLINIC | Age: 60
End: 2020-03-17
Payer: COMMERCIAL

## 2020-03-17 DIAGNOSIS — E55.9 VITAMIN D DEFICIENCY: ICD-10-CM

## 2020-03-17 DIAGNOSIS — I10 ESSENTIAL HYPERTENSION: ICD-10-CM

## 2020-03-17 DIAGNOSIS — E11.40 TYPE 2 DIABETES MELLITUS WITH DIABETIC NEUROPATHY, WITHOUT LONG-TERM CURRENT USE OF INSULIN (HCC): ICD-10-CM

## 2020-03-17 LAB
25(OH)D3 SERPL-MCNC: 34.8 NG/ML (ref 30–100)
ALBUMIN SERPL BCP-MCNC: 3.6 G/DL (ref 3.5–5)
ALP SERPL-CCNC: 79 U/L (ref 46–116)
ALT SERPL W P-5'-P-CCNC: 31 U/L (ref 12–78)
ANION GAP SERPL CALCULATED.3IONS-SCNC: 11 MMOL/L (ref 4–13)
AST SERPL W P-5'-P-CCNC: 14 U/L (ref 5–45)
BASOPHILS # BLD AUTO: 0.06 THOUSANDS/ΜL (ref 0–0.1)
BASOPHILS NFR BLD AUTO: 1 % (ref 0–1)
BILIRUB SERPL-MCNC: 1.05 MG/DL (ref 0.2–1)
BUN SERPL-MCNC: 18 MG/DL (ref 5–25)
CALCIUM SERPL-MCNC: 8.9 MG/DL (ref 8.3–10.1)
CHLORIDE SERPL-SCNC: 107 MMOL/L (ref 100–108)
CHOLEST SERPL-MCNC: 141 MG/DL (ref 50–200)
CO2 SERPL-SCNC: 26 MMOL/L (ref 21–32)
CREAT SERPL-MCNC: 1.43 MG/DL (ref 0.6–1.3)
EOSINOPHIL # BLD AUTO: 0.26 THOUSAND/ΜL (ref 0–0.61)
EOSINOPHIL NFR BLD AUTO: 3 % (ref 0–6)
ERYTHROCYTE [DISTWIDTH] IN BLOOD BY AUTOMATED COUNT: 13.7 % (ref 11.6–15.1)
EST. AVERAGE GLUCOSE BLD GHB EST-MCNC: 189 MG/DL
GFR SERPL CREATININE-BSD FRML MDRD: 53 ML/MIN/1.73SQ M
GLUCOSE P FAST SERPL-MCNC: 239 MG/DL (ref 65–99)
HBA1C MFR BLD: 8.2 %
HCT VFR BLD AUTO: 51.6 % (ref 36.5–49.3)
HDLC SERPL-MCNC: 31 MG/DL
HGB BLD-MCNC: 16.8 G/DL (ref 12–17)
IMM GRANULOCYTES # BLD AUTO: 0.02 THOUSAND/UL (ref 0–0.2)
IMM GRANULOCYTES NFR BLD AUTO: 0 % (ref 0–2)
LDLC SERPL CALC-MCNC: 92 MG/DL (ref 0–100)
LYMPHOCYTES # BLD AUTO: 2.06 THOUSANDS/ΜL (ref 0.6–4.47)
LYMPHOCYTES NFR BLD AUTO: 27 % (ref 14–44)
MCH RBC QN AUTO: 30.6 PG (ref 26.8–34.3)
MCHC RBC AUTO-ENTMCNC: 32.6 G/DL (ref 31.4–37.4)
MCV RBC AUTO: 94 FL (ref 82–98)
MONOCYTES # BLD AUTO: 0.8 THOUSAND/ΜL (ref 0.17–1.22)
MONOCYTES NFR BLD AUTO: 10 % (ref 4–12)
NEUTROPHILS # BLD AUTO: 4.58 THOUSANDS/ΜL (ref 1.85–7.62)
NEUTS SEG NFR BLD AUTO: 59 % (ref 43–75)
NONHDLC SERPL-MCNC: 110 MG/DL
NRBC BLD AUTO-RTO: 0 /100 WBCS
PLATELET # BLD AUTO: 295 THOUSANDS/UL (ref 149–390)
PMV BLD AUTO: 9.3 FL (ref 8.9–12.7)
POTASSIUM SERPL-SCNC: 4.1 MMOL/L (ref 3.5–5.3)
PROT SERPL-MCNC: 7.6 G/DL (ref 6.4–8.2)
RBC # BLD AUTO: 5.49 MILLION/UL (ref 3.88–5.62)
SODIUM SERPL-SCNC: 144 MMOL/L (ref 136–145)
TRIGL SERPL-MCNC: 89 MG/DL
TSH SERPL DL<=0.05 MIU/L-ACNC: 1.88 UIU/ML (ref 0.36–3.74)
WBC # BLD AUTO: 7.78 THOUSAND/UL (ref 4.31–10.16)

## 2020-03-17 PROCEDURE — 80053 COMPREHEN METABOLIC PANEL: CPT

## 2020-03-17 PROCEDURE — 82306 VITAMIN D 25 HYDROXY: CPT

## 2020-03-17 PROCEDURE — 85025 COMPLETE CBC W/AUTO DIFF WBC: CPT

## 2020-03-17 PROCEDURE — 36415 COLL VENOUS BLD VENIPUNCTURE: CPT

## 2020-03-17 PROCEDURE — 84443 ASSAY THYROID STIM HORMONE: CPT

## 2020-03-17 PROCEDURE — 83036 HEMOGLOBIN GLYCOSYLATED A1C: CPT

## 2020-03-17 PROCEDURE — 80061 LIPID PANEL: CPT

## 2020-03-20 ENCOUNTER — OFFICE VISIT (OUTPATIENT)
Dept: INTERNAL MEDICINE CLINIC | Facility: CLINIC | Age: 60
End: 2020-03-20
Payer: COMMERCIAL

## 2020-03-20 ENCOUNTER — OFFICE VISIT (OUTPATIENT)
Dept: PODIATRY | Facility: CLINIC | Age: 60
End: 2020-03-20
Payer: COMMERCIAL

## 2020-03-20 VITALS
DIASTOLIC BLOOD PRESSURE: 86 MMHG | TEMPERATURE: 96.7 F | WEIGHT: 278.2 LBS | RESPIRATION RATE: 18 BRPM | HEART RATE: 67 BPM | HEIGHT: 71 IN | SYSTOLIC BLOOD PRESSURE: 144 MMHG | OXYGEN SATURATION: 98 % | BODY MASS INDEX: 38.95 KG/M2

## 2020-03-20 VITALS
HEART RATE: 81 BPM | WEIGHT: 279 LBS | RESPIRATION RATE: 17 BRPM | DIASTOLIC BLOOD PRESSURE: 76 MMHG | HEIGHT: 71 IN | BODY MASS INDEX: 39.06 KG/M2 | SYSTOLIC BLOOD PRESSURE: 126 MMHG

## 2020-03-20 DIAGNOSIS — E66.01 MORBID OBESITY WITH BODY MASS INDEX (BMI) OF 40.0 TO 44.9 IN ADULT (HCC): ICD-10-CM

## 2020-03-20 DIAGNOSIS — N40.0 BPH WITHOUT URINARY OBSTRUCTION: ICD-10-CM

## 2020-03-20 DIAGNOSIS — M79.672 PAIN IN BOTH FEET: ICD-10-CM

## 2020-03-20 DIAGNOSIS — E11.42 DIABETIC POLYNEUROPATHY ASSOCIATED WITH TYPE 2 DIABETES MELLITUS (HCC): ICD-10-CM

## 2020-03-20 DIAGNOSIS — E78.2 MIXED HYPERLIPIDEMIA: ICD-10-CM

## 2020-03-20 DIAGNOSIS — E11.40 TYPE 2 DIABETES MELLITUS WITH DIABETIC NEUROPATHY, WITHOUT LONG-TERM CURRENT USE OF INSULIN (HCC): ICD-10-CM

## 2020-03-20 DIAGNOSIS — L84 CORNS: ICD-10-CM

## 2020-03-20 DIAGNOSIS — M21.969 ACQUIRED DEFORMITY OF FOOT, UNSPECIFIED LATERALITY: ICD-10-CM

## 2020-03-20 DIAGNOSIS — E11.42 DIABETIC POLYNEUROPATHY ASSOCIATED WITH TYPE 2 DIABETES MELLITUS (HCC): Primary | ICD-10-CM

## 2020-03-20 DIAGNOSIS — Z00.00 HEALTH MAINTENANCE EXAMINATION: Primary | ICD-10-CM

## 2020-03-20 DIAGNOSIS — F41.8 DEPRESSION WITH ANXIETY: ICD-10-CM

## 2020-03-20 DIAGNOSIS — M79.671 PAIN IN BOTH FEET: ICD-10-CM

## 2020-03-20 DIAGNOSIS — B35.1 ONYCHOMYCOSIS: ICD-10-CM

## 2020-03-20 DIAGNOSIS — I10 ESSENTIAL HYPERTENSION: ICD-10-CM

## 2020-03-20 DIAGNOSIS — F33.1 MODERATE EPISODE OF RECURRENT MAJOR DEPRESSIVE DISORDER (HCC): ICD-10-CM

## 2020-03-20 PROBLEM — M76.71 PERONEAL TENDINITIS OF RIGHT LOWER EXTREMITY: Status: RESOLVED | Noted: 2019-05-10 | Resolved: 2020-03-20

## 2020-03-20 PROCEDURE — 99396 PREV VISIT EST AGE 40-64: CPT | Performed by: INTERNAL MEDICINE

## 2020-03-20 PROCEDURE — 3052F HG A1C>EQUAL 8.0%<EQUAL 9.0%: CPT | Performed by: INTERNAL MEDICINE

## 2020-03-20 PROCEDURE — 99213 OFFICE O/P EST LOW 20 MIN: CPT | Performed by: PODIATRIST

## 2020-03-20 RX ORDER — SERTRALINE HYDROCHLORIDE 100 MG/1
100 TABLET, FILM COATED ORAL DAILY
Qty: 90 TABLET | Refills: 1 | Status: SHIPPED | OUTPATIENT
Start: 2020-03-20 | End: 2020-06-21

## 2020-03-20 RX ORDER — GLIMEPIRIDE 4 MG/1
4 TABLET ORAL 2 TIMES DAILY
Qty: 180 TABLET | Refills: 1 | Status: SHIPPED | OUTPATIENT
Start: 2020-03-20 | End: 2020-06-21

## 2020-03-20 RX ORDER — BUPROPION HYDROCHLORIDE 150 MG/1
150 TABLET ORAL DAILY
Qty: 90 TABLET | Refills: 1 | Status: SHIPPED | OUTPATIENT
Start: 2020-03-20 | End: 2020-06-21

## 2020-03-20 RX ORDER — CLONIDINE HYDROCHLORIDE 0.3 MG/1
0.3 TABLET ORAL 2 TIMES DAILY
Qty: 180 TABLET | Refills: 1 | Status: SHIPPED | OUTPATIENT
Start: 2020-03-20 | End: 2020-06-21

## 2020-03-20 RX ORDER — TAMSULOSIN HYDROCHLORIDE 0.4 MG/1
0.4 CAPSULE ORAL DAILY
Qty: 90 CAPSULE | Refills: 1 | Status: SHIPPED | OUTPATIENT
Start: 2020-03-20 | End: 2020-06-21

## 2020-03-20 RX ORDER — ENALAPRIL MALEATE 20 MG/1
20 TABLET ORAL 2 TIMES DAILY
Qty: 180 TABLET | Refills: 1 | Status: SHIPPED | OUTPATIENT
Start: 2020-03-20 | End: 2020-06-21

## 2020-03-20 RX ORDER — AMLODIPINE BESYLATE 10 MG/1
10 TABLET ORAL DAILY
Qty: 90 TABLET | Refills: 1 | Status: SHIPPED | OUTPATIENT
Start: 2020-03-20 | End: 2020-06-21

## 2020-03-20 NOTE — PROGRESS NOTES
Assessment   Pain upon ambulation   Peripheral artery disease   Diabetic neuropathy   Callus formation   Mycosis of nail   Peroneal tendinitis right foot      Plan   Foot exam performed   Nails debrided   Calluses debrided   Patient will stretch daily   Procedures performed without pain or complication  Topical antifungal added        Chief Complaint   Patient needs full diabetic foot exam   Patient has pain upon ambulation  Patient complains of pain in his toes when he wears shoes  He gets pain in the ball of the foot  No history of trauma      History of Present Illness   HPI: Patient presents for pedal evaluation  Patient complains of pain in his feet and toes with ambulation   Patient is a morbidly obese diabetic who has some electric sensations in his feet on occasion       Review of Systems        Constitutional: not feeling tired       Eyes: no eyesight problems       ENT: no nasal discharge       Cardiovascular: no chest pain,-- no palpitations-- and-- no extremity edema       Respiratory: no shortness of breath-- and-- no cough       Gastrointestinal: no abdominal pain-- and-- no constipation       Genitourinary: no dysuria-- and-- no urinary hesitancy       Integumentary: no skin wound       Neurological: no tingling-- and-- no dizziness       Psychiatric: sleeping better, stopped taking trazodone, but-- no sleep disturbances       Endocrine: no feelings of weakness       Active Problems   1  Acquired pes planus (734) (M21 40)   2  Acute renal failure (584 9) (N17 9)   3  Adhesive capsulitis of both shoulders (726 0) (M75 01,M75 02)   4  Arthralgia (719 40) (M25 50)   5  BPH without urinary obstruction (600 00) (N40 0)   6  Bunion (727 1) (M21 619)   7  Callus (700) (L84)   4  QLGEVMP systolic congestive heart failure (428 22,428 0) (I50 22)   9  Depression with anxiety (300 4) (F41 8)   10  Diabetes mellitus with neurological manifestation (250 60) (E11 49)   11  Diabetes type 2, uncontrolled (250 02) (E11 65)   12  Difficulty concentrating (799 51) (R41 840)   13  Difficulty walking (719 7) (R26 2)   14  Dyspnea on exertion (786 09) (R06 09)   15  Encounter for prostate cancer screening (V76 44) (Z12 5)   16  Encounter for screening for malignant neoplasm of colon (V76 51) (Z12 11)   17  Fatigue (780 79) (R53 83)   18  Foot pain, bilateral (729 5) (M79 671,M79 672)   19  Hallux valgus (735 0) (M20 10)   20  Hematuria (599 70) (R31 9)   21  Hyperlipidemia (272 4) (E78 5)   22  Hypertension (401 9) (I10)   23  Hypogonadism, male (257 2) (E29 1)   24  Insomnia (780 52) (G47 00)   25  Morbid or severe obesity due to excess calories (278 01) (E66 01)   26  Need for pneumococcal vaccination (V03 82) (Z23)   27  Need for prophylactic vaccination and inoculation against influenza (V04 81) (Z23)   28  Nephrolithiasis (592 0) (N20 0)   29  Onychomycosis (110 1) (B35 1)   30  JEFF (obstructive sleep apnea) (327 23) (G47 33)   31  Sciatica (724 3) (M54 30)   32  Screening for colon cancer (V76 51) (Z12 11)   33  Seasonal allergies (477 9) (J30 2)   34  Shoulder pain, right (719 41) (M25 511)   35  Spinal stenosis (724 00) (M48 00)   36  Tinea pedis (110 4) (B35 3)   37  Type 2 diabetes mellitus (250 00) (E11 9)   38  Vitamin D deficiency (268 9) (E55 9)     Past Medical History    · History of Cellulitis of left leg (682 6) (L03 116)   · History of chest pain (V13 89) (G55 314)   · History of diarrhea (V12 79) (R82 864)   · History of onychomycosis (V12 09) (Z86 19)   · History of onychomycosis (V12 09) (Z86 19)   · History of Limb pain (729 5) (M79 609)   · History of Neck pain (723 1) (M54 2)   · Need for pneumococcal vaccination (V03 82) (Z23)   · History of Nephrolithiasis (V13 01)   · History of Numbness On The Sole Of The Right Foot   · History of Pain and swelling of left lower leg (729 5,729 81) (M79 662,M79 89)   · History of Pain of lower extremity (729 5) (M79 606)   · History of Pain, hand joint, right (719 44) (M79 641)   · Sciatica (724 3) (M54 30)     The active problems and past medical history were reviewed and updated today       Surgical History    · History of Knee Arthroscopy (Therapeutic)   · History of Knee Surgery   · History of Needle Biopsy Of Prostate     Family History   Mother    · Family history of Alzheimer Disease   · Family history of Family Health Status Of Mother - Alive  Father    · Family history of Family Health Status Of Father -   Family History    · Family history of Adopted   · Denied: Family history of Colon Cancer   · Denied: Family history of Crohn's Disease   · Denied: Family history of Liver Cancer     Social History    · Denied: History of Alcohol Use (History)   · Current Some Day Smoker (305 1)   · Denied: History of Exercise Habits   · Marital History - Currently    · Tobacco use (305 1) (Z72 0)   · Working Full Time  The social history was reviewed and is unchanged       The medication list was reviewed and updated today       Allergies   1  No Known Drug Allergies   Physical Exam   Left Foot: Appearance: Normal except as noted: excessive pronation-- and-- pes planus  Great toe deformities include a bunion  Tenderness: None except the great toe-- and-- distal first metatarsal     Right Foot: Appearance: Normal except as noted: excessive pronation-- and-- pes planus  Great toe deformities include a bunion  Tenderness: None except the great toe-- and-- distal first metatarsal     Left Ankle: ROM: limited ROM in all planes    Right Ankle: ROM: limited ROM in all planes    Neurological Exam: performed  Light touch was decreased bilaterally  Vibratory sensation was decreased in both first metatarsophalangeal joints     Vascular Exam: performed Dorsalis pedis pulses were present bilaterally  Posterior tibial pulses were present bilaterally  Elevation Pallor: absent bilaterally  Dependence rubor was absent bilaterally  Edema: none     Toenails:  All of the toenails were elongated,-- hypertrophied,-- discolored-- and-- Ptotic  Both first toenails were tender-- and-- Positive onychogryphosis          Socks and shoes removed, Right Foot Findings: swollen, erythematous and dry       The sensory exam showed diminished vibratory sensation at the level of the toes  Diminished tactile sensation with monofilament testing throughout the right foot       Socks and shoes removed, Left Foot Findings: swollen, erythematous and dry       The sensory exam showed diminished vibratory sensation at the level of the toes  Diminished tactile sensation with monofilament testing throughout the left foot      Capillary refills findings on the right were normal in the toes       Pulses:      2+ in the posterior tibialis on the right      2+ in the dorsalis pedis on the right       Capillary refills findings on the left were normal in the toes       Pulses:      1+ in the posterior tibialis on the left      1+ in the dorsalis pedis on the left       Assign Risk Category: 2: Loss of protective sensation with or without weakness, deformity, callus, pre-ulcer, or history of ulceration  High risk     Hyperkeratosis: present on both first toes,-- present on both first sub metatarsals,-- present on both third sub metatarsals-- and-- Severe profound moccasin tinea pedis bilateral     Shoe Gear Evaluation: performed ()  Recommendation(s): SAS style  Patient's shoes and socks removed  Assign Risk Category:  Deformity present;  Loss of protective sensation; Weak pulses       Risk: 2

## 2020-03-20 NOTE — ASSESSMENT & PLAN NOTE
Lab Results   Component Value Date    HGBA1C 8 2 (H) 03/17/2020     A1c elevated since stopping Januvia  Maintain on Trulicity, Jardiance and glimepiride  Discussed diet  Discussed starting insulin if symptoms do not improved

## 2020-03-20 NOTE — PROGRESS NOTES
Assessment/Plan:    Diabetic polyneuropathy associated with type 2 diabetes mellitus ([facility] )    Lab Results   Component Value Date    HGBA1C 8 2 (H) 03/17/2020     A1c elevated since stopping Januvia  Maintain on Trulicity, Jardiance and glimepiride  Discussed diet  Discussed starting insulin if symptoms do not improved  Essential hypertension  BP controlled on current regimen: amlodipine, enalapril and clonidine  Depression with anxiety  Fatigue slightly worse recently  On bupropion and sertraline  Mixed hyperlipidemia  Lipids at goal, not on statin  JEFF on CPAP  Compliant, at least 4 hrs qHS  BPH without urinary obstruction  On tamsulosin  Morbid obesity with body mass index (BMI) of 40 0 to 44 9 in adult ([facility] )  No weight change  Diagnoses and all orders for this visit:    Health maintenance examination  Comments:  Updated  Type 2 diabetes mellitus with diabetic neuropathy, without long-term current use of insulin ([facility])  -     Dulaglutide (Trulicity) 1 5 RH/4 2MA SOPN; Inject 0 5 mL (1 5 mg total) under the skin once a week  -     glimepiride (AMARYL) 4 mg tablet; Take 1 tablet (4 mg total) by mouth 2 (two) times a day  -     enalapril (VASOTEC) 20 mg tablet; Take 1 tablet (20 mg total) by mouth 2 (two) times a day  -     Empagliflozin 25 MG TABS; Take 1 tablet (25 mg total) by mouth every morning  -     Microalbumin / creatinine urine ratio; Future  -     Hemoglobin A1C; Future  -     Basic metabolic panel; Future    BPH without urinary obstruction  -     tamsulosin (FLOMAX) 0 4 mg; Take 1 capsule (0 4 mg total) by mouth daily    Depression with anxiety  -     sertraline (ZOLOFT) 100 mg tablet; Take 1 tablet (100 mg total) by mouth daily    Essential hypertension  -     enalapril (VASOTEC) 20 mg tablet; Take 1 tablet (20 mg total) by mouth 2 (two) times a day  -     cloNIDine (CATAPRES) 0 3 mg tablet;  Take 1 tablet (0 3 mg total) by mouth 2 (two) times a day  -     amLODIPine (NORVASC) 10 mg tablet; Take 1 tablet (10 mg total) by mouth daily    Moderate episode of recurrent major depressive disorder (HCC)  -     buPROPion (WELLBUTRIN XL) 150 mg 24 hr tablet; Take 1 tablet (150 mg total) by mouth daily    Diabetic polyneuropathy associated with type 2 diabetes mellitus ([address] )    Mixed hyperlipidemia    Morbid obesity with body mass index (BMI) of 40 0 to 44 9 in adult [facility])      Follow up in 4 months or as needed  Subjective:      Patient ID: Eliazar Goltz is a 61 y o  male  Mr Srini Marin is feeling alright  He reports feeling more tired recently  He sleeps about 4 hours at night using the CPAP  He has been having trouble sleeping through the night  He denies increased depression or anxiety  He reports working through lunch, does not nap during his lunch break like before  He has been stressed with work  He denies other symptoms such as chest pain, shortness of breath  He does not check his sugars  He admits drinking more soda recently, not like before  He had his foot exam with his podiatrist earlier today  The following portions of the patient's history were reviewed and updated as appropriate: allergies, current medications, past medical history, past social history and problem list     Review of Systems   Constitutional: Positive for fatigue  Negative for appetite change  HENT: Negative for congestion and hearing loss  Eyes: Negative  Negative for visual disturbance  Respiratory: Negative for cough, chest tightness and shortness of breath  Cardiovascular: Negative for chest pain, palpitations and leg swelling  Gastrointestinal: Negative for abdominal pain and constipation  Genitourinary: Negative for dysuria and frequency  Musculoskeletal: Negative for arthralgias  Skin: Negative for wound  Neurological: Negative for dizziness and headaches  Psychiatric/Behavioral: Positive for sleep disturbance   Negative for decreased concentration and dysphoric mood  The patient is not nervous/anxious  Objective:      /86   Pulse 67   Temp (!) 96 7 °F (35 9 °C) (Oral)   Resp 18   Ht 5' 10 5" (1 791 m)   Wt 126 kg (278 lb 3 2 oz)   SpO2 98%   BMI 39 35 kg/m²          Physical Exam   Constitutional: He is oriented to person, place, and time  He appears well-developed and well-nourished  HENT:   Head: Normocephalic and atraumatic  Mouth/Throat: Mucous membranes are normal    Eyes: Pupils are equal, round, and reactive to light  Conjunctivae are normal    Neck: Neck supple  Cardiovascular: Normal rate, regular rhythm and normal heart sounds  Pulmonary/Chest: Effort normal  He has no wheezes  He has no rhonchi  Abdominal: Soft  Bowel sounds are normal    Musculoskeletal: He exhibits no edema  Neurological: He is alert and oriented to person, place, and time  Skin: Skin is warm  No rash noted  Psychiatric: He has a normal mood and affect  His behavior is normal  He does not exhibit a depressed mood  Nursing note and vitals reviewed  Lab results reviewed with patient

## 2020-05-22 ENCOUNTER — OFFICE VISIT (OUTPATIENT)
Dept: PODIATRY | Facility: CLINIC | Age: 60
End: 2020-05-22
Payer: COMMERCIAL

## 2020-05-22 VITALS
SYSTOLIC BLOOD PRESSURE: 144 MMHG | WEIGHT: 278 LBS | HEIGHT: 71 IN | DIASTOLIC BLOOD PRESSURE: 86 MMHG | RESPIRATION RATE: 17 BRPM | BODY MASS INDEX: 38.92 KG/M2 | HEART RATE: 67 BPM

## 2020-05-22 DIAGNOSIS — M79.671 PAIN IN BOTH FEET: ICD-10-CM

## 2020-05-22 DIAGNOSIS — M21.969 ACQUIRED DEFORMITY OF FOOT, UNSPECIFIED LATERALITY: ICD-10-CM

## 2020-05-22 DIAGNOSIS — M79.672 PAIN IN BOTH FEET: ICD-10-CM

## 2020-05-22 DIAGNOSIS — L84 CORNS: ICD-10-CM

## 2020-05-22 DIAGNOSIS — B35.1 ONYCHOMYCOSIS: ICD-10-CM

## 2020-05-22 DIAGNOSIS — E11.42 DIABETIC POLYNEUROPATHY ASSOCIATED WITH TYPE 2 DIABETES MELLITUS (HCC): Primary | ICD-10-CM

## 2020-05-22 PROCEDURE — 99213 OFFICE O/P EST LOW 20 MIN: CPT | Performed by: PODIATRIST

## 2020-06-20 DIAGNOSIS — E11.40 TYPE 2 DIABETES MELLITUS WITH DIABETIC NEUROPATHY, WITHOUT LONG-TERM CURRENT USE OF INSULIN (HCC): ICD-10-CM

## 2020-06-20 DIAGNOSIS — F33.1 MODERATE EPISODE OF RECURRENT MAJOR DEPRESSIVE DISORDER (HCC): ICD-10-CM

## 2020-06-20 DIAGNOSIS — F41.8 DEPRESSION WITH ANXIETY: ICD-10-CM

## 2020-06-20 DIAGNOSIS — I10 ESSENTIAL HYPERTENSION: ICD-10-CM

## 2020-06-20 DIAGNOSIS — N40.0 BPH WITHOUT URINARY OBSTRUCTION: ICD-10-CM

## 2020-06-21 PROCEDURE — 4010F ACE/ARB THERAPY RXD/TAKEN: CPT | Performed by: INTERNAL MEDICINE

## 2020-06-21 RX ORDER — CLONIDINE HYDROCHLORIDE 0.3 MG/1
TABLET ORAL
Qty: 180 TABLET | Refills: 1 | Status: SHIPPED | OUTPATIENT
Start: 2020-06-21 | End: 2020-10-27 | Stop reason: SDUPTHER

## 2020-06-21 RX ORDER — SERTRALINE HYDROCHLORIDE 100 MG/1
TABLET, FILM COATED ORAL
Qty: 90 TABLET | Refills: 1 | Status: SHIPPED | OUTPATIENT
Start: 2020-06-21 | End: 2020-10-27 | Stop reason: SDUPTHER

## 2020-06-21 RX ORDER — AMLODIPINE BESYLATE 10 MG/1
TABLET ORAL
Qty: 90 TABLET | Refills: 1 | Status: SHIPPED | OUTPATIENT
Start: 2020-06-21 | End: 2020-10-27 | Stop reason: SDUPTHER

## 2020-06-21 RX ORDER — GLIMEPIRIDE 4 MG/1
TABLET ORAL
Qty: 180 TABLET | Refills: 1 | Status: SHIPPED | OUTPATIENT
Start: 2020-06-21 | End: 2020-10-27 | Stop reason: SDUPTHER

## 2020-06-21 RX ORDER — ENALAPRIL MALEATE 20 MG/1
TABLET ORAL
Qty: 180 TABLET | Refills: 1 | Status: SHIPPED | OUTPATIENT
Start: 2020-06-21 | End: 2020-10-27 | Stop reason: SDUPTHER

## 2020-06-21 RX ORDER — BUPROPION HYDROCHLORIDE 150 MG/1
TABLET ORAL
Qty: 90 TABLET | Refills: 1 | Status: SHIPPED | OUTPATIENT
Start: 2020-06-21 | End: 2020-10-27 | Stop reason: SDUPTHER

## 2020-06-21 RX ORDER — TAMSULOSIN HYDROCHLORIDE 0.4 MG/1
CAPSULE ORAL
Qty: 90 CAPSULE | Refills: 1 | Status: SHIPPED | OUTPATIENT
Start: 2020-06-21 | End: 2020-10-27 | Stop reason: SDUPTHER

## 2020-07-15 ENCOUNTER — TRANSCRIBE ORDERS (OUTPATIENT)
Dept: LAB | Facility: CLINIC | Age: 60
End: 2020-07-15

## 2020-07-15 ENCOUNTER — LAB (OUTPATIENT)
Dept: LAB | Facility: CLINIC | Age: 60
End: 2020-07-15
Payer: COMMERCIAL

## 2020-07-15 DIAGNOSIS — E11.40 TYPE 2 DIABETES MELLITUS WITH DIABETIC NEUROPATHY, WITHOUT LONG-TERM CURRENT USE OF INSULIN (HCC): ICD-10-CM

## 2020-07-15 LAB
ANION GAP SERPL CALCULATED.3IONS-SCNC: 11 MMOL/L (ref 4–13)
BUN SERPL-MCNC: 17 MG/DL (ref 5–25)
CALCIUM SERPL-MCNC: 8.7 MG/DL (ref 8.3–10.1)
CHLORIDE SERPL-SCNC: 103 MMOL/L (ref 100–108)
CO2 SERPL-SCNC: 23 MMOL/L (ref 21–32)
CREAT SERPL-MCNC: 1.32 MG/DL (ref 0.6–1.3)
CREAT UR-MCNC: 48.2 MG/DL
EST. AVERAGE GLUCOSE BLD GHB EST-MCNC: 200 MG/DL
GFR SERPL CREATININE-BSD FRML MDRD: 58 ML/MIN/1.73SQ M
GLUCOSE P FAST SERPL-MCNC: 148 MG/DL (ref 65–99)
HBA1C MFR BLD: 8.6 %
MICROALBUMIN UR-MCNC: 58.5 MG/L (ref 0–20)
MICROALBUMIN/CREAT 24H UR: 121 MG/G CREATININE (ref 0–30)
POTASSIUM SERPL-SCNC: 3.7 MMOL/L (ref 3.5–5.3)
SODIUM SERPL-SCNC: 137 MMOL/L (ref 136–145)

## 2020-07-15 PROCEDURE — 36415 COLL VENOUS BLD VENIPUNCTURE: CPT

## 2020-07-15 PROCEDURE — 3052F HG A1C>EQUAL 8.0%<EQUAL 9.0%: CPT | Performed by: INTERNAL MEDICINE

## 2020-07-15 PROCEDURE — 3060F POS MICROALBUMINURIA REV: CPT | Performed by: INTERNAL MEDICINE

## 2020-07-15 PROCEDURE — 82043 UR ALBUMIN QUANTITATIVE: CPT

## 2020-07-15 PROCEDURE — 80048 BASIC METABOLIC PNL TOTAL CA: CPT

## 2020-07-15 PROCEDURE — 82570 ASSAY OF URINE CREATININE: CPT

## 2020-07-15 PROCEDURE — 83036 HEMOGLOBIN GLYCOSYLATED A1C: CPT

## 2020-07-20 ENCOUNTER — OFFICE VISIT (OUTPATIENT)
Dept: INTERNAL MEDICINE CLINIC | Facility: CLINIC | Age: 60
End: 2020-07-20
Payer: COMMERCIAL

## 2020-07-20 VITALS
WEIGHT: 277 LBS | HEIGHT: 71 IN | TEMPERATURE: 98 F | OXYGEN SATURATION: 97 % | DIASTOLIC BLOOD PRESSURE: 84 MMHG | SYSTOLIC BLOOD PRESSURE: 118 MMHG | BODY MASS INDEX: 38.78 KG/M2 | RESPIRATION RATE: 18 BRPM | HEART RATE: 84 BPM

## 2020-07-20 DIAGNOSIS — E78.2 MIXED HYPERLIPIDEMIA: ICD-10-CM

## 2020-07-20 DIAGNOSIS — I10 ESSENTIAL HYPERTENSION: ICD-10-CM

## 2020-07-20 DIAGNOSIS — E66.01 MORBID OBESITY WITH BODY MASS INDEX (BMI) OF 40.0 TO 44.9 IN ADULT (HCC): ICD-10-CM

## 2020-07-20 DIAGNOSIS — G47.33 OSA ON CPAP: ICD-10-CM

## 2020-07-20 DIAGNOSIS — E11.42 DIABETIC POLYNEUROPATHY ASSOCIATED WITH TYPE 2 DIABETES MELLITUS (HCC): Primary | ICD-10-CM

## 2020-07-20 DIAGNOSIS — N40.0 BPH WITHOUT URINARY OBSTRUCTION: ICD-10-CM

## 2020-07-20 DIAGNOSIS — F41.8 DEPRESSION WITH ANXIETY: ICD-10-CM

## 2020-07-20 DIAGNOSIS — Z99.89 OSA ON CPAP: ICD-10-CM

## 2020-07-20 PROCEDURE — 99214 OFFICE O/P EST MOD 30 MIN: CPT | Performed by: INTERNAL MEDICINE

## 2020-07-20 PROCEDURE — 2022F DILAT RTA XM EVC RTNOPTHY: CPT | Performed by: INTERNAL MEDICINE

## 2020-07-20 PROCEDURE — 3074F SYST BP LT 130 MM HG: CPT | Performed by: INTERNAL MEDICINE

## 2020-07-20 PROCEDURE — 3052F HG A1C>EQUAL 8.0%<EQUAL 9.0%: CPT | Performed by: INTERNAL MEDICINE

## 2020-07-20 PROCEDURE — 3060F POS MICROALBUMINURIA REV: CPT | Performed by: INTERNAL MEDICINE

## 2020-07-20 PROCEDURE — 3008F BODY MASS INDEX DOCD: CPT | Performed by: INTERNAL MEDICINE

## 2020-07-20 PROCEDURE — 3079F DIAST BP 80-89 MM HG: CPT | Performed by: INTERNAL MEDICINE

## 2020-07-20 NOTE — PROGRESS NOTES
Assessment/Plan:    Diabetic polyneuropathy associated with type 2 diabetes mellitus ([place] [address] )    Lab Results   Component Value Date    HGBA1C 8 6 (H) 07/15/2020     A1c slightly worse  Discussed low carb diet, limit fast food and sodas  No medication change  On Trulicity, glimepiride and Jardiance  Discussed starting insulin if no improvement by next visit  Microalb improved, on ACE  Essential hypertension  BP controlled: on amlodipine, clonidine, enalapril  Morbid obesity with body mass index (BMI) of 40 0 to 44 9 in adult ([place] [address] )  No weight change  Depression with anxiety  Stable, on sertraline and bupropion  EJFF on CPAP  Complaint, needs new supplies  BPH without urinary obstruction  On tamsulosin  Diagnoses and all orders for this visit:    Diabetic polyneuropathy associated with type 2 diabetes mellitus ([place] [address] )  -     CBC and differential; Future  -     Comprehensive metabolic panel; Future  -     Hemoglobin A1C; Future    Mixed hyperlipidemia    Morbid obesity with body mass index (BMI) of 40 0 to 44 9 in adult [facility])    Depression with anxiety    JEFF on CPAP    BPH without urinary obstruction    Essential hypertension      Follow up in 4 months or as needed  Subjective:      Patient ID: Foreign Lyn is a 61 y o  male  Mr Leung Phi feels well  He reports that he was out of work for about 3 weeks only, during the pandemic  He remains stressed at work, thinking of retiring  He started drinking soda again, at least a liter a day  He and his wife orders take out at least 3 days a week  He eats fast food frequently  He has been busy with yard work, fixing his garden  He has been using his CPAP for at least 5-6 hours every night  He reports fatigue is about the same  He denies any chest pain, shortness of breath, palpitations or dizziness        The following portions of the patient's history were reviewed and updated as appropriate: allergies, current medications, past medical history, past social history and problem list     Review of Systems   Constitutional: Negative for appetite change and fatigue  HENT: Negative for congestion and hearing loss  Eyes: Negative  Respiratory: Negative for cough, chest tightness and shortness of breath  Cardiovascular: Negative for chest pain, palpitations and leg swelling  Gastrointestinal: Negative for abdominal pain and constipation  Genitourinary: Negative for difficulty urinating, dysuria, frequency and urgency  Musculoskeletal: Negative for arthralgias and gait problem  Neurological: Negative for dizziness and headaches  Psychiatric/Behavioral: Negative for dysphoric mood and sleep disturbance  The patient is not nervous/anxious  Objective:      /84   Pulse 84   Temp 98 °F (36 7 °C) (Tympanic)   Resp 18   Ht 5' 10 5" (1 791 m)   Wt 126 kg (277 lb)   SpO2 97%   BMI 39 18 kg/m²          Physical Exam   Constitutional: He is oriented to person, place, and time  He appears well-developed and well-nourished  HENT:   Head: Normocephalic and atraumatic  Mouth/Throat: Mucous membranes are normal    Eyes: Pupils are equal, round, and reactive to light  Conjunctivae are normal    Neck: Neck supple  Cardiovascular: Normal rate, regular rhythm and normal heart sounds  Pulmonary/Chest: Effort normal  He has no wheezes  He has no rhonchi  Abdominal: Soft  Bowel sounds are normal    Musculoskeletal: He exhibits no edema  Neurological: He is alert and oriented to person, place, and time  Skin: Skin is warm  No rash noted  Psychiatric: He has a normal mood and affect  His behavior is normal    Nursing note and vitals reviewed  Lab results reviewed with patient  BMI Counseling: Body mass index is 39 18 kg/m²  The BMI is above normal  Nutrition recommendations include reducing portion sizes, reducing fast food intake, consuming healthier snacks and decreasing soda and/or juice intake   Exercise recommendations include exercising 3-5 times per week

## 2020-07-20 NOTE — ASSESSMENT & PLAN NOTE
Lab Results   Component Value Date    HGBA1C 8 6 (H) 07/15/2020     A1c slightly worse  Discussed low carb diet, limit fast food and sodas  No medication change  On Trulicity, glimepiride and Jardiance  Discussed starting insulin if no improvement by next visit  Microalb improved, on ACE

## 2020-07-20 NOTE — PATIENT INSTRUCTIONS
Meal Planning with the Plate Method   AMBULATORY CARE:   Meal planning with the plate method  is a simple way for people with diabetes to plan meals  This method can help you eat the right amount of carbohydrates and keep your blood sugar levels under control  Carbohydrates naturally raise your blood sugar level  Your blood sugar level can rise too high if you eat too much carbohydrate at one time  Carbohydrates are found in starches (bread, cereal, starchy vegetables, and beans), fruit, milk, yogurt, and sweets  How to use the plate method to plan meals:   · Draw an imaginary line down the middle of a 9-inch dinner plate  On one side, draw another line to divide that section in half  Your plate will have 3 sections  · Fill the largest section of your plate with non-starchy vegetables  These include broccoli, spinach, cucumbers, peppers, cauliflower, and tomatoes  · Add a starch to 1 of the small sections of your plate  Starches include pasta, rice, whole-grain bread, tortillas, corn, potatoes, and beans  · Add meat or another source of protein to the other small section of your plate  Examples include chicken or turkey without skin, fish, lean beef or pork, low-fat cheese, tofu, or eggs  · Add dairy or fruit to the side of your plate if your meal plan allows  Examples of dairy include skim or 1% milk or low-fat yogurt  If you do not drink milk, you may be able to add another serving of starchy food instead  · Add a low-calorie or calorie-free drink such as water or unsweetened tea or coffee         Serving sizes of foods:   · Non-starchy vegetables:      ¨ ½ cup of cooked vegetables or 1 cup of raw vegetables    ¨ ½ cup of vegetable juice    · Starches:      ¨ 1 ounce of whole-wheat bread or 1 small (6 inch) flour or corn tortilla    ¨ 1 small (4 inch) pancake (about ¼ inch thick)    ¨ ¾ cup of dry, unsweetened, whole-grain ready-to-eat cereal or ¼ cup of low-fat granola    ¨ ½ cup of cooked cereal or oatmeal    ¨ ? cup of rice or pasta     ¨ ½ cup of corn, green peas, sweet potatoes, or mashed potatoes    ¨ ½ cup of cooked beans and peas (garbanzo, charles, kidney, white, split, black-eyed)    · Meat and other protein sources:      ¨ 3 to 4 ounces of any lean meat, fish, or poultry    ¨ ½ cup of tofu or tempeh    ¨ 1 large egg    ¨ 1½ ounces (about 2 tablespoons) of nuts or 2 tablespoons of peanut butter    · Fruit:      ¨ 1 small piece of fresh fruit     ¨ ½ cup of canned or fresh fruit or unsweetened fruit juice    ¨ ¼ cup of dried fruit    · Milk and yogurt:      ¨ 1 cup (8 ounces) of skim or 1% milk    ¨ ¾ cup (6 ounces) of plain, non-fat yogurt  Other guidelines to follow:   · Limit salt and sugar  Choose and prepare foods and drinks with less salt and added sugars  Use the nutrition information on food labels to help you make healthy choices  The percent daily value listed on the food label tells you whether a food is low or high in certain nutrients  A percent daily value of 5% or less means that the food is low in a nutrient  A percent daily value of 20% or more means that the food is high in a nutrient  · Choose healthy fats  Choose healthy fats such as polyunsaturated and monounsaturated fats in place of unhealthy fats  Healthy fats are found in vegetable oils such as soybean, corn, canola, olive, and sunflower oil  Unhealthy fats are saturated fats, trans fats, and cholesterol  Unhealthy fats are found in shortening, butter, stick margarine, and animal fat  · Ask your healthcare provider if alcohol is safe for you  and how much is safe for you  If you choose to drink alcohol, drink it with meals  When you drink alcohol on an empty stomach, your blood sugar may fall to a low level  © 2017 [employer]  Information is for End User's use only and may not be sold, redistributed or otherwise used for commercial purposes   All illustrations and images included in [facility] are the copyrighted property of [employer]  or [employer]  The above information is an  only  It is not intended as medical advice for individual conditions or treatments  Talk to your doctor, nurse or pharmacist before following any medical regimen to see if it is safe and effective for you

## 2020-07-27 ENCOUNTER — OFFICE VISIT (OUTPATIENT)
Dept: PODIATRY | Facility: CLINIC | Age: 60
End: 2020-07-27
Payer: COMMERCIAL

## 2020-07-27 VITALS
HEIGHT: 71 IN | SYSTOLIC BLOOD PRESSURE: 118 MMHG | BODY MASS INDEX: 38.78 KG/M2 | DIASTOLIC BLOOD PRESSURE: 84 MMHG | WEIGHT: 277 LBS | HEART RATE: 84 BPM | RESPIRATION RATE: 17 BRPM

## 2020-07-27 DIAGNOSIS — L84 CORNS: ICD-10-CM

## 2020-07-27 DIAGNOSIS — M21.969 ACQUIRED DEFORMITY OF FOOT, UNSPECIFIED LATERALITY: ICD-10-CM

## 2020-07-27 DIAGNOSIS — B35.1 ONYCHOMYCOSIS: ICD-10-CM

## 2020-07-27 DIAGNOSIS — M79.671 PAIN IN BOTH FEET: ICD-10-CM

## 2020-07-27 DIAGNOSIS — M79.672 PAIN IN BOTH FEET: ICD-10-CM

## 2020-07-27 DIAGNOSIS — E11.42 DIABETIC POLYNEUROPATHY ASSOCIATED WITH TYPE 2 DIABETES MELLITUS (HCC): Primary | ICD-10-CM

## 2020-07-27 PROCEDURE — 99213 OFFICE O/P EST LOW 20 MIN: CPT | Performed by: PODIATRIST

## 2020-08-12 DIAGNOSIS — E11.40 TYPE 2 DIABETES MELLITUS WITH DIABETIC NEUROPATHY, WITHOUT LONG-TERM CURRENT USE OF INSULIN (HCC): ICD-10-CM

## 2020-08-12 RX ORDER — EMPAGLIFLOZIN 25 MG/1
TABLET, FILM COATED ORAL
Qty: 90 TABLET | Refills: 1 | Status: SHIPPED | OUTPATIENT
Start: 2020-08-12 | End: 2020-09-08 | Stop reason: HOSPADM

## 2020-09-05 ENCOUNTER — NURSE TRIAGE (OUTPATIENT)
Dept: OTHER | Facility: OTHER | Age: 60
End: 2020-09-05

## 2020-09-05 ENCOUNTER — APPOINTMENT (EMERGENCY)
Dept: RADIOLOGY | Facility: HOSPITAL | Age: 60
DRG: 872 | End: 2020-09-05
Payer: COMMERCIAL

## 2020-09-05 ENCOUNTER — HOSPITAL ENCOUNTER (INPATIENT)
Facility: HOSPITAL | Age: 60
LOS: 3 days | Discharge: HOME | DRG: 872 | End: 2020-09-08
Attending: EMERGENCY MEDICINE | Admitting: FAMILY MEDICINE
Payer: COMMERCIAL

## 2020-09-05 DIAGNOSIS — N39.0 UTI (URINARY TRACT INFECTION): Primary | ICD-10-CM

## 2020-09-05 DIAGNOSIS — R78.81 BACTEREMIA: ICD-10-CM

## 2020-09-05 PROBLEM — R65.20 SEVERE SEPSIS (HCC): Status: ACTIVE | Noted: 2020-09-05

## 2020-09-05 PROBLEM — E11.9 TYPE 2 DIABETES MELLITUS (HCC): Status: ACTIVE | Noted: 2020-09-05

## 2020-09-05 PROBLEM — A41.9 SEPSIS (HCC): Status: ACTIVE | Noted: 2020-09-05

## 2020-09-05 LAB
ALBUMIN SERPL BCP-MCNC: 4.1 G/DL (ref 3.5–5)
ALP SERPL-CCNC: 86 U/L (ref 46–116)
ALT SERPL W P-5'-P-CCNC: 33 U/L (ref 12–78)
ANION GAP SERPL CALCULATED.3IONS-SCNC: 11 MMOL/L (ref 4–13)
APTT PPP: 29 SECONDS (ref 23–37)
AST SERPL W P-5'-P-CCNC: 18 U/L (ref 5–45)
ATRIAL RATE: 111 BPM
ATRIAL RATE: 114 BPM
BACTERIA UR QL AUTO: ABNORMAL /HPF
BASOPHILS # BLD MANUAL: 0 THOUSAND/UL (ref 0–0.1)
BASOPHILS NFR MAR MANUAL: 0 % (ref 0–1)
BILIRUB SERPL-MCNC: 2.27 MG/DL (ref 0.2–1)
BILIRUB UR QL STRIP: NEGATIVE
BUN SERPL-MCNC: 17 MG/DL (ref 5–25)
CALCIUM SERPL-MCNC: 9.1 MG/DL (ref 8.3–10.1)
CHLORIDE SERPL-SCNC: 102 MMOL/L (ref 100–108)
CLARITY UR: ABNORMAL
CO2 SERPL-SCNC: 20 MMOL/L (ref 21–32)
COLOR UR: YELLOW
CREAT SERPL-MCNC: 1.53 MG/DL (ref 0.6–1.3)
EOSINOPHIL # BLD MANUAL: 0 THOUSAND/UL (ref 0–0.4)
EOSINOPHIL NFR BLD MANUAL: 0 % (ref 0–6)
ERYTHROCYTE [DISTWIDTH] IN BLOOD BY AUTOMATED COUNT: 12.8 % (ref 11.6–15.1)
GFR SERPL CREATININE-BSD FRML MDRD: 49 ML/MIN/1.73SQ M
GLUCOSE SERPL-MCNC: 125 MG/DL (ref 65–140)
GLUCOSE SERPL-MCNC: 195 MG/DL (ref 65–140)
GLUCOSE SERPL-MCNC: 302 MG/DL (ref 65–140)
GLUCOSE SERPL-MCNC: 84 MG/DL (ref 65–140)
GLUCOSE UR STRIP-MCNC: ABNORMAL MG/DL
HCT VFR BLD AUTO: 50.7 % (ref 36.5–49.3)
HGB BLD-MCNC: 17.3 G/DL (ref 12–17)
HGB UR QL STRIP.AUTO: ABNORMAL
INR PPP: 1.07 (ref 0.84–1.19)
KETONES UR STRIP-MCNC: NEGATIVE MG/DL
LACTATE SERPL-SCNC: 1.9 MMOL/L (ref 0.5–2)
LACTATE SERPL-SCNC: 2.1 MMOL/L (ref 0.5–2)
LEUKOCYTE ESTERASE UR QL STRIP: ABNORMAL
LYMPHOCYTES # BLD AUTO: 0.42 THOUSAND/UL (ref 0.6–4.47)
LYMPHOCYTES # BLD AUTO: 2 % (ref 14–44)
MCH RBC QN AUTO: 31.5 PG (ref 26.8–34.3)
MCHC RBC AUTO-ENTMCNC: 34.1 G/DL (ref 31.4–37.4)
MCV RBC AUTO: 92 FL (ref 82–98)
MONOCYTES # BLD AUTO: 0.21 THOUSAND/UL (ref 0–1.22)
MONOCYTES NFR BLD: 1 % (ref 4–12)
NEUTROPHILS # BLD MANUAL: 20.25 THOUSAND/UL (ref 1.85–7.62)
NEUTS BAND NFR BLD MANUAL: 5 % (ref 0–8)
NEUTS SEG NFR BLD AUTO: 92 % (ref 43–75)
NITRITE UR QL STRIP: NEGATIVE
NON-SQ EPI CELLS URNS QL MICRO: ABNORMAL /HPF
NRBC BLD AUTO-RTO: 0 /100 WBCS
P AXIS: 44 DEGREES
P AXIS: 59 DEGREES
PH UR STRIP.AUTO: 5.5 [PH]
PLATELET # BLD AUTO: 240 THOUSANDS/UL (ref 149–390)
PLATELET BLD QL SMEAR: ADEQUATE
PMV BLD AUTO: 9.1 FL (ref 8.9–12.7)
POTASSIUM SERPL-SCNC: 3.8 MMOL/L (ref 3.5–5.3)
PR INTERVAL: 138 MS
PR INTERVAL: 140 MS
PROCALCITONIN SERPL-MCNC: 0.13 NG/ML
PROT SERPL-MCNC: 8.1 G/DL (ref 6.4–8.2)
PROT UR STRIP-MCNC: ABNORMAL MG/DL
PROTHROMBIN TIME: 14 SECONDS (ref 11.6–14.5)
QRS AXIS: 53 DEGREES
QRS AXIS: 57 DEGREES
QRSD INTERVAL: 86 MS
QRSD INTERVAL: 90 MS
QT INTERVAL: 312 MS
QT INTERVAL: 322 MS
QTC INTERVAL: 421 MS
QTC INTERVAL: 428 MS
RBC # BLD AUTO: 5.5 MILLION/UL (ref 3.88–5.62)
RBC #/AREA URNS AUTO: ABNORMAL /HPF
RBC MORPH BLD: NORMAL
SODIUM SERPL-SCNC: 133 MMOL/L (ref 136–145)
SP GR UR STRIP.AUTO: 1.01 (ref 1–1.03)
T WAVE AXIS: -5 DEGREES
T WAVE AXIS: 17 DEGREES
TOTAL CELLS COUNTED SPEC: 100
TROPONIN I SERPL-MCNC: <0.02 NG/ML
UROBILINOGEN UR QL STRIP.AUTO: 0.2 E.U./DL
VENTRICULAR RATE: 106 BPM
VENTRICULAR RATE: 109 BPM
WBC # BLD AUTO: 20.88 THOUSAND/UL (ref 4.31–10.16)
WBC #/AREA URNS AUTO: ABNORMAL /HPF

## 2020-09-05 PROCEDURE — 93010 ELECTROCARDIOGRAM REPORT: CPT | Performed by: INTERNAL MEDICINE

## 2020-09-05 PROCEDURE — 93005 ELECTROCARDIOGRAM TRACING: CPT

## 2020-09-05 PROCEDURE — 81001 URINALYSIS AUTO W/SCOPE: CPT

## 2020-09-05 PROCEDURE — 87040 BLOOD CULTURE FOR BACTERIA: CPT

## 2020-09-05 PROCEDURE — 84484 ASSAY OF TROPONIN QUANT: CPT

## 2020-09-05 PROCEDURE — 82948 REAGENT STRIP/BLOOD GLUCOSE: CPT

## 2020-09-05 PROCEDURE — 99285 EMERGENCY DEPT VISIT HI MDM: CPT

## 2020-09-05 PROCEDURE — 87086 URINE CULTURE/COLONY COUNT: CPT | Performed by: EMERGENCY MEDICINE

## 2020-09-05 PROCEDURE — 85730 THROMBOPLASTIN TIME PARTIAL: CPT

## 2020-09-05 PROCEDURE — 71045 X-RAY EXAM CHEST 1 VIEW: CPT

## 2020-09-05 PROCEDURE — 85027 COMPLETE CBC AUTOMATED: CPT

## 2020-09-05 PROCEDURE — 80053 COMPREHEN METABOLIC PANEL: CPT

## 2020-09-05 PROCEDURE — 87077 CULTURE AEROBIC IDENTIFY: CPT

## 2020-09-05 PROCEDURE — 36415 COLL VENOUS BLD VENIPUNCTURE: CPT

## 2020-09-05 PROCEDURE — 85007 BL SMEAR W/DIFF WBC COUNT: CPT

## 2020-09-05 PROCEDURE — 96365 THER/PROPH/DIAG IV INF INIT: CPT

## 2020-09-05 PROCEDURE — 87186 SC STD MICRODIL/AGAR DIL: CPT

## 2020-09-05 PROCEDURE — 85610 PROTHROMBIN TIME: CPT

## 2020-09-05 PROCEDURE — 83605 ASSAY OF LACTIC ACID: CPT

## 2020-09-05 PROCEDURE — 84145 PROCALCITONIN (PCT): CPT

## 2020-09-05 PROCEDURE — 99223 1ST HOSP IP/OBS HIGH 75: CPT | Performed by: NURSE PRACTITIONER

## 2020-09-05 PROCEDURE — 99285 EMERGENCY DEPT VISIT HI MDM: CPT | Performed by: EMERGENCY MEDICINE

## 2020-09-05 RX ORDER — METHOCARBAMOL 500 MG/1
500 TABLET, FILM COATED ORAL EVERY 8 HOURS PRN
Status: DISCONTINUED | OUTPATIENT
Start: 2020-09-05 | End: 2020-09-08 | Stop reason: HOSPADM

## 2020-09-05 RX ORDER — LISINOPRIL 20 MG/1
40 TABLET ORAL DAILY
Status: DISCONTINUED | OUTPATIENT
Start: 2020-09-06 | End: 2020-09-06

## 2020-09-05 RX ORDER — POLYETHYLENE GLYCOL 3350 17 G/17G
17 POWDER, FOR SOLUTION ORAL DAILY PRN
Status: DISCONTINUED | OUTPATIENT
Start: 2020-09-05 | End: 2020-09-08 | Stop reason: HOSPADM

## 2020-09-05 RX ORDER — AMLODIPINE BESYLATE 10 MG/1
10 TABLET ORAL DAILY
Status: DISCONTINUED | OUTPATIENT
Start: 2020-09-06 | End: 2020-09-08 | Stop reason: HOSPADM

## 2020-09-05 RX ORDER — BUPROPION HYDROCHLORIDE 150 MG/1
150 TABLET ORAL DAILY
Status: DISCONTINUED | OUTPATIENT
Start: 2020-09-05 | End: 2020-09-08 | Stop reason: HOSPADM

## 2020-09-05 RX ORDER — CLONIDINE HYDROCHLORIDE 0.1 MG/1
0.3 TABLET ORAL EVERY 12 HOURS SCHEDULED
Status: DISCONTINUED | OUTPATIENT
Start: 2020-09-05 | End: 2020-09-08 | Stop reason: HOSPADM

## 2020-09-05 RX ORDER — SERTRALINE HYDROCHLORIDE 100 MG/1
100 TABLET, FILM COATED ORAL DAILY
Status: DISCONTINUED | OUTPATIENT
Start: 2020-09-05 | End: 2020-09-08 | Stop reason: HOSPADM

## 2020-09-05 RX ORDER — GLIMEPIRIDE 2 MG/1
4 TABLET ORAL
Status: DISCONTINUED | OUTPATIENT
Start: 2020-09-05 | End: 2020-09-06

## 2020-09-05 RX ORDER — ONDANSETRON 2 MG/ML
4 INJECTION INTRAMUSCULAR; INTRAVENOUS EVERY 6 HOURS PRN
Status: DISCONTINUED | OUTPATIENT
Start: 2020-09-05 | End: 2020-09-08 | Stop reason: HOSPADM

## 2020-09-05 RX ORDER — MAGNESIUM HYDROXIDE/ALUMINUM HYDROXICE/SIMETHICONE 120; 1200; 1200 MG/30ML; MG/30ML; MG/30ML
30 SUSPENSION ORAL EVERY 6 HOURS PRN
Status: DISCONTINUED | OUTPATIENT
Start: 2020-09-05 | End: 2020-09-08 | Stop reason: HOSPADM

## 2020-09-05 RX ORDER — HEPARIN SODIUM 5000 [USP'U]/ML
5000 INJECTION, SOLUTION INTRAVENOUS; SUBCUTANEOUS EVERY 8 HOURS SCHEDULED
Status: DISCONTINUED | OUTPATIENT
Start: 2020-09-05 | End: 2020-09-08 | Stop reason: HOSPADM

## 2020-09-05 RX ORDER — ACETAMINOPHEN 325 MG/1
650 TABLET ORAL EVERY 6 HOURS PRN
Status: DISCONTINUED | OUTPATIENT
Start: 2020-09-05 | End: 2020-09-08 | Stop reason: HOSPADM

## 2020-09-05 RX ORDER — TAMSULOSIN HYDROCHLORIDE 0.4 MG/1
0.4 CAPSULE ORAL
Status: DISCONTINUED | OUTPATIENT
Start: 2020-09-05 | End: 2020-09-08 | Stop reason: HOSPADM

## 2020-09-05 RX ADMIN — CLONIDINE HYDROCHLORIDE 0.3 MG: 0.1 TABLET ORAL at 21:31

## 2020-09-05 RX ADMIN — HEPARIN SODIUM 5000 UNITS: 5000 INJECTION INTRAVENOUS; SUBCUTANEOUS at 10:21

## 2020-09-05 RX ADMIN — TAMSULOSIN HYDROCHLORIDE 0.4 MG: 0.4 CAPSULE ORAL at 16:31

## 2020-09-05 RX ADMIN — SODIUM CHLORIDE, SODIUM LACTATE, POTASSIUM CHLORIDE, AND CALCIUM CHLORIDE 1000 ML: .6; .31; .03; .02 INJECTION, SOLUTION INTRAVENOUS at 07:31

## 2020-09-05 RX ADMIN — ACETAMINOPHEN 650 MG: 325 TABLET, FILM COATED ORAL at 16:31

## 2020-09-05 RX ADMIN — INSULIN LISPRO 8 UNITS: 100 INJECTION, SOLUTION INTRAVENOUS; SUBCUTANEOUS at 12:32

## 2020-09-05 RX ADMIN — ACETAMINOPHEN 650 MG: 325 TABLET, FILM COATED ORAL at 22:38

## 2020-09-05 RX ADMIN — HEPARIN SODIUM 5000 UNITS: 5000 INJECTION INTRAVENOUS; SUBCUTANEOUS at 21:31

## 2020-09-05 RX ADMIN — CEFTRIAXONE SODIUM 1000 MG: 10 INJECTION, POWDER, FOR SOLUTION INTRAVENOUS at 06:34

## 2020-09-05 NOTE — ASSESSMENT & PLAN NOTE
· Presents to ED with urinary frequency, dysuria, fever  Started on [place] on 8/12    · UA:  Innumerable WBC, occasional bacteria  · Continue Rocephin, follow-up culture

## 2020-09-05 NOTE — ED PROVIDER NOTES
History  Chief Complaint   Patient presents with    Fever - 9 weeks to 76 years     HPI     Patient is a pleasant 61 yom with urinary frequency and dysuria that started yesterday  Dysuria is burning in nature  No other associated pain  No rashes  No chest pain or sob  History of kidney stone but he denies any pain whatsoever  This does not feel like one of his stones, simply a UTI  No abdominal pain or tenderness  Patient has a distant history of UTI in the past      + history of bph  MDM suspect UTI, will workup, doubt stone or acute intraabdominal process  Prior to Admission Medications   Prescriptions Last Dose Informant Patient Reported? Taking?    Dulaglutide (Trulicity) 1 5 FS/6 6NU SOPN  Self No Yes   Sig: Inject 0 5 mL (1 5 mg total) under the skin once a week   Jardiance 25 MG TABS   No Yes   Sig: TAKE 1 TABLET(25 MG) BY MOUTH EVERY MORNING   amLODIPine (NORVASC) 10 mg tablet  Self No Yes   Sig: TAKE 1 TABLET(10 MG) BY MOUTH DAILY   buPROPion (WELLBUTRIN XL) 150 mg 24 hr tablet  Self No Yes   Sig: TAKE 1 TABLET(150 MG) BY MOUTH DAILY   cloNIDine (CATAPRES) 0 3 mg tablet  Self No Yes   Sig: TAKE 1 TABLET(0 3 MG) BY MOUTH TWICE DAILY   enalapril (VASOTEC) 20 mg tablet  Self No Yes   Sig: TAKE 1 TABLET(20 MG) BY MOUTH TWICE DAILY   glimepiride (AMARYL) 4 mg tablet  Self No Yes   Sig: TAKE 1 TABLET(4 MG) BY MOUTH TWICE DAILY   sertraline (ZOLOFT) 100 mg tablet  Self No Yes   Sig: TAKE 1 TABLET(100 MG) BY MOUTH DAILY   tamsulosin (FLOMAX) 0 4 mg  Self No Yes   Sig: TAKE 1 CAPSULE(0 4 MG) BY MOUTH DAILY      Facility-Administered Medications: None       Past Medical History:   Diagnosis Date    Acute renal failure (ARF) (HCC)     last assessed - [id number]    Chest pain     last assessed - [id number]    CPAP (continuous positive airway pressure) dependence     Depression     Diabetes mellitus (HCC)     Dyspnea on exertion     last assessed - [id number]    Hypertension     Nephrolithiasis     Numbness of right foot     numbness on the sole of the right foot; since he was young d/t stenosis    Obesity     Onychomycosis     last assessed - [id number]    Sciatica     last assessed - [id number]    Sleep apnea        Past Surgical History:   Procedure Laterality Date    COLONOSCOPY      CYSTOSCOPY W/ LASER LITHOTRIPSY      KNEE ARTHROSCOPY Left 11/2013    MO LAP,CHOLECYSTECTOMY N/A 2/8/2019    Procedure: ROBOTIC ASSISTED LAPAROSCOPIC CHOLECYSTECTOMY;  Surgeon: Lolita Nicolas DO;  [facility];  Service: General    PROSTATE BIOPSY      Needle Biopsy - Negative       Family History   Adopted: Yes   Problem Relation Age of Onset    Alzheimer's disease Mother     Alcohol abuse Neg Hx     Drug abuse Neg Hx     Depression Neg Hx     Substance Abuse Neg Hx     Mental illness Neg Hx      I have reviewed and agree with the history as documented  E-Cigarette/Vaping     E-Cigarette/Vaping Substances     Social History     Tobacco Use    Smoking status: Current Some Day Smoker     Types: Cigars    Smokeless tobacco: Never Used    Tobacco comment: 3 cigars per week   Substance Use Topics    Alcohol use: No    Drug use: No       Review of Systems   Gastrointestinal: Negative for abdominal pain  Genitourinary: Positive for dysuria and frequency  All other systems reviewed and are negative  Physical Exam  Physical Exam  Vitals signs and nursing note reviewed  Constitutional:       Appearance: He is well-developed  He is not diaphoretic  HENT:      Head: Normocephalic and atraumatic  Eyes:      Pupils: Pupils are equal, round, and reactive to light  Neck:      Musculoskeletal: Normal range of motion and neck supple  Cardiovascular:      Rate and Rhythm: Normal rate and regular rhythm  Heart sounds: Normal heart sounds  No murmur  Pulmonary:      Effort: Pulmonary effort is normal  No respiratory distress  Breath sounds: Normal breath sounds   No wheezing  Abdominal:      General: Bowel sounds are normal  There is no distension  Palpations: Abdomen is soft  Tenderness: There is no abdominal tenderness  Musculoskeletal: Normal range of motion  General: No tenderness  Skin:     General: Skin is warm and dry  Findings: No erythema  Neurological:      General: No focal deficit present  Mental Status: He is alert and oriented to person, place, and time        Coordination: Coordination normal    Psychiatric:         Mood and Affect: Mood normal          Behavior: Behavior normal          Vital Signs  ED Triage Vitals   Temperature Pulse Respirations Blood Pressure SpO2   09/05/20 0606 09/05/20 0606 09/05/20 0606 09/05/20 0607 09/05/20 0606   99 4 °F (37 4 °C) (!) 127 22 142/75 94 %      Temp Source Heart Rate Source Patient Position - Orthostatic VS BP Location FiO2 (%)   09/05/20 0606 09/05/20 0606 09/05/20 0606 09/05/20 0606 --   Oral Monitor Sitting Right arm       Pain Score       09/05/20 0606       6           Vitals:    09/05/20 0811 09/05/20 1527 09/05/20 2131 09/05/20 2233   BP: 107/60 132/72 139/73 122/70   Pulse: 78 85  84   Patient Position - Orthostatic VS: Lying Lying  Lying         Visual Acuity      ED Medications  Medications   glimepiride (AMARYL) tablet 4 mg (4 mg Oral Refused 9/5/20 1019)   amLODIPine (NORVASC) tablet 10 mg (has no administration in time range)   buPROPion (WELLBUTRIN XL) 24 hr tablet 150 mg (150 mg Oral Refused 9/5/20 1019)   cloNIDine (CATAPRES) tablet 0 3 mg (0 3 mg Oral Given 9/5/20 2131)   lisinopril (ZESTRIL) tablet 40 mg (has no administration in time range)   sertraline (ZOLOFT) tablet 100 mg (100 mg Oral Refused 9/5/20 1019)   tamsulosin (FLOMAX) capsule 0 4 mg (0 4 mg Oral Given 9/5/20 1631)   ondansetron (ZOFRAN) injection 4 mg (has no administration in time range)   polyethylene glycol (MIRALAX) packet 17 g (has no administration in time range)   aluminum-magnesium hydroxide-simethicone (MYLANTA) 200-200-20 mg/5 mL oral suspension 30 mL (has no administration in time range)   heparin (porcine) subcutaneous injection 5,000 Units (5,000 Units Subcutaneous Given 9/5/20 2131)   insulin lispro (HumaLOG) 100 units/mL subcutaneous injection 2-12 Units ( Subcutaneous Not Given 9/5/20 1636)   insulin lispro (HumaLOG) 100 units/mL subcutaneous injection 1-5 Units (1 Units Subcutaneous Not Given 9/5/20 2127)   acetaminophen (TYLENOL) tablet 650 mg (650 mg Oral Given 9/5/20 2238)   methocarbamol (ROBAXIN) tablet 500 mg (has no administration in time range)   cefTRIAXone (ROCEPHIN) 1,000 mg in dextrose 5 % 50 mL IVPB (has no administration in time range)   ceftriaxone (ROCEPHIN) 1 g/50 mL in dextrose IVPB (0 mg Intravenous Stopped 9/5/20 0704)   lactated ringers bolus 1,000 mL (1,000 mL Intravenous New Bag 9/5/20 0731)       Diagnostic Studies  Results Reviewed     Procedure Component Value Units Date/Time    Blood culture #1 [[id number]] Collected:  09/05/20 5428    Lab Status:  Preliminary result Specimen:  Blood from Arm, Right Updated:  09/05/20 1601     Blood Culture Received in Microbiology Lab  Culture in Progress  Blood culture #2 [[id number]] Collected:  09/05/20 0616    Lab Status:  Preliminary result Specimen:  Blood from Arm, Right Updated:  09/05/20 1601     Blood Culture Received in Microbiology Lab  Culture in Progress  Procalcitonin with AM Reflex [[id number]]  (Normal) Collected:  09/05/20 0616    Lab Status:  Final result Specimen:  Blood from Arm, Right Updated:  09/05/20 1222     Procalcitonin 0 13 ng/ml     Lactic acid 2 Hours [[id number]]  (Normal) Collected:  09/05/20 1002    Lab Status:  Final result Specimen:  Blood from Hand, Right Updated:  09/05/20 1049     LACTIC ACID 1 9 mmol/L     Narrative:       Result may be elevated if tourniquet was used during collection      Lactic acid, plasma [[id number]]     Lab Status:  No result Specimen:  Blood     URINE CULTURE; No; [id number] [place] - Miscellaneous Test [[id number]] Collected:  09/05/20 0700    Lab Status: In process Specimen: Body Fluid from Other Updated:  09/05/20 0701    Lactic acid [[id number]]  (Abnormal) Collected:  09/05/20 0616    Lab Status:  Final result Specimen:  Blood from Arm, Right Updated:  09/05/20 0657     LACTIC ACID 2 1 mmol/L     Narrative:       Result may be elevated if tourniquet was used during collection  CBC and differential [[id number]]  (Abnormal) Collected:  09/05/20 0616    Lab Status:  Final result Specimen:  Blood from Arm, Right Updated:  09/05/20 0655     WBC 20 88 Thousand/uL      RBC 5 50 Million/uL      Hemoglobin 17 3 g/dL      Hematocrit 50 7 %      MCV 92 fL      MCH 31 5 pg      MCHC 34 1 g/dL      RDW 12 8 %      MPV 9 1 fL      Platelets 918 Thousands/uL      nRBC 0 /100 WBCs     Narrative: This is an appended report  These results have been appended to a previously verified report      Troponin I [[id number]]  (Normal) Collected:  09/05/20 0616    Lab Status:  Final result Specimen:  Blood from Arm, Right Updated:  09/05/20 0642     Troponin I <0 02 ng/mL     Urine Microscopic [[id number]]  (Abnormal) Collected:  09/05/20 0618    Lab Status:  Final result Specimen:  Urine, Clean Catch Updated:  09/05/20 0642     RBC, UA 10-20 /hpf      WBC, UA Innumerable /hpf      Epithelial Cells None Seen /hpf      Bacteria, UA Occasional /hpf     Comprehensive metabolic panel [[id number]]  (Abnormal) Collected:  09/05/20 0616    Lab Status:  Final result Specimen:  Blood from Arm, Right Updated:  09/05/20 0639     Sodium 133 mmol/L      Potassium 3 8 mmol/L      Chloride 102 mmol/L      CO2 20 mmol/L      ANION GAP 11 mmol/L      BUN 17 mg/dL      Creatinine 1 53 mg/dL      Glucose 195 mg/dL      Calcium 9 1 mg/dL      AST 18 U/L      ALT 33 U/L      Alkaline Phosphatase 86 U/L      Total Protein 8 1 g/dL      Albumin 4 1 g/dL      Total Bilirubin 2 27 mg/dL      eGFR 49 ml/min/1 73sq m Narrative:       National Kidney Disease Foundation guidelines for Chronic Kidney Disease (CKD):     Stage 1 with normal or high GFR (GFR > 90 mL/min/1 73 square meters)    Stage 2 Mild CKD (GFR = 60-89 mL/min/1 73 square meters)    Stage 3A Moderate CKD (GFR = 45-59 mL/min/1 73 square meters)    Stage 3B Moderate CKD (GFR = 30-44 mL/min/1 73 square meters)    Stage 4 Severe CKD (GFR = 15-29 mL/min/1 73 square meters)    Stage 5 End Stage CKD (GFR <15 mL/min/1 73 square meters)  Note: GFR calculation is accurate only with a steady state creatinine    APTT [[id number]]  (Normal) Collected:  09/05/20 0616    Lab Status:  Final result Specimen:  Blood from Arm, Right Updated:  09/05/20 0635     PTT 29 seconds     Protime-INR [[id number]]  (Normal) Collected:  09/05/20 0616    Lab Status:  Final result Specimen:  Blood from Arm, Right Updated:  09/05/20 3024     Protime 14 0 seconds      INR 1 07    UA w Reflex to Microscopic w Reflex to Culture [[id number]]  (Abnormal) Collected:  09/05/20 0618    Lab Status:  Final result Specimen:  Urine, Clean Catch Updated:  09/05/20 0630     Color, UA Yellow     Clarity, UA Cloudy     Specific Gravity, UA 1 015     pH, UA 5 5     Leukocytes, UA Trace     Nitrite, UA Negative     Protein, UA 30 (1+) mg/dl      Glucose, UA >=1000 (1%) mg/dl      Ketones, UA Negative mg/dl      Urobilinogen, UA 0 2 E U /dl      Bilirubin, UA Negative     Blood, UA Large                 XR chest 1 view portable   Final Result by Mayda Lacy MD (09/05 5597)      No acute cardiopulmonary disease  Workstation performed: [id number]                    Procedures  Procedures         ED Course       US AUDIT      Most Recent Value   Initial Alcohol Screen: US AUDIT-C    1  How often do you have a drink containing alcohol?  0 Filed at: 09/05/2020 0609   2  How many drinks containing alcohol do you have on a typical day you are drinking? 0 Filed at: 09/05/2020 0609   3a  Male UNDER 65:  How often do you have five or more drinks on one occasion? 0 Filed at: 09/05/2020 0609   3b  FEMALE Any Age, or MALE 65+: How often do you have 4 or more drinks on one occassion? 0 Filed at: 09/05/2020 8374   Audit-C Score  0 Filed at: 09/05/2020 7522                  SNEHAL/DAST-10      Most Recent Value   How many times in the past year have you    Used an illegal drug or used a prescription medication for non-medical reasons? Never Filed at: 09/05/2020 0609                                MDM      Disposition  Final diagnoses:   UTI (urinary tract infection)     Time reflects when diagnosis was documented in both MDM as applicable and the Disposition within this note     Time User Action Codes Description Comment    9/5/2020  6:54 AM Annmarie Guerrao, [address] [N39 0] UTI (urinary tract infection)       ED Disposition     ED Disposition Condition Date/Time Comment    Admit Stable Sat Sep 5, 2020  6:54 AM Case was discussed with [facility] and the patient's admission status was agreed to be Admission Status: inpatient status to the service of Dr Lashell Beyer   Follow-up Information    None         Current Discharge Medication List      CONTINUE these medications which have NOT CHANGED    Details   amLODIPine (NORVASC) 10 mg tablet TAKE 1 TABLET(10 MG) BY MOUTH DAILY  Qty: 90 tablet, Refills: 1    Associated Diagnoses: Essential hypertension      buPROPion (WELLBUTRIN XL) 150 mg 24 hr tablet TAKE 1 TABLET(150 MG) BY MOUTH DAILY  Qty: 90 tablet, Refills: 1    Associated Diagnoses:  Moderate episode of recurrent major depressive disorder (HCC)      cloNIDine (CATAPRES) 0 3 mg tablet TAKE 1 TABLET(0 3 MG) BY MOUTH TWICE DAILY  Qty: 180 tablet, Refills: 1    Associated Diagnoses: Essential hypertension      Dulaglutide (Trulicity) 1 5 OZ/6 1BQ SOPN Inject 0 5 mL (1 5 mg total) under the skin once a week  Qty: 6 mL, Refills: 1    Associated Diagnoses: Type 2 diabetes mellitus with diabetic neuropathy, without long-term current use of insulin ([facility])      enalapril (VASOTEC) 20 mg tablet TAKE 1 TABLET(20 MG) BY MOUTH TWICE DAILY  Qty: 180 tablet, Refills: 1    Associated Diagnoses: Type 2 diabetes mellitus with diabetic neuropathy, without long-term current use of insulin ([facility] [address] ); Essential hypertension      glimepiride (AMARYL) 4 mg tablet TAKE 1 TABLET(4 MG) BY MOUTH TWICE DAILY  Qty: 180 tablet, Refills: 1    Associated Diagnoses: Type 2 diabetes mellitus with diabetic neuropathy, without long-term current use of insulin ([facility])      Jardiance 25 MG TABS TAKE 1 TABLET(25 MG) BY MOUTH EVERY MORNING  Qty: 90 tablet, Refills: 1    Associated Diagnoses: Type 2 diabetes mellitus with diabetic neuropathy, without long-term current use of insulin ([facility])      sertraline (ZOLOFT) 100 mg tablet TAKE 1 TABLET(100 MG) BY MOUTH DAILY  Qty: 90 tablet, Refills: 1    Associated Diagnoses: Depression with anxiety      tamsulosin (FLOMAX) 0 4 mg TAKE 1 CAPSULE(0 4 MG) BY MOUTH DAILY  Qty: 90 capsule, Refills: 1    Associated Diagnoses: BPH without urinary obstruction           No discharge procedures on file      PDMP Review     None          ED Provider  Electronically Signed by           Juanis Marina MD  09/06/20 7026

## 2020-09-05 NOTE — TELEPHONE ENCOUNTER
Reason for Disposition   Fever > 100 4 F (38 0 C)    Answer Assessment - Initial Assessment Questions  1  SYMPTOM: "What's the main symptom you're concerned about?" (e g , frequency, incontinence)      Fever burning with urination all night  2  ONSET: "When did the  pain  start?"      7pm  3  PAIN: "Is there any pain?" If so, ask: "How bad is it?" (Scale: 1-10; mild, moderate, severe)      Yes 7  4   CAUSE: "What do you think is causing the symptoms?"      UTI  5  OTHER SYMPTOMS: "Do you have any other symptoms?" (e g , fever, flank pain, blood in urine, pain with urination)      Fever    Protocols used: [facility]

## 2020-09-05 NOTE — TELEPHONE ENCOUNTER
Regarding: UTI plus cold symptoms  ----- Message from [facility] sent at 9/5/2020  5:21 AM EDT -----  "My  has an UTI  He has frequent urination and burning when urinating   I would like to know what to do because now he is having aches, chills, and a fever of 101 98 and is a diabetic "

## 2020-09-05 NOTE — PLAN OF CARE
Problem: Potential for Falls  Goal: Patient will remain free of falls  Description: INTERVENTIONS:  - Assess patient frequently for physical needs  -  Identify cognitive and physical deficits and behaviors that affect risk of falls    -  [place] fall precautions as indicated by assessment   - Educate patient/family on patient safety including physical limitations  - Instruct patient to call for assistance with activity based on assessment  - Modify environment to reduce risk of injury  - Consider OT/PT consult to assist with strengthening/mobility  Outcome: Progressing

## 2020-09-05 NOTE — LETTER
[address]    [facility] [address]  Dept: [phone]    September 8, 2020     Patient: Jadyn Lockhart   YOB: 1960   Date of Visit: 9/5/2020       To Whom it May Concern:    Astrid Spatz is under my professional care  He was seen in the hospital from 9/5/2020   to 09/08/20  He may return to work on 9/14/2020 without limitations  If you have any questions or concerns, please don't hesitate to call           Sincerely,            Jamila Pepper PA-C  [facility]  [phone]

## 2020-09-05 NOTE — ASSESSMENT & PLAN NOTE
Lab Results   Component Value Date    HGBA1C 8 6 (H) 07/15/2020       No results for input(s): POCGLU in the last 72 hours      Blood Sugar Average: Last 72 hrs:  ·  home regimen:  Jardiance daily, glimepiride daily, Trulicity weekly  · Continue glimepiride while inpatient  · Hold jardiance and trulicity   · Accu-Chek, sliding scale

## 2020-09-05 NOTE — H&P
H&P- Linda Lesches 1960, 61 y o  male MRN: [id number]    Unit/Bed#: S -01 Encounter: [id number]    Primary Care Provider: Amie Liu MD   Date and time admitted to hospital: 9/5/2020  5:45 AM        * UTI (urinary tract infection)  Assessment & Plan  · Presents to ED with urinary frequency, dysuria, fever  Started on [place] on 8/12  · UA:  Innumerable WBC, occasional bacteria  · Continue Rocephin, follow-up culture    Severe sepsis [facility])  Assessment & Plan   SIRS criteria:  Tachycardia, leukocytosis, fever   Suspected source:  Urinary   Lactic acid:  2 1, trend to less than 2   End organ damage:  Bilirubin greater than 2   IV Fluids:  1 liter bolus    IV antibiotics:  Rocephin   Follow up on culture results   Monitor vital signs, laboratory studies  [facility]  Assessment & Plan  Lab Results   Component Value Date    HGBA1C 8 6 (H) 07/15/2020       No results for input(s): POCGLU in the last 72 hours  Blood Sugar Average: Last 72 hrs:  ·  home regimen:  Jardiance daily, glimepiride daily, Trulicity weekly  · Continue glimepiride while inpatient  · Hold jardiance and trulicity   · Accu-Chek, sliding scale    Essential hypertension  Assessment & Plan  · Continue home regimen:  Amlodipine, clonidine, enalapril  · BP stable    BPH without urinary obstruction  Assessment & Plan  · Continue Flomax  · Bladder scan  · Retention protocol        VTE Prophylaxis: Heparin  / sequential compression device   Code Status:  Level 1  POLST: POLST is not applicable to this patient  Discussion with family:  Patient, family at bedside    Anticipated Length of Stay:  Patient will be admitted on an Inpatient basis with an anticipated length of stay of  greater than 2 midnights  Justification for Hospital Stay:  IV antibiotics    Total Time for Visit, including Counseling / Coordination of Care: 45 minutes    Greater than 50% of this total time spent on direct patient counseling and coordination of care  Chief Complaint:   Urinary frequency, dysuria, fever     History of Present Illness:    Tanisha Chan is a 61 y o  male with pmh significant for BPH, type 2 diabetes on Jardiance, hypertension, JEFF on CPAP, nephrolithiasis who presents with urinary frequency, dysuria, fever that began 9/4  T max 100 9 pre hospital per patient's family  Does report episodes of "shaking" with fever pre hospital   Started on Jardiance mid August   Denies any chest pain, upper respiratory symptoms, n/v/d, abd pain, constipation  Meeting severe sepsis criteria  Started on rocephin  Review of Systems:    Review of Systems   Constitutional: Positive for chills and fever  HENT: Negative  Eyes: Negative  Respiratory: Negative  Cardiovascular: Positive for leg swelling  Negative for chest pain and palpitations  Gastrointestinal: Negative  Endocrine: Negative  Genitourinary: Positive for dysuria and frequency  Negative for decreased urine volume, difficulty urinating, discharge, flank pain, hematuria and urgency  Musculoskeletal: Negative  Skin: Negative  Neurological: Negative  Hematological: Negative  Psychiatric/Behavioral: Negative          Past Medical and Surgical History:     Past Medical History:   Diagnosis Date    Acute renal failure (ARF) ([facility] )     last assessed - [id number]    Chest pain     last assessed - [id number]    CPAP (continuous positive airway pressure) dependence     Depression     Diabetes mellitus ([facility] )     Dyspnea on exertion     last assessed - [id number]    Hypertension     Nephrolithiasis     Numbness of right foot     numbness on the sole of the right foot; since he was young d/t stenosis    Obesity     Onychomycosis     last assessed - 83Ote2172    Sciatica     last assessed - 10Goy9707    Sleep apnea        Past Surgical History:   Procedure Laterality Date    COLONOSCOPY      CYSTOSCOPY W/ LASER LITHOTRIPSY  KNEE ARTHROSCOPY Left 11/2013    IN LAP,CHOLECYSTECTOMY N/A 2/8/2019    Procedure: ROBOTIC ASSISTED LAPAROSCOPIC CHOLECYSTECTOMY;  Surgeon: Zia Cabrera DO;  [facility];  Service: General    PROSTATE BIOPSY      Needle Biopsy - Negative       Meds/Allergies:    Prior to Admission medications    Medication Sig Start Date End Date Taking? Authorizing Provider   amLODIPine (NORVASC) 10 mg tablet TAKE 1 TABLET(10 MG) BY MOUTH DAILY 6/21/20  Yes Carlos MD Serjio   buPROPion (WELLBUTRIN XL) 150 mg 24 hr tablet TAKE 1 TABLET(150 MG) BY MOUTH DAILY 6/21/20  Yes Carlos MD Serjio   cloNIDine (CATAPRES) 0 3 mg tablet TAKE 1 TABLET(0 3 MG) BY MOUTH TWICE DAILY 6/21/20  Yes Carlos MD Serjio   Dulaglutide (Trulicity) 1 5 PG/6 2NJ SOPN Inject 0 5 mL (1 5 mg total) under the skin once a week 3/20/20  Yes Carlos MD Serjio   enalapril (VASOTEC) 20 mg tablet TAKE 1 TABLET(20 MG) BY MOUTH TWICE DAILY 6/21/20  Yes Carlos MD Serjio   glimepiride (AMARYL) 4 mg tablet TAKE 1 TABLET(4 MG) BY MOUTH TWICE DAILY 6/21/20  Yes Carlos MD Serjio   Jardiance 25 MG TABS TAKE 1 TABLET(25 MG) BY MOUTH EVERY MORNING 8/12/20  Yes Carlos MD Serjio   sertraline (ZOLOFT) 100 mg tablet TAKE 1 TABLET(100 MG) BY MOUTH DAILY 6/21/20  Yes Carlos MD Serjio   tamsulosin (FLOMAX) 0 4 mg TAKE 1 CAPSULE(0 4 MG) BY MOUTH DAILY 6/21/20  Yes Carlos MD Serjio   ketoconazole (NIZORAL) 2 % cream Apply topically daily 1/17/20 9/5/20  Kenyatta Jones DPM     I have reviewed home medications with patient personally      Allergies: No Known Allergies    Social History:     Marital Status: /Civil Union     Substance Use History:   Social History     Substance and Sexual Activity   Alcohol Use No     Social History     Tobacco Use   Smoking Status Current Some Day Smoker    Types: Cigars   Smokeless Tobacco Never Used   Tobacco Comment    3 cigars per week     Social History     Substance and Sexual Activity   Drug Use No       Family History:    non-contributory    Physical Exam:     Vitals:   Blood Pressure: 126/60 (09/05/20 0733)  Pulse: 89 (09/05/20 0733)  Temperature: 99 4 °F (37 4 °C) (09/05/20 0606)  Temp Source: Oral (09/05/20 0606)  Respirations: 18 (09/05/20 0733)  Height: 5' 11" (180 3 cm) (09/05/20 0606)  Weight - Scale: 123 kg (270 lb 11 6 oz) (09/05/20 0609)  SpO2: 93 % (09/05/20 0715)    Physical Exam  Constitutional:       General: He is not in acute distress  Appearance: Normal appearance  HENT:      Head: Normocephalic and atraumatic  Right Ear: External ear normal       Left Ear: External ear normal       Nose: Nose normal       Mouth/Throat:      Pharynx: Oropharynx is clear  Eyes:      Extraocular Movements: Extraocular movements intact  Conjunctiva/sclera: Conjunctivae normal    Neck:      Musculoskeletal: Normal range of motion and neck supple  Cardiovascular:      Rate and Rhythm: Normal rate and regular rhythm  Pulses: Normal pulses  Heart sounds: Normal heart sounds  Pulmonary:      Effort: Pulmonary effort is normal  No respiratory distress  Breath sounds: Normal breath sounds  No wheezing, rhonchi or rales  Abdominal:      General: Bowel sounds are normal  There is no distension  Palpations: Abdomen is soft  Tenderness: There is no abdominal tenderness  There is no right CVA tenderness, left CVA tenderness, guarding or rebound  Musculoskeletal: Normal range of motion  General: No tenderness  Right lower leg: Edema (+1) present  Left lower leg: Edema (+1) present  Skin:     General: Skin is warm and dry  Capillary Refill: Capillary refill takes less than 2 seconds  Neurological:      General: No focal deficit present  Mental Status: He is alert and oriented to person, place, and time     Psychiatric:         Mood and Affect: Mood normal          Behavior: Behavior normal            Additional Data:     Lab Results: I have personally reviewed pertinent reports  Results from last 7 days   Lab Units 09/05/20  0616   WBC Thousand/uL 20 88*   HEMOGLOBIN g/dL 17 3*   HEMATOCRIT % 50 7*   PLATELETS Thousands/uL 240   BANDS PCT % 5   LYMPHO PCT % 2*   MONO PCT % 1*   EOS PCT % 0     Results from last 7 days   Lab Units 09/05/20  0616   SODIUM mmol/L 133*   POTASSIUM mmol/L 3 8   CHLORIDE mmol/L 102   CO2 mmol/L 20*   BUN mg/dL 17   CREATININE mg/dL 1 53*   ANION GAP mmol/L 11   CALCIUM mg/dL 9 1   ALBUMIN g/dL 4 1   TOTAL BILIRUBIN mg/dL 2 27*   ALK PHOS U/L 86   ALT U/L 33   AST U/L 18   GLUCOSE RANDOM mg/dL 195*     Results from last 7 days   Lab Units 09/05/20  0616   INR  1 07             Results from last 7 days   Lab Units 09/05/20  0616   LACTIC ACID mmol/L 2 1*       Imaging: I have personally reviewed pertinent reports  XR chest 1 view portable   Final Result by João Hylton MD (09/05 0371)      No acute cardiopulmonary disease  Workstation performed: [id number]             EKG, Pathology, and Other Studies Reviewed on Admission:   · EKG:  Sinus tachycardia, rate 109    Allscripts / Epic Records Reviewed: Yes     ** Please Note: This note has been constructed using a voice recognition system   **

## 2020-09-05 NOTE — ASSESSMENT & PLAN NOTE
 SIRS criteria:  Tachycardia, leukocytosis, fever   Suspected source:  Urinary   Lactic acid:  2 1, trend to less than 2   End organ damage:  Bilirubin greater than 2   IV Fluids:  1 liter bolus    IV antibiotics:  Rocephin   Follow up on culture results   Monitor vital signs, laboratory studies

## 2020-09-06 ENCOUNTER — APPOINTMENT (INPATIENT)
Dept: CT IMAGING | Facility: HOSPITAL | Age: 60
DRG: 872 | End: 2020-09-06
Payer: COMMERCIAL

## 2020-09-06 PROBLEM — R78.81 BACTEREMIA: Status: ACTIVE | Noted: 2020-09-06

## 2020-09-06 PROBLEM — R79.89 ELEVATED SERUM CREATININE: Status: ACTIVE | Noted: 2020-09-06

## 2020-09-06 LAB
ANION GAP SERPL CALCULATED.3IONS-SCNC: 10 MMOL/L (ref 4–13)
BASOPHILS # BLD AUTO: 0.07 THOUSANDS/ΜL (ref 0–0.1)
BASOPHILS NFR BLD AUTO: 0 % (ref 0–1)
BUN SERPL-MCNC: 23 MG/DL (ref 5–25)
CALCIUM SERPL-MCNC: 8.5 MG/DL (ref 8.3–10.1)
CHLORIDE SERPL-SCNC: 105 MMOL/L (ref 100–108)
CO2 SERPL-SCNC: 22 MMOL/L (ref 21–32)
CREAT SERPL-MCNC: 1.68 MG/DL (ref 0.6–1.3)
EOSINOPHIL # BLD AUTO: 0.02 THOUSAND/ΜL (ref 0–0.61)
EOSINOPHIL NFR BLD AUTO: 0 % (ref 0–6)
ERYTHROCYTE [DISTWIDTH] IN BLOOD BY AUTOMATED COUNT: 13.3 % (ref 11.6–15.1)
GFR SERPL CREATININE-BSD FRML MDRD: 43 ML/MIN/1.73SQ M
GLUCOSE SERPL-MCNC: 119 MG/DL (ref 65–140)
GLUCOSE SERPL-MCNC: 124 MG/DL (ref 65–140)
GLUCOSE SERPL-MCNC: 149 MG/DL (ref 65–140)
GLUCOSE SERPL-MCNC: 172 MG/DL (ref 65–140)
GLUCOSE SERPL-MCNC: 81 MG/DL (ref 65–140)
HCT VFR BLD AUTO: 49.5 % (ref 36.5–49.3)
HGB BLD-MCNC: 16.3 G/DL (ref 12–17)
IMM GRANULOCYTES # BLD AUTO: 0.08 THOUSAND/UL (ref 0–0.2)
IMM GRANULOCYTES NFR BLD AUTO: 1 % (ref 0–2)
LYMPHOCYTES # BLD AUTO: 0.69 THOUSANDS/ΜL (ref 0.6–4.47)
LYMPHOCYTES NFR BLD AUTO: 4 % (ref 14–44)
MCH RBC QN AUTO: 30.6 PG (ref 26.8–34.3)
MCHC RBC AUTO-ENTMCNC: 32.9 G/DL (ref 31.4–37.4)
MCV RBC AUTO: 93 FL (ref 82–98)
MONOCYTES # BLD AUTO: 0.7 THOUSAND/ΜL (ref 0.17–1.22)
MONOCYTES NFR BLD AUTO: 4 % (ref 4–12)
NEUTROPHILS # BLD AUTO: 14.55 THOUSANDS/ΜL (ref 1.85–7.62)
NEUTS SEG NFR BLD AUTO: 91 % (ref 43–75)
NRBC BLD AUTO-RTO: 0 /100 WBCS
PLATELET # BLD AUTO: 214 THOUSANDS/UL (ref 149–390)
PMV BLD AUTO: 9.7 FL (ref 8.9–12.7)
POTASSIUM SERPL-SCNC: 3.9 MMOL/L (ref 3.5–5.3)
PROCALCITONIN SERPL-MCNC: 1.69 NG/ML
RBC # BLD AUTO: 5.33 MILLION/UL (ref 3.88–5.62)
SARS-COV-2 RNA RESP QL NAA+PROBE: NEGATIVE
SODIUM SERPL-SCNC: 137 MMOL/L (ref 136–145)
WBC # BLD AUTO: 16.11 THOUSAND/UL (ref 4.31–10.16)

## 2020-09-06 PROCEDURE — 74176 CT ABD & PELVIS W/O CONTRAST: CPT

## 2020-09-06 PROCEDURE — 99232 SBSQ HOSP IP/OBS MODERATE 35: CPT | Performed by: INTERNAL MEDICINE

## 2020-09-06 PROCEDURE — G1004 CDSM NDSC: HCPCS

## 2020-09-06 PROCEDURE — 80048 BASIC METABOLIC PNL TOTAL CA: CPT | Performed by: NURSE PRACTITIONER

## 2020-09-06 PROCEDURE — 84145 PROCALCITONIN (PCT): CPT

## 2020-09-06 PROCEDURE — 99254 IP/OBS CNSLTJ NEW/EST MOD 60: CPT | Performed by: INTERNAL MEDICINE

## 2020-09-06 PROCEDURE — 85025 COMPLETE CBC W/AUTO DIFF WBC: CPT | Performed by: NURSE PRACTITIONER

## 2020-09-06 PROCEDURE — 87635 SARS-COV-2 COVID-19 AMP PRB: CPT | Performed by: PHYSICIAN ASSISTANT

## 2020-09-06 PROCEDURE — 82948 REAGENT STRIP/BLOOD GLUCOSE: CPT

## 2020-09-06 RX ORDER — ACETAMINOPHEN 325 MG/1
650 TABLET ORAL ONCE
Status: COMPLETED | OUTPATIENT
Start: 2020-09-06 | End: 2020-09-06

## 2020-09-06 RX ORDER — NYSTATIN 100000 [USP'U]/G
POWDER TOPICAL 2 TIMES DAILY
Status: DISCONTINUED | OUTPATIENT
Start: 2020-09-06 | End: 2020-09-08 | Stop reason: HOSPADM

## 2020-09-06 RX ORDER — SODIUM CHLORIDE 9 MG/ML
100 INJECTION, SOLUTION INTRAVENOUS CONTINUOUS
Status: DISCONTINUED | OUTPATIENT
Start: 2020-09-06 | End: 2020-09-07

## 2020-09-06 RX ADMIN — HEPARIN SODIUM 5000 UNITS: 5000 INJECTION INTRAVENOUS; SUBCUTANEOUS at 21:40

## 2020-09-06 RX ADMIN — BUPROPION HYDROCHLORIDE 150 MG: 150 TABLET, FILM COATED, EXTENDED RELEASE ORAL at 08:41

## 2020-09-06 RX ADMIN — SERTRALINE HYDROCHLORIDE 100 MG: 100 TABLET ORAL at 08:41

## 2020-09-06 RX ADMIN — TAMSULOSIN HYDROCHLORIDE 0.4 MG: 0.4 CAPSULE ORAL at 16:17

## 2020-09-06 RX ADMIN — AMLODIPINE BESYLATE 10 MG: 10 TABLET ORAL at 08:48

## 2020-09-06 RX ADMIN — CLONIDINE HYDROCHLORIDE 0.3 MG: 0.1 TABLET ORAL at 08:40

## 2020-09-06 RX ADMIN — HEPARIN SODIUM 5000 UNITS: 5000 INJECTION INTRAVENOUS; SUBCUTANEOUS at 06:50

## 2020-09-06 RX ADMIN — NYSTATIN: 100000 POWDER TOPICAL at 18:21

## 2020-09-06 RX ADMIN — CEFTRIAXONE SODIUM 1000 MG: 10 INJECTION, POWDER, FOR SOLUTION INTRAVENOUS at 15:03

## 2020-09-06 RX ADMIN — GLIMEPIRIDE 4 MG: 2 TABLET ORAL at 08:30

## 2020-09-06 RX ADMIN — SODIUM CHLORIDE 100 ML/HR: 0.9 INJECTION, SOLUTION INTRAVENOUS at 16:21

## 2020-09-06 RX ADMIN — ACETAMINOPHEN 650 MG: 325 TABLET, FILM COATED ORAL at 08:40

## 2020-09-06 RX ADMIN — ACETAMINOPHEN 650 MG: 325 TABLET, FILM COATED ORAL at 19:11

## 2020-09-06 RX ADMIN — ACETAMINOPHEN 650 MG: 325 TABLET, FILM COATED ORAL at 13:55

## 2020-09-06 RX ADMIN — HEPARIN SODIUM 5000 UNITS: 5000 INJECTION INTRAVENOUS; SUBCUTANEOUS at 13:07

## 2020-09-06 RX ADMIN — CLONIDINE HYDROCHLORIDE 0.3 MG: 0.1 TABLET ORAL at 21:40

## 2020-09-06 RX ADMIN — CEFTRIAXONE SODIUM 1000 MG: 10 INJECTION, POWDER, FOR SOLUTION INTRAVENOUS at 07:50

## 2020-09-06 NOTE — UTILIZATION REVIEW
Notification of Inpatient Admission/Inpatient Authorization Request   This is a Notification of Inpatient Admission for [address]  Be advised that this patient was admitted to our facility under Inpatient Status  Contact Saritha Juarez at [phone] for additional admission information  [facility]  DEDICATED -[phone]  Patient Name:   Andria Wilson   YOB: 1960       [address]   [address]:   [address]  Tax ID: [id number]  NPI: [id number] Attending Provider/NPI:  Address:  Phone: Pedro QuinteroRadha [[id number]]  Same as the facility  [phone]   Place of Service Code: 24 Place of Service Name:  [address] [place] [address]   Start Date: 9/5/20 5935     Discharge Date & Time: No discharge date for patient encounter  Type of Admission: Inpatient Status Discharge Disposition   (if discharged): Home/Self Care   Patient Diagnoses: UTI (urinary tract infection) [N39 0]  UTI symptoms [R39 9]     Orders: Admission Orders (From admission, onward)     Ordered        09/05/20 0654  Inpatient Admission (expected length of stay for this patient Order details is greater than two midnights)  Once                    Assigned Utilization Review Contact: Saritha Juarez  Utilization   Network Utilization Review Department  Phone: [phone]; Fax [phone]  Email: [email or web address]   ATTENTION PAYERS: Please call the assigned Utilization  directly with any questions or concerns ALL voicemails in the department are confidential  Send all requests for admission clinical reviews, approved or denied determinations and any other requests to dedicated fax number belonging to the campus where the patient is receiving treatment

## 2020-09-06 NOTE — ASSESSMENT & PLAN NOTE
· One of 2 blood cultures with gram negative rods  · Likely due to UTI  · Infectious disease consulted  · Recommending obtaining CT A/P to r/o pyelo vs stone, obstruction given his h/o nephrolithiasis  · Continue IV ceftriaxone, increase dose to 2g given BMI  · Follow-up on final result of blood cultures and urine culture

## 2020-09-06 NOTE — ASSESSMENT & PLAN NOTE
[facility] Emergency Department    [place]  [address] [address]    Phone:  [phone]    Fax:  [phone]           Mr. Alston Jh   MRN: [id number]    Department:  [facility] Emergency Department   Date of Visit:  4/ · Cr 1 53 on presentation and increased to 1 68 today  · Prior lab work reviewed, baseline Cr appears to be closer to 1 3-1 4  · Denies h/o CKD  · Hold ACE-I   · Avoid nephrotoxins and hypotension  · Monitor intake and output  · Obtain CT scan to rule out obstruction  · Received 1 L bolus of LR on presentation to ED  · Will continue gentle fluids  · Repeat BMP in marge [place] from our patient liason soon after your visit. Also, some patients receive a detailed feedback survey mailed to them a week after the visit. If you receive this, we would really appreciate it if you could take the time to complete it. Thank you!       You [facility] [address] [facility] [id number] ([address]) [facility] [address] [facility]. ([address]) [facility] fields are clear. No diaphragmatic or pleural abnormality. The carmen and mediastinum appear within normal limits. No acute osseous abnormality is seen. [employer]     Sign up for [employer], your secure online medical record.   [employer] will allow you t

## 2020-09-06 NOTE — CONSULTS
Consultation - Infectious Disease   Leslie Jones 61 y o  male MRN: [id number]  Unit/Bed#: S -01 Encounter: [id number]      IMPRESSION & RECOMMENDATIONS:   1  Sepsis, POA  Patient presented on admission with tachycardia, leukocytosis and fever  This is due to problems 2 and 3  White count down trended, fever course protracted  Patient fortunately otherwise hemodynamically stable  Minimal antibiotic exposure  Continue antibiotics as below  Continue to trend fever curve/vitals  Repeat CBC/chemistry tomorrow  Follow up pending culture data  Additional imaging as below  Supportive care as per primary    2  Gram-negative bacteremia  One of 2 blood cultures with gram-negative rods  Patient with minimal antibiotic exposure  Likely due to problem 3  Would rule out ascending infections/stone and obstruction given history of nephrolithiasis and blood on UA  Continue ceftriaxone, dose increased due to 5  Continue to trend fever curve/WBC  Follow up pending culture data  Would obtain CT abdomen  Monitor for further urinary symptoms  Additional interventions pending clinical course  Will adjust antibiotics based on further culture data    3  Complicated urinary tract infection  Patient presented with dysuria along with frequency  Likely source of the above  Rule out ascending infection/stone as above  Possibly related to patient's diabetic medication  Continue antibiotics as above  Monitor for ongoing urinary symptoms  Follow up pending cultures  Consider adjustment of diabetic medication  Care otherwise as per primary    4  Elevated serum creatinine  Patient's creatinine noted to be higher today  Unclear baseline and patient denies any history of chronic kidney disease  Would rule out obstructive pathology as above  Continue ceftriaxone as above  Repeat chemistry tomorrow  Will further dose adjust any antibiotics as needed    5  Poorly controlled diabetes and obesity    Patient noted with hemoglobin A1c of 8 6  Possibly contributed to infection as above  BMI also noted to be 37 which can affect antibiotic dosing  Hodan Lal higher dosing of ceftriaxone, 2 g Q 24  Recommend discussion of weight loss as outpatient  Glucose management as per primary    Above plan discussed in detail with the patient and his wife at bedside  Above plan discussed in detail with primary service advanced practitioner  ID consult service will continue to follow  HISTORY OF PRESENT ILLNESS:  Reason for Consult:  Urinary tract infection and bacteremia    HPI: Delroy Valadez is a 61y o  year old male with diabetes, obesity, hypertension, previous kidney stones and left knee surgery  Patient presented to the hospital with urinary frequency, dysuria and fever which started on 09/04  No other localizing symptoms  Patient met sepsis criteria on admission  He was started on antibiotics and admitted for further interventions  White count 20  Creatinine 1 5  LFTs unremarkable  Chest x-ray negative  No other acute events were noted overnight  Patient remains with low-grade fever today  White blood cell count 16 1  One of 2 blood cultures with gram-negative rods  Additional imaging  Patient's other vitals are stable  Creatinine noted to be higher today  CBC otherwise unremarkable  UA with pyuria and significant blood  On chart review the patient has not been seen by our service previously nor does he have any significant culture data in our system  No other significant data noted in Care everywhere  We are consulted at this time for further assistance in management  On evaluation, reports overall feeling very fatigued  He is aware of ongoing fever  Urine symptoms are slowly improving  Denies having any blood in his urine or any progression of his prostate symptoms  He reports that his urinary stream has been adequate  Denies any significant back pain    Reports that he was last evaluated by Urology and had a stone many years ago  He denies having any prosthetic material in his body he has no other clear/new complaints/localizing complaints on exam   Patient does not have frequent antibiotic exposure and was last in the hospital over a year ago  He does recall his diabetic medications but does not recall any of them being started recently  REVIEW OF SYSTEMS:  A complete 12 point system-based review of systems is negative other than that noted in the HPI  PAST MEDICAL HISTORY:  Past Medical History:   Diagnosis Date    Acute [facility] ([facility]F) ([facility] )     last assessed - [id number]    Chest pain     last assessed - 43MNB9745    CPAP (continuous positive airway pressure) dependence     Depression     Diabetes mellitus ([facility]     Dyspnea on exertion     last assessed - 59Tbt2886    Hypertension     Nephrolithiasis     Numbness of right foot     numbness on the sole of the right foot; since he was young d/t stenosis    Obesity     Onychomycosis     last assessed - 90Jnm4599    Sciatica     last assessed - 42Psd1976    Sleep apnea      Past Surgical History:   Procedure Laterality Date    COLONOSCOPY      CYSTOSCOPY W/ LASER LITHOTRIPSY      KNEE ARTHROSCOPY Left 11/2013    LA LAP,CHOLECYSTECTOMY N/A 2/8/2019    Procedure: ROBOTIC ASSISTED LAPAROSCOPIC CHOLECYSTECTOMY;  Surgeon: Loreto Falcon DO;  [facility];  Service: General    PROSTATE BIOPSY      Needle Biopsy - Negative       FAMILY HISTORY:  Non-contributory    SOCIAL HISTORY:  Social History   Social History     Substance and Sexual Activity   Alcohol Use No     Social History     Substance and Sexual Activity   Drug Use No     Social History     Tobacco Use   Smoking Status Current Some Day Smoker    Types: Cigars   Smokeless Tobacco Never Used   Tobacco Comment    3 cigars per week       ALLERGIES:  No Known Allergies    MEDICATIONS:  All current active medications have been reviewed      PHYSICAL EXAM:  Temp:  [99 8 °F (37 7 °C)-102 °F (38 9 °C)] 102 °F (38 9 °C)  HR:  [84-92] 92  Resp:  [18] 18  BP: (122-165)/(70-74) 165/74  SpO2:  [93 %-95 %] 93 %  Temp (24hrs), Av 5 °F (38 1 °C), Min:99 8 °F (37 7 °C), Max:102 °F (38 9 °C)  Current: Temperature: (!) 102 °F (38 9 °C)    Intake/Output Summary (Last 24 hours) at 2020 1526  Last data filed at 2020 1449  Gross per 24 hour   Intake 1855 ml   Output 800 ml   Net 1055 ml       General Appearance:  Appearing well, nontoxic, and in no distress; patient does appear fatigued  Head:  Normocephalic, without obvious abnormality, atraumatic   Eyes:  Conjunctiva pink and sclera anicteric, both eyes   Nose: Deferred as patient is wearing a mask   Throat: Deferred as patient is wearing a mask   Neck: Supple, symmetrical, no adenopathy, no tenderness/mass/nodules   Back:   Symmetric, no curvature, ROM normal, no CVA tenderness; no spinal or paraspinal muscle tenderness to palpation  Lungs:   Clear to auscultation bilaterally, respirations unlabored on room air   Chest Wall:  No tenderness or deformity   Heart:  RRR; no murmur, rub or gallop appreciated   Abdomen:   Soft, non-tender, non-distended, positive bowel sounds; no suprapubic discomfort on palpation  Extremities: No cyanosis, clubbing or edema   Skin: No rashes or lesions  No draining wounds noted  Lymph nodes: Cervical, supraclavicular nodes normal   Neurologic: Alert and oriented times 3, extremity strength 5/5 and symmetric       LABS, IMAGING, & OTHER STUDIES:  Lab Results:  I have personally reviewed pertinent labs    Results from last 7 days   Lab Units 20  0604 20  0616   WBC Thousand/uL 16 11* 20 88*   HEMOGLOBIN g/dL 16 3 17 3*   PLATELETS Thousands/uL 214 240     Results from last 7 days   Lab Units 20  0604 20  0616   POTASSIUM mmol/L 3 9 3 8   CHLORIDE mmol/L 105 102   CO2 mmol/L 22 20*   BUN mg/dL 23 17   CREATININE mg/dL 1 68* 1 53*   EGFR ml/min/1 73sq m 43 49   CALCIUM mg/dL 8 5 9 1 AST U/L  --  18   ALT U/L  --  33   ALK PHOS U/L  --  86     Results from last 7 days   Lab Units 09/05/20  0633 09/05/20  0616   BLOOD CULTURE  No Growth at 24 hrs   --    GRAM STAIN RESULT   --  Gram negative rods*       Imaging Studies:   I have personally reviewed pertinent imaging study reports and images in PACS  Other Studies:   I have personally reviewed pertinent reports

## 2020-09-06 NOTE — PROGRESS NOTES
Progress Note - Praveena Mesa 1960, 61 y o  male MRN: [id number]    Unit/Bed#: S -01 Encounter: [id number]    Primary Care Provider: Callie Goss MD   Date and time admitted to hospital: 9/5/2020  5:45 AM        * Severe sepsis [facility])  Assessment & Plan   POA as evidenced by tachycardia, leukocytosis with WBC 20 88 and fever  o In the setting of UTI and bacteremia    Lactic acid 2 1 on presentation, improved to 1 9 with IV fluids   Bilirubin greater than 2 on admission   Procalcitonin 0 13 on presentation increased to 1 69 today   Infectious disease consulted   Continue IV antibiotics with ceftriaxone, dose was increased to 2 g q24h per ID   Follow up on blood cultures and urine cultures   Repeat CBC and CMP in AM      Bacteremia  Assessment & Plan  · One of 2 blood cultures with gram negative rods  · Likely due to UTI  · Infectious disease consulted  · Recommending obtaining CT A/P to r/o pyelo vs stone, obstruction given his h/o nephrolithiasis  · Continue IV ceftriaxone, increase dose to 2g given BMI  · Follow-up on final result of blood cultures and urine culture  UTI (urinary tract infection)  Assessment & Plan  · Presents with urinary frequency, dysuria, fever and generalized weakness  · Of note, patient was started on Jardiance on 8/12  · UA:  Innumerable WBC, occasional bacteria  · Continue IV ceftriaxone at increased dose due to BMI  · Follow-up on urine culture    Elevated serum creatinine  Assessment & Plan  · Cr 1 53 on presentation and increased to 1 68 today  · Prior lab work reviewed, baseline Cr appears to be closer to 1 3-1 4  · Denies h/o CKD  · Hold ACE-I   · Avoid nephrotoxins and hypotension  · Monitor intake and output  · Obtain CT scan to rule out obstruction  · Received 1 L bolus of LR on presentation to ED  · Will continue gentle fluids  · Repeat BMP in a m         Essential hypertension  Assessment & Plan  · Continue home medication regimen with amlodipine, clonidine  · Hold ACE-I    · Continue to monitor BP  Type 2 diabetes mellitus [facility])  Assessment & Plan  Lab Results   Component Value Date    HGBA1C 8 6 (H) 07/15/2020       Recent Labs     09/05/20  1632 09/05/20  2116 09/06/20  0748 09/06/20  1113   POCGLU 125 84 124 172*       Blood Sugar Average: Last 72 hrs:  · (P) 161 4 home regimen:  Jardiance daily, glimepiride daily, Trulicity weekly  · Hold jardiance, trulicity and glimepiride while inpatient  · Accu-Chek AC and HS, sliding scale insulin while inpatient    BPH without urinary obstruction  Assessment & Plan  · Continue Flomax  · Monitor urine output  CPAP (continuous positive airway pressure) dependence  Assessment & Plan  · JEFF on CPAP HS  VTE Pharmacologic Prophylaxis:   Pharmacologic: Heparin  Mechanical VTE Prophylaxis in Place: Yes    Patient Centered Rounds: I have performed bedside rounds with nursing staff today  Discussions with Specialists or Other Care Team Provider: nursing and attending physician    Education and Discussions with Family / Patient: patient and pt's wife at bedside    Time Spent for Care: 20 minutes  More than 50% of total time spent on counseling and coordination of care as described above  Current Length of Stay: 1 day(s)    Current Patient Status: Inpatient   Certification Statement: The patient will continue to require additional inpatient hospital stay due to need for IV antibiotics pending cultures and CT A/P    Discharge Plan: likely 48-72 hours    Code Status: Level 1 - Full Code      Subjective:   Patient sitting in chair, wife at bedside  Patient febrile this afternoon with temp of 102  Reports not feeling well but has noticed some improvement in his symptoms overall since yesterday  He continues to note dysuria but reports that he is urinating less frequently  He denies any chest pain or shortness of breath currently    He reports that on presentation yesterday he was feeling short of breath, fatigued and generally weak with minimal exertion  Objective:     Vitals:   Temp (24hrs), Av 5 °F (38 1 °C), Min:99 8 °F (37 7 °C), Max:102 °F (38 9 °C)    Temp:  [99 8 °F (37 7 °C)-102 °F (38 9 °C)] 100 °F (37 8 °C)  HR:  [84-96] 96  Resp:  [18] 18  BP: (119-165)/(68-74) 119/68  SpO2:  [93 %-95 %] 95 %  Body mass index is 37 76 kg/m²  Input and Output Summary (last 24 hours): Intake/Output Summary (Last 24 hours) at 2020 1602  Last data filed at 2020 1449  Gross per 24 hour   Intake 1855 ml   Output 800 ml   Net 1055 ml       Physical Exam:     Physical Exam  Vitals signs and nursing note reviewed  Constitutional:       General: He is not in acute distress  Appearance: He is obese  He is ill-appearing  HENT:      Head: Normocephalic and atraumatic  Eyes:      Conjunctiva/sclera: Conjunctivae normal    Cardiovascular:      Rate and Rhythm: Normal rate and regular rhythm  Pulmonary:      Effort: Pulmonary effort is normal  No respiratory distress  Breath sounds: Normal breath sounds  Abdominal:      General: Bowel sounds are normal  There is distension  Palpations: Abdomen is soft  Tenderness: There is no abdominal tenderness  Musculoskeletal:         General: No swelling or tenderness  Skin:     General: Skin is warm  Neurological:      Mental Status: He is alert and oriented to person, place, and time  Mental status is at baseline     Psychiatric:         Mood and Affect: Mood normal          Behavior: Behavior normal          Additional Data:     Labs:    Results from last 7 days   Lab Units 20  0604   WBC Thousand/uL 16 11*   HEMOGLOBIN g/dL 16 3   HEMATOCRIT % 49 5*   PLATELETS Thousands/uL 214   NEUTROS PCT % 91*   LYMPHS PCT % 4*   MONOS PCT % 4   EOS PCT % 0     Results from last 7 days   Lab Units 20  0604 20  0616   POTASSIUM mmol/L 3 9 3 8   CHLORIDE mmol/L 105 102   CO2 mmol/L 22 20*   BUN mg/dL 23 17   CREATININE mg/dL 1 68* 1 53*   CALCIUM mg/dL 8 5 9 1   ALK PHOS U/L  --  86   ALT U/L  --  33   AST U/L  --  18     Results from last 7 days   Lab Units 09/05/20  0616   INR  1 07       * I Have Reviewed All Lab Data Listed Above  * Additional Pertinent Lab Tests Reviewed: All Labs Within Last 24 Hours Reviewed    Imaging:    Imaging Reports Reviewed Today Include: none  Imaging Personally Reviewed by Myself Includes:  none    Recent Cultures (last 7 days):     Results from last 7 days   Lab Units 09/05/20  0633 09/05/20  0616   BLOOD CULTURE  No Growth at 24 hrs   --    GRAM STAIN RESULT   --  Gram negative rods*       Last 24 Hours Medication List:   Current Facility-Administered Medications   Medication Dose Route Frequency Provider Last Rate    acetaminophen  650 mg Oral Q6H PRN Christian Gan, EVON      aluminum-magnesium hydroxide-simethicone  30 mL Oral Q6H PRN Christian Gan, CRRAHUL      amLODIPine  10 mg Oral Daily EVON Carlos      buPROPion  150 mg Oral Daily Evaristo Carlos [address]      [START ON 9/7/2020] cefTRIAXone  2,000 mg Intravenous Q24H Lisandro White MD      cloNIDine  0 3 mg Oral Q12H [address]      heparin (porcine)  5,000 Units Subcutaneous [facility] [address], [address]      insulin lispro  1-5 Units Subcutaneous HS EVON Carlos      insulin lispro  2-12 Units Subcutaneous TID [facility] EVON Carlos      lisinopril  40 mg Oral Daily Christian Gan, [address]      methocarbamol  500 mg Oral Q8H PRN EVON Carlos      ondansetron  4 mg Intravenous Q6H PRN Christian Gan, CRRAHUL      polyethylene glycol  17 g Oral Daily PRN Christian Gan, CRNP      sertraline  100 mg Oral Daily EVON Carlos      tamsulosin  0 4 mg Oral Daily With [address]          Today, Patient Was Seen By: Chaitanya Raymond PA-C    ** Please Note: Dictation voice to text software may have been used in the creation of this document   **

## 2020-09-06 NOTE — UTILIZATION REVIEW
Initial Clinical Review    Admission: Date/Time/Statement:   Admission Orders (From admission, onward)     Ordered        09/05/20 0654  Inpatient Admission (expected length of stay for this patient Order details is greater than two midnights)  Once                   Orders Placed This Encounter   Procedures    Inpatient Admission (expected length of stay for this patient Order details is greater than two midnights)     Standing Status:   Standing     Number of Occurrences:   1     Order Specific Question:   Admitting Physician     Answer:   David Ortega [[id number]]     Order Specific Question:   Level of Care     Answer:   Med Surg [[id number]]     Order Specific Question:   Estimated length of stay     Answer:   More than 2 Midnights     Order Specific Question:   Certification     Answer:   I certify that inpatient services are medically necessary for this patient for a duration of greater than two midnights  See H&P and MD Progress Notes for additional information about the patient's course of treatment  ED Arrival Information     Expected Arrival Acuity Means of Arrival Escorted By Service Admission Type    9/5/2020 05:53 9/5/2020 05:42 Emergent Walk-In Spouse Hospitalist Emergency    Arrival Complaint    UTI Symptoms Fever, pain, aches hx of kiDney stones        Chief Complaint   Patient presents with    Fever - 9 weeks to 76 years     Assessment/Plan:  60 yo male presents to ED from home due to urinary frequency, dysuria, fever chills beginning 9/4  PMH T2DM started jardiance mid August  Upon arrival, pt is tachycardic   EKG ST  + leukocytosis, WBC 20 58, BUN 17, Creatinine 1 53  T bili elevated 2 27 glucose 195, Lactic acid 2 1 CXR no acute disease, UA innumerable WBC, occasional bacteria  +1 edema bilat LE's noted Admitted as inpatient to med surg for sepsis, UTI  IV rocephin initated IV fluids initated, follow urine and blood cultures Trend VS, WBC   Monitor POC glucose, insulin sliding scale coverage  Hold jardiance and trulicity , continue home BP meds, Check bladder scan, continue flomax  Blood culture result noted + Gram negative rods   Consult infectious disease     ED Triage Vitals   Temperature Pulse Respirations Blood Pressure SpO2   09/05/20 0606 09/05/20 0606 09/05/20 0606 09/05/20 0607 09/05/20 0606   99 4 °F (37 4 °C) (!) 127 22 142/75 94 %      Temp Source Heart Rate Source Patient Position - Orthostatic VS BP Location FiO2 (%)   09/05/20 0606 09/05/20 0606 09/05/20 0606 09/05/20 0606 --   Oral Monitor Sitting Right arm       Pain Score       09/05/20 0606       6          Wt Readings from Last 1 Encounters:   09/05/20 123 kg (270 lb 11 6 oz)     Additional Vital Signs:   Date/Time   Temp   Pulse   Resp   BP    SpO2      09/06/20 1026   100 2 °F (37 9 °C)                   09/06/20 0700   100 7 °F (38 2 °C)Abnormal     92   18   165/74    93 %      09/05/20 2233   100 5 °F (38 1 °C)   84   18   122/70    93 %      09/05/20 1527   100 2 °F (37 9 °C)   85   18   132/72    95 %      09/05/20 0811   99 3 °F (37 4 °C)   78   16   107/60    94 %            Pertinent Labs/Diagnostic Test Results:   EKG 9/5 Sinus tachycardia     CXR 9/5    No acute cardiopulmonary disease        Results from last 7 days   Lab Units 09/06/20  0604 09/05/20  0616   WBC Thousand/uL 16 11* 20 88*   HEMOGLOBIN g/dL 16 3 17 3*   HEMATOCRIT % 49 5* 50 7*   PLATELETS Thousands/uL 214 240   NEUTROS ABS Thousands/µL 14 55*  --    BANDS PCT %  --  5         Results from last 7 days   Lab Units 09/06/20  0604 09/05/20  0616   SODIUM mmol/L 137 133*   POTASSIUM mmol/L 3 9 3 8   CHLORIDE mmol/L 105 102   CO2 mmol/L 22 20*   ANION GAP mmol/L 10 11   BUN mg/dL 23 17   CREATININE mg/dL 1 68* 1 53*   EGFR ml/min/1 73sq m 43 49   CALCIUM mg/dL 8 5 9 1     Results from last 7 days   Lab Units 09/05/20  0616   AST U/L 18   ALT U/L 33   ALK PHOS U/L 86   TOTAL PROTEIN g/dL 8 1   ALBUMIN g/dL 4 1   TOTAL BILIRUBIN mg/dL 2 27* Results from last 7 days   Lab Units 09/06/20  1113 09/06/20  0748 09/05/20  2116 09/05/20  1632 09/05/20  1230   POC GLUCOSE mg/dl 172* 124 84 125 302*     Results from last 7 days   Lab Units 09/06/20  0604 09/05/20  0616   GLUCOSE RANDOM mg/dL 119 195*               Results from last 7 days   Lab Units 09/05/20  0616   TROPONIN I ng/mL <0 02         Results from last 7 days   Lab Units 09/05/20  0616   PROTIME seconds 14 0   INR  1 07   PTT seconds 29         Results from last 7 days   Lab Units 09/06/20  0604 09/05/20  0616   PROCALCITONIN ng/ml 1 69* 0 13     Results from last 7 days   Lab Units 09/05/20  1002 09/05/20  0616   LACTIC ACID mmol/L 1 9 2 1*         Results from last 7 days   Lab Units 09/05/20  0618   CLARITY UA  Cloudy   COLOR UA  Yellow   SPEC GRAV UA  1 015   PH UA  5 5   GLUCOSE UA mg/dl >=1000 (1%)*   KETONES UA mg/dl Negative   BLOOD UA  Large*   PROTEIN UA mg/dl 30 (1+)*   NITRITE UA  Negative   BILIRUBIN UA  Negative   UROBILINOGEN UA E U /dl 0 2   LEUKOCYTES UA  Trace*   WBC UA /hpf Innumerable*   RBC UA /hpf 10-20*   BACTERIA UA /hpf Occasional   EPITHELIAL CELLS WET PREP /hpf None Seen         Results from last 7 days   Lab Units 09/05/20  0633 09/05/20  0616   BLOOD CULTURE  Received in Microbiology Lab  Culture in Progress    --    GRAM STAIN RESULT   --  Gram negative rods*     Results from last 7 days   Lab Units 09/05/20  0616   TOTAL COUNTED  100           ED Treatment:   Medication Administration from 09/05/2020 0539 to 09/05/2020 0750       Date/Time Order Dose Route Action Action by Comments     09/05/2020 0634 ceftriaxone (ROCEPHIN) 1 g/50 mL in dextrose IVPB 1,000 mg Intravenous New [address] Elaine Simmons RN      09/05/2020 5202 lactated ringers bolus 1,000 mL 1,000 mL Intravenous New Bag Elaine Simmons RN         Past Medical History:   Diagnosis Date    Acute renal failure (ARF) [facility])     last assessed - [id number]    Chest pain     last assessed - [id number]  CPAP (continuous positive airway pressure) dependence     Depression     Diabetes mellitus (HCC)     Dyspnea on exertion     last assessed - [id number]    Hypertension     Nephrolithiasis     Numbness of right foot     numbness on the sole of the right foot; since he was young d/t stenosis    Obesity     Onychomycosis     last assessed - [id number]    Sciatica     last assessed - [id number]    Sleep apnea      Present on Admission:   BPH without urinary obstruction   Essential hypertension      Admitting Diagnosis: UTI (urinary tract infection) [N39 0]  UTI symptoms [R39 9]  Age/Sex: 61 y o  male  Admission Orders:  Scheduled Medications:  amLODIPine, 10 mg, Oral, Daily  buPROPion, 150 mg, Oral, Daily  cefTRIAXone, 1,000 mg, Intravenous, Q24H  cloNIDine, 0 3 mg, Oral, Q12H INOCENTE  glimepiride, 4 mg, Oral, Daily With Breakfast  heparin (porcine), 5,000 Units, Subcutaneous, Q8H INOCENTE  insulin lispro, 1-5 Units, Subcutaneous, HS  insulin lispro, 2-12 Units, Subcutaneous, TID AC  lisinopril, 40 mg, Oral, Daily  sertraline, 100 mg, Oral, Daily  tamsulosin, 0 4 mg, Oral, Daily With Dinner      Continuous IV Infusions:     PRN Meds:  acetaminophen, 650 mg, Oral, Q6H PRN  aluminum-magnesium hydroxide-simethicone, 30 mL, Oral, Q6H PRN  methocarbamol, 500 mg, Oral, Q8H PRN  ondansetron, 4 mg, Intravenous, Q6H PRN  polyethylene glycol, 17 g, Oral, Daily PRN    Up with assist    Daily weight, I/O      POC glucose qac and HS, RISS coverage   IP CONSULT TO INFECTIOUS DISEASES    Network Utilization Review Department  [email or web address]  ATTENTION: Please call with any questions or concerns to [phone] and carefully listen to the prompts so that you are directed to the right person   All voicemails are confidential   Mika Orellana all requests for admission clinical reviews, approved or denied determinations and any other requests to dedicated fax number below belonging to the campus where the patient is receiving treatment   List of dedicated fax numbers for the Facilities:  [address] DENIALS (Administrative/Medical Necessity) [phone]   [address] (Maternity/NICU/Pediatrics) [phone]   [facility] [phone]   [facility] [phone]   Naman Lopes [phone]   Katie Grant [phone]   [address] [place], [place] [address] [facility] [phone]   [facility] [phone]   [address] [facility]  [address] [facility] [address] [facility] [phone]

## 2020-09-06 NOTE — ASSESSMENT & PLAN NOTE
· Continue home medication regimen with amlodipine, clonidine  · Hold ACE-I    · Continue to monitor BP

## 2020-09-06 NOTE — ASSESSMENT & PLAN NOTE
Lab Results   Component Value Date    HGBA1C 8 6 (H) 07/15/2020       Recent Labs     09/05/20  1632 09/05/20  2116 09/06/20  0748 09/06/20  1113   POCGLU 125 84 124 172*       Blood Sugar Average: Last 72 hrs:  · (P) 161 4 home regimen:  Jardiance daily, glimepiride daily, Trulicity weekly  · Hold jardiance, trulicity and glimepiride while inpatient  · Accu-Chek AC and HS, sliding scale insulin while inpatient

## 2020-09-06 NOTE — ASSESSMENT & PLAN NOTE
 POA as evidenced by tachycardia, leukocytosis with WBC 20 88 and fever  o In the setting of UTI and bacteremia    Lactic acid 2 1 on presentation, improved to 1 9 with IV fluids   Bilirubin greater than 2 on admission   Procalcitonin 0 13 on presentation increased to 1 69 today   Infectious disease consulted   Continue IV antibiotics with ceftriaxone, dose was increased to 2 g q24h per ID   Follow up on blood cultures and urine cultures     Repeat CBC and CMP in AM

## 2020-09-07 PROBLEM — N18.30 CKD (CHRONIC KIDNEY DISEASE) STAGE 3, GFR 30-59 ML/MIN (HCC): Status: ACTIVE | Noted: 2020-09-07

## 2020-09-07 LAB
ANION GAP SERPL CALCULATED.3IONS-SCNC: 10 MMOL/L (ref 4–13)
BUN SERPL-MCNC: 21 MG/DL (ref 5–25)
CALCIUM SERPL-MCNC: 8.1 MG/DL (ref 8.3–10.1)
CHLORIDE SERPL-SCNC: 101 MMOL/L (ref 100–108)
CO2 SERPL-SCNC: 21 MMOL/L (ref 21–32)
CREAT SERPL-MCNC: 1.35 MG/DL (ref 0.6–1.3)
ERYTHROCYTE [DISTWIDTH] IN BLOOD BY AUTOMATED COUNT: 12.9 % (ref 11.6–15.1)
GFR SERPL CREATININE-BSD FRML MDRD: 57 ML/MIN/1.73SQ M
GLUCOSE SERPL-MCNC: 116 MG/DL (ref 65–140)
GLUCOSE SERPL-MCNC: 118 MG/DL (ref 65–140)
GLUCOSE SERPL-MCNC: 205 MG/DL (ref 65–140)
GLUCOSE SERPL-MCNC: 70 MG/DL (ref 65–140)
GLUCOSE SERPL-MCNC: 73 MG/DL (ref 65–140)
HCT VFR BLD AUTO: 44.6 % (ref 36.5–49.3)
HGB BLD-MCNC: 15.3 G/DL (ref 12–17)
MCH RBC QN AUTO: 31.4 PG (ref 26.8–34.3)
MCHC RBC AUTO-ENTMCNC: 34.3 G/DL (ref 31.4–37.4)
MCV RBC AUTO: 91 FL (ref 82–98)
PLATELET # BLD AUTO: 177 THOUSANDS/UL (ref 149–390)
PMV BLD AUTO: 9.6 FL (ref 8.9–12.7)
POTASSIUM SERPL-SCNC: 3.5 MMOL/L (ref 3.5–5.3)
RBC # BLD AUTO: 4.88 MILLION/UL (ref 3.88–5.62)
SODIUM SERPL-SCNC: 132 MMOL/L (ref 136–145)
WBC # BLD AUTO: 6.12 THOUSAND/UL (ref 4.31–10.16)

## 2020-09-07 PROCEDURE — 99232 SBSQ HOSP IP/OBS MODERATE 35: CPT | Performed by: PHYSICIAN ASSISTANT

## 2020-09-07 PROCEDURE — 82948 REAGENT STRIP/BLOOD GLUCOSE: CPT

## 2020-09-07 PROCEDURE — 99233 SBSQ HOSP IP/OBS HIGH 50: CPT | Performed by: INTERNAL MEDICINE

## 2020-09-07 PROCEDURE — 85027 COMPLETE CBC AUTOMATED: CPT | Performed by: INTERNAL MEDICINE

## 2020-09-07 PROCEDURE — 80048 BASIC METABOLIC PNL TOTAL CA: CPT | Performed by: INTERNAL MEDICINE

## 2020-09-07 RX ORDER — GUAIFENESIN 600 MG
600 TABLET, EXTENDED RELEASE 12 HR ORAL EVERY 12 HOURS SCHEDULED
Status: DISCONTINUED | OUTPATIENT
Start: 2020-09-07 | End: 2020-09-08 | Stop reason: HOSPADM

## 2020-09-07 RX ADMIN — HEPARIN SODIUM 5000 UNITS: 5000 INJECTION INTRAVENOUS; SUBCUTANEOUS at 16:20

## 2020-09-07 RX ADMIN — ACETAMINOPHEN 650 MG: 325 TABLET, FILM COATED ORAL at 19:39

## 2020-09-07 RX ADMIN — SERTRALINE HYDROCHLORIDE 100 MG: 100 TABLET ORAL at 08:21

## 2020-09-07 RX ADMIN — ACETAMINOPHEN 650 MG: 325 TABLET, FILM COATED ORAL at 08:21

## 2020-09-07 RX ADMIN — AMLODIPINE BESYLATE 10 MG: 10 TABLET ORAL at 08:21

## 2020-09-07 RX ADMIN — CLONIDINE HYDROCHLORIDE 0.3 MG: 0.1 TABLET ORAL at 08:21

## 2020-09-07 RX ADMIN — BUPROPION HYDROCHLORIDE 150 MG: 150 TABLET, FILM COATED, EXTENDED RELEASE ORAL at 08:21

## 2020-09-07 RX ADMIN — CLONIDINE HYDROCHLORIDE 0.3 MG: 0.1 TABLET ORAL at 22:23

## 2020-09-07 RX ADMIN — HEPARIN SODIUM 5000 UNITS: 5000 INJECTION INTRAVENOUS; SUBCUTANEOUS at 22:23

## 2020-09-07 RX ADMIN — GUAIFENESIN 600 MG: 600 TABLET, EXTENDED RELEASE ORAL at 22:53

## 2020-09-07 RX ADMIN — TAMSULOSIN HYDROCHLORIDE 0.4 MG: 0.4 CAPSULE ORAL at 19:38

## 2020-09-07 RX ADMIN — NYSTATIN: 100000 POWDER TOPICAL at 19:38

## 2020-09-07 RX ADMIN — CEFTRIAXONE SODIUM 2000 MG: 2 INJECTION, POWDER, FOR SOLUTION INTRAMUSCULAR; INTRAVENOUS at 06:14

## 2020-09-07 RX ADMIN — NYSTATIN: 100000 POWDER TOPICAL at 08:23

## 2020-09-07 RX ADMIN — HEPARIN SODIUM 5000 UNITS: 5000 INJECTION INTRAVENOUS; SUBCUTANEOUS at 06:17

## 2020-09-07 NOTE — ASSESSMENT & PLAN NOTE
 POA as evidenced by tachycardia, leukocytosis with WBC 20 88 and fever  o In the setting of UTI and bacteremia    Lactic acid 2 1 on presentation, improved to 1 9 with IV fluids   Bilirubin greater than 2 on admission   Procalcitonin 0 13 on presentation increased to 1 69 today   Infectious disease consulted   Continue IV antibiotics with ceftriaxone, dose was increased to 2 g q24h per ID   Follow up on final blood cultures and urine cultures

## 2020-09-07 NOTE — ASSESSMENT & PLAN NOTE
Lab Results   Component Value Date    HGBA1C 8 6 (H) 07/15/2020     Recent Labs     09/06/20  1553 09/06/20  2101 09/07/20  0813 09/07/20  1106   POCGLU 149* 81 73 205*   Blood Sugar Average: Last 72 hrs:  · (P) 146 [id number]   · Home regimen:  Jardiance daily, glimepiride daily, Trulicity weekly  · Hold while inpatient  · Accu-Chek AC and HS, sliding scale insulin while inpatient  · Long discussion with patient about possibly switching to insulin, states his PCP and him have been considering this, especially in the setting of UTI and bacteremia in the setting of SGLT2 inhibitor

## 2020-09-07 NOTE — PROGRESS NOTES
Progress Note - Rajeev Escobedo 1960, 61 y o  male MRN: [id number]  Unit/Bed#: S -01 Encounter: [id number]  Primary Care Provider: Wing Elle MD   Date and time admitted to hospital: 9/5/2020  5:45 AM    UTI (urinary tract infection)  Assessment & Plan  · Presents with urinary frequency, dysuria, fever and generalized weakness  · Of note, patient was started on Jardiance on 8/12  · UA:  Innumerable WBC, occasional bacteria  · Continue IV ceftriaxone    · Follow-up on urine culture  · Will likely need 2 week course of antibiotics, concern for prostatitis  · CT A/P negative for hydronephrosis, diverticulitis but does show diverticulosis, enlarged prostate, bilateral renal cysts and and bladder wall thickening  · Positive for 9 mm calculus in the lower pole of the right kidney and punctate calculus in the midpole of the left kidney    * Severe sepsis [facility])  Assessment & Plan   POA as evidenced by tachycardia, leukocytosis with WBC 20 88 and fever  o In the setting of UTI and bacteremia    Lactic acid 2 1 on presentation, improved to 1 9 with IV fluids   Bilirubin greater than 2 on admission   Procalcitonin 0 13 on presentation increased to 1 69 today   Infectious disease consulted   Continue IV antibiotics with ceftriaxone, dose was increased to 2 g q24h per ID   Follow up on final blood cultures and urine cultures  Bacteremia  Assessment & Plan  · One of 2 blood cultures with gram negative rods  · Likely due to UTI     · Infectious disease input appreciated   · Continue IV ceftriaxone   · Follow-up on blood culture    Type 2 diabetes mellitus [facility])  Assessment & Plan  Lab Results   Component Value Date    HGBA1C 8 6 (H) 07/15/2020     Recent Labs     09/06/20  1553 09/06/20  2101 09/07/20  0813 09/07/20  1106   POCGLU 149* 81 73 205*   Blood Sugar Average: Last 72 hrs:  · (P) 146 [id number]   · Home regimen:  Jardiance daily, glimepiride daily, Trulicity weekly  · Hold while inpatient  · Accu-Chek AC and HS, sliding scale insulin while inpatient  · Long discussion with patient about possibly switching to insulin, states his PCP and him have been considering this, especially in the setting of UTI and bacteremia in the setting of SGLT2 inhibitor     CKD (chronic kidney disease) stage 3, GFR 30-59 ml/min ([facility])  Assessment & Plan  · Prior lab work reviewed, baseline Cr appears to be closer to 1 3-1 5   · Hold ACE-I   · Avoid nephrotoxins and hypotension  · Monitor intake and output  CPAP (continuous positive airway pressure) dependence  Assessment & Plan  · JEFF on CPAP HS  Essential hypertension  Assessment & Plan  · Continue home medication regimen with amlodipine, clonidine  · Hold ACE-I    · Continue to monitor BP  BPH without urinary obstruction  Assessment & Plan  · Continue Flomax  · Monitor urine output  VTE Pharmacologic Prophylaxis:   Pharmacologic: Heparin  Mechanical VTE Prophylaxis in Place: Yes    Patient Centered Rounds: I have performed bedside rounds with nursing staff today  Discussions with Specialists or Other Care Team Provider: estefani    Education and Discussions with Family / Patient: pt wife at bedside     Time Spent for Care: 30 minutes  More than 50% of total time spent on counseling and coordination of care as described above  Current Length of Stay: 2 day(s)    Current Patient Status: Inpatient   Certification Statement: The patient will continue to require additional inpatient hospital stay due to pending improvement in fevers, workup for bacteremia     Discharge Plan: pending clinical course     Code Status: Level 1 - Full Code      Subjective:   Pt seen and examined at bedside  Had fevers and shaking chills this AM  No dysuria  Good appetite  No back or abdominal pain       Objective:     Vitals:   Temp (24hrs), Av 8 °F (38 2 °C), Min:99 8 °F (37 7 °C), Max:102 7 °F (39 3 °C)    Temp:  [99 8 °F (37 7 °C)-102 7 °F (39 3 °C)] 100 7 °F (38 2 °C)  HR:  [78-96] 79  Resp:  [18-20] 20  BP: (119-136)/(68-84) 135/78  SpO2:  [91 %-96 %] 91 %  Body mass index is 37 76 kg/m²  Input and Output Summary (last 24 hours): Intake/Output Summary (Last 24 hours) at 9/7/2020 1320  Last data filed at 9/7/2020 0810  Gross per 24 hour   Intake 2336 ml   Output 1575 ml   Net 761 ml       Physical Exam:     Physical Exam  Constitutional:       Appearance: Normal appearance  He is obese  HENT:      Head: Normocephalic and atraumatic  Nose: Nose normal       Mouth/Throat:      Mouth: Mucous membranes are moist    Eyes:      Extraocular Movements: Extraocular movements intact  Neck:      Musculoskeletal: Normal range of motion and neck supple  Cardiovascular:      Rate and Rhythm: Normal rate and regular rhythm  Pulmonary:      Effort: Pulmonary effort is normal       Breath sounds: Normal breath sounds  Abdominal:      General: Abdomen is flat  There is no distension  Palpations: Abdomen is soft  Tenderness: There is no right CVA tenderness or left CVA tenderness  Musculoskeletal: Normal range of motion  Skin:     General: Skin is warm and dry  Neurological:      General: No focal deficit present  Mental Status: He is alert     Psychiatric:         Mood and Affect: Mood normal          Behavior: Behavior normal        Additional Data:     Labs:    Results from last 7 days   Lab Units 09/07/20  0638 09/06/20  0604 09/05/20  0616   WBC Thousand/uL 6 12 16 11* 20 88*   HEMOGLOBIN g/dL 15 3 16 3 17 3*   HEMATOCRIT % 44 6 49 5* 50 7*   PLATELETS Thousands/uL 177 214 240   BANDS PCT %  --   --  5   NEUTROS PCT %  --  91*  --    LYMPHS PCT %  --  4*  --    LYMPHO PCT %  --   --  2*   MONOS PCT %  --  4  --    MONO PCT %  --   --  1*   EOS PCT %  --  0 0     Results from last 7 days   Lab Units 09/07/20  0638 09/05/20  0616   SODIUM mmol/L 132*   < > 133*   POTASSIUM mmol/L 3 5   < > 3 8   CHLORIDE mmol/L 101   < > 102   CO2 mmol/L 21 < > 20*   BUN mg/dL 21   < > 17   CREATININE mg/dL 1 35*   < > 1 53*   ANION GAP mmol/L 10   < > 11   CALCIUM mg/dL 8 1*   < > 9 1   ALBUMIN g/dL  --   --  4 1   TOTAL BILIRUBIN mg/dL  --   --  2 27*   ALK PHOS U/L  --   --  86   ALT U/L  --   --  33   AST U/L  --   --  18   GLUCOSE RANDOM mg/dL 70   < > 195*    < > = values in this interval not displayed  Results from last 7 days   Lab Units 09/05/20  0616   INR  1 07     Results from last 7 days   Lab Units 09/07/20  1106 09/07/20  0813 09/06/20  2101 09/06/20  1553 09/06/20  1113 09/06/20  0748 09/05/20  2116 09/05/20  1632 09/05/20  1230   POC GLUCOSE mg/dl 205* 73 81 149* 172* 124 84 125 302*         Results from last 7 days   Lab Units 09/06/20  0604 09/05/20  1002 09/05/20  0616   LACTIC ACID mmol/L  --  1 9 2 1*   PROCALCITONIN ng/ml 1 69*  --  0 13           * I Have Reviewed All Lab Data Listed Above  * Additional Pertinent Lab Tests Reviewed:  [place] [facility] Admission Reviewed    Imaging:    Imaging Reports Reviewed Today Include: all  Imaging Personally Reviewed by Myself Includes:  none    Recent Cultures (last 7 days):     Results from last 7 days   Lab Units 09/05/20  0633 09/05/20  0616   BLOOD CULTURE  No Growth at 24 hrs   --    GRAM STAIN RESULT   --  Gram negative rods*       Last 24 Hours Medication List:   Current Facility-Administered Medications   Medication Dose Route Frequency Provider Last Rate    acetaminophen  650 mg Oral Q6H PRN EVON Baca      aluminum-magnesium hydroxide-simethicone  30 mL Oral Q6H PRN EVON Baca      amLODIPine  10 mg Oral Daily EVON Baca      buPROPion  150 mg Oral Daily Alo Santos, [address]      cefTRIAXone  2,000 mg Intravenous Q24H Arjun Ballard MD 2,000 mg (09/07/20 6498)    cloNIDine  0 3 mg Oral Q12H [facility] EVON Baca      heparin (porcine)  5,000 Units Subcutaneous [facility] Alo Santos, [address]      insulin lispro  1-5 Units Subcutaneous HS EVON Baca      insulin lispro  2-12 Units Subcutaneous [facility]ID [facility] EVON Marie      methocarbamol  500 mg Oral Q8H PRN EVON Marie      nystatin   Topical BID Chantal Mahmood PA-C      ondansetron  4 mg Intravenous Q6H PRN EVON Marie      polyethylene glycol  17 g Oral Daily PRN EVON Marie      sertraline  100 mg Oral Daily EVON Marie      tamsulosin  0 4 mg Oral Daily With 95 EVON Arriaza          Today, Patient Was Seen By: Alexis Day PA-C    ** Please Note: Dictation voice to text software may have been used in the creation of this document   **

## 2020-09-07 NOTE — PROGRESS NOTES
Progress Note - Infectious Disease   Shamar Womack 61 y o  male MRN: [id number]  Unit/Bed#: S -01 Encounter: [id number]      Impression/Plan:  1  Sepsis, POA  Patient presented on admission with tachycardia, leukocytosis and fever  Appears to be secondary to gram-negative bacteremia from a UTI  White count down trended, fever course protracted  Patient fortunately otherwise hemodynamically stable  Minimal antibiotic exposure  Continue antibiotics as below  Continue to trend fever curve/vitals  Repeat CBC with diff and BMP  Follow up pending culture data  Additional imaging as below  Supportive care as per primary  If continues to be slow thus far as the decrease the temperature curve despite appropriate antibiotics, may need a 2 week course of antibiotics for possible prostatitis     2  Gram-negative bacteremia  Appears to be secondary to UTI  One of 2 blood cultures with gram-negative rods  Preliminarily it appears to be a Klebsiella/enterobacter  Could be an ESBL although the white count has come down and the patient remains stable  Continue ceftriaxone for now at current dose  Follow up sensitivities and adjust antibiotics as needed  Recheck CBC with diff and BMP  Follow up pending culture data  Additional interventions pending clinical course     3  Complicated urinary tract infection  Patient presented with dysuria along with frequency  Likely source of the above  CT of the abdomen pelvis without complication  Enlarged prostate could suggest prostatitis  Continue antibiotics as above  Monitor for ongoing urinary symptoms  Follow up urine cultures which were sent a [facility]  Consider adjustment of diabetic medication     4  Elevated serum creatinine  Patient's creatinine has decreased since yesterday  Unclear baseline and patient denies any history of chronic kidney disease    no obstruction seen on CT scan  Continue ceftriaxone as above  Recheck BMP  Volume management  Will further dose adjust any antibiotics as needed     5  Poorly controlled diabetes and obesity  Patient noted with hemoglobin A1c of 8 6  Possibly contributed to infection as above  BMI also noted to be 37 which can affect antibiotic dosing  Tighten diabetic control through weight loss a medical treatment  Glucose management as per primary    Discussed the above management plan in detail with the primary service    Antibiotics:  Ceftriaxone 2    Subjective:  Patien still with intermittent fever although slight decrease in the temperature curve; no nausea, vomiting, but still with occasional loose stool; no cough, shortness of breath; no increased pain  No new symptoms  Still has some dysuria    Objective:  Vitals:  Temp:  [99 8 °F (37 7 °C)-102 7 °F (39 3 °C)] 100 7 °F (38 2 °C)  HR:  [78-96] 79  Resp:  [18-20] 20  BP: (119-136)/(68-84) 135/78  SpO2:  [91 %-96 %] 91 %  Temp (24hrs), Av 7 °F (38 2 °C), Min:99 8 °F (37 7 °C), Max:102 7 °F (39 3 °C)  Current: Temperature: (!) 100 7 °F (38 2 °C)    Physical Exam:   General Appearance:  Alert, interactive, nontoxic, no acute distress  Throat: Oropharynx moist without lesions  Lungs:   Clear to auscultation bilaterally; no wheezes, rhonchi or rales; respirations unlabored   Heart:  RRR; no murmur, rub or gallop   Abdomen:   Soft, non-tender, non-distended, positive bowel sounds  Extremities: No clubbing, cyanosis or edema   Skin: No new rashes or lesions  No draining wounds noted         Labs, Imaging, & Other studies:   All pertinent labs and imaging studies were personally reviewed  Results from last 7 days   Lab Units 20  0616   WBC Thousand/uL 6 12 16 11* 20 88*   HEMOGLOBIN g/dL 15 3 16 3 17 3*   PLATELETS Thousands/uL 177 214 240     Results from last 7 days   Lab Units 20  0616   SODIUM mmol/L 132* 137 133*   POTASSIUM mmol/L 3 5 3 9 3 8   CHLORIDE mmol/L 101 105 102   CO2 mmol/L 21 22 20*   BUN mg/dL 21 23 17   CREATININE mg/dL 1 35* 1 68* 1 53*   EGFR ml/min/1 73sq m 57 43 49   CALCIUM mg/dL 8 1* 8 5 9 1   AST U/L  --   --  18   ALT U/L  --   --  33   ALK PHOS U/L  --   --  86     Results from last 7 days   Lab Units 09/05/20  0633 09/05/20  0616   BLOOD CULTURE  No Growth at 24 hrs   --    GRAM STAIN RESULT   --  Gram negative rods*     Results from last 7 days   Lab Units 09/06/20  0604 09/05/20  0616   PROCALCITONIN ng/ml 1 69* 0 13        CT abdomen pelvis-no renal calculus or hydronephrosis  Bladder wall thickening  Enlarged prostate      Images personally reviewed by me in PACS

## 2020-09-07 NOTE — ASSESSMENT & PLAN NOTE
· Presents with urinary frequency, dysuria, fever and generalized weakness  · Of note, patient was started on Jardiance on 8/12    · UA:  Innumerable WBC, occasional bacteria  · Continue IV ceftriaxone    · Follow-up on urine culture  · Will likely need 2 week course of antibiotics, concern for prostatitis  · CT A/P negative for hydronephrosis, diverticulitis but does show diverticulosis, enlarged prostate, bilateral renal cysts and and bladder wall thickening  · Positive for 9 mm calculus in the lower pole of the right kidney and punctate calculus in the midpole of the left kidney

## 2020-09-07 NOTE — PLAN OF CARE
Problem: Potential for Falls  Goal: Patient will remain free of falls  Description: INTERVENTIONS:  - Assess patient frequently for physical needs  -  Identify cognitive and physical deficits and behaviors that affect risk of falls    -  [place] fall precautions as indicated by assessment   - Educate patient/family on patient safety including physical limitations  - Instruct patient to call for assistance with activity based on assessment  - Modify environment to reduce risk of injury  - Consider OT/PT consult to assist with strengthening/mobility  Outcome: Progressing     Problem: GENITOURINARY - ADULT  Goal: Maintains or returns to baseline urinary function  Description: INTERVENTIONS:  - Assess urinary function  - Encourage oral fluids to ensure adequate hydration if ordered  - Administer IV fluids as ordered to ensure adequate hydration  - Administer ordered medications as needed  - Offer frequent toileting  - Follow urinary retention protocol if ordered  Outcome: Progressing  Goal: Absence of urinary retention  Description: INTERVENTIONS:  - Assess patients ability to void and empty bladder  - Monitor I/O  - Bladder scan as needed  - Discuss with physician/AP medications to alleviate retention as needed  - Discuss catheterization for long term situations as appropriate  Outcome: Progressing     Problem: PAIN - ADULT  Goal: Verbalizes/displays adequate comfort level or baseline comfort level  Description: Interventions:  - Encourage patient to monitor pain and request assistance  - Assess pain using appropriate pain scale  - Administer analgesics based on type and severity of pain and evaluate response  - Implement non-pharmacological measures as appropriate and evaluate response  - Consider cultural and social influences on pain and pain management  - Notify physician/advanced practitioner if interventions unsuccessful or patient reports new pain  Outcome: Progressing     Problem: INFECTION - ADULT  Goal: Absence or prevention of progression during hospitalization  Description: INTERVENTIONS:  - Assess and monitor for signs and symptoms of infection  - Monitor lab/diagnostic results  - Monitor all insertion sites, i e  indwelling lines, tubes, and drains  - Monitor endotracheal if appropriate and nasal secretions for changes in amount and color  - [place] appropriate cooling/warming therapies per order  - Administer medications as ordered  - Instruct and encourage patient and family to use good hand hygiene technique  - Identify and instruct in appropriate isolation precautions for identified infection/condition  Outcome: Progressing

## 2020-09-07 NOTE — ASSESSMENT & PLAN NOTE
· One of 2 blood cultures with gram negative rods  · Likely due to UTI     · Infectious disease input appreciated   · Continue IV ceftriaxone   · Follow-up on blood culture

## 2020-09-08 VITALS
WEIGHT: 270.73 LBS | HEIGHT: 71 IN | RESPIRATION RATE: 20 BRPM | TEMPERATURE: 98.8 F | OXYGEN SATURATION: 95 % | BODY MASS INDEX: 37.9 KG/M2 | SYSTOLIC BLOOD PRESSURE: 130 MMHG | DIASTOLIC BLOOD PRESSURE: 70 MMHG | HEART RATE: 74 BPM

## 2020-09-08 LAB
BACTERIA BLD CULT: ABNORMAL
BASOPHILS # BLD MANUAL: 0.05 THOUSAND/UL (ref 0–0.1)
BASOPHILS NFR MAR MANUAL: 1 % (ref 0–1)
EOSINOPHIL # BLD MANUAL: 0.05 THOUSAND/UL (ref 0–0.4)
EOSINOPHIL NFR BLD MANUAL: 1 % (ref 0–6)
ERYTHROCYTE [DISTWIDTH] IN BLOOD BY AUTOMATED COUNT: 12.9 % (ref 11.6–15.1)
GLUCOSE SERPL-MCNC: 100 MG/DL (ref 65–140)
GLUCOSE SERPL-MCNC: 100 MG/DL (ref 65–140)
GRAM STN SPEC: ABNORMAL
HCT VFR BLD AUTO: 44.7 % (ref 36.5–49.3)
HGB BLD-MCNC: 15.1 G/DL (ref 12–17)
LYMPHOCYTES # BLD AUTO: 0.72 THOUSAND/UL (ref 0.6–4.47)
LYMPHOCYTES # BLD AUTO: 15 % (ref 14–44)
MCH RBC QN AUTO: 31.2 PG (ref 26.8–34.3)
MCHC RBC AUTO-ENTMCNC: 33.8 G/DL (ref 31.4–37.4)
MCV RBC AUTO: 92 FL (ref 82–98)
METAMYELOCYTES NFR BLD MANUAL: 1 % (ref 0–1)
MONOCYTES # BLD AUTO: 0.72 THOUSAND/UL (ref 0–1.22)
MONOCYTES NFR BLD: 15 % (ref 4–12)
NEUTROPHILS # BLD MANUAL: 3.13 THOUSAND/UL (ref 1.85–7.62)
NEUTS BAND NFR BLD MANUAL: 4 % (ref 0–8)
NEUTS SEG NFR BLD AUTO: 61 % (ref 43–75)
NRBC BLD AUTO-RTO: 0 /100 WBCS
PLATELET # BLD AUTO: 189 THOUSANDS/UL (ref 149–390)
PLATELET BLD QL SMEAR: ADEQUATE
PMV BLD AUTO: 10.1 FL (ref 8.9–12.7)
RBC # BLD AUTO: 4.84 MILLION/UL (ref 3.88–5.62)
RBC MORPH BLD: NORMAL
TOTAL CELLS COUNTED SPEC: 100
VARIANT LYMPHS # BLD AUTO: 2 %
WBC # BLD AUTO: 4.81 THOUSAND/UL (ref 4.31–10.16)

## 2020-09-08 PROCEDURE — 82948 REAGENT STRIP/BLOOD GLUCOSE: CPT

## 2020-09-08 PROCEDURE — 99239 HOSP IP/OBS DSCHRG MGMT >30: CPT | Performed by: PHYSICIAN ASSISTANT

## 2020-09-08 PROCEDURE — 85007 BL SMEAR W/DIFF WBC COUNT: CPT | Performed by: PHYSICIAN ASSISTANT

## 2020-09-08 PROCEDURE — 99232 SBSQ HOSP IP/OBS MODERATE 35: CPT | Performed by: INTERNAL MEDICINE

## 2020-09-08 PROCEDURE — 85027 COMPLETE CBC AUTOMATED: CPT | Performed by: PHYSICIAN ASSISTANT

## 2020-09-08 RX ORDER — CEPHALEXIN 500 MG/1
500 CAPSULE ORAL EVERY 6 HOURS SCHEDULED
Status: DISCONTINUED | OUTPATIENT
Start: 2020-09-09 | End: 2020-09-08 | Stop reason: HOSPADM

## 2020-09-08 RX ORDER — CEPHALEXIN 500 MG/1
500 CAPSULE ORAL EVERY 6 HOURS SCHEDULED
Qty: 28 CAPSULE | Refills: 0 | Status: SHIPPED | OUTPATIENT
Start: 2020-09-09 | End: 2020-09-16

## 2020-09-08 RX ADMIN — AMLODIPINE BESYLATE 10 MG: 10 TABLET ORAL at 09:46

## 2020-09-08 RX ADMIN — HEPARIN SODIUM 5000 UNITS: 5000 INJECTION INTRAVENOUS; SUBCUTANEOUS at 06:20

## 2020-09-08 RX ADMIN — CEFTRIAXONE SODIUM 2000 MG: 2 INJECTION, POWDER, FOR SOLUTION INTRAMUSCULAR; INTRAVENOUS at 06:20

## 2020-09-08 RX ADMIN — BUPROPION HYDROCHLORIDE 150 MG: 150 TABLET, FILM COATED, EXTENDED RELEASE ORAL at 09:47

## 2020-09-08 RX ADMIN — SERTRALINE HYDROCHLORIDE 100 MG: 100 TABLET ORAL at 09:46

## 2020-09-08 RX ADMIN — NYSTATIN 1 APPLICATION: 100000 POWDER TOPICAL at 09:51

## 2020-09-08 RX ADMIN — CLONIDINE HYDROCHLORIDE 0.3 MG: 0.1 TABLET ORAL at 09:47

## 2020-09-08 RX ADMIN — GUAIFENESIN 600 MG: 600 TABLET, EXTENDED RELEASE ORAL at 09:46

## 2020-09-08 NOTE — PROGRESS NOTES
Progress Note - Infectious Disease   Shamar Womack 61 y o  male MRN: [id number]  Unit/Bed#: S -01 Encounter: [id number]      Impression/Plan:  1  Sepsis, POA   Patient presented on admission with tachycardia, leukocytosis and fever  Appears to be secondary to gKlebsiella bacteremia from a UTI  White count down and fever trended   Patient fortunately otherwise hemodynamically stable  Antibiotics as below  Supportive care as per primary     2  Klebsiella bacteremia  Appears to be secondary to UTI  One of 2 blood cultures with Klebsiella pneumoniae  Complete ceftriaxone today  Transition to Cephalexin 500 mg PO q 6 hour starting in am through 9/15/20 to complete a total 10 day antibiotic course  3  Complicated urinary tract infection   Patient presented with dysuria along with frequency   Likely source of the above  CT of the abdomen pelvis without complication  Enlarged prostate noted  Continue antibiotics as above  Monitor for ongoing urinary symptoms  Follow up urine cultures which were sent a [facility]      4  Elevated serum creatinine   Patient's creatinine has decreased since admission   Unclear baseline and patient denies any history of chronic kidney disease   no obstruction seen on CT scan  Monitor creatinine  Volume management  Will further dose adjust any antibiotics as needed     5  Poorly controlled diabetes and obesity   Patient noted with hemoglobin A1c of 8 6   Possibly contributed to infection as above   BMI also noted to be 37 which can affect antibiotic dosing  Tighten diabetic control through weight loss and medical treatment  Glucose management as per primary     Discussed the above management plan in detail with patient, wife at bedside, and the primary service     Antibiotics:  Ceftriaxone 3     Subjective:  Patient feeling so much better  Temp trending down  no nausea, vomiting  No dysuria      ROS: Patient has no high fever, + sweats last night; no diarrhea; no cough, shortness of breath; no pain  No new symptoms  Objective:  Vitals:  Temp:  [98 4 °F (36 9 °C)-99 3 °F (37 4 °C)] 98 8 °F (37 1 °C)  HR:  [74-83] 74  Resp:  [18-20] 20  BP: (130-140)/(70-88) 130/70  SpO2:  [94 %-95 %] 95 %  Temp (24hrs), Av 8 °F (37 1 °C), Min:98 4 °F (36 9 °C), Max:99 3 °F (37 4 °C)  Current: Temperature: 98 8 °F (37 1 °C)    General Appearance:  Awake, alert, cooperative, resting in chair, no acute distress  Throat: Oropharynx moist without lesions  Lungs:   Clear to auscultation bilaterally; no wheezes, rhonchi or rales; respirations unlabored   Heart:  RRR; S1-S2 heard, no murmur   Abdomen:   Soft, non-tender, protuberant, positive bowel sounds  Extremities: No edema, arm IV site nontender   : Yellow urine in urinal at bedside  Skin: No rashes      Labs, Imaging, & Other studies:   All pertinent labs and imaging studies were personally reviewed  Results from last 7 days   Lab Units 20  0705 20  0638 20  0604   WBC Thousand/uL 4 81 6 12 16 11*   HEMOGLOBIN g/dL 15 1 15 3 16 3   PLATELETS Thousands/uL 189 177 214     Results from last 7 days   Lab Units 20  0638  20  0616   POTASSIUM mmol/L 3 5   < > 3 8   CHLORIDE mmol/L 101   < > 102   CO2 mmol/L 21   < > 20*   BUN mg/dL 21   < > 17   CREATININE mg/dL 1 35*   < > 1 53*   EGFR ml/min/1 73sq m 57   < > 49   CALCIUM mg/dL 8 1*   < > 9 1   AST U/L  --   --  18   ALT U/L  --   --  33   ALK PHOS U/L  --   --  86    < > = values in this interval not displayed  Results from last 7 days   Lab Units 20  0604 20  0616   PROCALCITONIN ng/ml 1 69* 0 13     Results from last 7 days   Lab Units 20  0633 20  0616   BLOOD CULTURE  No Growth at 48 hrs   Klebsiella pneumoniae*   GRAM STAIN RESULT   --  Gram negative rods*

## 2020-09-08 NOTE — ASSESSMENT & PLAN NOTE
· One of 2 blood cultures with Klebsiella pneumoniae   · Likely due to UTI     · Infectious disease input appreciated   · Continue IV ceftriaxone, transition to PO Keflex 500mg q6 for total of 10 days of abx (today day 3)

## 2020-09-08 NOTE — DISCHARGE SUMMARY
Discharge- Leslie Jones 1960, 61 y o  male MRN: [id number]  Unit/Bed#: S -01 Encounter: [id number]  Primary Care Provider: Manny Patino MD   Date and time admitted to hospital: 9/5/2020  5:45 AM    * Severe sepsis [facility])  Assessment & Plan   POA as evidenced by tachycardia, leukocytosis with WBC 20 88 and fever  o In the setting of UTI and bacteremia    Lactic acid 2 1 on presentation, improved to 1 9 with IV fluids   Bilirubin greater than 2 on admission   Procalcitonin 0 13 on presentation increased to 1 69 today   Infectious disease input appreciated    Continue IV antibiotics with ceftriaxone, transition to PO    Urine culture lab luh 9/5 still pending     Bacteremia  Assessment & Plan  · One of 2 blood cultures with Klebsiella pneumoniae   · Likely due to UTI  · Infectious disease input appreciated   · Continue IV ceftriaxone, transition to PO Keflex 500mg q6 for total of 10 days of abx (today day 3)    Type 2 diabetes mellitus [facility])  Assessment & Plan  Lab Results   Component Value Date    HGBA1C 8 6 (H) 07/15/2020     Recent Labs     09/07/20  1106 09/07/20  1547 09/07/20  2112 09/08/20  0716   POCGLU 205* 118 116 100   Blood Sugar Average: Last 72 hrs:  · (P) 502 [id number]   · Home regimen:  Jardiance daily, glimepiride daily, Trulicity weekly  · Hold while inpatient   · Accu-Chek AC and HS, sliding scale insulin while inpatient  · Long discussion with patient about possibly switching to insulin, states his PCP and him have been considering this, especially in the setting of UTI and bacteremia after starting SGLT2 inhibitor     CKD (chronic kidney disease) stage 3, GFR 30-59 ml/min ([facility])  Assessment & Plan  · Prior lab work reviewed, baseline Cr appears to be closer to 1 3-1 5   · Hold ACE-I   · Avoid nephrotoxins and hypotension  · Monitor intake and output      CPAP (continuous positive airway pressure) dependence  Assessment & Plan  · JEFF on CPAP HS     Essential hypertension  Assessment & Plan  · Continue home medication regimen with amlodipine, clonidine  · Hold ACE-I on d/c  · Continue to monitor BP  BPH without urinary obstruction  Assessment & Plan  · Continue Flomax  · Monitor urine output  Discharging Physician / Practitioner: Michelle Guerra PA-C  PCP: Marti Salinas MD  Admission Date:   Admission Orders (From admission, onward)     Ordered        09/05/20 0654  Inpatient Admission (expected length of stay for this patient Order details is greater than two midnights)  Once                   Discharge Date: 09/08/20    Resolved Problems  Date Reviewed: 9/8/2020    None        Consultations During Hospital Stay:  · ID     Procedures Performed:   · none    Significant Findings / Test Results:   · Sepsis secondary to Klebsiella bacteremia likely due to UTI  · Poorly controlled type 2 diabetes with hyperglycemia  · Suspected chronic kidney disease   · CT abdomen pelvis 9 mm calculus in the lower pole right kidney and punctate calculus in the mid pole of left kidney, no hydronephrosis  Poorly distended bladder with wall thickening, prostate enlarged, diverticulosis and descending and sigmoid colons, bilateral renal cysts  Incidental Findings:   · None    Test Results Pending at Discharge (will require follow up): · None     Outpatient Tests Requested:  · Follow-up with PCP    Complications:  None    Reason for Admission:  Fever    Hospital Course:     Mann Mckeon is a 61 y o  male patient with past medical history significant for type 2 diabetes, BPH, hypertension, CKD who originally presented to the hospital on 9/5/2020 due to fever and dysuria  Patient was found have UTI in the ER and admitted for urosepsis  Patient was found to have 1 of 2 blood cultures positive for Klebsiella pneumonia felt to be urinary in origin  No urinary culture has resulted since admission    He continued to spike temps over the last 72 hours however this has resolved  He is tolerating IV antibiotics and is stable for transition to p o  No obstruction or hydronephrosis was noted on CT abdomen pelvis  Infectious Disease input was appreciated  Repeat blood cultures remain negative  He is medically stable for discharge home with close outpatient follow-up this time  Please see above list of diagnoses and related plan for additional information  Condition at Discharge: stable     Discharge Day Visit / Exam:     Subjective:  Pt seen and examined at bedside  No new complaints  No fevers or chills  No dysuria  Vitals: Blood Pressure: 130/70 (09/08/20 0946)  Pulse: 74 (09/08/20 0705)  Temperature: 98 8 °F (37 1 °C) (09/08/20 0705)  Temp Source: Oral (09/08/20 0705)  Respirations: 20 (09/08/20 0705)  Height: 5' 11" (180 3 cm) (09/05/20 0606)  Weight - Scale: 123 kg (270 lb 11 6 oz) (09/05/20 0609)  SpO2: 95 % (09/08/20 0705)    Exam:   Physical Exam  Constitutional:       Appearance: Normal appearance  He is obese  HENT:      Head: Normocephalic and atraumatic  Eyes:      Extraocular Movements: Extraocular movements intact  Neck:      Musculoskeletal: Normal range of motion and neck supple  Cardiovascular:      Rate and Rhythm: Normal rate and regular rhythm  Pulmonary:      Effort: Pulmonary effort is normal       Breath sounds: Normal breath sounds  Abdominal:      General: Abdomen is flat  Palpations: Abdomen is soft  Musculoskeletal: Normal range of motion  General: No swelling  Skin:     General: Skin is warm and dry  Neurological:      General: No focal deficit present  Mental Status: He is alert  Psychiatric:         Mood and Affect: Mood normal          Behavior: Behavior normal        Discussion with Family: pt declined call to wife     Discharge instructions/Information to patient and family:   See after visit summary for information provided to patient and family        Provisions for Follow-Up Care:  See after visit summary for information related to follow-up care and any pertinent home health orders  Disposition:     Home    For Discharges to [facility]:   · Not Applicable to this Patient - Not Applicable to this Patient    Planned Readmission: none     Discharge Statement:  I spent 45 minutes discharging the patient  This time was spent on the day of discharge  I had direct contact with the patient on the day of discharge  Greater than 50% of the total time was spent examining patient, answering all patient questions, arranging and discussing plan of care with patient as well as directly providing post-discharge instructions  Additional time then spent on discharge activities  Discharge Medications:  See after visit summary for reconciled discharge medications provided to patient and family        ** Please Note: This note has been constructed using a voice recognition system **

## 2020-09-08 NOTE — DISCHARGE INSTR - AVS FIRST PAGE
Stop jardiance and f/u with PCP to discuss insulin     Start antibiotic pill Wednesday 9/9 and continue for 7 more days

## 2020-09-08 NOTE — ASSESSMENT & PLAN NOTE
· Prior lab work reviewed, baseline Cr appears to be closer to 1 3-1 5   · Hold ACE-I   · Avoid nephrotoxins and hypotension  · Monitor intake and output

## 2020-09-08 NOTE — ASSESSMENT & PLAN NOTE
· Continue home medication regimen with amlodipine, clonidine  · Hold ACE-I on d/c  · Continue to monitor BP

## 2020-09-08 NOTE — PLAN OF CARE
Problem: Potential for Falls  Goal: Patient will remain free of falls  Description: INTERVENTIONS:  - Assess patient frequently for physical needs  -  Identify cognitive and physical deficits and behaviors that affect risk of falls    -  Aldrich fall precautions as indicated by assessment   - Educate patient/family on patient safety including physical limitations  - Instruct patient to call for assistance with activity based on assessment  - Modify environment to reduce risk of injury  - Consider OT/PT consult to assist with strengthening/mobility  Outcome: Progressing     Problem: GENITOURINARY - ADULT  Goal: Maintains or returns to baseline urinary function  Description: INTERVENTIONS:  - Assess urinary function  - Encourage oral fluids to ensure adequate hydration if ordered  - Administer IV fluids as ordered to ensure adequate hydration  - Administer ordered medications as needed  - Offer frequent toileting  - Follow urinary retention protocol if ordered  Outcome: Progressing  Goal: Absence of urinary retention  Description: INTERVENTIONS:  - Assess patients ability to void and empty bladder  - Monitor I/O  - Bladder scan as needed  - Discuss with physician/AP medications to alleviate retention as needed  - Discuss catheterization for long term situations as appropriate  Outcome: Progressing     Problem: PAIN - ADULT  Goal: Verbalizes/displays adequate comfort level or baseline comfort level  Description: Interventions:  - Encourage patient to monitor pain and request assistance  - Assess pain using appropriate pain scale  - Administer analgesics based on type and severity of pain and evaluate response  - Implement non-pharmacological measures as appropriate and evaluate response  - Consider cultural and social influences on pain and pain management  - Notify physician/advanced practitioner if interventions unsuccessful or patient reports new pain  Outcome: Progressing     Problem: INFECTION - ADULT  Goal: Absence or prevention of progression during hospitalization  Description: INTERVENTIONS:  - Assess and monitor for signs and symptoms of infection  - Monitor lab/diagnostic results  - Monitor all insertion sites, i e  indwelling lines, tubes, and drains  - Monitor endotracheal if appropriate and nasal secretions for changes in amount and color  - [place] appropriate cooling/warming therapies per order  - Administer medications as ordered  - Instruct and encourage patient and family to use good hand hygiene technique  - Identify and instruct in appropriate isolation precautions for identified infection/condition  Outcome: Progressing

## 2020-09-08 NOTE — ASSESSMENT & PLAN NOTE
Lab Results   Component Value Date    HGBA1C 8 6 (H) 07/15/2020     Recent Labs     09/07/20  1106 09/07/20  1547 09/07/20  2112 09/08/20  0716   POCGLU 205* 118 116 100   Blood Sugar Average: Last 72 hrs:  · (P) 781 [id number]   · Home regimen:  Jardiance daily, glimepiride daily, Trulicity weekly  · Hold while inpatient   · Accu-Chek AC and HS, sliding scale insulin while inpatient  · Long discussion with patient about possibly switching to insulin, states his PCP and him have been considering this, especially in the setting of UTI and bacteremia after starting SGLT2 inhibitor

## 2020-09-08 NOTE — DISCHARGE INSTRUCTIONS
Bacteremia   WHAT YOU NEED TO KNOW:   Bacteremia is when there is bacteria in the blood  Bacteremia happens when germs from infections in your body travel to your blood  It can also be caused by a catheter or drain that is inserted into the body and left in place  Examples of catheters and drains include a port-a-cath, PICC line, dialysis catheter, abdominal drain, or a urinary catheter  DISCHARGE INSTRUCTIONS:   Call 911 for any of the following:   · You have a seizure or lose consciousness       · You have trouble breathing       · You feel extremely weak and have a hard time moving  Seek care immediately if:   · Your symptoms, such as fever, get worse, even if you are taking medicine to treat the infection      · You stop urinating or urinate very little  Contact your healthcare provider if:   · You have questions or concerns about your condition or care      Medicines: You may need any of the following:  · Antibiotics may be given to treat an infection  You may be given antibiotics through an IV for several weeks  You may instead be given oral antibiotics  Do not stop taking your antibiotics when you feel better  Take all of your medicine until it is finished  This may prevent the infection from returning or getting worse       · Acetaminophen helps decrease pain and fever  Taking too much acetaminophen can hurt your liver  Read labels so that you know the active ingredients in each medicine that you take  Talk to your healthcare provider before taking more than one medicine that contains acetaminophen  Ask your healthcare provider before taking over-the-counter medicine if you are also taking pain medicine prescribed (ordered) for you       · NSAIDs , such as ibuprofen, help decrease swelling, pain, and fever  This medicine is available with or without a doctor's order  NSAIDs can cause stomach bleeding or kidney problems in certain people   If you take blood thinner medicine, always ask your healthcare provider if NSAIDs are safe for you  Always read the medicine label and follow directions      · Take your medicine as directed  Contact your healthcare provider if you think your medicine is not helping or if you have side effects  Tell him of her if you are allergic to any medicine  Keep a list of the medicines, vitamins, and herbs you take  Include the amounts, and when and why you take them  Bring the list or the pill bottles to follow-up visits  Carry your medicine list with you in case of an emergency  Prevent bacteremia:   · Care for catheters and drains as directed  Wash your hands before and after you touch your catheter or drain  Follow directions for dressing changes and bathing  Watch for signs and symptoms of infection such as pus, fever, swelling, pain or drainage  Report symptoms immediately to your healthcare provider       · Get vaccinated  Get all recommended vaccinations  The pneumonia and influenza vaccines may prevent lung infections that could cause bacteremia  Follow up with your healthcare provider as directed: You may need to return for more blood tests  This will tell your healthcare provider if the antibiotics are working  Write down your questions so you remember to ask them during your visits  © 2017 [employer] Information is for End User's use only and may not be sold, redistributed or otherwise used for commercial purposes  All illustrations and images included in CareNotes® are the copyrighted property of A D A M , Inc  or Zach[employer]  The above information is an  only  It is not intended as medical advice for individual conditions or treatments   Talk to your doctor, nurse or pharmacist before following any medical regimen to see if it is safe and effective for you

## 2020-09-08 NOTE — ASSESSMENT & PLAN NOTE
 POA as evidenced by tachycardia, leukocytosis with WBC 20 88 and fever  o In the setting of UTI and bacteremia    Lactic acid 2 1 on presentation, improved to 1 9 with IV fluids   Bilirubin greater than 2 on admission   Procalcitonin 0 13 on presentation increased to 1 69 today     Infectious disease input appreciated    Continue IV antibiotics with ceftriaxone, transition to PO    Urine culture lab luh 9/5 still pending

## 2020-09-09 ENCOUNTER — TRANSITIONAL CARE MANAGEMENT (OUTPATIENT)
Dept: INTERNAL MEDICINE CLINIC | Facility: CLINIC | Age: 60
End: 2020-09-09

## 2020-09-10 LAB — BACTERIA BLD CULT: NORMAL

## 2020-09-11 ENCOUNTER — OFFICE VISIT (OUTPATIENT)
Dept: INTERNAL MEDICINE CLINIC | Facility: CLINIC | Age: 60
End: 2020-09-11
Payer: COMMERCIAL

## 2020-09-11 ENCOUNTER — TELEPHONE (OUTPATIENT)
Dept: INTERNAL MEDICINE CLINIC | Facility: CLINIC | Age: 60
End: 2020-09-11

## 2020-09-11 VITALS
TEMPERATURE: 97.2 F | SYSTOLIC BLOOD PRESSURE: 136 MMHG | WEIGHT: 268 LBS | HEIGHT: 71 IN | HEART RATE: 70 BPM | BODY MASS INDEX: 37.52 KG/M2 | OXYGEN SATURATION: 98 % | DIASTOLIC BLOOD PRESSURE: 88 MMHG

## 2020-09-11 DIAGNOSIS — I10 ESSENTIAL HYPERTENSION: ICD-10-CM

## 2020-09-11 DIAGNOSIS — G47.33 OSA ON CPAP: ICD-10-CM

## 2020-09-11 DIAGNOSIS — B96.1 BACTEREMIA DUE TO KLEBSIELLA PNEUMONIAE: Primary | ICD-10-CM

## 2020-09-11 DIAGNOSIS — N18.30 CKD (CHRONIC KIDNEY DISEASE) STAGE 3, GFR 30-59 ML/MIN (HCC): ICD-10-CM

## 2020-09-11 DIAGNOSIS — Z99.89 OSA ON CPAP: ICD-10-CM

## 2020-09-11 DIAGNOSIS — N20.0 CALCULUS OF RIGHT KIDNEY: ICD-10-CM

## 2020-09-11 DIAGNOSIS — B37.9 CANDIDIASIS: ICD-10-CM

## 2020-09-11 DIAGNOSIS — R78.81 BACTEREMIA DUE TO KLEBSIELLA PNEUMONIAE: Primary | ICD-10-CM

## 2020-09-11 DIAGNOSIS — E11.40 TYPE 2 DIABETES MELLITUS WITH DIABETIC NEUROPATHY, WITHOUT LONG-TERM CURRENT USE OF INSULIN (HCC): ICD-10-CM

## 2020-09-11 PROCEDURE — 99495 TRANSJ CARE MGMT MOD F2F 14D: CPT | Performed by: NURSE PRACTITIONER

## 2020-09-11 RX ORDER — INSULIN GLARGINE 100 [IU]/ML
10 INJECTION, SOLUTION SUBCUTANEOUS
Qty: 5 PEN | Refills: 0 | Status: SHIPPED | OUTPATIENT
Start: 2020-09-11 | End: 2020-09-11 | Stop reason: SDUPTHER

## 2020-09-11 RX ORDER — INSULIN GLARGINE 100 [IU]/ML
10 INJECTION, SOLUTION SUBCUTANEOUS
Qty: 9 ML | Refills: 0 | Status: SHIPPED | OUTPATIENT
Start: 2020-09-11 | End: 2020-11-20 | Stop reason: SDUPTHER

## 2020-09-11 RX ORDER — BLOOD SUGAR DIAGNOSTIC
1 STRIP MISCELLANEOUS 2 TIMES DAILY
Qty: 200 EACH | Refills: 2 | Status: SHIPPED | OUTPATIENT
Start: 2020-09-11 | End: 2020-09-11 | Stop reason: SDUPTHER

## 2020-09-11 RX ORDER — BLOOD SUGAR DIAGNOSTIC
1 STRIP MISCELLANEOUS 2 TIMES DAILY
Qty: 200 EACH | Refills: 2 | Status: SHIPPED | OUTPATIENT
Start: 2020-09-11 | End: 2021-07-28 | Stop reason: SDUPTHER

## 2020-09-11 RX ORDER — NYSTATIN 100000 [USP'U]/G
POWDER TOPICAL 3 TIMES DAILY PRN
Qty: 15 G | Refills: 1 | Status: SHIPPED | OUTPATIENT
Start: 2020-09-11 | End: 2020-10-27 | Stop reason: SDUPTHER

## 2020-09-11 NOTE — PROGRESS NOTES
Assessment/Plan:     Diabetes mellitus with neurological manifestation ([facility])    Lab Results   Component Value Date    HGBA1C 8 6 (H) 07/15/2020   off Jardiance due to UTI   Continue glimepiride and Trulicity  Start basaglar 10 units at HS  Monitor blood sugars once or twice daily   Due for labs prior to next follow up in 2 months    JEFF on CPAP  Compliant with cpap     Essential hypertension  Stable  Continue home medication regimen     CKD (chronic kidney disease) stage 3, GFR 30-59 ml/min ([facility])  Renal function stable     Bacteremia due to Klebsiella pneumoniae  Symptoms improved- no fevers or issues urinating  Off Jardiance  Finish Keflex  Diagnoses and all orders for this visit:    Bacteremia due to Klebsiella pneumoniae  -     Ambulatory referral to Urology; Future    Calculus of right kidney  -     Ambulatory referral to Urology; Future    Candidiasis  -     nystatin (MYCOSTATIN) powder; Apply topically 3 (three) times a day as needed (groin rash)    Type 2 diabetes mellitus with diabetic neuropathy, without long-term current use of insulin ([facility])  -     Discontinue: insulin glargine (Basaglar KwikPen) 100 units/mL injection pen; Inject 10 Units under the skin daily at bedtime  -     Discontinue: glucose blood (Accu-Chek Guide) test strip; 1 each by Other route 2 (two) times a day Use as instructed  -     insulin glargine (Basaglar KwikPen) 100 units/mL injection pen; Inject 10 Units under the skin daily at bedtime  -     glucose blood (Accu-Chek Guide) test strip; 1 each by Other route 2 (two) times a day Use as instructed  -     Insulin Pen Needle 32G X 5 MM MISC; by Does not apply route daily    Essential hypertension    JEFF on CPAP    CKD (chronic kidney disease) stage 3, GFR 30-59 ml/min ([facility])         Subjective:     Patient ID: Huang Rivas is a 61 y o  male  Here today for TCM   Krissy Nickel was admitted to the hospital from 9/5-9/8 with complaints of fever and dysuria     He was diagnosed with urosepsis  WBC was 20 88 on admission and lactic acid was 2 1  He had one positive blood culture which revealed klebsiella pneumonia  He received IV fluids and IV antibiotics with improvement in symptoms  Ct abd/pelvis did not show hydronephrosis but did show a 9mm calculus on the right   He was discharged home with keflex to complete 10 total days of antibiotics   His Jardiance was stopped due to UTI    Today he reports significant improvement in his symptoms  He denies any further fever, chills, burning with urination, n/v/d  His appetite has improved   He has no issues urinating     He does not check his blood sugars at home  He has stopped Jardiance   He is receptive to starting insulin     He has complaints of a red itchy rash in his groin  He has been using nystatin powder from the hospital with relief       Review of Systems   Constitutional: Negative for activity change, appetite change, fatigue and unexpected weight change  Eyes: Negative for visual disturbance  Respiratory: Negative for cough, chest tightness, shortness of breath and wheezing  Cardiovascular: Negative for chest pain, palpitations and leg swelling  Gastrointestinal: Negative for abdominal pain, blood in stool, constipation and diarrhea  Genitourinary: Negative for difficulty urinating, dysuria, flank pain, frequency and hematuria  Musculoskeletal: Negative for arthralgias  Skin: Negative for rash  Neurological: Negative for dizziness, weakness, light-headedness and headaches  Psychiatric/Behavioral: Negative for sleep disturbance  Objective:     Physical Exam  Vitals signs reviewed  Constitutional:       Appearance: Normal appearance  He is well-developed  HENT:      Head: Normocephalic and atraumatic  Eyes:      Conjunctiva/sclera: Conjunctivae normal       Pupils: Pupils are equal, round, and reactive to light  Neck:      Thyroid: No thyromegaly     Cardiovascular:      Rate and Rhythm: Normal rate and regular rhythm  Heart sounds: Normal heart sounds  Pulmonary:      Effort: Pulmonary effort is normal       Breath sounds: Normal breath sounds  Abdominal:      General: Bowel sounds are normal       Palpations: Abdomen is soft  Musculoskeletal: Normal range of motion  Lymphadenopathy:      Cervical: No cervical adenopathy  Skin:     General: Skin is warm and dry  Neurological:      Mental Status: He is alert and oriented to person, place, and time  Psychiatric:         Behavior: Behavior normal            Vitals:    09/11/20 1320   BP: 136/88   Pulse: 70   Temp: (!) 97 2 °F (36 2 °C)   SpO2: 98%   Weight: 122 kg (268 lb)   Height: 5' 11" (1 803 m)       Transitional Care Management Review:  Rajeev Escobedo is a 61 y o  male here for TCM follow up  During the TCM phone call patient stated:    TCM Call (since 8/11/2020)     Date and time call was made  9/9/2020  2:24 PM    Hospital care reviewed  Records reviewed    Patient was hospitialized at  [address] [place] [address]    Date of Admission  09/05/20    Date of discharge  09/08/20    Diagnosis  Severe sepsis [facility])     Disposition  Home    Were the patients medications reviewed and updated  Yes    Current Symptoms  None      TCM Call (since 8/11/2020)     Post hospital issues  None    Should patient be enrolled in anticoag monitoring? No    Scheduled for follow up? Yes    Did you obtain your prescribed medications  Yes    Do you need help managing your prescriptions or medications  No    Is transportation to your appointment needed  No    I have advised the patient to call PCP with any new or worsening symptoms  Mark Holder            [address] [facility] [place]

## 2020-09-11 NOTE — TELEPHONE ENCOUNTER
Lantus is not covered by pt 's insurance  Can send something else to [facility]  He also needs Acu check Guide sstrips sent [facility]  He has a new meter

## 2020-09-11 NOTE — TELEPHONE ENCOUNTER
Please call owen and cancel accu chek strips and basaglar insulin  Please let him know I sent in accu chek strips to [facility]  I also sent in Basaglar insulin 10 units at bedtime  Check sugars once or twice daily

## 2020-09-11 NOTE — ASSESSMENT & PLAN NOTE
Lab Results   Component Value Date    HGBA1C 8 6 (H) 07/15/2020   off Jardiance due to UTI   Continue glimepiride and Trulicity  Start basaglar 10 units at HS  Monitor blood sugars once or twice daily   Due for labs prior to next follow up in 2 months

## 2020-09-11 NOTE — UTILIZATION REVIEW
Notification of Discharge  This is a Notification of Discharge from our facility [address]  Please be advised that this patient has been discharge from our facility  Below you will find the admission and discharge date and time including the patients disposition  PRESENTATION DATE: 9/5/2020  5:45 AM  OBS ADMISSION DATE:   IP ADMISSION DATE: 9/5/20 0654   DISCHARGE DATE: 9/8/2020  4:08 PM  DISPOSITION: Home/Self Care Home/Self Care   Admission Orders listed below:  Admission Orders (From admission, onward)     Ordered        09/05/20 0654  Inpatient Admission (expected length of stay for this patient Order details is greater than two midnights)  Once                   Please contact the UR Department if additional information is required to close this patient's authorization/case  [address] [facility] Utilization Review Department  Main: [phone] x carefully listen to the prompts  All voicemails are confidential   [email or web address]  Send all requests for admission clinical reviews, approved or denied determinations and any other requests to dedicated fax number below belonging to the campus where the patient is receiving treatment   List of dedicated fax numbers:  [address] [place] [address] DENIALS (Administrative/Medical Necessity) [phone]   [address] (Maternity/NICU/Pediatrics) [phone]   Jamil Vu [phone]   Maisha Gordon [phone]   Josh Gary [phone]   Gerri Nyhan [facility] [address] [facility] [phone]   [facility]  [phone]   [address] [facility] [address]  [address] [facility] [place][note 2] [address] [facility] [phone]

## 2020-09-14 DIAGNOSIS — E11.40 TYPE 2 DIABETES MELLITUS WITH DIABETIC NEUROPATHY, WITHOUT LONG-TERM CURRENT USE OF INSULIN (HCC): Primary | ICD-10-CM

## 2020-09-15 DIAGNOSIS — E11.01 TYPE 2 DIABETES MELLITUS WITH HYPEROSMOLAR COMA, WITHOUT LONG-TERM CURRENT USE OF INSULIN (HCC): Primary | ICD-10-CM

## 2020-09-15 NOTE — TELEPHONE ENCOUNTER
Patients insurance does not cover Novotwst NDL pen [id number]     Alternative that is covered is BD Ultra fine

## 2020-09-16 LAB — MISCELLANEOUS LAB TEST RESULT: NORMAL

## 2020-09-21 DIAGNOSIS — E11.40 TYPE 2 DIABETES MELLITUS WITH DIABETIC NEUROPATHY, WITHOUT LONG-TERM CURRENT USE OF INSULIN (HCC): Primary | ICD-10-CM

## 2020-09-21 RX ORDER — PEN NEEDLE, DIABETIC 32GX 5/32"
NEEDLE, DISPOSABLE MISCELLANEOUS DAILY
Qty: 100 EACH | Refills: 1 | Status: SHIPPED | OUTPATIENT
Start: 2020-09-21 | End: 2021-01-08

## 2020-09-28 ENCOUNTER — OFFICE VISIT (OUTPATIENT)
Dept: PODIATRY | Facility: CLINIC | Age: 60
End: 2020-09-28
Payer: COMMERCIAL

## 2020-09-28 VITALS — BODY MASS INDEX: 37.52 KG/M2 | WEIGHT: 268 LBS | HEIGHT: 71 IN | RESPIRATION RATE: 17 BRPM

## 2020-09-28 DIAGNOSIS — E11.42 DIABETIC POLYNEUROPATHY ASSOCIATED WITH TYPE 2 DIABETES MELLITUS (HCC): Primary | ICD-10-CM

## 2020-09-28 DIAGNOSIS — M77.42 METATARSALGIA OF BOTH FEET: ICD-10-CM

## 2020-09-28 DIAGNOSIS — B35.1 ONYCHOMYCOSIS: ICD-10-CM

## 2020-09-28 DIAGNOSIS — M79.671 PAIN IN BOTH FEET: ICD-10-CM

## 2020-09-28 DIAGNOSIS — M21.969 ACQUIRED DEFORMITY OF FOOT, UNSPECIFIED LATERALITY: ICD-10-CM

## 2020-09-28 DIAGNOSIS — M77.41 METATARSALGIA OF BOTH FEET: ICD-10-CM

## 2020-09-28 DIAGNOSIS — M79.672 PAIN IN BOTH FEET: ICD-10-CM

## 2020-09-28 DIAGNOSIS — B35.3 TINEA PEDIS OF BOTH FEET: ICD-10-CM

## 2020-09-28 PROCEDURE — 99213 OFFICE O/P EST LOW 20 MIN: CPT | Performed by: PODIATRIST

## 2020-09-28 NOTE — PROGRESS NOTES
Assessment   Pain upon ambulation   Peripheral artery disease   Diabetic neuropathy   Callus formation   Mycosis of nail   Peroneal tendinitis right foot      Plan   Foot exam performed   Nails debrided   Calluses debrided   Patient will stretch daily   Procedures performed without pain or complication   Topical antifungal added        Chief Complaint   Patient needs full diabetic foot exam   Patient has pain upon ambulation   Patient complains of pain in his toes when he wears shoes   He gets pain in the ball of the foot   No history of trauma      History of Present Illness   HPI: Patient presents for pedal evaluation  Patient complains of pain in his feet and toes with ambulation   Patient is a morbidly obese diabetic who has some electric sensations in his feet on occasion       Review of Systems        Constitutional: not feeling tired       Eyes: no eyesight problems       ENT: no nasal discharge       Cardiovascular: no chest pain,-- no palpitations-- and-- no extremity edema       Respiratory: no shortness of breath-- and-- no cough       Gastrointestinal: no abdominal pain-- and-- no constipation       Genitourinary: no dysuria-- and-- no urinary hesitancy       Integumentary: no skin wound       Neurological: no tingling-- and-- no dizziness       Psychiatric: sleeping better, stopped taking trazodone, but-- no sleep disturbances       Endocrine: no feelings of weakness       Active Problems   1  Acquired pes planus (734) (M21 40)   2  Acute renal failure (584 9) (N17 9)   3  Adhesive capsulitis of both shoulders (726 0) (M75 01,M75 02)   4  Arthralgia (719 40) (M25 50)   5  BPH without urinary obstruction (600 00) (N40 0)   6  Bunion (727 1) (M21 619)   7  Callus (700) (L84)   8  IODMJCD systolic congestive heart failure (428 22,428 0) (I50 22)   9  Depression with anxiety (300 4) (F41 8)   10  Diabetes mellitus with neurological manifestation (250 60) (E11 49)   11  Diabetes type 2, uncontrolled (250 02) (E11 65)   12  Difficulty concentrating (799 51) (R41 840)   13  Difficulty walking (719 7) (R26 2)   14  Dyspnea on exertion (786 09) (R06 09)   15  Encounter for prostate cancer screening (V76 44) (Z12 5)   16  Encounter for screening for malignant neoplasm of colon (V76 51) (Z12 11)   17  Fatigue (780 79) (R53 83)   18  Foot pain, bilateral (729 5) (M79 671,M79 672)   19  Hallux valgus (735 0) (M20 10)   20  Hematuria (599 70) (R31 9)   21  Hyperlipidemia (272 4) (E78 5)   22  Hypertension (401 9) (I10)   23  Hypogonadism, male (257 2) (E29 1)   24  Insomnia (780 52) (G47 00)   25  Morbid or severe obesity due to excess calories (278 01) (E66 01)   26  Need for pneumococcal vaccination (V03 82) (Z23)   27  Need for prophylactic vaccination and inoculation against influenza (V04 81) (Z23)   28  Nephrolithiasis (592 0) (N20 0)   29  Onychomycosis (110 1) (B35 1)   30  JEFF (obstructive sleep apnea) (327 23) (G47 33)   31  Sciatica (724 3) (M54 30)   32  Screening for colon cancer (V76 51) (Z12 11)   33  Seasonal allergies (477 9) (J30 2)   34  Shoulder pain, right (719 41) (M25 511)   35  Spinal stenosis (724 00) (M48 00)   36  Tinea pedis (110 4) (B35 3)   37  Type 2 diabetes mellitus (250 00) (E11 9)   38  Vitamin D deficiency (268 9) (E55 9)     Past Medical History    · History of Cellulitis of left leg (682 6) (L03 116)   · History of chest pain (V13 89) (F06 577)   · History of diarrhea (V12 79) (Y17 310)   · History of onychomycosis (V12 09) (Z86 19)   · History of onychomycosis (V12 09) (Z86 19)   · History of Limb pain (729 5) (M79 609)   · History of Neck pain (723 1) (M54 2)   · Need for pneumococcal vaccination (V03 82) (Z23)   · History of Nephrolithiasis (V13 01)   · History of Numbness On The Sole Of The Right Foot   · History of Pain and swelling of left lower leg (729 5,729 81) (M79 662,M79 89)   · History of Pain of lower extremity (729 5) (M79 606)   · History of Pain, hand joint, right (719 44) (M79 641)   · Sciatica (724 3) (M54 30)     The active problems and past medical history were reviewed and updated today       Surgical History    · History of Knee Arthroscopy (Therapeutic)   · History of Knee Surgery   · History of Needle Biopsy Of Prostate     Family History   Mother    · Family history of Alzheimer Disease   · Family history of Family Health Status Of Mother - Alive  Father    · Family history of Family Health Status Of Father -   Family History    · Family history of Adopted   · Denied: Family history of Colon Cancer   · Denied: Family history of Crohn's Disease   · Denied: Family history of Liver Cancer     Social History    · Denied: History of Alcohol Use (History)   · Current Some Day Smoker (305 1)   · Denied: History of Exercise Habits   · Marital History - Currently    · Tobacco use (305 1) (Z72 0)   · Working Full Time  The social history was reviewed and is unchanged       The medication list was reviewed and updated today       Allergies   1  No Known Drug Allergies   Physical Exam   Left Foot: Appearance: Normal except as noted: excessive pronation-- and-- pes planus  Great toe deformities include a bunion  Tenderness: None except the great toe-- and-- distal first metatarsal     Right Foot: Appearance: Normal except as noted: excessive pronation-- and-- pes planus  Great toe deformities include a bunion  Tenderness: None except the great toe-- and-- distal first metatarsal     Left Ankle: ROM: limited ROM in all planes    Right Ankle: ROM: limited ROM in all planes    Neurological Exam: performed  Light touch was decreased bilaterally  Vibratory sensation was decreased in both first metatarsophalangeal joints     Vascular Exam: performed Dorsalis pedis pulses were present bilaterally  Posterior tibial pulses were present bilaterally  Elevation Pallor: absent bilaterally  Dependence rubor was absent bilaterally  Edema: none     Toenails:  All of the toenails were elongated,-- hypertrophied,-- discolored-- and-- Ptotic  Both first toenails were tender-- and-- Positive onychogryphosis          Socks and shoes removed, Right Foot Findings: swollen, erythematous and dry       The sensory exam showed diminished vibratory sensation at the level of the toes  Diminished tactile sensation with monofilament testing throughout the right foot       Socks and shoes removed, Left Foot Findings: swollen, erythematous and dry       The sensory exam showed diminished vibratory sensation at the level of the toes  Diminished tactile sensation with monofilament testing throughout the left foot      Capillary refills findings on the right were normal in the toes       Pulses:      2+ in the posterior tibialis on the right      2+ in the dorsalis pedis on the right       Capillary refills findings on the left were normal in the toes       Pulses:      1+ in the posterior tibialis on the left      1+ in the dorsalis pedis on the left       Assign Risk Category: 2: Loss of protective sensation with or without weakness, deformity, callus, pre-ulcer, or history of ulceration  High risk     Hyperkeratosis: present on both first toes,-- present on both first sub metatarsals,-- present on both third sub metatarsals-- and-- Severe profound moccasin tinea pedis bilateral     Shoe Gear Evaluation: performed ()  Recommendation(s): SAS style      Patient's shoes and socks removed  Assign Risk Category:  Deformity present; Loss of protective sensation; Weak pulses       Risk: 2

## 2020-10-27 DIAGNOSIS — N40.0 BPH WITHOUT URINARY OBSTRUCTION: ICD-10-CM

## 2020-10-27 DIAGNOSIS — I10 ESSENTIAL HYPERTENSION: ICD-10-CM

## 2020-10-27 DIAGNOSIS — B37.9 CANDIDIASIS: ICD-10-CM

## 2020-10-27 DIAGNOSIS — E11.40 TYPE 2 DIABETES MELLITUS WITH DIABETIC NEUROPATHY, WITHOUT LONG-TERM CURRENT USE OF INSULIN (HCC): ICD-10-CM

## 2020-10-27 DIAGNOSIS — F41.8 DEPRESSION WITH ANXIETY: ICD-10-CM

## 2020-10-27 DIAGNOSIS — F33.1 MODERATE EPISODE OF RECURRENT MAJOR DEPRESSIVE DISORDER (HCC): ICD-10-CM

## 2020-10-27 PROCEDURE — 4010F ACE/ARB THERAPY RXD/TAKEN: CPT | Performed by: INTERNAL MEDICINE

## 2020-10-27 RX ORDER — TAMSULOSIN HYDROCHLORIDE 0.4 MG/1
0.4 CAPSULE ORAL DAILY
Qty: 90 CAPSULE | Refills: 1 | Status: SHIPPED | OUTPATIENT
Start: 2020-10-27 | End: 2021-05-20 | Stop reason: SDUPTHER

## 2020-10-27 RX ORDER — GLIMEPIRIDE 4 MG/1
4 TABLET ORAL 2 TIMES DAILY
Qty: 180 TABLET | Refills: 1 | Status: SHIPPED | OUTPATIENT
Start: 2020-10-27 | End: 2021-02-08 | Stop reason: SDUPTHER

## 2020-10-27 RX ORDER — BUPROPION HYDROCHLORIDE 150 MG/1
150 TABLET ORAL DAILY
Qty: 90 TABLET | Refills: 1 | Status: SHIPPED | OUTPATIENT
Start: 2020-10-27 | End: 2021-02-08 | Stop reason: SDUPTHER

## 2020-10-27 RX ORDER — CLONIDINE HYDROCHLORIDE 0.3 MG/1
0.3 TABLET ORAL 2 TIMES DAILY
Qty: 180 TABLET | Refills: 1 | Status: SHIPPED | OUTPATIENT
Start: 2020-10-27 | End: 2021-02-08 | Stop reason: SDUPTHER

## 2020-10-27 RX ORDER — ENALAPRIL MALEATE 20 MG/1
20 TABLET ORAL 2 TIMES DAILY
Qty: 180 TABLET | Refills: 1 | Status: SHIPPED | OUTPATIENT
Start: 2020-10-27 | End: 2021-05-20 | Stop reason: SDUPTHER

## 2020-10-27 RX ORDER — SERTRALINE HYDROCHLORIDE 100 MG/1
100 TABLET, FILM COATED ORAL DAILY
Qty: 90 TABLET | Refills: 1 | Status: SHIPPED | OUTPATIENT
Start: 2020-10-27 | End: 2021-02-08 | Stop reason: SDUPTHER

## 2020-10-27 RX ORDER — AMLODIPINE BESYLATE 10 MG/1
10 TABLET ORAL DAILY
Qty: 90 TABLET | Refills: 1 | Status: SHIPPED | OUTPATIENT
Start: 2020-10-27 | End: 2021-05-20 | Stop reason: SDUPTHER

## 2020-10-27 RX ORDER — NYSTATIN 100000 [USP'U]/G
POWDER TOPICAL 3 TIMES DAILY PRN
Qty: 60 G | Refills: 1 | Status: SHIPPED | OUTPATIENT
Start: 2020-10-27

## 2020-11-13 ENCOUNTER — CONSULT (OUTPATIENT)
Dept: UROLOGY | Facility: CLINIC | Age: 60
End: 2020-11-13
Payer: COMMERCIAL

## 2020-11-13 VITALS
WEIGHT: 277.4 LBS | HEART RATE: 84 BPM | HEIGHT: 71 IN | DIASTOLIC BLOOD PRESSURE: 82 MMHG | BODY MASS INDEX: 38.84 KG/M2 | SYSTOLIC BLOOD PRESSURE: 150 MMHG

## 2020-11-13 DIAGNOSIS — N20.0 CALCULUS OF RIGHT KIDNEY: ICD-10-CM

## 2020-11-13 DIAGNOSIS — Z12.5 SCREENING FOR PROSTATE CANCER: ICD-10-CM

## 2020-11-13 DIAGNOSIS — B96.1 BACTEREMIA DUE TO KLEBSIELLA PNEUMONIAE: Primary | ICD-10-CM

## 2020-11-13 DIAGNOSIS — R78.81 BACTEREMIA DUE TO KLEBSIELLA PNEUMONIAE: Primary | ICD-10-CM

## 2020-11-13 LAB — POST-VOID RESIDUAL VOLUME, ML POC: 0 ML

## 2020-11-13 PROCEDURE — 3079F DIAST BP 80-89 MM HG: CPT | Performed by: PHYSICIAN ASSISTANT

## 2020-11-13 PROCEDURE — 3077F SYST BP >= 140 MM HG: CPT | Performed by: PHYSICIAN ASSISTANT

## 2020-11-13 PROCEDURE — 99203 OFFICE O/P NEW LOW 30 MIN: CPT | Performed by: PHYSICIAN ASSISTANT

## 2020-11-13 PROCEDURE — 51798 US URINE CAPACITY MEASURE: CPT | Performed by: PHYSICIAN ASSISTANT

## 2020-11-20 ENCOUNTER — TRANSCRIBE ORDERS (OUTPATIENT)
Dept: LAB | Facility: CLINIC | Age: 60
End: 2020-11-20

## 2020-11-20 ENCOUNTER — OFFICE VISIT (OUTPATIENT)
Dept: INTERNAL MEDICINE CLINIC | Facility: CLINIC | Age: 60
End: 2020-11-20
Payer: COMMERCIAL

## 2020-11-20 ENCOUNTER — LAB (OUTPATIENT)
Dept: LAB | Facility: CLINIC | Age: 60
End: 2020-11-20
Payer: COMMERCIAL

## 2020-11-20 VITALS
HEIGHT: 71 IN | RESPIRATION RATE: 18 BRPM | WEIGHT: 275.4 LBS | OXYGEN SATURATION: 98 % | DIASTOLIC BLOOD PRESSURE: 78 MMHG | HEART RATE: 76 BPM | BODY MASS INDEX: 38.56 KG/M2 | SYSTOLIC BLOOD PRESSURE: 122 MMHG | TEMPERATURE: 97.2 F

## 2020-11-20 DIAGNOSIS — N20.0 NEPHROLITHIASIS: ICD-10-CM

## 2020-11-20 DIAGNOSIS — E11.42 DIABETIC POLYNEUROPATHY ASSOCIATED WITH TYPE 2 DIABETES MELLITUS (HCC): ICD-10-CM

## 2020-11-20 DIAGNOSIS — F41.8 DEPRESSION WITH ANXIETY: ICD-10-CM

## 2020-11-20 DIAGNOSIS — G47.33 OSA ON CPAP: ICD-10-CM

## 2020-11-20 DIAGNOSIS — Z99.89 OSA ON CPAP: ICD-10-CM

## 2020-11-20 DIAGNOSIS — E55.9 VITAMIN D DEFICIENCY: ICD-10-CM

## 2020-11-20 DIAGNOSIS — E11.40 TYPE 2 DIABETES MELLITUS WITH DIABETIC NEUROPATHY, WITHOUT LONG-TERM CURRENT USE OF INSULIN (HCC): ICD-10-CM

## 2020-11-20 DIAGNOSIS — E66.01 MORBID OBESITY WITH BODY MASS INDEX (BMI) OF 40.0 TO 44.9 IN ADULT (HCC): ICD-10-CM

## 2020-11-20 DIAGNOSIS — I10 ESSENTIAL HYPERTENSION: ICD-10-CM

## 2020-11-20 DIAGNOSIS — N40.0 BPH WITHOUT URINARY OBSTRUCTION: ICD-10-CM

## 2020-11-20 DIAGNOSIS — N18.31 STAGE 3A CHRONIC KIDNEY DISEASE (HCC): Primary | ICD-10-CM

## 2020-11-20 DIAGNOSIS — Z23 NEED FOR INFLUENZA VACCINATION: ICD-10-CM

## 2020-11-20 DIAGNOSIS — Z79.899 ENCOUNTER FOR LONG-TERM CURRENT USE OF MEDICATION: ICD-10-CM

## 2020-11-20 DIAGNOSIS — Z12.5 SCREENING FOR PROSTATE CANCER: ICD-10-CM

## 2020-11-20 PROBLEM — R65.20 SEVERE SEPSIS (HCC): Status: RESOLVED | Noted: 2020-09-05 | Resolved: 2020-11-20

## 2020-11-20 PROBLEM — B96.1 BACTEREMIA DUE TO KLEBSIELLA PNEUMONIAE: Status: RESOLVED | Noted: 2020-09-06 | Resolved: 2020-11-20

## 2020-11-20 PROBLEM — R79.89 ELEVATED SERUM CREATININE: Status: RESOLVED | Noted: 2020-09-06 | Resolved: 2020-11-20

## 2020-11-20 PROBLEM — R78.81 BACTEREMIA DUE TO KLEBSIELLA PNEUMONIAE: Status: RESOLVED | Noted: 2020-09-06 | Resolved: 2020-11-20

## 2020-11-20 PROBLEM — A41.9 SEVERE SEPSIS (HCC): Status: RESOLVED | Noted: 2020-09-05 | Resolved: 2020-11-20

## 2020-11-20 PROBLEM — N39.0 UTI (URINARY TRACT INFECTION): Status: RESOLVED | Noted: 2020-09-05 | Resolved: 2020-11-20

## 2020-11-20 LAB
ALBUMIN SERPL BCP-MCNC: 3.8 G/DL (ref 3.5–5)
ALP SERPL-CCNC: 89 U/L (ref 46–116)
ALT SERPL W P-5'-P-CCNC: 26 U/L (ref 12–78)
ANION GAP SERPL CALCULATED.3IONS-SCNC: 9 MMOL/L (ref 4–13)
AST SERPL W P-5'-P-CCNC: 11 U/L (ref 5–45)
BASOPHILS # BLD AUTO: 0.09 THOUSANDS/ΜL (ref 0–0.1)
BASOPHILS NFR BLD AUTO: 1 % (ref 0–1)
BILIRUB SERPL-MCNC: 1.41 MG/DL (ref 0.2–1)
BUN SERPL-MCNC: 18 MG/DL (ref 5–25)
CALCIUM SERPL-MCNC: 9.1 MG/DL (ref 8.3–10.1)
CHLORIDE SERPL-SCNC: 105 MMOL/L (ref 100–108)
CO2 SERPL-SCNC: 24 MMOL/L (ref 21–32)
CREAT SERPL-MCNC: 1.47 MG/DL (ref 0.6–1.3)
EOSINOPHIL # BLD AUTO: 0.28 THOUSAND/ΜL (ref 0–0.61)
EOSINOPHIL NFR BLD AUTO: 3 % (ref 0–6)
ERYTHROCYTE [DISTWIDTH] IN BLOOD BY AUTOMATED COUNT: 13.7 % (ref 11.6–15.1)
GFR SERPL CREATININE-BSD FRML MDRD: 51 ML/MIN/1.73SQ M
GLUCOSE SERPL-MCNC: 150 MG/DL (ref 65–140)
HCT VFR BLD AUTO: 48.5 % (ref 36.5–49.3)
HGB BLD-MCNC: 16.3 G/DL (ref 12–17)
IMM GRANULOCYTES # BLD AUTO: 0.03 THOUSAND/UL (ref 0–0.2)
IMM GRANULOCYTES NFR BLD AUTO: 0 % (ref 0–2)
LYMPHOCYTES # BLD AUTO: 2.09 THOUSANDS/ΜL (ref 0.6–4.47)
LYMPHOCYTES NFR BLD AUTO: 24 % (ref 14–44)
MCH RBC QN AUTO: 31.2 PG (ref 26.8–34.3)
MCHC RBC AUTO-ENTMCNC: 33.6 G/DL (ref 31.4–37.4)
MCV RBC AUTO: 93 FL (ref 82–98)
MONOCYTES # BLD AUTO: 0.73 THOUSAND/ΜL (ref 0.17–1.22)
MONOCYTES NFR BLD AUTO: 8 % (ref 4–12)
NEUTROPHILS # BLD AUTO: 5.49 THOUSANDS/ΜL (ref 1.85–7.62)
NEUTS SEG NFR BLD AUTO: 64 % (ref 43–75)
NRBC BLD AUTO-RTO: 0 /100 WBCS
PLATELET # BLD AUTO: 290 THOUSANDS/UL (ref 149–390)
PMV BLD AUTO: 9 FL (ref 8.9–12.7)
POTASSIUM SERPL-SCNC: 4.1 MMOL/L (ref 3.5–5.3)
PROT SERPL-MCNC: 7.5 G/DL (ref 6.4–8.2)
PSA SERPL-MCNC: 2.4 NG/ML (ref 0–4)
RBC # BLD AUTO: 5.22 MILLION/UL (ref 3.88–5.62)
SL AMB POCT HEMOGLOBIN AIC: 7.7 (ref ?–6.5)
SODIUM SERPL-SCNC: 138 MMOL/L (ref 136–145)
WBC # BLD AUTO: 8.71 THOUSAND/UL (ref 4.31–10.16)

## 2020-11-20 PROCEDURE — 3008F BODY MASS INDEX DOCD: CPT | Performed by: INTERNAL MEDICINE

## 2020-11-20 PROCEDURE — 80053 COMPREHEN METABOLIC PANEL: CPT

## 2020-11-20 PROCEDURE — 83036 HEMOGLOBIN GLYCOSYLATED A1C: CPT | Performed by: INTERNAL MEDICINE

## 2020-11-20 PROCEDURE — 85025 COMPLETE CBC W/AUTO DIFF WBC: CPT

## 2020-11-20 PROCEDURE — 36415 COLL VENOUS BLD VENIPUNCTURE: CPT

## 2020-11-20 PROCEDURE — 90682 RIV4 VACC RECOMBINANT DNA IM: CPT | Performed by: INTERNAL MEDICINE

## 2020-11-20 PROCEDURE — G0103 PSA SCREENING: HCPCS

## 2020-11-20 PROCEDURE — 90471 IMMUNIZATION ADMIN: CPT | Performed by: INTERNAL MEDICINE

## 2020-11-20 PROCEDURE — 3051F HG A1C>EQUAL 7.0%<8.0%: CPT | Performed by: INTERNAL MEDICINE

## 2020-11-20 PROCEDURE — 99214 OFFICE O/P EST MOD 30 MIN: CPT | Performed by: INTERNAL MEDICINE

## 2020-11-20 RX ORDER — INSULIN GLARGINE 100 [IU]/ML
14 INJECTION, SOLUTION SUBCUTANEOUS
Qty: 9 ML | Refills: 0
Start: 2020-11-20 | End: 2021-01-08

## 2020-11-23 ENCOUNTER — TELEPHONE (OUTPATIENT)
Dept: UROLOGY | Facility: CLINIC | Age: 60
End: 2020-11-23

## 2020-11-30 ENCOUNTER — OFFICE VISIT (OUTPATIENT)
Dept: PODIATRY | Facility: CLINIC | Age: 60
End: 2020-11-30
Payer: COMMERCIAL

## 2020-11-30 VITALS — RESPIRATION RATE: 17 BRPM | HEIGHT: 71 IN | WEIGHT: 275 LBS | BODY MASS INDEX: 38.5 KG/M2

## 2020-11-30 DIAGNOSIS — B35.3 TINEA PEDIS OF BOTH FEET: ICD-10-CM

## 2020-11-30 DIAGNOSIS — E11.42 DIABETIC POLYNEUROPATHY ASSOCIATED WITH TYPE 2 DIABETES MELLITUS (HCC): Primary | ICD-10-CM

## 2020-11-30 DIAGNOSIS — M79.672 PAIN IN BOTH FEET: ICD-10-CM

## 2020-11-30 DIAGNOSIS — M21.969 ACQUIRED DEFORMITY OF FOOT, UNSPECIFIED LATERALITY: ICD-10-CM

## 2020-11-30 DIAGNOSIS — M79.671 PAIN IN BOTH FEET: ICD-10-CM

## 2020-11-30 DIAGNOSIS — B35.1 ONYCHOMYCOSIS: ICD-10-CM

## 2020-11-30 PROCEDURE — 99213 OFFICE O/P EST LOW 20 MIN: CPT | Performed by: PODIATRIST

## 2021-01-08 DIAGNOSIS — E11.40 TYPE 2 DIABETES MELLITUS WITH DIABETIC NEUROPATHY, WITHOUT LONG-TERM CURRENT USE OF INSULIN (HCC): ICD-10-CM

## 2021-01-08 RX ORDER — INSULIN GLARGINE 100 [IU]/ML
INJECTION, SOLUTION SUBCUTANEOUS
Qty: 15 ML | Refills: 1 | Status: SHIPPED | OUTPATIENT
Start: 2021-01-08 | End: 2021-05-20 | Stop reason: SDUPTHER

## 2021-01-08 RX ORDER — PEN NEEDLE, DIABETIC 32GX 5/32"
NEEDLE, DISPOSABLE MISCELLANEOUS
Qty: 100 EACH | Refills: 1 | Status: SHIPPED | OUTPATIENT
Start: 2021-01-08 | End: 2021-07-28 | Stop reason: SDUPTHER

## 2021-02-08 DIAGNOSIS — I10 ESSENTIAL HYPERTENSION: ICD-10-CM

## 2021-02-08 DIAGNOSIS — E11.40 TYPE 2 DIABETES MELLITUS WITH DIABETIC NEUROPATHY, WITHOUT LONG-TERM CURRENT USE OF INSULIN (HCC): ICD-10-CM

## 2021-02-08 DIAGNOSIS — F41.8 DEPRESSION WITH ANXIETY: ICD-10-CM

## 2021-02-08 DIAGNOSIS — F33.1 MODERATE EPISODE OF RECURRENT MAJOR DEPRESSIVE DISORDER (HCC): ICD-10-CM

## 2021-02-09 RX ORDER — BUPROPION HYDROCHLORIDE 150 MG/1
150 TABLET ORAL DAILY
Qty: 90 TABLET | Refills: 1 | Status: SHIPPED | OUTPATIENT
Start: 2021-02-09 | End: 2021-05-20 | Stop reason: SDUPTHER

## 2021-02-09 RX ORDER — SERTRALINE HYDROCHLORIDE 100 MG/1
100 TABLET, FILM COATED ORAL DAILY
Qty: 90 TABLET | Refills: 1 | Status: SHIPPED | OUTPATIENT
Start: 2021-02-09 | End: 2021-05-20 | Stop reason: SDUPTHER

## 2021-02-09 RX ORDER — CLONIDINE HYDROCHLORIDE 0.3 MG/1
0.3 TABLET ORAL 2 TIMES DAILY
Qty: 180 TABLET | Refills: 1 | Status: SHIPPED | OUTPATIENT
Start: 2021-02-09 | End: 2021-05-20 | Stop reason: SDUPTHER

## 2021-02-09 RX ORDER — GLIMEPIRIDE 4 MG/1
4 TABLET ORAL 2 TIMES DAILY
Qty: 180 TABLET | Refills: 1 | Status: SHIPPED | OUTPATIENT
Start: 2021-02-09 | End: 2021-05-20 | Stop reason: SDUPTHER

## 2021-04-12 ENCOUNTER — OFFICE VISIT (OUTPATIENT)
Dept: PODIATRY | Facility: CLINIC | Age: 61
End: 2021-04-12
Payer: COMMERCIAL

## 2021-04-12 VITALS — HEIGHT: 71 IN | RESPIRATION RATE: 17 BRPM | BODY MASS INDEX: 38.5 KG/M2 | WEIGHT: 275 LBS

## 2021-04-12 DIAGNOSIS — M79.672 PAIN IN BOTH FEET: ICD-10-CM

## 2021-04-12 DIAGNOSIS — M21.969 ACQUIRED DEFORMITY OF FOOT, UNSPECIFIED LATERALITY: ICD-10-CM

## 2021-04-12 DIAGNOSIS — E11.42 DIABETIC POLYNEUROPATHY ASSOCIATED WITH TYPE 2 DIABETES MELLITUS (HCC): Primary | ICD-10-CM

## 2021-04-12 DIAGNOSIS — B35.3 TINEA PEDIS OF BOTH FEET: ICD-10-CM

## 2021-04-12 DIAGNOSIS — M79.671 PAIN IN BOTH FEET: ICD-10-CM

## 2021-04-12 DIAGNOSIS — B35.1 ONYCHOMYCOSIS: ICD-10-CM

## 2021-04-12 PROCEDURE — 99213 OFFICE O/P EST LOW 20 MIN: CPT | Performed by: PODIATRIST

## 2021-04-12 NOTE — PROGRESS NOTES
Assessment   Pain upon ambulation   Peripheral artery disease   Diabetic neuropathy   Callus formation   Mycosis of nail   Peroneal tendinitis right foot      Plan   Foot exam performed   Nails debrided   Calluses debrided   Patient will stretch daily   Procedures performed without pain or complication   Topical antifungal added        Chief Complaint   Patient needs full diabetic foot exam   Patient has pain upon ambulation   Patient complains of pain in his toes when he wears shoes   He gets pain in the ball of the foot   No history of trauma      History of Present Illness   HPI: Patient presents for pedal evaluation  Patient complains of pain in his feet and toes with ambulation   Patient is a morbidly obese diabetic who has some electric sensations in his feet on occasion       Review of Systems        Constitutional: not feeling tired       Eyes: no eyesight problems       ENT: no nasal discharge       Cardiovascular: no chest pain,-- no palpitations-- and-- no extremity edema       Respiratory: no shortness of breath-- and-- no cough       Gastrointestinal: no abdominal pain-- and-- no constipation       Genitourinary: no dysuria-- and-- no urinary hesitancy       Integumentary: no skin wound       Neurological: no tingling-- and-- no dizziness       Psychiatric: sleeping better, stopped taking trazodone, but-- no sleep disturbances       Endocrine: no feelings of weakness       Active Problems   1  Acquired pes planus (734) (M21 40)   2  Acute renal failure (584 9) (N17 9)   3  Adhesive capsulitis of both shoulders (726 0) (M75 01,M75 02)   4  Arthralgia (719 40) (M25 50)   5  BPH without urinary obstruction (600 00) (N40 0)   6  Bunion (727 1) (M21 619)   7  Callus (700) (L84)   7  UADPBRV systolic congestive heart failure (428 22,428 0) (I50 22)   9  Depression with anxiety (300 4) (F41 8)   10  Diabetes mellitus with neurological manifestation (250 60) (E11 49)   11  Diabetes type 2, uncontrolled (250 02) (E11 65)   12  Difficulty concentrating (799 51) (R41 840)   13  Difficulty walking (719 7) (R26 2)   14  Dyspnea on exertion (786 09) (R06 09)   15  Encounter for prostate cancer screening (V76 44) (Z12 5)   16  Encounter for screening for malignant neoplasm of colon (V76 51) (Z12 11)   17  Fatigue (780 79) (R53 83)   18  Foot pain, bilateral (729 5) (M79 671,M79 672)   19  Hallux valgus (735 0) (M20 10)   20  Hematuria (599 70) (R31 9)   21  Hyperlipidemia (272 4) (E78 5)   22  Hypertension (401 9) (I10)   23  Hypogonadism, male (257 2) (E29 1)   24  Insomnia (780 52) (G47 00)   25  Morbid or severe obesity due to excess calories (278 01) (E66 01)   26  Need for pneumococcal vaccination (V03 82) (Z23)   27  Need for prophylactic vaccination and inoculation against influenza (V04 81) (Z23)   28  Nephrolithiasis (592 0) (N20 0)   29  Onychomycosis (110 1) (B35 1)   30  JEFF (obstructive sleep apnea) (327 23) (G47 33)   31  Sciatica (724 3) (M54 30)   32  Screening for colon cancer (V76 51) (Z12 11)   33  Seasonal allergies (477 9) (J30 2)   34  Shoulder pain, right (719 41) (M25 511)   35  Spinal stenosis (724 00) (M48 00)   36  Tinea pedis (110 4) (B35 3)   37  Type 2 diabetes mellitus (250 00) (E11 9)   38  Vitamin D deficiency (268 9) (E55 9)     Past Medical History    · History of Cellulitis of left leg (682 6) (L03 116)   · History of chest pain (V13 89) (O85 241)   · History of diarrhea (V12 79) (X62 204)   · History of onychomycosis (V12 09) (Z86 19)   · History of onychomycosis (V12 09) (Z86 19)   · History of Limb pain (729 5) (M79 609)   · History of Neck pain (723 1) (M54 2)   · Need for pneumococcal vaccination (V03 82) (Z23)   · History of Nephrolithiasis (V13 01)   · History of Numbness On The Sole Of The Right Foot   · History of Pain and swelling of left lower leg (729 5,729 81) (M79 662,M79 89)   · History of Pain of lower extremity (729 5) (M79 606)   · History of Pain, hand joint, right (719 44) (M79 641)   · Sciatica (724 3) (M54 30)     The active problems and past medical history were reviewed and updated today       Surgical History    · History of Knee Arthroscopy (Therapeutic)   · History of Knee Surgery   · History of Needle Biopsy Of Prostate     Family History   Mother    · Family history of Alzheimer Disease   · Family history of Family Health Status Of Mother - Alive  Father    · Family history of Family Health Status Of Father -   Family History    · Family history of Adopted   · Denied: Family history of Colon Cancer   · Denied: Family history of Crohn's Disease   · Denied: Family history of Liver Cancer     Social History    · Denied: History of Alcohol Use (History)   · Current Some Day Smoker (305 1)   · Denied: History of Exercise Habits   · Marital History - Currently    · Tobacco use (305 1) (Z72 0)   · Working Full Time  The social history was reviewed and is unchanged       The medication list was reviewed and updated today       Allergies   1  No Known Drug Allergies   Physical Exam   Left Foot: Appearance: Normal except as noted: excessive pronation-- and-- pes planus  Great toe deformities include a bunion  Tenderness: None except the great toe-- and-- distal first metatarsal     Right Foot: Appearance: Normal except as noted: excessive pronation-- and-- pes planus  Great toe deformities include a bunion  Tenderness: None except the great toe-- and-- distal first metatarsal     Left Ankle: ROM: limited ROM in all planes    Right Ankle: ROM: limited ROM in all planes    Neurological Exam: performed  Light touch was decreased bilaterally  Vibratory sensation was decreased in both first metatarsophalangeal joints     Vascular Exam: performed Dorsalis pedis pulses were present bilaterally  Posterior tibial pulses were present bilaterally  Elevation Pallor: absent bilaterally  Dependence rubor was absent bilaterally  Edema: none     Toenails:  All of the toenails were elongated,-- hypertrophied,-- discolored-- and-- Ptotic  Both first toenails were tender-- and-- Positive onychogryphosis          Socks and shoes removed, Right Foot Findings: swollen, erythematous and dry       The sensory exam showed diminished vibratory sensation at the level of the toes  Diminished tactile sensation with monofilament testing throughout the right foot       Socks and shoes removed, Left Foot Findings: swollen, erythematous and dry       The sensory exam showed diminished vibratory sensation at the level of the toes  Diminished tactile sensation with monofilament testing throughout the left foot      Capillary refills findings on the right were normal in the toes       Pulses:      2+ in the posterior tibialis on the right      2+ in the dorsalis pedis on the right       Capillary refills findings on the left were normal in the toes       Pulses:      1+ in the posterior tibialis on the left      1+ in the dorsalis pedis on the left       Assign Risk Category: 2: Loss of protective sensation with or without weakness, deformity, callus, pre-ulcer, or history of ulceration  High risk     Hyperkeratosis: present on both first toes,-- present on both first sub metatarsals,-- present on both third sub metatarsals-- and-- Severe profound moccasin tinea pedis bilateral     Shoe Gear Evaluation: performed ()  Recommendation(s): SAS style  Patient's shoes and socks removed  Assign Risk Category:  Deformity present; Loss of protective sensation;  No weak pulses       Risk: 1

## 2021-04-25 ENCOUNTER — IMMUNIZATIONS (OUTPATIENT)
Dept: FAMILY MEDICINE CLINIC | Facility: HOSPITAL | Age: 61
End: 2021-04-25

## 2021-04-25 DIAGNOSIS — Z23 ENCOUNTER FOR IMMUNIZATION: Primary | ICD-10-CM

## 2021-04-25 PROCEDURE — 0001A SARS-COV-2 / COVID-19 MRNA VACCINE (PFIZER-BIONTECH) 30 MCG: CPT

## 2021-04-25 PROCEDURE — 91300 SARS-COV-2 / COVID-19 MRNA VACCINE (PFIZER-BIONTECH) 30 MCG: CPT

## 2021-05-16 ENCOUNTER — LAB (OUTPATIENT)
Dept: LAB | Facility: CLINIC | Age: 61
End: 2021-05-16
Payer: COMMERCIAL

## 2021-05-16 ENCOUNTER — TRANSCRIBE ORDERS (OUTPATIENT)
Dept: LAB | Facility: CLINIC | Age: 61
End: 2021-05-16

## 2021-05-16 ENCOUNTER — IMMUNIZATIONS (OUTPATIENT)
Dept: FAMILY MEDICINE CLINIC | Facility: HOSPITAL | Age: 61
End: 2021-05-16

## 2021-05-16 DIAGNOSIS — N18.31 STAGE 3A CHRONIC KIDNEY DISEASE (HCC): ICD-10-CM

## 2021-05-16 DIAGNOSIS — I10 ESSENTIAL HYPERTENSION: ICD-10-CM

## 2021-05-16 DIAGNOSIS — E55.9 VITAMIN D DEFICIENCY: ICD-10-CM

## 2021-05-16 DIAGNOSIS — E11.40 TYPE 2 DIABETES MELLITUS WITH DIABETIC NEUROPATHY, WITHOUT LONG-TERM CURRENT USE OF INSULIN (HCC): ICD-10-CM

## 2021-05-16 DIAGNOSIS — Z23 ENCOUNTER FOR IMMUNIZATION: Primary | ICD-10-CM

## 2021-05-16 DIAGNOSIS — Z79.899 ENCOUNTER FOR LONG-TERM CURRENT USE OF MEDICATION: ICD-10-CM

## 2021-05-16 LAB
25(OH)D3 SERPL-MCNC: 21.9 NG/ML (ref 30–100)
ALBUMIN SERPL BCP-MCNC: 3.6 G/DL (ref 3.5–5)
ALP SERPL-CCNC: 100 U/L (ref 46–116)
ALT SERPL W P-5'-P-CCNC: 29 U/L (ref 12–78)
ANION GAP SERPL CALCULATED.3IONS-SCNC: 9 MMOL/L (ref 4–13)
AST SERPL W P-5'-P-CCNC: 13 U/L (ref 5–45)
BASOPHILS # BLD AUTO: 0.06 THOUSANDS/ΜL (ref 0–0.1)
BASOPHILS NFR BLD AUTO: 1 % (ref 0–1)
BILIRUB SERPL-MCNC: 1.39 MG/DL (ref 0.2–1)
BUN SERPL-MCNC: 19 MG/DL (ref 5–25)
CALCIUM SERPL-MCNC: 8.8 MG/DL (ref 8.3–10.1)
CHLORIDE SERPL-SCNC: 106 MMOL/L (ref 100–108)
CHOLEST SERPL-MCNC: 176 MG/DL (ref 50–200)
CO2 SERPL-SCNC: 28 MMOL/L (ref 21–32)
CREAT SERPL-MCNC: 1.44 MG/DL (ref 0.6–1.3)
EOSINOPHIL # BLD AUTO: 0.22 THOUSAND/ΜL (ref 0–0.61)
EOSINOPHIL NFR BLD AUTO: 3 % (ref 0–6)
ERYTHROCYTE [DISTWIDTH] IN BLOOD BY AUTOMATED COUNT: 13.2 % (ref 11.6–15.1)
EST. AVERAGE GLUCOSE BLD GHB EST-MCNC: 226 MG/DL
GFR SERPL CREATININE-BSD FRML MDRD: 52 ML/MIN/1.73SQ M
GLUCOSE P FAST SERPL-MCNC: 170 MG/DL (ref 65–99)
HBA1C MFR BLD: 9.5 %
HCT VFR BLD AUTO: 50.6 % (ref 36.5–49.3)
HDLC SERPL-MCNC: 32 MG/DL
HGB BLD-MCNC: 16.6 G/DL (ref 12–17)
IMM GRANULOCYTES # BLD AUTO: 0.03 THOUSAND/UL (ref 0–0.2)
IMM GRANULOCYTES NFR BLD AUTO: 0 % (ref 0–2)
LDLC SERPL CALC-MCNC: 101 MG/DL (ref 0–100)
LYMPHOCYTES # BLD AUTO: 2.19 THOUSANDS/ΜL (ref 0.6–4.47)
LYMPHOCYTES NFR BLD AUTO: 26 % (ref 14–44)
MCH RBC QN AUTO: 30 PG (ref 26.8–34.3)
MCHC RBC AUTO-ENTMCNC: 32.8 G/DL (ref 31.4–37.4)
MCV RBC AUTO: 92 FL (ref 82–98)
MONOCYTES # BLD AUTO: 0.6 THOUSAND/ΜL (ref 0.17–1.22)
MONOCYTES NFR BLD AUTO: 7 % (ref 4–12)
NEUTROPHILS # BLD AUTO: 5.37 THOUSANDS/ΜL (ref 1.85–7.62)
NEUTS SEG NFR BLD AUTO: 63 % (ref 43–75)
NONHDLC SERPL-MCNC: 144 MG/DL
NRBC BLD AUTO-RTO: 0 /100 WBCS
PLATELET # BLD AUTO: 315 THOUSANDS/UL (ref 149–390)
PMV BLD AUTO: 8.9 FL (ref 8.9–12.7)
POTASSIUM SERPL-SCNC: 4.4 MMOL/L (ref 3.5–5.3)
PROT SERPL-MCNC: 7.7 G/DL (ref 6.4–8.2)
RBC # BLD AUTO: 5.53 MILLION/UL (ref 3.88–5.62)
SODIUM SERPL-SCNC: 143 MMOL/L (ref 136–145)
TRIGL SERPL-MCNC: 217 MG/DL
TSH SERPL DL<=0.05 MIU/L-ACNC: 1.57 UIU/ML (ref 0.36–3.74)
VIT B12 SERPL-MCNC: 386 PG/ML (ref 100–900)
WBC # BLD AUTO: 8.47 THOUSAND/UL (ref 4.31–10.16)

## 2021-05-16 PROCEDURE — 80061 LIPID PANEL: CPT

## 2021-05-16 PROCEDURE — 82607 VITAMIN B-12: CPT

## 2021-05-16 PROCEDURE — 84443 ASSAY THYROID STIM HORMONE: CPT

## 2021-05-16 PROCEDURE — 83036 HEMOGLOBIN GLYCOSYLATED A1C: CPT

## 2021-05-16 PROCEDURE — 82306 VITAMIN D 25 HYDROXY: CPT

## 2021-05-16 PROCEDURE — 36415 COLL VENOUS BLD VENIPUNCTURE: CPT

## 2021-05-16 PROCEDURE — 91300 SARS-COV-2 / COVID-19 MRNA VACCINE (PFIZER-BIONTECH) 30 MCG: CPT

## 2021-05-16 PROCEDURE — 80053 COMPREHEN METABOLIC PANEL: CPT

## 2021-05-16 PROCEDURE — 0002A SARS-COV-2 / COVID-19 MRNA VACCINE (PFIZER-BIONTECH) 30 MCG: CPT

## 2021-05-16 PROCEDURE — 85025 COMPLETE CBC W/AUTO DIFF WBC: CPT

## 2021-05-16 PROCEDURE — 3046F HEMOGLOBIN A1C LEVEL >9.0%: CPT | Performed by: INTERNAL MEDICINE

## 2021-05-20 ENCOUNTER — OFFICE VISIT (OUTPATIENT)
Dept: INTERNAL MEDICINE CLINIC | Facility: CLINIC | Age: 61
End: 2021-05-20
Payer: COMMERCIAL

## 2021-05-20 VITALS
BODY MASS INDEX: 40.88 KG/M2 | SYSTOLIC BLOOD PRESSURE: 120 MMHG | OXYGEN SATURATION: 98 % | DIASTOLIC BLOOD PRESSURE: 78 MMHG | TEMPERATURE: 96.8 F | HEIGHT: 71 IN | HEART RATE: 82 BPM | WEIGHT: 292 LBS

## 2021-05-20 DIAGNOSIS — E11.42 DIABETIC POLYNEUROPATHY ASSOCIATED WITH TYPE 2 DIABETES MELLITUS (HCC): ICD-10-CM

## 2021-05-20 DIAGNOSIS — Z00.00 HEALTH MAINTENANCE EXAMINATION: Primary | ICD-10-CM

## 2021-05-20 DIAGNOSIS — E78.2 MIXED HYPERLIPIDEMIA: ICD-10-CM

## 2021-05-20 DIAGNOSIS — E11.40 TYPE 2 DIABETES MELLITUS WITH DIABETIC NEUROPATHY, WITHOUT LONG-TERM CURRENT USE OF INSULIN (HCC): ICD-10-CM

## 2021-05-20 DIAGNOSIS — F41.8 DEPRESSION WITH ANXIETY: ICD-10-CM

## 2021-05-20 DIAGNOSIS — N40.0 BPH WITHOUT URINARY OBSTRUCTION: ICD-10-CM

## 2021-05-20 DIAGNOSIS — F33.1 MODERATE EPISODE OF RECURRENT MAJOR DEPRESSIVE DISORDER (HCC): ICD-10-CM

## 2021-05-20 DIAGNOSIS — Z99.89 OSA ON CPAP: ICD-10-CM

## 2021-05-20 DIAGNOSIS — G47.33 OSA ON CPAP: ICD-10-CM

## 2021-05-20 DIAGNOSIS — I10 ESSENTIAL HYPERTENSION: ICD-10-CM

## 2021-05-20 DIAGNOSIS — E66.01 MORBID OBESITY WITH BODY MASS INDEX (BMI) OF 40.0 TO 44.9 IN ADULT (HCC): ICD-10-CM

## 2021-05-20 DIAGNOSIS — N18.31 STAGE 3A CHRONIC KIDNEY DISEASE (HCC): ICD-10-CM

## 2021-05-20 PROBLEM — E53.8 VITAMIN B12 DEFICIENCY: Status: ACTIVE | Noted: 2021-05-20

## 2021-05-20 PROCEDURE — 3008F BODY MASS INDEX DOCD: CPT | Performed by: INTERNAL MEDICINE

## 2021-05-20 PROCEDURE — 99396 PREV VISIT EST AGE 40-64: CPT | Performed by: INTERNAL MEDICINE

## 2021-05-20 PROCEDURE — 4010F ACE/ARB THERAPY RXD/TAKEN: CPT | Performed by: INTERNAL MEDICINE

## 2021-05-20 RX ORDER — CLONIDINE HYDROCHLORIDE 0.3 MG/1
0.3 TABLET ORAL 2 TIMES DAILY
Qty: 180 TABLET | Refills: 1 | Status: SHIPPED | OUTPATIENT
Start: 2021-05-20 | End: 2022-07-20 | Stop reason: SDUPTHER

## 2021-05-20 RX ORDER — ENALAPRIL MALEATE 20 MG/1
20 TABLET ORAL 2 TIMES DAILY
Qty: 180 TABLET | Refills: 1 | Status: SHIPPED | OUTPATIENT
Start: 2021-05-20 | End: 2021-07-28 | Stop reason: SDUPTHER

## 2021-05-20 RX ORDER — AMLODIPINE BESYLATE 10 MG/1
10 TABLET ORAL DAILY
Qty: 90 TABLET | Refills: 1 | Status: SHIPPED | OUTPATIENT
Start: 2021-05-20 | End: 2021-07-28 | Stop reason: SDUPTHER

## 2021-05-20 RX ORDER — DULAGLUTIDE 1.5 MG/.5ML
1.5 INJECTION, SOLUTION SUBCUTANEOUS WEEKLY
Qty: 6 ML | Refills: 1 | Status: CANCELLED | OUTPATIENT
Start: 2021-05-20

## 2021-05-20 RX ORDER — SERTRALINE HYDROCHLORIDE 100 MG/1
100 TABLET, FILM COATED ORAL DAILY
Qty: 90 TABLET | Refills: 1 | Status: SHIPPED | OUTPATIENT
Start: 2021-05-20 | End: 2022-07-20 | Stop reason: SDUPTHER

## 2021-05-20 RX ORDER — ATORVASTATIN CALCIUM 10 MG/1
10 TABLET, FILM COATED ORAL DAILY
Qty: 90 TABLET | Refills: 1 | Status: SHIPPED | OUTPATIENT
Start: 2021-05-20 | End: 2021-07-28 | Stop reason: SDUPTHER

## 2021-05-20 RX ORDER — BUPROPION HYDROCHLORIDE 150 MG/1
150 TABLET ORAL DAILY
Qty: 90 TABLET | Refills: 1 | Status: SHIPPED | OUTPATIENT
Start: 2021-05-20 | End: 2021-12-20 | Stop reason: SDUPTHER

## 2021-05-20 RX ORDER — INSULIN GLARGINE 100 [IU]/ML
20 INJECTION, SOLUTION SUBCUTANEOUS DAILY
Qty: 18 ML | Refills: 1 | Status: SHIPPED | OUTPATIENT
Start: 2021-05-20 | End: 2021-07-28 | Stop reason: SDUPTHER

## 2021-05-20 RX ORDER — GLIMEPIRIDE 4 MG/1
4 TABLET ORAL 2 TIMES DAILY
Qty: 180 TABLET | Refills: 1 | Status: SHIPPED | OUTPATIENT
Start: 2021-05-20 | End: 2022-07-20 | Stop reason: SDUPTHER

## 2021-05-20 RX ORDER — TAMSULOSIN HYDROCHLORIDE 0.4 MG/1
0.4 CAPSULE ORAL DAILY
Qty: 90 CAPSULE | Refills: 1 | Status: SHIPPED | OUTPATIENT
Start: 2021-05-20 | End: 2021-11-05 | Stop reason: SDUPTHER

## 2021-05-20 NOTE — PROGRESS NOTES
Assessment/Plan:    Essential hypertension  BP controlled, on amlodipine, clonidine and enalapril  Diabetic polyneuropathy associated with type 2 diabetes mellitus ([facility] [address] )    Lab Results   Component Value Date    HGBA1C 9 5 (H) 05/16/2021     Increase Basaglar gradually to 20 units, instructions given  Increase Trulicity to 3 mg / week  Continue glimepiride  JEFF on CPAP  Compliant  Morbid obesity with body mass index (BMI) of 40 0 to 44 9 in adult ([facility])  Gained 20 lbs since last visit  Discussed diet, start daily walks  Depression with anxiety  Stable, on sertraline and bupropion  CKD (chronic kidney disease) stage 3, GFR 30-59 ml/min [facility])  Lab Results   Component Value Date    EGFR 52 05/16/2021    EGFR 51 11/20/2020    EGFR 57 09/07/2020    CREATININE 1 44 (H) 05/16/2021    CREATININE 1 47 (H) 11/20/2020    CREATININE 1 35 (H) 09/07/2020     Stable  No NSAIDs  Vitamin B12 deficiency  Start daily supplement  Vitamin D deficiency  Continue daily D3  Mixed hyperlipidemia  TG and LDL worse  Start statin  Diagnoses and all orders for this visit:    Health maintenance examination  Comments:  Received COVID vaccine  Eye exam, colonoscopy due  Essential hypertension  -     amLODIPine (NORVASC) 10 mg tablet; Take 1 tablet (10 mg total) by mouth daily  -     cloNIDine (CATAPRES) 0 3 mg tablet; Take 1 tablet (0 3 mg total) by mouth 2 (two) times a day  -     enalapril (VASOTEC) 20 mg tablet; Take 1 tablet (20 mg total) by mouth 2 (two) times a day    Moderate episode of recurrent major depressive disorder ([facility])  -     buPROPion (WELLBUTRIN XL) 150 mg 24 hr tablet; Take 1 tablet (150 mg total) by mouth daily    Type 2 diabetes mellitus with diabetic neuropathy, without long-term current use of insulin ([facility])  -     enalapril (VASOTEC) 20 mg tablet; Take 1 tablet (20 mg total) by mouth 2 (two) times a day  -     glimepiride (AMARYL) 4 mg tablet;  Take 1 tablet (4 mg total) by mouth 2 (two) times a day  -     insulin glargine (Basaglar KwikPen) 100 units/mL injection pen; Inject 20 Units under the skin daily  -     Dulaglutide 3 MG/0 5ML SOPN; Inject 0 5 mL (3 mg total) under the skin once a week  -     Comprehensive metabolic panel; Future  -     Hemoglobin A1C; Future  -     Microalbumin / creatinine urine ratio; Future    Depression with anxiety  -     sertraline (ZOLOFT) 100 mg tablet; Take 1 tablet (100 mg total) by mouth daily    BPH without urinary obstruction  -     tamsulosin (FLOMAX) 0 4 mg; Take 1 capsule (0 4 mg total) by mouth daily    Stage 3a chronic kidney disease (HCC)    Diabetic polyneuropathy associated with type 2 diabetes mellitus (HCC)    Mixed hyperlipidemia  -     atorvastatin (LIPITOR) 10 mg tablet; Take 1 tablet (10 mg total) by mouth daily    Morbid obesity with body mass index (BMI) of 40 0 to 44 9 in adult (HCC)    JEFF on CPAP    Other orders  -     Cancel: Dulaglutide (Trulicity) 1 5 LO/0 9ZS SOPN; Inject 0 5 mL (1 5 mg total) under the skin once a week      Follow up in 4 months or as needed  Subjective:      Patient ID: Tanisha Chan is a 64 y o  male here for a follow up  He feels well, has no new complaints  He reports that he has been more tired and stressed at work  He does not nap during lunch time but does take breaks  He has no regular exercise, very sedentary since he is at his desk all day  He has gained a lot of weight throughout the winter  He and his wife rarely cooks at home, mostly eats takeout food  He did get his second dose the COVID vaccine a few days ago, reports experiencing a sore arm a few days later  This has improved  He does not check his sugars at home  He noticed that he is urinating less since he stop the [place]         The following portions of the patient's history were reviewed and updated as appropriate: allergies, current medications, past medical history, past social history and problem list     Review of Systems   Constitutional: Positive for fatigue  Negative for appetite change  HENT: Negative for congestion, hearing loss and postnasal drip  Eyes: Negative  Respiratory: Negative for cough, chest tightness and shortness of breath  Cardiovascular: Negative for chest pain, palpitations and leg swelling  Gastrointestinal: Negative for abdominal pain and constipation  Genitourinary: Negative for dysuria, frequency and urgency  Musculoskeletal: Negative for arthralgias  Skin: Negative for wound  Neurological: Negative for dizziness and headaches  Psychiatric/Behavioral: Negative for dysphoric mood and sleep disturbance  Objective:      /78   Pulse 82   Temp (!) 96 8 °F (36 °C)   Ht 5' 11" (1 803 m)   Wt 132 kg (292 lb)   SpO2 98%   BMI 40 73 kg/m²          Physical Exam  Vitals signs and nursing note reviewed  Constitutional:       Appearance: He is well-developed  HENT:      Head: Normocephalic and atraumatic  Eyes:      Conjunctiva/sclera: Conjunctivae normal       Pupils: Pupils are equal, round, and reactive to light  Neck:      Musculoskeletal: Neck supple  Cardiovascular:      Rate and Rhythm: Normal rate and regular rhythm  Heart sounds: Normal heart sounds  Pulmonary:      Effort: Pulmonary effort is normal       Breath sounds: No wheezing or rhonchi  Abdominal:      General: Bowel sounds are normal       Palpations: Abdomen is soft  Musculoskeletal:         General: No swelling  Right lower leg: No edema  Left lower leg: No edema  Skin:     General: Skin is warm  Findings: No rash  Neurological:      General: No focal deficit present  Mental Status: He is alert and oriented to person, place, and time  Psychiatric:         Mood and Affect: Mood normal          Behavior: Behavior normal            Labs & imaging results reviewed with patient  BMI Counseling: Body mass index is 40 73 kg/m²   The BMI is above normal  Nutrition recommendations include decreasing overall calorie intake, 3-5 servings of fruits/vegetables daily, reducing fast food intake and consuming healthier snacks  Exercise recommendations include exercising 3-5 times per week

## 2021-05-20 NOTE — PATIENT INSTRUCTIONS
Increase Basaglar gradually, to 20 units a day  Start 12 units for 3 days, increase by 2 units every 3 days  Restart vitamin B12 500 mcg daily  Vitamin D3 2000 units daily

## 2021-05-20 NOTE — ASSESSMENT & PLAN NOTE
Lab Results   Component Value Date    HGBA1C 9 5 (H) 05/16/2021     Increase Basaglar gradually to 20 units, instructions given  Increase Trulicity to 3 mg / week  Continue glimepiride

## 2021-05-20 NOTE — ASSESSMENT & PLAN NOTE
Lab Results   Component Value Date    EGFR 52 05/16/2021    EGFR 51 11/20/2020    EGFR 57 09/07/2020    CREATININE 1 44 (H) 05/16/2021    CREATININE 1 47 (H) 11/20/2020    CREATININE 1 35 (H) 09/07/2020     Stable  No NSAIDs

## 2021-06-14 ENCOUNTER — OFFICE VISIT (OUTPATIENT)
Dept: PODIATRY | Facility: CLINIC | Age: 61
End: 2021-06-14
Payer: COMMERCIAL

## 2021-06-14 VITALS
HEART RATE: 82 BPM | DIASTOLIC BLOOD PRESSURE: 78 MMHG | RESPIRATION RATE: 16 BRPM | SYSTOLIC BLOOD PRESSURE: 120 MMHG | HEIGHT: 71 IN | BODY MASS INDEX: 40.88 KG/M2 | WEIGHT: 292 LBS

## 2021-06-14 DIAGNOSIS — B35.1 ONYCHOMYCOSIS: ICD-10-CM

## 2021-06-14 DIAGNOSIS — M79.671 PAIN IN BOTH FEET: ICD-10-CM

## 2021-06-14 DIAGNOSIS — B35.3 TINEA PEDIS OF BOTH FEET: ICD-10-CM

## 2021-06-14 DIAGNOSIS — M21.969 ACQUIRED DEFORMITY OF FOOT, UNSPECIFIED LATERALITY: ICD-10-CM

## 2021-06-14 DIAGNOSIS — M79.672 PAIN IN BOTH FEET: ICD-10-CM

## 2021-06-14 DIAGNOSIS — E11.42 DIABETIC POLYNEUROPATHY ASSOCIATED WITH TYPE 2 DIABETES MELLITUS (HCC): Primary | ICD-10-CM

## 2021-06-14 PROCEDURE — 99213 OFFICE O/P EST LOW 20 MIN: CPT | Performed by: PODIATRIST

## 2021-06-14 RX ORDER — KETOCONAZOLE 20 MG/G
CREAM TOPICAL DAILY
Qty: 60 G | Refills: 1 | Status: SHIPPED | OUTPATIENT
Start: 2021-06-14 | End: 2021-07-14

## 2021-06-14 NOTE — PROGRESS NOTES
Assessment   Pain upon ambulation   Peripheral artery disease   Diabetic neuropathy   Callus formation   Mycosis of nail   Peroneal tendinitis right foot      Plan   Foot exam performed   Nails debrided   Calluses debrided   Patient will stretch daily   Procedures performed without pain or complication   Topical antifungal added        Chief Complaint   Patient needs full diabetic foot exam   Patient has pain upon ambulation   Patient complains of pain in his toes when he wears shoes   He gets pain in the ball of the foot   No history of trauma      History of Present Illness   HPI: Patient presents for pedal evaluation  Patient complains of pain in his feet and toes with ambulation   Patient is a morbidly obese diabetic who has some electric sensations in his feet on occasion       Review of Systems        Constitutional: not feeling tired       Eyes: no eyesight problems       ENT: no nasal discharge       Cardiovascular: no chest pain,-- no palpitations-- and-- no extremity edema       Respiratory: no shortness of breath-- and-- no cough       Gastrointestinal: no abdominal pain-- and-- no constipation       Genitourinary: no dysuria-- and-- no urinary hesitancy       Integumentary: no skin wound       Neurological: no tingling-- and-- no dizziness       Psychiatric: sleeping better, stopped taking trazodone, but-- no sleep disturbances       Endocrine: no feelings of weakness       Active Problems   1  Acquired pes planus (734) (M21 40)   2  Acute renal failure (584 9) (N17 9)   3  Adhesive capsulitis of both shoulders (726 0) (M75 01,M75 02)   4  Arthralgia (719 40) (M25 50)   5  BPH without urinary obstruction (600 00) (N40 0)   6  Bunion (727 1) (M21 619)   7  Callus (700) (L84)   1  UXYDFHH systolic congestive heart failure (428 22,428 0) (I50 22)   9  Depression with anxiety (300 4) (F41 8)   10  Diabetes mellitus with neurological manifestation (250 60) (E11 49)   11  Diabetes type 2, uncontrolled (250 02) (E11 65)   12  Difficulty concentrating (799 51) (R41 840)   13  Difficulty walking (719 7) (R26 2)   14  Dyspnea on exertion (786 09) (R06 09)   15  Encounter for prostate cancer screening (V76 44) (Z12 5)   16  Encounter for screening for malignant neoplasm of colon (V76 51) (Z12 11)   17  Fatigue (780 79) (R53 83)   18  Foot pain, bilateral (729 5) (M79 671,M79 672)   19  Hallux valgus (735 0) (M20 10)   20  Hematuria (599 70) (R31 9)   21  Hyperlipidemia (272 4) (E78 5)   22  Hypertension (401 9) (I10)   23  Hypogonadism, male (257 2) (E29 1)   24  Insomnia (780 52) (G47 00)   25  Morbid or severe obesity due to excess calories (278 01) (E66 01)   26  Need for pneumococcal vaccination (V03 82) (Z23)   27  Need for prophylactic vaccination and inoculation against influenza (V04 81) (Z23)   28  Nephrolithiasis (592 0) (N20 0)   29  Onychomycosis (110 1) (B35 1)   30  JEFF (obstructive sleep apnea) (327 23) (G47 33)   31  Sciatica (724 3) (M54 30)   32  Screening for colon cancer (V76 51) (Z12 11)   33  Seasonal allergies (477 9) (J30 2)   34  Shoulder pain, right (719 41) (M25 511)   35  Spinal stenosis (724 00) (M48 00)   36  Tinea pedis (110 4) (B35 3)   37  Type 2 diabetes mellitus (250 00) (E11 9)   38  Vitamin D deficiency (268 9) (E55 9)     Past Medical History    · History of Cellulitis of left leg (682 6) (L03 116)   · History of chest pain (V13 89) (K84 326)   · History of diarrhea (V12 79) (V39 591)   · History of onychomycosis (V12 09) (Z86 19)   · History of onychomycosis (V12 09) (Z86 19)   · History of Limb pain (729 5) (M79 609)   · History of Neck pain (723 1) (M54 2)   · Need for pneumococcal vaccination (V03 82) (Z23)   · History of Nephrolithiasis (V13 01)   · History of Numbness On The Sole Of The Right Foot   · History of Pain and swelling of left lower leg (729 5,729 81) (M79 662,M79 89)   · History of Pain of lower extremity (729 5) (M79 606)   · History of Pain, hand joint, right (719 44) (M79 641)   · Sciatica (724 3) (M54 30)     The active problems and past medical history were reviewed and updated today       Surgical History    · History of Knee Arthroscopy (Therapeutic)   · History of Knee Surgery   · History of Needle Biopsy Of Prostate     Family History   Mother    · Family history of Alzheimer Disease   · Family history of Family Health Status Of Mother - Alive  Father    · Family history of Family Health Status Of Father -   Family History    · Family history of Adopted   · Denied: Family history of Colon Cancer   · Denied: Family history of Crohn's Disease   · Denied: Family history of Liver Cancer     Social History    · Denied: History of Alcohol Use (History)   · Current Some Day Smoker (305 1)   · Denied: History of Exercise Habits   · Marital History - Currently    · Tobacco use (305 1) (Z72 0)   · Working Full Time  The social history was reviewed and is unchanged       The medication list was reviewed and updated today       Allergies   1  No Known Drug Allergies   Physical Exam   Left Foot: Appearance: Normal except as noted: excessive pronation-- and-- pes planus  Great toe deformities include a bunion  Tenderness: None except the great toe-- and-- distal first metatarsal     Right Foot: Appearance: Normal except as noted: excessive pronation-- and-- pes planus  Great toe deformities include a bunion  Tenderness: None except the great toe-- and-- distal first metatarsal     Left Ankle: ROM: limited ROM in all planes    Right Ankle: ROM: limited ROM in all planes    Neurological Exam: performed  Light touch was decreased bilaterally  Vibratory sensation was decreased in both first metatarsophalangeal joints     Vascular Exam: performed Dorsalis pedis pulses were present bilaterally  Posterior tibial pulses were present bilaterally  Elevation Pallor: absent bilaterally  Dependence rubor was absent bilaterally  Edema: none     Toenails:  All of the toenails were elongated,-- hypertrophied,-- discolored-- and-- Ptotic  Both first toenails were tender-- and-- Positive onychogryphosis          Socks and shoes removed, Right Foot Findings: swollen, erythematous and dry       The sensory exam showed diminished vibratory sensation at the level of the toes  Diminished tactile sensation with monofilament testing throughout the right foot       Socks and shoes removed, Left Foot Findings: swollen, erythematous and dry       The sensory exam showed diminished vibratory sensation at the level of the toes  Diminished tactile sensation with monofilament testing throughout the left foot      Capillary refills findings on the right were normal in the toes       Pulses:      2+ in the posterior tibialis on the right      2+ in the dorsalis pedis on the right       Capillary refills findings on the left were normal in the toes       Pulses:      1+ in the posterior tibialis on the left      1+ in the dorsalis pedis on the left       Assign Risk Category: 2: Loss of protective sensation with or without weakness, deformity, callus, pre-ulcer, or history of ulceration  High risk     Hyperkeratosis: present on both first toes,-- present on both first sub metatarsals,-- present on both third sub metatarsals-- and-- Severe profound moccasin tinea pedis bilateral     Shoe Gear Evaluation: performed ()  Recommendation(s): SAS style      Patient's shoes and socks removed  Assign Risk Category:  Deformity present; Loss of protective sensation;  No weak pulses       Risk: 1

## 2021-08-09 ENCOUNTER — OFFICE VISIT (OUTPATIENT)
Dept: PODIATRY | Facility: CLINIC | Age: 61
End: 2021-08-09
Payer: COMMERCIAL

## 2021-08-09 VITALS
BODY MASS INDEX: 40.88 KG/M2 | WEIGHT: 292 LBS | RESPIRATION RATE: 17 BRPM | HEIGHT: 71 IN | SYSTOLIC BLOOD PRESSURE: 120 MMHG | DIASTOLIC BLOOD PRESSURE: 78 MMHG

## 2021-08-09 DIAGNOSIS — M77.42 METATARSALGIA OF BOTH FEET: ICD-10-CM

## 2021-08-09 DIAGNOSIS — B35.1 ONYCHOMYCOSIS: ICD-10-CM

## 2021-08-09 DIAGNOSIS — M79.672 PAIN IN BOTH FEET: ICD-10-CM

## 2021-08-09 DIAGNOSIS — M77.41 METATARSALGIA OF BOTH FEET: ICD-10-CM

## 2021-08-09 DIAGNOSIS — B35.3 TINEA PEDIS OF BOTH FEET: ICD-10-CM

## 2021-08-09 DIAGNOSIS — M79.671 PAIN IN BOTH FEET: ICD-10-CM

## 2021-08-09 DIAGNOSIS — E11.42 DIABETIC POLYNEUROPATHY ASSOCIATED WITH TYPE 2 DIABETES MELLITUS (HCC): Primary | ICD-10-CM

## 2021-08-09 PROCEDURE — 99213 OFFICE O/P EST LOW 20 MIN: CPT | Performed by: PODIATRIST

## 2021-08-09 RX ORDER — TERBINAFINE HYDROCHLORIDE 250 MG/1
TABLET ORAL
Qty: 15 TABLET | Refills: 0 | Status: SHIPPED | OUTPATIENT
Start: 2021-08-09 | End: 2021-09-09

## 2021-08-09 NOTE — PROGRESS NOTES
Assessment   Pain upon ambulation   Peripheral artery disease   Diabetic neuropathy   Callus formation   Mycosis of nail   Peroneal tendinitis right foot  Dermatophytosis      Plan   Foot exam performed   Nails debrided   Calluses debrided   Patient will stretch daily   Procedures performed without pain or complication   Topical antifungal added   In addition short course of oral antifungal added as well        Chief Complaint   Patient needs full diabetic foot exam   Patient has pain upon ambulation   Patient complains of pain in his toes when he wears shoes   He gets pain in the ball of the foot   No history of trauma      History of Present Illness   HPI: Patient presents for pedal evaluation  Patient complains of pain in his feet and toes with ambulation   Patient is a morbidly obese diabetic who has some electric sensations in his feet on occasion       Review of Systems        Constitutional: not feeling tired       Eyes: no eyesight problems       ENT: no nasal discharge       Cardiovascular: no chest pain,-- no palpitations-- and-- no extremity edema       Respiratory: no shortness of breath-- and-- no cough       Gastrointestinal: no abdominal pain-- and-- no constipation       Genitourinary: no dysuria-- and-- no urinary hesitancy       Integumentary: no skin wound       Neurological: no tingling-- and-- no dizziness       Psychiatric: sleeping better, stopped taking trazodone, but-- no sleep disturbances       Endocrine: no feelings of weakness       Active Problems   1  Acquired pes planus (734) (M21 40)   2  Acute renal failure (584 9) (N17 9)   3  Adhesive capsulitis of both shoulders (726 0) (M75 01,M75 02)   4  Arthralgia (719 40) (M25 50)   5  BPH without urinary obstruction (600 00) (N40 0)   6  Bunion (727 1) (M21 619)   7  Callus (700) (L84)   5  VJFUUQW systolic congestive heart failure (428 22,428 0) (I50 22)   9  Depression with anxiety (300 4) (F41 8)   10  Diabetes mellitus with neurological manifestation (250 60) (E11 49)   11  Diabetes type 2, uncontrolled (250 02) (E11 65)   12  Difficulty concentrating (799 51) (R41 840)   13  Difficulty walking (719 7) (R26 2)   14  Dyspnea on exertion (786 09) (R06 09)   15  Encounter for prostate cancer screening (V76 44) (Z12 5)   16  Encounter for screening for malignant neoplasm of colon (V76 51) (Z12 11)   17  Fatigue (780 79) (R53 83)   18  Foot pain, bilateral (729 5) (M79 671,M79 672)   19  Hallux valgus (735 0) (M20 10)   20  Hematuria (599 70) (R31 9)   21  Hyperlipidemia (272 4) (E78 5)   22  Hypertension (401 9) (I10)   23  Hypogonadism, male (257 2) (E29 1)   24  Insomnia (780 52) (G47 00)   25  Morbid or severe obesity due to excess calories (278 01) (E66 01)   26  Need for pneumococcal vaccination (V03 82) (Z23)   27  Need for prophylactic vaccination and inoculation against influenza (V04 81) (Z23)   28  Nephrolithiasis (592 0) (N20 0)   29  Onychomycosis (110 1) (B35 1)   30  JEFF (obstructive sleep apnea) (327 23) (G47 33)   31  Sciatica (724 3) (M54 30)   32  Screening for colon cancer (V76 51) (Z12 11)   33  Seasonal allergies (477 9) (J30 2)   34  Shoulder pain, right (719 41) (M25 511)   35  Spinal stenosis (724 00) (M48 00)   36  Tinea pedis (110 4) (B35 3)   37  Type 2 diabetes mellitus (250 00) (E11 9)   38  Vitamin D deficiency (268 9) (E55 9)     Past Medical History    · History of Cellulitis of left leg (682 6) (L03 116)   · History of chest pain (V13 89) (A80 654)   · History of diarrhea (V12 79) (J10 675)   · History of onychomycosis (V12 09) (Z86 19)   · History of onychomycosis (V12 09) (Z86 19)   · History of Limb pain (729 5) (M79 609)   · History of Neck pain (723 1) (M54 2)   · Need for pneumococcal vaccination (V03 82) (Z23)   · History of Nephrolithiasis (V13 01)   · History of Numbness On The Sole Of The Right Foot   · History of Pain and swelling of left lower leg (729 5,729 81) (M79 662,M79 89)   · History of Pain of lower extremity (729 5) (M79 606)   · History of Pain, hand joint, right (719 44) (M79 641)   · Sciatica (724 3) (M54 30)     The active problems and past medical history were reviewed and updated today       Surgical History    · History of Knee Arthroscopy (Therapeutic)   · History of Knee Surgery   · History of Needle Biopsy Of Prostate     Family History   Mother    · Family history of Alzheimer Disease   · Family history of Family Health Status Of Mother - Alive  Father    · Family history of Family Health Status Of Father -   Family History    · Family history of Adopted   · Denied: Family history of Colon Cancer   · Denied: Family history of Crohn's Disease   · Denied: Family history of Liver Cancer     Social History    · Denied: History of Alcohol Use (History)   · Current Some Day Smoker (305 1)   · Denied: History of Exercise Habits   · Marital History - Currently    · Tobacco use (305 1) (Z72 0)   · Working Full Time  The social history was reviewed and is unchanged       The medication list was reviewed and updated today       Allergies   1  No Known Drug Allergies   Physical Exam   Left Foot: Appearance: Normal except as noted: excessive pronation-- and-- pes planus  Great toe deformities include a bunion  Tenderness: None except the great toe-- and-- distal first metatarsal     Right Foot: Appearance: Normal except as noted: excessive pronation-- and-- pes planus  Great toe deformities include a bunion  Tenderness: None except the great toe-- and-- distal first metatarsal     Left Ankle: ROM: limited ROM in all planes    Right Ankle: ROM: limited ROM in all planes    Neurological Exam: performed  Light touch was decreased bilaterally  Vibratory sensation was decreased in both first metatarsophalangeal joints     Vascular Exam: performed Dorsalis pedis pulses were present bilaterally  Posterior tibial pulses were present bilaterally  Elevation Pallor: absent bilaterally  Dependence rubor was absent bilaterally  Edema: none     Toenails: All of the toenails were elongated,-- hypertrophied,-- discolored-- and-- Ptotic  Both first toenails were tender-- and-- Positive onychogryphosis          Socks and shoes removed, Right Foot Findings: swollen, erythematous and dry       The sensory exam showed diminished vibratory sensation at the level of the toes  Diminished tactile sensation with monofilament testing throughout the right foot       Socks and shoes removed, Left Foot Findings: swollen, erythematous and dry       The sensory exam showed diminished vibratory sensation at the level of the toes  Diminished tactile sensation with monofilament testing throughout the left foot      Capillary refills findings on the right were normal in the toes       Pulses:      2+ in the posterior tibialis on the right      2+ in the dorsalis pedis on the right       Capillary refills findings on the left were normal in the toes       Pulses:      1+ in the posterior tibialis on the left      1+ in the dorsalis pedis on the left       Assign Risk Category: 2: Loss of protective sensation with or without weakness, deformity, callus, pre-ulcer, or history of ulceration  High risk     Hyperkeratosis: present on both first toes,-- present on both first sub metatarsals,-- present on both third sub metatarsals-- and-- Severe profound moccasin tinea pedis bilateral     Shoe Gear Evaluation: performed ()  Recommendation(s): SAS style      Patient's shoes and socks removed  Assign Risk Category:  Deformity present; Loss of protective sensation; No weak pulses       Risk: 1

## 2021-11-01 ENCOUNTER — RA CDI HCC (OUTPATIENT)
Dept: OTHER | Facility: HOSPITAL | Age: 61
End: 2021-11-01

## 2021-11-05 ENCOUNTER — OFFICE VISIT (OUTPATIENT)
Dept: INTERNAL MEDICINE CLINIC | Facility: CLINIC | Age: 61
End: 2021-11-05
Payer: COMMERCIAL

## 2021-11-05 VITALS
OXYGEN SATURATION: 98 % | BODY MASS INDEX: 40.6 KG/M2 | TEMPERATURE: 97.3 F | HEIGHT: 71 IN | SYSTOLIC BLOOD PRESSURE: 124 MMHG | DIASTOLIC BLOOD PRESSURE: 78 MMHG | HEART RATE: 71 BPM | WEIGHT: 290 LBS

## 2021-11-05 DIAGNOSIS — E53.8 VITAMIN B12 DEFICIENCY: ICD-10-CM

## 2021-11-05 DIAGNOSIS — I10 ESSENTIAL HYPERTENSION: ICD-10-CM

## 2021-11-05 DIAGNOSIS — Z23 NEED FOR IMMUNIZATION AGAINST INFLUENZA: ICD-10-CM

## 2021-11-05 DIAGNOSIS — N18.31 STAGE 3A CHRONIC KIDNEY DISEASE (HCC): ICD-10-CM

## 2021-11-05 DIAGNOSIS — F41.8 DEPRESSION WITH ANXIETY: ICD-10-CM

## 2021-11-05 DIAGNOSIS — Z71.9 HEALTH COUNSELING: ICD-10-CM

## 2021-11-05 DIAGNOSIS — G47.33 OSA ON CPAP: ICD-10-CM

## 2021-11-05 DIAGNOSIS — N18.31 TYPE 2 DIABETES MELLITUS WITH STAGE 3A CHRONIC KIDNEY DISEASE, WITHOUT LONG-TERM CURRENT USE OF INSULIN (HCC): ICD-10-CM

## 2021-11-05 DIAGNOSIS — R19.7 DIARRHEA, UNSPECIFIED TYPE: Primary | ICD-10-CM

## 2021-11-05 DIAGNOSIS — E78.2 MIXED HYPERLIPIDEMIA: ICD-10-CM

## 2021-11-05 DIAGNOSIS — N40.0 BPH WITHOUT URINARY OBSTRUCTION: ICD-10-CM

## 2021-11-05 DIAGNOSIS — E11.40 TYPE 2 DIABETES MELLITUS WITH DIABETIC NEUROPATHY, WITHOUT LONG-TERM CURRENT USE OF INSULIN (HCC): ICD-10-CM

## 2021-11-05 DIAGNOSIS — E11.42 DIABETIC POLYNEUROPATHY ASSOCIATED WITH TYPE 2 DIABETES MELLITUS (HCC): ICD-10-CM

## 2021-11-05 DIAGNOSIS — E66.01 MORBID OBESITY WITH BODY MASS INDEX (BMI) OF 40.0 TO 44.9 IN ADULT (HCC): ICD-10-CM

## 2021-11-05 DIAGNOSIS — Z99.89 OSA ON CPAP: ICD-10-CM

## 2021-11-05 DIAGNOSIS — E11.22 TYPE 2 DIABETES MELLITUS WITH STAGE 3A CHRONIC KIDNEY DISEASE, WITHOUT LONG-TERM CURRENT USE OF INSULIN (HCC): ICD-10-CM

## 2021-11-05 PROBLEM — N18.30 TYPE 2 DIABETES MELLITUS WITH STAGE 3 CHRONIC KIDNEY DISEASE (HCC): Status: ACTIVE | Noted: 2020-09-05

## 2021-11-05 PROCEDURE — 3066F NEPHROPATHY DOC TX: CPT | Performed by: INTERNAL MEDICINE

## 2021-11-05 PROCEDURE — 90682 RIV4 VACC RECOMBINANT DNA IM: CPT

## 2021-11-05 PROCEDURE — 3078F DIAST BP <80 MM HG: CPT | Performed by: INTERNAL MEDICINE

## 2021-11-05 PROCEDURE — 4010F ACE/ARB THERAPY RXD/TAKEN: CPT | Performed by: INTERNAL MEDICINE

## 2021-11-05 PROCEDURE — 3074F SYST BP LT 130 MM HG: CPT | Performed by: INTERNAL MEDICINE

## 2021-11-05 PROCEDURE — 99214 OFFICE O/P EST MOD 30 MIN: CPT | Performed by: INTERNAL MEDICINE

## 2021-11-05 PROCEDURE — 3008F BODY MASS INDEX DOCD: CPT | Performed by: INTERNAL MEDICINE

## 2021-11-05 PROCEDURE — 90471 IMMUNIZATION ADMIN: CPT

## 2021-11-05 RX ORDER — ENALAPRIL MALEATE 20 MG/1
20 TABLET ORAL 2 TIMES DAILY
Qty: 180 TABLET | Refills: 1 | Status: SHIPPED | OUTPATIENT
Start: 2021-11-05 | End: 2022-03-29

## 2021-11-05 RX ORDER — TAMSULOSIN HYDROCHLORIDE 0.4 MG/1
0.4 CAPSULE ORAL DAILY
Qty: 90 CAPSULE | Refills: 1 | Status: SHIPPED | OUTPATIENT
Start: 2021-11-05 | End: 2022-03-29

## 2021-11-09 ENCOUNTER — TELEPHONE (OUTPATIENT)
Dept: UROLOGY | Facility: CLINIC | Age: 61
End: 2021-11-09

## 2021-12-02 ENCOUNTER — TELEPHONE (OUTPATIENT)
Dept: UROLOGY | Facility: CLINIC | Age: 61
End: 2021-12-02

## 2021-12-02 DIAGNOSIS — Z12.5 SCREENING FOR PROSTATE CANCER: Primary | ICD-10-CM

## 2021-12-20 DIAGNOSIS — F33.1 MODERATE EPISODE OF RECURRENT MAJOR DEPRESSIVE DISORDER (HCC): ICD-10-CM

## 2021-12-20 DIAGNOSIS — I10 ESSENTIAL HYPERTENSION: ICD-10-CM

## 2021-12-20 RX ORDER — AMLODIPINE BESYLATE 10 MG/1
10 TABLET ORAL DAILY
Qty: 90 TABLET | Refills: 0 | Status: SHIPPED | OUTPATIENT
Start: 2021-12-20 | End: 2022-01-19

## 2021-12-20 RX ORDER — BUPROPION HYDROCHLORIDE 150 MG/1
150 TABLET ORAL DAILY
Qty: 90 TABLET | Refills: 0 | Status: SHIPPED | OUTPATIENT
Start: 2021-12-20 | End: 2022-01-19

## 2021-12-27 ENCOUNTER — OFFICE VISIT (OUTPATIENT)
Dept: PODIATRY | Facility: CLINIC | Age: 61
End: 2021-12-27
Payer: COMMERCIAL

## 2021-12-27 VITALS
DIASTOLIC BLOOD PRESSURE: 78 MMHG | WEIGHT: 290 LBS | RESPIRATION RATE: 17 BRPM | BODY MASS INDEX: 40.6 KG/M2 | SYSTOLIC BLOOD PRESSURE: 124 MMHG | HEIGHT: 71 IN

## 2021-12-27 DIAGNOSIS — M21.969 ACQUIRED DEFORMITY OF FOOT, UNSPECIFIED LATERALITY: ICD-10-CM

## 2021-12-27 DIAGNOSIS — M77.42 METATARSALGIA OF BOTH FEET: ICD-10-CM

## 2021-12-27 DIAGNOSIS — M77.41 METATARSALGIA OF BOTH FEET: ICD-10-CM

## 2021-12-27 DIAGNOSIS — B35.1 ONYCHOMYCOSIS: ICD-10-CM

## 2021-12-27 DIAGNOSIS — E11.42 DIABETIC POLYNEUROPATHY ASSOCIATED WITH TYPE 2 DIABETES MELLITUS (HCC): Primary | ICD-10-CM

## 2021-12-27 PROCEDURE — 99213 OFFICE O/P EST LOW 20 MIN: CPT | Performed by: PODIATRIST

## 2022-01-18 DIAGNOSIS — E78.2 MIXED HYPERLIPIDEMIA: ICD-10-CM

## 2022-01-18 DIAGNOSIS — F33.1 MODERATE EPISODE OF RECURRENT MAJOR DEPRESSIVE DISORDER (HCC): ICD-10-CM

## 2022-01-18 DIAGNOSIS — I10 ESSENTIAL HYPERTENSION: ICD-10-CM

## 2022-01-19 RX ORDER — BUPROPION HYDROCHLORIDE 150 MG/1
TABLET ORAL
Qty: 90 TABLET | Refills: 0 | Status: SHIPPED | OUTPATIENT
Start: 2022-01-19 | End: 2022-03-29

## 2022-01-19 RX ORDER — ATORVASTATIN CALCIUM 10 MG/1
10 TABLET, FILM COATED ORAL DAILY
Qty: 90 TABLET | Refills: 0 | Status: SHIPPED | OUTPATIENT
Start: 2022-01-19 | End: 2022-03-29

## 2022-01-19 RX ORDER — AMLODIPINE BESYLATE 10 MG/1
TABLET ORAL
Qty: 90 TABLET | Refills: 0 | Status: SHIPPED | OUTPATIENT
Start: 2022-01-19 | End: 2022-03-29

## 2022-02-28 ENCOUNTER — OFFICE VISIT (OUTPATIENT)
Dept: PODIATRY | Facility: CLINIC | Age: 62
End: 2022-02-28
Payer: COMMERCIAL

## 2022-02-28 VITALS — HEIGHT: 71 IN | WEIGHT: 290 LBS | RESPIRATION RATE: 17 BRPM | BODY MASS INDEX: 40.6 KG/M2

## 2022-02-28 DIAGNOSIS — M77.41 METATARSALGIA OF BOTH FEET: ICD-10-CM

## 2022-02-28 DIAGNOSIS — E11.42 DIABETIC POLYNEUROPATHY ASSOCIATED WITH TYPE 2 DIABETES MELLITUS (HCC): Primary | ICD-10-CM

## 2022-02-28 DIAGNOSIS — M21.969 ACQUIRED DEFORMITY OF FOOT, UNSPECIFIED LATERALITY: ICD-10-CM

## 2022-02-28 DIAGNOSIS — B35.1 ONYCHOMYCOSIS: ICD-10-CM

## 2022-02-28 DIAGNOSIS — M77.42 METATARSALGIA OF BOTH FEET: ICD-10-CM

## 2022-02-28 PROCEDURE — 99213 OFFICE O/P EST LOW 20 MIN: CPT | Performed by: PODIATRIST

## 2022-02-28 NOTE — PROGRESS NOTES
Assessment   Pain upon ambulation   Peripheral artery disease   Diabetic neuropathy   Callus formation   Mycosis of nail   Peroneal tendinitis right foot   Dermatophytosis      Plan   Foot exam performed   Nails debrided   Calluses debrided   Patient will stretch daily   Procedures performed without pain or complication   Patient educated on care of the diabetic foot         Chief Complaint   Patient needs full diabetic foot exam   Patient has pain upon ambulation   Patient complains of pain in his toes when he wears shoes   He gets pain in the ball of the foot   No history of trauma      History of Present Illness   HPI: Patient presents for pedal evaluation  Patient complains of pain in his feet and toes with ambulation   Patient is a morbidly obese diabetic who has some electric sensations in his feet on occasion       Review of Systems        Constitutional: not feeling tired       Eyes: no eyesight problems       ENT: no nasal discharge       Cardiovascular: no chest pain,-- no palpitations-- and-- no extremity edema       Respiratory: no shortness of breath-- and-- no cough       Gastrointestinal: no abdominal pain-- and-- no constipation       Genitourinary: no dysuria-- and-- no urinary hesitancy       Integumentary: no skin wound       Neurological: no tingling-- and-- no dizziness       Psychiatric: sleeping better, stopped taking trazodone, but-- no sleep disturbances       Endocrine: no feelings of weakness       Active Problems   1  Acquired pes planus (734) (M21 40)   2  Acute renal failure (584 9) (N17 9)   3  Adhesive capsulitis of both shoulders (726 0) (M75 01,M75 02)   4  Arthralgia (719 40) (M25 50)   5  BPH without urinary obstruction (600 00) (N40 0)   6  Bunion (727 1) (M21 619)   7  Callus (700) (L84)   5  INHQHZD systolic congestive heart failure (428 22,428 0) (I50 22)   9  Depression with anxiety (300 4) (F41 8)   10  Diabetes mellitus with neurological manifestation (250 60) (E11 49)   11  Diabetes type 2, uncontrolled (250 02) (E11 65)   12  Difficulty concentrating (799 51) (R41 840)   13  Difficulty walking (719 7) (R26 2)   14  Dyspnea on exertion (786 09) (R06 09)   15  Encounter for prostate cancer screening (V76 44) (Z12 5)   16  Encounter for screening for malignant neoplasm of colon (V76 51) (Z12 11)   17  Fatigue (780 79) (R53 83)   18  Foot pain, bilateral (729 5) (M79 671,M79 672)   19  Hallux valgus (735 0) (M20 10)   20  Hematuria (599 70) (R31 9)   21  Hyperlipidemia (272 4) (E78 5)   22  Hypertension (401 9) (I10)   23  Hypogonadism, male (257 2) (E29 1)   24  Insomnia (780 52) (G47 00)   25  Morbid or severe obesity due to excess calories (278 01) (E66 01)   26  Need for pneumococcal vaccination (V03 82) (Z23)   27  Need for prophylactic vaccination and inoculation against influenza (V04 81) (Z23)   28  Nephrolithiasis (592 0) (N20 0)   29  Onychomycosis (110 1) (B35 1)   30  JEFF (obstructive sleep apnea) (327 23) (G47 33)   31  Sciatica (724 3) (M54 30)   32  Screening for colon cancer (V76 51) (Z12 11)   33  Seasonal allergies (477 9) (J30 2)   34  Shoulder pain, right (719 41) (M25 511)   35  Spinal stenosis (724 00) (M48 00)   36  Tinea pedis (110 4) (B35 3)   37  Type 2 diabetes mellitus (250 00) (E11 9)   38  Vitamin D deficiency (268 9) (E55 9)     Past Medical History    · History of Cellulitis of left leg (682 6) (L03 116)   · History of chest pain (V13 89) (N24 515)   · History of diarrhea (V12 79) (J22 257)   · History of onychomycosis (V12 09) (Z86 19)   · History of onychomycosis (V12 09) (Z86 19)   · History of Limb pain (729 5) (M79 609)   · History of Neck pain (723 1) (M54 2)   · Need for pneumococcal vaccination (V03 82) (Z23)   · History of Nephrolithiasis (V13 01)   · History of Numbness On The Sole Of The Right Foot   · History of Pain and swelling of left lower leg (729 5,729 81) (M79 662,M79 89)   · History of Pain of lower extremity (729 5) (M79 606)   · History of Pain, hand joint, right (719 44) (M79 641)   · Sciatica (724 3) (M54 30)     The active problems and past medical history were reviewed and updated today       Surgical History    · History of Knee Arthroscopy (Therapeutic)   · History of Knee Surgery   · History of Needle Biopsy Of Prostate     Family History   Mother    · Family history of Alzheimer Disease   · Family history of Family Health Status Of Mother - Alive  Father    · Family history of Family Health Status Of Father -   Family History    · Family history of Adopted   · Denied: Family history of Colon Cancer   · Denied: Family history of Crohn's Disease   · Denied: Family history of Liver Cancer     Social History    · Denied: History of Alcohol Use (History)   · Current Some Day Smoker (305 1)   · Denied: History of Exercise Habits   · Marital History - Currently    · Tobacco use (305 1) (Z72 0)   · Working Full Time  The social history was reviewed and is unchanged       The medication list was reviewed and updated today       Allergies   1  No Known Drug Allergies   Physical Exam   Left Foot: Appearance: Normal except as noted: excessive pronation-- and-- pes planus  Great toe deformities include a bunion  Tenderness: None except the great toe-- and-- distal first metatarsal     Right Foot: Appearance: Normal except as noted: excessive pronation-- and-- pes planus  Great toe deformities include a bunion  Tenderness: None except the great toe-- and-- distal first metatarsal     Left Ankle: ROM: limited ROM in all planes    Right Ankle: ROM: limited ROM in all planes    Neurological Exam: performed  Light touch was decreased bilaterally  Vibratory sensation was decreased in both first metatarsophalangeal joints     Vascular Exam: performed Dorsalis pedis pulses were present bilaterally  Posterior tibial pulses were present bilaterally  Elevation Pallor: absent bilaterally  Dependence rubor was absent bilaterally  Edema: none     Toenails: All of the toenails were elongated,-- hypertrophied,-- discolored-- and-- Ptotic  Both first toenails were tender-- and-- Positive onychogryphosis          Socks and shoes removed, Right Foot Findings: swollen, erythematous and dry       The sensory exam showed diminished vibratory sensation at the level of the toes  Diminished tactile sensation with monofilament testing throughout the right foot       Socks and shoes removed, Left Foot Findings: swollen, erythematous and dry       The sensory exam showed diminished vibratory sensation at the level of the toes  Diminished tactile sensation with monofilament testing throughout the left foot      Capillary refills findings on the right were normal in the toes       Pulses:      2+ in the posterior tibialis on the right      2+ in the dorsalis pedis on the right       Capillary refills findings on the left were normal in the toes       Pulses:      1+ in the posterior tibialis on the left      1+ in the dorsalis pedis on the left       Assign Risk Category: 2: Loss of protective sensation with or without weakness, deformity, callus, pre-ulcer, or history of ulceration  High risk     Hyperkeratosis: present on both first toes,-- present on both first sub metatarsals,-- present on both third sub metatarsals-- and-- Severe profound moccasin tinea pedis bilateral     Shoe Gear Evaluation: performed ()  Recommendation(s): SAS style      Patient's shoes and socks removed  Assign Risk Category:  Deformity present; Loss of protective sensation; No weak pulses       Risk: 1

## 2022-03-02 ENCOUNTER — RA CDI HCC (OUTPATIENT)
Dept: OTHER | Facility: HOSPITAL | Age: 62
End: 2022-03-02

## 2022-03-02 PROBLEM — N18.31 TYPE 2 DIABETES MELLITUS WITH STAGE 3A CHRONIC KIDNEY DISEASE (HCC): Status: ACTIVE | Noted: 2020-09-05

## 2022-03-02 NOTE — PROGRESS NOTES
[facility] 75  coding opportunities             Chart Reviewed * (Number of) Inbasket suggestions sent to Provider: 2   Based on the patient problem list the following diagnoses are shown as active but not on the BPA    E11 22, N18 31 T2DM with CKD stage 3a  E66 01 Morbid, severe, obesity due to excess calories    If this is correct please assess and document at your next visit 3/8/22               Patients insurance company: [address]  (Medicare Advantage and Commercial)

## 2022-03-07 ENCOUNTER — LAB (OUTPATIENT)
Dept: LAB | Facility: CLINIC | Age: 62
End: 2022-03-07
Payer: COMMERCIAL

## 2022-03-07 DIAGNOSIS — E11.40 TYPE 2 DIABETES MELLITUS WITH DIABETIC NEUROPATHY, WITHOUT LONG-TERM CURRENT USE OF INSULIN (HCC): ICD-10-CM

## 2022-03-07 DIAGNOSIS — Z12.5 SCREENING FOR PROSTATE CANCER: ICD-10-CM

## 2022-03-07 LAB
ALBUMIN SERPL BCP-MCNC: 3.6 G/DL (ref 3.5–5)
ALP SERPL-CCNC: 90 U/L (ref 46–116)
ALT SERPL W P-5'-P-CCNC: 50 U/L (ref 12–78)
ANION GAP SERPL CALCULATED.3IONS-SCNC: 13 MMOL/L (ref 4–13)
AST SERPL W P-5'-P-CCNC: 24 U/L (ref 5–45)
BILIRUB SERPL-MCNC: 1.06 MG/DL (ref 0.2–1)
BUN SERPL-MCNC: 13 MG/DL (ref 5–25)
CALCIUM SERPL-MCNC: 9 MG/DL (ref 8.3–10.1)
CHLORIDE SERPL-SCNC: 107 MMOL/L (ref 100–108)
CHOLEST SERPL-MCNC: 95 MG/DL
CO2 SERPL-SCNC: 23 MMOL/L (ref 21–32)
CREAT SERPL-MCNC: 1.4 MG/DL (ref 0.6–1.3)
CREAT UR-MCNC: 141 MG/DL
EST. AVERAGE GLUCOSE BLD GHB EST-MCNC: 229 MG/DL
GFR SERPL CREATININE-BSD FRML MDRD: 53 ML/MIN/1.73SQ M
GLUCOSE P FAST SERPL-MCNC: 160 MG/DL (ref 65–99)
HBA1C MFR BLD: 9.6 %
HDLC SERPL-MCNC: 33 MG/DL
LDLC SERPL CALC-MCNC: 32 MG/DL (ref 0–100)
MICROALBUMIN UR-MCNC: 247 MG/L (ref 0–20)
MICROALBUMIN/CREAT 24H UR: 175 MG/G CREATININE (ref 0–30)
NONHDLC SERPL-MCNC: 62 MG/DL
POTASSIUM SERPL-SCNC: 3.4 MMOL/L (ref 3.5–5.3)
PROT SERPL-MCNC: 7.6 G/DL (ref 6.4–8.2)
PSA SERPL-MCNC: 2.3 NG/ML (ref 0–4)
SODIUM SERPL-SCNC: 143 MMOL/L (ref 136–145)
TRIGL SERPL-MCNC: 148 MG/DL
VIT B12 SERPL-MCNC: 1011 PG/ML (ref 100–900)

## 2022-03-07 PROCEDURE — 36415 COLL VENOUS BLD VENIPUNCTURE: CPT

## 2022-03-07 PROCEDURE — 83036 HEMOGLOBIN GLYCOSYLATED A1C: CPT

## 2022-03-07 PROCEDURE — 82570 ASSAY OF URINE CREATININE: CPT

## 2022-03-07 PROCEDURE — 3060F POS MICROALBUMINURIA REV: CPT | Performed by: INTERNAL MEDICINE

## 2022-03-07 PROCEDURE — 84153 ASSAY OF PSA TOTAL: CPT

## 2022-03-07 PROCEDURE — 82043 UR ALBUMIN QUANTITATIVE: CPT

## 2022-03-07 PROCEDURE — 80061 LIPID PANEL: CPT | Performed by: INTERNAL MEDICINE

## 2022-03-07 PROCEDURE — 80053 COMPREHEN METABOLIC PANEL: CPT

## 2022-03-07 PROCEDURE — 82607 VITAMIN B-12: CPT | Performed by: INTERNAL MEDICINE

## 2022-03-07 PROCEDURE — 3046F HEMOGLOBIN A1C LEVEL >9.0%: CPT | Performed by: INTERNAL MEDICINE

## 2022-03-08 ENCOUNTER — OFFICE VISIT (OUTPATIENT)
Dept: INTERNAL MEDICINE CLINIC | Facility: CLINIC | Age: 62
End: 2022-03-08
Payer: COMMERCIAL

## 2022-03-08 VITALS
SYSTOLIC BLOOD PRESSURE: 124 MMHG | WEIGHT: 285 LBS | TEMPERATURE: 97.6 F | BODY MASS INDEX: 39.9 KG/M2 | DIASTOLIC BLOOD PRESSURE: 72 MMHG | HEART RATE: 75 BPM | OXYGEN SATURATION: 96 % | HEIGHT: 71 IN

## 2022-03-08 DIAGNOSIS — Z79.4 TYPE 2 DIABETES MELLITUS WITH STAGE 3A CHRONIC KIDNEY DISEASE, WITH LONG-TERM CURRENT USE OF INSULIN (HCC): Primary | ICD-10-CM

## 2022-03-08 DIAGNOSIS — E78.2 MIXED HYPERLIPIDEMIA: ICD-10-CM

## 2022-03-08 DIAGNOSIS — N18.31 TYPE 2 DIABETES MELLITUS WITH STAGE 3A CHRONIC KIDNEY DISEASE, WITH LONG-TERM CURRENT USE OF INSULIN (HCC): Primary | ICD-10-CM

## 2022-03-08 DIAGNOSIS — I10 ESSENTIAL HYPERTENSION: ICD-10-CM

## 2022-03-08 DIAGNOSIS — E11.42 DIABETIC POLYNEUROPATHY ASSOCIATED WITH TYPE 2 DIABETES MELLITUS (HCC): ICD-10-CM

## 2022-03-08 DIAGNOSIS — N18.31 STAGE 3A CHRONIC KIDNEY DISEASE (HCC): ICD-10-CM

## 2022-03-08 DIAGNOSIS — E53.8 VITAMIN B12 DEFICIENCY: ICD-10-CM

## 2022-03-08 DIAGNOSIS — G47.33 OSA ON CPAP: ICD-10-CM

## 2022-03-08 DIAGNOSIS — E11.22 TYPE 2 DIABETES MELLITUS WITH STAGE 3A CHRONIC KIDNEY DISEASE, WITH LONG-TERM CURRENT USE OF INSULIN (HCC): Primary | ICD-10-CM

## 2022-03-08 DIAGNOSIS — N40.0 BPH WITHOUT URINARY OBSTRUCTION: ICD-10-CM

## 2022-03-08 DIAGNOSIS — E55.9 VITAMIN D DEFICIENCY: ICD-10-CM

## 2022-03-08 DIAGNOSIS — Z71.9 HEALTH COUNSELING: ICD-10-CM

## 2022-03-08 DIAGNOSIS — F41.8 DEPRESSION WITH ANXIETY: ICD-10-CM

## 2022-03-08 DIAGNOSIS — E66.01 MORBID OBESITY WITH BODY MASS INDEX (BMI) OF 40.0 TO 44.9 IN ADULT (HCC): ICD-10-CM

## 2022-03-08 DIAGNOSIS — E11.40 TYPE 2 DIABETES MELLITUS WITH DIABETIC NEUROPATHY, WITHOUT LONG-TERM CURRENT USE OF INSULIN (HCC): ICD-10-CM

## 2022-03-08 DIAGNOSIS — Z99.89 OSA ON CPAP: ICD-10-CM

## 2022-03-08 PROCEDURE — 3066F NEPHROPATHY DOC TX: CPT | Performed by: INTERNAL MEDICINE

## 2022-03-08 PROCEDURE — 99214 OFFICE O/P EST MOD 30 MIN: CPT | Performed by: INTERNAL MEDICINE

## 2022-03-08 PROCEDURE — 3074F SYST BP LT 130 MM HG: CPT | Performed by: INTERNAL MEDICINE

## 2022-03-08 PROCEDURE — 3008F BODY MASS INDEX DOCD: CPT | Performed by: INTERNAL MEDICINE

## 2022-03-08 PROCEDURE — 3078F DIAST BP <80 MM HG: CPT | Performed by: INTERNAL MEDICINE

## 2022-03-08 RX ORDER — INSULIN GLARGINE 100 [IU]/ML
26 INJECTION, SOLUTION SUBCUTANEOUS DAILY
Qty: 18 ML | Refills: 1
Start: 2022-03-08 | End: 2022-04-05 | Stop reason: SDUPTHER

## 2022-03-08 NOTE — ASSESSMENT & PLAN NOTE
Lab Results   Component Value Date    HGBA1C 9 6 (H) 03/07/2022     A1c remains elevated  Increase Basaglar to 26 units  On Trulicity 3 mg weekly, also on glimepiride bid   (+) microalb, on enalapril    Eye exam due

## 2022-03-08 NOTE — ASSESSMENT & PLAN NOTE
Lab Results   Component Value Date    EGFR 53 03/07/2022    EGFR 52 05/16/2021    EGFR 51 11/20/2020    CREATININE 1 40 (H) 03/07/2022    CREATININE 1 44 (H) 05/16/2021    CREATININE 1 47 (H) 11/20/2020     Stable  No NSAIDs

## 2022-03-08 NOTE — ASSESSMENT & PLAN NOTE
Lost 5 lbs since last visit  Avoid sodas, continue small frequent meals  Start strengthening exercises and daily walks

## 2022-03-08 NOTE — PROGRESS NOTES
Assessment/Plan:    CKD (chronic kidney disease) stage 3, GFR 30-59 ml/min [facility])  Lab Results   Component Value Date    EGFR 53 03/07/2022    EGFR 52 05/16/2021    EGFR 51 11/20/2020    CREATININE 1 40 (H) 03/07/2022    CREATININE 1 44 (H) 05/16/2021    CREATININE 1 47 (H) 11/20/2020     Stable  No NSAIDs  JEFF on CPAP  Using CPAP, awaiting for replacement  Mixed hyperlipidemia  Lipids much lower since starting statin  Decrease atorvastatin to 5 mg daily  Morbid obesity with body mass index (BMI) of 40 0 to 44 9 in adult [facility])  Lost 5 lbs since last visit  Avoid sodas, continue small frequent meals  Start strengthening exercises and daily walks  Depression with anxiety  Stable, on bupropion and sertraline  Essential hypertension  BP controlled: on amlodipine, clonidine and enalapril  BPH without urinary obstruction  On tamsulosin  Nephrolithiasis  Adequate fluid intake  Diabetic polyneuropathy associated with type 2 diabetes mellitus ([place] [address] )    Lab Results   Component Value Date    HGBA1C 9 6 (H) 03/07/2022     A1c remains elevated  Increase Basaglar to 26 units  On Trulicity 3 mg weekly, also on glimepiride bid   (+) microalb, on enalapril  Eye exam due    Vitamin B12 deficiency  Decrease supplement to 3 days a week  Vitamin D deficiency  On daily D3  Diagnoses and all orders for this visit:    Type 2 diabetes mellitus with stage 3a chronic kidney disease, with long-term current use of insulin ([facility])    Stage 3a chronic kidney disease ([facility] )    Type 2 diabetes mellitus with diabetic neuropathy, without long-term current use of insulin ([facility])  -     Hemoglobin A1C; Future  -     Basic metabolic panel; Future  -     CBC and Platelet; Future  -     TSH, 3rd generation with Free T4 reflex; Future  -     insulin glargine (Basaglar KwikPen) 100 units/mL injection pen; Inject 26 Units under the skin daily    Vitamin D deficiency  -     Vitamin D 25 hydroxy;  Future    JEFF on CPAP    Morbid obesity with body mass index (BMI) of 40 0 to 44 9 in adult [facility])    Mixed hyperlipidemia    Essential hypertension    Depression with anxiety    BPH without urinary obstruction    Diabetic polyneuropathy associated with type 2 diabetes mellitus ([address] )    Vitamin B12 deficiency    Health counseling  Comments:  Vacciantions updated  Eye exam, colonoscopy due  Follow up in 4 months or as needed  Subjective:      Patient ID: Magi Guerrero is a 64 y o  male here for a follow up  He has been feeling well, no complaints  He has been out of a job, looking for work  Now that he has more time, he is watching his food intake and is a bit more active at home  He stopped drinking sodas, eating better  He has not been smoking his cigars as often  He does not always check his sugars at home  The following portions of the patient's history were reviewed and updated as appropriate: allergies, current medications, past medical history, past social history and problem list     Review of Systems   Constitutional: Positive for activity change and appetite change  Negative for fatigue  HENT: Negative for congestion, hearing loss and postnasal drip  Eyes: Negative  Negative for visual disturbance  Respiratory: Negative for cough, chest tightness and shortness of breath  Cardiovascular: Negative for chest pain, palpitations and leg swelling  Gastrointestinal: Negative for abdominal pain and constipation  Genitourinary: Negative for dysuria, frequency and urgency  Musculoskeletal: Negative for arthralgias  Skin: Negative for rash and wound  Neurological: Negative for dizziness, numbness and headaches  Psychiatric/Behavioral: Negative for dysphoric mood and sleep disturbance  The patient is not nervous/anxious            Objective:      /72   Pulse 75   Temp 97 6 °F (36 4 °C)   Ht 5' 11" (1 803 m)   Wt 129 kg (285 lb)   SpO2 96%   BMI 39 75 kg/m²          Physical Exam  Vitals and nursing note reviewed  Constitutional:       Appearance: He is well-developed  HENT:      Head: Normocephalic and atraumatic  Eyes:      Conjunctiva/sclera: Conjunctivae normal       Pupils: Pupils are equal, round, and reactive to light  Cardiovascular:      Rate and Rhythm: Normal rate and regular rhythm  Heart sounds: Normal heart sounds  Pulmonary:      Effort: Pulmonary effort is normal       Breath sounds: No wheezing or rhonchi  Abdominal:      General: Bowel sounds are normal       Palpations: Abdomen is soft  Musculoskeletal:         General: No swelling  Cervical back: Neck supple  Right lower leg: No edema  Left lower leg: No edema  Skin:     General: Skin is warm  Findings: No rash  Neurological:      General: No focal deficit present  Mental Status: He is alert and oriented to person, place, and time  Psychiatric:         Mood and Affect: Mood and affect normal          Behavior: Behavior normal            Lab results reviewed with patient

## 2022-03-28 DIAGNOSIS — N40.0 BPH WITHOUT URINARY OBSTRUCTION: ICD-10-CM

## 2022-03-28 DIAGNOSIS — I10 ESSENTIAL HYPERTENSION: ICD-10-CM

## 2022-03-28 DIAGNOSIS — F33.1 MODERATE EPISODE OF RECURRENT MAJOR DEPRESSIVE DISORDER (HCC): ICD-10-CM

## 2022-03-28 DIAGNOSIS — E11.40 TYPE 2 DIABETES MELLITUS WITH DIABETIC NEUROPATHY, WITHOUT LONG-TERM CURRENT USE OF INSULIN (HCC): ICD-10-CM

## 2022-03-28 DIAGNOSIS — E78.2 MIXED HYPERLIPIDEMIA: ICD-10-CM

## 2022-03-29 PROCEDURE — 4010F ACE/ARB THERAPY RXD/TAKEN: CPT | Performed by: INTERNAL MEDICINE

## 2022-03-29 RX ORDER — ENALAPRIL MALEATE 20 MG/1
TABLET ORAL
Qty: 180 TABLET | Refills: 1 | Status: SHIPPED | OUTPATIENT
Start: 2022-03-29

## 2022-03-29 RX ORDER — ATORVASTATIN CALCIUM 10 MG/1
TABLET, FILM COATED ORAL
Qty: 90 TABLET | Refills: 1 | Status: SHIPPED | OUTPATIENT
Start: 2022-03-29

## 2022-03-29 RX ORDER — AMLODIPINE BESYLATE 10 MG/1
TABLET ORAL
Qty: 90 TABLET | Refills: 1 | Status: SHIPPED | OUTPATIENT
Start: 2022-03-29

## 2022-03-29 RX ORDER — TAMSULOSIN HYDROCHLORIDE 0.4 MG/1
CAPSULE ORAL
Qty: 90 CAPSULE | Refills: 1 | Status: SHIPPED | OUTPATIENT
Start: 2022-03-29

## 2022-03-29 RX ORDER — BUPROPION HYDROCHLORIDE 150 MG/1
TABLET ORAL
Qty: 90 TABLET | Refills: 1 | Status: SHIPPED | OUTPATIENT
Start: 2022-03-29

## 2022-05-09 ENCOUNTER — TELEPHONE (OUTPATIENT)
Dept: INTERNAL MEDICINE CLINIC | Facility: CLINIC | Age: 62
End: 2022-05-09

## 2022-05-09 DIAGNOSIS — U07.1 COVID-19 VIRUS INFECTION: Primary | ICD-10-CM

## 2022-05-09 RX ORDER — LIDOCAINE HYDROCHLORIDE 20 MG/ML
15 SOLUTION OROPHARYNGEAL 4 TIMES DAILY PRN
Qty: 100 ML | Refills: 0 | Status: SHIPPED | OUTPATIENT
Start: 2022-05-09

## 2022-05-09 NOTE — TELEPHONE ENCOUNTER
You can only take Tylenol for pain, take it every 6 hours  You can try salt water gargles several times a day  If you would like some lidocaine mouthwash, I can send some to the pharmacy      Offer f/u appt tomorrow, virtual

## 2022-05-09 NOTE — TELEPHONE ENCOUNTER
Pt tested positive on Friday  Had antibody treatment today at [facility]  He has a severe sore throat that is keeping him from eating and drinking  Anything he can take for the pain?

## 2022-05-10 ENCOUNTER — TELEMEDICINE (OUTPATIENT)
Dept: INTERNAL MEDICINE CLINIC | Facility: CLINIC | Age: 62
End: 2022-05-10
Payer: COMMERCIAL

## 2022-05-10 DIAGNOSIS — U07.1 COVID-19 VIRUS INFECTION: Primary | ICD-10-CM

## 2022-05-10 DIAGNOSIS — U07.1 COVID-19: ICD-10-CM

## 2022-05-10 PROCEDURE — 99213 OFFICE O/P EST LOW 20 MIN: CPT | Performed by: INTERNAL MEDICINE

## 2022-05-10 NOTE — PROGRESS NOTES
COVID-19 Outpatient Progress Note    Assessment/Plan:    Problem List Items Addressed This Visit        Other    COVID-19 virus infection - Primary      Other Visit Diagnoses     COVID-19             Disposition:     Patient has COVID-19 infection  Based off CDC guidelines, they were recommended to isolate for 5 days from the date of the positive test  If they remain asymptomatic, isolation may be ended followed by 5 days of wearing a mask when around othes to minimize risk of infecting others  If they have a fever, continue to stay home until fever resolves for at least 24 hours  Discussed symptom directed medication options with patient  S/p monoclonal Ab infusion ([facility]), also enrolled with their remote monitoring  Maintain adequate fluid intake  Sore throat improving, using lidocaine prn  Continue quarantine  I have spent 7 minutes directly with the patient  Greater than 50% of this time was spent in counseling/coordination of care regarding: risks and benefits of treatment options, instructions for management, patient and family education and risk factor reductions  Encounter provider Matteo Proctor MD    Provider located at [address] [place], [place] [id number]    Recent Visits  Date Type Provider Dept   05/09/22 Telephone Rosmery Walker [facility] Internal Med   Showing recent visits within past 7 days and meeting all other requirements  Today's Visits  Date Type Provider Dept   05/10/22 Telemedicine Matteo Proctor MD [facility] Internal Med   Showing today's visits and meeting all other requirements  Future Appointments  No visits were found meeting these conditions  Showing future appointments within next 150 days and meeting all other requirements     This virtual check-in was done via [address] and patient was informed that this is a secure, HIPAA-compliant platform   He agrees to proceed  Patient agrees to participate in a virtual check in via telephone or video visit instead of presenting to the office to address urgent/immediate medical needs  Patient is aware this is a billable service  After connecting through [facility], the patient was identified by name and date of birth  Claudia Ruiz was informed that this was a telemedicine visit and that the exam was being conducted confidentially over secure lines  My office door was closed  No one else was in the room  Claudia Ruiz acknowledged consent and understanding of privacy and security of the telemedicine visit  I informed the patient that I have reviewed his record in Epic and presented the opportunity for him to ask any questions regarding the visit today  The patient agreed to participate  Verification of patient location:  Patient is located in the following state in which I hold an active license: [place]    Subjective:   Claudia Ruiz is a 64 y o  male who has been screened for COVID-19  Symptom change since last report: unchanged  Patient's symptoms include fatigue, sore throat and cough  Patient denies fever, chills, rhinorrhea, shortness of breath, nausea, vomiting and diarrhea  - Date of symptom onset: 5/6/2022  - Date of positive COVID-19 test: 5/6/2022  Type of test: PCR  COVID-19 vaccination status: Fully vaccinated with booster    Taiwo Gui has been staying home and has isolated themselves in his home  He is taking care to not share personal items and is cleaning all surfaces that are touched often, like counters, tabletops, and doorknobs using household cleaning sprays or wipes  He is wearing a mask when he leaves his room  Symptoms started 5/6 Friday, with sore throat and cough  He found out a co-worker tested (+) for COVID that day  He was tested at  Urgent care and received infusion yesterday  He reports sore throat is somewhat better today, will eat today  Keeping hydrated    He has been checking his temp (98 7) and O2 sats (93%)      Lab Results   Component Value Date    SARSCOV2 Negative 09/06/2020     Past Medical History:   Diagnosis Date    Acute renal failure (ARF) ([facility] )     last assessed - [id number]    Chest pain     last assessed - [id number]    CPAP (continuous positive airway pressure) dependence     Depression     Diabetes mellitus ([facility] )     Dyspnea on exertion     last assessed - [id number]    Hypertension     Nephrolithiasis     Numbness of right foot     numbness on the sole of the right foot; since he was young d/t stenosis    Obesity     Onychomycosis     last assessed - [id number]    JEFF on CPAP     Sciatica     last assessed - [id number]    Sleep apnea      Past Surgical History:   Procedure Laterality Date    COLONOSCOPY      CYSTOSCOPY W/ LASER LITHOTRIPSY      KNEE ARTHROSCOPY Left 11/2013    MS LAP,CHOLECYSTECTOMY N/A 2/8/2019    Procedure: ROBOTIC ASSISTED LAPAROSCOPIC CHOLECYSTECTOMY;  Surgeon: Yvette Orozco DO;  [facility]: [facility];  Service: General    PROSTATE BIOPSY      Needle Biopsy - Negative     Current Outpatient Medications   Medication Sig Dispense Refill    amLODIPine (NORVASC) 10 mg tablet TAKE 1 TABLET DAILY 90 tablet 1    atorvastatin (LIPITOR) 10 mg tablet TAKE 1 TABLET DAILY 90 tablet 1    buPROPion (WELLBUTRIN XL) 150 mg 24 hr tablet TAKE 1 TABLET DAILY 90 tablet 1    ciclopirox (LOPROX) 0 77 % cream Apply topically 2 (two) times a day for 20 days 45 g 2    cloNIDine (CATAPRES) 0 3 mg tablet Take 1 tablet (0 3 mg total) by mouth 2 (two) times a day 180 tablet 1    Dulaglutide 3 MG/0 5ML SOPN Inject 0 5 mL (3 mg total) under the skin once a week 6 mL 1    enalapril (VASOTEC) 20 mg tablet TAKE 1 TABLET TWICE A  tablet 1    glimepiride (AMARYL) 4 mg tablet Take 1 tablet (4 mg total) by mouth 2 (two) times a day 180 tablet 1    glucose blood (Accu-Chek Guide) test strip Use 1 each 2 (two) times a day Use as instructed 200 each 0    insulin glargine (Basaglar KwikPen) 100 units/mL injection pen Inject 26 Units under the skin daily 24 mL 1    Insulin Pen Needle (BD Pen Needle Alicia 2nd Gen) 32G X 4 MM MISC Inject under the skin daily 100 each 0    ketoconazole (NIZORAL) 2 % cream Apply topically daily 60 g 1    Lidocaine Viscous HCl (XYLOCAINE) 2 % mucosal solution Swish and spit 15 mL 4 (four) times a day as needed for mouth pain or discomfort 100 mL 0    nystatin (MYCOSTATIN) powder Apply topically 3 (three) times a day as needed (groin rash) 60 g 1    sertraline (ZOLOFT) 100 mg tablet Take 1 tablet (100 mg total) by mouth daily 90 tablet 1    tamsulosin (FLOMAX) 0 4 mg TAKE 1 CAPSULE DAILY 90 capsule 1     No current facility-administered medications for this visit  No Known Allergies    Review of Systems   Constitutional: Positive for fatigue  Negative for chills and fever  HENT: Positive for sore throat  Negative for rhinorrhea  Respiratory: Positive for cough  Negative for shortness of breath  Gastrointestinal: Negative for diarrhea, nausea and vomiting  Objective: There were no vitals filed for this visit  Physical Exam  Constitutional:       Appearance: Normal appearance  HENT:      Head: Normocephalic and atraumatic  Mouth/Throat:      Mouth: Mucous membranes are moist    Pulmonary:      Effort: Pulmonary effort is normal  No respiratory distress  Neurological:      General: No focal deficit present  Mental Status: He is alert and oriented to person, place, and time  Psychiatric:         Mood and Affect: Mood normal          Behavior: Behavior normal          VIRTUAL VISIT Trent verbally agrees to participate in [employer]   Pt is aware that [employer] could be limited without vital signs or the ability to perform a full hands-on physical Si Babin understands he or the provider may request at any time to terminate the video visit and request the patient to seek care or treatment in person

## 2022-05-13 NOTE — TELEPHONE ENCOUNTER
Patient states feeling better, No fevers, pulse ox 96-97, no otc meds taken  Denies cough, SOB or congestion

## 2022-06-19 DIAGNOSIS — E11.40 TYPE 2 DIABETES MELLITUS WITH DIABETIC NEUROPATHY, WITHOUT LONG-TERM CURRENT USE OF INSULIN (HCC): ICD-10-CM

## 2022-06-20 RX ORDER — PEN NEEDLE, DIABETIC 32GX 5/32"
NEEDLE, DISPOSABLE MISCELLANEOUS
Qty: 100 EACH | Refills: 0 | Status: SHIPPED | OUTPATIENT
Start: 2022-06-20

## 2022-06-30 ENCOUNTER — OFFICE VISIT (OUTPATIENT)
Dept: PODIATRY | Facility: CLINIC | Age: 62
End: 2022-06-30
Payer: COMMERCIAL

## 2022-06-30 VITALS
HEIGHT: 71 IN | RESPIRATION RATE: 17 BRPM | WEIGHT: 285 LBS | SYSTOLIC BLOOD PRESSURE: 124 MMHG | DIASTOLIC BLOOD PRESSURE: 72 MMHG | BODY MASS INDEX: 39.9 KG/M2

## 2022-06-30 DIAGNOSIS — M21.969 ACQUIRED DEFORMITY OF FOOT, UNSPECIFIED LATERALITY: ICD-10-CM

## 2022-06-30 DIAGNOSIS — M77.41 METATARSALGIA OF BOTH FEET: ICD-10-CM

## 2022-06-30 DIAGNOSIS — E11.42 DIABETIC POLYNEUROPATHY ASSOCIATED WITH TYPE 2 DIABETES MELLITUS (HCC): Primary | ICD-10-CM

## 2022-06-30 DIAGNOSIS — M77.42 METATARSALGIA OF BOTH FEET: ICD-10-CM

## 2022-06-30 DIAGNOSIS — B35.1 ONYCHOMYCOSIS: ICD-10-CM

## 2022-06-30 PROCEDURE — 99213 OFFICE O/P EST LOW 20 MIN: CPT | Performed by: PODIATRIST

## 2022-06-30 NOTE — PROGRESS NOTES
Assessment   Pain upon ambulation   Peripheral artery disease   Diabetic neuropathy   Callus formation   Mycosis of nail   Peroneal tendinitis right foot   Dermatophytosis      Plan   Foot exam performed   Nails debrided   Calluses debrided   Patient will stretch daily   Procedures performed without pain or complication   Patient educated on care of the diabetic foot         Chief Complaint   Patient needs full diabetic foot exam   Patient has pain upon ambulation   Patient complains of pain in his toes when he wears shoes   He gets pain in the ball of the foot   No history of trauma      History of Present Illness   HPI: Patient presents for pedal evaluation  Patient complains of pain in his feet and toes with ambulation   Patient is a morbidly obese diabetic who has some electric sensations in his feet on occasion       Review of Systems        Constitutional: not feeling tired       Eyes: no eyesight problems       ENT: no nasal discharge       Cardiovascular: no chest pain,-- no palpitations-- and-- no extremity edema       Respiratory: no shortness of breath-- and-- no cough       Gastrointestinal: no abdominal pain-- and-- no constipation       Genitourinary: no dysuria-- and-- no urinary hesitancy       Integumentary: no skin wound       Neurological: no tingling-- and-- no dizziness       Psychiatric: sleeping better, stopped taking trazodone, but-- no sleep disturbances       Endocrine: no feelings of weakness       Active Problems   1  Acquired pes planus (734) (M21 40)   2  Acute renal failure (584 9) (N17 9)   3  Adhesive capsulitis of both shoulders (726 0) (M75 01,M75 02)   4  Arthralgia (719 40) (M25 50)   5  BPH without urinary obstruction (600 00) (N40 0)   6  Bunion (727 1) (M21 619)   7  Callus (700) (L84)   6  MPEDQPZ systolic congestive heart failure (428 22,428 0) (I50 22)   9  Depression with anxiety (300 4) (F41 8)   10  Diabetes mellitus with neurological manifestation (250 60) (E11 49)   11  Diabetes type 2, uncontrolled (250 02) (E11 65)   12  Difficulty concentrating (799 51) (R41 840)   13  Difficulty walking (719 7) (R26 2)   14  Dyspnea on exertion (786 09) (R06 09)   15  Encounter for prostate cancer screening (V76 44) (Z12 5)   16  Encounter for screening for malignant neoplasm of colon (V76 51) (Z12 11)   17  Fatigue (780 79) (R53 83)   18  Foot pain, bilateral (729 5) (M79 671,M79 672)   19  Hallux valgus (735 0) (M20 10)   20  Hematuria (599 70) (R31 9)   21  Hyperlipidemia (272 4) (E78 5)   22  Hypertension (401 9) (I10)   23  Hypogonadism, male (257 2) (E29 1)   24  Insomnia (780 52) (G47 00)   25  Morbid or severe obesity due to excess calories (278 01) (E66 01)   26  Need for pneumococcal vaccination (V03 82) (Z23)   27  Need for prophylactic vaccination and inoculation against influenza (V04 81) (Z23)   28  Nephrolithiasis (592 0) (N20 0)   29  Onychomycosis (110 1) (B35 1)   30  JEFF (obstructive sleep apnea) (327 23) (G47 33)   31  Sciatica (724 3) (M54 30)   32  Screening for colon cancer (V76 51) (Z12 11)   33  Seasonal allergies (477 9) (J30 2)   34  Shoulder pain, right (719 41) (M25 511)   35  Spinal stenosis (724 00) (M48 00)   36  Tinea pedis (110 4) (B35 3)   37  Type 2 diabetes mellitus (250 00) (E11 9)   38  Vitamin D deficiency (268 9) (E55 9)     Past Medical History    · History of Cellulitis of left leg (682 6) (L03 116)   · History of chest pain (V13 89) (L56 733)   · History of diarrhea (V12 79) (M59 243)   · History of onychomycosis (V12 09) (Z86 19)   · History of onychomycosis (V12 09) (Z86 19)   · History of Limb pain (729 5) (M79 609)   · History of Neck pain (723 1) (M54 2)   · Need for pneumococcal vaccination (V03 82) (Z23)   · History of Nephrolithiasis (V13 01)   · History of Numbness On The Sole Of The Right Foot   · History of Pain and swelling of left lower leg (729 5,729 81) (M79 662,M79 89)   · History of Pain of lower extremity (729 5) (M79 606)   · History of Pain, hand joint, right (719 44) (M79 641)   · Sciatica (724 3) (M54 30)     The active problems and past medical history were reviewed and updated today       Surgical History    · History of Knee Arthroscopy (Therapeutic)   · History of Knee Surgery   · History of Needle Biopsy Of Prostate     Family History   Mother    · Family history of Alzheimer Disease   · Family history of Family Health Status Of Mother - Alive  Father    · Family history of Family Health Status Of Father -   Family History    · Family history of Adopted   · Denied: Family history of Colon Cancer   · Denied: Family history of Crohn's Disease   · Denied: Family history of Liver Cancer     Social History    · Denied: History of Alcohol Use (History)   · Current Some Day Smoker (305 1)   · Denied: History of Exercise Habits   · Marital History - Currently    · Tobacco use (305 1) (Z72 0)   · Working Full Time  The social history was reviewed and is unchanged       The medication list was reviewed and updated today       Allergies   1  No Known Drug Allergies   Physical Exam   Left Foot: Appearance: Normal except as noted: excessive pronation-- and-- pes planus  Great toe deformities include a bunion  Tenderness: None except the great toe-- and-- distal first metatarsal     Right Foot: Appearance: Normal except as noted: excessive pronation-- and-- pes planus  Great toe deformities include a bunion  Tenderness: None except the great toe-- and-- distal first metatarsal     Left Ankle: ROM: limited ROM in all planes    Right Ankle: ROM: limited ROM in all planes    Neurological Exam: performed  Light touch was decreased bilaterally  Vibratory sensation was decreased in both first metatarsophalangeal joints     Vascular Exam: performed Dorsalis pedis pulses were present bilaterally  Posterior tibial pulses were present bilaterally  Elevation Pallor: absent bilaterally  Dependence rubor was absent bilaterally  Edema: none     Toenails: All of the toenails were elongated,-- hypertrophied,-- discolored-- and-- Ptotic  Both first toenails were tender-- and-- Positive onychogryphosis          Socks and shoes removed, Right Foot Findings: swollen, erythematous and dry       The sensory exam showed diminished vibratory sensation at the level of the toes  Diminished tactile sensation with monofilament testing throughout the right foot       Socks and shoes removed, Left Foot Findings: swollen, erythematous and dry       The sensory exam showed diminished vibratory sensation at the level of the toes  Diminished tactile sensation with monofilament testing throughout the left foot      Capillary refills findings on the right were normal in the toes       Pulses:      2+ in the posterior tibialis on the right      2+ in the dorsalis pedis on the right       Capillary refills findings on the left were normal in the toes       Pulses:      1+ in the posterior tibialis on the left      1+ in the dorsalis pedis on the left       Assign Risk Category: 2: Loss of protective sensation with or without weakness, deformity, callus, pre-ulcer, or history of ulceration  High risk     Hyperkeratosis: present on both first toes,-- present on both first sub metatarsals,-- present on both third sub metatarsals-- and-- Severe profound moccasin tinea pedis bilateral     Shoe Gear Evaluation: performed ()   Recommendation(s): SAS style      Right Foot/Ankle   Right Foot Inspection      Sensory   Vibration: diminished  Proprioception: diminished  Monofilament testing: diminished    Vascular  Capillary refills: < 3 seconds          Left Foot/Ankle  Left Foot Inspection      Sensory   Vibration: diminished  Proprioception: diminished  Monofilament testing: diminished    Vascular  Capillary refills: < 3 seconds        Assign Risk Category  Deformity present  Loss of protective sensation  No weak pulses  Risk: 1

## 2022-07-20 DIAGNOSIS — I10 ESSENTIAL HYPERTENSION: ICD-10-CM

## 2022-07-20 DIAGNOSIS — F41.8 DEPRESSION WITH ANXIETY: ICD-10-CM

## 2022-07-20 DIAGNOSIS — E11.40 TYPE 2 DIABETES MELLITUS WITH DIABETIC NEUROPATHY, WITHOUT LONG-TERM CURRENT USE OF INSULIN (HCC): ICD-10-CM

## 2022-07-21 RX ORDER — CLONIDINE HYDROCHLORIDE 0.3 MG/1
0.3 TABLET ORAL 2 TIMES DAILY
Qty: 180 TABLET | Refills: 0 | Status: SHIPPED | OUTPATIENT
Start: 2022-07-21 | End: 2022-10-06

## 2022-07-21 RX ORDER — GLIMEPIRIDE 4 MG/1
4 TABLET ORAL 2 TIMES DAILY
Qty: 180 TABLET | Refills: 0 | Status: SHIPPED | OUTPATIENT
Start: 2022-07-21 | End: 2022-10-06

## 2022-07-21 RX ORDER — SERTRALINE HYDROCHLORIDE 100 MG/1
100 TABLET, FILM COATED ORAL DAILY
Qty: 90 TABLET | Refills: 0 | Status: SHIPPED | OUTPATIENT
Start: 2022-07-21 | End: 2022-10-06

## 2022-09-29 NOTE — ASSESSMENT & PLAN NOTE
Lab Results   Component Value Date    HGBA1C 7 6 (H) 01/16/2019     Doing well  On Januvia, glimepiride, Trulicity and Jardiance  (+) microalb, on enalapril  Eye exam due  - - -

## 2022-10-05 DIAGNOSIS — F33.1 MODERATE EPISODE OF RECURRENT MAJOR DEPRESSIVE DISORDER (HCC): ICD-10-CM

## 2022-10-05 DIAGNOSIS — E11.40 TYPE 2 DIABETES MELLITUS WITH DIABETIC NEUROPATHY, WITHOUT LONG-TERM CURRENT USE OF INSULIN (HCC): ICD-10-CM

## 2022-10-05 DIAGNOSIS — I10 ESSENTIAL HYPERTENSION: ICD-10-CM

## 2022-10-05 DIAGNOSIS — F41.8 DEPRESSION WITH ANXIETY: ICD-10-CM

## 2022-10-05 DIAGNOSIS — N40.0 BPH WITHOUT URINARY OBSTRUCTION: ICD-10-CM

## 2022-10-06 RX ORDER — BUPROPION HYDROCHLORIDE 150 MG/1
TABLET ORAL
Qty: 90 TABLET | Refills: 1 | Status: SHIPPED | OUTPATIENT
Start: 2022-10-06

## 2022-10-06 RX ORDER — SERTRALINE HYDROCHLORIDE 100 MG/1
TABLET, FILM COATED ORAL
Qty: 90 TABLET | Refills: 0 | Status: SHIPPED | OUTPATIENT
Start: 2022-10-06 | End: 2022-10-19

## 2022-10-06 RX ORDER — AMLODIPINE BESYLATE 10 MG/1
TABLET ORAL
Qty: 90 TABLET | Refills: 1 | Status: SHIPPED | OUTPATIENT
Start: 2022-10-06

## 2022-10-06 RX ORDER — INSULIN GLARGINE 100 [IU]/ML
INJECTION, SOLUTION SUBCUTANEOUS
Qty: 30 ML | Refills: 1 | Status: SHIPPED | OUTPATIENT
Start: 2022-10-06 | End: 2022-10-16

## 2022-10-06 RX ORDER — CLONIDINE HYDROCHLORIDE 0.3 MG/1
TABLET ORAL
Qty: 180 TABLET | Refills: 0 | Status: SHIPPED | OUTPATIENT
Start: 2022-10-06 | End: 2022-10-19

## 2022-10-06 RX ORDER — TAMSULOSIN HYDROCHLORIDE 0.4 MG/1
CAPSULE ORAL
Qty: 90 CAPSULE | Refills: 1 | Status: SHIPPED | OUTPATIENT
Start: 2022-10-06

## 2022-10-06 RX ORDER — PEN NEEDLE, DIABETIC 32GX 5/32"
NEEDLE, DISPOSABLE MISCELLANEOUS
Qty: 100 EACH | Refills: 0 | Status: SHIPPED | OUTPATIENT
Start: 2022-10-06

## 2022-10-06 RX ORDER — BLOOD SUGAR DIAGNOSTIC
STRIP MISCELLANEOUS
Qty: 200 STRIP | Refills: 0 | Status: SHIPPED | OUTPATIENT
Start: 2022-10-06

## 2022-10-06 RX ORDER — GLIMEPIRIDE 4 MG/1
TABLET ORAL
Qty: 180 TABLET | Refills: 0 | Status: SHIPPED | OUTPATIENT
Start: 2022-10-06 | End: 2022-10-19

## 2022-10-06 RX ORDER — ENALAPRIL MALEATE 20 MG/1
TABLET ORAL
Qty: 180 TABLET | Refills: 1 | Status: SHIPPED | OUTPATIENT
Start: 2022-10-06

## 2022-10-16 ENCOUNTER — TELEPHONE (OUTPATIENT)
Dept: INTERNAL MEDICINE CLINIC | Facility: CLINIC | Age: 62
End: 2022-10-16

## 2022-10-16 ENCOUNTER — APPOINTMENT (OUTPATIENT)
Dept: LAB | Facility: CLINIC | Age: 62
End: 2022-10-16
Payer: COMMERCIAL

## 2022-10-16 DIAGNOSIS — E55.9 VITAMIN D DEFICIENCY: ICD-10-CM

## 2022-10-16 DIAGNOSIS — E11.40 TYPE 2 DIABETES MELLITUS WITH DIABETIC NEUROPATHY, WITHOUT LONG-TERM CURRENT USE OF INSULIN (HCC): ICD-10-CM

## 2022-10-16 LAB
25(OH)D3 SERPL-MCNC: 28.5 NG/ML (ref 30–100)
ANION GAP SERPL CALCULATED.3IONS-SCNC: 7 MMOL/L (ref 4–13)
BUN SERPL-MCNC: 20 MG/DL (ref 5–25)
CALCIUM SERPL-MCNC: 9.5 MG/DL (ref 8.4–10.2)
CHLORIDE SERPL-SCNC: 106 MMOL/L (ref 96–108)
CO2 SERPL-SCNC: 26 MMOL/L (ref 21–32)
CREAT SERPL-MCNC: 1.45 MG/DL (ref 0.6–1.3)
ERYTHROCYTE [DISTWIDTH] IN BLOOD BY AUTOMATED COUNT: 13.1 % (ref 11.6–15.1)
EST. AVERAGE GLUCOSE BLD GHB EST-MCNC: 243 MG/DL
GFR SERPL CREATININE-BSD FRML MDRD: 51 ML/MIN/1.73SQ M
GLUCOSE P FAST SERPL-MCNC: 233 MG/DL (ref 65–99)
HBA1C MFR BLD: 10.1 %
HCT VFR BLD AUTO: 48 % (ref 36.5–49.3)
HGB BLD-MCNC: 15.8 G/DL (ref 12–17)
MCH RBC QN AUTO: 29.8 PG (ref 26.8–34.3)
MCHC RBC AUTO-ENTMCNC: 32.9 G/DL (ref 31.4–37.4)
MCV RBC AUTO: 90 FL (ref 82–98)
PLATELET # BLD AUTO: 301 THOUSANDS/UL (ref 149–390)
PMV BLD AUTO: 9.5 FL (ref 8.9–12.7)
POTASSIUM SERPL-SCNC: 4.4 MMOL/L (ref 3.5–5.3)
RBC # BLD AUTO: 5.31 MILLION/UL (ref 3.88–5.62)
SODIUM SERPL-SCNC: 139 MMOL/L (ref 135–147)
TSH SERPL DL<=0.05 MIU/L-ACNC: 2.01 UIU/ML (ref 0.45–4.5)
WBC # BLD AUTO: 8.73 THOUSAND/UL (ref 4.31–10.16)

## 2022-10-16 PROCEDURE — 82306 VITAMIN D 25 HYDROXY: CPT

## 2022-10-16 PROCEDURE — 83036 HEMOGLOBIN GLYCOSYLATED A1C: CPT

## 2022-10-16 PROCEDURE — 85027 COMPLETE CBC AUTOMATED: CPT

## 2022-10-16 PROCEDURE — 84443 ASSAY THYROID STIM HORMONE: CPT

## 2022-10-16 PROCEDURE — 36415 COLL VENOUS BLD VENIPUNCTURE: CPT

## 2022-10-16 PROCEDURE — 80048 BASIC METABOLIC PNL TOTAL CA: CPT

## 2022-10-16 RX ORDER — INSULIN GLARGINE 100 [IU]/ML
34 INJECTION, SOLUTION SUBCUTANEOUS DAILY
Qty: 30 ML | Refills: 1
Start: 2022-10-16

## 2022-10-16 NOTE — TELEPHONE ENCOUNTER
Lab results: Your A1c worse at 10 1%  Please increase your Basaglar to 34 units daily (from 26 units)  Increase Trulicity to 4 5 mg weekly  Sent to the pharmacy  Kidney function stable  The rest of your labs were normal     Please reschedule f/u appt to next month

## 2022-10-18 DIAGNOSIS — I10 ESSENTIAL HYPERTENSION: ICD-10-CM

## 2022-10-18 DIAGNOSIS — E78.2 MIXED HYPERLIPIDEMIA: ICD-10-CM

## 2022-10-18 DIAGNOSIS — F41.8 DEPRESSION WITH ANXIETY: ICD-10-CM

## 2022-10-18 DIAGNOSIS — E11.40 TYPE 2 DIABETES MELLITUS WITH DIABETIC NEUROPATHY, WITHOUT LONG-TERM CURRENT USE OF INSULIN (HCC): ICD-10-CM

## 2022-10-19 RX ORDER — ATORVASTATIN CALCIUM 10 MG/1
TABLET, FILM COATED ORAL
Qty: 90 TABLET | Refills: 1 | Status: SHIPPED | OUTPATIENT
Start: 2022-10-19

## 2022-10-19 RX ORDER — SERTRALINE HYDROCHLORIDE 100 MG/1
TABLET, FILM COATED ORAL
Qty: 90 TABLET | Refills: 0 | Status: SHIPPED | OUTPATIENT
Start: 2022-10-19

## 2022-10-19 RX ORDER — GLIMEPIRIDE 4 MG/1
TABLET ORAL
Qty: 180 TABLET | Refills: 0 | Status: SHIPPED | OUTPATIENT
Start: 2022-10-19

## 2022-10-19 RX ORDER — CLONIDINE HYDROCHLORIDE 0.3 MG/1
TABLET ORAL
Qty: 180 TABLET | Refills: 0 | Status: SHIPPED | OUTPATIENT
Start: 2022-10-19

## 2022-10-24 ENCOUNTER — OFFICE VISIT (OUTPATIENT)
Dept: PODIATRY | Facility: CLINIC | Age: 62
End: 2022-10-24
Payer: COMMERCIAL

## 2022-10-24 VITALS — HEIGHT: 71 IN | BODY MASS INDEX: 39.9 KG/M2 | WEIGHT: 285 LBS | RESPIRATION RATE: 17 BRPM

## 2022-10-24 DIAGNOSIS — M21.969 ACQUIRED DEFORMITY OF FOOT, UNSPECIFIED LATERALITY: ICD-10-CM

## 2022-10-24 DIAGNOSIS — B35.1 ONYCHOMYCOSIS: ICD-10-CM

## 2022-10-24 DIAGNOSIS — M77.41 METATARSALGIA OF BOTH FEET: ICD-10-CM

## 2022-10-24 DIAGNOSIS — M77.42 METATARSALGIA OF BOTH FEET: ICD-10-CM

## 2022-10-24 DIAGNOSIS — E11.42 DIABETIC POLYNEUROPATHY ASSOCIATED WITH TYPE 2 DIABETES MELLITUS (HCC): Primary | ICD-10-CM

## 2022-10-24 DIAGNOSIS — B35.3 TINEA PEDIS OF BOTH FEET: ICD-10-CM

## 2022-10-24 PROCEDURE — 99213 OFFICE O/P EST LOW 20 MIN: CPT | Performed by: PODIATRIST

## 2022-10-24 RX ORDER — TERBINAFINE HYDROCHLORIDE 250 MG/1
TABLET ORAL
Qty: 15 TABLET | Refills: 0 | Status: SHIPPED | OUTPATIENT
Start: 2022-10-24 | End: 2022-11-24

## 2022-10-24 NOTE — PROGRESS NOTES
Assessment   Pain upon ambulation   Peripheral artery disease   Diabetic neuropathy   Callus formation   Mycosis of nail   Peroneal tendinitis right foot   Dermatophytosis      Plan   Foot exam performed   Nails debrided   Calluses debrided   Patient will stretch daily   Procedures performed without pain or complication   Patient educated on care of the diabetic foot         Chief Complaint   Patient needs full diabetic foot exam   Patient has pain upon ambulation   Patient complains of pain in his toes when he wears shoes   He gets pain in the ball of the foot   No history of trauma      History of Present Illness   HPI: Patient presents for pedal evaluation  Patient complains of pain in his feet and toes with ambulation   Patient is a morbidly obese diabetic who has some electric sensations in his feet on occasion       Review of Systems        Constitutional: not feeling tired       Eyes: no eyesight problems       ENT: no nasal discharge       Cardiovascular: no chest pain,-- no palpitations-- and-- no extremity edema       Respiratory: no shortness of breath-- and-- no cough       Gastrointestinal: no abdominal pain-- and-- no constipation       Genitourinary: no dysuria-- and-- no urinary hesitancy       Integumentary: no skin wound       Neurological: no tingling-- and-- no dizziness       Psychiatric: sleeping better, stopped taking trazodone, but-- no sleep disturbances       Endocrine: no feelings of weakness       Active Problems   1  Acquired pes planus (734) (M21 40)   2  Acute renal failure (584 9) (N17 9)   3  Adhesive capsulitis of both shoulders (726 0) (M75 01,M75 02)   4  Arthralgia (719 40) (M25 50)   5  BPH without urinary obstruction (600 00) (N40 0)   6  Bunion (727 1) (M21 619)   7  Callus (700) (L84)   6  IAPYICL systolic congestive heart failure (428 22,428 0) (I50 22)   9  Depression with anxiety (300 4) (F41 8)   10  Diabetes mellitus with neurological manifestation (250 60) (E11 49)   11  Diabetes type 2, uncontrolled (250 02) (E11 65)   12  Difficulty concentrating (799 51) (R41 840)   13  Difficulty walking (719 7) (R26 2)   14  Dyspnea on exertion (786 09) (R06 09)   15  Encounter for prostate cancer screening (V76 44) (Z12 5)   16  Encounter for screening for malignant neoplasm of colon (V76 51) (Z12 11)   17  Fatigue (780 79) (R53 83)   18  Foot pain, bilateral (729 5) (M79 671,M79 672)   19  Hallux valgus (735 0) (M20 10)   20  Hematuria (599 70) (R31 9)   21  Hyperlipidemia (272 4) (E78 5)   22  Hypertension (401 9) (I10)   23  Hypogonadism, male (257 2) (E29 1)   24  Insomnia (780 52) (G47 00)   25  Morbid or severe obesity due to excess calories (278 01) (E66 01)   26  Need for pneumococcal vaccination (V03 82) (Z23)   27  Need for prophylactic vaccination and inoculation against influenza (V04 81) (Z23)   28  Nephrolithiasis (592 0) (N20 0)   29  Onychomycosis (110 1) (B35 1)   30  JEFF (obstructive sleep apnea) (327 23) (G47 33)   31  Sciatica (724 3) (M54 30)   32  Screening for colon cancer (V76 51) (Z12 11)   33  Seasonal allergies (477 9) (J30 2)   34  Shoulder pain, right (719 41) (M25 511)   35  Spinal stenosis (724 00) (M48 00)   36  Tinea pedis (110 4) (B35 3)   37  Type 2 diabetes mellitus (250 00) (E11 9)   38  Vitamin D deficiency (268 9) (E55 9)     Past Medical History    · History of Cellulitis of left leg (682 6) (L03 116)   · History of chest pain (V13 89) (I03 965)   · History of diarrhea (V12 79) (T89 545)   · History of onychomycosis (V12 09) (Z86 19)   · History of onychomycosis (V12 09) (Z86 19)   · History of Limb pain (729 5) (M79 609)   · History of Neck pain (723 1) (M54 2)   · Need for pneumococcal vaccination (V03 82) (Z23)   · History of Nephrolithiasis (V13 01)   · History of Numbness On The Sole Of The Right Foot   · History of Pain and swelling of left lower leg (729 5,729 81) (M79 662,M79 89)   · History of Pain of lower extremity (729 5) (M79 606)   · History of Pain, hand joint, right (719 44) (M79 641)   · Sciatica (724 3) (M54 30)     The active problems and past medical history were reviewed and updated today       Surgical History    · History of Knee Arthroscopy (Therapeutic)   · History of Knee Surgery   · History of Needle Biopsy Of Prostate     Family History   Mother    · Family history of Alzheimer Disease   · Family history of Family Health Status Of Mother - Alive  Father    · Family history of Family Health Status Of Father -   Family History    · Family history of Adopted   · Denied: Family history of Colon Cancer   · Denied: Family history of Crohn's Disease   · Denied: Family history of Liver Cancer     Social History    · Denied: History of Alcohol Use (History)   · Current Some Day Smoker (305 1)   · Denied: History of Exercise Habits   · Marital History - Currently    · Tobacco use (305 1) (Z72 0)   · Working Full Time  The social history was reviewed and is unchanged       The medication list was reviewed and updated today       Allergies   1  No Known Drug Allergies   Physical Exam   Left Foot: Appearance: Normal except as noted: excessive pronation-- and-- pes planus  Great toe deformities include a bunion  Tenderness: None except the great toe-- and-- distal first metatarsal     Right Foot: Appearance: Normal except as noted: excessive pronation-- and-- pes planus  Great toe deformities include a bunion  Tenderness: None except the great toe-- and-- distal first metatarsal     Left Ankle: ROM: limited ROM in all planes    Right Ankle: ROM: limited ROM in all planes    Neurological Exam: performed  Light touch was decreased bilaterally  Vibratory sensation was decreased in both first metatarsophalangeal joints     Vascular Exam: performed Dorsalis pedis pulses were present bilaterally  Posterior tibial pulses were present bilaterally  Elevation Pallor: absent bilaterally  Dependence rubor was absent bilaterally  Edema: none     Toenails: All of the toenails were elongated,-- hypertrophied,-- discolored-- and-- Ptotic  Both first toenails were tender-- and-- Positive onychogryphosis          Socks and shoes removed, Right Foot Findings: swollen, erythematous and dry       The sensory exam showed diminished vibratory sensation at the level of the toes  Diminished tactile sensation with monofilament testing throughout the right foot       Socks and shoes removed, Left Foot Findings: swollen, erythematous and dry       The sensory exam showed diminished vibratory sensation at the level of the toes  Diminished tactile sensation with monofilament testing throughout the left foot      Capillary refills findings on the right were normal in the toes       Pulses:      2+ in the posterior tibialis on the right      2+ in the dorsalis pedis on the right       Capillary refills findings on the left were normal in the toes       Pulses:      1+ in the posterior tibialis on the left      1+ in the dorsalis pedis on the left       Assign Risk Category: 2: Loss of protective sensation with or without weakness, deformity, callus, pre-ulcer, or history of ulceration  High risk     Hyperkeratosis: present on both first toes,-- present on both first sub metatarsals,-- present on both third sub metatarsals-- and-- Severe profound moccasin tinea pedis bilateral     Shoe Gear Evaluation: performed ()   Recommendation(s): SAS style      Right Foot/Ankle   Right Foot Inspection        Sensory   Vibration: diminished  Proprioception: diminished  Monofilament testing: diminished     Vascular  Capillary refills: < 3 seconds              Left Foot/Ankle  Left Foot Inspection        Sensory   Vibration: diminished  Proprioception: diminished  Monofilament testing: diminished     Vascular  Capillary refills: < 3 seconds           Assign Risk Category  Deformity present  Loss of protective sensation  No weak pulses  Risk: 1

## 2022-10-28 ENCOUNTER — TELEPHONE (OUTPATIENT)
Dept: OTHER | Facility: OTHER | Age: 62
End: 2022-10-28

## 2022-10-28 NOTE — TELEPHONE ENCOUNTER
patient called to request a referral for covid antibody treatment  He was diagnosed with COVID on 5/9, [facility] was providing antibody treatment but his insurance is now billing him due to not having a referral  Please advise if one could be provided

## 2022-10-31 NOTE — TELEPHONE ENCOUNTER
Please clarify with patient  Does he need an insurance referral or another order? Since it was done already

## 2022-10-31 NOTE — TELEPHONE ENCOUNTER
Patient states he needs Insurance referral   Had the order for the infusion had it done and now insurance is denying because there was no referral and its $1,000

## 2022-11-01 NOTE — TELEPHONE ENCOUNTER
Even if we try to put a referral in now I would need procedure and diagnostic codes  Where would we find these? We have never done referrals for covid tx-am wondering if he needed an auth instead since it is a med

## 2022-11-02 NOTE — TELEPHONE ENCOUNTER
Pt  No longer has [employer] [facility] adv'd pt   That we cannot put in a back dated referral

## 2022-11-20 ENCOUNTER — HOSPITAL ENCOUNTER (INPATIENT)
Facility: HOSPITAL | Age: 62
LOS: 1 days | End: 2022-11-23
Attending: EMERGENCY MEDICINE | Admitting: INTERNAL MEDICINE

## 2022-11-20 DIAGNOSIS — M54.59 INTRACTABLE LOW BACK PAIN: Primary | ICD-10-CM

## 2022-11-20 DIAGNOSIS — N20.1 URETEROLITHIASIS: ICD-10-CM

## 2022-11-20 DIAGNOSIS — N13.30 HYDRONEPHROSIS: ICD-10-CM

## 2022-11-20 PROBLEM — M54.9 BACK PAIN: Status: ACTIVE | Noted: 2022-11-20

## 2022-11-20 PROBLEM — E66.9 OBESITY (BMI 35.0-39.9 WITHOUT COMORBIDITY): Status: ACTIVE | Noted: 2018-01-16

## 2022-11-20 LAB
ANION GAP SERPL CALCULATED.3IONS-SCNC: 8 MMOL/L (ref 4–13)
BASOPHILS # BLD AUTO: 0.05 THOUSANDS/ÂΜL (ref 0–0.1)
BASOPHILS NFR BLD AUTO: 0 % (ref 0–1)
BUN SERPL-MCNC: 18 MG/DL (ref 5–25)
CALCIUM SERPL-MCNC: 9.2 MG/DL (ref 8.4–10.2)
CHLORIDE SERPL-SCNC: 107 MMOL/L (ref 96–108)
CO2 SERPL-SCNC: 23 MMOL/L (ref 21–32)
CREAT SERPL-MCNC: 1.5 MG/DL (ref 0.6–1.3)
EOSINOPHIL # BLD AUTO: 0.1 THOUSAND/ÂΜL (ref 0–0.61)
EOSINOPHIL NFR BLD AUTO: 1 % (ref 0–6)
ERYTHROCYTE [DISTWIDTH] IN BLOOD BY AUTOMATED COUNT: 13.2 % (ref 11.6–15.1)
GFR SERPL CREATININE-BSD FRML MDRD: 49 ML/MIN/1.73SQ M
GLUCOSE SERPL-MCNC: 116 MG/DL (ref 65–140)
HCT VFR BLD AUTO: 47.7 % (ref 36.5–49.3)
HGB BLD-MCNC: 16.2 G/DL (ref 12–17)
IMM GRANULOCYTES # BLD AUTO: 0.04 THOUSAND/UL (ref 0–0.2)
IMM GRANULOCYTES NFR BLD AUTO: 0 % (ref 0–2)
LYMPHOCYTES # BLD AUTO: 1.6 THOUSANDS/ÂΜL (ref 0.6–4.47)
LYMPHOCYTES NFR BLD AUTO: 14 % (ref 14–44)
MCH RBC QN AUTO: 30.6 PG (ref 26.8–34.3)
MCHC RBC AUTO-ENTMCNC: 34 G/DL (ref 31.4–37.4)
MCV RBC AUTO: 90 FL (ref 82–98)
MONOCYTES # BLD AUTO: 0.76 THOUSAND/ÂΜL (ref 0.17–1.22)
MONOCYTES NFR BLD AUTO: 7 % (ref 4–12)
NEUTROPHILS # BLD AUTO: 8.73 THOUSANDS/ÂΜL (ref 1.85–7.62)
NEUTS SEG NFR BLD AUTO: 78 % (ref 43–75)
NRBC BLD AUTO-RTO: 0 /100 WBCS
PLATELET # BLD AUTO: 295 THOUSANDS/UL (ref 149–390)
PMV BLD AUTO: 9.7 FL (ref 8.9–12.7)
POTASSIUM SERPL-SCNC: 4.3 MMOL/L (ref 3.5–5.3)
RBC # BLD AUTO: 5.3 MILLION/UL (ref 3.88–5.62)
SODIUM SERPL-SCNC: 138 MMOL/L (ref 135–147)
WBC # BLD AUTO: 11.28 THOUSAND/UL (ref 4.31–10.16)

## 2022-11-20 RX ORDER — BUPROPION HYDROCHLORIDE 150 MG/1
150 TABLET ORAL DAILY
Status: DISCONTINUED | OUTPATIENT
Start: 2022-11-21 | End: 2022-11-23 | Stop reason: HOSPADM

## 2022-11-20 RX ORDER — INSULIN LISPRO 100 [IU]/ML
1-5 INJECTION, SOLUTION INTRAVENOUS; SUBCUTANEOUS
Status: DISCONTINUED | OUTPATIENT
Start: 2022-11-20 | End: 2022-11-23 | Stop reason: HOSPADM

## 2022-11-20 RX ORDER — OXYCODONE HYDROCHLORIDE 5 MG/1
5 TABLET ORAL EVERY 4 HOURS PRN
Status: DISCONTINUED | OUTPATIENT
Start: 2022-11-20 | End: 2022-11-21

## 2022-11-20 RX ORDER — ATORVASTATIN CALCIUM 10 MG/1
10 TABLET, FILM COATED ORAL
Status: DISCONTINUED | OUTPATIENT
Start: 2022-11-21 | End: 2022-11-23 | Stop reason: HOSPADM

## 2022-11-20 RX ORDER — LISINOPRIL 20 MG/1
80 TABLET ORAL DAILY
Status: DISCONTINUED | OUTPATIENT
Start: 2022-11-21 | End: 2022-11-23

## 2022-11-20 RX ORDER — HEPARIN SODIUM 5000 [USP'U]/ML
5000 INJECTION, SOLUTION INTRAVENOUS; SUBCUTANEOUS EVERY 8 HOURS SCHEDULED
Status: DISCONTINUED | OUTPATIENT
Start: 2022-11-20 | End: 2022-11-23 | Stop reason: HOSPADM

## 2022-11-20 RX ORDER — OXYCODONE HYDROCHLORIDE 5 MG/1
5 TABLET ORAL ONCE
Status: COMPLETED | OUTPATIENT
Start: 2022-11-20 | End: 2022-11-20

## 2022-11-20 RX ORDER — HYDROMORPHONE HCL/PF 1 MG/ML
1 SYRINGE (ML) INJECTION ONCE
Status: COMPLETED | OUTPATIENT
Start: 2022-11-20 | End: 2022-11-20

## 2022-11-20 RX ORDER — CLONIDINE HYDROCHLORIDE 0.1 MG/1
0.3 TABLET ORAL EVERY 12 HOURS SCHEDULED
Status: DISCONTINUED | OUTPATIENT
Start: 2022-11-20 | End: 2022-11-23 | Stop reason: HOSPADM

## 2022-11-20 RX ORDER — ACETAMINOPHEN 325 MG/1
975 TABLET ORAL EVERY 8 HOURS SCHEDULED
Status: DISCONTINUED | OUTPATIENT
Start: 2022-11-20 | End: 2022-11-23 | Stop reason: HOSPADM

## 2022-11-20 RX ORDER — LIDOCAINE 50 MG/G
1 PATCH TOPICAL DAILY
Status: DISCONTINUED | OUTPATIENT
Start: 2022-11-21 | End: 2022-11-23 | Stop reason: HOSPADM

## 2022-11-20 RX ORDER — INSULIN GLARGINE 100 [IU]/ML
34 INJECTION, SOLUTION SUBCUTANEOUS
Status: DISCONTINUED | OUTPATIENT
Start: 2022-11-20 | End: 2022-11-23 | Stop reason: HOSPADM

## 2022-11-20 RX ORDER — METHOCARBAMOL 500 MG/1
500 TABLET, FILM COATED ORAL EVERY 6 HOURS PRN
Status: DISCONTINUED | OUTPATIENT
Start: 2022-11-20 | End: 2022-11-22

## 2022-11-20 RX ORDER — NICOTINE 21 MG/24HR
1 PATCH, TRANSDERMAL 24 HOURS TRANSDERMAL DAILY
Status: DISCONTINUED | OUTPATIENT
Start: 2022-11-21 | End: 2022-11-23 | Stop reason: HOSPADM

## 2022-11-20 RX ORDER — OXYCODONE HYDROCHLORIDE 5 MG/1
2.5 TABLET ORAL EVERY 4 HOURS PRN
Status: DISCONTINUED | OUTPATIENT
Start: 2022-11-20 | End: 2022-11-21

## 2022-11-20 RX ORDER — DOCUSATE SODIUM 100 MG/1
100 CAPSULE, LIQUID FILLED ORAL 2 TIMES DAILY
Status: DISCONTINUED | OUTPATIENT
Start: 2022-11-21 | End: 2022-11-23 | Stop reason: HOSPADM

## 2022-11-20 RX ORDER — SERTRALINE HYDROCHLORIDE 100 MG/1
100 TABLET, FILM COATED ORAL DAILY
Status: DISCONTINUED | OUTPATIENT
Start: 2022-11-21 | End: 2022-11-23 | Stop reason: HOSPADM

## 2022-11-20 RX ORDER — HYDROMORPHONE HCL IN WATER/PF 6 MG/30 ML
0.2 PATIENT CONTROLLED ANALGESIA SYRINGE INTRAVENOUS EVERY 4 HOURS PRN
Status: DISCONTINUED | OUTPATIENT
Start: 2022-11-20 | End: 2022-11-21

## 2022-11-20 RX ORDER — AMLODIPINE BESYLATE 10 MG/1
10 TABLET ORAL DAILY
Status: DISCONTINUED | OUTPATIENT
Start: 2022-11-21 | End: 2022-11-23 | Stop reason: HOSPADM

## 2022-11-20 RX ORDER — INSULIN LISPRO 100 [IU]/ML
2-12 INJECTION, SOLUTION INTRAVENOUS; SUBCUTANEOUS
Status: DISCONTINUED | OUTPATIENT
Start: 2022-11-21 | End: 2022-11-23 | Stop reason: HOSPADM

## 2022-11-20 RX ORDER — CYCLOBENZAPRINE HCL 10 MG
5 TABLET ORAL ONCE
Status: COMPLETED | OUTPATIENT
Start: 2022-11-20 | End: 2022-11-20

## 2022-11-20 RX ORDER — TAMSULOSIN HYDROCHLORIDE 0.4 MG/1
0.4 CAPSULE ORAL
Status: DISCONTINUED | OUTPATIENT
Start: 2022-11-21 | End: 2022-11-23 | Stop reason: HOSPADM

## 2022-11-20 RX ADMIN — HYDROMORPHONE HYDROCHLORIDE 1 MG: 1 INJECTION, SOLUTION INTRAMUSCULAR; INTRAVENOUS; SUBCUTANEOUS at 20:31

## 2022-11-20 RX ADMIN — CLONIDINE HYDROCHLORIDE 0.3 MG: 0.1 TABLET ORAL at 23:56

## 2022-11-20 RX ADMIN — INSULIN GLARGINE 34 UNITS: 100 INJECTION, SOLUTION SUBCUTANEOUS at 23:58

## 2022-11-20 RX ADMIN — SODIUM CHLORIDE, SODIUM LACTATE, POTASSIUM CHLORIDE, AND CALCIUM CHLORIDE 500 ML: .6; .31; .03; .02 INJECTION, SOLUTION INTRAVENOUS at 19:23

## 2022-11-20 RX ADMIN — METHOCARBAMOL 500 MG: 500 TABLET ORAL at 23:54

## 2022-11-20 RX ADMIN — CYCLOBENZAPRINE HYDROCHLORIDE 5 MG: 10 TABLET, FILM COATED ORAL at 18:02

## 2022-11-20 RX ADMIN — HYDROMORPHONE HYDROCHLORIDE 1 MG: 1 INJECTION, SOLUTION INTRAMUSCULAR; INTRAVENOUS; SUBCUTANEOUS at 19:23

## 2022-11-20 RX ADMIN — HYDROMORPHONE HYDROCHLORIDE 1 MG: 1 INJECTION, SOLUTION INTRAMUSCULAR; INTRAVENOUS; SUBCUTANEOUS at 20:22

## 2022-11-20 RX ADMIN — ACETAMINOPHEN 975 MG: 325 TABLET, FILM COATED ORAL at 23:54

## 2022-11-20 RX ADMIN — OXYCODONE HYDROCHLORIDE 5 MG: 5 TABLET ORAL at 23:54

## 2022-11-20 RX ADMIN — HEPARIN SODIUM 5000 UNITS: 5000 INJECTION INTRAVENOUS; SUBCUTANEOUS at 23:55

## 2022-11-20 RX ADMIN — OXYCODONE HYDROCHLORIDE 5 MG: 5 TABLET ORAL at 18:02

## 2022-11-21 ENCOUNTER — APPOINTMENT (OUTPATIENT)
Dept: RADIOLOGY | Facility: HOSPITAL | Age: 62
End: 2022-11-21

## 2022-11-21 PROBLEM — N17.9 AKI (ACUTE KIDNEY INJURY) (HCC): Status: ACTIVE | Noted: 2022-11-21

## 2022-11-21 LAB
ANION GAP SERPL CALCULATED.3IONS-SCNC: 7 MMOL/L (ref 4–13)
BACTERIA UR QL AUTO: ABNORMAL /HPF
BILIRUB UR QL STRIP: NEGATIVE
BUN SERPL-MCNC: 24 MG/DL (ref 5–25)
CALCIUM SERPL-MCNC: 8.6 MG/DL (ref 8.4–10.2)
CHLORIDE SERPL-SCNC: 107 MMOL/L (ref 96–108)
CLARITY UR: CLEAR
CO2 SERPL-SCNC: 25 MMOL/L (ref 21–32)
COLOR UR: YELLOW
CREAT SERPL-MCNC: 1.89 MG/DL (ref 0.6–1.3)
ERYTHROCYTE [DISTWIDTH] IN BLOOD BY AUTOMATED COUNT: 13.6 % (ref 11.6–15.1)
GFR SERPL CREATININE-BSD FRML MDRD: 37 ML/MIN/1.73SQ M
GLUCOSE P FAST SERPL-MCNC: 71 MG/DL (ref 65–99)
GLUCOSE SERPL-MCNC: 102 MG/DL (ref 65–140)
GLUCOSE SERPL-MCNC: 104 MG/DL (ref 65–140)
GLUCOSE SERPL-MCNC: 158 MG/DL (ref 65–140)
GLUCOSE SERPL-MCNC: 165 MG/DL (ref 65–140)
GLUCOSE SERPL-MCNC: 64 MG/DL (ref 65–140)
GLUCOSE SERPL-MCNC: 68 MG/DL (ref 65–140)
GLUCOSE SERPL-MCNC: 71 MG/DL (ref 65–140)
GLUCOSE SERPL-MCNC: 92 MG/DL (ref 65–140)
GLUCOSE UR STRIP-MCNC: NEGATIVE MG/DL
HCT VFR BLD AUTO: 46.9 % (ref 36.5–49.3)
HGB BLD-MCNC: 15.3 G/DL (ref 12–17)
HGB UR QL STRIP.AUTO: NEGATIVE
HYALINE CASTS #/AREA URNS LPF: ABNORMAL /LPF
KETONES UR STRIP-MCNC: ABNORMAL MG/DL
LEUKOCYTE ESTERASE UR QL STRIP: ABNORMAL
MCH RBC QN AUTO: 30.4 PG (ref 26.8–34.3)
MCHC RBC AUTO-ENTMCNC: 32.6 G/DL (ref 31.4–37.4)
MCV RBC AUTO: 93 FL (ref 82–98)
MUCOUS THREADS UR QL AUTO: ABNORMAL
NITRITE UR QL STRIP: NEGATIVE
NON-SQ EPI CELLS URNS QL MICRO: ABNORMAL /HPF
PH UR STRIP.AUTO: 5.5 [PH]
PLATELET # BLD AUTO: 280 THOUSANDS/UL (ref 149–390)
PMV BLD AUTO: 9.2 FL (ref 8.9–12.7)
POTASSIUM SERPL-SCNC: 4 MMOL/L (ref 3.5–5.3)
PROT UR STRIP-MCNC: ABNORMAL MG/DL
RBC # BLD AUTO: 5.03 MILLION/UL (ref 3.88–5.62)
RBC #/AREA URNS AUTO: ABNORMAL /HPF
SODIUM SERPL-SCNC: 139 MMOL/L (ref 135–147)
SP GR UR STRIP.AUTO: 1.03 (ref 1–1.03)
UROBILINOGEN UR STRIP-ACNC: 2 MG/DL
WBC # BLD AUTO: 8.98 THOUSAND/UL (ref 4.31–10.16)
WBC #/AREA URNS AUTO: ABNORMAL /HPF

## 2022-11-21 RX ORDER — SODIUM CHLORIDE 9 MG/ML
100 INJECTION, SOLUTION INTRAVENOUS CONTINUOUS
Status: DISCONTINUED | OUTPATIENT
Start: 2022-11-21 | End: 2022-11-23 | Stop reason: HOSPADM

## 2022-11-21 RX ORDER — HYDROMORPHONE HCL/PF 1 MG/ML
0.5 SYRINGE (ML) INJECTION EVERY 4 HOURS PRN
Status: DISCONTINUED | OUTPATIENT
Start: 2022-11-21 | End: 2022-11-22

## 2022-11-21 RX ORDER — OXYCODONE HYDROCHLORIDE 5 MG/1
5 TABLET ORAL EVERY 4 HOURS PRN
Status: DISCONTINUED | OUTPATIENT
Start: 2022-11-21 | End: 2022-11-22

## 2022-11-21 RX ORDER — OXYCODONE HYDROCHLORIDE 10 MG/1
10 TABLET ORAL EVERY 4 HOURS PRN
Status: DISCONTINUED | OUTPATIENT
Start: 2022-11-21 | End: 2022-11-22

## 2022-11-21 RX ADMIN — ACETAMINOPHEN 975 MG: 325 TABLET, FILM COATED ORAL at 22:09

## 2022-11-21 RX ADMIN — HYDROMORPHONE HYDROCHLORIDE 0.2 MG: 0.2 INJECTION, SOLUTION INTRAMUSCULAR; INTRAVENOUS; SUBCUTANEOUS at 16:09

## 2022-11-21 RX ADMIN — HEPARIN SODIUM 5000 UNITS: 5000 INJECTION INTRAVENOUS; SUBCUTANEOUS at 22:10

## 2022-11-21 RX ADMIN — CLONIDINE HYDROCHLORIDE 0.3 MG: 0.1 TABLET ORAL at 08:37

## 2022-11-21 RX ADMIN — HEPARIN SODIUM 5000 UNITS: 5000 INJECTION INTRAVENOUS; SUBCUTANEOUS at 14:14

## 2022-11-21 RX ADMIN — CLONIDINE HYDROCHLORIDE 0.3 MG: 0.1 TABLET ORAL at 20:10

## 2022-11-21 RX ADMIN — HEPARIN SODIUM 5000 UNITS: 5000 INJECTION INTRAVENOUS; SUBCUTANEOUS at 05:00

## 2022-11-21 RX ADMIN — SERTRALINE 100 MG: 100 TABLET, FILM COATED ORAL at 08:37

## 2022-11-21 RX ADMIN — DOCUSATE SODIUM 100 MG: 100 CAPSULE, LIQUID FILLED ORAL at 08:37

## 2022-11-21 RX ADMIN — LISINOPRIL 80 MG: 20 TABLET ORAL at 08:37

## 2022-11-21 RX ADMIN — ACETAMINOPHEN 975 MG: 325 TABLET, FILM COATED ORAL at 14:13

## 2022-11-21 RX ADMIN — DOCUSATE SODIUM 100 MG: 100 CAPSULE, LIQUID FILLED ORAL at 17:05

## 2022-11-21 RX ADMIN — ATORVASTATIN CALCIUM 10 MG: 10 TABLET, FILM COATED ORAL at 16:08

## 2022-11-21 RX ADMIN — OXYCODONE HYDROCHLORIDE 5 MG: 5 TABLET ORAL at 14:13

## 2022-11-21 RX ADMIN — BUPROPION HYDROCHLORIDE 150 MG: 150 TABLET, EXTENDED RELEASE ORAL at 08:37

## 2022-11-21 RX ADMIN — OXYCODONE HYDROCHLORIDE 5 MG: 5 TABLET ORAL at 22:10

## 2022-11-21 RX ADMIN — LIDOCAINE 5% 1 PATCH: 700 PATCH TOPICAL at 08:37

## 2022-11-21 RX ADMIN — HYDROMORPHONE HYDROCHLORIDE 0.2 MG: 0.2 INJECTION, SOLUTION INTRAMUSCULAR; INTRAVENOUS; SUBCUTANEOUS at 20:06

## 2022-11-21 RX ADMIN — INSULIN LISPRO 2 UNITS: 100 INJECTION, SOLUTION INTRAVENOUS; SUBCUTANEOUS at 11:54

## 2022-11-21 RX ADMIN — OXYCODONE HYDROCHLORIDE 5 MG: 5 TABLET ORAL at 18:45

## 2022-11-21 RX ADMIN — ACETAMINOPHEN 975 MG: 325 TABLET, FILM COATED ORAL at 05:00

## 2022-11-21 RX ADMIN — METHOCARBAMOL 500 MG: 500 TABLET ORAL at 18:45

## 2022-11-21 RX ADMIN — INSULIN LISPRO 2 UNITS: 100 INJECTION, SOLUTION INTRAVENOUS; SUBCUTANEOUS at 17:05

## 2022-11-21 RX ADMIN — TAMSULOSIN HYDROCHLORIDE 0.4 MG: 0.4 CAPSULE ORAL at 16:08

## 2022-11-21 RX ADMIN — OXYCODONE HYDROCHLORIDE 5 MG: 5 TABLET ORAL at 04:45

## 2022-11-21 RX ADMIN — INSULIN GLARGINE 34 UNITS: 100 INJECTION, SOLUTION SUBCUTANEOUS at 22:10

## 2022-11-21 RX ADMIN — AMLODIPINE BESYLATE 10 MG: 10 TABLET ORAL at 08:37

## 2022-11-21 RX ADMIN — METHOCARBAMOL 500 MG: 500 TABLET ORAL at 11:25

## 2022-11-21 RX ADMIN — SODIUM CHLORIDE 100 ML/HR: 0.9 INJECTION, SOLUTION INTRAVENOUS at 14:23

## 2022-11-21 NOTE — ASSESSMENT & PLAN NOTE
Lab Results   Component Value Date    EGFR 37 11/21/2022    EGFR 49 11/20/2022    EGFR 51 10/16/2022    CREATININE 1 89 (H) 11/21/2022    CREATININE 1 50 (H) 11/20/2022    CREATININE 1 45 (H) 10/16/2022   • Creatinine upon admission: 1 50  o Baseline: 1 4-1 6  · Avoid nephrotoxins and hypotension  · Monitor BMP

## 2022-11-21 NOTE — ASSESSMENT & PLAN NOTE
Lab Results   Component Value Date    EGFR 49 11/20/2022    EGFR 51 10/16/2022    EGFR 53 03/07/2022    CREATININE 1 50 (H) 11/20/2022    CREATININE 1 45 (H) 10/16/2022    CREATININE 1 40 (H) 03/07/2022   • Creatinine upon admission: 1 50  o Baseline: 1 4-1 6  · Avoid nephrotoxins and hypotension  · Monitor BMP

## 2022-11-21 NOTE — PLAN OF CARE
Problem: PHYSICAL THERAPY ADULT  Goal: Performs mobility at highest level of function for planned discharge setting  See evaluation for individualized goals  Description: Treatment/Interventions: Functional transfer training, LE strengthening/ROM, Therapeutic exercise, Patient/family training, Equipment eval/education, Bed mobility, Gait training, Cognitive reorientation, Endurance training, Elevations, Spoke to nursing, Spoke to case management  Equipment Recommended:  (+/- cane)       See flowsheet documentation for full assessment, interventions and recommendations  Note: Prognosis: Fair  Problem List: Decreased endurance, Decreased range of motion, Decreased mobility, Obesity, Impaired sensation, Pain (gait deviations)  Assessment: Pt is a 58 y o  male seen for PT evaluation s/p admit to 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  Upon evaluation: Pt required no physical assistance for bed mobility (but uses momentum for performing supine to sit), no assistance for transfers nor gait     Pt's clinical presentation is currently unstable/unpredictable given the functional mobility deficits above, especially (but not limited to) decreased ROM and pain, coupled with fall risks including impaired balance, limited sensation/neuropathy and obesity, and combined with medical complications of abnormal WBCs  Recommend continue mobility trials with plus or minus single-point cane increased ambulation distances and independence, stair training  Would trial gentle TherEx to improve trunk range of motion (extension> flexion)    Barriers to Discharge:  (CHARLY)     PT Discharge Recommendation: (S) Home with outpatient rehabilitation (comp spine program)    See flowsheet documentation for full assessment

## 2022-11-21 NOTE — ASSESSMENT & PLAN NOTE
Patient presents with complaints of persistent, worsening right lower back and right hip pain without radiation down the leg for the past week  First noticed after doing yard work last week  Denies saddle anesthesia, bowel or urinary incontinence, numbness, tingling, fever or chills  He reports history of sciatica but states that this feels different compared to prior sciatica flares  Symptoms have improved with current management below  Given hx of different type of pain, ANNI, and severity, will order kub  · Pain control:  · Tylenol OTC  · Lidocaine patch  · Oxycodone for moderate and severe pain  · IV Dilaudid for breakthrough  · Robaxin    · Aqua K   · PT/OT evaluations

## 2022-11-21 NOTE — ASSESSMENT & PLAN NOTE
Lab Results   Component Value Date    HGBA1C 10 1 (H) 10/16/2022       No results for input(s): POCGLU in the last 72 hours  Blood Sugar Average: Last 72 hrs:  · Home regimen of glimepiride and Dulaglutide, will hold while inpatient  · Continue home insulin regimen of 34 units of Lantus HS  · Accu-checks and SSI  · Hypoglycemia protocol

## 2022-11-21 NOTE — ASSESSMENT & PLAN NOTE
• Blood pressure is reviewed and acceptable  One elevated reading of 193/86 on arrival suspect 2/2 acute back pain, blood pressure improved spontaneously without antihypertensive medication intervention  o Last recorded pressure: 158/85  • Continue home regimen of Enalapril, Clonidine and Amlodipine  • Monitor blood pressure

## 2022-11-21 NOTE — PROGRESS NOTES
[facility]  Progress Note - Deisi Humphreyiver 1960, 58 y o  male MRN: [id number]  Unit/Bed#: S MS 08993 Encounter: [id number]  Primary Care Provider: Tika Quiñonez MD   Date and time admitted to hospital: 11/20/2022  5:46 PM    * Back pain  Assessment & Plan  Patient presents with complaints of persistent, worsening right lower back and right hip pain without radiation down the leg for the past week  First noticed after doing yard work last week  Denies saddle anesthesia, bowel or urinary incontinence, numbness, tingling, fever or chills  He reports history of sciatica but states that this feels different compared to prior sciatica flares  Symptoms have improved with current management below  Given hx of different type of pain, ANNI, and severity, will order kub  · Pain control:  · Tylenol OTC  · Lidocaine patch  · Oxycodone for moderate and severe pain  · IV Dilaudid for breakthrough  · Robaxin  · Aqua K   · PT/OT evaluations     ANNI (acute kidney injury) [facility])  Assessment & Plan  ANNI pathway started  Avoid nsaids  Monitor kidney function  Bladder scan revealed 36mL  Will obtain UA  CKD (chronic kidney disease) stage 3, GFR 30-59 ml/min [facility])  Assessment & Plan  Lab Results   Component Value Date    EGFR 37 11/21/2022    EGFR 49 11/20/2022    EGFR 51 10/16/2022    CREATININE 1 89 (H) 11/21/2022    CREATININE 1 50 (H) 11/20/2022    CREATININE 1 45 (H) 10/16/2022   • Creatinine upon admission: 1 50  o Baseline: 1 4-1 6  · Avoid nephrotoxins and hypotension  · Monitor BMP      Type 2 diabetes mellitus with stage 3a chronic kidney disease, with long-term current use of insulin [facility])  Assessment & Plan  Lab Results   Component Value Date    HGBA1C 10 1 (H) 10/16/2022       Recent Labs     11/21/22  0717 11/21/22  0738 11/21/22  0758 11/21/22  1102   POCGLU 68 64* 92 158*       Blood Sugar Average: Last 72 hrs:  · (P) 96 8Home regimen of glimepiride and Dulaglutide, will hold while inpatient  · Continue home insulin regimen of 34 units of Lantus HS  · Accu-checks and SSI  · Hypoglycemia protocol  · Well controlled     JEFF on CPAP  Assessment & Plan  · CPAP HS  Essential hypertension  Assessment & Plan  • Blood pressure is reviewed and acceptable  One elevated reading of 193/86 on arrival suspect 2/2 acute back pain, blood pressure improved spontaneously without antihypertensive medication intervention  • Continue home regimen of Enalapril, Clonidine and Amlodipine  • Monitor blood pressure  Mixed hyperlipidemia  Assessment & Plan  · Continue statin  Obesity (BMI 35 0-39 9 without comorbidity)  Assessment & Plan  · Encouraged lifestyle modifications  Depression with anxiety  Assessment & Plan  · Continue Zoloft and Wellbutrin  BPH without urinary obstruction  Assessment & Plan  · Continue Flomax  VTE Pharmacologic Prophylaxis: VTE Score: 3 Moderate Risk (Score 3-4) - Pharmacological DVT Prophylaxis Ordered: heparin  Patient Centered Rounds: I performed bedside rounds with nursing staff today  Discussions with Specialists or Other Care Team Provider: attending physician    Education and Discussions with Family / Patient: Updated  (wife) via phone  Current Length of Stay: 0 day(s)  Current Patient Status: Observation   Discharge Plan: Anticipate discharge tomorrow to home  Code Status: Level 1 - Full Code    Subjective:  Admits right lower back pain and right hip pain that is non radiating  No saddle anesthesia  Significant improvement in pain since pain management started  No acute distress  Objective:     Vitals:   Temp (24hrs), Av 9 °F (36 6 °C), Min:97 5 °F (36 4 °C), Max:98 4 °F (36 9 °C)    Temp:  [97 5 °F (36 4 °C)-98 4 °F (36 9 °C)] 98 4 °F (36 9 °C)  HR:  [47-86] 69  Resp:  [16-20] 16  BP: (126-193)/(68-89) 126/68  SpO2:  [89 %-97 %] 91 %  Body mass index is 39 75 kg/m²       Input and Output Summary (last 24 hours): Intake/Output Summary (Last 24 hours) at 11/21/2022 1336  Last data filed at 11/20/2022 2019  Gross per 24 hour   Intake 500 ml   Output --   Net 500 ml       Physical Exam:   Physical Exam  Vitals reviewed  Constitutional:       General: He is not in acute distress  Appearance: Normal appearance  He is not ill-appearing  Cardiovascular:      Rate and Rhythm: Normal rate and regular rhythm  Pulses: Normal pulses  Heart sounds: Normal heart sounds  Pulmonary:      Effort: Pulmonary effort is normal       Breath sounds: Normal breath sounds  Abdominal:      General: Bowel sounds are normal       Palpations: Abdomen is soft  Tenderness: There is no abdominal tenderness  There is no guarding or rebound  Musculoskeletal:         General: No deformity  Right lower leg: No edema  Left lower leg: No edema  Comments: ROM of right hip normal although produces pain  Flexion of left hip causes right hip pain  Skin:     General: Skin is warm  Neurological:      General: No focal deficit present  Mental Status: He is alert and oriented to person, place, and time     Psychiatric:         Mood and Affect: Mood normal          Behavior: Behavior normal           Additional Data:     Labs:  Results from last 7 days   Lab Units 11/21/22  0542 11/20/22 2025   WBC Thousand/uL 8 98 11 28*   HEMOGLOBIN g/dL 15 3 16 2   HEMATOCRIT % 46 9 47 7   PLATELETS Thousands/uL 280 295   NEUTROS PCT %  --  78*   LYMPHS PCT %  --  14   MONOS PCT %  --  7   EOS PCT %  --  1     Results from last 7 days   Lab Units 11/21/22  0542   SODIUM mmol/L 139   POTASSIUM mmol/L 4 0   CHLORIDE mmol/L 107   CO2 mmol/L 25   BUN mg/dL 24   CREATININE mg/dL 1 89*   ANION GAP mmol/L 7   CALCIUM mg/dL 8 6   GLUCOSE RANDOM mg/dL 71         Results from last 7 days   Lab Units 11/21/22  1102 11/21/22  0758 11/21/22  0738 11/21/22  0717 11/20/22  2353   POC GLUCOSE mg/dl 158* 92 64* 68 102 Lines/Drains:  Invasive Devices     Peripheral Intravenous Line  Duration           Peripheral IV 11/20/22 Right Antecubital <1 day                      Imaging: No pertinent imaging reviewed  Recent Cultures (last 7 days):         Last 24 Hours Medication List:   Current Facility-Administered Medications   Medication Dose Route Frequency Provider Last Rate   • acetaminophen  975 mg Oral Q8H 528 EVON Wallace     • amLODIPine  10 mg Oral Daily EVON Deluca     • atorvastatin  10 mg Oral Daily With EVON HUNTER     • buPROPion  150 mg Oral Daily EVON Deluca     • cloNIDine  0 3 mg Oral Q12H 528 EVON Wallace     • docusate sodium  100 mg Oral BID EVON Deluca     • heparin (porcine)  5,000 Units Subcutaneous Q8H [facility] EVON Ferraro     • HYDROmorphone  0 2 mg Intravenous Q4H PRN EVON Deluca     • insulin glargine  34 Units Subcutaneous HS EVON Ferraro     • insulin lispro  1-5 Units Subcutaneous HS EVON Ferraro     • insulin lispro  2-12 Units Subcutaneous TID AC EVON Ferraro     • lidocaine  1 patch Topical Daily EVON Deluca     • lisinopril  80 mg Oral Daily EVON Deluca     • methocarbamol  500 mg Oral Q6H PRN EVON Deluca     • nicotine  1 patch Transdermal Daily EVON Deluca     • oxyCODONE  2 5 mg Oral Q4H PRN EOVN Deluca     • oxyCODONE  5 mg Oral Q4H PRN EVON Deluca     • sertraline  100 mg Oral Daily EVON Deluca     • sodium chloride  100 mL/hr Intravenous Continuous Raymond Jacobs MD     • tamsulosin  0 4 mg Oral Daily With EVON HUNTER          Today, Patient Was Seen By: Dot Vasquez MD    **Please Note: This note may have been constructed using a voice recognition system  **

## 2022-11-21 NOTE — H&P
[facility]  H&P- Justine Ruiz 1960, 58 y o  male MRN: [id number]  Unit/Bed#: S -10 Encounter: [id number]  Primary Care Provider: Chris De Leon MD   Date and time admitted to hospital: 11/20/2022  5:46 PM    * Back pain  Assessment & Plan  · Presentation: Patient presents with complaints of persistent, worsening right lower back and right hip pain without radiation down the leg for the past week  He reports onset of symptoms was noticed after doing yard work last week  He reports taking Advil at home with minimal relief  He states the pain was initially intermittent; however, over the past two days the back pain has become persistent and spasms have occurred  He reports due to the pain he is unable to ambulate  Patient denies saddle anesthesia, bowel or urinary incontinence, numbness, tingling, fever or chills  He reports history of sciatica but states that this feels different compared to prior sciatica flares  · Etiology: Suspect Sciatica given history of sciatica  · Pain control:  · Tylenol OTC  · Lidocaine patch  · Oxycodone for moderate and severe pain  · IV Dilaudid for breakthrough  · Robaxin  · Aqua K   · PT/OT evaluations  · CM consult  CKD (chronic kidney disease) stage 3, GFR 30-59 ml/min [facility])  Assessment & Plan  Lab Results   Component Value Date    EGFR 49 11/20/2022    EGFR 51 10/16/2022    EGFR 53 03/07/2022    CREATININE 1 50 (H) 11/20/2022    CREATININE 1 45 (H) 10/16/2022    CREATININE 1 40 (H) 03/07/2022   • Creatinine upon admission: 1 50  o Baseline: 1 4-1 6  · Avoid nephrotoxins and hypotension  · Monitor BMP  Type 2 diabetes mellitus with stage 3a chronic kidney disease, with long-term current use of insulin ([facility])  Assessment & Plan  Lab Results   Component Value Date    HGBA1C 10 1 (H) 10/16/2022       No results for input(s): POCGLU in the last 72 hours      Blood Sugar Average: Last 72 hrs:  · Home regimen of glimepiride and Dulaglutide, will hold while inpatient  · Continue home insulin regimen of 34 units of Lantus HS  · Accu-checks and SSI  · Hypoglycemia protocol  Essential hypertension  Assessment & Plan  • Blood pressure is reviewed and acceptable  One elevated reading of 193/86 on arrival suspect 2/2 acute back pain, blood pressure improved spontaneously without antihypertensive medication intervention  o Last recorded pressure: 158/85  • Continue home regimen of Enalapril, Clonidine and Amlodipine  • Monitor blood pressure  Mixed hyperlipidemia  Assessment & Plan  · Continue statin  Obesity (BMI 35 0-39 9 without comorbidity)  Assessment & Plan  · Encouraged lifestyle modifications  JEFF on CPAP  Assessment & Plan  · CPAP HS  Depression with anxiety  Assessment & Plan  · Continue Zoloft and Wellbutrin  BPH without urinary obstruction  Assessment & Plan  · Continue Flomax  VTE Pharmacologic Prophylaxis: VTE Score: 3 Moderate Risk (Score 3-4) - Pharmacological DVT Prophylaxis Ordered: heparin  Code Status: Level 1 - Full Code   Discussion with family: Patient declined call to   Anticipated Length of Stay: Patient will be admitted on an observation basis with an anticipated length of stay of less than 2 midnights secondary to intractable back pain  Total Time for Visit, including Counseling / Coordination of Care: 70 minutes Greater than 50% of this total time spent on direct patient counseling and coordination of care  Chief Complaint: Back pain    History of Present Illness:  Justine Ruiz is a 58 y o  male with a PMH of IDDM2, CKD, JEFF, HTN, HLD, BPH, depression and obesity who presents with complaints of persistent, worsening right lower back and right hip pain without radiation down the leg for the past week  He reports onset of symptoms was noticed after doing yard work last week  He reports taking Advil at home with minimal relief   He states the pain was initially intermittent; however, over the past two days the back pain has become persistent and spasms have occurred  He reports due to the pain he is unable to ambulate  Patient denies saddle anesthesia, bowel or urinary incontinence, numbness, tingling, fever or chills  He reports history of sciatica but states that this feels different compared to prior sciatica flares  Upon evaluation in the ED, patient received multiple rounds of pain medication with some relief but he continued to be unable to ambulate due to the pain  Patient will be admitted under observation for pain control and PT/OT evaluations  Review of Systems:  Review of Systems   Constitutional: Negative for chills and fever  Respiratory: Negative for chest tightness and shortness of breath  Cardiovascular: Negative for chest pain  Gastrointestinal: Negative for abdominal pain and nausea  Genitourinary: Negative for dysuria, frequency and urgency  Musculoskeletal: Positive for back pain and gait problem  Neurological: Negative for dizziness, weakness and numbness  All other systems reviewed and are negative        Past Medical and Surgical History:   Past Medical History:   Diagnosis Date   • [facility] ([facility]) [facility]     last assessed - [id number]   • Chest pain     last assessed - [id number]   • CPAP (continuous positive airway pressure) dependence    • Depression    • Diabetes mellitus ([facility] )    • Dyspnea on exertion     last assessed - [id number]   • Hypertension    • Nephrolithiasis    • Numbness of right foot     numbness on the sole of the right foot; since he was young d/t stenosis   • Obesity    • Onychomycosis     last assessed - [id number]   • JEFF on CPAP    • Sciatica     last assessed - [id number]   • Sleep apnea        Past Surgical History:   Procedure Laterality Date   • COLONOSCOPY     • CYSTOSCOPY W/ LASER LITHOTRIPSY     • KNEE ARTHROSCOPY Left 11/2013   • ME LAP,CHOLECYSTECTOMY N/A 2/8/2019    Procedure: ROBOTIC ASSISTED LAPAROSCOPIC CHOLECYSTECTOMY;  Surgeon: Silas Jacob DO;  Location: [facility];  Service: General   • PROSTATE BIOPSY      Needle Biopsy - Negative       Meds/Allergies:  Prior to Admission medications    Medication Sig Start Date End Date Taking?  Authorizing Provider   Accu-Chek Guide test strip USE AND DISCARD 1 TEST     STRIP TWICE DAILY AS       INSTRUCTED 10/6/22   Ortiz Castro MD   amLODIPine (NORVASC) 10 mg tablet TAKE 1 TABLET DAILY 10/6/22   Ortiz Castro MD   atorvastatin (LIPITOR) 10 mg tablet TAKE 1 TABLET DAILY 10/19/22   Ortiz Castro MD   BD Pen Needle Alicia 2nd Gen 32G X 4 MM MISC INJECT SUBCUTANEOUSLY DAILY 10/6/22   Ortiz Castro MD   buPROPion (WELLBUTRIN XL) 150 mg 24 hr tablet TAKE 1 TABLET DAILY 10/6/22   Ortiz Castro MD   ciclopirox (LOPROX) 0 77 % cream Apply topically 2 (two) times a day for 20 days 8/9/21 8/29/21  Cl Machado DPM   cloNIDine (CATAPRES) 0 3 mg tablet TAKE 1 TABLET TWICE A DAY 10/19/22   Ortiz Castro MD   Dulaglutide 4 5 MG/0 5ML SOPN Inject 0 5 mL (4 5 mg total) under the skin once a week 10/16/22   Ortiz Castro MD   enalapril (VASOTEC) 20 mg tablet TAKE 1 TABLET TWICE A DAY 10/6/22   Ortiz Castro MD   glimepiride (AMARYL) 4 mg tablet TAKE 1 TABLET TWICE A DAY 10/19/22   Ortiz Castro MD   Insulin Glargine Solostar (Basaglar KwikPen) 100 UNIT/ML SOPN Inject 0 34 mL (34 Units total) under the skin daily 10/16/22   Ortiz Castro MD   ketoconazole (NIZORAL) 2 % cream Apply topically daily 6/14/21 7/14/21  Cl Machado DPM   Lidocaine Viscous HCl (XYLOCAINE) 2 % mucosal solution Swish and spit 15 mL 4 (four) times a day as needed for mouth pain or discomfort 5/9/22   Ortiz Castro MD   nystatin (MYCOSTATIN) powder Apply topically 3 (three) times a day as needed (groin rash) 10/27/20   Ortiz Castro MD   sertraline (ZOLOFT) 100 mg tablet TAKE 1 TABLET DAILY 10/19/22   Sudeep Xie MD Cherelle   tamsulosin (FLOMAX) 0 4 mg TAKE 1 CAPSULE DAILY 10/6/22   Richar Marina MD   terbinafine (LamISIL) 250 mg tablet 1 tab p o  every other day  10/24/22 11/24/22  Gertie Boas, DPM     I have reviewed home medications with patient personally  Allergies: No Known Allergies    Social History:  Marital Status: /Civil Union   Occupation: Unknown  Patient Pre-hospital Living Situation: Home  Patient Pre-hospital Level of Mobility: walks  Patient Pre-hospital Diet Restrictions: Diabetic  Substance Use History:   Social History     Substance and Sexual Activity   Alcohol Use Yes     Social History     Tobacco Use   Smoking Status Some Days   • Types: Cigars   Smokeless Tobacco Never   Tobacco Comments    3 cigars per week     Social History     Substance and Sexual Activity   Drug Use No       Family History:  Family History   Adopted: Yes   Problem Relation Age of Onset   • Alzheimer's disease Mother    • Alcohol abuse Neg Hx    • Drug abuse Neg Hx    • Depression Neg Hx    • Substance Abuse Neg Hx    • Mental illness Neg Hx        Physical Exam:     Vitals:   Blood Pressure: 145/89 (11/20/22 2201)  Pulse: 78 (11/20/22 2203)  Temperature: 97 7 °F (36 5 °C) (11/20/22 2201)  Temp Source: Oral (11/20/22 1754)  Respirations: 16 (11/20/22 2201)  Height: 5' 11" (180 3 cm) (11/20/22 2020)  Weight - Scale: 129 kg (285 lb) (11/20/22 2020)  SpO2: 92 % (11/20/22 2201)    Physical Exam  Vitals and nursing note reviewed  Constitutional:       General: He is not in acute distress  Appearance: He is obese  He is not ill-appearing, toxic-appearing or diaphoretic  HENT:      Head: Normocephalic  Nose: Nose normal       Mouth/Throat:      Pharynx: Oropharynx is clear  Eyes:      General: No scleral icterus  Conjunctiva/sclera: Conjunctivae normal    Cardiovascular:      Rate and Rhythm: Normal rate and regular rhythm  Pulses: Normal pulses  Heart sounds: Normal heart sounds   No murmur heard  Pulmonary:      Effort: Pulmonary effort is normal  No respiratory distress  Breath sounds: Normal breath sounds  No wheezing, rhonchi or rales  Chest:      Chest wall: No tenderness  Abdominal:      General: Bowel sounds are normal  There is no distension  Palpations: Abdomen is soft  Tenderness: There is no abdominal tenderness  Musculoskeletal:      Cervical back: Normal range of motion  Lumbar back: Tenderness present  Skin:     General: Skin is warm and dry  Neurological:      General: No focal deficit present  Mental Status: He is alert and oriented to person, place, and time  Cranial Nerves: No facial asymmetry  Motor: No weakness  Psychiatric:         Speech: Speech normal           Additional Data:     Lab Results:  Results from last 7 days   Lab Units 11/20/22 2025   WBC Thousand/uL 11 28*   HEMOGLOBIN g/dL 16 2   HEMATOCRIT % 47 7   PLATELETS Thousands/uL 295   NEUTROS PCT % 78*   LYMPHS PCT % 14   MONOS PCT % 7   EOS PCT % 1     Results from last 7 days   Lab Units 11/20/22 2025   SODIUM mmol/L 138   POTASSIUM mmol/L 4 3   CHLORIDE mmol/L 107   CO2 mmol/L 23   BUN mg/dL 18   CREATININE mg/dL 1 50*   ANION GAP mmol/L 8   CALCIUM mg/dL 9 2   GLUCOSE RANDOM mg/dL 116                       Lines/Drains:  Invasive Devices     Peripheral Intravenous Line  Duration           Peripheral IV 11/20/22 Right Antecubital <1 day                    Imaging: No pertinent imaging reviewed  No orders to display       EKG and Other Studies Reviewed on Admission:   · EKG: No EKG obtained  ** Please Note: This note has been constructed using a voice recognition system   **

## 2022-11-21 NOTE — ASSESSMENT & PLAN NOTE
Lab Results   Component Value Date    HGBA1C 10 1 (H) 10/16/2022       Recent Labs     11/21/22  0717 11/21/22  0738 11/21/22  0758 11/21/22  1102   POCGLU 68 64* 92 158*       Blood Sugar Average: Last 72 hrs:  · (P) 96 8Home regimen of glimepiride and Dulaglutide, will hold while inpatient  · Continue home insulin regimen of 34 units of Lantus HS  · Accu-checks and SSI  · Hypoglycemia protocol    · Well controlled

## 2022-11-21 NOTE — PHYSICAL THERAPY NOTE
PHYSICAL THERAPY EVALUATION  NAME:  Rosita Cancino  DATE: 11/21/22    AGE:   58 y o  Mrn:   [id number]  Principal problem: Principal Problem:    Back pain  Active Problems:    BPH without urinary obstruction    Depression with anxiety    Obesity (BMI 35 0-39 9 without comorbidity)    Mixed hyperlipidemia    Essential hypertension    JEFF on CPAP    Type 2 diabetes mellitus with stage 3a chronic kidney disease, with long-term current use of insulin ([facility])    CKD (chronic kidney disease) stage 3, GFR 30-59 ml/min ([facility])      Vitals:    11/20/22 2352 11/21/22 0430 11/21/22 0445 11/21/22 0716   BP: 127/85   126/68   BP Location:       Pulse: 76   69   Resp:    16   Temp:    98 4 °F (36 9 °C)   TempSrc:       SpO2: (!) 89% 90% 94% 91%   Weight:       Height:           Length Of Stay: 0  Performed at least 2 patient identifiers during session: Name and Birthday  PHYSICAL THERAPY EVALUATION :    11/21/22 1129   PT Last Visit   PT Visit Date 11/21/22   Note Type   Note type Evaluation   Pain Assessment   Pain Assessment Tool 0-10   Pain Score 5   Pain Location/Orientation Orientation: Right  (above pelvis)   Effect of Pain on Daily Activities Limits speed and independence of mobility, especially ROM of right> LLE and flexion> extension   Patient's Stated Pain Goal No pain   Hospital Pain Intervention(s) Repositioned; Ambulation/increased activity   Restrictions/Precautions   Other Precautions Pain   Home Living   Type of [address] level  (has basement that he does not need to go to, 3 CHARLY)   Home Equipment   (none)   Prior Function   Level of [place] Independent with ADLs; Independent with functional mobility   Lives With Spouse   IADLs Independent with driving; Independent with meal prep; Independent with medication management   Falls in the last 6 months 0   Vocational Full time employment  (Sedentary job per pt)   General   Additional Pertinent History Past pain improvements: epidural injections, TENS, medications  Recent improvements: supine, heat  No improvements: ICE, sitting> standing  Red flags: no saddle parethesias, no B&B incontinence, no fevers  Family/Caregiver Present No   Cognition   Overall Cognitive Status WFL   Arousal/Participation Alert   Orientation Level Oriented X4   Memory Within functional limits   Following Commands Follows one step commands with increased time or repetition   Subjective   Subjective Patient confirmed nursing reports that he had been ambulating to and from bathroom without physical assistance nor supervision patient did not require any DME  "The cocktail that has worked for me in the past has been the epidural injections"   RUE Assessment   RUE Assessment WFL   LUE Assessment   LUE Assessment WFL   RLE Assessment   RLE Assessment   (Sub maximal MMT over pressure due to patient's complaints of pain)   RLE Overall PROM   R Hip Flexion + SLR   Strength RLE   R Hip Flexion   (Tested to 3, limited by pain)   R Knee Extension   (Tested to 3, limited by pain)   R Ankle Dorsiflexion 4/5   LLE Assessment   LLE Assessment   (Sub maximal MMT over pressure due to patient's complaints of pain)   LLE Overall PROM   L Hip Flexion +SLR (contralateral)   Strength LLE   L Hip Flexion 4+/5   L Knee Extension 4+/5   L Ankle Dorsiflexion 4+/5   Vision-Basic Assessment   Current Vision Wears glasses all the time   Coordination   Movements are Fluid and Coordinated   (Guarded)   Light Touch   RLE Light Touch Grossly intact  (But does report intermittent plantar "impaired sensation" which he attributes to his history of "sciatic pain")   LLE Light Touch Grossly intact   Bed Mobility   Supine to Sit 5  Supervision   Additional items Assist x 1;HOB elevated; Bedrails; Increased time required;Verbal cues  (Using momentum/rocking, right egress)   Additional Comments Verbal instruction to patient regarding recommendations of side-lying to sit methods to minimize twisting   Trunk: no TTP @ R pelvic area where pt reports has most pain concentration  Trunk ROM: less pain w/ extension >flexion  Trunk ROM limited in all planes  Pt is limited in flattenting out lumbar lordosis to neutral position while in standing  Transfers   Sit to Stand 5  Supervision   Additional items Assist x 1; Increased time required;Verbal cues;Armrests   Stand to Sit 5  Supervision   Additional items Assist x 1; Increased time required;Verbal cues;Armrests   Additional Comments Dana's Bariatric body type APPLE PANNUS  Recliner chair positioned with pillows to elevate seat of chair and to provide lumbar support while sitting in chair   Ambulation/Elevation   Gait pattern Antalgic  (guarded)   Gait Assistance 5  Supervision   Additional items Assist x 1;Verbal cues   Assistive Device None   Distance 3'   Stair Management Assistance Not tested   Balance   Static Sitting Good   Static Standing Fair  (standing tolerance x 2 min w/pt reaching for UE support)   Ambulatory Fair   Endurance Deficit   Endurance Deficit Yes   Endurance Deficit Description limited amb distance and sitting/standing tolerance   Activity Tolerance   Activity Tolerance Patient limited by pain; Patient limited by fatigue   Nurse Made Aware spoke to RNs   Assessment   Prognosis Fair   Problem List Decreased endurance;Decreased range of motion;Decreased mobility;Obesity; Impaired sensation;Pain  (gait deviations)   Assessment Pt is a 58 y o  male seen for PT evaluation s/p admit to [facility] on 11/20/2022 w/ Back pain  Order placed for PT  Prior to admission: Pt lived with spouse in a first-floor setup with basement (does not need to go to basement)  He has few steps to enter and did not use any DME prior to admission, working full-time at what he describes as a sedentary job  Upon evaluation: Pt required no physical assistance for bed mobility (but uses momentum for performing supine to sit), no assistance for transfers nor gait         Pt's clinical presentation is currently unstable/unpredictable given the functional mobility deficits above, especially (but not limited to) decreased ROM and pain, coupled with fall risks including impaired balance, limited sensation/neuropathy and obesity, and combined with medical complications of abnormal WBCs  Recommend continue mobility trials with plus or minus single-point cane increased ambulation distances and independence, stair training  Would trial gentle TherEx to improve trunk range of motion (extension> flexion)  Barriers to Discharge   (CHARLY)   Goals   Patient Goals to have less back pain, get home  STG Expiration Date 12/01/22   Short Term Goal #1 Pt will: Perform bed mobility tasks with modified I to prepare for transfers and reposition in bed with minimizing twisting  Perform transfers with modified I to improve ease of transfers and With less than 5/10 pain  Perform ambulation with L RAD (SPC?)  for over 50 ft with supervision to increase Indep in home environment  Perform two stairs with DME and or railing and w/Supervision to return to home with CHARLY  Perform at least 2 HEP w/o physical A to demonstrate compliance w/therex and improve ease of transfers, < 5/10 pain   PT Treatment Day 0   Plan   Treatment/Interventions Functional transfer training;LE strengthening/ROM; Therapeutic exercise;Patient/family training;Equipment eval/education; Bed mobility;Gait training;Cognitive reorientation; Endurance training;Elevations; Spoke to nursing;Spoke to case management   PT Frequency 3-5x/wk   Recommendation   PT Discharge Recommendation (S)  Home with outpatient rehabilitation  (comp spine program)   Equipment Recommended   (+/- cane)   AM-PAC Basic Mobility Inpatient   Turning in Bed Without Bedrails 3   Lying on Back to Sitting on Edge of Flat Bed 3   Moving Bed to Chair 3   Standing Up From Chair 3   Walk in Room 1   Climb 3-5 Stairs 1   Basic Mobility Inpatient Raw Score 14   Basic Mobility Standardized Score 35 55   Highest Level Of Mobility   -[facility] Goal 4: Move to chair/commode   -HL Achieved 5: Stand (1 or more minutes)   End of Consult   Patient Position at End of Consult All needs within reach; Bedside chair   (Please find full objective findings from PT assessment regarding body systems outlined above)  Pt to benefit fromcontinued skilled PT tx while in hospital and upon DC to address deficits as defined above and maximize level of functional mobility  Co-morbidities affecting pt's physical performance at time of assessment include:  Obesity, acute renal failure, CPAP, diabetes mellitus, hypertension, right foot numbness, sciatic pain  Personal factors affecting pt at time of IE include: steps to enter environment, past experience, behavioral pattern, inability to perform current job functions, inability to perform IADLs, inability to perform ADLs and inability to navigate community distances  The patient's [facility] Basic Mobility Inpatient Short Form Raw Score is 14, Standardized Score is 35 55  A Raw Score of less than 16 suggests the patient may benefit from discharge to post-acute rehabilitation services, which DOES NOT coincide with CURRENT above PT recommendations  However please refer to therapist recommendation for discharge planning given other factors that may influence destination as I anticipate patient will be able to mobilize once pain is better controlled including gait and stairs performance  Adapted from [employer] [facility] “6-Clicks” Basic Mobility and Daily Activity Scores With Discharge Destination  Physical Therapy, 2021;101:1-9  DOI: [email or web address]    Portions of the record may have been created with voice recognition software  Occasional wrong word or "sound a like" substitutions may have occurred due to the inherent limitations of voice recognition software    Read the chart carefully and recognize, using context, where substitutions have occurred

## 2022-11-21 NOTE — ASSESSMENT & PLAN NOTE
• Blood pressure is reviewed and acceptable  One elevated reading of 193/86 on arrival suspect 2/2 acute back pain, blood pressure improved spontaneously without antihypertensive medication intervention  • Continue home regimen of Enalapril, Clonidine and Amlodipine  • Monitor blood pressure

## 2022-11-21 NOTE — ASSESSMENT & PLAN NOTE
ANNI pathway started  Avoid nsaids  Monitor kidney function  Bladder scan revealed 36mL  Will obtain UA

## 2022-11-21 NOTE — ASSESSMENT & PLAN NOTE
· Presentation: Patient presents with complaints of persistent, worsening right lower back and right hip pain without radiation down the leg for the past week  He reports onset of symptoms was noticed after doing yard work last week  He reports taking Advil at home with minimal relief  He states the pain was initially intermittent; however, over the past two days the back pain has become persistent and spasms have occurred  He reports due to the pain he is unable to ambulate  Patient denies saddle anesthesia, bowel or urinary incontinence, numbness, tingling, fever or chills  He reports history of sciatica but states that this feels different compared to prior sciatica flares  · Etiology: Suspect Sciatica given history of sciatica  · Pain control:  · Tylenol OTC  · Lidocaine patch  · Oxycodone for moderate and severe pain  · IV Dilaudid for breakthrough  · Robaxin  · Aqua K   · PT/OT evaluations  · CM consult

## 2022-11-22 ENCOUNTER — APPOINTMENT (INPATIENT)
Dept: CT IMAGING | Facility: HOSPITAL | Age: 62
End: 2022-11-22

## 2022-11-22 ENCOUNTER — APPOINTMENT (OUTPATIENT)
Dept: RADIOLOGY | Facility: HOSPITAL | Age: 62
End: 2022-11-22

## 2022-11-22 PROBLEM — N17.9 AKI (ACUTE KIDNEY INJURY) (HCC): Status: RESOLVED | Noted: 2022-11-21 | Resolved: 2022-11-22

## 2022-11-22 PROBLEM — R82.90 ABNORMAL URINALYSIS: Status: ACTIVE | Noted: 2022-11-22

## 2022-11-22 LAB
ANION GAP SERPL CALCULATED.3IONS-SCNC: 5 MMOL/L (ref 4–13)
BUN SERPL-MCNC: 24 MG/DL (ref 5–25)
CALCIUM SERPL-MCNC: 8.3 MG/DL (ref 8.4–10.2)
CHLORIDE SERPL-SCNC: 108 MMOL/L (ref 96–108)
CO2 SERPL-SCNC: 25 MMOL/L (ref 21–32)
CREAT SERPL-MCNC: 1.48 MG/DL (ref 0.6–1.3)
ERYTHROCYTE [DISTWIDTH] IN BLOOD BY AUTOMATED COUNT: 13.2 % (ref 11.6–15.1)
GFR SERPL CREATININE-BSD FRML MDRD: 49 ML/MIN/1.73SQ M
GLUCOSE SERPL-MCNC: 104 MG/DL (ref 65–140)
GLUCOSE SERPL-MCNC: 105 MG/DL (ref 65–140)
GLUCOSE SERPL-MCNC: 121 MG/DL (ref 65–140)
GLUCOSE SERPL-MCNC: 121 MG/DL (ref 65–140)
GLUCOSE SERPL-MCNC: 95 MG/DL (ref 65–140)
HCT VFR BLD AUTO: 44.1 % (ref 36.5–49.3)
HGB BLD-MCNC: 14.4 G/DL (ref 12–17)
MCH RBC QN AUTO: 29.8 PG (ref 26.8–34.3)
MCHC RBC AUTO-ENTMCNC: 32.7 G/DL (ref 31.4–37.4)
MCV RBC AUTO: 91 FL (ref 82–98)
PLATELET # BLD AUTO: 247 THOUSANDS/UL (ref 149–390)
PMV BLD AUTO: 9.4 FL (ref 8.9–12.7)
POTASSIUM SERPL-SCNC: 3.8 MMOL/L (ref 3.5–5.3)
PROCALCITONIN SERPL-MCNC: 0.13 NG/ML
RBC # BLD AUTO: 4.83 MILLION/UL (ref 3.88–5.62)
SODIUM SERPL-SCNC: 138 MMOL/L (ref 135–147)
WBC # BLD AUTO: 7.03 THOUSAND/UL (ref 4.31–10.16)

## 2022-11-22 RX ORDER — OXYCODONE HYDROCHLORIDE 5 MG/1
2.5 TABLET ORAL EVERY 4 HOURS PRN
Status: CANCELLED | OUTPATIENT
Start: 2022-11-22

## 2022-11-22 RX ORDER — INSULIN GLARGINE 100 [IU]/ML
34 INJECTION, SOLUTION SUBCUTANEOUS
Status: CANCELLED | OUTPATIENT
Start: 2022-11-23

## 2022-11-22 RX ORDER — DEXAMETHASONE 2 MG/1
2 TABLET ORAL EVERY 12 HOURS SCHEDULED
Status: CANCELLED | OUTPATIENT
Start: 2022-11-22

## 2022-11-22 RX ORDER — HYDROMORPHONE HCL IN WATER/PF 6 MG/30 ML
0.2 PATIENT CONTROLLED ANALGESIA SYRINGE INTRAVENOUS EVERY 4 HOURS PRN
Status: DISCONTINUED | OUTPATIENT
Start: 2022-11-22 | End: 2022-11-23 | Stop reason: HOSPADM

## 2022-11-22 RX ORDER — DEXAMETHASONE 2 MG/1
2 TABLET ORAL EVERY 12 HOURS SCHEDULED
Status: DISCONTINUED | OUTPATIENT
Start: 2022-11-22 | End: 2022-11-23

## 2022-11-22 RX ORDER — DEXAMETHASONE 2 MG/1
2 TABLET ORAL EVERY 12 HOURS SCHEDULED
Status: CANCELLED | OUTPATIENT
Start: 2022-11-23 | End: 2022-11-25

## 2022-11-22 RX ORDER — SERTRALINE HYDROCHLORIDE 100 MG/1
100 TABLET, FILM COATED ORAL DAILY
Status: CANCELLED | OUTPATIENT
Start: 2022-11-23

## 2022-11-22 RX ORDER — TAMSULOSIN HYDROCHLORIDE 0.4 MG/1
0.4 CAPSULE ORAL
Status: CANCELLED | OUTPATIENT
Start: 2022-11-23

## 2022-11-22 RX ORDER — CLONIDINE HYDROCHLORIDE 0.1 MG/1
0.3 TABLET ORAL EVERY 12 HOURS SCHEDULED
Status: CANCELLED | OUTPATIENT
Start: 2022-11-23

## 2022-11-22 RX ORDER — AMLODIPINE BESYLATE 10 MG/1
10 TABLET ORAL DAILY
Status: CANCELLED | OUTPATIENT
Start: 2022-11-23

## 2022-11-22 RX ORDER — HEPARIN SODIUM 5000 [USP'U]/ML
5000 INJECTION, SOLUTION INTRAVENOUS; SUBCUTANEOUS EVERY 8 HOURS SCHEDULED
Status: CANCELLED | OUTPATIENT
Start: 2022-11-23

## 2022-11-22 RX ORDER — HYDROMORPHONE HCL IN WATER/PF 6 MG/30 ML
0.2 PATIENT CONTROLLED ANALGESIA SYRINGE INTRAVENOUS EVERY 4 HOURS PRN
Status: CANCELLED | OUTPATIENT
Start: 2022-11-22

## 2022-11-22 RX ORDER — LISINOPRIL 20 MG/1
80 TABLET ORAL DAILY
Status: CANCELLED | OUTPATIENT
Start: 2022-11-23

## 2022-11-22 RX ORDER — METHOCARBAMOL 500 MG/1
500 TABLET, FILM COATED ORAL EVERY 6 HOURS
Status: DISCONTINUED | OUTPATIENT
Start: 2022-11-22 | End: 2022-11-23 | Stop reason: HOSPADM

## 2022-11-22 RX ORDER — INSULIN LISPRO 100 [IU]/ML
1-5 INJECTION, SOLUTION INTRAVENOUS; SUBCUTANEOUS
Status: CANCELLED | OUTPATIENT
Start: 2022-11-23

## 2022-11-22 RX ORDER — ACETAMINOPHEN 325 MG/1
975 TABLET ORAL EVERY 8 HOURS SCHEDULED
Status: CANCELLED | OUTPATIENT
Start: 2022-11-23

## 2022-11-22 RX ORDER — METHOCARBAMOL 500 MG/1
500 TABLET, FILM COATED ORAL EVERY 6 HOURS
Status: CANCELLED | OUTPATIENT
Start: 2022-11-23

## 2022-11-22 RX ORDER — BUPROPION HYDROCHLORIDE 150 MG/1
150 TABLET ORAL DAILY
Status: CANCELLED | OUTPATIENT
Start: 2022-11-23

## 2022-11-22 RX ORDER — SODIUM CHLORIDE 9 MG/ML
100 INJECTION, SOLUTION INTRAVENOUS CONTINUOUS
Status: CANCELLED | OUTPATIENT
Start: 2022-11-22

## 2022-11-22 RX ORDER — OXYCODONE HYDROCHLORIDE 5 MG/1
5 TABLET ORAL EVERY 4 HOURS PRN
Status: CANCELLED | OUTPATIENT
Start: 2022-11-22

## 2022-11-22 RX ORDER — ATORVASTATIN CALCIUM 10 MG/1
10 TABLET, FILM COATED ORAL
Status: CANCELLED | OUTPATIENT
Start: 2022-11-23

## 2022-11-22 RX ORDER — OXYCODONE HYDROCHLORIDE 5 MG/1
5 TABLET ORAL EVERY 4 HOURS PRN
Status: DISCONTINUED | OUTPATIENT
Start: 2022-11-22 | End: 2022-11-23 | Stop reason: HOSPADM

## 2022-11-22 RX ORDER — NICOTINE 21 MG/24HR
1 PATCH, TRANSDERMAL 24 HOURS TRANSDERMAL DAILY
Status: CANCELLED | OUTPATIENT
Start: 2022-11-23

## 2022-11-22 RX ORDER — LIDOCAINE 50 MG/G
1 PATCH TOPICAL DAILY
Status: CANCELLED | OUTPATIENT
Start: 2022-11-23

## 2022-11-22 RX ORDER — DOCUSATE SODIUM 100 MG/1
100 CAPSULE, LIQUID FILLED ORAL 2 TIMES DAILY
Status: CANCELLED | OUTPATIENT
Start: 2022-11-23

## 2022-11-22 RX ORDER — INSULIN LISPRO 100 [IU]/ML
2-12 INJECTION, SOLUTION INTRAVENOUS; SUBCUTANEOUS
Status: CANCELLED | OUTPATIENT
Start: 2022-11-23

## 2022-11-22 RX ADMIN — AMLODIPINE BESYLATE 10 MG: 10 TABLET ORAL at 07:44

## 2022-11-22 RX ADMIN — HYDROMORPHONE HYDROCHLORIDE 0.2 MG: 0.2 INJECTION, SOLUTION INTRAMUSCULAR; INTRAVENOUS; SUBCUTANEOUS at 18:38

## 2022-11-22 RX ADMIN — BUPROPION HYDROCHLORIDE 150 MG: 150 TABLET, EXTENDED RELEASE ORAL at 07:44

## 2022-11-22 RX ADMIN — ACETAMINOPHEN 975 MG: 325 TABLET, FILM COATED ORAL at 21:17

## 2022-11-22 RX ADMIN — METHOCARBAMOL 500 MG: 500 TABLET ORAL at 10:15

## 2022-11-22 RX ADMIN — OXYCODONE HYDROCHLORIDE 5 MG: 5 TABLET ORAL at 10:15

## 2022-11-22 RX ADMIN — CLONIDINE HYDROCHLORIDE 0.3 MG: 0.1 TABLET ORAL at 07:44

## 2022-11-22 RX ADMIN — METHOCARBAMOL 500 MG: 500 TABLET ORAL at 21:17

## 2022-11-22 RX ADMIN — DOCUSATE SODIUM 100 MG: 100 CAPSULE, LIQUID FILLED ORAL at 07:44

## 2022-11-22 RX ADMIN — SERTRALINE 100 MG: 100 TABLET, FILM COATED ORAL at 07:44

## 2022-11-22 RX ADMIN — ACETAMINOPHEN 975 MG: 325 TABLET, FILM COATED ORAL at 13:01

## 2022-11-22 RX ADMIN — ACETAMINOPHEN 975 MG: 325 TABLET, FILM COATED ORAL at 05:19

## 2022-11-22 RX ADMIN — DEXAMETHASONE 2 MG: 2 TABLET ORAL at 21:17

## 2022-11-22 RX ADMIN — HEPARIN SODIUM 5000 UNITS: 5000 INJECTION INTRAVENOUS; SUBCUTANEOUS at 13:01

## 2022-11-22 RX ADMIN — METHOCARBAMOL 500 MG: 500 TABLET ORAL at 16:03

## 2022-11-22 RX ADMIN — OXYCODONE HYDROCHLORIDE 5 MG: 5 TABLET ORAL at 04:54

## 2022-11-22 RX ADMIN — HEPARIN SODIUM 5000 UNITS: 5000 INJECTION INTRAVENOUS; SUBCUTANEOUS at 21:18

## 2022-11-22 RX ADMIN — IOHEXOL 100 ML: 350 INJECTION, SOLUTION INTRAVENOUS at 16:31

## 2022-11-22 RX ADMIN — SODIUM CHLORIDE 100 ML/HR: 0.9 INJECTION, SOLUTION INTRAVENOUS at 02:28

## 2022-11-22 RX ADMIN — METHOCARBAMOL 500 MG: 500 TABLET ORAL at 02:27

## 2022-11-22 RX ADMIN — INSULIN GLARGINE 34 UNITS: 100 INJECTION, SOLUTION SUBCUTANEOUS at 21:17

## 2022-11-22 RX ADMIN — OXYCODONE HYDROCHLORIDE 5 MG: 5 TABLET ORAL at 17:02

## 2022-11-22 RX ADMIN — SODIUM CHLORIDE 100 ML/HR: 0.9 INJECTION, SOLUTION INTRAVENOUS at 12:11

## 2022-11-22 RX ADMIN — SODIUM CHLORIDE 100 ML/HR: 0.9 INJECTION, SOLUTION INTRAVENOUS at 23:19

## 2022-11-22 RX ADMIN — DOCUSATE SODIUM 100 MG: 100 CAPSULE, LIQUID FILLED ORAL at 17:02

## 2022-11-22 RX ADMIN — LISINOPRIL 80 MG: 20 TABLET ORAL at 07:44

## 2022-11-22 RX ADMIN — TAMSULOSIN HYDROCHLORIDE 0.4 MG: 0.4 CAPSULE ORAL at 16:03

## 2022-11-22 RX ADMIN — OXYCODONE HYDROCHLORIDE 5 MG: 5 TABLET ORAL at 23:24

## 2022-11-22 RX ADMIN — CLONIDINE HYDROCHLORIDE 0.3 MG: 0.1 TABLET ORAL at 21:17

## 2022-11-22 RX ADMIN — HEPARIN SODIUM 5000 UNITS: 5000 INJECTION INTRAVENOUS; SUBCUTANEOUS at 05:19

## 2022-11-22 RX ADMIN — ATORVASTATIN CALCIUM 10 MG: 10 TABLET, FILM COATED ORAL at 16:03

## 2022-11-22 NOTE — ASSESSMENT & PLAN NOTE
· Continues to denie saddle anesthesia, bowel or urinary incontinence, numbness, tingling, fever or chills  KUB did not show any stones  Patient states when he moves pain increases to 10/10 but is 4/10 at rest  Xray hip showed possible hernia vs small area of focal gas at inguinal area  CT abdomen pelvis ordered for further evaluation, if hernia or gas present, urgent consult to surgery  Acute pain management referral recs: Decadron bid for 3 days (monitor glucose closely) and DC IV Dilaudid tomorrow  · Pain control:  · Tylenol OTC  · Lidocaine patch  · Oxycodone for moderate pain  · IV Dilaudid for breakthrough  · Robaxin    · Aqua K   · PT/OT evaluations

## 2022-11-22 NOTE — UTILIZATION REVIEW
Initial Clinical Review  Observation on 11/20 @ 2101 upgraded to Inpatient on 11/22 @ 1530  Pt requiring continued stay d/t continues back pain, APS consult, urgent surgery consult- Hip xray w/ possible hernia vs small area of focal gas at inguinal area, CT abdomen pelvis pending  Admission: Date/Time/Statement:   Admission Orders (From admission, onward)     Ordered        11/22/22 1530  Inpatient Admission  Once            11/20/22 2101  Place in Observation  Once                      Orders Placed This Encounter   Procedures   • Inpatient Admission     Standing Status:   Standing     Number of Occurrences:   1     Order Specific Question:   Level of Care     Answer:   Med Surg [16]     Order Specific Question:   Estimated length of stay     Answer:   More than 2 Midnights     Order Specific Question:   Certification     Answer:   I certify that inpatient services are medically necessary for this patient for a duration of greater than two midnights  See H&P and MD Progress Notes for additional information about the patient's course of treatment  ED Arrival Information     Expected   -    Arrival   11/20/2022 17:35    Acuity   Urgent            Means of arrival   Wheelchair    Escorted by   Spouse    Service   Hospitalist    Admission type   Emergency            Arrival complaint   BACK PAIN           Chief Complaint   Patient presents with   • Back Pain     Pt reports possibly tweaking back a couple days ago and increasingly getting worse, spasms, denies a specific injury/trauma, using tens unit without relief       Initial Presentation: 58 y o  male with PMH of IDDM2, CKD, JEFF, HTN, HLD, BPH, depression and obesity presented to the ED from home with persistent worsening right lower back and right hip pain that started after doing yard work last week  Pt reported that pain was non radiating, initially intermittent however became persistent in the last 2 days w/ spasms  He took Advil w/ minimal effect  Unable to ambulate d/t pain  Reported h/o sciatica but stated that this feels different compared to prior sciatica flares  In the ED, /86, elevated reading suspect 2/2 acute back pain  Cr 1 5, baseline 1 4-4 6  On exam, aaox3, obese, tenderness of lumbar back present  He received multiple rounds of pain medication with some relief but he continued to be unable to ambulate due to the pain  Admit as observation level of care for back pain, CKD, HTN: pain control: Tylenol OTC, Lidocaine patch, oxycodone, IV Dilaudid, Robaxin, Aqua K  PT/OT eval  Mon BMP  Continue home regimen of Enalapril, Clonidine and Amlodipine  Monitor blood pressure  11/21 IM Notes: Pt's reported improvement in pain since pain management  On exam, ROM of right hip normal although produces pain  Flexion of left hip causes right hip pain  Creatinine up to 1 89 today  BP controlled  Plan: Obtain KUB, Cont pain meds, give gentle hydration, Mon BUN and creatinine  Check UA, bladder scan  Avoid NSAIDS  Cont SSI, accu checks  Cont CPAP     11/22 Obs to IP  APS Consult: Pt w/ intractable right lower back pain x 1 wk that became progressively more severe which limited his ability to ambulate  PTA was utilizing Tylenol, Advil and TENS unit at home w/o relief  X-ray of lumbar spine and hip/pelvis pending  Plan:  Multimodal analgesia:  • Tylenol 975 mg every 8 hours scheduled  • Lidocaine patch daily, on for 12 hours and off 12 hours  • Avoid NSAIDs given acute kidney injury  • Start Decadron 2 mg twice a day for 3 days for anti-inflammatory effect  ? Continue close monitoring on glucose levels  • Robaxin 500 mg every 6 hours scheduled  • Change Oxycodone to 5 mg every 4 hours as needed for moderate or severe pain  • Discontinue Oxycodone 10 mg dose  ? Continue lower doses of oxycodone based on renal function  • Dilaudid 0 2mg IV every 4 hours as needed for breakthrough pain  ?  D/C IV dilaudid tomorrow  • PT consult for back pain; also follow-up outpatient   • Aqua K-pad as needed to lower back for comfort/spasms   Bowel Regimen:    IM Notes: Pt continues to have R lower back and hip pains  Right hip x-ray was unremarkable; however there is a small focus of gas overlying the right inguinal region of uncertain etiology  This could represent a bowel containing hernia or soft tissue gas  Lumbar spine x-ray revealed lower lumbar disc space narrowing with osteophytes from L1 through L4   CT scan of the abdomen and pelvis revealed 12 mm proximal right ureteral calculus located 5 to 6 centimetre distal to the right UPJ causing mild right hydroureteronephrosis  There was also small foci of gas within the anterior subcutaneous tissues in the patient's anterior abdominal wall pannus  UA w/ some RBCs and WBCs  Cr improved  Plan: Urology consult, Urine cx, check procal  PT/OT  Cont pain control management  IM Quick Note: Urology reviewed pt's CT scan and recommended pt be transferred to [facility] for OR for possible stent placement tomorrow      Date: 11/23   Day 2:   Urology Consult: Pt w/ ureteral calculus w/ hydronephrosis  Plan to proceed with surgical intervention, cystoscopy, retrograde pyelogram, and right ureteral stent  [facility]  Remain NPO  Cont IVF, flomax and pain control     PE: Aaox3, Negative CVA tenderness, negative suprapubic tenderness    ED Triage Vitals   Temperature Pulse Respirations Blood Pressure SpO2   11/20/22 1754 11/20/22 1752 11/20/22 1752 11/20/22 1753 11/20/22 1753   97 5 °F (36 4 °C) 58 20 (!) 193/86 96 %      Temp Source Heart Rate Source Patient Position - Orthostatic VS BP Location FiO2 (%)   11/20/22 1754 11/20/22 1752 11/20/22 1752 11/20/22 2020 --   Oral Monitor Lying Right arm       Pain Score       11/20/22 1923       7          Wt Readings from Last 1 Encounters:   11/22/22 124 kg (272 lb 7 8 oz)     Additional Vital Signs:   Date/Time Temp Pulse Resp BP MAP (mmHg) SpO2 O2 Device   11/23/22 07:17:06 97 9 °F (36 6 °C) 71 16 136/86 103 92 % --   11/22/22 23:04:44 98 5 °F (36 9 °C) 77 -- 154/95 115 94 % --   11/22/22 23:02:04 98 5 °F (36 9 °C) 76 -- -- -- 94 % --   11/22/22 21:12:42 98 3 °F (36 8 °C) 75 -- 152/91 111 91 % --   11/22/22 15:08:49 98 3 °F (36 8 °C) 71 18 151/89 110 93 % --   11/22/22 07:34:02 97 6 °F (36 4 °C) 64 16 134/84 101 92 % --   11/21/22 21:49:15 97 6 °F (36 4 °C) 73 18 147/91 110 93 % --   11/21/22 1934 -- -- -- -- -- 92 % --   11/21/22 14:56:07 97 9 °F (36 6 °C) 75 -- 128/83 98 91 % --   11/21/22 07:16:12 98 4 °F (36 9 °C) 69 16 126/68 87 91 % --   11/21/22 0445 -- -- -- -- -- 94 % None (Room air)       Pertinent Labs/Diagnostic Test Results:   CT abdomen pelvis w contrast   Final Result by Nathanael Jeronimo MD (11/22 1651)      Obstructive oval 12 mm proximal right ureteral calculus located 5 to 6 cm distal to the right UPJ causes mild right hydroureteronephrosis  Small foci of gas within the anterior subcutaneous tissues in this patient's anterior abdominal wall pannus correlating to the findings seen on today's radiographs  The study was marked in [facility] for immediate notification  Workstation performed: [id number]         XR spine lumbar 2 or 3 views injury   Final Result by Sal Medrano MD (11/22 1501)      Lower lumbar disc space narrowing with flowing osteophytes from L1 through L4 suggestive of DISH  Workstation performed: [id number]         XR hip/pelv 2-3 vws right if performed   Final Result by Sal Medrano MD (11/22 1897)      1  Unremarkable right hip  2   Small focus of gas overlying the right inguinal region of uncertain etiology  This could represent a bowel-containing hernia or soft tissue gas  Clinical correlation is recommended  This would be better visualized with CT if clinically indicated  The study was marked in [facility] for immediate notification        Workstation performed: [id number]         XR abdomen 1 view kub   Final Result by Makenzie Villalobos MD (11/21 1459)      Unremarkable abdomen                 Workstation performed: [id number]               Results from last 7 days   Lab Units 11/23/22  0528 11/22/22  0553 11/21/22  0542 11/20/22 2025   WBC Thousand/uL 7 77 7 03 8 98 11 28*   HEMOGLOBIN g/dL 15 5 14 4 15 3 16 2   HEMATOCRIT % 46 3 44 1 46 9 47 7   PLATELETS Thousands/uL 263 247 280 295   NEUTROS ABS Thousands/µL 6 02  --   --  8 73*         Results from last 7 days   Lab Units 11/23/22  0528 11/22/22  0553 11/21/22  0542 11/20/22 2025   SODIUM mmol/L 136 138 139 138   POTASSIUM mmol/L 4 2 3 8 4 0 4 3   CHLORIDE mmol/L 106 108 107 107   CO2 mmol/L 21 25 25 23   ANION GAP mmol/L 9 5 7 8   BUN mg/dL 17 24 24 18   CREATININE mg/dL 1 13 1 48* 1 89* 1 50*   EGFR ml/min/1 73sq m 69 49 37 49   CALCIUM mg/dL 9 0 8 3* 8 6 9 2         Results from last 7 days   Lab Units 11/23/22  0718 11/22/22  2200 11/22/22  1600 11/22/22  1105 11/22/22  0734 11/21/22  2149 11/21/22  1614 11/21/22  1102 11/21/22  0758 11/21/22  0738 11/21/22  0717 11/20/22  2353   POC GLUCOSE mg/dl 112 105 121 121 95 104 165* 158* 92 64* 68 102     Results from last 7 days   Lab Units 11/23/22  0528 11/22/22  0553 11/21/22  0542 11/20/22 2025   GLUCOSE RANDOM mg/dL 129 104 71 116               Results from last 7 days   Lab Units 11/21/22  1416   CLARITY UA  Clear   COLOR UA  Yellow   SPEC GRAV UA  1 028   PH UA  5 5   GLUCOSE UA mg/dl Negative   KETONES UA mg/dl Trace*   BLOOD UA  Negative   PROTEIN UA mg/dl 100 (2+)*   NITRITE UA  Negative   BILIRUBIN UA  Negative   UROBILINOGEN UA (BE) mg/dl 2 0*   LEUKOCYTES UA  Small*   WBC UA /hpf 10-20*   RBC UA /hpf 2-4*   BACTERIA UA /hpf Occasional   EPITHELIAL CELLS WET PREP /hpf Occasional   MUCUS THREADS  Occasional*       ED Treatment:   Medication Administration from 11/20/2022 5864 to 11/20/2022 2156       Date/Time Order Dose Route Action     11/20/2022 1802 EST oxyCODONE (ROXICODONE) IR tablet 5 mg 5 mg Oral Given     11/20/2022 1802 EST cyclobenzaprine (FLEXERIL) tablet 5 mg 5 mg Oral Given     11/20/2022 1923 EST HYDROmorphone (DILAUDID) injection 1 mg 1 mg Intravenous Given     11/20/2022 2019 EST lactated ringers bolus 500 mL 0 mL Intravenous Stopped     11/20/2022 1923 EST lactated ringers bolus 500 mL 500 mL Intravenous New Bag     11/20/2022 2022 EST HYDROmorphone (DILAUDID) injection 1 mg 1 mg Intravenous Given     11/20/2022 2031 EST HYDROmorphone (DILAUDID) injection 1 mg 1 mg Intravenous Given        Past Medical History:   Diagnosis Date   • Acute renal failure (ARF) ([facility] [address] )     last assessed - 06Sep2017   • Chest pain     last assessed - 04Dec2012   • CPAP (continuous positive airway pressure) dependence    • Depression    • Diabetes mellitus (HCC)    • Dyspnea on exertion     last assessed - 18Sep2017   • Hypertension    • Nephrolithiasis    • Numbness of right foot     numbness on the sole of the right foot; since he was young d/t stenosis   • Obesity    • Onychomycosis     last assessed - [id number]   • JEFF on CPAP    • Sciatica     last assessed - 31Jul2012   • Sleep apnea      Present on Admission:  • Depression with anxiety  • CKD (chronic kidney disease) stage 3, GFR 30-59 ml/min ([facility])  • Obesity (BMI 35 0-39 9 without comorbidity)  • Mixed hyperlipidemia  • Essential hypertension  • BPH without urinary obstruction  • Nephrolithiasis      Admitting Diagnosis: Back pain [M54 9]  Intractable low back pain [M54 59]  Age/Sex: 58 y o  male  Admission Orders:  PT/OT  Daily weights  I/O  NPO    Scheduled Medications:  acetaminophen, 975 mg, Oral, Q8H [address]  amLODIPine, 10 mg, Oral, Daily  atorvastatin, 10 mg, Oral, Daily With Dinner  buPROPion, 150 mg, Oral, Daily  cloNIDine, 0 3 mg, Oral, Q12H INOCENTE  docusate sodium, 100 mg, Oral, BID  heparin (porcine), 5,000 Units, Subcutaneous, Q8H INOCENTE  insulin glargine, 34 Units, Subcutaneous, HS  insulin lispro, 1-5 Units, Subcutaneous, HS  insulin lispro, 2-12 Units, Subcutaneous, TID AC  lidocaine, 1 patch, Topical, Daily  methocarbamol, 500 mg, Oral, Q6H  nicotine, 1 patch, Transdermal, Daily  sertraline, 100 mg, Oral, Daily  tamsulosin, 0 4 mg, Oral, Daily With Dinner      Continuous IV Infusions:  sodium chloride, 100 mL/hr, Intravenous, Continuous      PRN Meds:  HYDROmorphone, 0 2 mg, Intravenous, Q4H PRN 11/21 x 2, 11/22 x 1, 11/23 x 2  oxyCODONE, 5 mg, Oral, Q4H PRN 11/20 x 1, 11/212 x 4, 11/22 x 4, 11/23 x 2  methocarbamol (ROBAXIN) tablet 500 mg q6h po prn 11/20 x 1, 11/21 x 2, 11/22 x 1      IP CONSULT TO ACUTE PAIN SERVICE  IP CONSULT TO UROLOGY    Network Utilization Review Department  ATTENTION: Please call with any questions or concerns to [phone] and carefully listen to the prompts so that you are directed to the right person  All voicemails are confidential   Magi Krysta all requests for admission clinical reviews, approved or denied determinations and any other requests to dedicated fax number below belonging to the campus where the patient is receiving treatment   List of dedicated fax numbers for the Facilities:  [address] DENIALS (Administrative/Medical Necessity) [phone]   [address] (Maternity/NICU/Pediatrics) [phone]   [address] Valarie White [phone]   [facility] Juan José  [phone]   [address] [address] [address] [place] [address] [facility] [phone]   [address] Scott [facility] 134 815 [facility] [phone]

## 2022-11-22 NOTE — PROGRESS NOTES
[facility]  Progress Note - Sarah Zaragoza 1960, 58 y o  male MRN: [id number]  Unit/Bed#: S -33 Encounter: [id number]  Primary Care Provider: Bhargav Snow MD   Date and time admitted to hospital: 11/20/2022  5:46 PM    * Back pain  Assessment & Plan  · Continues to denie saddle anesthesia, bowel or urinary incontinence, numbness, tingling, fever or chills  KUB did not show any stones  Patient states when he moves pain increases to 10/10 but is 4/10 at rest  Xray hip showed possible hernia vs small area of focal gas at inguinal area  CT abdomen pelvis ordered for further evaluation, if hernia or gas present, urgent consult to surgery  Acute pain management referral recs: Decadron bid for 3 days (monitor glucose closely) and DC IV Dilaudid tomorrow  · Pain control:  · Tylenol OTC  · Lidocaine patch  · Oxycodone for moderate pain  · IV Dilaudid for breakthrough  · Robaxin  · Aqua K   · PT/OT evaluations     ANNI (acute kidney injury) (HCC)-resolved as of 11/22/2022  Assessment & Plan  Resolved    Abnormal urinalysis  Assessment & Plan  Proteinuria, trace ketones, and wbc's  Asymptomatic  CKD (chronic kidney disease) stage 3, GFR 30-59 ml/min [facility])  Assessment & Plan  Lab Results   Component Value Date    EGFR 49 11/22/2022    EGFR 37 11/21/2022    EGFR 49 11/20/2022    CREATININE 1 48 (H) 11/22/2022    CREATININE 1 89 (H) 11/21/2022    CREATININE 1 50 (H) 11/20/2022   • Creatinine upon admission: 1 50  o Baseline: 1 4-1 6  · Avoid nephrotoxins and hypotension  · Monitor BMP      Type 2 diabetes mellitus with stage 3a chronic kidney disease, with long-term current use of insulin [facility])  Assessment & Plan  Lab Results   Component Value Date    HGBA1C 10 1 (H) 10/16/2022       Recent Labs     11/21/22  1614 11/21/22  2149 11/22/22  0734 11/22/22  1105   POCGLU 165* 104 95 121       Blood Sugar Average: Last 72 hrs:  · (P) 813 [id number] regimen of glimepiride and Dulaglutide, will hold while inpatient  · Continue home insulin regimen of 34 units of Lantus HS  · Accu-checks and SSI  · Hypoglycemia protocol  · Well controlled     JEFF on CPAP  Assessment & Plan  · CPAP HS  Essential hypertension  Assessment & Plan  • Blood pressure is reviewed and acceptable  One elevated reading of 193/86 on arrival suspect 2/2 acute back pain, blood pressure improved spontaneously without antihypertensive medication intervention  • Continue home regimen of Enalapril, Clonidine and Amlodipine  • Monitor blood pressure  Mixed hyperlipidemia  Assessment & Plan  · Continue statin  Obesity (BMI 35 0-39 9 without comorbidity)  Assessment & Plan  · Encouraged lifestyle modifications  Depression with anxiety  Assessment & Plan  · Continue Zoloft and Wellbutrin  BPH without urinary obstruction  Assessment & Plan  · Continue Flomax  VTE Pharmacologic Prophylaxis: VTE Score: 3 Moderate Risk (Score 3-4) - Pharmacological DVT Prophylaxis Ordered: heparin  Patient Centered Rounds: I performed bedside rounds with nursing staff today  Discussions with Specialists or Other Care Team Provider: attending physician     Education and Discussions with Family / Patient: Patient declined call to   Current Length of Stay: 0 day(s)  Current Patient Status: Inpatient   Discharge Plan: Anticipate discharge in 24-48 hrs to home  Code Status: Level 1 - Full Code    Subjective:   4/10 pain while lying still, 10/10 pain when he attempts to move in any direction  In obvious significant pain during exam while moving legs and hips  No CP, sob, fevers, chills, N/V/D  Objective:     Vitals:   Temp (24hrs), Av 8 °F (36 6 °C), Min:97 6 °F (36 4 °C), Max:98 3 °F (36 8 °C)    Temp:  [97 6 °F (36 4 °C)-98 3 °F (36 8 °C)] 98 3 °F (36 8 °C)  HR:  [64-73] 71  Resp:  [16-18] 18  BP: (134-151)/(84-91) 151/89  SpO2:  [92 %-93 %] 93 %  Body mass index is 38 kg/m²  Input and Output Summary (last 24 hours): Intake/Output Summary (Last 24 hours) at 11/22/2022 1545  Last data filed at 11/22/2022 1453  Gross per 24 hour   Intake 160 ml   Output 1480 ml   Net -1320 ml       Physical Exam:   Physical Exam  Vitals and nursing note reviewed  Constitutional:       General: He is not in acute distress  Appearance: Normal appearance  He is not ill-appearing  HENT:      Head: Normocephalic  Cardiovascular:      Rate and Rhythm: Normal rate and regular rhythm  Pulses: Normal pulses  Heart sounds: Normal heart sounds  Pulmonary:      Effort: Pulmonary effort is normal       Breath sounds: Normal breath sounds  Abdominal:      General: There is distension  Tenderness: There is no abdominal tenderness  There is no guarding or rebound  Musculoskeletal:         General: Tenderness (Right hip and right lower back tenderness) present  Skin:     General: Skin is warm  Neurological:      General: No focal deficit present  Mental Status: He is alert and oriented to person, place, and time  Psychiatric:         Mood and Affect: Mood normal           Additional Data:     Labs:  Results from last 7 days   Lab Units 11/22/22  0553 11/21/22  0542 11/20/22 2025   WBC Thousand/uL 7 03   < > 11 28*   HEMOGLOBIN g/dL 14 4   < > 16 2   HEMATOCRIT % 44 1   < > 47 7   PLATELETS Thousands/uL 247   < > 295   NEUTROS PCT %  --   --  78*   LYMPHS PCT %  --   --  14   MONOS PCT %  --   --  7   EOS PCT %  --   --  1    < > = values in this interval not displayed       Results from last 7 days   Lab Units 11/22/22  0553   SODIUM mmol/L 138   POTASSIUM mmol/L 3 8   CHLORIDE mmol/L 108   CO2 mmol/L 25   BUN mg/dL 24   CREATININE mg/dL 1 48*   ANION GAP mmol/L 5   CALCIUM mg/dL 8 3*   GLUCOSE RANDOM mg/dL 104         Results from last 7 days   Lab Units 11/22/22  1105 11/22/22  0734 11/21/22  2149 11/21/22  1614 11/21/22  1102 11/21/22  0758 11/21/22  0738 11/21/22  6175 11/20/22  2353   POC GLUCOSE mg/dl 121 95 104 165* 158* 92 64* 68 102               Lines/Drains:  Invasive Devices     Peripheral Intravenous Line  Duration           Peripheral IV 11/20/22 Right Antecubital 1 day                      Imaging: Reviewed radiology reports from this admission including: xray(s)    Recent Cultures (last 7 days):         Last 24 Hours Medication List:   Current Facility-Administered Medications   Medication Dose Route Frequency Provider Last Rate   • acetaminophen  975 mg Oral Q8H 528 EVON Wallace     • amLODIPine  10 mg Oral Daily EVON Wade     • atorvastatin  10 mg Oral Daily With EVON HUNTER     • buPROPion  150 mg Oral Daily EVON Wade     • cloNIDine  0 3 mg Oral Q12H 528 EVON Wallace     • docusate sodium  100 mg Oral BID EVON Wade     • heparin (porcine)  5,000 Units Subcutaneous Q8H [address] EVON Ferraro     • HYDROmorphone  0 2 mg Intravenous Q4H PRN EVON Corona     • insulin glargine  34 Units Subcutaneous HS EVON Ferraro     • insulin lispro  1-5 Units Subcutaneous HS EVON Wade     • insulin lispro  2-12 Units Subcutaneous TID AC EVON Ferraro     • lidocaine  1 patch Topical Daily EVON Ferraro     • lisinopril  80 mg Oral Daily EVON Wade     • methocarbamol  500 mg Oral Q6H Christiano Juarez MD     • nicotine  1 patch Transdermal Daily EVON Wade     • oxyCODONE  5 mg Oral Q4H PRN EVON Corona     • sertraline  100 mg Oral Daily EVON Wade     • sodium chloride  100 mL/hr Intravenous Continuous Tyrellhéctor Angela  mL/hr (11/22/22 1211)   • tamsulosin  0 4 mg Oral Daily With EVON HUNTER          Today, Patient Was Seen By: Christiano Juarez MD    **Please Note: This note may have been constructed using a voice recognition system  **

## 2022-11-22 NOTE — OCCUPATIONAL THERAPY NOTE
Occupational Therapy Cancellation    Patient Name: Mei Caldwell  [id number] Date: 11/22/2022 11/22/22 1305   OT Last Visit   OT Visit Date 11/22/22   Note Type   Note type Cancelled Session   Cancel Reasons Other   Additional Comments Second attempt made to see pt for OT evaluation  Per RN, pt just had an xray and has had increased pain since  OT will continue to follow and evaluate as able       [employer], MS, OTR/L

## 2022-11-22 NOTE — ASSESSMENT & PLAN NOTE
Lab Results   Component Value Date    HGBA1C 10 1 (H) 10/16/2022       Recent Labs     11/21/22  1614 11/21/22  2149 11/22/22  0734 11/22/22  1105   POCGLU 165* 104 95 121       Blood Sugar Average: Last 72 hrs:  · (P) 453 [id number] regimen of glimepiride and Dulaglutide, will hold while inpatient  · Continue home insulin regimen of 34 units of Lantus HS  · Accu-checks and SSI  · Hypoglycemia protocol    · Well controlled

## 2022-11-22 NOTE — CONSULTS
Consult Note- Acute Pain Service   Sidra Norton 58 y o  male MRN: [id number]  Unit/Bed#: S -01 Encounter: [id number]               Assessment/Plan     Assessment:   Patient Active Problem List   Diagnosis   • BPH without urinary obstruction   • Depression with anxiety   • Diabetes mellitus with neurological manifestation ([facility] )   • Obesity (BMI 35 0-39 9 without comorbidity)   • Mixed hyperlipidemia   • Essential hypertension   • Hypogonadism, male   • Nephrolithiasis   • JEFF on CPAP   • Spinal stenosis   • Vitamin D deficiency   • Acquired deformity of foot   • Metatarsalgia of both feet   • Pain in both feet   • Diabetic polyneuropathy associated with type 2 diabetes mellitus (HCC)   • Fatigue   • Seasonal allergies   • Onychomycosis   • Corns   • Arthralgia   • Hallux valgus   • Bunion   • Acquired pes planus   • Bilateral renal cysts   • Type 2 diabetes mellitus with stage 3a chronic kidney disease, with long-term current use of insulin ([facility])   • CKD (chronic kidney disease) stage 3, GFR 30-59 ml/min ([facility])   • Vitamin B12 deficiency   • COVID-19 virus infection   • Back pain   • ANNI (acute kidney injury) ([place] [address] )   • Abnormal urinalysis      Sidra Norton is a 58 y o  male who presents with intractable right lower back pain which started about a week ago and progressively became more severe which limited his ability to ambulate  Patient has a history of JEFF, chronic kidney disease, DM and right-sided sciatica  Prior to admission was utilizing Tylenol, Advil and TENS unit at home without relief  X-ray of lumbar spine and hip/pelvis pending  Plan:   Multimodal analgesia:  · Tylenol 975 mg every 8 hours scheduled  · Lidocaine patch daily, on for 12 hours and off 12 hours  · Avoid NSAIDs given acute kidney injury  · Estimated Creatinine Clearance: 69 4 mL/min (A) (by C-G formula based on SCr of 1 48 mg/dL (H))    · Start Decadron 2 mg twice a day for 3 days for anti-inflammatory effect  · Continue close monitoring on glucose levels  · Robaxin 500 mg every 6 hours scheduled  · Change Oxycodone to 5 mg every 4 hours as needed for moderate or severe pain  · Discontinue Oxycodone 10 mg dose  · Continue lower doses of oxycodone based on renal function  · Dilaudid 0 2mg IV every 4 hours as needed for breakthrough pain  · D/C IV dilaudid tomorrow  · PT consult for back pain; also follow-up outpatient   · Aqua K-pad as needed to lower back for comfort/spasms    Bowel Regimen:  · Colace 100 mg twice a day  · Add senna daily to prevent opioid induced constipation    Treatment recommendations and plan of care discussed with bedside nursing staff and Primary Care Service  APS will continue to follow  Please contact Acute Pain Service - [facility] via RealtimeBoard from [phone] with additional questions or concerns  See Elias or Rashid for additional contacts and after hours information  History of Present Illness    Admit Date:  11/20/2022  Hospital Day:  0 days  Primary Service:  Hospitalist  Attending Provider:  Vic Lew*  Reason for Consult / Principal Problem: acute on chronic pain   HPI: Gildardo Garcia is a 58 y o  male who presents with intractable right lower back pain which started about a week ago and progressively became more severe which limited his ability to ambulate  Patient has a history of right-sided sciatica  Prior to admission was utilizing Tylenol, Advil and TENS unit at home without relief  Current pain location(s): right lower back/right hip  Pain Scale:  0-5  Quality: "grabbing/twisting"  Current Analgesic regimen:  Patient resting in bed, reporting right lower back pain that radiates towards his right hip  Denied radiation to right leg, weakness or paresthesias  From chronic right sided sciatica patient reports very mild plantar numbness which has been ongoing for years  Denies bowel or bladder dysfunction  No recent surgeries or trauma    Mild improvement in pain with as needed oxycodone  Pain is most severe when he is sitting up at 90° or ambulating  Pain History:  Reports a history of right-sided sciatica since age 23 status post epidural steroid injection with adequate pain control  Does not take opioids or other pain medications chronically  I have reviewed the patient's controlled substance dispensing history in the Prescription Drug Monitoring Program in compliance with the [facility] regulations before prescribing any controlled substances  Inpatient consult to Acute Pain Service  Consult performed by: EVON Harris  Consult ordered by: Som Montero MD          Review of Systems   Respiratory: Negative for shortness of breath  Cardiovascular: Negative for chest pain  Gastrointestinal: Negative for abdominal pain, constipation, diarrhea, nausea and vomiting  Genitourinary: Negative for difficulty urinating  Musculoskeletal: Positive for back pain  Negative for neck pain  Neurological: Positive for numbness (right plantar foot; chronic and present PTA)  Negative for dizziness, weakness and headaches  All other systems reviewed and are negative        Historical Information   Past Medical History:   Diagnosis Date   • Acute renal failure (ARF) ([facility] )     last assessed - [id number]   • Chest pain     last assessed - [id number]   • CPAP (continuous positive airway pressure) dependence    • Depression    • Diabetes mellitus ([facility] )    • Dyspnea on exertion     last assessed - [id number]   • Hypertension    • Nephrolithiasis    • Numbness of right foot     numbness on the sole of the right foot; since he was young d/t stenosis   • Obesity    • Onychomycosis     last assessed - [id number]   • JEFF on CPAP    • Sciatica     last assessed - [id number]   • Sleep apnea      Past Surgical History:   Procedure Laterality Date   • COLONOSCOPY     • CYSTOSCOPY W/ LASER LITHOTRIPSY     • KNEE ARTHROSCOPY Left 11/2013   • VT LAP,CHOLECYSTECTOMY N/A 2/8/2019    Procedure: ROBOTIC ASSISTED LAPAROSCOPIC CHOLECYSTECTOMY;  Surgeon: Joshua Avendaño DO;  Location: [facility];  Service: General   • PROSTATE BIOPSY      Needle Biopsy - Negative     Social History   Social History     Substance and Sexual Activity   Alcohol Use Yes     Social History     Substance and Sexual Activity   Drug Use No     Social History     Tobacco Use   Smoking Status Some Days   • Types: Cigars   Smokeless Tobacco Never   Tobacco Comments    3 cigars per week     Family History: non-contributory    Meds/Allergies   all current active meds have been reviewed, current meds:   Current Facility-Administered Medications   Medication Dose Route Frequency   • acetaminophen (TYLENOL) tablet 975 mg  975 mg Oral Q8H [facility]   • amLODIPine (NORVASC) tablet 10 mg  10 mg Oral Daily   • atorvastatin (LIPITOR) tablet 10 mg  10 mg Oral Daily With Dinner   • buPROPion (WELLBUTRIN XL) 24 hr tablet 150 mg  150 mg Oral Daily   • cloNIDine (CATAPRES) tablet 0 3 mg  0 3 mg Oral Q12H [facility]   • docusate sodium (COLACE) capsule 100 mg  100 mg Oral BID   • heparin (porcine) subcutaneous injection 5,000 Units  5,000 Units Subcutaneous Q8H [facility]   • HYDROmorphone (DILAUDID) injection 0 5 mg  0 5 mg Intravenous Q4H PRN   • insulin glargine (LANTUS) subcutaneous injection 34 Units 0 34 mL  34 Units Subcutaneous HS   • insulin lispro (HumaLOG) 100 units/mL subcutaneous injection 1-5 Units  1-5 Units Subcutaneous HS   • insulin lispro (HumaLOG) 100 units/mL subcutaneous injection 2-12 Units  2-12 Units Subcutaneous TID AC   • lidocaine (LIDODERM) 5 % patch 1 patch  1 patch Topical Daily   • lisinopril (ZESTRIL) tablet 80 mg  80 mg Oral Daily   • methocarbamol (ROBAXIN) tablet 500 mg  500 mg Oral Q6H   • nicotine (NICODERM CQ) 14 mg/24hr TD 24 hr patch 1 patch  1 patch Transdermal Daily   • oxyCODONE (ROXICODONE) immediate release tablet 10 mg  10 mg Oral Q4H PRN   • oxyCODONE (ROXICODONE) IR tablet 5 mg  5 mg Oral Q4H PRN   • sertraline (ZOLOFT) tablet 100 mg  100 mg Oral Daily   • sodium chloride 0 9 % infusion  100 mL/hr Intravenous Continuous   • tamsulosin (FLOMAX) capsule 0 4 mg  0 4 mg Oral Daily With Dinner    and PTA meds:   Prior to Admission Medications   Prescriptions Last Dose Informant Patient Reported? Taking? Accu-Chek Guide test strip   No No   Sig: USE AND DISCARD 1 TEST     STRIP TWICE DAILY AS       INSTRUCTED   BD Pen Needle Alicia 2nd Gen 32G X 4 MM MISC   No No   Sig: INJECT SUBCUTANEOUSLY DAILY   Dulaglutide 4 5 MG/0 5ML SOPN   No No   Sig: Inject 0 5 mL (4 5 mg total) under the skin once a week   Insulin Glargine Solostar (Basaglar KwikPen) 100 UNIT/ML SOPN   No No   Sig: Inject 0 34 mL (34 Units total) under the skin daily   Lidocaine Viscous HCl (XYLOCAINE) 2 % mucosal solution   No No   Sig: Swish and spit 15 mL 4 (four) times a day as needed for mouth pain or discomfort   amLODIPine (NORVASC) 10 mg tablet   No No   Sig: TAKE 1 TABLET DAILY   atorvastatin (LIPITOR) 10 mg tablet   No No   Sig: TAKE 1 TABLET DAILY   buPROPion (WELLBUTRIN XL) 150 mg 24 hr tablet   No No   Sig: TAKE 1 TABLET DAILY   ciclopirox (LOPROX) 0 77 % cream   No No   Sig: Apply topically 2 (two) times a day for 20 days   cloNIDine (CATAPRES) 0 3 mg tablet   No No   Sig: TAKE 1 TABLET TWICE A DAY   enalapril (VASOTEC) 20 mg tablet   No No   Sig: TAKE 1 TABLET TWICE A DAY   glimepiride (AMARYL) 4 mg tablet   No No   Sig: TAKE 1 TABLET TWICE A DAY   ketoconazole (NIZORAL) 2 % cream   No No   Sig: Apply topically daily   nystatin (MYCOSTATIN) powder   No No   Sig: Apply topically 3 (three) times a day as needed (groin rash)   sertraline (ZOLOFT) 100 mg tablet   No No   Sig: TAKE 1 TABLET DAILY   tamsulosin (FLOMAX) 0 4 mg   No No   Sig: TAKE 1 CAPSULE DAILY   terbinafine (LamISIL) 250 mg tablet   No No   Si tab p o  every other day        Facility-Administered Medications: None       No Known Allergies    Objective   Temp:  [97 6 °F (36 4 °C)-97 9 °F (36 6 °C)] 97 6 °F (36 4 °C)  HR:  [64-75] 64  Resp:  [16-18] 16  BP: (128-147)/(83-91) 134/84    Intake/Output Summary (Last 24 hours) at 11/22/2022 1249  Last data filed at 11/22/2022 1230  Gross per 24 hour   Intake 160 ml   Output 1200 ml   Net -1040 ml       Physical Exam  Vitals reviewed  Constitutional:       General: He is awake  He is not in acute distress  Appearance: He is not ill-appearing, toxic-appearing or diaphoretic  HENT:      Head: Normocephalic and atraumatic  Nose: Nose normal  No congestion or rhinorrhea  Mouth/Throat:      Mouth: Mucous membranes are moist    Eyes:      Extraocular Movements: Extraocular movements intact  Cardiovascular:      Rate and Rhythm: Normal rate  Pulses: Normal pulses  Pulmonary:      Effort: Pulmonary effort is normal  No tachypnea, bradypnea or respiratory distress  Breath sounds: Normal breath sounds  No decreased breath sounds, wheezing, rhonchi or rales  Abdominal:      General: Bowel sounds are normal  There is no distension  Palpations: Abdomen is soft  Tenderness: There is no abdominal tenderness  Musculoskeletal:      Thoracic back: Normal  No spasms or tenderness  Lumbar back: Tenderness (right flank) present  No spasms  Right lower leg: No edema  Left lower leg: No edema  Comments: No right SI joint tenderness   Skin:     General: Skin is warm and dry  Coloration: Skin is not pale  Findings: No rash  Neurological:      Mental Status: He is alert and oriented to person, place, and time  Mental status is at baseline  Motor: No weakness or tremor  Psychiatric:         Attention and Perception: Attention normal          Mood and Affect: Mood normal  Mood is not anxious  Speech: Speech normal          Behavior: Behavior normal  Behavior is cooperative  Cognition and Memory: Cognition and memory normal          Lab Results:   I have personally reviewed pertinent labs  , CBC: Lab Results   Component Value Date    WBC 7 03 11/22/2022    HGB 14 4 11/22/2022    HCT 44 1 11/22/2022    MCV 91 11/22/2022     11/22/2022    MCH 29 8 11/22/2022    MCHC 32 7 11/22/2022    RDW 13 2 11/22/2022    MPV 9 4 11/22/2022   , CMP:   Lab Results   Component Value Date    SODIUM 138 11/22/2022    K 3 8 11/22/2022     11/22/2022    CO2 25 11/22/2022    BUN 24 11/22/2022    CREATININE 1 48 (H) 11/22/2022    CALCIUM 8 3 (L) 11/22/2022    EGFR 49 11/22/2022   , BMP:  Lab Results   Component Value Date    SODIUM 138 11/22/2022    K 3 8 11/22/2022     11/22/2022    CO2 25 11/22/2022    BUN 24 11/22/2022    CREATININE 1 48 (H) 11/22/2022    GLUC 104 11/22/2022    CALCIUM 8 3 (L) 11/22/2022    AGAP 5 11/22/2022    EGFR 49 11/22/2022   , PT/PTT:No results found for: PT, PTT    Imaging Studies: I have personally reviewed pertinent reports  Please note that the APS provides consultative services regarding pain management only  With the exception of ketamine and epidural infusions and except when indicated, final decisions regarding starting or changing doses of analgesic medications are at the discretion of the consulting service  Off hours consultation and/or medication management is generally not available      EVON Maguire  Acute Pain Service

## 2022-11-22 NOTE — OCCUPATIONAL THERAPY NOTE
Occupational Therapy Cancellation    Patient Name: Sidra Norton  ETERV'Q Date: 11/22/2022 11/22/22 1019   OT Last Visit   OT Visit Date 11/22/22   Note Type   Note type Cancelled Session   Cancel Reasons Other   Additional Comments OT orders received and chart reviewed  Spoke to RN who reports pt just had pain relief and muscle relaxant  Spoke to pt who reports yesterday he was able to walk to the bathroom, but pain worsened yesterday evening and he is unable to tolerate even moving in bed without severe muscle spasms in right lower back  Pt using urinal and bedpan and declines evaluation participation until pain is under control  OT to hold evaluation at this time and follow up as able       [place], MS, OTR/L

## 2022-11-23 ENCOUNTER — HOSPITAL ENCOUNTER (INPATIENT)
Facility: HOSPITAL | Age: 62
LOS: 3 days | Discharge: HOME | DRG: 661 | End: 2022-11-26
Attending: INTERNAL MEDICINE | Admitting: INTERNAL MEDICINE
Payer: COMMERCIAL

## 2022-11-23 VITALS
SYSTOLIC BLOOD PRESSURE: 159 MMHG | DIASTOLIC BLOOD PRESSURE: 95 MMHG | OXYGEN SATURATION: 95 % | BODY MASS INDEX: 38.15 KG/M2 | HEART RATE: 69 BPM | RESPIRATION RATE: 16 BRPM | TEMPERATURE: 97.6 F | WEIGHT: 272.49 LBS | HEIGHT: 71 IN

## 2022-11-23 DIAGNOSIS — R93.5 ABNORMAL CT OF THE ABDOMEN: Primary | ICD-10-CM

## 2022-11-23 DIAGNOSIS — M54.59 INTRACTABLE LOW BACK PAIN: ICD-10-CM

## 2022-11-23 DIAGNOSIS — N13.30 HYDRONEPHROSIS: ICD-10-CM

## 2022-11-23 DIAGNOSIS — N20.1 URETEROLITHIASIS: ICD-10-CM

## 2022-11-23 PROBLEM — B37.2 CANDIDAL INTERTRIGO: Status: ACTIVE | Noted: 2022-11-23

## 2022-11-23 LAB
ANION GAP SERPL CALCULATED.3IONS-SCNC: 9 MMOL/L (ref 4–13)
BASOPHILS # BLD AUTO: 0.05 THOUSANDS/ÂΜL (ref 0–0.1)
BASOPHILS NFR BLD AUTO: 1 % (ref 0–1)
BUN SERPL-MCNC: 17 MG/DL (ref 5–25)
CALCIUM SERPL-MCNC: 9 MG/DL (ref 8.4–10.2)
CHLORIDE SERPL-SCNC: 106 MMOL/L (ref 96–108)
CO2 SERPL-SCNC: 21 MMOL/L (ref 21–32)
CREAT SERPL-MCNC: 1.13 MG/DL (ref 0.6–1.3)
EOSINOPHIL # BLD AUTO: 0.1 THOUSAND/ÂΜL (ref 0–0.61)
EOSINOPHIL NFR BLD AUTO: 1 % (ref 0–6)
ERYTHROCYTE [DISTWIDTH] IN BLOOD BY AUTOMATED COUNT: 12.8 % (ref 11.6–15.1)
GFR SERPL CREATININE-BSD FRML MDRD: 69 ML/MIN/1.73SQ M
GLUCOSE SERPL-MCNC: 112 MG/DL (ref 65–140)
GLUCOSE SERPL-MCNC: 113 MG/DL (ref 65–140)
GLUCOSE SERPL-MCNC: 129 MG/DL (ref 65–140)
GLUCOSE SERPL-MCNC: 158 MG/DL (ref 65–140)
GLUCOSE SERPL-MCNC: 92 MG/DL (ref 65–140)
HCT VFR BLD AUTO: 46.3 % (ref 36.5–49.3)
HGB BLD-MCNC: 15.5 G/DL (ref 12–17)
IMM GRANULOCYTES # BLD AUTO: 0.03 THOUSAND/UL (ref 0–0.2)
IMM GRANULOCYTES NFR BLD AUTO: 0 % (ref 0–2)
LYMPHOCYTES # BLD AUTO: 1.12 THOUSANDS/ÂΜL (ref 0.6–4.47)
LYMPHOCYTES NFR BLD AUTO: 14 % (ref 14–44)
MCH RBC QN AUTO: 29.9 PG (ref 26.8–34.3)
MCHC RBC AUTO-ENTMCNC: 33.5 G/DL (ref 31.4–37.4)
MCV RBC AUTO: 89 FL (ref 82–98)
MONOCYTES # BLD AUTO: 0.45 THOUSAND/ÂΜL (ref 0.17–1.22)
MONOCYTES NFR BLD AUTO: 6 % (ref 4–12)
NEUTROPHILS # BLD AUTO: 6.02 THOUSANDS/ÂΜL (ref 1.85–7.62)
NEUTS SEG NFR BLD AUTO: 78 % (ref 43–75)
NRBC BLD AUTO-RTO: 0 /100 WBCS
PLATELET # BLD AUTO: 263 THOUSANDS/UL (ref 149–390)
PMV BLD AUTO: 9.8 FL (ref 8.9–12.7)
POTASSIUM SERPL-SCNC: 4.2 MMOL/L (ref 3.5–5.3)
PROCALCITONIN SERPL-MCNC: 0.08 NG/ML
RBC # BLD AUTO: 5.18 MILLION/UL (ref 3.88–5.62)
SODIUM SERPL-SCNC: 136 MMOL/L (ref 135–147)
WBC # BLD AUTO: 7.77 THOUSAND/UL (ref 4.31–10.16)

## 2022-11-23 PROCEDURE — 94660 CPAP INITIATION&MGMT: CPT

## 2022-11-23 PROCEDURE — 94760 N-INVAS EAR/PLS OXIMETRY 1: CPT

## 2022-11-23 PROCEDURE — 99223 1ST HOSP IP/OBS HIGH 75: CPT | Performed by: INTERNAL MEDICINE

## 2022-11-23 PROCEDURE — 82948 REAGENT STRIP/BLOOD GLUCOSE: CPT

## 2022-11-23 RX ORDER — SODIUM CHLORIDE 9 MG/ML
100 INJECTION, SOLUTION INTRAVENOUS CONTINUOUS
Status: DISCONTINUED | OUTPATIENT
Start: 2022-11-23 | End: 2022-11-26 | Stop reason: HOSPADM

## 2022-11-23 RX ORDER — BUPROPION HYDROCHLORIDE 150 MG/1
150 TABLET ORAL DAILY
Status: DISCONTINUED | OUTPATIENT
Start: 2022-11-24 | End: 2022-11-26 | Stop reason: HOSPADM

## 2022-11-23 RX ORDER — TAMSULOSIN HYDROCHLORIDE 0.4 MG/1
0.4 CAPSULE ORAL
Status: DISCONTINUED | OUTPATIENT
Start: 2022-11-23 | End: 2022-11-26 | Stop reason: HOSPADM

## 2022-11-23 RX ORDER — DOCUSATE SODIUM 100 MG/1
100 CAPSULE, LIQUID FILLED ORAL 2 TIMES DAILY
Status: DISCONTINUED | OUTPATIENT
Start: 2022-11-23 | End: 2022-11-26 | Stop reason: HOSPADM

## 2022-11-23 RX ORDER — INSULIN LISPRO 100 [IU]/ML
1-5 INJECTION, SOLUTION INTRAVENOUS; SUBCUTANEOUS
Status: DISCONTINUED | OUTPATIENT
Start: 2022-11-23 | End: 2022-11-26 | Stop reason: HOSPADM

## 2022-11-23 RX ORDER — AMLODIPINE BESYLATE 10 MG/1
10 TABLET ORAL DAILY
Status: DISCONTINUED | OUTPATIENT
Start: 2022-11-24 | End: 2022-11-26 | Stop reason: HOSPADM

## 2022-11-23 RX ORDER — INSULIN LISPRO 100 [IU]/ML
2-12 INJECTION, SOLUTION INTRAVENOUS; SUBCUTANEOUS
Status: DISCONTINUED | OUTPATIENT
Start: 2022-11-23 | End: 2022-11-26 | Stop reason: HOSPADM

## 2022-11-23 RX ORDER — HEPARIN SODIUM 5000 [USP'U]/ML
5000 INJECTION, SOLUTION INTRAVENOUS; SUBCUTANEOUS EVERY 8 HOURS SCHEDULED
Status: DISCONTINUED | OUTPATIENT
Start: 2022-11-23 | End: 2022-11-26 | Stop reason: HOSPADM

## 2022-11-23 RX ORDER — HYDROMORPHONE HCL/PF 1 MG/ML
0.5 SYRINGE (ML) INJECTION EVERY 4 HOURS PRN
Status: DISCONTINUED | OUTPATIENT
Start: 2022-11-23 | End: 2022-11-24

## 2022-11-23 RX ORDER — NICOTINE 21 MG/24HR
1 PATCH, TRANSDERMAL 24 HOURS TRANSDERMAL DAILY
Status: DISCONTINUED | OUTPATIENT
Start: 2022-11-24 | End: 2022-11-26 | Stop reason: HOSPADM

## 2022-11-23 RX ORDER — METHOCARBAMOL 500 MG/1
500 TABLET, FILM COATED ORAL EVERY 6 HOURS
Status: DISCONTINUED | OUTPATIENT
Start: 2022-11-23 | End: 2022-11-23

## 2022-11-23 RX ORDER — SERTRALINE HYDROCHLORIDE 100 MG/1
100 TABLET, FILM COATED ORAL DAILY
Status: DISCONTINUED | OUTPATIENT
Start: 2022-11-24 | End: 2022-11-26 | Stop reason: HOSPADM

## 2022-11-23 RX ORDER — OXYCODONE HYDROCHLORIDE 5 MG/1
5 TABLET ORAL EVERY 4 HOURS PRN
Status: DISCONTINUED | OUTPATIENT
Start: 2022-11-23 | End: 2022-11-24

## 2022-11-23 RX ORDER — LIDOCAINE 50 MG/G
1 PATCH TOPICAL DAILY
Status: DISCONTINUED | OUTPATIENT
Start: 2022-11-24 | End: 2022-11-26 | Stop reason: HOSPADM

## 2022-11-23 RX ORDER — METHOCARBAMOL 750 MG/1
750 TABLET, FILM COATED ORAL EVERY 8 HOURS SCHEDULED
Status: DISCONTINUED | OUTPATIENT
Start: 2022-11-23 | End: 2022-11-26 | Stop reason: HOSPADM

## 2022-11-23 RX ORDER — HYDROMORPHONE HCL IN WATER/PF 6 MG/30 ML
0.2 PATIENT CONTROLLED ANALGESIA SYRINGE INTRAVENOUS EVERY 4 HOURS PRN
Status: DISCONTINUED | OUTPATIENT
Start: 2022-11-23 | End: 2022-11-23

## 2022-11-23 RX ORDER — INSULIN GLARGINE 100 [IU]/ML
34 INJECTION, SOLUTION SUBCUTANEOUS
Status: DISCONTINUED | OUTPATIENT
Start: 2022-11-23 | End: 2022-11-26 | Stop reason: HOSPADM

## 2022-11-23 RX ORDER — ACETAMINOPHEN 325 MG/1
975 TABLET ORAL EVERY 8 HOURS SCHEDULED
Status: DISCONTINUED | OUTPATIENT
Start: 2022-11-23 | End: 2022-11-26 | Stop reason: HOSPADM

## 2022-11-23 RX ORDER — ATORVASTATIN CALCIUM 10 MG/1
10 TABLET, FILM COATED ORAL
Status: DISCONTINUED | OUTPATIENT
Start: 2022-11-23 | End: 2022-11-26 | Stop reason: HOSPADM

## 2022-11-23 RX ORDER — OXYCODONE HYDROCHLORIDE 10 MG/1
10 TABLET ORAL EVERY 4 HOURS PRN
Status: DISCONTINUED | OUTPATIENT
Start: 2022-11-23 | End: 2022-11-24

## 2022-11-23 RX ORDER — NYSTATIN 100000 [USP'U]/G
POWDER TOPICAL 2 TIMES DAILY
Status: DISCONTINUED | OUTPATIENT
Start: 2022-11-23 | End: 2022-11-26 | Stop reason: HOSPADM

## 2022-11-23 RX ADMIN — ACETAMINOPHEN 975 MG: 325 TABLET, FILM COATED ORAL at 05:35

## 2022-11-23 RX ADMIN — SODIUM CHLORIDE 100 ML/HR: 0.9 INJECTION, SOLUTION INTRAVENOUS at 09:46

## 2022-11-23 RX ADMIN — HEPARIN SODIUM 5000 UNITS: 5000 INJECTION INTRAVENOUS; SUBCUTANEOUS at 22:34

## 2022-11-23 RX ADMIN — METHOCARBAMOL TABLETS 750 MG: 750 TABLET, COATED ORAL at 17:30

## 2022-11-23 RX ADMIN — METHOCARBAMOL 500 MG: 500 TABLET ORAL at 03:25

## 2022-11-23 RX ADMIN — METHOCARBAMOL 500 MG: 500 TABLET ORAL at 09:40

## 2022-11-23 RX ADMIN — OXYCODONE HYDROCHLORIDE 5 MG: 5 TABLET ORAL at 03:25

## 2022-11-23 RX ADMIN — TAMSULOSIN HYDROCHLORIDE 0.4 MG: 0.4 CAPSULE ORAL at 17:30

## 2022-11-23 RX ADMIN — OXYCODONE HYDROCHLORIDE 10 MG: 10 TABLET ORAL at 22:37

## 2022-11-23 RX ADMIN — HYDROMORPHONE HYDROCHLORIDE 0.2 MG: 0.2 INJECTION, SOLUTION INTRAMUSCULAR; INTRAVENOUS; SUBCUTANEOUS at 05:37

## 2022-11-23 RX ADMIN — SERTRALINE 100 MG: 100 TABLET, FILM COATED ORAL at 09:40

## 2022-11-23 RX ADMIN — ATORVASTATIN CALCIUM 10 MG: 10 TABLET, FILM COATED ORAL at 17:30

## 2022-11-23 RX ADMIN — LIDOCAINE 5% 1 PATCH: 700 PATCH TOPICAL at 09:41

## 2022-11-23 RX ADMIN — AMLODIPINE BESYLATE 10 MG: 10 TABLET ORAL at 09:40

## 2022-11-23 RX ADMIN — INSULIN LISPRO 1 UNITS: 100 INJECTION, SOLUTION INTRAVENOUS; SUBCUTANEOUS at 22:35

## 2022-11-23 RX ADMIN — OXYCODONE HYDROCHLORIDE 5 MG: 5 TABLET ORAL at 17:30

## 2022-11-23 RX ADMIN — CLONIDINE HYDROCHLORIDE 0.3 MG: 0.1 TABLET ORAL at 09:40

## 2022-11-23 RX ADMIN — SODIUM CHLORIDE 100 ML/HR: 0.9 INJECTION, SOLUTION INTRAVENOUS at 16:00

## 2022-11-23 RX ADMIN — HYDROMORPHONE HYDROCHLORIDE 0.5 MG: 1 INJECTION, SOLUTION INTRAMUSCULAR; INTRAVENOUS; SUBCUTANEOUS at 19:00

## 2022-11-23 RX ADMIN — METHOCARBAMOL TABLETS 750 MG: 750 TABLET, COATED ORAL at 22:33

## 2022-11-23 RX ADMIN — OXYCODONE HYDROCHLORIDE 5 MG: 5 TABLET ORAL at 09:44

## 2022-11-23 RX ADMIN — ACETAMINOPHEN 975 MG: 325 TABLET ORAL at 22:33

## 2022-11-23 RX ADMIN — BUPROPION HYDROCHLORIDE 150 MG: 150 TABLET, EXTENDED RELEASE ORAL at 09:40

## 2022-11-23 RX ADMIN — ACETAMINOPHEN 975 MG: 325 TABLET, FILM COATED ORAL at 13:20

## 2022-11-23 RX ADMIN — INSULIN GLARGINE 34 UNITS: 100 INJECTION, SOLUTION SUBCUTANEOUS at 22:34

## 2022-11-23 RX ADMIN — CLONIDINE HYDROCHLORIDE 0.3 MG: 0.2 TABLET ORAL at 22:33

## 2022-11-23 RX ADMIN — HYDROMORPHONE HYDROCHLORIDE 0.2 MG: 0.2 INJECTION, SOLUTION INTRAMUSCULAR; INTRAVENOUS; SUBCUTANEOUS at 00:32

## 2022-11-23 NOTE — QUICK NOTE
Urology has reviewed patient's CT scan which shows obstructive 12 mm proximal right ureteral calculus causing mild right hydroureteronephrosis  They request that he be transferred to [facility] for OR for possible stent placement tomorrow  Transport request has been sent  Discussed with PACs  Patient has been accepted by Dr Celia Frances  Awaiting bed placement

## 2022-11-23 NOTE — ED PROVIDER NOTES
History  Chief Complaint   Patient presents with   • Back Pain     Pt reports possibly tweaking back a couple days ago and increasingly getting worse, spasms, denies a specific injury/trauma, using tens unit without relief     58 yr male with hx of sciatica in past-- after working in yard approx 1 week ago- no specific injury c/o worsening r sided low back pain - today with decreased ability  To do daily activities-- no r sided sciatica- no abd/flank pain no gu comps-- no ble weakness new sensory comps- has bel diabetic neuropathy - unchanged       History provided by:  Patient and spouse   used: No    Back Pain      Prior to Admission Medications   Prescriptions Last Dose Informant Patient Reported? Taking?    Accu-Chek Guide test strip   No No   Sig: USE AND DISCARD 1 TEST     STRIP TWICE DAILY AS       INSTRUCTED   BD Pen Needle Alicia 2nd Gen 32G X 4 MM MISC   No No   Sig: INJECT SUBCUTANEOUSLY DAILY   Dulaglutide 4 5 MG/0 5ML SOPN   No No   Sig: Inject 0 5 mL (4 5 mg total) under the skin once a week   Insulin Glargine Solostar (Basaglar KwikPen) 100 UNIT/ML SOPN   No No   Sig: Inject 0 34 mL (34 Units total) under the skin daily   Lidocaine Viscous HCl (XYLOCAINE) 2 % mucosal solution   No No   Sig: Swish and spit 15 mL 4 (four) times a day as needed for mouth pain or discomfort   amLODIPine (NORVASC) 10 mg tablet   No No   Sig: TAKE 1 TABLET DAILY   atorvastatin (LIPITOR) 10 mg tablet   No No   Sig: TAKE 1 TABLET DAILY   buPROPion (WELLBUTRIN XL) 150 mg 24 hr tablet   No No   Sig: TAKE 1 TABLET DAILY   ciclopirox (LOPROX) 0 77 % cream   No No   Sig: Apply topically 2 (two) times a day for 20 days   cloNIDine (CATAPRES) 0 3 mg tablet   No No   Sig: TAKE 1 TABLET TWICE A DAY   enalapril (VASOTEC) 20 mg tablet   No No   Sig: TAKE 1 TABLET TWICE A DAY   glimepiride (AMARYL) 4 mg tablet   No No   Sig: TAKE 1 TABLET TWICE A DAY   ketoconazole (NIZORAL) 2 % cream   No No   Sig: Apply topically daily nystatin (MYCOSTATIN) powder   No No   Sig: Apply topically 3 (three) times a day as needed (groin rash)   sertraline (ZOLOFT) 100 mg tablet   No No   Sig: TAKE 1 TABLET DAILY   tamsulosin (FLOMAX) 0 4 mg   No No   Sig: TAKE 1 CAPSULE DAILY   terbinafine (LamISIL) 250 mg tablet   No No   Si tab p o  every other day  Facility-Administered Medications: None       Past Medical History:   Diagnosis Date   • Acute renal failure (ARF) ([facility] )     last assessed - 2017   • Chest pain     last assessed - [id number]   • CPAP (continuous positive airway pressure) dependence    • Depression    • Diabetes mellitus ([facility] )    • Dyspnea on exertion     last assessed - [id number]   • Hypertension    • Nephrolithiasis    • Numbness of right foot     numbness on the sole of the right foot; since he was young d/t stenosis   • Obesity    • Onychomycosis     last assessed - [id number]   • JEFF on CPAP    • Sciatica     last assessed - [id number]   • Sleep apnea        Past Surgical History:   Procedure Laterality Date   • COLONOSCOPY     • CYSTOSCOPY W/ LASER LITHOTRIPSY     • KNEE ARTHROSCOPY Left 2013   • LA LAP,CHOLECYSTECTOMY N/A 2019    Procedure: ROBOTIC ASSISTED LAPAROSCOPIC CHOLECYSTECTOMY;  Surgeon: Elmer Martinez DO;  Location: [facility];  Service: General   • PROSTATE BIOPSY      Needle Biopsy - Negative       Family History   Adopted: Yes   Problem Relation Age of Onset   • Alzheimer's disease Mother    • Alcohol abuse Neg Hx    • Drug abuse Neg Hx    • Depression Neg Hx    • Substance Abuse Neg Hx    • Mental illness Neg Hx      I have reviewed and agree with the history as documented  E-Cigarette/Vaping   • E-Cigarette Use Never User      E-Cigarette/Vaping Substances     Social History     Tobacco Use   • Smoking status: Some Days     Types: Cigars   • Smokeless tobacco: Never   • Tobacco comments:     3 cigars per week   Vaping Use   • Vaping Use: Never used   Substance Use Topics   • Alcohol use:  Yes • Drug use: No       Review of Systems   Constitutional: Negative  HENT: Negative  Eyes: Negative  Respiratory: Negative  Cardiovascular: Negative  Gastrointestinal: Negative  Endocrine: Negative  Genitourinary: Negative  Musculoskeletal: Positive for back pain  Negative for arthralgias, gait problem, joint swelling, myalgias, neck pain and neck stiffness  Skin: Negative  Allergic/Immunologic: Negative  Neurological: Negative  Hematological: Negative  Psychiatric/Behavioral: Negative  Physical Exam  Physical Exam  Vitals and nursing note reviewed  Constitutional:       General: He is in acute distress  Appearance: Normal appearance  He is not ill-appearing, toxic-appearing or diaphoretic  Comments: avss-- htnsive-- pulse ox  96 % on ra- interpretation is normal- no intervention - pt in pain    HENT:      Head: Normocephalic and atraumatic  Nose: Nose normal       Mouth/Throat:      Mouth: Mucous membranes are moist    Eyes:      General: No scleral icterus  Right eye: No discharge  Left eye: No discharge  Extraocular Movements: Extraocular movements intact  Conjunctiva/sclera: Conjunctivae normal       Pupils: Pupils are equal, round, and reactive to light  Comments: Mm pink   Neck:      Vascular: No carotid bruit  Comments: No pmt c spine   Cardiovascular:      Rate and Rhythm: Normal rate and regular rhythm  Pulses: Normal pulses  Heart sounds: Normal heart sounds  No murmur heard  No friction rub  No gallop  Pulmonary:      Effort: Pulmonary effort is normal  No respiratory distress  Breath sounds: Normal breath sounds  No stridor  No wheezing, rhonchi or rales  Chest:      Chest wall: No tenderness  Abdominal:      General: Bowel sounds are normal  There is no distension  Palpations: Abdomen is soft  There is no mass  Tenderness: There is no abdominal tenderness   There is no right CVA tenderness, left CVA tenderness, guarding or rebound  Hernia: No hernia is present  Comments: Soft nt/nd- no hsm- no cva tenderness- no ascites- no peritoneal signs- no pulsatile abd mass/bruit/ tenderness-    Musculoskeletal:         General: No swelling, tenderness, deformity or signs of injury  Cervical back: Normal range of motion and neck supple  No rigidity or tenderness  Right lower leg: Edema present  Left lower leg: Edema present  Comments: Decreased rom - pt lying supine in bed-- uable to sit up or roll over do to pain -- neg r lrt and xed slrt- equal bilateral radial/dp - trace ble pretibial edema- nt- no asym/ erythema   Lymphadenopathy:      Cervical: No cervical adenopathy  Skin:     General: Skin is warm  Capillary Refill: Capillary refill takes less than 2 seconds  Coloration: Skin is not jaundiced or pale  Findings: No bruising, erythema, lesion or rash  Neurological:      General: No focal deficit present  Mental Status: He is alert and oriented to person, place, and time  Mental status is at baseline  Cranial Nerves: No cranial nerve deficit  Sensory: No sensory deficit  Motor: No weakness  Coordination: Coordination normal       Deep Tendon Reflexes: Reflexes normal       Comments: Normal non focal neuro exam    Psychiatric:         Mood and Affect: Mood normal          Behavior: Behavior normal          Thought Content:  Thought content normal          Judgment: Judgment normal          Vital Signs  ED Triage Vitals   Temperature Pulse Respirations Blood Pressure SpO2   11/20/22 1754 11/20/22 1752 11/20/22 1752 11/20/22 1753 11/20/22 1753   97 5 °F (36 4 °C) 58 20 (!) 193/86 96 %      Temp Source Heart Rate Source Patient Position - Orthostatic VS BP Location FiO2 (%)   11/20/22 1754 11/20/22 1752 11/20/22 1752 11/20/22 2020 --   Oral Monitor Lying Right arm       Pain Score       11/20/22 1923       7           Vitals: 11/22/22 2112 11/22/22 2302 11/22/22 2304 11/23/22 0717   BP: 152/91  154/95 136/86   Pulse: 75 76 77 71   Patient Position - Orthostatic VS:             Visual Acuity      ED Medications  Medications   amLODIPine (NORVASC) tablet 10 mg (10 mg Oral Given 11/23/22 0940)   atorvastatin (LIPITOR) tablet 10 mg (10 mg Oral Given 11/22/22 1603)   buPROPion (WELLBUTRIN XL) 24 hr tablet 150 mg (150 mg Oral Given 11/23/22 0940)   cloNIDine (CATAPRES) tablet 0 3 mg (0 3 mg Oral Given 11/23/22 0940)   insulin glargine (LANTUS) subcutaneous injection 34 Units 0 34 mL (34 Units Subcutaneous Given 11/22/22 2117)   sertraline (ZOLOFT) tablet 100 mg (100 mg Oral Given 11/23/22 0940)   tamsulosin (FLOMAX) capsule 0 4 mg (0 4 mg Oral Given 11/22/22 1603)   nicotine (NICODERM CQ) 14 mg/24hr TD 24 hr patch 1 patch (1 patch Transdermal Not Given 11/23/22 0941)   heparin (porcine) subcutaneous injection 5,000 Units (5,000 Units Subcutaneous Not Given 11/23/22 0532)   acetaminophen (TYLENOL) tablet 975 mg (975 mg Oral Given 11/23/22 0535)   lidocaine (LIDODERM) 5 % patch 1 patch (1 patch Topical Medication Applied 11/23/22 0941)   docusate sodium (COLACE) capsule 100 mg (100 mg Oral Not Given 11/23/22 0940)   insulin lispro (HumaLOG) 100 units/mL subcutaneous injection 2-12 Units (2 Units Subcutaneous Not Given 11/23/22 0841)   insulin lispro (HumaLOG) 100 units/mL subcutaneous injection 1-5 Units (1 Units Subcutaneous Not Given 11/22/22 2200)   sodium chloride 0 9 % infusion (100 mL/hr Intravenous New Bag 11/23/22 0946)   methocarbamol (ROBAXIN) tablet 500 mg (500 mg Oral Given 11/23/22 0940)   HYDROmorphone HCl (DILAUDID) injection 0 2 mg (0 2 mg Intravenous Given 11/23/22 0537)   oxyCODONE (ROXICODONE) IR tablet 5 mg (5 mg Oral Given 11/23/22 6023)   oxyCODONE (ROXICODONE) IR tablet 5 mg (5 mg Oral Given 11/20/22 1802)   cyclobenzaprine (FLEXERIL) tablet 5 mg (5 mg Oral Given 11/20/22 1802)   HYDROmorphone (DILAUDID) injection 1 mg (1 mg Intravenous Given 11/20/22 1923)   lactated ringers bolus 500 mL (0 mL Intravenous Stopped 11/20/22 2019)   HYDROmorphone (DILAUDID) injection 1 mg (1 mg Intravenous Given 11/20/22 2022)   HYDROmorphone (DILAUDID) injection 1 mg (1 mg Intravenous Given 11/20/22 2031)   iohexol (OMNIPAQUE) 350 MG/ML injection (SINGLE-DOSE) 100 mL (100 mL Intravenous Given 11/22/22 1631)       Diagnostic Studies  Results Reviewed     Procedure Component Value Units Date/Time    Basic metabolic panel [[id number]]  (Abnormal) Collected: 11/20/22 2025    Lab Status: Final result Specimen: Blood from Arm, Right Updated: 11/20/22 2057     Sodium 138 mmol/L      Potassium 4 3 mmol/L      Chloride 107 mmol/L      CO2 23 mmol/L      ANION GAP 8 mmol/L      BUN 18 mg/dL      Creatinine 1 50 mg/dL      Glucose 116 mg/dL      Calcium 9 2 mg/dL      eGFR 49 ml/min/1 73sq m     Narrative:      [place] guidelines for Chronic Kidney Disease (CKD):   •  Stage 1 with normal or high GFR (GFR > 90 mL/min/1 73 square meters)  •  Stage 2 Mild CKD (GFR = 60-89 mL/min/1 73 square meters)  •  Stage 3A Moderate CKD (GFR = 45-59 mL/min/1 73 square meters)  •  Stage 3B Moderate CKD (GFR = 30-44 mL/min/1 73 square meters)  •  Stage 4 Severe CKD (GFR = 15-29 mL/min/1 73 square meters)  •  Stage 5 End Stage CKD (GFR <15 mL/min/1 73 square meters)  Note: GFR calculation is accurate only with a steady state creatinine    CBC and differential [[id number]]  (Abnormal) Collected: 11/20/22 2025    Lab Status: Final result Specimen: Blood from Arm, Right Updated: 11/20/22 2037     WBC 11 28 Thousand/uL      RBC 5 30 Million/uL      Hemoglobin 16 2 g/dL      Hematocrit 47 7 %      MCV 90 fL      MCH 30 6 pg      MCHC 34 0 g/dL      RDW 13 2 %      MPV 9 7 fL      Platelets 650 Thousands/uL      nRBC 0 /100 WBCs      Neutrophils Relative 78 %      Immat GRANS % 0 %      Lymphocytes Relative 14 %      Monocytes Relative 7 %      Eosinophils Relative 1 %      Basophils Relative 0 %      Neutrophils Absolute 8 73 Thousands/µL      Immature Grans Absolute 0 04 Thousand/uL      Lymphocytes Absolute 1 60 Thousands/µL      Monocytes Absolute 0 76 Thousand/µL      Eosinophils Absolute 0 10 Thousand/µL      Basophils Absolute 0 05 Thousands/µL                  CT abdomen pelvis w contrast   Final Result by Victor Hugo Aragon MD (11/22 1171)      Obstructive oval 12 mm proximal right ureteral calculus located 5 to 6 cm distal to the right UPJ causes mild right hydroureteronephrosis  Small foci of gas within the anterior subcutaneous tissues in this patient's anterior abdominal wall pannus correlating to the findings seen on today's radiographs  The study was marked in [facility] for immediate notification  Workstation performed: [id number]         XR spine lumbar 2 or 3 views injury   Final Result by Sylvia Valle MD (11/22 0577)      Lower lumbar disc space narrowing with flowing osteophytes from L1 through L4 suggestive of DISH  Workstation performed: [id number]         XR hip/pelv 2-3 vws right if performed   Final Result by Sylvia Valle MD (11/22 1306)      1  Unremarkable right hip  2   Small focus of gas overlying the right inguinal region of uncertain etiology  This could represent a bowel-containing hernia or soft tissue gas  Clinical correlation is recommended  This would be better visualized with CT if clinically indicated  The study was marked in [facility] for immediate notification  Workstation performed: [id number]         XR abdomen 1 view kub   Final Result by Yoni Gupta MD (11/21 0016)      Unremarkable abdomen                 Workstation performed: [id number]                    Procedures  Procedures         ED Course  ED Course as of 11/23/22 1055   Sun Nov 20, 2022   1907 ER MD NOTE- PT- RE-EVALUATED- STATES FEELS BETTER  WITH PAIN AS LONG AS DOES NOT MOVE- TRIED TO GET UP AND COULD NOT AS PAIN/SPASM WERE TO BAD-- WILL PLACE IV AND GIVE ADDITIONAL PAIN MEDICATION AND RE-EVAL    2021 Er md note-  pt- re-evaluated-  feels better with pain - but still unable to sit up in bed without pain -- spasm- will admit to slim as obs- pt and wife aware that this does not mean any advanced imaging    2055 - er md note- previous labs reviewed and compared by er md - last hb a1 c reviewed by er md 10/22                               SBIRT 22yo+    Flowsheet Row Most Recent Value   SBIRT (23 yo +)    In order to provide better care to our patients, we are screening all of our patients for alcohol and drug use  Would it be okay to ask you these screening questions? Unable to answer at this time Filed at: 11/20/2022 1751                    MDM    Disposition  Final diagnoses:   Intractable low back pain     Time reflects when diagnosis was documented in both MDM as applicable and the Disposition within this note     Time User Action Codes Description Comment    11/20/2022  8:41 PM Heladio Plate Add [M54 59] Intractable low back pain     11/22/2022  6:54 PM Brigida, Alpiniano B Add [N20 1] Ureterolithiasis     11/22/2022  6:54 PM Brigida, Alpiniano B Add [N13 30] Hydronephrosis       ED Disposition     ED Disposition   Admit    Condition   Stable    Date/Time   Sun Nov 20, 2022 2100    Comment   Case was discussed with dr Lucretia Prado  and the patient's admission status was agreed to be Admission Status: observation status to the service of Dr Mook Fisher              Follow-up Information    None         Current Discharge Medication List      CONTINUE these medications which have NOT CHANGED    Details   Accu-Chek Guide test strip USE AND DISCARD 1 TEST     STRIP TWICE DAILY AS       INSTRUCTED  Qty: 200 strip, Refills: 0    Associated Diagnoses: Type 2 diabetes mellitus with diabetic neuropathy, without long-term current use of insulin (HCC)      amLODIPine (NORVASC) 10 mg tablet TAKE 1 TABLET DAILY  Qty: 90 tablet, Refills: 1    Associated Diagnoses: Essential hypertension      atorvastatin (LIPITOR) 10 mg tablet TAKE 1 TABLET DAILY  Qty: 90 tablet, Refills: 1    Associated Diagnoses: Mixed hyperlipidemia      BD Pen Needle Alicia 2nd Gen 32G X 4 MM MISC INJECT SUBCUTANEOUSLY DAILY  Qty: 100 each, Refills: 0    Associated Diagnoses: Type 2 diabetes mellitus with diabetic neuropathy, without long-term current use of insulin ([facility])      buPROPion (WELLBUTRIN XL) 150 mg 24 hr tablet TAKE 1 TABLET DAILY  Qty: 90 tablet, Refills: 1    Associated Diagnoses: Moderate episode of recurrent major depressive disorder ([facility])      ciclopirox (LOPROX) 0 77 % cream Apply topically 2 (two) times a day for 20 days  Qty: 45 g, Refills: 2    Associated Diagnoses: Tinea pedis of both feet; Onychomycosis      cloNIDine (CATAPRES) 0 3 mg tablet TAKE 1 TABLET TWICE A DAY  Qty: 180 tablet, Refills: 0    Associated Diagnoses: Essential hypertension      Dulaglutide 4 5 MG/0 5ML SOPN Inject 0 5 mL (4 5 mg total) under the skin once a week  Qty: 6 mL, Refills: 1    Associated Diagnoses: Type 2 diabetes mellitus with diabetic neuropathy, without long-term current use of insulin ([facility])      enalapril (VASOTEC) 20 mg tablet TAKE 1 TABLET TWICE A DAY  Qty: 180 tablet, Refills: 1    Associated Diagnoses: Type 2 diabetes mellitus with diabetic neuropathy, without long-term current use of insulin ([facility] );  Essential hypertension      glimepiride (AMARYL) 4 mg tablet TAKE 1 TABLET TWICE A DAY  Qty: 180 tablet, Refills: 0    Associated Diagnoses: Type 2 diabetes mellitus with diabetic neuropathy, without long-term current use of insulin ([facility])      Insulin Glargine Solostar (Basaglar KwikPen) 100 UNIT/ML SOPN Inject 0 34 mL (34 Units total) under the skin daily  Qty: 30 mL, Refills: 1    Associated Diagnoses: Type 2 diabetes mellitus with diabetic neuropathy, without long-term current use of insulin ([facility])      ketoconazole (NIZORAL) 2 % cream Apply topically daily  Qty: 60 g, Refills: 1    Associated Diagnoses: Onychomycosis; Tinea pedis of both feet      Lidocaine Viscous HCl (XYLOCAINE) 2 % mucosal solution Swish and spit 15 mL 4 (four) times a day as needed for mouth pain or discomfort  Qty: 100 mL, Refills: 0    Associated Diagnoses: COVID-19 virus infection      nystatin (MYCOSTATIN) powder Apply topically 3 (three) times a day as needed (groin rash)  Qty: 60 g, Refills: 1    Associated Diagnoses: Candidiasis      sertraline (ZOLOFT) 100 mg tablet TAKE 1 TABLET DAILY  Qty: 90 tablet, Refills: 0    Associated Diagnoses: Depression with anxiety      tamsulosin (FLOMAX) 0 4 mg TAKE 1 CAPSULE DAILY  Qty: 90 capsule, Refills: 1    Associated Diagnoses: BPH without urinary obstruction      terbinafine (LamISIL) 250 mg tablet 1 tab p o  every other day  Qty: 15 tablet, Refills: 0    Associated Diagnoses: Onychomycosis; Tinea pedis of both feet             No discharge procedures on file      PDMP Review     None          ED Provider  Electronically Signed by           Young Hardin MD  11/23/22 7861

## 2022-11-23 NOTE — ASSESSMENT & PLAN NOTE
Lab Results   Component Value Date    EGFR 49 11/22/2022    EGFR 37 11/21/2022    EGFR 49 11/20/2022    CREATININE 1 48 (H) 11/22/2022    CREATININE 1 89 (H) 11/21/2022    CREATININE 1 50 (H) 11/20/2022   • Creatinine upon admission: 1 50  o Baseline: 1 4-1 6  · Avoid nephrotoxins and hypotension  · Monitor BMP

## 2022-11-23 NOTE — ASSESSMENT & PLAN NOTE
· CT abdomen pelvis showed 12mm right ureteral calculus causing right hydroureteronephrosis with gas in anterior wall pannus  · [facility] notified and patient now transferred to [facility]  · No signs of peritonitis on exam   · Urinating in urinal without any stones noticed  · Follow acute pain management referral recs  · Additional acute pain management referral placed for arrival at [facility]  No acute distress     · F/u with urology for possible cystoscopy

## 2022-11-23 NOTE — ASSESSMENT & PLAN NOTE
· Continues to denie saddle anesthesia, bowel or urinary incontinence, numbness, tingling, fever or chills  Pain has improved to 4/10  CT abdomen pelvis showed 12mm right ureteral calculus causing right hydroureteronephrosis with gas in anterior wall pannus  [facility] notified and patient now waiting for transfer pending bed availability  Follow acute pain management referral recs  · Pain control:  · Tylenol OTC  · Lidocaine patch  · Oxycodone for moderate pain  · IV Dilaudid for breakthrough  · Robaxin    · Aqua K   · PT/OT evaluations

## 2022-11-23 NOTE — ASSESSMENT & PLAN NOTE
Lab Results   Component Value Date    HGBA1C 10 1 (H) 10/16/2022       Recent Labs     11/22/22  1600 11/22/22  2200 11/23/22  0718 11/23/22  1203   POCGLU 121 105 112 113       Blood Sugar Average: Last 72 hrs:  · (P) 109 [id number] regimen of glimepiride and Dulaglutide, will hold while inpatient  · Continue home insulin regimen of 34 units of Lantus HS  · Accu-checks and SSI  · Hypoglycemia protocol    · Well controlled

## 2022-11-23 NOTE — ASSESSMENT & PLAN NOTE
• Continues to denie saddle anesthesia, bowel or urinary incontinence, numbness, tingling, fever or chills  Pain has improved to 4/10  CT abdomen pelvis showed 12mm right ureteral calculus causing right hydroureteronephrosis with gas in anterior wall pannus  [facility] notified and patient now waiting for transfer pending bed availability  Follow acute pain management referral recs  • Pain control:  • Tylenol OTC  • Lidocaine patch  • Oxycodone for moderate pain  • IV Dilaudid for breakthrough  • Robaxin  • Aqua K   • PT/OT evaluations   • On exam the pain is worsening with ROM of the hip, consider an MRI of the lumbar spine after his cystoscopy if his pain is not sufficiently improved

## 2022-11-23 NOTE — PLAN OF CARE
Problem: MOBILITY - ADULT  Goal: Maintain or return to baseline ADL function  Description: INTERVENTIONS:  -  Assess patient's ability to carry out ADLs; assess patient's baseline for ADL function and identify physical deficits which impact ability to perform ADLs (bathing, care of mouth/teeth, toileting, grooming, dressing, etc )  - Assess/evaluate cause of self-care deficits   - Assess range of motion  - Assess patient's mobility; develop plan if impaired  - Assess patient's need for assistive devices and provide as appropriate  - Encourage maximum independence but intervene and supervise when necessary  - Involve family in performance of ADLs  - Assess for home care needs following discharge   - Consider OT consult to assist with ADL evaluation and planning for discharge  - Provide patient education as appropriate  Outcome: Progressing  Goal: Maintains/Returns to pre admission functional level  Description: INTERVENTIONS:  - Perform BMAT or MOVE assessment daily    - Set and communicate daily mobility goal to care team and patient/family/caregiver     - Collaborate with rehabilitation services on mobility goals if consulted  - Perform Range of Motion   - Reposition patient   - Dangle patient   - Stand patient   - Ambulate patient   - Out of bed to chair    - Out of bed for meals   - Out of bed for toileting  - Record patient progress and toleration of activity level   Outcome: Progressing     Problem: Potential for Falls  Goal: Patient will remain free of falls  Description: INTERVENTIONS:  - Educate patient/family on patient safety including physical limitations  - Instruct patient to call for assistance with activity   - Consult OT/PT to assist with strengthening/mobility   - Keep Call bell within reach  - Keep bed low and locked with side rails adjusted as appropriate  - Keep care items and personal belongings within reach  - Initiate and maintain comfort rounds  - Make Fall Risk Sign visible to staff  - Apply yellow socks and bracelet for high fall risk patients  - Consider moving patient to room near nurses station  Outcome: Progressing     Problem: PAIN - ADULT  Goal: Verbalizes/displays adequate comfort level or baseline comfort level  Description: Interventions:  - Encourage patient to monitor pain and request assistance  - Assess pain using appropriate pain scale  - Administer analgesics based on type and severity of pain and evaluate response  - Implement non-pharmacological measures as appropriate and evaluate response  - Consider cultural and social influences on pain and pain management  - Notify physician/advanced practitioner if interventions unsuccessful or patient reports new pain  Outcome: Progressing     Problem: DISCHARGE PLANNING  Goal: Discharge to home or other facility with appropriate resources  Description: INTERVENTIONS:  - Identify barriers to discharge w/patient and caregiver  - Arrange for needed discharge resources and transportation as appropriate  - Identify discharge learning needs (meds, wound care, etc )  - Arrange for interpretive services to assist at discharge as needed  - Refer to Case Management Department for coordinating discharge planning if the patient needs post-hospital services based on physician/advanced practitioner order or complex needs related to functional status, cognitive ability, or social support system  Outcome: Progressing     Problem: GENITOURINARY - ADULT  Goal: Maintains or returns to baseline urinary function  Description: INTERVENTIONS:  - Assess urinary function  - Encourage oral fluids to ensure adequate hydration if ordered  - Administer IV fluids as ordered to ensure adequate hydration  - Administer ordered medications as needed  - Offer frequent toileting  - Follow urinary retention protocol if ordered  Outcome: Progressing     Problem: Prexisting or High Potential for Compromised Skin Integrity  Goal: Skin integrity is maintained or improved  Description: INTERVENTIONS:  - Identify patients at risk for skin breakdown  - Assess and monitor skin integrity  - Assess and monitor nutrition and hydration status  - Monitor labs   - Assess for incontinence   - Turn and reposition patient  - Assist with mobility/ambulation  - Relieve pressure over bony prominences  - Avoid friction and shearing  - Provide appropriate hygiene as needed including keeping skin clean and dry  - Evaluate need for skin moisturizer/barrier cream  - Collaborate with interdisciplinary team   - Patient/family teaching  - Consider wound care consult   Outcome: Progressing

## 2022-11-23 NOTE — PROGRESS NOTES
Progress Note - Acute Pain Service    Rosita Cancino 58 y o  male MRN: [id number]  Unit/Bed#: S -01 Encounter: [id number]      Assessment:   Principal Problem:    Nephrolithiasis  Active Problems:    BPH without urinary obstruction    Depression with anxiety    Obesity (BMI 35 0-39 9 without comorbidity)    Mixed hyperlipidemia    Essential hypertension    JEFF on CPAP    Type 2 diabetes mellitus with stage 3a chronic kidney disease, with long-term current use of insulin ([facility])    CKD (chronic kidney disease) stage 3, GFR 30-59 ml/min ([facility])    Back pain    Abnormal urinalysis    Rosita Cancino is a 58 y o  male who presented with intractable right lower back pain which started about a week ago which progressively became more severe limiting his ability to ambulate  Patient has a history of JEFF, chronic kidney disease, DM and right-sided sciatica  Prior to admission was utilizing Tylenol, Advil and TENS unit without relief  X-ray of lumbar spine yesterday showed lower lumbar disc space narrowing with flowing osteophytes from L1 through L4 suggestive of DISH; x-ray of hip/pelvis showed Small focus of gas overlying the right inguinal region of uncertain etiology  This could represent a bowel-containing hernia or soft tissue gas    CT abdomen and pelvis revealed obstructive oval 12 mm proximal right ureteral calculus located 5 to 6 cm distal to the right UPJ causes mild right hydroureteronephrosis  Urology consulted; plan is for transfer to [facility] for cystoscopy retrograde pyelogram with insertion of ureteral stent      Plan:   Multimodal analgesia:  · Tylenol 975 mg every 8 hours scheduled  · Lidocaine patch daily, on for 12 hours off for 12 hours  · Apply to right lower back  · Avoid NSAIDs given acute kidney injury  · Creatinine improved  · Estimated Creatinine Clearance: 90 9 mL/min (by C-G formula based on SCr of 1 13 mg/dL)    · Decadron discontinued  · Robaxin 500 mg every 6 hours scheduled  · Oxycodone 5 mg every 4 hours as needed for moderate or severe pain  · Dilaudid 0 2 mg IV every 4 hours as needed for breakthrough pain    Bowel Regimen:  · Colace 100 mg twice a day  · Add Senokot daily to prevent opioid induced constipation    Treatment recommendations plan of care discussed with bedside nursing staff and Primary Care Service  Acute pain service will continue to follow when patient is transfer to [address] [place] [address]  [facility] will continue to follow  Please contact Acute Pain Service - [facility] via [facility] [employer] from [phone] with additional questions or concerns  See Elias or Rashid for additional contacts and after hours information  Pain History  Current pain location(s): right flank/back   Pain Scale:   7  24 hour history:  Patient resting in bed, continues to report pain in his right lower back/flank area which radiates towards his right hip  Overall pain has remained the same in the last 24 hours  Pain increases with movement and sitting up in bed  Tolerating current analgesic regimen, no reported adverse effects  P r n  Oxycodone and IV Dilaudid provide adequate pain control  Patient states he was able to get some sleep this morning after receiving pain medication      Opioid requirement previous 24 hours:  Oxycodone 15 mg, IV Dilaudid 0 6 mg    Meds/Allergies   all current active meds have been reviewed and current meds:   Current Facility-Administered Medications   Medication Dose Route Frequency   • acetaminophen (TYLENOL) tablet 975 mg  975 mg Oral Q8H [facility]   • amLODIPine (NORVASC) tablet 10 mg  10 mg Oral Daily   • atorvastatin (LIPITOR) tablet 10 mg  10 mg Oral Daily With Dinner   • buPROPion (WELLBUTRIN XL) 24 hr tablet 150 mg  150 mg Oral Daily   • cloNIDine (CATAPRES) tablet 0 3 mg  0 3 mg Oral Q12H [facility]   • docusate sodium (COLACE) capsule 100 mg  100 mg Oral BID   • heparin (porcine) subcutaneous injection 5,000 Units  5,000 Units Subcutaneous Q8H [facility]   • HYDROmorphone HCl (DILAUDID) injection 0 2 mg  0 2 mg Intravenous Q4H PRN   • insulin glargine (LANTUS) subcutaneous injection 34 Units 0 34 mL  34 Units Subcutaneous HS   • insulin lispro (HumaLOG) 100 units/mL subcutaneous injection 1-5 Units  1-5 Units Subcutaneous HS   • insulin lispro (HumaLOG) 100 units/mL subcutaneous injection 2-12 Units  2-12 Units Subcutaneous TID AC   • lidocaine (LIDODERM) 5 % patch 1 patch  1 patch Topical Daily   • methocarbamol (ROBAXIN) tablet 500 mg  500 mg Oral Q6H   • nicotine (NICODERM CQ) 14 mg/24hr TD 24 hr patch 1 patch  1 patch Transdermal Daily   • oxyCODONE (ROXICODONE) IR tablet 5 mg  5 mg Oral Q4H PRN   • sertraline (ZOLOFT) tablet 100 mg  100 mg Oral Daily   • sodium chloride 0 9 % infusion  100 mL/hr Intravenous Continuous   • tamsulosin (FLOMAX) capsule 0 4 mg  0 4 mg Oral Daily With Dinner       No Known Allergies    Objective     Temp:  [97 9 °F (36 6 °C)-98 5 °F (36 9 °C)] 97 9 °F (36 6 °C)  HR:  [71-77] 71  Resp:  [16-18] 16  BP: (136-154)/(86-95) 136/86    Physical Exam  Vitals reviewed  Constitutional:       General: He is awake  He is not in acute distress  Appearance: Normal appearance  He is not ill-appearing, toxic-appearing or diaphoretic  HENT:      Head: Normocephalic and atraumatic  Nose: Nose normal  No congestion or rhinorrhea  Mouth/Throat:      Mouth: Mucous membranes are moist    Pulmonary:      Effort: Pulmonary effort is normal  No tachypnea, bradypnea or respiratory distress  Musculoskeletal:      Right lower leg: No edema  Left lower leg: No edema  Skin:     Coloration: Skin is not pale  Neurological:      Mental Status: He is alert and oriented to person, place, and time  Mental status is at baseline  Psychiatric:         Attention and Perception: Attention normal          Mood and Affect: Mood normal  Mood is not anxious  Speech: Speech normal          Behavior: Behavior normal  Behavior is cooperative           Cognition and Memory: Cognition and memory normal          Lab Results:   Results from last 7 days   Lab Units 11/23/22  0528   WBC Thousand/uL 7 77   HEMOGLOBIN g/dL 15 5   HEMATOCRIT % 46 3   PLATELETS Thousands/uL 263      Results from last 7 days   Lab Units 11/23/22  0528   POTASSIUM mmol/L 4 2   CHLORIDE mmol/L 106   CO2 mmol/L 21   BUN mg/dL 17   CREATININE mg/dL 1 13   CALCIUM mg/dL 9 0       Imaging Studies: I have personally reviewed pertinent reports  Please note that the APS provides consultative services regarding pain management only  With the exception of ketamine and epidural infusions and except when indicated, final decisions regarding starting or changing doses of analgesic medications are at the discretion of the consulting service  Off hours consultation and/or medication management is generally not available      EVON De Leon  Acute Pain Service

## 2022-11-23 NOTE — ASSESSMENT & PLAN NOTE
Lab Results   Component Value Date    EGFR 69 11/23/2022    EGFR 49 11/22/2022    EGFR 37 11/21/2022    CREATININE 1 13 11/23/2022    CREATININE 1 48 (H) 11/22/2022    CREATININE 1 89 (H) 11/21/2022   • Creatinine upon admission: 1 50  ? Baseline: 1 4-1 6  • Avoid nephrotoxins and hypotension  · Continue to monitor BMP

## 2022-11-23 NOTE — ASSESSMENT & PLAN NOTE
· Continues to denie saddle anesthesia, bowel or urinary incontinence, numbness, tingling, fever or chills  Pain has improved to 4/10  CT abdomen pelvis showed 12mm right ureteral calculus causing right hydroureteronephrosis with gas in anterior wall pannus  [facility] notified and patient now waiting for transfer pending bed availability  Follow acute pain management referral recs  · Pain control:  · Tylenol OTC  · Lidocaine patch  · Oxycodone for moderate pain  · IV Dilaudid for breakthrough  · Robaxin    · Aqua K   · PT/OT evaluations

## 2022-11-23 NOTE — ASSESSMENT & PLAN NOTE
CT abdomen pelvis showed 12mm right ureteral calculus causing right hydroureteronephrosis with gas in anterior wall pannus  [facility] notified and patient now waiting for transfer pending bed availability  No signs of peritonitis on exam  Urinating in urinal without any stones noticed  Follow acute pain management referral recs  Decadron DC'brinda  Additional acute pain management referral placed for arrival at [facility]  No acute distress

## 2022-11-23 NOTE — ASSESSMENT & PLAN NOTE
Lab Results   Component Value Date    HGBA1C 10 1 (H) 10/16/2022       Recent Labs     11/22/22  0734 11/22/22  1105 11/22/22  1600 11/22/22  2200   POCGLU 95 121 121 105       Blood Sugar Average: Last 72 hrs:  · (P) 656 [id number] regimen of glimepiride and Dulaglutide, will hold while inpatient  · Continue home insulin regimen of 34 units of Lantus HS  · Accu-checks and SSI  · Hypoglycemia protocol    · Well controlled

## 2022-11-23 NOTE — ASSESSMENT & PLAN NOTE
Lab Results   Component Value Date    HGBA1C 10 1 (H) 10/16/2022       Recent Labs     11/22/22  1600 11/22/22  2200 11/23/22  0718 11/23/22  1203   POCGLU 121 105 112 113       Blood Sugar Average: Last 72 hrs:  •  Home regimen of glimepiride and Dulaglutide, will hold while inpatient  • Continue home insulin regimen of 34 units of Lantus HS  • Accu-checks and SSI    • Hypoglycemia protocol  • Glucose control inpatient is acceptable

## 2022-11-23 NOTE — ASSESSMENT & PLAN NOTE
Lab Results   Component Value Date    EGFR 69 11/23/2022    EGFR 49 11/22/2022    EGFR 37 11/21/2022    CREATININE 1 13 11/23/2022    CREATININE 1 48 (H) 11/22/2022    CREATININE 1 89 (H) 11/21/2022   • Creatinine upon admission: 1 50  o Baseline: 1 4-1 6  · Avoid nephrotoxins and hypotension  · Monitor BMP

## 2022-11-23 NOTE — ASSESSMENT & PLAN NOTE
CT abdomen pelvis showed 12mm right ureteral calculus causing right hydroureteronephrosis with gas in anterior wall pannus  [facility] notified and patient now waiting for transfer pending bed availability  No signs of peritonitis on exam  Urinating in urinal without any stones noticed  Follow acute pain management referral recs  Decadron DC'brinda  Additional acute pain management referral placed for arrival at [facility]  No acute distress

## 2022-11-23 NOTE — CONSULTS
UROLOGY CONSULTATION NOTE     Patient Identifiers: Lilly Ross (MRN [id number])  Service Requesting Consultation: Ureteral calculus  Service Providing Consultation:  UrologyDony    Date of Service: 11/23/2022  Inpatient consult to Urology  Consult performed by: EVON Phipps  Consult ordered by: Hari Meredith MD          Reason for Consultation: Ureteral calculus    ASSESSMENT:     58 y o  old male with  12 mm right proximal ureteral calculus with hydroureteronephrosis  Patient reporting right-sided flank pain  He states pain is relieved laying flat on his back  Patient voiding without any difficulties using urinal   He is afebrile, vital stable  WBC 7 77 and creatinine 1 13  PLAN:   -Reviewed the results of his CT scan with Dr Jennifer Horton, which noted a 12 mm right proximal ureteral calculus with hydroureteronephrosis  Plan to proceed with surgical intervention  Case requested for cystoscopy, retrograde pyelogram, and right ureteral stent  He will be transferred [facility]  Informed consent provided  Surgical consent signed  -Remain NPO  -Continue IVF, flomax, and pain control      History of Present Illness:     Lilly Ross is a 58 y o  male who presented to the emergency department on 11/20/2022 with worsening back pain  Patient had initially believed pain had been musculoskeletal in etiology  CT scan workup identified a 12 mm right proximal ureteral calculus with mild hydronephrosis  WBC 7 03, creatinine 1 48, and urine appearing negative for infection  Patient states he is voiding without any difficulties, he denies any gross hematuria, dysuria, fever, chills, nausea, or vomiting  Patient previously seen in our office in 2020  He remains on tamsulosin for BPH and has a remote history of nephrolithiasis  Patient underwent ESWL x2 and ureteroscopy over 10 years ago by Dr Milda Bumpers        Past Medical, Past Surgical History: Past Medical History:   Diagnosis Date   • Acute renal failure (ARF) ([address] )     last assessed - [id number]   • Chest pain     last assessed - [id number]   • CPAP (continuous positive airway pressure) dependence    • Depression    • Diabetes mellitus (HCC)    • Dyspnea on exertion     last assessed - [id number]   • Hypertension    • Nephrolithiasis    • Numbness of right foot     numbness on the sole of the right foot; since he was young d/t stenosis   • Obesity    • Onychomycosis     last assessed - [id number]   • JEFF on CPAP    • Sciatica     last assessed - [id number]   • Sleep apnea    :    Past Surgical History:   Procedure Laterality Date   • COLONOSCOPY     • CYSTOSCOPY W/ LASER LITHOTRIPSY     • KNEE ARTHROSCOPY Left 11/2013   • AR LAP,CHOLECYSTECTOMY N/A 2/8/2019    Procedure: ROBOTIC ASSISTED LAPAROSCOPIC CHOLECYSTECTOMY;  Surgeon: Merline Terry DO;  Location: [facility];  Service: General   • PROSTATE BIOPSY      Needle Biopsy - Negative   :    Medications, Allergies:     Current Facility-Administered Medications   Medication Dose Route Frequency   • acetaminophen (TYLENOL) tablet 975 mg  975 mg Oral Q8H [facility]   • amLODIPine (NORVASC) tablet 10 mg  10 mg Oral Daily   • atorvastatin (LIPITOR) tablet 10 mg  10 mg Oral Daily With Dinner   • buPROPion (WELLBUTRIN XL) 24 hr tablet 150 mg  150 mg Oral Daily   • cloNIDine (CATAPRES) tablet 0 3 mg  0 3 mg Oral Q12H [facility]   • docusate sodium (COLACE) capsule 100 mg  100 mg Oral BID   • heparin (porcine) subcutaneous injection 5,000 Units  5,000 Units Subcutaneous Q8H [facility]   • HYDROmorphone HCl (DILAUDID) injection 0 2 mg  0 2 mg Intravenous Q4H PRN   • insulin glargine (LANTUS) subcutaneous injection 34 Units 0 34 mL  34 Units Subcutaneous HS   • insulin lispro (HumaLOG) 100 units/mL subcutaneous injection 1-5 Units  1-5 Units Subcutaneous HS   • insulin lispro (HumaLOG) 100 units/mL subcutaneous injection 2-12 Units  2-12 Units Subcutaneous TID AC   • lidocaine (LIDODERM) 5 % patch 1 patch  1 patch Topical Daily   • methocarbamol (ROBAXIN) tablet 500 mg  500 mg Oral Q6H   • nicotine (NICODERM CQ) 14 mg/24hr TD 24 hr patch 1 patch  1 patch Transdermal Daily   • oxyCODONE (ROXICODONE) IR tablet 5 mg  5 mg Oral Q4H PRN   • sertraline (ZOLOFT) tablet 100 mg  100 mg Oral Daily   • sodium chloride 0 9 % infusion  100 mL/hr Intravenous Continuous   • tamsulosin (FLOMAX) capsule 0 4 mg  0 4 mg Oral Daily With Dinner       Allergies:  No Known Allergies:    Social and Family History:   Social History:   Social History     Tobacco Use   • Smoking status: Some Days     Types: Cigars   • Smokeless tobacco: Never   • Tobacco comments:     3 cigars per week   Vaping Use   • Vaping Use: Never used   Substance Use Topics   • Alcohol use: Yes   • Drug use: No        Social History     Tobacco Use   Smoking Status Some Days   • Types: Cigars   Smokeless Tobacco Never   Tobacco Comments    3 cigars per week       Family History:  Family History   Adopted: Yes   Problem Relation Age of Onset   • Alzheimer's disease Mother    • Alcohol abuse Neg Hx    • Drug abuse Neg Hx    • Depression Neg Hx    • Substance Abuse Neg Hx    • Mental illness Neg Hx    :     Review of Systems:     General: Fever, chills, or night sweats: negative  Cardiac: Negative for chest pain  Pulmonary: Negative for shortness of breath  Gastrointestinal: Abdominal pain negative  Nausea, vomiting, or diarrhea negative,  Genitourinary: See HPI above  Patient does not have hematuria  All other systems queried were negative  Physical Exam:   General: Patient is pleasant and in NAD  Awake and alert  /86   Pulse 71   Temp 97 9 °F (36 6 °C)   Resp 16   Ht 5' 11" (1 803 m)   Wt 124 kg (272 lb 7 8 oz) Comment: Patient not able to stand for standing weight  SpO2 92%   BMI 38 00 kg/m² Temp (24hrs), Av 3 °F (36 8 °C), Min:97 9 °F (36 6 °C), Max:98 5 °F (36 9 °C)  current;  Temperature: 97 9 °F (36 6 °C)  I/O last 24 hours: In: 2130 [P O :160; I V :1970]  Out: 2355 [Urine:2355]  Skin: warm, dry, intact  Cardiac: S1S2, HRR, Peripheral edema: negative  Pulmonary: Non-labored breathing  Abdomen: Soft, non-tender, non-distended  No surgical scars  No masses, tenderness, hernias noted  Musculoskeletal: AROM with no joint deformity or tenderness  Neurology: alert, oriented x3, affect appropriate, no focal neurological deficits, moves all extremities well, no involuntary movements and reflexes at knee and ankle intact  Genitourinary: Negative CVA tenderness, negative suprapubic tenderness  (Male): Penis circumcised, phallus normal, meatus patent  Testicles descended into scrotum bilaterally without masses nor tenderness  Labs:     Lab Results   Component Value Date    HGB 15 5 11/23/2022    HCT 46 3 11/23/2022    WBC 7 77 11/23/2022     11/23/2022   ]    Lab Results   Component Value Date     02/01/2014    K 4 2 11/23/2022     11/23/2022    CO2 21 11/23/2022    BUN 17 11/23/2022    CREATININE 1 13 11/23/2022    CALCIUM 9 0 11/23/2022    GLUCOSE 301 (H) 02/01/2014   ]    Imaging:   I personally reviewed the images and report of the following studies, and reviewed them with the patient:    CT Abdomen: Pelvis w contrast 11/22/2022     KIDNEYS/URETERS: Obstructive oval 12 mm proximal right ureteral calculus located 5 to 6 cm distal to the right UPJ causes mild right hydroureteronephrosis      Unchanged bilateral simple and hemorrhagic cysts  Nonobstructive bilateral renal calculi measuring up to 5 mm  REPRODUCTIVE ORGANS:  Unremarkable for patient's age      URINARY BLADDER:  Unremarkable      IMPRESSION:     Obstructive oval 12 mm proximal right ureteral calculus located 5 to 6 cm distal to the right UPJ causes mild right hydroureteronephrosis      Small foci of gas within the anterior subcutaneous tissues in this patient's anterior abdominal wall pannus correlating to the findings seen on today's radiographs  Thank you for allowing me to participate in this patients’ care  Please do not hesitate to call with any additional questions    [id number] Sukumar White

## 2022-11-23 NOTE — PLAN OF CARE
Problem: MOBILITY - ADULT  Goal: Maintain or return to baseline ADL function  Description: INTERVENTIONS:  -  Assess patient's ability to carry out ADLs; assess patient's baseline for ADL function and identify physical deficits which impact ability to perform ADLs (bathing, care of mouth/teeth, toileting, grooming, dressing, etc )  - Assess/evaluate cause of self-care deficits   - Assess range of motion  - Assess patient's mobility; develop plan if impaired  - Assess patient's need for assistive devices and provide as appropriate  - Encourage maximum independence but intervene and supervise when necessary  - Involve family in performance of ADLs  - Assess for home care needs following discharge   - Consider OT consult to assist with ADL evaluation and planning for discharge  - Provide patient education as appropriate  Outcome: Progressing  Goal: Maintains/Returns to pre admission functional level  Description: INTERVENTIONS:  - Perform BMAT or MOVE assessment daily    - Set and communicate daily mobility goal to care team and patient/family/caregiver  - Collaborate with rehabilitation services on mobility goals if consulted  - Perform Range of Motion  times a day  - Reposition patient every  hours    - Dangle patient  times a day  - Stand patient  times a day  - Ambulate patient  times a day  - Out of bed to chair  times a day   - Out of bed for meals  times a day  - Out of bed for toileting  - Record patient progress and toleration of activity level   Outcome: Progressing     Problem: Potential for Falls  Goal: Patient will remain free of falls  Description: INTERVENTIONS:  - Educate patient/family on patient safety including physical limitations  - Instruct patient to call for assistance with activity   - Consult OT/PT to assist with strengthening/mobility   - Keep Call bell within reach  - Keep bed low and locked with side rails adjusted as appropriate  - Keep care items and personal belongings within reach  - Initiate and maintain comfort rounds  - Make Fall Risk Sign visible to staff  - Offer Toileting every  Hours, in advance of need  - Initiate/Maintain alarm  - Obtain necessary fall risk management equipment:  - Apply yellow socks and bracelet for high fall risk patients  - Consider moving patient to room near nurses station  Outcome: Progressing     Problem: PAIN - ADULT  Goal: Verbalizes/displays adequate comfort level or baseline comfort level  Description: Interventions:  - Encourage patient to monitor pain and request assistance  - Assess pain using appropriate pain scale  - Administer analgesics based on type and severity of pain and evaluate response  - Implement non-pharmacological measures as appropriate and evaluate response  - Consider cultural and social influences on pain and pain management  - Notify physician/advanced practitioner if interventions unsuccessful or patient reports new pain  Outcome: Progressing     Problem: DISCHARGE PLANNING  Goal: Discharge to home or other facility with appropriate resources  Description: INTERVENTIONS:  - Identify barriers to discharge w/patient and caregiver  - Arrange for needed discharge resources and transportation as appropriate  - Identify discharge learning needs (meds, wound care, etc )  - Arrange for interpretive services to assist at discharge as needed  - Refer to Case Management Department for coordinating discharge planning if the patient needs post-hospital services based on physician/advanced practitioner order or complex needs related to functional status, cognitive ability, or social support system  Outcome: Progressing     Problem: Prexisting or High Potential for Compromised Skin Integrity  Goal: Skin integrity is maintained or improved  Description: INTERVENTIONS:  - Identify patients at risk for skin breakdown  - Assess and monitor skin integrity  - Assess and monitor nutrition and hydration status  - Monitor labs   - Assess for incontinence   - Turn and reposition patient  - Assist with mobility/ambulation  - Relieve pressure over bony prominences  - Avoid friction and shearing  - Provide appropriate hygiene as needed including keeping skin clean and dry  - Evaluate need for skin moisturizer/barrier cream  - Collaborate with interdisciplinary team   - Patient/family teaching  - Consider wound care consult   Outcome: Progressing     Problem: GENITOURINARY - ADULT  Goal: Maintains or returns to baseline urinary function  Description: INTERVENTIONS:  - Assess urinary function  - Encourage oral fluids to ensure adequate hydration if ordered  - Administer IV fluids as ordered to ensure adequate hydration  - Administer ordered medications as needed  - Offer frequent toileting  - Follow urinary retention protocol if ordered  Outcome: Progressing

## 2022-11-23 NOTE — H&P
[facility]  H&P- Johnnie English 1960, 58 y o  male MRN: [id number]  [facility] Encounter: [id number]  Primary Care Provider: Jennifer Mays MD   Date and time admitted to hospital: 11/23/2022  4:00 PM    * Nephrolithiasis  Assessment & Plan  · CT abdomen pelvis showed 12mm right ureteral calculus causing right hydroureteronephrosis with gas in anterior wall pannus  · [facility] notified and patient now transferred to [facility]  · No signs of peritonitis on exam   · Urinating in urinal without any stones noticed  · Follow acute pain management referral recs  · Additional acute pain management referral placed for arrival at [facility]  No acute distress  · F/u with urology for possible cystoscopy    Back pain  Assessment & Plan  • Continues to denie saddle anesthesia, bowel or urinary incontinence, numbness, tingling, fever or chills  Pain has improved to 4/10  CT abdomen pelvis showed 12mm right ureteral calculus causing right hydroureteronephrosis with gas in anterior wall pannus  [facility] notified and patient now waiting for transfer pending bed availability  Follow acute pain management referral recs  • Pain control:  • Tylenol OTC  • Lidocaine patch  • Oxycodone for moderate pain  • IV Dilaudid for breakthrough  • Robaxin  • Aqua K   • PT/OT evaluations   • On exam the pain is worsening with ROM of the hip, consider an MRI of the lumbar spine after his cystoscopy if his pain is not sufficiently improved  CKD (chronic kidney disease) stage 3, GFR 30-59 ml/min [facility])  Assessment & Plan  Lab Results   Component Value Date    EGFR 69 11/23/2022    EGFR 49 11/22/2022    EGFR 37 11/21/2022    CREATININE 1 13 11/23/2022    CREATININE 1 48 (H) 11/22/2022    CREATININE 1 89 (H) 11/21/2022   • Creatinine upon admission: 1 50  ? Baseline: 1 4-1 6  • Avoid nephrotoxins and hypotension  · Continue to monitor BMP      Diabetes mellitus with neurological manifestation [facility])  Assessment & Plan  Lab Results   Component Value Date    HGBA1C 10 1 (H) 10/16/2022       Recent Labs     11/22/22  1600 11/22/22  2200 11/23/22  0718 11/23/22  1203   POCGLU 121 105 112 113       Blood Sugar Average: Last 72 hrs:  •  Home regimen of glimepiride and Dulaglutide, will hold while inpatient  • Continue home insulin regimen of 34 units of Lantus HS  • Accu-checks and SSI  • Hypoglycemia protocol  • Glucose control inpatient is acceptable    Essential hypertension  Assessment & Plan  • Blood pressure is reviewed and acceptable  One elevated reading of 193/86 on arrival suspect 2/2 acute back pain, blood pressure improved spontaneously without antihypertensive medication intervention  • Continue home regimen of Enalapril, Clonidine and Amlodipine  • Monitor blood pressure  BPH without urinary obstruction  Assessment & Plan  · Continue flomax    Depression with anxiety  Assessment & Plan  · Continue wellbutrin, sertraline    Obesity (BMI 35 0-39 9 without comorbidity)  Assessment & Plan  · Weight loss counseling when stable as an outpatient    JEFF on CPAP  Assessment & Plan  Continue CPAP at HS    Candidal intertrigo  Assessment & Plan  Nystatin power to abdominal folds and groin    VTE Pharmacologic Prophylaxis:   Moderate Risk (Score 3-4) - Pharmacological DVT Prophylaxis Ordered: heparin  Code Status: Level 1 - Full Code   Discussion with family: Patient declined call to   Anticipated Length of Stay: Patient will be admitted on an inpatient basis with an anticipated length of stay of greater than 2 midnights secondary to cystoscopy  Total Time for Visit, including Counseling / Coordination of Care: 45 minutes Greater than 50% of this total time spent on direct patient counseling and coordination of care      Chief Complaint: back pain    History of Present Illness:  Andreas Arceo is a 58 y o  male  who originally presented to the hospital on 11/20/2022 due to intractable nonradiating right lower back and right hip pain exacerbated with movement first noticed after doing yard work 1 week ago  Patient was started on pain control management which improved symptoms significantly immediately  KUB did not show any stones  Given hstory of sciatica and inciting event, MSK etiology was likely thus a xray right hip and lumbar spine was ordered  However, these xrays were remarkable for bowel containing hernia vs tissue gas and a subsequent CT showed a large right ureteral stone with hydrouteronephrosis  Pain management was consulted for recommendations  Urology was contacted and the patient was transferred to [facility] for intervention  Review of Systems:  Review of Systems   All other systems reviewed and are negative  Past Medical and Surgical History:   Past Medical History:   Diagnosis Date   • Acute renal failure (ARF) ([facility] [address] )     last assessed - [id number]   • Chest pain     last assessed - [id number]   • CPAP (continuous positive airway pressure) dependence    • Depression    • Diabetes mellitus ([facility] [address] )    • Dyspnea on exertion     last assessed - [id number]   • Hypertension    • Nephrolithiasis    • Numbness of right foot     numbness on the sole of the right foot; since he was young d/t stenosis   • Obesity    • Onychomycosis     last assessed - [id number]   • JEFF on CPAP    • Sciatica     last assessed - [id number]   • Sleep apnea        Past Surgical History:   Procedure Laterality Date   • COLONOSCOPY     • CYSTOSCOPY W/ LASER LITHOTRIPSY     • KNEE ARTHROSCOPY Left 11/2013   • MI LAP,CHOLECYSTECTOMY N/A 2/8/2019    Procedure: ROBOTIC ASSISTED LAPAROSCOPIC CHOLECYSTECTOMY;  Surgeon: Marco Antonio Knowles DO;  Location: [facility];  Service: General   • PROSTATE BIOPSY      Needle Biopsy - Negative       Meds/Allergies:  Prior to Admission medications    Medication Sig Start Date End Date Taking?  Authorizing Provider   Accu-Chek Guide test strip USE AND DISCARD 1 TEST     STRIP TWICE DAILY AS       INSTRUCTED 10/6/22   Casey Kumar MD   amLODIPine (NORVASC) 10 mg tablet TAKE 1 TABLET DAILY 10/6/22   Casey Kumar MD   atorvastatin (LIPITOR) 10 mg tablet TAKE 1 TABLET DAILY 10/19/22   Casey Kumar MD   BD Pen Needle Alicia 2nd Gen 32G X 4 MM MISC INJECT SUBCUTANEOUSLY DAILY 10/6/22   Casey Kumar MD   buPROPion (WELLBUTRIN XL) 150 mg 24 hr tablet TAKE 1 TABLET DAILY 10/6/22   Casey Kumar MD   ciclopirox (LOPROX) 0 77 % cream Apply topically 2 (two) times a day for 20 days 8/9/21 8/29/21  Jay Wing DPM   cloNIDine (CATAPRES) 0 3 mg tablet TAKE 1 TABLET TWICE A DAY 10/19/22   Casey Kumar MD   Dulaglutide 4 5 MG/0 5ML SOPN Inject 0 5 mL (4 5 mg total) under the skin once a week 10/16/22   Casey Kumar MD   enalapril (VASOTEC) 20 mg tablet TAKE 1 TABLET TWICE A DAY 10/6/22   Casey Kumar MD   glimepiride (AMARYL) 4 mg tablet TAKE 1 TABLET TWICE A DAY 10/19/22   Casey Kumar MD   Insulin Glargine Solostar (Basaglar KwikPen) 100 UNIT/ML SOPN Inject 0 34 mL (34 Units total) under the skin daily 10/16/22   Casey Kumar MD   ketoconazole (NIZORAL) 2 % cream Apply topically daily 6/14/21 7/14/21  Jay Wing DPM   Lidocaine Viscous HCl (XYLOCAINE) 2 % mucosal solution Swish and spit 15 mL 4 (four) times a day as needed for mouth pain or discomfort 5/9/22   Casey Kumar MD   nystatin (MYCOSTATIN) powder Apply topically 3 (three) times a day as needed (groin rash) 10/27/20   Casey Kumar MD   sertraline (ZOLOFT) 100 mg tablet TAKE 1 TABLET DAILY 10/19/22   Casey Kumar MD   tamsulosin (FLOMAX) 0 4 mg TAKE 1 CAPSULE DAILY 10/6/22   Casey Kumar MD   terbinafine (LamISIL) 250 mg tablet 1 tab p o  every other day  10/24/22 11/24/22  Jay Wing DPM     I have reviewed home medications using recent Epic encounter      Allergies: No Known Allergies    Social History:  Marital Status: /Civil Union   Occupation: [employer]  Patient Pre-hospital Living Situation: Home  Patient Pre-hospital Level of Mobility: walks  Patient Pre-hospital Diet Restrictions: None  Substance Use History:   Social History     Substance and Sexual Activity   Alcohol Use Yes     Social History     Tobacco Use   Smoking Status Some Days   • Types: Cigars   Smokeless Tobacco Never   Tobacco Comments    3 cigars per week     Social History     Substance and Sexual Activity   Drug Use No       Family History:  Family History   Adopted: Yes   Problem Relation Age of Onset   • Alzheimer's disease Mother    • Alcohol abuse Neg Hx    • Drug abuse Neg Hx    • Depression Neg Hx    • Substance Abuse Neg Hx    • Mental illness Neg Hx        Physical Exam:     Vitals:   Blood Pressure: (!) 181/93 (11/23/22 1623)  Pulse: 68 (11/23/22 1623)  Temperature: 97 9 °F (36 6 °C) (11/23/22 1623)  Temp Source: Temporal (11/23/22 1623)  Respirations: 18 (11/23/22 1623)  SpO2: 92 % (11/23/22 1623)    Physical Exam  Constitutional:       Appearance: He is obese  He is not toxic-appearing  HENT:      Head: Normocephalic and atraumatic  Nose: Nose normal    Eyes:      Extraocular Movements: Extraocular movements intact  Cardiovascular:      Rate and Rhythm: Normal rate and regular rhythm  Pulmonary:      Effort: Pulmonary effort is normal    Abdominal:      General: There is no distension  Palpations: Abdomen is soft  Tenderness: There is no abdominal tenderness  Musculoskeletal:      Comments: Pain with right hip ROM   Skin:     Findings: Rash present  Neurological:      Mental Status: He is alert            Additional Data:     Lab Results:  Results from last 7 days   Lab Units 11/23/22  0528   WBC Thousand/uL 7 77   HEMOGLOBIN g/dL 15 5   HEMATOCRIT % 46 3   PLATELETS Thousands/uL 263   NEUTROS PCT % 78*   LYMPHS PCT % 14   MONOS PCT % 6   EOS PCT % 1     Results from last 7 days   Lab Units 11/23/22  0528   SODIUM mmol/L 136   POTASSIUM mmol/L 4 2   CHLORIDE mmol/L 106   CO2 mmol/L 21   BUN mg/dL 17   CREATININE mg/dL 1 13   ANION GAP mmol/L 9   CALCIUM mg/dL 9 0   GLUCOSE RANDOM mg/dL 129         Results from last 7 days   Lab Units 11/23/22  1652 11/23/22  1203 11/23/22  0718 11/22/22  2200 11/22/22  1600 11/22/22  1105 11/22/22  0734 11/21/22  2149 11/21/22  1614 11/21/22  1102 11/21/22  0758 11/21/22  0738   POC GLUCOSE mg/dl 92 113 112 105 121 121 95 104 165* 158* 92 64*         Results from last 7 days   Lab Units 11/23/22  0528 11/22/22  0553   PROCALCITONIN ng/ml 0 08 0 13       Lines/Drains:  Invasive Devices     Peripheral Intravenous Line  Duration           Peripheral IV 11/20/22 Right Antecubital 2 days                    Imaging: Reviewed radiology reports from this admission including: abdominal/pelvic CT  No orders to display       EKG and Other Studies Reviewed on Admission:   · EKG: No EKG obtained  ** Please Note: This note has been constructed using a voice recognition system   **

## 2022-11-23 NOTE — DISCHARGE SUMMARY
[facility]  Discharge- [facility] 1960, 58 y o  male MRN: [id number]  Unit/Bed#: S -61 Encounter: [id number]  Primary Care Provider: Steffanie Mejia MD   Date and time admitted to hospital: 11/20/2022  5:46 PM    * Nephrolithiasis  Assessment & Plan  CT abdomen pelvis showed 12mm right ureteral calculus causing right hydroureteronephrosis with gas in anterior wall pannus  [facility] notified and patient now waiting for transfer pending bed availability  No signs of peritonitis on exam  Urinating in urinal without any stones noticed  Follow acute pain management referral recs  Decadron DC'd  Additional acute pain management referral placed for arrival at [facility]  No acute distress  Back pain  Assessment & Plan  · Continues to denie saddle anesthesia, bowel or urinary incontinence, numbness, tingling, fever or chills  Pain has improved to 4/10  CT abdomen pelvis showed 12mm right ureteral calculus causing right hydroureteronephrosis with gas in anterior wall pannus  [facility] notified and patient now waiting for transfer pending bed availability  Follow acute pain management referral recs  · Pain control:  · Tylenol OTC  · Lidocaine patch  · Oxycodone for moderate pain  · IV Dilaudid for breakthrough  · Robaxin  · Aqua K   · PT/OT evaluations     ANNI (acute kidney injury) (HCC)-resolved as of 11/22/2022  Assessment & Plan  Resolved    Abnormal urinalysis  Assessment & Plan  Proteinuria, trace ketones, and wbc's  Asymptomatic  CKD (chronic kidney disease) stage 3, GFR 30-59 ml/min [facility])  Assessment & Plan  Lab Results   Component Value Date    EGFR 69 11/23/2022    EGFR 49 11/22/2022    EGFR 37 11/21/2022    CREATININE 1 13 11/23/2022    CREATININE 1 48 (H) 11/22/2022    CREATININE 1 89 (H) 11/21/2022   • Creatinine upon admission: 1 50  o Baseline: 1 4-1 6  · Avoid nephrotoxins and hypotension  · Monitor BMP      Type 2 diabetes mellitus with stage 3a chronic kidney disease, with long-term current use of insulin [facility])  Assessment & Plan  Lab Results   Component Value Date    HGBA1C 10 1 (H) 10/16/2022       Recent Labs     11/22/22  1600 11/22/22  2200 11/23/22  0718 11/23/22  1203   POCGLU 121 105 112 113       Blood Sugar Average: Last 72 hrs:  · (P) 109 [id number] regimen of glimepiride and Dulaglutide, will hold while inpatient  · Continue home insulin regimen of 34 units of Lantus HS  · Accu-checks and SSI  · Hypoglycemia protocol  · Well controlled     JEFF on CPAP  Assessment & Plan  · CPAP HS  Essential hypertension  Assessment & Plan  • Blood pressure is reviewed and acceptable  One elevated reading of 193/86 on arrival suspect 2/2 acute back pain, blood pressure improved spontaneously without antihypertensive medication intervention  • Continue home regimen of Enalapril, Clonidine and Amlodipine  • Monitor blood pressure  Mixed hyperlipidemia  Assessment & Plan  · Continue statin  Obesity (BMI 35 0-39 9 without comorbidity)  Assessment & Plan  · Encouraged lifestyle modifications  Depression with anxiety  Assessment & Plan  · Continue Zoloft and Wellbutrin  BPH without urinary obstruction  Assessment & Plan  · Continue Flomax        Medical Problems     Resolved Problems  Date Reviewed: 11/23/2022          Resolved    ANNI (acute kidney injury) ([facility] [address] [facility] 11/22/2022     Resolved by  Manuel Vega MD        Discharging Resident: Manuel Vega MD  Discharging Attending: Sharan Stahl*  PCP: Jessica Mai MD  Admission Date:   Admission Orders (From admission, onward)     Ordered        11/22/22 1530  Inpatient Admission  Once            11/20/22 2101  Place in Observation  Once                      Discharge Date: 11/23/22    Consultations During Hospital Stay:  · Acute pain   · Physical therapy   · Occupational therapy     Procedures Performed:   · None    Significant Findings / Test Results: · Obstructive oval 12 mm proximal right ureteral calculus located 5 to 6 cm distal to the right UPJ causes mild right hydroureteronephrosis  Small foci of gas within the anterior subcutaneous tissues in this patient's anterior abdominal wall pannus   Incidental Findings:   · None   · I reviewed the above mentioned incidental findings with the patient and/or family and they expressed understanding  Test Results Pending at Discharge (will require follow up):  · Urine culture     Outpatient Tests Requested:  · None    Complications:  None    Reason for Admission: Patient will be readmitted to [facility] for Urological intervention of right ureter stone     Hospital Course:   Rodger Wyman is a 58 y o  male patient who originally presented to the hospital on 11/20/2022 due to intractable nonradiating right lower back and right hip pain exacerbated with movement first noticed after doing yard work 1 week ago  Patient was started on pain control management which improved symptoms significantly immediately  KUB did not show any stones  Given hstory of sciatica and inciting event, MSK etiology was likely thus a xray right hip and lumbar spine was ordered  However, these xrays were remarkable for bowel containing hernia vs tissue gas and a subsequent CT showed a large right ureteral stone with hydrouteronephrosis  Pain management was consulted for recommendations  Urology was contacted and patient scheduled to be transferred to [facility] for intervention  Patient is stable and now endorsing mild right back pain while at rest        Please see above list of diagnoses and related plan for additional information  Condition at Discharge: good    Discharge Day Visit / Exam:   Subjective:  Admits 4/10 right back pain at rest that becomes much more severe when he moves  He is unable to walk due to pain  No nausea vomiting or diarrhea        Vitals: Blood Pressure: 136/86 (11/23/22 0717)  Pulse: 71 (11/23/22 0717)  Temperature: 97 9 °F (36 6 °C) (11/23/22 0717)  Temp Source: Oral (11/22/22 2302)  Respirations: 16 (11/23/22 0717)  Height: 5' 11" (180 3 cm) (11/20/22 2020)  Weight - Scale: 124 kg (272 lb 7 8 oz) (Patient not able to stand for standing weight ) (11/22/22 0554)  SpO2: 92 % (11/23/22 0717)  Exam:   Physical Exam  Vitals and nursing note reviewed  Constitutional:       General: He is not in acute distress  Appearance: Normal appearance  He is not ill-appearing  Cardiovascular:      Rate and Rhythm: Normal rate  Pulses: Normal pulses  Heart sounds: Normal heart sounds  Pulmonary:      Breath sounds: Normal breath sounds  Abdominal:      General: Bowel sounds are normal       Palpations: Abdomen is soft  Tenderness: There is no abdominal tenderness  There is no guarding or rebound  Musculoskeletal:         General: Tenderness present  Comments: Right hip/flank pain on palpation   Skin:     General: Skin is warm  Neurological:      General: No focal deficit present  Mental Status: He is alert and oriented to person, place, and time  Psychiatric:         Mood and Affect: Mood normal          Behavior: Behavior normal           Discussion with Family: Updated  (wife) via phone  Discharge instructions/Information to patient and family:   See after visit summary for information provided to patient and family  Provisions for Follow-Up Care:  See after visit summary for information related to follow-up care and any pertinent home health orders  Disposition:   [address] Transfer to [facility]    Planned Readmission: [facility] for urology intervention of right kidney stone     Discharge Medications:  See after visit summary for reconciled discharge medications provided to patient and/or family        **Please Note: This note may have been constructed using a voice recognition system**

## 2022-11-24 ENCOUNTER — TELEPHONE (OUTPATIENT)
Dept: OTHER | Facility: HOSPITAL | Age: 62
End: 2022-11-24

## 2022-11-24 ENCOUNTER — ANESTHESIA (INPATIENT)
Dept: PERIOP | Facility: HOSPITAL | Age: 62
End: 2022-11-24

## 2022-11-24 ENCOUNTER — APPOINTMENT (OUTPATIENT)
Dept: RADIOLOGY | Facility: HOSPITAL | Age: 62
DRG: 661 | End: 2022-11-24
Payer: COMMERCIAL

## 2022-11-24 ENCOUNTER — ANESTHESIA EVENT (INPATIENT)
Dept: PERIOP | Facility: HOSPITAL | Age: 62
End: 2022-11-24

## 2022-11-24 DIAGNOSIS — N13.2 HYDRONEPHROSIS WITH OBSTRUCTING CALCULUS: Primary | ICD-10-CM

## 2022-11-24 PROBLEM — R93.5 ABNORMAL CT OF THE ABDOMEN: Status: ACTIVE | Noted: 2022-11-24

## 2022-11-24 LAB
ANION GAP SERPL CALCULATED.3IONS-SCNC: 7 MMOL/L (ref 4–13)
BACTERIA UR CULT: NORMAL
BASOPHILS # BLD AUTO: 0.04 THOUSANDS/ÂΜL (ref 0–0.1)
BASOPHILS NFR BLD AUTO: 1 % (ref 0–1)
BUN SERPL-MCNC: 21 MG/DL (ref 5–25)
CALCIUM SERPL-MCNC: 8.8 MG/DL (ref 8.3–10.1)
CHLORIDE SERPL-SCNC: 107 MMOL/L (ref 96–108)
CO2 SERPL-SCNC: 24 MMOL/L (ref 21–32)
CREAT SERPL-MCNC: 1.27 MG/DL (ref 0.6–1.3)
EOSINOPHIL # BLD AUTO: 0.21 THOUSAND/ÂΜL (ref 0–0.61)
EOSINOPHIL NFR BLD AUTO: 3 % (ref 0–6)
ERYTHROCYTE [DISTWIDTH] IN BLOOD BY AUTOMATED COUNT: 12.8 % (ref 11.6–15.1)
GFR SERPL CREATININE-BSD FRML MDRD: 60 ML/MIN/1.73SQ M
GLUCOSE SERPL-MCNC: 114 MG/DL (ref 65–140)
GLUCOSE SERPL-MCNC: 117 MG/DL (ref 65–140)
GLUCOSE SERPL-MCNC: 123 MG/DL (ref 65–140)
GLUCOSE SERPL-MCNC: 210 MG/DL (ref 65–140)
GLUCOSE SERPL-MCNC: 94 MG/DL (ref 65–140)
GLUCOSE SERPL-MCNC: 99 MG/DL (ref 65–140)
HCT VFR BLD AUTO: 45.7 % (ref 36.5–49.3)
HGB BLD-MCNC: 14.6 G/DL (ref 12–17)
IMM GRANULOCYTES # BLD AUTO: 0.03 THOUSAND/UL (ref 0–0.2)
IMM GRANULOCYTES NFR BLD AUTO: 0 % (ref 0–2)
LYMPHOCYTES # BLD AUTO: 2.24 THOUSANDS/ÂΜL (ref 0.6–4.47)
LYMPHOCYTES NFR BLD AUTO: 29 % (ref 14–44)
MCH RBC QN AUTO: 28.9 PG (ref 26.8–34.3)
MCHC RBC AUTO-ENTMCNC: 31.9 G/DL (ref 31.4–37.4)
MCV RBC AUTO: 91 FL (ref 82–98)
MONOCYTES # BLD AUTO: 0.68 THOUSAND/ÂΜL (ref 0.17–1.22)
MONOCYTES NFR BLD AUTO: 9 % (ref 4–12)
NEUTROPHILS # BLD AUTO: 4.58 THOUSANDS/ÂΜL (ref 1.85–7.62)
NEUTS SEG NFR BLD AUTO: 58 % (ref 43–75)
NRBC BLD AUTO-RTO: 0 /100 WBCS
PLATELET # BLD AUTO: 276 THOUSANDS/UL (ref 149–390)
PMV BLD AUTO: 9.7 FL (ref 8.9–12.7)
POTASSIUM SERPL-SCNC: 3.7 MMOL/L (ref 3.5–5.3)
RBC # BLD AUTO: 5.05 MILLION/UL (ref 3.88–5.62)
SODIUM SERPL-SCNC: 138 MMOL/L (ref 135–147)
WBC # BLD AUTO: 7.78 THOUSAND/UL (ref 4.31–10.16)

## 2022-11-24 PROCEDURE — BT1D1ZZ FLUOROSCOPY OF RIGHT KIDNEY, URETER AND BLADDER USING LOW OSMOLAR CONTRAST: ICD-10-PCS | Performed by: STUDENT IN AN ORGANIZED HEALTH CARE EDUCATION/TRAINING PROGRAM

## 2022-11-24 PROCEDURE — 85025 COMPLETE CBC W/AUTO DIFF WBC: CPT | Performed by: INTERNAL MEDICINE

## 2022-11-24 PROCEDURE — 0T768DZ DILATION OF RIGHT URETER WITH INTRALUMINAL DEVICE, VIA NATURAL OR ARTIFICIAL OPENING ENDOSCOPIC: ICD-10-PCS | Performed by: STUDENT IN AN ORGANIZED HEALTH CARE EDUCATION/TRAINING PROGRAM

## 2022-11-24 PROCEDURE — 94760 N-INVAS EAR/PLS OXIMETRY 1: CPT

## 2022-11-24 PROCEDURE — 94660 CPAP INITIATION&MGMT: CPT

## 2022-11-24 PROCEDURE — 82948 REAGENT STRIP/BLOOD GLUCOSE: CPT

## 2022-11-24 PROCEDURE — 99233 SBSQ HOSP IP/OBS HIGH 50: CPT | Performed by: INTERNAL MEDICINE

## 2022-11-24 PROCEDURE — C2617 STENT, NON-COR, TEM W/O DEL: HCPCS | Performed by: STUDENT IN AN ORGANIZED HEALTH CARE EDUCATION/TRAINING PROGRAM

## 2022-11-24 PROCEDURE — 80048 BASIC METABOLIC PNL TOTAL CA: CPT | Performed by: INTERNAL MEDICINE

## 2022-11-24 PROCEDURE — 99222 1ST HOSP IP/OBS MODERATE 55: CPT | Performed by: STUDENT IN AN ORGANIZED HEALTH CARE EDUCATION/TRAINING PROGRAM

## 2022-11-24 PROCEDURE — 52332 CYSTOSCOPY AND TREATMENT: CPT | Performed by: STUDENT IN AN ORGANIZED HEALTH CARE EDUCATION/TRAINING PROGRAM

## 2022-11-24 PROCEDURE — C1769 GUIDE WIRE: HCPCS | Performed by: STUDENT IN AN ORGANIZED HEALTH CARE EDUCATION/TRAINING PROGRAM

## 2022-11-24 PROCEDURE — 74420 UROGRAPHY RTRGR +-KUB: CPT

## 2022-11-24 PROCEDURE — C1758 CATHETER, URETERAL: HCPCS | Performed by: STUDENT IN AN ORGANIZED HEALTH CARE EDUCATION/TRAINING PROGRAM

## 2022-11-24 PROCEDURE — 99222 1ST HOSP IP/OBS MODERATE 55: CPT | Performed by: SURGERY

## 2022-11-24 PROCEDURE — 87086 URINE CULTURE/COLONY COUNT: CPT | Performed by: STUDENT IN AN ORGANIZED HEALTH CARE EDUCATION/TRAINING PROGRAM

## 2022-11-24 DEVICE — INLAY OPTIMA URETERAL STENT W/O GUIDEWIRE
Type: IMPLANTABLE DEVICE | Status: FUNCTIONAL
Brand: BARD® INLAY OPTIMA® URETERAL STENT

## 2022-11-24 RX ORDER — LIDOCAINE HYDROCHLORIDE 20 MG/ML
INJECTION, SOLUTION EPIDURAL; INFILTRATION; INTRACAUDAL; PERINEURAL AS NEEDED
Status: DISCONTINUED | OUTPATIENT
Start: 2022-11-24 | End: 2022-11-24

## 2022-11-24 RX ORDER — PHENAZOPYRIDINE HYDROCHLORIDE 100 MG/1
100 TABLET, FILM COATED ORAL
Status: DISCONTINUED | OUTPATIENT
Start: 2022-11-24 | End: 2022-11-26 | Stop reason: HOSPADM

## 2022-11-24 RX ORDER — OXYCODONE HYDROCHLORIDE 5 MG/1
7.5 TABLET ORAL EVERY 4 HOURS PRN
Status: DISCONTINUED | OUTPATIENT
Start: 2022-11-24 | End: 2022-11-25

## 2022-11-24 RX ORDER — OXYCODONE HYDROCHLORIDE 5 MG/1
5 TABLET ORAL EVERY 4 HOURS PRN
Status: DISCONTINUED | OUTPATIENT
Start: 2022-11-24 | End: 2022-11-24

## 2022-11-24 RX ORDER — SODIUM CHLORIDE, SODIUM LACTATE, POTASSIUM CHLORIDE, CALCIUM CHLORIDE 600; 310; 30; 20 MG/100ML; MG/100ML; MG/100ML; MG/100ML
INJECTION, SOLUTION INTRAVENOUS CONTINUOUS PRN
Status: DISCONTINUED | OUTPATIENT
Start: 2022-11-24 | End: 2022-11-24

## 2022-11-24 RX ORDER — PROPOFOL 10 MG/ML
INJECTION, EMULSION INTRAVENOUS AS NEEDED
Status: DISCONTINUED | OUTPATIENT
Start: 2022-11-24 | End: 2022-11-24

## 2022-11-24 RX ORDER — HYDROMORPHONE HCL/PF 1 MG/ML
1 SYRINGE (ML) INJECTION
Status: DISCONTINUED | OUTPATIENT
Start: 2022-11-24 | End: 2022-11-25

## 2022-11-24 RX ORDER — CEFAZOLIN SODIUM 2 G/50ML
2000 SOLUTION INTRAVENOUS
Status: DISCONTINUED | OUTPATIENT
Start: 2022-11-24 | End: 2022-11-24

## 2022-11-24 RX ORDER — LISINOPRIL 20 MG/1
40 TABLET ORAL DAILY
Status: DISCONTINUED | OUTPATIENT
Start: 2022-11-25 | End: 2022-11-26 | Stop reason: HOSPADM

## 2022-11-24 RX ORDER — CEFAZOLIN SODIUM 1 G/3ML
INJECTION, POWDER, FOR SOLUTION INTRAMUSCULAR; INTRAVENOUS AS NEEDED
Status: DISCONTINUED | OUTPATIENT
Start: 2022-11-24 | End: 2022-11-24

## 2022-11-24 RX ORDER — OXYBUTYNIN CHLORIDE 5 MG/1
5 TABLET ORAL 3 TIMES DAILY PRN
Status: DISCONTINUED | OUTPATIENT
Start: 2022-11-24 | End: 2022-11-26 | Stop reason: HOSPADM

## 2022-11-24 RX ORDER — HYDRALAZINE HYDROCHLORIDE 20 MG/ML
5 INJECTION INTRAMUSCULAR; INTRAVENOUS EVERY 6 HOURS PRN
Status: DISCONTINUED | OUTPATIENT
Start: 2022-11-24 | End: 2022-11-26 | Stop reason: HOSPADM

## 2022-11-24 RX ORDER — PHENAZOPYRIDINE HYDROCHLORIDE 100 MG/1
100 TABLET, FILM COATED ORAL 3 TIMES DAILY PRN
Qty: 10 TABLET | Refills: 0 | Status: SHIPPED | OUTPATIENT
Start: 2022-11-24 | End: 2022-12-01

## 2022-11-24 RX ORDER — OXYBUTYNIN CHLORIDE 5 MG/1
5 TABLET ORAL 3 TIMES DAILY PRN
Qty: 30 TABLET | Refills: 2 | Status: SHIPPED | OUTPATIENT
Start: 2022-11-24 | End: 2022-12-24

## 2022-11-24 RX ORDER — FENTANYL CITRATE 50 UG/ML
INJECTION, SOLUTION INTRAMUSCULAR; INTRAVENOUS AS NEEDED
Status: DISCONTINUED | OUTPATIENT
Start: 2022-11-24 | End: 2022-11-24

## 2022-11-24 RX ORDER — HYDROMORPHONE HCL/PF 1 MG/ML
1 SYRINGE (ML) INJECTION ONCE
Status: COMPLETED | OUTPATIENT
Start: 2022-11-24 | End: 2022-11-24

## 2022-11-24 RX ORDER — HYDROMORPHONE HCL IN WATER/PF 6 MG/30 ML
0.2 PATIENT CONTROLLED ANALGESIA SYRINGE INTRAVENOUS
Status: DISCONTINUED | OUTPATIENT
Start: 2022-11-24 | End: 2022-11-24 | Stop reason: HOSPADM

## 2022-11-24 RX ORDER — CEFAZOLIN SODIUM 2 G/50ML
2000 SOLUTION INTRAVENOUS
Status: COMPLETED | OUTPATIENT
Start: 2022-11-24 | End: 2022-11-24

## 2022-11-24 RX ORDER — FENTANYL CITRATE/PF 50 MCG/ML
25 SYRINGE (ML) INJECTION
Status: DISCONTINUED | OUTPATIENT
Start: 2022-11-24 | End: 2022-11-24 | Stop reason: HOSPADM

## 2022-11-24 RX ORDER — MIDAZOLAM HYDROCHLORIDE 2 MG/2ML
INJECTION, SOLUTION INTRAMUSCULAR; INTRAVENOUS AS NEEDED
Status: DISCONTINUED | OUTPATIENT
Start: 2022-11-24 | End: 2022-11-24

## 2022-11-24 RX ORDER — ONDANSETRON 2 MG/ML
4 INJECTION INTRAMUSCULAR; INTRAVENOUS ONCE AS NEEDED
Status: DISCONTINUED | OUTPATIENT
Start: 2022-11-24 | End: 2022-11-24 | Stop reason: HOSPADM

## 2022-11-24 RX ORDER — ONDANSETRON 2 MG/ML
INJECTION INTRAMUSCULAR; INTRAVENOUS AS NEEDED
Status: DISCONTINUED | OUTPATIENT
Start: 2022-11-24 | End: 2022-11-24

## 2022-11-24 RX ADMIN — CLONIDINE HYDROCHLORIDE 0.3 MG: 0.2 TABLET ORAL at 21:24

## 2022-11-24 RX ADMIN — CEFAZOLIN SODIUM 2000 MG: 2 SOLUTION INTRAVENOUS at 10:25

## 2022-11-24 RX ADMIN — METHOCARBAMOL TABLETS 750 MG: 750 TABLET, COATED ORAL at 21:24

## 2022-11-24 RX ADMIN — ONDANSETRON 4 MG: 2 INJECTION INTRAMUSCULAR; INTRAVENOUS at 10:20

## 2022-11-24 RX ADMIN — OXYBUTYNIN CHLORIDE 5 MG: 5 TABLET ORAL at 20:07

## 2022-11-24 RX ADMIN — MIDAZOLAM 1 MG: 1 INJECTION INTRAMUSCULAR; INTRAVENOUS at 10:28

## 2022-11-24 RX ADMIN — PROPOFOL 200 MG: 10 INJECTION, EMULSION INTRAVENOUS at 10:32

## 2022-11-24 RX ADMIN — FENTANYL CITRATE 25 MCG: 50 INJECTION INTRAMUSCULAR; INTRAVENOUS at 10:40

## 2022-11-24 RX ADMIN — HYDROMORPHONE HYDROCHLORIDE 1 MG: 1 INJECTION, SOLUTION INTRAMUSCULAR; INTRAVENOUS; SUBCUTANEOUS at 18:07

## 2022-11-24 RX ADMIN — OXYCODONE HYDROCHLORIDE 10 MG: 10 TABLET ORAL at 13:44

## 2022-11-24 RX ADMIN — NYSTATIN: 100000 POWDER TOPICAL at 17:34

## 2022-11-24 RX ADMIN — SODIUM CHLORIDE 100 ML/HR: 0.9 INJECTION, SOLUTION INTRAVENOUS at 04:30

## 2022-11-24 RX ADMIN — HEPARIN SODIUM 5000 UNITS: 5000 INJECTION INTRAVENOUS; SUBCUTANEOUS at 21:24

## 2022-11-24 RX ADMIN — TAMSULOSIN HYDROCHLORIDE 0.4 MG: 0.4 CAPSULE ORAL at 15:44

## 2022-11-24 RX ADMIN — HYDROMORPHONE HYDROCHLORIDE 0.5 MG: 1 INJECTION, SOLUTION INTRAMUSCULAR; INTRAVENOUS; SUBCUTANEOUS at 16:57

## 2022-11-24 RX ADMIN — SERTRALINE 100 MG: 100 TABLET, FILM COATED ORAL at 09:26

## 2022-11-24 RX ADMIN — ATORVASTATIN CALCIUM 10 MG: 10 TABLET, FILM COATED ORAL at 15:45

## 2022-11-24 RX ADMIN — ACETAMINOPHEN 975 MG: 325 TABLET ORAL at 13:45

## 2022-11-24 RX ADMIN — SODIUM CHLORIDE 100 ML/HR: 0.9 INJECTION, SOLUTION INTRAVENOUS at 11:15

## 2022-11-24 RX ADMIN — CLONIDINE HYDROCHLORIDE 0.3 MG: 0.2 TABLET ORAL at 09:37

## 2022-11-24 RX ADMIN — SODIUM CHLORIDE 100 ML/HR: 0.9 INJECTION, SOLUTION INTRAVENOUS at 15:42

## 2022-11-24 RX ADMIN — HYDROMORPHONE HYDROCHLORIDE 0.5 MG: 1 INJECTION, SOLUTION INTRAMUSCULAR; INTRAVENOUS; SUBCUTANEOUS at 01:23

## 2022-11-24 RX ADMIN — INSULIN GLARGINE 34 UNITS: 100 INJECTION, SOLUTION SUBCUTANEOUS at 21:24

## 2022-11-24 RX ADMIN — INSULIN LISPRO 4 UNITS: 100 INJECTION, SOLUTION INTRAVENOUS; SUBCUTANEOUS at 17:34

## 2022-11-24 RX ADMIN — PHENAZOPYRIDINE 100 MG: 100 TABLET ORAL at 20:08

## 2022-11-24 RX ADMIN — SODIUM CHLORIDE, SODIUM LACTATE, POTASSIUM CHLORIDE, AND CALCIUM CHLORIDE: .6; .31; .03; .02 INJECTION, SOLUTION INTRAVENOUS at 10:11

## 2022-11-24 RX ADMIN — DOCUSATE SODIUM 100 MG: 100 CAPSULE, LIQUID FILLED ORAL at 17:33

## 2022-11-24 RX ADMIN — HYDROMORPHONE HYDROCHLORIDE 0.5 MG: 1 INJECTION, SOLUTION INTRAMUSCULAR; INTRAVENOUS; SUBCUTANEOUS at 09:43

## 2022-11-24 RX ADMIN — CEFAZOLIN 1000 MG: 1 INJECTION, POWDER, FOR SOLUTION INTRAMUSCULAR; INTRAVENOUS at 10:25

## 2022-11-24 RX ADMIN — HEPARIN SODIUM 5000 UNITS: 5000 INJECTION INTRAVENOUS; SUBCUTANEOUS at 05:23

## 2022-11-24 RX ADMIN — BUPROPION HYDROCHLORIDE 150 MG: 150 TABLET, FILM COATED, EXTENDED RELEASE ORAL at 09:26

## 2022-11-24 RX ADMIN — AMLODIPINE BESYLATE 10 MG: 10 TABLET ORAL at 09:27

## 2022-11-24 RX ADMIN — LIDOCAINE HYDROCHLORIDE 100 MG: 20 INJECTION, SOLUTION EPIDURAL; INFILTRATION; INTRACAUDAL; PERINEURAL at 10:32

## 2022-11-24 RX ADMIN — NYSTATIN: 100000 POWDER TOPICAL at 09:30

## 2022-11-24 RX ADMIN — PHENAZOPYRIDINE 100 MG: 100 TABLET ORAL at 15:44

## 2022-11-24 RX ADMIN — OXYCODONE HYDROCHLORIDE 15 MG: 10 TABLET ORAL at 20:07

## 2022-11-24 RX ADMIN — METHOCARBAMOL TABLETS 750 MG: 750 TABLET, COATED ORAL at 05:23

## 2022-11-24 RX ADMIN — ACETAMINOPHEN 975 MG: 325 TABLET ORAL at 05:23

## 2022-11-24 RX ADMIN — CEFTRIAXONE 1000 MG: 1 INJECTION, POWDER, FOR SOLUTION INTRAMUSCULAR; INTRAVENOUS at 15:45

## 2022-11-24 RX ADMIN — METHOCARBAMOL TABLETS 750 MG: 750 TABLET, COATED ORAL at 13:45

## 2022-11-24 RX ADMIN — ACETAMINOPHEN 975 MG: 325 TABLET ORAL at 21:24

## 2022-11-24 RX ADMIN — MIDAZOLAM 1 MG: 1 INJECTION INTRAMUSCULAR; INTRAVENOUS at 10:21

## 2022-11-24 NOTE — ANESTHESIA POSTPROCEDURE EVALUATION
Post-Op Assessment Note    CV Status:  Stable  Pain Score: 0    Pain management: adequate  Multimodal analgesia used between 6 hours prior to anesthesia start to PACU discharge    Mental Status:  Alert and awake   Hydration Status:  Euvolemic   PONV Controlled:  Controlled   Airway Patency:  Patent      Post Op Vitals Reviewed: Yes      Staff: Anesthesiologist, CRNA         No notable events documented      /77 (11/24/22 1100)    Temp 98 3 °F (36 8 °C) (11/24/22 1100)    Pulse 68 (11/24/22 1100)   Resp 14 (11/24/22 1100)    SpO2 95 % (11/24/22 1100)

## 2022-11-24 NOTE — ANESTHESIA PREPROCEDURE EVALUATION
Procedure:  CYSTOSCOPY RETROGRADE PYELOGRAM WITH INSERTION STENT URETERAL (Right: Bladder)    Relevant Problems   ANESTHESIA (within normal limits)   (-) History of anesthesia complications      CARDIO   (+) Essential hypertension   (+) Mixed hyperlipidemia   (-) Chest pain   (-) TREVIÑO (dyspnea on exertion)      ENDO   (+) Type 2 diabetes mellitus with stage 3a chronic kidney disease, with long-term current use of insulin (HCC)      GI/HEPATIC   (-) Gastroesophageal reflux disease      /RENAL   (+) BPH without urinary obstruction   (+) Bilateral renal cysts   (+) CKD (chronic kidney disease) stage 3, GFR 30-59 ml/min (HCC)   (+) Nephrolithiasis      NEURO/PSYCH   (+) Depression with anxiety   (+) Diabetic polyneuropathy associated with type 2 diabetes mellitus (HCC)      PULMONARY   (+) JEFF on CPAP   (-) Shortness of breath   (-) URI (upper respiratory infection)      Other   (+) Obesity (BMI 35 0-39 9 without comorbidity)        Physical Exam    Airway    Mallampati score: II  TM Distance: >3 FB  Neck ROM: full     Dental       Cardiovascular      Pulmonary      Other Findings        Anesthesia Plan  ASA Score- 3     Anesthesia Type- general with ASA Monitors  Additional Monitors:   Airway Plan: LMA  Plan Factors-Exercise tolerance (METS): >4 METS  Chart reviewed  EKG reviewed  Existing labs reviewed  Patient summary reviewed  Induction- intravenous  Postoperative Plan-     Informed Consent- Anesthetic plan and risks discussed with patient  I personally reviewed this patient with the CRNA  Discussed and agreed on the Anesthesia Plan with the CRNA  Annamaria Bellamy

## 2022-11-24 NOTE — CONSULTS
Consultation - General Surgery   Claudia Ruiz 58 y o  male MRN: [id number]  Unit/Bed#: [facility] 924-01 Encounter: [id number]    Assessment/Plan     Assessment:  57yoM w small foci of gas within the anterior subcutaneous tissue in the anterior abdominal wall pannus    11/22 CT a/P:  Small foci of gas within the anterior subcutaneous tissue in this patient's anterior abdominal wall pannus; obstructive oval 12 mm proximal right ureteral calculus located 5-6 cm distal to the right UPJ causing mild right-sided hydroureteronephrosis    Vital stable, afebrile  WBC 7 78  Hemoglobin 14 6  Creatinine 1 27    Plan:  - no acute surgical intervention  - patient has no abdominal pain, no abdominal complaints, is receiving SQH in the areas of gas seen on imaging, could be related to [facility] administration  - patient's main complaint is right-sided flank pain, will defer to Urology and primary team for treatment of his right-sided kidney stone as well as his hydroureteronephrosis  - general surgery will sign off, please tiger text with any questions or concerns    History of Present Illness   HPI:  Claudia Ruiz is a 58 y o  male who presents w PMH of HTN, DM, CKD, JEFF, depression, with right-sided obstructing kidney stone causing right-sided hydro uretero nephrosis, urology consulted and took patient r for retrograde cystoscopy with insertion of ureteral stent on 11/24  CT imaging ordered by primary team showed small foci of gas within the anterior subcutaneous tissue and patient's anterior abdominal wall pannus  They would like input from General surgery regarding this incidental finding  Patient's main complaint is right flank pain, this is consistent with his known right-sided kidney stone, states he has had kidney stones in the past and this is similar to the pain he has had with previous kidney stones, reports that to be “grabbing and pulling” in nature    He denies any abdominal pain, states he has never had any abdominal pain during this admission, is tolerating his diet without nausea or emesis, passing flatus but has not had a bowel movement for few days  Inpatient consult to Acute Care Surgery  Consult performed by: Gallo Landry MD  Consult ordered by: Diego Marquez MD          Review of Systems   Constitutional: Negative for appetite change, chills and fever  Respiratory: Negative for shortness of breath  Cardiovascular: Negative for chest pain  Gastrointestinal: Negative for abdominal distention, abdominal pain, constipation, diarrhea, nausea and vomiting  Genitourinary: Positive for flank pain  Negative for difficulty urinating  Musculoskeletal: Negative for back pain and neck pain  Neurological: Negative for dizziness, light-headedness and headaches  Psychiatric/Behavioral: Negative for agitation and confusion  All other systems reviewed and are negative        Historical Information   Past Medical History:   Diagnosis Date   • [facility]     last assessed - 06Sep2017   • Chest pain     last assessed - 04Dec2012   • CPAP (continuous positive airway pressure) dependence    • Depression    • Diabetes mellitus ([facility] )    • Dyspnea on exertion     last assessed - [id number]   • Hypertension    • Nephrolithiasis    • Numbness of right foot     numbness on the sole of the right foot; since he was young d/t stenosis   • Obesity    • Onychomycosis     last assessed - [id number]   • JEFF on CPAP    • Sciatica     last assessed - [id number]   • Sleep apnea      Past Surgical History:   Procedure Laterality Date   • COLONOSCOPY     • CYSTOSCOPY W/ LASER LITHOTRIPSY     • KNEE ARTHROSCOPY Left 11/2013   • [place] LAP,CHOLECYSTECTOMY N/A 2/8/2019    Procedure: ROBOTIC ASSISTED LAPAROSCOPIC CHOLECYSTECTOMY;  Surgeon: Marco Antonio Knowles DO;  Location: [facility];  Service: General   • PROSTATE BIOPSY      Needle Biopsy - Negative     Social History   Social History     Substance and Sexual Activity   Alcohol Use Yes     Social History     Substance and Sexual Activity   Drug Use No     E-Cigarette/Vaping   • E-Cigarette Use Never User      E-Cigarette/Vaping Substances     Social History     Tobacco Use   Smoking Status Some Days   • Types: Cigars   Smokeless Tobacco Never   Tobacco Comments    3 cigars per week     Family History: non-contributory    Meds/Allergies   all current active meds have been reviewed  No Known Allergies    Objective   First Vitals:   Blood Pressure: (!) 181/93 (11/23/22 1623)  Pulse: 68 (11/23/22 1623)  Temperature: 97 9 °F (36 6 °C) (11/23/22 1623)  Temp Source: Temporal (11/23/22 1623)  Respirations: 18 (11/23/22 1623)  SpO2: 92 % (11/23/22 1623)    Current Vitals:   Blood Pressure: 125/59 (11/24/22 1505)  Pulse: 69 (11/24/22 1505)  Temperature: 97 5 °F (36 4 °C) (11/24/22 1505)  Temp Source: Temporal (11/24/22 1100)  Respirations: 18 (11/24/22 1505)  SpO2: 91 % (11/24/22 1505)      Intake/Output Summary (Last 24 hours) at 11/24/2022 1827  Last data filed at 11/24/2022 1541  Gross per 24 hour   Intake 2868  33 ml   Output 1000 ml   Net 1868 33 ml       Invasive Devices     Peripheral Intravenous Line  Duration           Peripheral IV 11/20/22 Right Antecubital 3 days                Physical Exam  Vitals reviewed  Constitutional:       General: He is not in acute distress  Appearance: Normal appearance  He is not ill-appearing or toxic-appearing  HENT:      Head: Normocephalic and atraumatic  Mouth/Throat:      Mouth: Mucous membranes are moist    Cardiovascular:      Rate and Rhythm: Normal rate and regular rhythm  Pulses: Normal pulses  Heart sounds: Normal heart sounds  Pulmonary:      Effort: Pulmonary effort is normal  No respiratory distress  Breath sounds: Normal breath sounds  Abdominal:      General: There is no distension  Palpations: Abdomen is soft  Tenderness: There is no abdominal tenderness   There is no guarding or rebound  Comments: Abdomen is soft, nondistended, nontender to palpation in any of the 4 quadrants or epigastric regions   Musculoskeletal:         General: Normal range of motion  Cervical back: Normal range of motion  Skin:     General: Skin is warm  Capillary Refill: Capillary refill takes less than 2 seconds  Neurological:      Mental Status: He is alert and oriented to person, place, and time  Psychiatric:         Mood and Affect: Mood normal          Lab Results:   I have personally reviewed pertinent lab results  , CBC:   Lab Results   Component Value Date    WBC 7 78 11/24/2022    HGB 14 6 11/24/2022    HCT 45 7 11/24/2022    MCV 91 11/24/2022     11/24/2022    MCH 28 9 11/24/2022    MCHC 31 9 11/24/2022    RDW 12 8 11/24/2022    MPV 9 7 11/24/2022    NRBC 0 11/24/2022   , CMP:   Lab Results   Component Value Date    SODIUM 138 11/24/2022    K 3 7 11/24/2022     11/24/2022    CO2 24 11/24/2022    BUN 21 11/24/2022    CREATININE 1 27 11/24/2022    CALCIUM 8 8 11/24/2022    EGFR 60 11/24/2022     Imaging: I have personally reviewed pertinent reports  EKG, Pathology, and Other Studies: I have personally reviewed pertinent reports

## 2022-11-24 NOTE — ASSESSMENT & PLAN NOTE
XR lumbar spine revealing: "Lower lumbar disc space narrowing with flowing osteophytes from L1 through L4 suggestive of DISH "  Has denied saddle anesthesia, bowel/urinary incontinence, numbness/tingling or fever/chills  Admitting provider ordered pain management consultation (pending)  Continue current multimodal pain regimen including around the clock Tylenol, lidocaine patch, Oxycodone for moderate/severe pain, and IV Dilaudid for breakthrough events - continue Robaxin for spasms and conservative aqua K heating pad  Await PT/OT input

## 2022-11-24 NOTE — OCCUPATIONAL THERAPY NOTE
Occupational Therapy Cancel Note     11/24/22 1118   OT Last Visit   OT Visit Date 11/24/22   Note Type   Note type Cancelled Session       OT orders received and chart reviewed  Pt is currently off floor at OR  Will continue to follow and complete OT evaluation when appropriate         Cassie Millard MS, OTR/L

## 2022-11-24 NOTE — PHYSICAL THERAPY NOTE
Physical Therapy Cancellation Note       11/24/22 0955   PT Last Visit   PT Visit Date 11/24/22   Note Type   Note type Cancelled Session; Evaluation   Cancel Reasons Patient to operating room     Pt chart reviewed  Pt currently off floor to OR receiving "ureteral stent placement"   PT to continue to follow and see pt as appropriate and able       Joselo Mendoza, PT, DPT

## 2022-11-24 NOTE — ASSESSMENT & PLAN NOTE
Continue Norvasc/Catapres/ACEI - PRN IV Hydralazine on board for BP spikes  Optimize pain control  Low-sodium diet

## 2022-11-24 NOTE — ASSESSMENT & PLAN NOTE
CT of abdomen/pelvis revealed an: "Obstructive oval 12 mm proximal right ureteral calculus located 5 to 6 cm distal to the right UPJ causes mild right hydroureteronephrosis"   Appreciate urology input -> s/p cystoscopy w/ right retrograde pyelogram and subsequent ureteral stenting this morning  PRN pain/emesis control - continue IV fluids  Urinalysis revealed small pyuria with occasional bacteria -> continue empiric IV antibiotics and await urine culture  Supportive care otherwise

## 2022-11-24 NOTE — ASSESSMENT & PLAN NOTE
Insulin to finding on CT imaging noting: "Small foci of gas within the anterior subcutaneous tissues in this patient's anterior abdominal wall pannus correlating to the findings seen on today's radiographs "  Preceding XR right hip noted: "Small focus of gas overlying the right inguinal region of uncertain etiology   This could represent a bowel-containing hernia or soft tissue gas "  Clinical significance, if any, uncertain at this time -> will appreciate general surgery evaluation

## 2022-11-24 NOTE — CONSULTS
UROLOGY INPATIENT CONSULT NOTE   Name: Andreas Arceo  : 1960  MRN: [id number]     Date of Service: 22  Service Requesting Consult: Pippa Graff  Reason for consultation: Right ureteral stone      History of Present Illness:     Andreas Arceo is a 58 y o  male with a history of nephrolithiasis who has undergone ESWL and ureteroscopy over 10 years ago who presented to the emergency room 2022 with low back pain  Initially this thought to be musculoskeletal in nature but a CT scan revealed that he actually had a 12 mm proximal right ureteral calculus with mild hydro  WBC is 7 8, creatinine initially 1 89 now down to 1 27, urine culture obtained yesterday returned with mixed contaminants  Patient was transferred to [facility] for ureteral stent placement        PAST MEDICAL HISTORY:     Past Medical History:   Diagnosis Date   • Acute renal failure (ARF) ([facility] )     last assessed - [id number]   • Chest pain     last assessed - [id number]   • CPAP (continuous positive airway pressure) dependence    • Depression    • Diabetes mellitus ([facility] )    • Dyspnea on exertion     last assessed - [id number]   • Hypertension    • Nephrolithiasis    • Numbness of right foot     numbness on the sole of the right foot; since he was young d/t stenosis   • Obesity    • Onychomycosis     last assessed - [id number]   • JEFF on CPAP    • Sciatica     last assessed - [id number]   • Sleep apnea        PAST SURGICAL HISTORY:     Past Surgical History:   Procedure Laterality Date   • COLONOSCOPY     • CYSTOSCOPY W/ LASER LITHOTRIPSY     • KNEE ARTHROSCOPY Left 2013   • [place] LAP,CHOLECYSTECTOMY N/A 2019    Procedure: ROBOTIC ASSISTED LAPAROSCOPIC CHOLECYSTECTOMY;  Surgeon: Matty Antoine DO;  Location: [facility];  Service: General   • PROSTATE BIOPSY      Needle Biopsy - Negative       CURRENT MEDICATIONS:     Current Facility-Administered Medications   Medication Dose Route Frequency Provider Last Rate Last Admin   • [MAR Hold] acetaminophen (TYLENOL) tablet 975 mg  975 mg Oral Q8H [address] Tawanna Campos MD   975 mg at 11/24/22 0523   • [MAR Hold] amLODIPine (NORVASC) tablet 10 mg  10 mg Oral Daily Tawanna Campos MD   10 mg at 11/24/22 0927   • [MAR Hold] atorvastatin (LIPITOR) tablet 10 mg  10 mg Oral Daily With Era Ko MD   10 mg at 11/23/22 1730   • [MAR Hold] buPROPion (WELLBUTRIN XL) 24 hr tablet 150 mg  150 mg Oral Daily Tawanna Campos MD   150 mg at 11/24/22 5662   • ceFAZolin (ANCEF) IVPB (premix in dextrose) 2,000 mg 50 mL  2,000 mg Intravenous On Call To OR Donna Powell MD       • [facility] Hold] cloNIDine (CATAPRES) tablet 0 3 mg  0 3 mg Oral Q12H Bebeto Gomez MD   0 3 mg at 11/24/22 0937   • [MAR Hold] docusate sodium (COLACE) capsule 100 mg  100 mg Oral BID Tawanna Campos MD       • [facility] Hold] heparin (porcine) subcutaneous injection 5,000 Units  5,000 Units Subcutaneous [facility] Tawanna Campos MD   5,000 Units at 11/24/22 0523   • [MAR Hold] HYDROmorphone (DILAUDID) injection 0 5 mg  0 5 mg Intravenous Q4H PRN Tawanna Campos MD   0 5 mg at 11/24/22 0943   • [MAR Hold] insulin glargine (LANTUS) subcutaneous injection 34 Units 0 34 mL  34 Units Subcutaneous HS Tawanna Campos MD   34 Units at 11/23/22 2234   • [MAR Hold] insulin lispro (HumaLOG) 100 units/mL subcutaneous injection 1-5 Units  1-5 Units Subcutaneous HS Tawanna Campos MD   1 Units at 11/23/22 2235   • [MAR Hold] insulin lispro (HumaLOG) 100 units/mL subcutaneous injection 2-12 Units  2-12 Units Subcutaneous TID AC Tawanna Campos MD       • [MAR Hold] lidocaine (LIDODERM) 5 % patch 1 patch  1 patch Topical Daily Tawanna Campos MD       • [facility] Hold] methocarbamol (ROBAXIN) tablet 750 mg  750 mg Oral [facility] Tawanna Campos MD   750 mg at 11/24/22 0523   • [MAR Hold] nicotine (NICODERM CQ) 14 mg/24hr TD 24 hr patch 1 patch  1 patch Transdermal Daily Tawanna Campos MD       • [MAR Hold] nystatin (MYCOSTATIN) powder   Topical BID Arthurine Lout Garett Badillo MD   Given at 11/24/22 0930   • [MAR Hold] oxyCODONE (ROXICODONE) immediate release tablet 10 mg  10 mg Oral Q4H PRN Rosalio Mcclelland MD   10 mg at 11/23/22 2237   • [MAR Hold] oxyCODONE (ROXICODONE) IR tablet 5 mg  5 mg Oral Q4H PRN Rosalio Mcclelland MD   5 mg at 11/23/22 1730   • [MAR Hold] sertraline (ZOLOFT) tablet 100 mg  100 mg Oral Daily Rosalio Mcclelland MD   100 mg at 11/24/22 6720   • sodium chloride 0 9 % infusion  100 mL/hr Intravenous Continuous Rosalio Mcclelland  mL/hr at 11/24/22 0430 100 mL/hr at 11/24/22 0430   • [MAR Hold] tamsulosin (FLOMAX) capsule 0 4 mg  0 4 mg Oral Daily With Michaela Guzman MD   0 4 mg at 11/23/22 1730       ALLERGIES:   No Known Allergies    SOCIAL HISTORY:     Social History     Socioeconomic History   • Marital status: /Civil Union     Spouse name: Not on file   • Number of children: Not on file   • Years of education: Not on file   • Highest education level: Not on file   Occupational History   • Occupation: Works Full-time   Tobacco Use   • Smoking status: Some Days     Types: Cigars   • Smokeless tobacco: Never   • Tobacco comments:     3 cigars per week   Vaping Use   • Vaping Use: Never used   Substance and Sexual Activity   • Alcohol use:  Yes   • Drug use: No   • Sexual activity: Yes   Other Topics Concern   • Not on file   Social History Narrative    Exercise habits - none        Works fulltime    Has a dog     Social Determinants of Health     Financial Resource Strain: Not on file   Food Insecurity: Not on file   Transportation Needs: Not on file   Physical Activity: Not on file   Stress: Not on file   Social Connections: Not on file   Intimate Partner Violence: Not on file   Housing Stability: Not on file       FAMILY HISTORY:     Family History   Adopted: Yes   Problem Relation Age of Onset   • Alzheimer's disease Mother    • Alcohol abuse Neg Hx    • Drug abuse Neg Hx    • Depression Neg Hx    • Substance Abuse Neg Hx    • Mental illness Neg Hx        REVIEW OF SYSTEMS:     Pertinent positives and negatives in HPI    PHYSICAL EXAM:     /84   Pulse 60   Temp 97 7 °F (36 5 °C)   Resp 18   SpO2 95%     General:  Healthy appearing male in no acute distress  Head:  Normocephalic, atraumatic  Eyes: no scleral icterus  ENMT: Nares patent, moist mucous membranes  Cardiovascular:  Regular rate  Respiratory:  Patient has unlabored respirations  Abdomen:  Abdomen nondistended  Neurological: Grossly intact  Psych: Normal affect  LABS:     CBC:   Lab Results   Component Value Date    WBC 7 78 11/24/2022    HGB 14 6 11/24/2022    HCT 45 7 11/24/2022    MCV 91 11/24/2022     11/24/2022       BMP:   Lab Results   Component Value Date    GLUCOSE 301 (H) 02/01/2014    CALCIUM 8 8 11/24/2022     02/01/2014    K 3 7 11/24/2022    CO2 24 11/24/2022     11/24/2022    BUN 21 11/24/2022    CREATININE 1 27 11/24/2022       IMAGING:     CT ABDOMEN AND PELVIS WITH IV CONTRAST     INDICATION:   hernia and soft tissue gas assessment   "   Continues to denie saddle anesthesia, bowel or urinary incontinence, numbness, tingling, fever or chills  KUB did not show any stones  Patient states when he moves pain increases to 10/10 but is   4/10 at rest  Xray hip showed possible hernia vs small area of focal gas at inguinal area  CT abdomen pelvis ordered for further evaluation, if hernia or gas present, urgent consult to surgery"      COMPARISON:  Right hip radiographs from earlier today     TECHNIQUE:  CT examination of the abdomen and pelvis was performed  Axial, sagittal, and coronal 2D reformatted images were created from the source data and submitted for interpretation      Radiation dose length product (DLP) for this visit:  9627 mGy-cm     This examination, like all CT scans performed in the [facility], was performed utilizing techniques to minimize radiation dose exposure, including the use of iterative   reconstruction and automated exposure control      IV Contrast:  100 mL of iohexol (OMNIPAQUE)  Enteric Contrast:  Enteric contrast was not administered      FINDINGS:     ABDOMEN     LOWER CHEST:  No clinically significant abnormality identified in the visualized lower chest      LIVER/BILIARY TREE:  Unremarkable      GALLBLADDER:  No calcified gallstones  No pericholecystic inflammatory change      SPLEEN:  Unremarkable      PANCREAS:  Unremarkable      ADRENAL GLANDS:  Unremarkable      KIDNEYS/URETERS: Obstructive oval 12 mm proximal right ureteral calculus located 5 to 6 cm distal to the right UPJ causes mild right hydroureteronephrosis      Unchanged bilateral simple and hemorrhagic cysts  Nonobstructive bilateral renal calculi measuring up to 5 mm      STOMACH AND BOWEL:  There is colonic diverticulosis without evidence of acute diverticulitis      APPENDIX:  No findings to suggest appendicitis      ABDOMINOPELVIC CAVITY:  No ascites  No pneumoperitoneum  No lymphadenopathy      VESSELS:  Unremarkable for patient's age      PELVIS     REPRODUCTIVE ORGANS:  Unremarkable for patient's age      URINARY BLADDER:  Unremarkable      ABDOMINAL WALL/INGUINAL REGIONS:  Small foci of gas within the anterior subcutaneous tissues in this patient's anterior abdominal wall pannus correlating to the findings seen on today's radiographs      No inguinal hernia or collection      Tiny uncomplicated fat-containing umbilical hernia        OSSEOUS STRUCTURES:  No acute fracture or destructive osseous lesion      IMPRESSION:     Obstructive oval 12 mm proximal right ureteral calculus located 5 to 6 cm distal to the right UPJ causes mild right hydroureteronephrosis      Small foci of gas within the anterior subcutaneous tissues in this patient's anterior abdominal wall pannus correlating to the findings seen on today's radiographs  ASSESSMENT:     58 y o  male with 12mm proximal right ureteral stone and mild upstream hydro    I discussed the patient's imaging findings with the patient stated that the safest thing to do was to place a stent and return at a later date to treat his stone  Will send a urine culture from renal pelvis to evaluate if there is infection behind the stone  He does have a large stone that appears impacted in explained that sometimes this can mean and difficult stent placement  We were unable to place a stent I would recommend a nephrostomy tube  If he is able to undergo stent placement without any issues anticipate discharge today      PLAN:     To OR for right ureteral stent placement  Anticipate discharge home after procedure  Ancef on-call    Greg Zabala MD  10:24 AM  [facility]

## 2022-11-24 NOTE — ASSESSMENT & PLAN NOTE
Baseline creatinine approximately 1 4-1 6 - currently stable @ 1 27  Monitor renal function and urine output - limit/avoid nephrotoxins as possible - avoid hypotension as possible

## 2022-11-24 NOTE — ASSESSMENT & PLAN NOTE
Lab Results   Component Value Date    HGBA1C 10 1 (H) 10/16/2022     Hold oral hypoglycemics while hospitalized  Continue basal insulin w/ additional SSI coverage per [employer]u-Cheks  Carbohydrate restricted diet  Hypoglycemia protocol

## 2022-11-24 NOTE — OP NOTE
OPERATIVE REPORT     Name: Pauly Cespedes     MRN: [id number]  Date: 11/24/2022 Time: 1:30 PM     Location:       [facility]   Date of Operation:  11/24/2022   Service: Urology     Pre-op Diagnosis:  Right ureteral calculus    Post-op Diagnosis:  Same     Operation:     Cystoscopy  Right Retrograde Pyelogram   Right ureteral stent placement (6Fr x 26 cm JJ stent)  Fluoroscopic Guidance     Surgeon:  Vince De La Rosa MD  Assistants:  None      Anesthesia:  General   Drains:   6Fr x 26 cm JJ stent  Estimated Blood Loss:  Minimal   Urine Output:  N/A   Specimens:  Right renal pelvis urine for culture  Apparent Intraoperative Complications:  None   Patient Condition:  Stable   Disposition:  PACU  Antibiotic Prophylaxis:  Ancef     Indications:     58 y o  male with large right ureteral stone presenting for stent placement  Risks, benefits and alternatives to treatment were discussed with the patient who elected to proceed with the operation  Findings:    - normal urethra   - trilobar prostatic enlargement with intravesical median lobe   - orthotopic ureteral orifices   - normal bladder mucosa   - ureteral stone was not appreciated on fluoroscopy and may not have been radiopaque  Operative Report:    The patient was identified, and the procedure verified  After induction of anesthesia the patient was prepped and draped in the dorsolithotomy position  ? A  film was obtained  A 22 5 Fr rigid cystoscope was passed per urethra to the bladder revealing the above findings  A SoloPlus wire was used to intubate the right ureteral orifice and was passed up to the renal pelvis under fluoroscopic guidance  A 5Fr open ended ureteral catheter was inserted over the wire and passed up to the renal pelvis  ? The wire was removed and a retrograde pyelogram obtained  ? The wire was replaced and passed up to the kidney under fluoroscopic guidance  ? A 6Fr ?x 26cm? JJ stent was placed in standard retrograde fashion under fluoroscopic guidance  ? Upon removal of the wire an adequate curl was noted in the renal pelvis and the bladder on fluoroscopy  The bladder was emptied  The patient was awoken from anesthesia?and transferred to PACU in satisfactory condition  ?     I was present forthe entire procedure       Plan:    - okay for discharge from Urology perspective  - Flomax, Pyridium, Ditropan p r n   For stent discomfort  - Will make arrangements for definitive stone treatment    SIGNATURE: Danitza Ham MD  DATE: 11/24/2022  TIME: 1:30 PM

## 2022-11-24 NOTE — PROGRESS NOTES
[address] [facility]  Progress Note - Claudette Cavanaugh 1960, 58 y o  male MRN: [id number]  Unit/Bed#: [address]01 Encounter: [id number]  Primary Care Provider: Ortiz Castro MD   Date and time admitted to hospital: 11/23/2022  4:00 PM      * Right hydroureteronephrosis with obstructing calculus  Assessment & Plan  CT of abdomen/pelvis revealed an: "Obstructive oval 12 mm proximal right ureteral calculus located 5 to 6 cm distal to the right UPJ causes mild right hydroureteronephrosis"   Appreciate urology input -> s/p cystoscopy w/ right retrograde pyelogram and subsequent ureteral stenting this morning  PRN pain/emesis control - continue IV fluids  Urinalysis revealed small pyuria with occasional bacteria -> continue empiric IV antibiotics and await urine culture  Supportive care otherwise    Abnormal CT of the abdomen  Assessment & Plan  Insulin to finding on CT imaging noting: "Small foci of gas within the anterior subcutaneous tissues in this patient's anterior abdominal wall pannus correlating to the findings seen on today's radiographs "  Preceding XR right hip noted: "Small focus of gas overlying the right inguinal region of uncertain etiology   This could represent a bowel-containing hernia or soft tissue gas "  Clinical significance, if any, uncertain at this time -> will appreciate general surgery evaluation    Obstructive sleep apnea  Assessment & Plan  CPAP QHS  Weight loss counseling    Morbid obesity  Assessment & Plan  BMI of 38  Lifestyle/diet modifications    CKD (chronic kidney disease) stage 3  Assessment & Plan  Baseline creatinine approximately 1 4-1 6 - currently stable @ 1 27  Monitor renal function and urine output - limit/avoid nephrotoxins as possible - avoid hypotension as possible    Diabetes mellitus type 2   Assessment & Plan  Lab Results   Component Value Date    HGBA1C 10 1 (H) 10/16/2022     Hold oral hypoglycemics while hospitalized  Continue basal insulin w/ additional SSI coverage per Accu-Cheks  Carbohydrate restricted diet  Hypoglycemia protocol    Essential hypertension  Assessment & Plan  Continue Norvasc/Catapres/ACEI - PRN IV Hydralazine on board for BP spikes  Optimize pain control  Low-sodium diet    Depression  Assessment & Plan  Continue Wellbutrin/Zoloft    Back pain  Assessment & Plan  XR lumbar spine revealing: "Lower lumbar disc space narrowing with flowing osteophytes from L1 through L4 suggestive of DISH "  Has denied saddle anesthesia, bowel/urinary incontinence, numbness/tingling or fever/chills  Admitting provider ordered pain management consultation (pending)  Continue current multimodal pain regimen including around the clock Tylenol, lidocaine patch, Oxycodone for moderate/severe pain, and IV Dilaudid for breakthrough events - continue Robaxin for spasms and conservative aqua K heating pad  Await PT/OT input      DVT Prophylaxis:  Heparin SC      Patient Centered Rounds:  I have performed bedside rounds and discussed plan of care with nursing today  Discussions with Specialists or Other Care Team Provider:  see above assessments if applicable    Education and Discussions with Family / Patient:  Patient, along with wife, at bedside today    Time Spent for Care:  32 minutes  More than 50% of total time spent on counseling and coordination of care as described above  Current Length of Stay: 1 day(s)  Current Patient Status: Inpatient   Certification Statement:  Patient will continue to require additional hospital stay due to assessments as noted above  Code Status: Level 1 - Full Code        Subjective:     Seen/examined earlier in the day after returning from the OR  Still complains of some soreness/discomfort in the genital area along with a burning sensation  Does acknowledge ability to urinate more freely after stenting  Denies fever/chills this time    Also reports mild soreness in his throat s/p postprocedural extubation  Objective:     Vitals:   Temp (24hrs), Av °F (36 7 °C), Min:97 5 °F (36 4 °C), Max:98 3 °F (36 8 °C)    Temp:  [97 5 °F (36 4 °C)-98 3 °F (36 8 °C)] 97 5 °F (36 4 °C)  HR:  [60-74] 70  Resp:  [14-19] 17  BP: (118-181)/(77-93) 150/86  SpO2:  [92 %-96 %] 95 %  There is no height or weight on file to calculate BMI  Input and Output Summary (last 24 hours):        Intake/Output Summary (Last 24 hours) at 2022 1408  Last data filed at 2022 1052  Gross per 24 hour   Intake 680 ml   Output 1000 ml   Net -320 ml       Physical Exam:     GENERAL:  Obese - waxing/waning distress from pain  HEAD:  Normocephalic - atraumatic  EYES: PERRL - EOMI   MOUTH:  Mucosa moist  NECK:  Supple - full range of motion  CARDIAC:  Rate controlled - S1/S2 positive  PULMONARY:  Fairly clear to auscultation - nonlabored respirations  ABDOMEN:  Soft - improved suprapubic and flank discomfort - nondistended - active bowel sounds  MUSCULOSKELETAL:  Motor strength/range of motion mildly deconditioned  NEUROLOGIC:  Alert/oriented at baseline  SKIN:  Chronic wrinkles/blemishes   PSYCHIATRIC:  Mood/affect stable      Additional Data:     Labs & Recent Cultures:    Results from last 7 days   Lab Units 22  0526   WBC Thousand/uL 7 78   HEMOGLOBIN g/dL 14 6   HEMATOCRIT % 45 7   PLATELETS Thousands/uL 276   NEUTROS PCT % 58   LYMPHS PCT % 29   MONOS PCT % 9   EOS PCT % 3     Results from last 7 days   Lab Units 22  0526   POTASSIUM mmol/L 3 7   CHLORIDE mmol/L 107   CO2 mmol/L 24   BUN mg/dL 21   CREATININE mg/dL 1 27   CALCIUM mg/dL 8 8         Results from last 7 days   Lab Units 22  1147 22  1058 22  0728 22  2113 22  1652 22  1203 22  0718 22  2200 22  1600 22  1105 22  0734 22  2149   POC GLUCOSE mg/dl 94 99 114 158* 92 113 112 105 121 121 95 104         Results from last 7 days   Lab Units 22  0528 22  0553 PROCALCITONIN ng/ml 0 08 0 13         Results from last 7 days   Lab Units 11/23/22  0040   URINE CULTURE  50,000-59,000 cfu/ml         Lines/Drains:  Invasive Devices     Peripheral Intravenous Line  Duration           Peripheral IV 11/20/22 Right Antecubital 3 days                  Last 24 Hours Medication List:   Current Facility-Administered Medications   Medication Dose Route Frequency Provider Last Rate   • acetaminophen  975 mg Oral Q8H [facility] Claudia Cruz MD     • amLODIPine  10 mg Oral Daily Claudia Cruz MD     • atorvastatin  10 mg Oral Daily With Bonita Echols MD     • buPROPion  150 mg Oral Daily Claudia Cruz MD     • cefTRIAXone  1,000 mg Intravenous Q24H Claudia Cruz MD     • cloNIDine  0 3 mg Oral Q12H [facility] Claudia Cruz MD     • docusate sodium  100 mg Oral BID Claudia Cruz MD     • heparin (porcine)  5,000 Units Subcutaneous Q8H [facility] Claudia Cruz MD     • hydrALAZINE  5 mg Intravenous Q6H PRN Claudia Cruz MD     • HYDROmorphone  0 5 mg Intravenous Q4H PRN Claudia Cruz MD     • insulin glargine  34 Units Subcutaneous HS Claudia Cruz MD     • insulin lispro  1-5 Units Subcutaneous HS Claudia Cruz MD     • insulin lispro  2-12 Units Subcutaneous TID AC Claudia Cruz MD     • lidocaine  1 patch Topical Daily Claudia Cruz MD     • [START ON 11/25/2022] lisinopril  40 mg Oral Daily Claudia Cruz MD     • methocarbamol  750 mg Oral Q8H [facility] Claudia Cruz MD     • nicotine  1 patch Transdermal Daily Claudia Cruz MD     • nystatin   Topical BID Claudia Cruz MD     • oxyCODONE  10 mg Oral Q4H PRN Claudia Cruz MD     • oxyCODONE  5 mg Oral Q4H PRN Claudia Cruz MD     • phenazopyridine  100 mg Oral TID With Meals Claudia Cruz MD     • sertraline  100 mg Oral Daily Claudia Cruz MD     • sodium chloride  100 mL/hr Intravenous Continuous Claudia Cruz  mL/hr (11/24/22 1115)   • tamsulosin  0 4 mg Oral Daily With Bonita Echols MD                     ** Please Note: This note is constructed using a voice recognition dictation system  An occasional wrong word/phrase or “sound-a-like” substitution may have been picked up by dictation device due to the inherent limitations of voice recognition software  Read the chart carefully and recognize, using reasonable context, where substitutions may have occurred  **

## 2022-11-25 PROBLEM — M54.59 INTRACTABLE LOW BACK PAIN: Status: ACTIVE | Noted: 2022-11-20

## 2022-11-25 LAB
ANION GAP SERPL CALCULATED.3IONS-SCNC: 5 MMOL/L (ref 4–13)
BASOPHILS # BLD AUTO: 0.06 THOUSANDS/ÂΜL (ref 0–0.1)
BASOPHILS NFR BLD AUTO: 1 % (ref 0–1)
BUN SERPL-MCNC: 19 MG/DL (ref 5–25)
CALCIUM SERPL-MCNC: 8.6 MG/DL (ref 8.3–10.1)
CHLORIDE SERPL-SCNC: 109 MMOL/L (ref 96–108)
CO2 SERPL-SCNC: 24 MMOL/L (ref 21–32)
CREAT SERPL-MCNC: 1.39 MG/DL (ref 0.6–1.3)
EOSINOPHIL # BLD AUTO: 0.3 THOUSAND/ÂΜL (ref 0–0.61)
EOSINOPHIL NFR BLD AUTO: 4 % (ref 0–6)
ERYTHROCYTE [DISTWIDTH] IN BLOOD BY AUTOMATED COUNT: 13.2 % (ref 11.6–15.1)
GFR SERPL CREATININE-BSD FRML MDRD: 53 ML/MIN/1.73SQ M
GLUCOSE SERPL-MCNC: 102 MG/DL (ref 65–140)
GLUCOSE SERPL-MCNC: 104 MG/DL (ref 65–140)
GLUCOSE SERPL-MCNC: 110 MG/DL (ref 65–140)
GLUCOSE SERPL-MCNC: 141 MG/DL (ref 65–140)
GLUCOSE SERPL-MCNC: 152 MG/DL (ref 65–140)
GLUCOSE SERPL-MCNC: 154 MG/DL (ref 65–140)
HCT VFR BLD AUTO: 48.4 % (ref 36.5–49.3)
HGB BLD-MCNC: 15.5 G/DL (ref 12–17)
IMM GRANULOCYTES # BLD AUTO: 0.03 THOUSAND/UL (ref 0–0.2)
IMM GRANULOCYTES NFR BLD AUTO: 0 % (ref 0–2)
LYMPHOCYTES # BLD AUTO: 1.78 THOUSANDS/ÂΜL (ref 0.6–4.47)
LYMPHOCYTES NFR BLD AUTO: 24 % (ref 14–44)
MCH RBC QN AUTO: 29.9 PG (ref 26.8–34.3)
MCHC RBC AUTO-ENTMCNC: 32 G/DL (ref 31.4–37.4)
MCV RBC AUTO: 93 FL (ref 82–98)
MONOCYTES # BLD AUTO: 0.6 THOUSAND/ÂΜL (ref 0.17–1.22)
MONOCYTES NFR BLD AUTO: 8 % (ref 4–12)
NEUTROPHILS # BLD AUTO: 4.64 THOUSANDS/ÂΜL (ref 1.85–7.62)
NEUTS SEG NFR BLD AUTO: 63 % (ref 43–75)
NRBC BLD AUTO-RTO: 0 /100 WBCS
PLATELET # BLD AUTO: 279 THOUSANDS/UL (ref 149–390)
PMV BLD AUTO: 9.6 FL (ref 8.9–12.7)
POTASSIUM SERPL-SCNC: 3.9 MMOL/L (ref 3.5–5.3)
RBC # BLD AUTO: 5.18 MILLION/UL (ref 3.88–5.62)
SODIUM SERPL-SCNC: 138 MMOL/L (ref 135–147)
WBC # BLD AUTO: 7.41 THOUSAND/UL (ref 4.31–10.16)

## 2022-11-25 PROCEDURE — 94760 N-INVAS EAR/PLS OXIMETRY 1: CPT

## 2022-11-25 PROCEDURE — 97166 OT EVAL MOD COMPLEX 45 MIN: CPT

## 2022-11-25 PROCEDURE — 82948 REAGENT STRIP/BLOOD GLUCOSE: CPT

## 2022-11-25 PROCEDURE — 99232 SBSQ HOSP IP/OBS MODERATE 35: CPT

## 2022-11-25 PROCEDURE — 80048 BASIC METABOLIC PNL TOTAL CA: CPT | Performed by: INTERNAL MEDICINE

## 2022-11-25 PROCEDURE — 85025 COMPLETE CBC W/AUTO DIFF WBC: CPT | Performed by: INTERNAL MEDICINE

## 2022-11-25 PROCEDURE — 97163 PT EVAL HIGH COMPLEX 45 MIN: CPT

## 2022-11-25 PROCEDURE — 94660 CPAP INITIATION&MGMT: CPT

## 2022-11-25 PROCEDURE — 99233 SBSQ HOSP IP/OBS HIGH 50: CPT | Performed by: INTERNAL MEDICINE

## 2022-11-25 RX ORDER — HYDROMORPHONE HYDROCHLORIDE 4 MG/1
4 TABLET ORAL EVERY 4 HOURS PRN
Status: DISCONTINUED | OUTPATIENT
Start: 2022-11-25 | End: 2022-11-26 | Stop reason: HOSPADM

## 2022-11-25 RX ORDER — HYDROMORPHONE HYDROCHLORIDE 2 MG/1
2 TABLET ORAL EVERY 4 HOURS PRN
Status: DISCONTINUED | OUTPATIENT
Start: 2022-11-25 | End: 2022-11-26 | Stop reason: HOSPADM

## 2022-11-25 RX ORDER — HYDROMORPHONE HCL/PF 1 MG/ML
0.5 SYRINGE (ML) INJECTION EVERY 6 HOURS PRN
Status: DISCONTINUED | OUTPATIENT
Start: 2022-11-25 | End: 2022-11-26 | Stop reason: HOSPADM

## 2022-11-25 RX ADMIN — CLONIDINE HYDROCHLORIDE 0.3 MG: 0.2 TABLET ORAL at 08:17

## 2022-11-25 RX ADMIN — HEPARIN SODIUM 5000 UNITS: 5000 INJECTION INTRAVENOUS; SUBCUTANEOUS at 21:30

## 2022-11-25 RX ADMIN — OXYCODONE HYDROCHLORIDE 15 MG: 10 TABLET ORAL at 00:22

## 2022-11-25 RX ADMIN — HYDROMORPHONE HYDROCHLORIDE 1 MG: 1 INJECTION, SOLUTION INTRAMUSCULAR; INTRAVENOUS; SUBCUTANEOUS at 02:24

## 2022-11-25 RX ADMIN — PHENAZOPYRIDINE 100 MG: 100 TABLET ORAL at 11:50

## 2022-11-25 RX ADMIN — HEPARIN SODIUM 5000 UNITS: 5000 INJECTION INTRAVENOUS; SUBCUTANEOUS at 05:49

## 2022-11-25 RX ADMIN — SODIUM CHLORIDE 100 ML/HR: 0.9 INJECTION, SOLUTION INTRAVENOUS at 02:25

## 2022-11-25 RX ADMIN — OXYBUTYNIN CHLORIDE 5 MG: 5 TABLET ORAL at 02:31

## 2022-11-25 RX ADMIN — DOCUSATE SODIUM 100 MG: 100 CAPSULE, LIQUID FILLED ORAL at 17:30

## 2022-11-25 RX ADMIN — PHENAZOPYRIDINE 100 MG: 100 TABLET ORAL at 07:55

## 2022-11-25 RX ADMIN — BUPROPION HYDROCHLORIDE 150 MG: 150 TABLET, FILM COATED, EXTENDED RELEASE ORAL at 08:17

## 2022-11-25 RX ADMIN — ACETAMINOPHEN 975 MG: 325 TABLET ORAL at 14:22

## 2022-11-25 RX ADMIN — HYDROMORPHONE HYDROCHLORIDE 0.5 MG: 1 INJECTION, SOLUTION INTRAMUSCULAR; INTRAVENOUS; SUBCUTANEOUS at 14:22

## 2022-11-25 RX ADMIN — HYDROMORPHONE HYDROCHLORIDE 4 MG: 4 TABLET ORAL at 17:30

## 2022-11-25 RX ADMIN — METHOCARBAMOL TABLETS 750 MG: 750 TABLET, COATED ORAL at 14:22

## 2022-11-25 RX ADMIN — METHOCARBAMOL TABLETS 750 MG: 750 TABLET, COATED ORAL at 05:49

## 2022-11-25 RX ADMIN — ATORVASTATIN CALCIUM 10 MG: 10 TABLET, FILM COATED ORAL at 16:10

## 2022-11-25 RX ADMIN — AMLODIPINE BESYLATE 10 MG: 10 TABLET ORAL at 08:17

## 2022-11-25 RX ADMIN — TAMSULOSIN HYDROCHLORIDE 0.4 MG: 0.4 CAPSULE ORAL at 16:10

## 2022-11-25 RX ADMIN — LISINOPRIL 40 MG: 20 TABLET ORAL at 08:17

## 2022-11-25 RX ADMIN — HYDROMORPHONE HYDROCHLORIDE 4 MG: 4 TABLET ORAL at 11:44

## 2022-11-25 RX ADMIN — ACETAMINOPHEN 975 MG: 325 TABLET ORAL at 21:30

## 2022-11-25 RX ADMIN — HEPARIN SODIUM 5000 UNITS: 5000 INJECTION INTRAVENOUS; SUBCUTANEOUS at 14:22

## 2022-11-25 RX ADMIN — SODIUM CHLORIDE 100 ML/HR: 0.9 INJECTION, SOLUTION INTRAVENOUS at 11:45

## 2022-11-25 RX ADMIN — SERTRALINE 100 MG: 100 TABLET, FILM COATED ORAL at 08:18

## 2022-11-25 RX ADMIN — NYSTATIN: 100000 POWDER TOPICAL at 17:32

## 2022-11-25 RX ADMIN — LIDOCAINE 5% 1 PATCH: 700 PATCH TOPICAL at 08:17

## 2022-11-25 RX ADMIN — HYDROMORPHONE HYDROCHLORIDE 4 MG: 4 TABLET ORAL at 21:31

## 2022-11-25 RX ADMIN — INSULIN LISPRO 2 UNITS: 100 INJECTION, SOLUTION INTRAVENOUS; SUBCUTANEOUS at 17:31

## 2022-11-25 RX ADMIN — DOCUSATE SODIUM 100 MG: 100 CAPSULE, LIQUID FILLED ORAL at 08:17

## 2022-11-25 RX ADMIN — NYSTATIN: 100000 POWDER TOPICAL at 08:17

## 2022-11-25 RX ADMIN — HYDROMORPHONE HYDROCHLORIDE 1 MG: 1 INJECTION, SOLUTION INTRAMUSCULAR; INTRAVENOUS; SUBCUTANEOUS at 07:55

## 2022-11-25 RX ADMIN — INSULIN GLARGINE 34 UNITS: 100 INJECTION, SOLUTION SUBCUTANEOUS at 21:39

## 2022-11-25 RX ADMIN — OXYBUTYNIN CHLORIDE 5 MG: 5 TABLET ORAL at 11:45

## 2022-11-25 RX ADMIN — CEFTRIAXONE 1000 MG: 1 INJECTION, POWDER, FOR SOLUTION INTRAMUSCULAR; INTRAVENOUS at 14:23

## 2022-11-25 RX ADMIN — PHENAZOPYRIDINE 100 MG: 100 TABLET ORAL at 16:11

## 2022-11-25 RX ADMIN — METHOCARBAMOL TABLETS 750 MG: 750 TABLET, COATED ORAL at 21:30

## 2022-11-25 RX ADMIN — OXYBUTYNIN CHLORIDE 5 MG: 5 TABLET ORAL at 21:30

## 2022-11-25 RX ADMIN — CLONIDINE HYDROCHLORIDE 0.3 MG: 0.2 TABLET ORAL at 21:30

## 2022-11-25 RX ADMIN — INSULIN LISPRO 2 UNITS: 100 INJECTION, SOLUTION INTRAVENOUS; SUBCUTANEOUS at 11:29

## 2022-11-25 RX ADMIN — ACETAMINOPHEN 975 MG: 325 TABLET ORAL at 05:49

## 2022-11-25 RX ADMIN — OXYCODONE HYDROCHLORIDE 7.5 MG: 5 TABLET ORAL at 05:50

## 2022-11-25 NOTE — ASSESSMENT & PLAN NOTE
XR lumbar spine revealing: "Lower lumbar disc space narrowing with flowing osteophytes from L1 through L4 suggestive of DISH "  Has denied saddle anesthesia, bowel/urinary incontinence, numbness/tingling or fever/chills  Pain management consultation ordered on admission as was seen by their service at transferring facility  Continue current multimodal pain regimen including around the clock Tylenol, lidocaine patch - Oxycodone discontinued and transitioned to oral Dilaudid for moderate/severe pain, along w/ IV Dilaudid for breakthrough events - continue Robaxin for spasms and conservative aqua K heating pad  No needs per OT - PT recommending continued outpatient PT on discharge

## 2022-11-25 NOTE — ASSESSMENT & PLAN NOTE
Insulin to finding on CT imaging noting: "Small foci of gas within the anterior subcutaneous tissues in this patient's anterior abdominal wall pannus correlating to the findings seen on today's radiographs "  Preceding XR right hip noted: "Small focus of gas overlying the right inguinal region of uncertain etiology   This could represent a bowel-containing hernia or soft tissue gas "  Appreciate general surgery input -> no clinical significance to findings as may relate to area of subcutaneous heparin injections

## 2022-11-25 NOTE — UTILIZATION REVIEW
Initial Clinical Review    Admission: Date/Time/Statement:   Admission Orders (From admission, onward)     Ordered        11/23/22 1721  Inpatient Admission  Once                      Orders Placed This Encounter   Procedures   • Inpatient Admission     Standing Status:   Standing     Number of Occurrences:   1     Order Specific Question:   Level of Care     Answer:   Med Surg [16]     Order Specific Question:   Estimated length of stay     Answer:   Inpatient Only Surgery       Initial Presentation: 58 y o  male with PMhx of T2 DM, CKD III,HTN, BPH, depression/anxiety, obesity, JEFF presents to [facility] as a transfer from [facility] where he initially presented with intractable R lower back and hip pain with CT showing R ureteral stone with hydrouteronephrosis  Pt tx'd to [facility] for urology eval and further tx  On presentation pt with pain in R hip ROM, rash present, obese  Admit inpatient to M/S unit with Nephrolithiasis and acute back pain-- Urology and APS consulted  Continue previous po meds  Aqua K pad, lidocaine patch  Monitor BMP  Accuchecks w/ ssi  Date: 11/24   Day 2:   Urology consult 11/24 -- plan to OR for right ureteral stent placement  Npo, IVFs  Analgesics prn  IV abx on call  OPERATIVE REPORT   Date of Operation:  11/24/2022   Operation:     Cystoscopy  Right Retrograde Pyelogram   Right ureteral stent placement (6Fr x 26 cm JJ stent)  Fluoroscopic Guidance   Anesthesia:  General   Findings:    - normal urethra   - trilobar prostatic enlargement with intravesical median lobe   - orthotopic ureteral orifices   - normal bladder mucosa   - ureteral stone was not appreciated on fluoroscopy and may not have been radiopaque  Intern med note post-op:  Pt still c/o some soreness/discomfort in the genital area along with a burning sensation  States he able to urinate more freely after stenting  Also reports mild soreness in his throat s/p postprocedural extubation   Continue current multimodal pain regimen including around the clock Tylenol, lidocaine patch, Oxycodone for moderate/severe pain, and IV Dilaudid for breakthrough events - continue Robaxin for spasms and conservative aqua K heating pad  Low sodium, cons carb diet  Continue  Norvasc/Catapres/ACEI - PRN IV Hydralazine on board for BP spikes  Hold oral hypoglycemics while IP, continue basal insulin w/ additional SSI coverage per Accu-Cheks  Monitor Cr, I/Os          Wt Readings from Last 1 Encounters:   11/22/22 124 kg (272 lb 7 8 oz)     Vital Signs:   Date/Time Temp Pulse Resp BP MAP (mmHg) SpO2 Calculated FIO2 (%) - Nasal Cannula O2 Flow Rate (L/min) Nasal Cannula O2 Flow Rate (L/min) O2 Device O2 Interface Device   11/24/22 21:11:55 97 9 °F (36 6 °C) 80 -- 148/83 105 93 % -- -- -- -- --   11/24/22 21:11:18 -- 82 -- 148/83 105 91 % -- -- -- -- --   11/24/22 15:05:09 97 5 °F (36 4 °C) 69 18 125/59 81 91 % -- -- -- -- --   11/24/22 12:02:03 97 5 °F (36 4 °C) 70 17 150/86 107 95 % -- -- -- -- --   11/24/22 1115 98 1 °F (36 7 °C) 68 14 165/92 123 96 % 28 -- 2 L/min Nasal cannula --   11/24/22 1100 98 3 °F (36 8 °C) 68 14 134/81 94 95 % -- 6 L/min -- Simple mask --   11/24/22 1057 98 3 °F (36 8 °C) 69 19 118/77 107 94 % -- 6 L/min -- Simple mask --   11/24/22 07:27:42 97 7 °F (36 5 °C) 60 18 145/84 104 95 % -- -- -- -- --   11/24/22 0234 -- -- -- -- -- 94 % -- -- -- -- Face mask   11/23/22 2237 98 1 °F (36 7 °C) 74 18 134/87 -- 94 % -- -- -- -- Face mask   11/23/22 1623 97 9 °F (36 6 °C) 68 18 181/93 Abnormal  126 92 % -- -- -- None (Room air) --     Pertinent Labs/Diagnostic Test Results:   FL retrograde pyelogram   Final Result by Ashley Pokl MD (11/25 3016)      Fluoroscopic guidance provided for right retrograde pyelogram  Please see procedure report for further details  CT a/p 11/22: Obstructive oval 12 mm proximal right ureteral calculus located 5 to 6 cm distal to the right UPJ causes mild right hydroureteronephrosis     Small foci of gas within the anterior subcutaneous tissues in this patient's anterior abdominal wall pannus correlating to the findings seen on today's radiographs  XR hip/pelvis 11/22: Lower lumbar disc space narrowing with flowing osteophytes from L1 through L4 suggestive of DISH         Results from last 7 days   Lab Units 11/24/22  0526 11/23/22  0528 11/22/22  0553 11/21/22  0542   WBC Thousand/uL 7 78 7 77 7 03 8 98   HEMOGLOBIN g/dL 14 6 15 5 14 4 15 3   HEMATOCRIT % 45 7 46 3 44 1 46 9   PLATELETS Thousands/uL 276 263 247 280   NEUTROS ABS Thousands/µL 4 58 6 02  --   --      Results from last 7 days   Lab Units 11/24/22  0526 11/23/22  0528 11/22/22  0553 11/21/22  0542   SODIUM mmol/L 138 136 138 139   POTASSIUM mmol/L 3 7 4 2 3 8 4 0   CHLORIDE mmol/L 107 106 108 107   CO2 mmol/L 24 21 25 25   ANION GAP mmol/L 7 9 5 7   BUN mg/dL 21 17 24 24   CREATININE mg/dL 1 27 1 13 1 48* 1 89*   EGFR ml/min/1 73sq m 60 69 49 37   CALCIUM mg/dL 8 8 9 0 8 3* 8 6     Results from last 7 days   Lab Units 11/24/22  2056 11/24/22  1631 11/24/22  1147 11/24/22  1058 11/24/22  1045 11/24/22  0728 11/23/22  2113 11/23/22  1652 11/23/22  1203 11/23/22  0718   POC GLUCOSE mg/dl 117 210* 94 99 110 114 158* 92 113 112     Results from last 7 days   Lab Units 11/24/22  0526 11/23/22  0528 11/22/22  0553 11/21/22  0542 11/20/22  2025   GLUCOSE RANDOM mg/dL 123 129 104 71 116     Results from last 7 days   Lab Units 11/23/22  0528 11/22/22  0553   PROCALCITONIN ng/ml 0 08 0 13     Results from last 7 days   Lab Units 11/21/22  1416   CLARITY UA  Clear   COLOR UA  Yellow   SPEC GRAV UA  1 028   PH UA  5 5   GLUCOSE UA mg/dl Negative   KETONES UA mg/dl Trace*   BLOOD UA  Negative   PROTEIN UA mg/dl 100 (2+)*   NITRITE UA  Negative   BILIRUBIN UA  Negative   UROBILINOGEN UA (BE) mg/dl 2 0*   LEUKOCYTES UA  Small*   WBC UA /hpf 10-20*   RBC UA /hpf 2-4*   BACTERIA UA /hpf Occasional   EPITHELIAL CELLS WET PREP /hpf Occasional   MUCUS THREADS  Occasional*     Results from last 7 days   Lab Units 11/24/22  1045 11/23/22  0040   URINE CULTURE  No Growth <100 cfu/mL 50,000-59,000 cfu/ml       Past Medical History:   Diagnosis Date   • Acute renal failure (ARF) (HCC)     last assessed - [id number]   • Chest pain     last assessed - [id number]   • CPAP (continuous positive airway pressure) dependence    • Depression    • Diabetes mellitus (HCC)    • Dyspnea on exertion     last assessed - [id number]   • Hypertension    • Nephrolithiasis    • Numbness of right foot     numbness on the sole of the right foot; since he was young d/t stenosis   • Obesity    • Onychomycosis     last assessed - [id number]   • JEFF on CPAP    • Sciatica     last assessed - [id number]   • Sleep apnea      Present on Admission:  • Right hydroureteronephrosis with obstructing calculus  • Back pain  • CKD (chronic kidney disease) stage 3  • Diabetes mellitus type 2   • Essential hypertension  • Depression  • Morbid obesity      Admitting Diagnosis: Ureteral calculus [N20 1]  Age/Sex: 58 y o  male  Admission Orders:  Scheduled Medications:  acetaminophen, 975 mg, Oral, Q8H [address]  amLODIPine, 10 mg, Oral, Daily  atorvastatin, 10 mg, Oral, Daily With Dinner  buPROPion, 150 mg, Oral, Daily  cefTRIAXone, 1,000 mg, Intravenous, Q24H  cloNIDine, 0 3 mg, Oral, Q12H [facility]  docusate sodium, 100 mg, Oral, BID  heparin (porcine), 5,000 Units, Subcutaneous, Q8H [facility]  insulin glargine, 34 Units, Subcutaneous, HS  insulin lispro, 1-5 Units, Subcutaneous, HS  insulin lispro, 2-12 Units, Subcutaneous, TID AC  lidocaine, 1 patch, Topical, Daily  lisinopril, 40 mg, Oral, Daily  methocarbamol, 750 mg, Oral, Q8H [facility]  nicotine, 1 patch, Transdermal, Daily  nystatin, , Topical, BID  phenazopyridine, 100 mg, Oral, TID With Meals  sertraline, 100 mg, Oral, Daily  tamsulosin, 0 4 mg, Oral, Daily With Dinner    Continuous IV Infusions:  sodium chloride, 100 mL/hr, Intravenous, Continuous    PRN Meds:  hydrALAZINE, 5 mg, Intravenous, Q6H PRN  HYDROmorphone, 0 5 mg, Intravenous, Q4H PRN 11/23 x1, 11/24 x3  HYDROmorphone, 2 mg, Oral, Q4H PRN   Or  HYDROmorphone, 4 mg, Oral, Q4H PRN  oxybutynin, 5 mg, Oral, TID PRN 11/24 x1  oxycodone IR 10 mg Oral, Q4H PRN 11/23 x1, 11/24 x1 then d/c'd  oxycodone IR 15 mg Oral, Q4H PRN 11/24 x1  phenol, 1 spray, Mouth/Throat, Q2H PRN      Network Utilization Review Department  ATTENTION: Please call with any questions or concerns to [phone] and carefully listen to the prompts so that you are directed to the right person  All voicemails are confidential   Kieran Eddy all requests for admission clinical reviews, approved or denied determinations and any other requests to dedicated fax number below belonging to the campus where the patient is receiving treatment   List of dedicated fax numbers for the Facilities:  [address] DENIALS (Administrative/Medical Necessity) [phone]   [address] (Maternity/NICU/Pediatrics) [phone]    Valarie White [phone]   Cecil Can  [phone]   [address] [place] [address] Roland Matson 28 [phone]   [address] Vonnie Chavez [facility] 134 815 [facility] [phone]

## 2022-11-25 NOTE — ASSESSMENT & PLAN NOTE
Baseline creatinine approximately 1 4-1 6 - currently stable @ 1 39  Monitor renal function and urine output - limit/avoid nephrotoxins as possible - avoid hypotension as possible  On IV fluids

## 2022-11-25 NOTE — PLAN OF CARE
Problem: OCCUPATIONAL THERAPY ADULT  Goal: Performs self-care activities at highest level of function for planned discharge setting  See evaluation for individualized goals  Description: Treatment Interventions: ADL retraining, Functional transfer training, Endurance training, Patient/family training, Equipment evaluation/education, Compensatory technique education, Energy conservation, Activityengagement          See flowsheet documentation for full assessment, interventions and recommendations  11/25/2022 1107 by Nico Cerrato OT  Note: Limitation: Decreased ADL status, Decreased endurance, Decreased self-care trans, Decreased high-level ADLs  Prognosis: Good  Assessment: Pt is a 58 y o  male who was admitted to [facility] on 11/22 and transferred to [facility] on 11/23/2022  Pt presented with R back and hip pain  Found to have R hydroureteronephrosis with obstructing calculus  X-ray lumbar spine: "Lower lumbar disc space narrowing with flowing osteophytes from L1 through L4 suggestive of DISH "  Pt s/p cystoscopy, ureteral stent 11/24  Pt  has a past medical history of Acute renal failure (ARF) ([facility] [address] ), Chest pain, CPAP (continuous positive airway pressure) dependence, Depression, Diabetes mellitus ([facility] [address] ), Dyspnea on exertion, Hypertension, Nephrolithiasis, Numbness of right foot, Obesity, Onychomycosis, JEFF on CPAP, Sciatica, and Sleep apnea  At baseline pt was completing all ADLs/IADLs IND, ambulating IND w/o AD, (+) working, (+) driving  Pt lives with his wife in a 1  with [address] [facility]  Pt reports his wife typically works from home and is able to assist as needed  Currently pt requires MIN A for overall ADLS and for functional mobility/transfers  Pt currently presents with impairments in the following categories -steps to enter environment, difficulty performing ADLS and difficulty performing IADLS  activity tolerance, endurance and standing balance/tolerance   These impairments, as well as pt's fatigue, pain and risk for falls  limit pt's ability to safely engage in all baseline areas of occupation, includinggrooming, bathing, dressing, toileting, functional mobility/transfers, community mobility, driving, house maintenance, meal prep, cleaning, work/volunteer work  and leisure activities   From OT standpoint, recommend HOME WITH FAMILY SUPPORT upon D/C  OT will continue to follow to address the below stated goals  OT Discharge Recommendation: No rehabilitation needs       11/25/2022 1106 by Sophia Lovell OT  Note: Limitation: Decreased ADL status, Decreased endurance, Decreased self-care trans, Decreased high-level ADLs  Prognosis: Good  Assessment: Pt is a 58 y o  male who was admitted to [facility] on 11/22 and transferred to [facility] on 11/23/2022  Pt presented with R back and hip pain  Found to have R hydroureteronephrosis with obstructing calculus  X-ray lumbar spine: "Lower lumbar disc space narrowing with flowing osteophytes from L1 through L4 suggestive of DISH "  Pt s/p cystoscopy, ureteral stent 11/24  Pt  has a past medical history of Acute renal failure (ARF) ([address] ), Chest pain, CPAP (continuous positive airway pressure) dependence, Depression, Diabetes mellitus ([facility] [address] [facility] Dyspnea on exertion, Hypertension, Nephrolithiasis, Numbness of right foot, Obesity, Onychomycosis, JEFF on CPAP, Sciatica, and Sleep apnea  At baseline pt was completing all ADLs/IADLs IND, ambulating IND w/o AD, (+) working, (+) driving  Pt lives with his wife in a 1  with 3 [facility]  Pt reports his wife typically works from home and is able to assist as needed  Currently pt requires MIN A for overall ADLS and for functional mobility/transfers  Pt currently presents with impairments in the following categories -steps to enter environment, difficulty performing ADLS and difficulty performing IADLS  activity tolerance, endurance and standing balance/tolerance   These impairments, as well as pt's fatigue, pain and risk for falls  limit pt's ability to safely engage in all baseline areas of occupation, includinggrooming, bathing, dressing, toileting, functional mobility/transfers, community mobility, driving, house maintenance, meal prep, cleaning, work/volunteer work  and leisure activities   From OT standpoint, recommend HOME WITH FAMILY SUPPORT upon D/C  OT will continue to follow to address the below stated goals       OT Discharge Recommendation: No rehabilitation needs

## 2022-11-25 NOTE — PROGRESS NOTES
[address] [facility]  Progress Note - Eliazar Goltz 1960, 58 y o  male MRN: [id number]  Unit/Bed#: [facility] 924-01 Encounter: [id number]  Primary Care Provider: Rj Thomas MD   Date and time admitted to hospital: 11/23/2022  4:00 PM      * Right hydroureteronephrosis with obstructing calculus  Assessment & Plan  CT of abdomen/pelvis revealed an: "Obstructive oval 12 mm proximal right ureteral calculus located 5 to 6 cm distal to the right UPJ causes mild right hydroureteronephrosis"   Appreciate urology input -> s/p cystoscopy w/ right retrograde pyelogram and subsequent ureteral stenting on 11/24  PRN pain/emesis control - continue IV fluids  Urinalysis revealed small pyuria with occasional bacteria -> urine culture negative, hence, will discontinue antibiotics  Supportive care otherwise  Outpatient follow-up w/ urology in approximately four weeks for stent removal and definitive stone treatment    Intractable lower back pain  Assessment & Plan  XR lumbar spine revealing: "Lower lumbar disc space narrowing with flowing osteophytes from L1 through L4 suggestive of DISH "  Has denied saddle anesthesia, bowel/urinary incontinence, numbness/tingling or fever/chills  Pain management consultation ordered on admission as was seen by their service at transferring facility  Continue current multimodal pain regimen including around the clock Tylenol, lidocaine patch - Oxycodone discontinued and transitioned to oral Dilaudid for moderate/severe pain, along w/ IV Dilaudid for breakthrough events - continue Robaxin for spasms and conservative aqua K heating pad  No needs per OT - PT recommending continued outpatient PT on discharge    Obstructive sleep apnea  Assessment & Plan  CPAP QHS  Weight loss counseling    Morbid obesity  Assessment & Plan  BMI of 38  Lifestyle/diet modifications    CKD (chronic kidney disease) stage 3  Assessment & Plan  Baseline creatinine approximately 1  4-1 6 - currently stable @ 1 39  Monitor renal function and urine output - limit/avoid nephrotoxins as possible - avoid hypotension as possible  On IV fluids    Diabetes mellitus type 2   Assessment & Plan  Lab Results   Component Value Date    HGBA1C 10 1 (H) 10/16/2022     Hold oral hypoglycemics while hospitalized  Continue basal insulin w/ additional SSI coverage per Accu-Cheks  Carbohydrate restricted diet  Hypoglycemia protocol    Essential hypertension  Assessment & Plan  Continue Norvasc/Catapres/ACEI - PRN IV Hydralazine on board for BP spikes  Optimize pain control  Low-sodium diet    Depression  Assessment & Plan  Continue Wellbutrin/Zoloft    Abnormal CT of the abdomen  Assessment & Plan  Insulin to finding on CT imaging noting: "Small foci of gas within the anterior subcutaneous tissues in this patient's anterior abdominal wall pannus correlating to the findings seen on today's radiographs "  Preceding XR right hip noted: "Small focus of gas overlying the right inguinal region of uncertain etiology  This could represent a bowel-containing hernia or soft tissue gas "  Appreciate general surgery input -> no clinical significance to findings as may relate to area of subcutaneous heparin injections      DVT Prophylaxis:  Heparin SC      Patient Centered Rounds:  I have performed bedside rounds and discussed plan of care with nursing today  Discussions with Specialists or Other Care Team Provider:  see above assessments if applicable    Education and Discussions with Family / Patient:  Patient at bedside - discussed with wife, at bedside, yesterday    Time Spent for Care:  32 minutes  More than 50% of total time spent on counseling and coordination of care as described above  Current Length of Stay: 2 day(s)  Current Patient Status: Inpatient   Certification Statement:  Patient will continue to require additional hospital stay due to assessments as noted above      Code Status: Level 1 - Full Code        Subjective:     Encountered earlier in the day  States pain is waxing/waning but slightly better controlled  Patient was seen by pain management prior to my encounter this morning  Objective:     Vitals:   Temp (24hrs), Av 9 °F (36 6 °C), Min:97 9 °F (36 6 °C), Max:97 9 °F (36 6 °C)    Temp:  [97 9 °F (36 6 °C)] 97 9 °F (36 6 °C)  HR:  [61-82] 61  Resp:  [16] 16  BP: (129-148)/(73-85) 130/73  SpO2:  [85 %-97 %] 85 %  There is no height or weight on file to calculate BMI  Input and Output Summary (last 24 hours):        Intake/Output Summary (Last 24 hours) at 2022 1533  Last data filed at 2022 1333  Gross per 24 hour   Intake 5420 ml   Output 1800 ml   Net 3620 ml       Physical Exam:     GENERAL:  Obese - waxing/waning distress from pain/discomfort  HEAD:  Normocephalic - atraumatic  EYES: PERRL - EOMI   MOUTH:  Mucosa moist  NECK:  Supple - full range of motion  CARDIAC:  Rate controlled - S1/S2 positive  PULMONARY:  Clear to auscultation bilaterally - nonlabored respirations  ABDOMEN:  Soft - resolving suprapubic and flank discomfort - nondistended - active bowel sounds  MUSCULOSKELETAL:  Motor strength/range of motion mildly deconditioned - lumbar paraspinal tenderness present  NEUROLOGIC:  Alert/oriented at baseline  SKIN:  Chronic wrinkles/blemishes   PSYCHIATRIC:  Mood/affect stable      Additional Data:     Labs & Recent Cultures:    Results from last 7 days   Lab Units 22  0559   WBC Thousand/uL 7 41   HEMOGLOBIN g/dL 15 5   HEMATOCRIT % 48 4   PLATELETS Thousands/uL 279   NEUTROS PCT % 63   LYMPHS PCT % 24   MONOS PCT % 8   EOS PCT % 4     Results from last 7 days   Lab Units 22  0559   POTASSIUM mmol/L 3 9   CHLORIDE mmol/L 109*   CO2 mmol/L 24   BUN mg/dL 19   CREATININE mg/dL 1 39*   CALCIUM mg/dL 8 6         Results from last 7 days   Lab Units 22  1108 22  0753 22  2056 22  1631 22  1147 22  1058 22  104 11/24/22  0728 11/23/22  2113 11/23/22  1652 11/23/22  1203 11/23/22  0718   POC GLUCOSE mg/dl 152* 102 117 210* 94 99 110 114 158* 92 113 112         Results from last 7 days   Lab Units 11/23/22  0528 11/22/22  0553   PROCALCITONIN ng/ml 0 08 0 13         Results from last 7 days   Lab Units 11/24/22  1045 11/23/22  0040   URINE CULTURE  No Growth <100 cfu/mL 50,000-59,000 cfu/ml         Lines/Drains:  Invasive Devices     Peripheral Intravenous Line  Duration           Peripheral IV 11/20/22 Right Antecubital 4 days                  Last 24 Hours Medication List:   Current Facility-Administered Medications   Medication Dose Route Frequency Provider Last Rate   • acetaminophen  975 mg Oral Q8H [facility] Cheryle Le MD     • amLODIPine  10 mg Oral Daily Cheryle Le MD     • atorvastatin  10 mg Oral Daily With Abdi Venegas MD     • buPROPion  150 mg Oral Daily Cheryle Le MD     • cloNIDine  0 3 mg Oral Q12H [facility] Cheryle Le MD     • docusate sodium  100 mg Oral BID Cheryle Le MD     • heparin (porcine)  5,000 Units Subcutaneous Q8H [facility] Cheryle Le MD     • hydrALAZINE  5 mg Intravenous Q6H PRN Cheryle Le MD     • HYDROmorphone  0 5 mg Intravenous Q6H PRN Pete Guzman MD     • HYDROmorphone  2 mg Oral Q4H PRN Pete Guzman MD      Or   • HYDROmorphone  4 mg Oral Q4H PRN Pete Guzman MD     • insulin glargine  34 Units Subcutaneous HS Cheryle Le MD     • insulin lispro  1-5 Units Subcutaneous HS Cheryle Le MD     • insulin lispro  2-12 Units Subcutaneous TID AC Cheryle Le MD     • lidocaine  1 patch Topical Daily Cheryle Le MD     • lisinopril  40 mg Oral Daily Cheryle Le MD     • methocarbamol  750 mg Oral Q8H [facility] Cheryle Le MD     • nicotine  1 patch Transdermal Daily Cheryle Le MD     • nystatin   Topical BID Cheryle Le MD     • oxybutynin  5 mg Oral TID PRN Cheryle Le MD     • phenazopyridine  100 mg Oral TID With Meals Cheryle Le MD     • phenol  1 spray Mouth/Throat Q2H PRN Latisha Childers MD     • sertraline  100 mg Oral Daily Latisha Childers MD     • sodium chloride  100 mL/hr Intravenous Continuous Latisha Childers  mL/hr (11/25/22 1145)   • tamsulosin  0 4 mg Oral Daily With Marcelino Horton MD                     ** Please Note: This note is constructed using a voice recognition dictation system  An occasional wrong word/phrase or “sound-a-like” substitution may have been picked up by dictation device due to the inherent limitations of voice recognition software  Read the chart carefully and recognize, using reasonable context, where substitutions may have occurred  **

## 2022-11-25 NOTE — PLAN OF CARE
Problem: PHYSICAL THERAPY ADULT  Goal: Performs mobility at highest level of function for planned discharge setting  See evaluation for individualized goals  Description: Treatment/Interventions: Functional transfer training, LE strengthening/ROM, Patient/family training, Equipment eval/education, Bed mobility, Gait training, Spoke to nursing, Spoke to case management, OT  Equipment Recommended:  (TBD RW vs SPC trial)       See flowsheet documentation for full assessment, interventions and recommendations  11/25/2022 1159 by Jerald Connell PT  Note: Prognosis: Fair  Problem List: Decreased endurance, Decreased range of motion, Decreased mobility, Obesity, Pain  Assessment: Pt seen for high complexity PT evaluation  Pt with active PT eval/treat orders  Pt is a 58 y o  male who was admitted to [facility] on 11/23/2022 with R hydroureteronephrosis with obstructing calculus s/p cystoscopy + stent placement  Pt's current dx/ problem list include: depression, DM, obesity, HTN, CKD, back pain, candidal intertrigo  Comorbidities affecting pt's physical performance at time of assessment are as follows:  has a past medical history of Acute renal failure (ARF) ([address] ), Chest pain, CPAP (continuous positive airway pressure) dependence, Depression, Diabetes mellitus ([address] ), Dyspnea on exertion, Hypertension, Nephrolithiasis, Numbness of right foot, Obesity, Onychomycosis, JEFF on CPAP, Sciatica, and Sleep apnea  Personal factors affecting pt at time of initial examination include: steps to enter environment, advanced age, past experience, inability to perform IADLs, inability to perform ADLs, inability to ambulate household distances   Due to acute medical issues, ongoing medical workup for primary dx; pain, fall risk, increased reliance on more restrictive AD compared to baseline;  decreased activity tolerance compared to baseline, increased assistance needed from caregiver at current time, continuous telemetry monitoring, multiple lines, decline in overall functional mobility status; health management issues; note unstable clinical picture (high complexity)  At baseline, pt resides with spouse in 1 [address] with 3 [place] and was independent prior to hospital admission  Currently, upon initial examination, pt  is requiring min A for bed mobility skills; CGA for functional transfers and CGA for ambulation with RW  Currently, pt presents functioning below baseline and with overall mobility deficits 2* to: decreased LE strength/AROM; limited flexibility;  generalized weakness/ deconditioning; decreased endurance; decreased activity tolerance; decreased coordination; impaired balance; gait deviations; fatigue; multiple lines; as well as : weakness, impaired balance, decreased endurance, gait deviations, pain, decreased activity tolerance, decreased functional mobility tolerance and fall risk  Pt currently at increased risk for falls  At the end of evaluation, pt was left sitting OOB in recliner with all needs in reach  Pt would benefit from continued skilled PT services while in hospital to address remaining limitations and maximize functional potential  PT to continue to follow pt and recommends OPPT + social support  The patient's AM-PAC Basic Mobility Inpatient Short Form Raw Score is 17  A Raw score of greater than 16 suggests the patient may benefit from discharge to home  Please also refer to the recommendation of the Physical Therapist for safe discharge planning  PT Discharge Recommendation: Home with outpatient rehabilitation    See flowsheet documentation for full assessment

## 2022-11-25 NOTE — CONSULTS
Consult Note- Acute Pain Service   Francia Milan 58 y o  male MRN: [id number]  Unit/Bed#: [facility] 924-01 Encounter: [id number]               Assessment/Plan     Assessment:   Patient Active Problem List   Diagnosis   • Depression   • Diabetes mellitus type 2    • Morbid obesity   • Mixed hyperlipidemia   • Essential hypertension   • Hypogonadism, male   • Right hydroureteronephrosis with obstructing calculus   • Obstructive sleep apnea   • Spinal stenosis   • Vitamin D deficiency   • Acquired deformity of foot   • Metatarsalgia of both feet   • Pain in both feet   • Diabetic polyneuropathy associated with type 2 diabetes mellitus (HCC)   • Fatigue   • Seasonal allergies   • Onychomycosis   • Corns   • Arthralgia   • Hallux valgus   • Bunion   • Acquired pes planus   • Bilateral renal cysts   • Type 2 diabetes mellitus with stage 3a chronic kidney disease, with long-term current use of insulin (HCC)   • CKD (chronic kidney disease) stage 3   • Vitamin B12 deficiency   • COVID-19 virus infection   • Back pain   • Abnormal urinalysis   • Candidal intertrigo   • Abnormal CT of the abdomen      Francia Milan is a 58 y o  male with past medical history significant for type 2 diabetes mellitus, hypertension, hyperlipidemia, obstructive sleep apnea, diabetic neuropathy who initially presented to [facility] with intractable back pain of the right lower back limiting ability to ambulate  X-ray of the lumbar spine demonstrated lower lumbar disc space narrowing with flowing osteophytes from L1 through L4 suggestive of DISH  CT AP with obstructive ureteral calculus causing mild right hydroureteronephrosis  Urology was consulted patient was transferred to [facility] for further intervention  Acute pain service did evaluate patient at [facility] prior to transfer and multimodal analgesic regimen was recommended   Patient underwent cystoscopy, right retrograde pyelogram right ureteral stent placement on 11/24/2022 without complication  Patient was evaluated by General surgery for subcutaneous air the multiple occasions however this is thought to be in the setting of subcutaneous injections  Patient reports moderate pain control with current regimen but states that the IV hydromorphone is much more effective than oxycodone  Plan:   Continue acetaminophen 975 mg p o  Q 8 hours scheduled  Avoid NSAIDs in the setting of renal disease  Discontinue oxycodone  Start hydromorphone 2 mg p o  Q 4 hours p r n  For moderate pain  Start hydromorphone 4 mg p o  Q 4 hours p r n  For severe pain  Change hydromorphone to 0 5 mg IV Q 6 hours p r n  For breakthrough pain  Consider discontinuing IV on 11/26/2022  Continue methocarbamol 750 mg p o  Q 8 hours scheduled  Continue lidocaine patch x1 12 hours on/12 hours off  Continue bowel regimen while on opioids to avoid opioid induced constipation    APS will continue to follow  Please contact Acute Pain Service - [facility] via [employer] from [phone] with additional questions or concerns  See Tiger[employer]stephanie or Rashid for additional contacts and after hours information  History of Present Illness    Admit Date:  11/23/2022  Hospital Day:  2 days  Primary Service:  Hospitalist  Attending Provider:  Lokesh Guzman MD  Reason for Consult / Principal Problem:  Back pain  HPI: Gin Isaac is a 58 y o  male with past medical history significant for type 2 diabetes mellitus, hypertension, hyperlipidemia, obstructive sleep apnea, diabetic neuropathy who initially presented to [facility] with intractable back pain of the right lower back limiting ability to ambulate  X-ray of the lumbar spine demonstrated lower lumbar disc space narrowing with flowing osteophytes from L1 through L4 suggestive of DISH  CT AP with obstructive ureteral calculus causing mild right hydroureteronephrosis  Urology was consulted patient was transferred to [facility] for further intervention   Acute pain service did evaluate patient at [facility] prior to transfer and multimodal analgesic regimen was recommended  Patient underwent cystoscopy, right retrograde pyelogram right ureteral stent placement on 79/85/7586 without complication  Patient was evaluated by General surgery for subcutaneous air the multiple occasions however this is thought to be in the setting of subcutaneous injections  Since patient's procedure, patient has remained hemodynamically stable and afebrile  He is not requiring supplemental oxygen  Most recent labs significant for creatinine 1 39 with EGFR 53  Most recent LFTs from 03/07/2022 with AST 24, ALT 50, alkaline phosphatase 90, total bilirubin 1 06     Current pain location(s): R low back  Pain Scale:   6-8  Quality: sharp, twisting  Current Analgesic regimen:    Acetaminophen 975 mg p o  Q 8 hours scheduled  Oxycodone 7 5 mg p o  Q 4 hours p r n  For moderate pain  Oxycodone 15 mg p o  Q 4 hours p r n  For severe pain  Hydromorphone 1 mg IV q 3 hours p r n  For breakthrough pain  Methocarbamol 750 mg p o  Q 8 hours scheduled  Lidocaine patch x1 12 hours on/12 hours off    Pain History:  Patient reports a history of nephrolithiasis previously requiring lithotripsy but never stent placement  He denies being on any opioid analgesics at home and states that he typically takes ibuprofen for pain  I have reviewed the patient's controlled substance dispensing history in the Prescription Drug Monitoring Program in compliance with the [facility] regulations before prescribing any controlled substances  No medications found  Inpatient consult to Acute Pain Service  Consult performed by: Pete Guzman MD  Consult ordered by: Cheryle Le MD          Review of Systems   Constitutional: Negative for appetite change, chills, diaphoresis, fatigue, fever and unexpected weight change  HENT: Negative for sore throat  Eyes: Negative for visual disturbance     Respiratory: Negative for cough, chest tightness, shortness of breath and wheezing  Cardiovascular: Negative for chest pain, palpitations and leg swelling  Gastrointestinal: Negative for abdominal distention, abdominal pain, blood in stool, constipation, diarrhea, nausea and vomiting  Genitourinary: Positive for flank pain  Negative for difficulty urinating and urgency  Musculoskeletal: Negative for arthralgias, back pain and myalgias  Skin: Negative for pallor and rash  Neurological: Negative for dizziness, weakness, light-headedness and headaches         Historical Information   Past Medical History:   Diagnosis Date   • Acute renal failure (ARF) ([facility] )     last assessed - [id number]   • Chest pain     last assessed - [id number]   • CPAP (continuous positive airway pressure) dependence    • Depression    • Diabetes mellitus ([facility] )    • Dyspnea on exertion     last assessed - [id number]   • Hypertension    • Nephrolithiasis    • Numbness of right foot     numbness on the sole of the right foot; since he was young d/t stenosis   • Obesity    • Onychomycosis     last assessed - [id number]   • JEFF on CPAP    • Sciatica     last assessed - [id number]   • Sleep apnea      Past Surgical History:   Procedure Laterality Date   • COLONOSCOPY     • CYSTOSCOPY W/ LASER LITHOTRIPSY     • FL RETROGRADE PYELOGRAM  11/24/2022   • KNEE ARTHROSCOPY Left 11/2013   • MS LAP,CHOLECYSTECTOMY N/A 2/8/2019    Procedure: ROBOTIC ASSISTED LAPAROSCOPIC CHOLECYSTECTOMY;  Surgeon: Silas Jacob DO;  [facility]: [facility];  Service: General   • PROSTATE BIOPSY      Needle Biopsy - Negative     Social History   Social History     Substance and Sexual Activity   Alcohol Use Yes     Social History     Substance and Sexual Activity   Drug Use No     Social History     Tobacco Use   Smoking Status Some Days   • Types: Cigars   Smokeless Tobacco Never   Tobacco Comments    3 cigars per week     Family History:   Family History   Adopted: Yes   Problem Relation Age of Onset   • Alzheimer's disease Mother    • Alcohol abuse Neg Hx    • Drug abuse Neg Hx    • Depression Neg Hx    • Substance Abuse Neg Hx    • Mental illness Neg Hx        Meds/Allergies   all current active meds have been reviewed    No Known Allergies    Objective   Temp:  [97 5 °F (36 4 °C)-98 3 °F (36 8 °C)] 97 9 °F (36 6 °C)  HR:  [68-82] 73  Resp:  [14-19] 16  BP: (118-165)/(59-92) 145/85    Intake/Output Summary (Last 24 hours) at 11/25/2022 0848  Last data filed at 11/25/2022 0847  Gross per 24 hour   Intake 3848 33 ml   Output 1400 ml   Net 2448  33 ml       Physical Exam  Vitals and nursing note reviewed  Constitutional:       General: He is not in acute distress  Appearance: Normal appearance  He is not ill-appearing or toxic-appearing  Comments: BMI 38   HENT:      Head: Normocephalic and atraumatic  Eyes:      General: No scleral icterus  Conjunctiva/sclera: Conjunctivae normal    Cardiovascular:      Rate and Rhythm: Normal rate  Pulmonary:      Effort: Pulmonary effort is normal  No respiratory distress  Comments: RA  Skin:     General: Skin is warm and dry  Neurological:      Mental Status: He is alert  Comments: Awake, alert and oriented to person place time situation  POSS 1   Psychiatric:         Thought Content: Thought content normal          Lab Results: I have personally reviewed pertinent labs  Imaging Studies: I have personally reviewed pertinent reports  EKG, Pathology, and Other Studies: I have personally reviewed pertinent reports  Please note that the APS provides consultative services regarding pain management only  With the exception of ketamine and epidural infusions and except when indicated, final decisions regarding starting or changing doses of analgesic medications are at the discretion of the consulting service  Off hours consultation and/or medication management is generally not available      Sd Dudley MD  Acute Pain Service

## 2022-11-25 NOTE — ASSESSMENT & PLAN NOTE
-CT on 11/22 demonstrated Obstructive oval 12 mm proximal right ureteral calculus located 5 to 6 cm distal to the right UPJ causes mild right hydroureteronephrosis  -POD#1 with right stent placement  Patient tolerating stent well  Utilization of Flomax, Pyridium, Ditropan p r n  for outpatient  stent discomfort      -Plan for definitive stone treatment in 4 weeks with outpatient secondary ureteroscopy  Patient agreeable with plan  -Urology will sign off now as no further urologic intervention anticipated with this hospitalization  The urology team is always available for any questions or concerns regarding this patient

## 2022-11-25 NOTE — PROGRESS NOTES
Progress Note - Urology  Angelica Gibson 1960, 58 y o  male MRN: [id number]    Unit/Bed#: [facility] 924-01 Encounter: [id number]    * Right hydroureteronephrosis with obstructing calculus  Assessment & Plan  -CT on 11/22 demonstrated Obstructive oval 12 mm proximal right ureteral calculus located 5 to 6 cm distal to the right UPJ causes mild right hydroureteronephrosis  -POD#1 with right stent placement  Patient tolerating stent well  Utilization of Flomax, Pyridium, Ditropan p r n  for outpatient  stent discomfort      -Plan for definitive stone treatment in 4 weeks with outpatient secondary ureteroscopy  Patient agreeable with plan  -Urology will sign off now as no further urologic intervention anticipated with this hospitalization  The urology team is always available for any questions or concerns regarding this patient         Subjective:   HPI: Patient reports that he is still feels discomfort in the right side of back/flank  He denies gross hematuria, dysuria, bladder spasms, or difficulty with urination  Review of Systems   Constitutional: Negative for chills and fever  HENT: Negative  Eyes: Negative for visual disturbance  Respiratory: Negative for shortness of breath  Cardiovascular: Negative for chest pain and leg swelling  Gastrointestinal: Negative for abdominal pain, nausea and vomiting  Genitourinary: Positive for flank pain  Negative for decreased urine volume, difficulty urinating, dysuria and hematuria  Neurological: Negative for dizziness and weakness  Psychiatric/Behavioral: Negative  Objective:    Vitals: Blood pressure 129/73, pulse 69, temperature 97 9 °F (36 6 °C), resp  rate 16, SpO2 (!) 89 %  ,There is no height or weight on file to calculate BMI  Physical Exam  Constitutional:       Appearance: Normal appearance  He is not ill-appearing or toxic-appearing  HENT:      Head: Normocephalic and atraumatic     Pulmonary:      Effort: Pulmonary effort is normal  No respiratory distress  Abdominal:      General: There is no distension  Palpations: Abdomen is soft  Tenderness: There is no abdominal tenderness  There is no guarding or rebound  Musculoskeletal:         General: No swelling  Cervical back: Normal range of motion  Neurological:      General: No focal deficit present  Mental Status: He is alert and oriented to person, place, and time  Psychiatric:         Mood and Affect: Mood normal          Behavior: Behavior normal          Thought Content:  Thought content normal          Judgment: Judgment normal          Labs:  Recent Labs     11/23/22 0528 11/24/22 0526 11/25/22  0559   WBC 7 77 7 78 7 41       Recent Labs     11/23/22 0528 11/24/22 0526 11/25/22  0559   HGB 15 5 14 6 15 5     Recent Labs     11/23/22 0528 11/24/22 0526 11/25/22  0559   HCT 46 3 45 7 48 4     Recent Labs     11/23/22 0528 11/24/22 0526 11/25/22  0559   CREATININE 1 13 1 27 1 39*     Urine Culture: No growth     History:    Past Medical History:   Diagnosis Date   • Acute renal failure (ARF) ([facility] )     last assessed - 06Sep2017   • Chest pain     last assessed - [id number]   • CPAP (continuous positive airway pressure) dependence    • Depression    • Diabetes mellitus ([facility] )    • Dyspnea on exertion     last assessed - 18Sep2017   • Hypertension    • Nephrolithiasis    • Numbness of right foot     numbness on the sole of the right foot; since he was young d/t stenosis   • Obesity    • Onychomycosis     last assessed - [id number]   • JEFF on CPAP    • Sciatica     last assessed - [id number]   • Sleep apnea      Social History     Socioeconomic History   • Marital status: /Civil Union     Spouse name: Not on file   • Number of children: Not on file   • Years of education: Not on file   • Highest education level: Not on file   Occupational History   • Occupation: Works Full-time   Tobacco Use   • Smoking status: Some Days     Types: Cigars   • Smokeless tobacco: Never   • Tobacco comments:     3 cigars per week   Vaping Use   • Vaping Use: Never used   Substance and Sexual Activity   • Alcohol use:  Yes   • Drug use: No   • Sexual activity: Yes   Other Topics Concern   • Not on file   Social History Narrative    Exercise habits - none        Works fulltime    Has a dog     Social Determinants of Health     Financial Resource Strain: Not on file   Food Insecurity: Not on file   Transportation Needs: Not on file   Physical Activity: Not on file   Stress: Not on file   Social Connections: Not on file   Intimate Partner Violence: Not on file   Housing Stability: Not on file     Past Surgical History:   Procedure Laterality Date   • COLONOSCOPY     • CYSTOSCOPY W/ LASER LITHOTRIPSY     • FL RETROGRADE PYELOGRAM  11/24/2022   • KNEE ARTHROSCOPY Left 11/2013   • CT LAP,CHOLECYSTECTOMY N/A 2/8/2019    Procedure: ROBOTIC ASSISTED LAPAROSCOPIC CHOLECYSTECTOMY;  Surgeon: Merline Terry DO;  Location: [facility];  Service: General   • PROSTATE BIOPSY      Needle Biopsy - Negative     Family History   Adopted: Yes   Problem Relation Age of Onset   • Alzheimer's disease Mother    • Alcohol abuse Neg Hx    • Drug abuse Neg Hx    • Depression Neg Hx    • Substance Abuse Neg Hx    • Mental illness Neg Hx        Cecile Melgar PA-C  Date: 11/25/2022 Time: 1:14 PM

## 2022-11-25 NOTE — PHYSICAL THERAPY NOTE
Physical Therapy Evaluation     Patient's Name: Sarah Zaragoza    Admitting Diagnosis  Ureteral calculus [N20 1]    Problem List  Patient Active Problem List   Diagnosis    Depression    Diabetes mellitus type 2     Morbid obesity    Mixed hyperlipidemia    Essential hypertension    Hypogonadism, male    Right hydroureteronephrosis with obstructing calculus    Obstructive sleep apnea    Spinal stenosis    Vitamin D deficiency    Acquired deformity of foot    Metatarsalgia of both feet    Pain in both feet    Diabetic polyneuropathy associated with type 2 diabetes mellitus (HCC)    Fatigue    Seasonal allergies    Onychomycosis    Corns    Arthralgia    Hallux valgus    Bunion    Acquired pes planus    Bilateral renal cysts    Type 2 diabetes mellitus with stage 3a chronic kidney disease, with long-term current use of insulin ([facility])    CKD (chronic kidney disease) stage 3    Vitamin B12 deficiency    COVID-19 virus infection    Back pain    Abnormal urinalysis    Candidal intertrigo    Abnormal CT of the abdomen       Past Medical History  Past Medical History:   Diagnosis Date    Acute renal failure (ARF) ([facility] [address] )     last assessed - [id number]    Chest pain     last assessed - [id number]    CPAP (continuous positive airway pressure) dependence     Depression     Diabetes mellitus ([facility] [address] )     Dyspnea on exertion     last assessed - [id number]    Hypertension     Nephrolithiasis     Numbness of right foot     numbness on the sole of the right foot; since he was young d/t stenosis    Obesity     Onychomycosis     last assessed - [id number]    JEFF on CPAP     Sciatica     last assessed - [id number]    Sleep apnea        Past Surgical History  Past Surgical History:   Procedure Laterality Date    COLONOSCOPY      CYSTOSCOPY W/ LASER LITHOTRIPSY      FL RETROGRADE PYELOGRAM  11/24/2022    KNEE ARTHROSCOPY Left 11/2013    [place] LAP,CHOLECYSTECTOMY N/A 2/8/2019    Procedure: ROBOTIC ASSISTED LAPAROSCOPIC CHOLECYSTECTOMY;  Surgeon: Cristiano [facility], DO;  [facility]: [facility];  Service: General    PROSTATE BIOPSY      Needle Biopsy - Negative        11/25/22 0831   PT Last Visit   PT Visit Date 11/25/22   Note Type   Note type Evaluation   Pain Assessment   Pain Assessment Tool 0-10   Pain Score 7   Pain Location/Orientation Orientation: Right;Orientation: Lower; Location: Back   Hospital Pain Intervention(s) Repositioned; Ambulation/increased activity; Emotional support   Restrictions/Precautions   Weight Bearing Precautions Per Order No   Other Precautions Multiple lines; Fall Risk;Pain   Home Living   Type of [address] One level;Performs ADLs on one level; Able to live on main level with bedroom/bathroom  (3 CHARLY)   Bathroom Shower/Tub Tub/shower unit   Bathroom Toilet Standard   Bathroom Equipment Grab bars in [facility]   (denies)   Prior Function   Level of [place] Independent with ADLs; Independent with functional mobility   Lives With Spouse   Receives Help From Family   IADLs Independent with driving; Independent with meal prep; Independent with medication management   Falls in the last 6 months 0   Vocational Full time employment   General   Family/Caregiver Present No   Cognition   Overall Cognitive Status WFL   Arousal/Participation Cooperative   Attention Within functional limits   Orientation Level Oriented X4   Memory Within functional limits   Following Commands Follows all commands and directions without difficulty   Comments pt pleasant and cooperative to participate in therapy session   RLE Assessment   RLE Assessment   (funcitonally 4-/5 limited 2* pain)   LLE Assessment   LLE Assessment   (funcitonally 4/5)   Bed Mobility   Supine to Sit 4  Minimal assistance   Additional items Assist x 1; Increased time required;Verbal cues   Sit to Supine Unable to assess   Additional Comments pt supine in bed upon arrival  Pt returned to bedside chiar with all needs wihtin reach at the end of therapy session  (log roll technique utilized to help ease back pain)   Transfers   Sit to Stand 4  Minimal assistance  (CGA)   Additional items Assist x 1; Increased time required;Verbal cues   Stand to Sit 4  Minimal assistance  (CGA)   Additional items Assist x 1; Increased time required;Verbal cues   Additional Comments transfers without AD   Ambulation/Elevation   Gait pattern Excessively slow; Short stride; Antalgic   Gait Assistance 4  Minimal assist  (CGA)   Additional items Assist x 1;Verbal cues   Assistive Device None   Distance 3ft to bedside chair, limited 2* pain   Stair Management Assistance Not tested   Balance   Static Sitting Fair +   Dynamic Sitting Fair   Static Standing Fair -   Dynamic Standing Fair -   Ambulatory Fair -   Endurance Deficit   Endurance Deficit Yes   Endurance Deficit Description pain   Activity Tolerance   Activity Tolerance Patient limited by fatigue;Patient limited by pain   Medical Staff Made Aware OT; co-eval performed this date 2* increased medical complexity and mulitple co-morbidities   Nurse Made Aware RN cleared pt to participate in therapy session   Assessment   Prognosis Fair   Problem List Decreased endurance;Decreased range of motion;Decreased mobility;Obesity;Pain   Assessment Pt seen for high complexity PT evaluation  Pt with active PT eval/treat orders  Pt is a 58 y o  male who was [facility] to [facility] on 11/23/2022 with R hydroureteronephrosis with obstructing calculus s/p cystoscopy + stent placement  Pt's current dx/ problem list include: depression, DM, obesity, HTN, CKD, back pain, candidal intertrigo   Comorbidities affecting pt's physical performance at time of assessment are as follows:  has a past medical history of Acute renal failure (ARF) ([address] ), Chest pain, CPAP (continuous positive airway pressure) dependence, Depression, Diabetes mellitus ([address] ), Dyspnea on exertion, Hypertension, Nephrolithiasis, Numbness of right foot, Obesity, Onychomycosis, JEFF on CPAP, Sciatica, and Sleep apnea  Personal factors affecting pt at time of initial examination include: steps to enter environment, advanced age, past experience, inability to perform IADLs, inability to perform ADLs, inability to ambulate household distances  Due to acute medical issues, ongoing medical workup for primary dx; pain, fall risk, increased reliance on more restrictive AD compared to baseline;  decreased activity tolerance compared to baseline, increased assistance needed from caregiver at current time, continuous telemetry monitoring, multiple lines, decline in overall functional mobility status; health management issues; note unstable clinical picture (high complexity)  At baseline, pt resides with spouse in 1  with 3 CHARLY and was independent prior to hospital admission  Currently, upon initial examination, pt  is requiring min A for bed mobility skills; CGA for functional transfers and CGA for ambulation with RW  Currently, pt presents functioning below baseline and with overall mobility deficits 2* to: decreased LE strength/AROM; limited flexibility;  generalized weakness/ deconditioning; decreased endurance; decreased activity tolerance; decreased coordination; impaired balance; gait deviations; fatigue; multiple lines; as well as : weakness, impaired balance, decreased endurance, gait deviations, pain, decreased activity tolerance, decreased functional mobility tolerance and fall risk  Pt currently at increased risk for falls  At the end of evaluation, pt was left sitting OOB in recliner with all needs in reach  Pt would benefit from continued skilled PT services while in hospital to address remaining limitations and maximize functional potential  PT to continue to follow pt and recommends OPPT + social support  The patient's AM-PAC Basic Mobility Inpatient Short Form Raw Score is 17  A Raw score of greater than 16 suggests the patient may benefit from discharge to home   Please also refer to the recommendation of the Physical Therapist for safe discharge planning  Goals   Patient Goals to have less pain   STG Expiration Date 12/09/22   Short Term Goal #1 STG 1  Pt will be able to perform bed mobility tasks with mod I level in order to improve overall functional mobility and assist in safe d/c  STG 2  Pt with sit EOB for at least 25 minutes at mod I level in order to strengthen abdominal musculature and assist in future transfers/ ambulation  STG 3  Pt will be able to perform functional transfer with mod I level in order to improve overall functional mobility and assist in safe d/c  STG 4  Pt will be able to ambulate at least 250 feet with least restrictive device with mod I level A in order to improve overall functional mobility and assist in safe d/c  STG 5  Pt will improve sitting/standing static/dynamic balance 1/2 grade in order to improve functional mobility and assist in safe d/c  STG 6  Pt will improve LE strength by 1/2 grade in order to improve functional mobility and assist in safe d/c  STG 7  Pt will be able to negotiate at least 3 stairs with least restrictive device with mod I level A in order to improve overall functional mobility and assist in safe d/c    PT Treatment Day 0   Plan   Treatment/Interventions Functional transfer training;LE strengthening/ROM; Patient/family training;Equipment eval/education; Bed mobility;Gait training;Spoke to nursing;Spoke to case management;OT   PT Frequency 2-3x/wk   Recommendation   PT Discharge Recommendation Home with outpatient rehabilitation   Equipment Recommended   (TBD RW vs SPC trial)   AM-PAC Basic Mobility Inpatient   Turning in Bed Without Bedrails 3   Lying on Back to Sitting on Edge of Flat Bed 3   Moving Bed to Chair 3   Standing Up From Chair 3   Walk in Room 3   Climb 3-5 Stairs 2   Basic Mobility Inpatient Raw Score 17   Basic Mobility Standardized Score 39 67   Highest Level Of Mobility   -[facility] Goal 5: Stand one or more mins   -[facility] Achieved 4: Move to chair/commode           Pina Plata, PT DPT

## 2022-11-25 NOTE — ASSESSMENT & PLAN NOTE
CT of abdomen/pelvis revealed an: "Obstructive oval 12 mm proximal right ureteral calculus located 5 to 6 cm distal to the right UPJ causes mild right hydroureteronephrosis"   Appreciate urology input -> s/p cystoscopy w/ right retrograde pyelogram and subsequent ureteral stenting on 11/24  PRN pain/emesis control - continue IV fluids  Urinalysis revealed small pyuria with occasional bacteria -> urine culture negative, hence, will discontinue antibiotics  Supportive care otherwise  Outpatient follow-up w/ urology in approximately four weeks for stent removal and definitive stone treatment

## 2022-11-25 NOTE — UTILIZATION REVIEW
NOTIFICATION OF ADMISSION DISCHARGE   This is a Notification of Discharge from [address]  Please be advised that this patient has been discharge from our facility  Below you will find the admission and discharge date and time including the patient’s disposition  UTILIZATION REVIEW CONTACT:  Genevieve Cardoza  Utilization   Network Utilization Review Department  Phone: [phone] x carefully listen to the prompts  All voicemails are confidential   Email: [email or web address]     ADMISSION INFORMATION  PRESENTATION DATE: 11/20/2022  5:46 PM  OBERVATION ADMISSION DATE:   INPATIENT ADMISSION DATE: 11/22/22  3:30 PM   DISCHARGE DATE: 11/23/2022  2:57 PM   DISPOSITION:[facility]    IMPORTANT INFORMATION:  Send all requests for admission clinical reviews, approved or denied determinations and any other requests to dedicated fax number below belonging to the campus where the patient is receiving treatment   List of dedicated fax numbers:  [address] [place] [address] DENIALS (Administrative/Medical Necessity) [phone]   [address] [place] [address] (Maternity/NICU/Pediatrics) [phone]   [facility] [phone]   [place] [address] [phone]   [address] [phone]   [address] [facility] [phone]   [address] [facility] [phone]   [address] [place] [address] 119 [phone]   [facility]  [phone] [address] [facility] [phone]   [address] [facility] [address] [facility] [phone]

## 2022-11-25 NOTE — OCCUPATIONAL THERAPY NOTE
Occupational Therapy Evaluation     Patient Name: Eb HOPE Date: 11/25/2022  Problem List  Principal Problem:    Right hydroureteronephrosis with obstructing calculus  Active Problems:    Depression    Diabetes mellitus type 2     Morbid obesity    Essential hypertension    Obstructive sleep apnea    CKD (chronic kidney disease) stage 3    Back pain    Candidal intertrigo    Abnormal CT of the abdomen    Past Medical History  Past Medical History:   Diagnosis Date    Acute renal failure (ARF) ([facility] [address] )     last assessed - [id number]    Chest pain     last assessed - [id number]    CPAP (continuous positive airway pressure) dependence     Depression     Diabetes mellitus ([facility] [address] )     Dyspnea on exertion     last assessed - [id number]    Hypertension     Nephrolithiasis     Numbness of right foot     numbness on the sole of the right foot; since he was young d/t stenosis    Obesity     Onychomycosis     last assessed - [id number]    JEFF on CPAP     Sciatica     last assessed - [id number]    Sleep apnea      Past Surgical History  Past Surgical History:   Procedure Laterality Date    COLONOSCOPY      CYSTOSCOPY W/ LASER LITHOTRIPSY      FL RETROGRADE PYELOGRAM  11/24/2022    KNEE ARTHROSCOPY Left 11/2013    IN LAP,CHOLECYSTECTOMY N/A 2/8/2019    Procedure: ROBOTIC ASSISTED LAPAROSCOPIC CHOLECYSTECTOMY;  Surgeon: Karey Bryan DO;  [facility];  Service: General    PROSTATE BIOPSY      Needle Biopsy - Negative           11/25/22 0830   OT Last Visit   OT Visit Date 11/25/22   Note Type   Note type Evaluation   Pain Assessment   Pain Assessment Tool 0-10   Pain Score 7   Pain Location/Orientation Orientation: Right;Orientation: Lower; Location: Back   Hospital Pain Intervention(s) Repositioned; Ambulation/increased activity   Restrictions/Precautions   Weight Bearing Precautions Per Order No   Other Precautions Pain;Multiple lines; Fall Risk   Home Living   Type of [address] [place] [address] One level  (3 CHARLY)   Bathroom Shower/Tub Tub/shower unit   Bathroom Toilet Standard   Bathroom Equipment Grab bars in shower   Prior Function   Level of [place] Independent with ADLs   Lives With Spouse   IADLs Independent with driving; Independent with meal prep; Independent with medication management   Falls in the last 6 months 0   Vocational Full time employment   Comments Pt reports his wife typically works from home and is able to assist as needed  Lifestyle   Autonomy At baseline pt was completing all ADLs/IADLs IND, ambulating IND w/o AD, (+) working, (+) driving  Reciprocal Relationships supportive spouse   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)   Intrinsic Gratification enjoys spending time with family   ADL   Eating Assistance 7  Independent   Grooming Assistance 5  [address] [facility] 5  Supervision/Setup   [address] 4  [address] 5  Supervision/Setup    [facility] 3  Moderate [address]  4  Minimal Assistance   Bed Mobility   Supine to Sit 4  Minimal assistance   Additional items Assist x 1; Increased time required;Verbal cues   Additional Comments educated on log roll technique d/t back pain   Transfers   Sit to Stand 4  Minimal assistance   Additional items Assist x 1   Stand to Sit 4  Minimal assistance   Additional items Assist x 1; Increased time required   Functional Mobility   Functional Mobility 4  Minimal assistance   Additional Comments bed to bedside recliner   Balance   Static Sitting Fair +   Dynamic Sitting Fair   Static Standing Fair -   Dynamic Standing Fair -   Activity Tolerance   Activity Tolerance Patient limited by pain   Medical Staff Made Aware PT   Nurse Made Aware Per RN pt appropriate to be seen   RUE Assessment   RUE Assessment WFL   LUE Assessment   LUE Assessment WFL   Hand Function   Gross Motor Coordination Functional   Fine Motor Coordination Functional Psychosocial   Psychosocial (WDL) WDL   Cognition   Overall Cognitive Status WFL   Arousal/Participation Alert; Cooperative   Attention Within functional limits   Orientation Level Oriented X4   Memory Within functional limits   Following Commands Follows all commands and directions without difficulty   Assessment   Limitation Decreased ADL status; Decreased endurance;Decreased self-care trans;Decreased high-level ADLs   Prognosis Good   Assessment Pt is a 58 y o  male who was admitted to [facility] on 11/22 and transferred to [facility] on 11/23/2022  Pt presented with R back and hip pain  Found to have R hydroureteronephrosis with obstructing calculus  X-ray lumbar spine: "Lower lumbar disc space narrowing with flowing osteophytes from L1 through L4 suggestive of DISH "  Pt s/p cystoscopy, ureteral stent 11/24  Pt  has a past medical history of Acute renal failure (ARF) ([facility] [address] ), Chest pain, CPAP (continuous positive airway pressure) dependence, Depression, Diabetes mellitus ([facility] [address] ), Dyspnea on exertion, Hypertension, Nephrolithiasis, Numbness of right foot, Obesity, Onychomycosis, JEFF on CPAP, Sciatica, and Sleep apnea  At baseline pt was completing all ADLs/IADLs IND, ambulating IND w/o AD, (+) working, (+) driving  Pt lives with his wife in a 1  with 3 CHARLY  Pt reports his wife typically works from home and is able to assist as needed  Currently pt requires MIN A for overall ADLS and for functional mobility/transfers  Pt currently presents with impairments in the following categories -steps to enter environment, difficulty performing ADLS and difficulty performing IADLS  activity tolerance, endurance and standing balance/tolerance   These impairments, as well as pt's fatigue, pain and risk for falls  limit pt's ability to safely engage in all baseline areas of occupation, includinggrooming, bathing, dressing, toileting, functional mobility/transfers, community mobility, driving, house maintenance, meal prep, cleaning, work/volunteer work  and leisure activities   From OT standpoint, recommend HOME WITH FAMILY SUPPORT upon D/C  OT will continue to follow to address the below stated goals  Goals   Patient Goals to have less pain   LTG Time Frame 10-14   Long Term Goal #1 see goals below   Plan   Treatment Interventions ADL retraining;Functional transfer training; Endurance training;Patient/family training;Equipment evaluation/education; Compensatory technique education; Energy conservation; Activityengagement   Goal Expiration Date 12/09/22   OT Frequency Other (comment)  (2-4x/wk)   Recommendation   OT Discharge Recommendation No rehabilitation needs   AM-PAC Daily Activity Inpatient   Lower Body Dressing 2   Bathing 3   Toileting 3   Upper Body Dressing 3   Grooming 4   Eating 4   Daily Activity Raw Score 19   Daily Activity Standardized Score (Calc for Raw Score >=11) 40 22   AM-PAC Applied Cognition Inpatient   Following a Speech/Presentation 4   Understanding Ordinary Conversation 4   Taking Medications 4   Remembering Where Things Are Placed or Put Away 4   Remembering List of 4-5 Errands 4   Taking Care of Complicated Tasks 4   Applied Cognition Raw Score 24   Applied Cognition Standardized Score 62 21   End of Consult   Patient Position at End of Consult Bedside chair; All needs within reach   Nurse Communication Nurse aware of consult        The patient's raw score on the AM-PAC Daily Activity inpatient short form is 19, standardized score is 40 22, greater than 39 4  Patients at this level are likely to benefit from discharge to home  Please refer to the recommendation of the Occupational Therapist for safe discharge planning            GOALS     Pt will improve activity tolerance to G for min 30 min txment sessions     Pt will complete UB ADLs with MOD IND and use of adaptive device and DME as needed     Pt will complete LB ADLs with MOD IND w/ use of AE and DME as needed     Pt will complete toileting w/ MOD IND w/ G hygiene/thoroughness using DME as needed     Pt will improve functional transfers to Mod I on/off all surfaces using DME as needed w/ G balance/safety      Pt will improve functional mobility during ADL/IADL/leisure tasks to Mod I using DME as needed w/ G balance/safety      Pt will demonstrate G carryover of pt/caregiver education and training as appropriate w/ mod I w/o cues w/ good tolerance           Court Ye

## 2022-11-26 ENCOUNTER — APPOINTMENT (EMERGENCY)
Dept: RADIOLOGY | Facility: HOSPITAL | Age: 62
DRG: 661 | End: 2022-11-26
Payer: COMMERCIAL

## 2022-11-26 ENCOUNTER — HOSPITAL ENCOUNTER (INPATIENT)
Facility: HOSPITAL | Age: 62
LOS: 2 days | Discharge: HOME | DRG: 661 | End: 2022-12-01
Attending: EMERGENCY MEDICINE | Admitting: INTERNAL MEDICINE
Payer: COMMERCIAL

## 2022-11-26 VITALS
HEART RATE: 63 BPM | OXYGEN SATURATION: 91 % | SYSTOLIC BLOOD PRESSURE: 153 MMHG | TEMPERATURE: 97.9 F | DIASTOLIC BLOOD PRESSURE: 76 MMHG | RESPIRATION RATE: 18 BRPM

## 2022-11-26 DIAGNOSIS — R10.9 RIGHT FLANK PAIN: ICD-10-CM

## 2022-11-26 DIAGNOSIS — N18.30 STAGE 3 CHRONIC KIDNEY DISEASE, UNSPECIFIED WHETHER STAGE 3A OR 3B CKD (HCC): ICD-10-CM

## 2022-11-26 DIAGNOSIS — K59.00 CONSTIPATION: ICD-10-CM

## 2022-11-26 DIAGNOSIS — M54.59 INTRACTABLE LOW BACK PAIN: ICD-10-CM

## 2022-11-26 DIAGNOSIS — N23 RENAL COLIC: ICD-10-CM

## 2022-11-26 DIAGNOSIS — N20.1 CALCULUS OF URETER: Primary | ICD-10-CM

## 2022-11-26 LAB
ANION GAP SERPL CALCULATED.3IONS-SCNC: 6 MMOL/L (ref 4–13)
ANION GAP SERPL CALCULATED.3IONS-SCNC: 7 MMOL/L (ref 4–13)
BACTERIA UR CULT: NORMAL
BASOPHILS # BLD AUTO: 0.06 THOUSANDS/ÂΜL (ref 0–0.1)
BASOPHILS # BLD AUTO: 0.07 THOUSANDS/ÂΜL (ref 0–0.1)
BASOPHILS NFR BLD AUTO: 1 % (ref 0–1)
BASOPHILS NFR BLD AUTO: 1 % (ref 0–1)
BUN SERPL-MCNC: 19 MG/DL (ref 5–25)
BUN SERPL-MCNC: 23 MG/DL (ref 5–25)
CALCIUM SERPL-MCNC: 8.8 MG/DL (ref 8.3–10.1)
CALCIUM SERPL-MCNC: 9.7 MG/DL (ref 8.3–10.1)
CHLORIDE SERPL-SCNC: 108 MMOL/L (ref 96–108)
CHLORIDE SERPL-SCNC: 110 MMOL/L (ref 96–108)
CO2 SERPL-SCNC: 21 MMOL/L (ref 21–32)
CO2 SERPL-SCNC: 23 MMOL/L (ref 21–32)
CREAT SERPL-MCNC: 1.14 MG/DL (ref 0.6–1.3)
CREAT SERPL-MCNC: 1.36 MG/DL (ref 0.6–1.3)
EOSINOPHIL # BLD AUTO: 0.19 THOUSAND/ÂΜL (ref 0–0.61)
EOSINOPHIL # BLD AUTO: 0.32 THOUSAND/ÂΜL (ref 0–0.61)
EOSINOPHIL NFR BLD AUTO: 2 % (ref 0–6)
EOSINOPHIL NFR BLD AUTO: 4 % (ref 0–6)
ERYTHROCYTE [DISTWIDTH] IN BLOOD BY AUTOMATED COUNT: 13 % (ref 11.6–15.1)
ERYTHROCYTE [DISTWIDTH] IN BLOOD BY AUTOMATED COUNT: 13.1 % (ref 11.6–15.1)
GFR SERPL CREATININE-BSD FRML MDRD: 55 ML/MIN/1.73SQ M
GFR SERPL CREATININE-BSD FRML MDRD: 68 ML/MIN/1.73SQ M
GLUCOSE SERPL-MCNC: 104 MG/DL (ref 65–140)
GLUCOSE SERPL-MCNC: 119 MG/DL (ref 65–140)
GLUCOSE SERPL-MCNC: 120 MG/DL (ref 65–140)
GLUCOSE SERPL-MCNC: 152 MG/DL (ref 65–140)
HCT VFR BLD AUTO: 47.1 % (ref 36.5–49.3)
HCT VFR BLD AUTO: 47.9 % (ref 36.5–49.3)
HGB BLD-MCNC: 15 G/DL (ref 12–17)
HGB BLD-MCNC: 16.1 G/DL (ref 12–17)
IMM GRANULOCYTES # BLD AUTO: 0.03 THOUSAND/UL (ref 0–0.2)
IMM GRANULOCYTES # BLD AUTO: 0.03 THOUSAND/UL (ref 0–0.2)
IMM GRANULOCYTES NFR BLD AUTO: 0 % (ref 0–2)
IMM GRANULOCYTES NFR BLD AUTO: 0 % (ref 0–2)
LYMPHOCYTES # BLD AUTO: 1.71 THOUSANDS/ÂΜL (ref 0.6–4.47)
LYMPHOCYTES # BLD AUTO: 1.96 THOUSANDS/ÂΜL (ref 0.6–4.47)
LYMPHOCYTES NFR BLD AUTO: 18 % (ref 14–44)
LYMPHOCYTES NFR BLD AUTO: 26 % (ref 14–44)
MCH RBC QN AUTO: 29.2 PG (ref 26.8–34.3)
MCH RBC QN AUTO: 30 PG (ref 26.8–34.3)
MCHC RBC AUTO-ENTMCNC: 31.8 G/DL (ref 31.4–37.4)
MCHC RBC AUTO-ENTMCNC: 33.6 G/DL (ref 31.4–37.4)
MCV RBC AUTO: 89 FL (ref 82–98)
MCV RBC AUTO: 92 FL (ref 82–98)
MONOCYTES # BLD AUTO: 0.7 THOUSAND/ÂΜL (ref 0.17–1.22)
MONOCYTES # BLD AUTO: 0.89 THOUSAND/ÂΜL (ref 0.17–1.22)
MONOCYTES NFR BLD AUTO: 9 % (ref 4–12)
MONOCYTES NFR BLD AUTO: 9 % (ref 4–12)
NEUTROPHILS # BLD AUTO: 4.36 THOUSANDS/ÂΜL (ref 1.85–7.62)
NEUTROPHILS # BLD AUTO: 6.79 THOUSANDS/ÂΜL (ref 1.85–7.62)
NEUTS SEG NFR BLD AUTO: 60 % (ref 43–75)
NEUTS SEG NFR BLD AUTO: 70 % (ref 43–75)
NRBC BLD AUTO-RTO: 0 /100 WBCS
NRBC BLD AUTO-RTO: 0 /100 WBCS
PLATELET # BLD AUTO: 266 THOUSANDS/UL (ref 149–390)
PLATELET # BLD AUTO: 289 THOUSANDS/UL (ref 149–390)
PMV BLD AUTO: 10 FL (ref 8.9–12.7)
PMV BLD AUTO: 9.6 FL (ref 8.9–12.7)
POTASSIUM SERPL-SCNC: 3.6 MMOL/L (ref 3.5–5.3)
POTASSIUM SERPL-SCNC: 4.6 MMOL/L (ref 3.5–5.3)
RBC # BLD AUTO: 5.13 MILLION/UL (ref 3.88–5.62)
RBC # BLD AUTO: 5.37 MILLION/UL (ref 3.88–5.62)
SODIUM SERPL-SCNC: 137 MMOL/L (ref 135–147)
SODIUM SERPL-SCNC: 138 MMOL/L (ref 135–147)
WBC # BLD AUTO: 7.43 THOUSAND/UL (ref 4.31–10.16)
WBC # BLD AUTO: 9.68 THOUSAND/UL (ref 4.31–10.16)

## 2022-11-26 PROCEDURE — 96375 TX/PRO/DX INJ NEW DRUG ADDON: CPT

## 2022-11-26 PROCEDURE — 80048 BASIC METABOLIC PNL TOTAL CA: CPT | Performed by: INTERNAL MEDICINE

## 2022-11-26 PROCEDURE — 85025 COMPLETE CBC W/AUTO DIFF WBC: CPT | Performed by: INTERNAL MEDICINE

## 2022-11-26 PROCEDURE — 36415 COLL VENOUS BLD VENIPUNCTURE: CPT | Performed by: EMERGENCY MEDICINE

## 2022-11-26 PROCEDURE — 85025 COMPLETE CBC W/AUTO DIFF WBC: CPT | Performed by: EMERGENCY MEDICINE

## 2022-11-26 PROCEDURE — 99239 HOSP IP/OBS DSCHRG MGMT >30: CPT | Performed by: INTERNAL MEDICINE

## 2022-11-26 PROCEDURE — 82948 REAGENT STRIP/BLOOD GLUCOSE: CPT

## 2022-11-26 PROCEDURE — 99285 EMERGENCY DEPT VISIT HI MDM: CPT | Performed by: EMERGENCY MEDICINE

## 2022-11-26 PROCEDURE — 80048 BASIC METABOLIC PNL TOTAL CA: CPT | Performed by: EMERGENCY MEDICINE

## 2022-11-26 PROCEDURE — 74176 CT ABD & PELVIS W/O CONTRAST: CPT

## 2022-11-26 PROCEDURE — 99285 EMERGENCY DEPT VISIT HI MDM: CPT

## 2022-11-26 PROCEDURE — G1004 CDSM NDSC: HCPCS

## 2022-11-26 RX ORDER — HYDROMORPHONE HCL/PF 1 MG/ML
0.5 SYRINGE (ML) INJECTION ONCE
Status: COMPLETED | OUTPATIENT
Start: 2022-11-27 | End: 2022-11-27

## 2022-11-26 RX ORDER — METHOCARBAMOL 750 MG/1
750 TABLET, FILM COATED ORAL EVERY 8 HOURS SCHEDULED
Qty: 45 TABLET | Refills: 0 | Status: SHIPPED | OUTPATIENT
Start: 2022-11-26 | End: 2022-12-01

## 2022-11-26 RX ORDER — KETOROLAC TROMETHAMINE 30 MG/ML
15 INJECTION, SOLUTION INTRAMUSCULAR; INTRAVENOUS ONCE
Status: COMPLETED | OUTPATIENT
Start: 2022-11-27 | End: 2022-11-27

## 2022-11-26 RX ORDER — ACETAMINOPHEN 325 MG/1
975 TABLET ORAL EVERY 8 HOURS SCHEDULED
Qty: 100 TABLET | Refills: 0 | Status: SHIPPED | OUTPATIENT
Start: 2022-11-26 | End: 2022-12-01

## 2022-11-26 RX ORDER — HYDROMORPHONE HYDROCHLORIDE 2 MG/1
2 TABLET ORAL EVERY 4 HOURS PRN
Qty: 30 TABLET | Refills: 0 | Status: SHIPPED | OUTPATIENT
Start: 2022-11-26

## 2022-11-26 RX ORDER — HYDROMORPHONE HCL/PF 1 MG/ML
0.5 SYRINGE (ML) INJECTION ONCE
Status: COMPLETED | OUTPATIENT
Start: 2022-11-26 | End: 2022-11-26

## 2022-11-26 RX ORDER — SODIUM CHLORIDE, SODIUM GLUCONATE, SODIUM ACETATE, POTASSIUM CHLORIDE, MAGNESIUM CHLORIDE, SODIUM PHOSPHATE, DIBASIC, AND POTASSIUM PHOSPHATE .53; .5; .37; .037; .03; .012; .00082 G/100ML; G/100ML; G/100ML; G/100ML; G/100ML; G/100ML; G/100ML
1000 INJECTION, SOLUTION INTRAVENOUS ONCE
Status: COMPLETED | OUTPATIENT
Start: 2022-11-27 | End: 2022-11-27

## 2022-11-26 RX ADMIN — METHOCARBAMOL TABLETS 750 MG: 750 TABLET, COATED ORAL at 05:03

## 2022-11-26 RX ADMIN — AMLODIPINE BESYLATE 10 MG: 10 TABLET ORAL at 10:02

## 2022-11-26 RX ADMIN — PHENAZOPYRIDINE 100 MG: 100 TABLET ORAL at 12:44

## 2022-11-26 RX ADMIN — HEPARIN SODIUM 5000 UNITS: 5000 INJECTION INTRAVENOUS; SUBCUTANEOUS at 05:03

## 2022-11-26 RX ADMIN — HYDROMORPHONE HYDROCHLORIDE 0.5 MG: 1 INJECTION, SOLUTION INTRAMUSCULAR; INTRAVENOUS; SUBCUTANEOUS at 22:28

## 2022-11-26 RX ADMIN — PHENAZOPYRIDINE 100 MG: 100 TABLET ORAL at 10:02

## 2022-11-26 RX ADMIN — SERTRALINE 100 MG: 100 TABLET, FILM COATED ORAL at 10:02

## 2022-11-26 RX ADMIN — DOCUSATE SODIUM 100 MG: 100 CAPSULE, LIQUID FILLED ORAL at 10:02

## 2022-11-26 RX ADMIN — HYDROMORPHONE HYDROCHLORIDE 2 MG: 2 TABLET ORAL at 10:01

## 2022-11-26 RX ADMIN — METHOCARBAMOL TABLETS 750 MG: 750 TABLET, COATED ORAL at 12:45

## 2022-11-26 RX ADMIN — BUPROPION HYDROCHLORIDE 150 MG: 150 TABLET, FILM COATED, EXTENDED RELEASE ORAL at 10:02

## 2022-11-26 RX ADMIN — NYSTATIN: 100000 POWDER TOPICAL at 10:04

## 2022-11-26 RX ADMIN — HYDROMORPHONE HYDROCHLORIDE 2 MG: 2 TABLET ORAL at 01:45

## 2022-11-26 RX ADMIN — LISINOPRIL 40 MG: 20 TABLET ORAL at 10:02

## 2022-11-26 RX ADMIN — INSULIN LISPRO 2 UNITS: 100 INJECTION, SOLUTION INTRAVENOUS; SUBCUTANEOUS at 12:45

## 2022-11-26 RX ADMIN — ACETAMINOPHEN 975 MG: 325 TABLET ORAL at 12:45

## 2022-11-26 RX ADMIN — ACETAMINOPHEN 975 MG: 325 TABLET ORAL at 05:03

## 2022-11-26 RX ADMIN — CLONIDINE HYDROCHLORIDE 0.3 MG: 0.2 TABLET ORAL at 10:02

## 2022-11-26 NOTE — CASE MANAGEMENT
Case Management Assessment & Discharge Planning Note    Patient name Eliazar Goltz  [facility] MRN [id number]  : 1960 Date 2022       Current Admission Date: 2022  Current Admission Diagnosis:Right hydroureteronephrosis with obstructing calculus   Patient Active Problem List    Diagnosis Date Noted   • Abnormal CT of the abdomen 2022   • Candidal intertrigo 2022   • Abnormal urinalysis 2022   • Intractable lower back pain 2022   • COVID-19 virus infection 2022   • Vitamin B12 deficiency 2021   • CKD (chronic kidney disease) stage 3 2020   • Type 2 diabetes mellitus with stage 3a chronic kidney disease, with long-term current use of insulin ([facility] [address] ) 2020   • Bilateral renal cysts 2019   • Onychomycosis 2018   • Corns 2018   • Acquired deformity of foot 2018   • Metatarsalgia of both feet 2018   • Pain in both feet 2018   • Diabetic polyneuropathy associated with type 2 diabetes mellitus ([facility] [address] ) 2018   • Morbid obesity 2018   • Hypogonadism, male 2017   • Arthralgia 2017   • Right hydroureteronephrosis with obstructing calculus 2017   • Fatigue 2017   • Obstructive sleep apnea 2016   • Vitamin D deficiency 10/16/2015   • Diabetes mellitus type 2  2015   • Hallux valgus 2015   • Bunion 2015   • Acquired pes planus 2015   • Mixed hyperlipidemia 10/23/2014   • Seasonal allergies 2014   • Depression 2012   • Essential hypertension 2012   • Spinal stenosis 2012      LOS (days): 3  Geometric Mean LOS (GMLOS) (days):   Days to GMLOS:     OBJECTIVE:    Risk of Unplanned Readmission Score: 10 82         Current admission status: Inpatient       Preferred Pharmacy:   [place] [address]  Phone: [phone] Fax: [phone]    [id number] [address]  [address] [address]  Phone: [phone] Fax: [phone]    Primary Care Provider: Radha Guillaume MD    Primary Insurance: [facility]  Secondary Insurance:     ASSESSMENT:  Active Health Care Proxies     [address] [facility] Representative - Spouse   Primary Phone: [phone] (Mobile)                              Patient Information  Admitted from[de-identified] Home  Mental Status: Alert  During Assessment patient was accompanied by: Not accompanied during assessment  Assessment information provided by[de-identified] Patient  Primary Caregiver: Self  Support Systems: Spouse/significant other, Self  What city do you live in?: [address] entry access options   Select all that apply : Stairs  Number of steps to enter home : 3  Do the steps have railings?: Yes  Type of Current Residence: [place]  In the last 12 months, was there a time when you were not able to pay the mortgage or rent on time?: No  In the last 12 months, how many places have you lived?: 1  In the last 12 months, was there a time when you did not have a steady place to sleep or slept in a shelter (including now)?: No  Living Arrangements: Lives w/ Spouse/significant other    Activities of Daily Living Prior to Admission  Functional Status: Independent  Completes ADLs independently?: Yes  Ambulates independently?: Yes  Does patient use assisted devices?: No  Does patient currently own DME?: No  Does patient have a history of Outpatient Therapy (PT/OT)?: Yes  Does the patient have a history of Short-Term Rehab?: No  Does patient have a history of HHC?: No  Does patient currently have [facility]?: No         Patient Information Continued  Income Source: Employed  Does patient have prescription coverage?: Yes  Within the past 12 months, you worried that your food would run out before you got the money to buy more : Never true  Within the past 12 months, the food you bought just didn't last and you didn't have money to get more : Never true  Does patient receive dialysis treatments?: No  Does patient have a history of substance abuse?: No  Does patient have a history of Mental Health Diagnosis? :  (chart review indicates depression)         Means of Transportation  Means of Transport to Appts[de-identified] Drives Self  In the past 12 months, has lack of transportation kept you from medical appointments or from getting medications?: No  In the past 12 months, has lack of transportation kept you from meetings, work, or from getting things needed for daily living?: No        DISCHARGE DETAILS:    Discharge planning discussed with[de-identified] Patient  Freedom of Choice: Yes     CM contacted family/caregiver?:  (alert and oriented)  Were Treatment Team discharge recommendations reviewed with patient/caregiver?: Yes  Did patient/caregiver verbalize understanding of patient care needs?: Yes  Were patient/caregiver advised of the risks associated with not following Treatment Team discharge recommendations?: Yes                   Other Referral/Resources/Interventions Provided:  Referral Comments: patient was seen by PT and is recommended to d/c to home with outpatient PT  Treatment Team Recommendation: Home  Discharge Destination Plan[de-identified] Home                       Additional Comments: Patient confirms that he has received a total of 4 COVID vaccines at this time  He states that when he is cleared for discharge his spouse is going to bring him home  Patient/caregiver received discharge checklist   Content reviewed  Patient/caregiver encouraged to participate in discharge plan of care prior to discharge home

## 2022-11-26 NOTE — PLAN OF CARE
Problem: Prexisting or High Potential for Compromised Skin Integrity  Goal: Skin integrity is maintained or improved  Description: INTERVENTIONS:  - Identify patients at risk for skin breakdown  - Assess and monitor skin integrity  - Assess and monitor nutrition and hydration status  - Monitor labs   - Assess for incontinence   - Turn and reposition patient  - Assist with mobility/ambulation  - Relieve pressure over bony prominences  - Avoid friction and shearing  - Provide appropriate hygiene as needed including keeping skin clean and dry  - Evaluate need for skin moisturizer/barrier cream  - Collaborate with interdisciplinary team   - Patient/family teaching  - Consider wound care consult   Outcome: Adequate for Discharge     Problem: MOBILITY - ADULT  Goal: Maintain or return to baseline ADL function  Description: INTERVENTIONS:  -  Assess patient's ability to carry out ADLs; assess patient's baseline for ADL function and identify physical deficits which impact ability to perform ADLs (bathing, care of mouth/teeth, toileting, grooming, dressing, etc )  - Assess/evaluate cause of self-care deficits   - Assess range of motion  - Assess patient's mobility; develop plan if impaired  - Assess patient's need for assistive devices and provide as appropriate  - Encourage maximum independence but intervene and supervise when necessary  - Involve family in performance of ADLs  - Assess for home care needs following discharge   - Consider OT consult to assist with ADL evaluation and planning for discharge  - Provide patient education as appropriate  Outcome: Adequate for Discharge       Problem: Potential for Falls  Goal: Patient will remain free of falls  Description: INTERVENTIONS:  - Educate patient/family on patient safety including physical limitations  - Instruct patient to call for assistance with activity   - Consult OT/PT to assist with strengthening/mobility   - Keep Call bell within reach  - Keep bed low and locked with side rails adjusted as appropriate  - Keep care items and personal belongings within reach  - Initiate and maintain comfort rounds  - Make Fall Risk Sign visible to staff  - Offer Toileting every Hours, in advance of need  - Initiate/Maintain alarm  - Obtain necessary fall risk management equipment:   - Apply yellow socks and bracelet for high fall risk patients  - Consider moving patient to room near nurses station  Outcome: Adequate for Discharge

## 2022-11-26 NOTE — DISCHARGE SUMMARY
Discharge Summary - [place] [address] Internal Medicine  Patient: Thad Mcdonnell 58 y o  male   MRN: [id number]  PCP: Karie Carreon MD  Unit/Bed#: [facility] 517-67 Encounter: [id number]            Discharging Physician / Practitioner: Vanessa Allen MD  PCP: Karie Carreon MD  Admission Date:   Admission Orders (From admission, onward)     Ordered        11/23/22 1721  Inpatient Admission  Once                      Discharge Date: 11/26/22      Reason for Admission:  Back pain       Discharge Diagnoses:     Principal Problem:    Right hydroureteronephrosis with obstructing calculus    Active Problems:    Intractable lower back pain    Morbid obesity    Obstructive sleep apnea    CKD (chronic kidney disease) stage 3    Diabetes mellitus type 2     Essential hypertension    Depression    Abnormal CT of the abdomen      Consultations During Hospital Stay:  · Urology  · Pain management  · General surgery      Hospital Course:     * Right hydroureteronephrosis with obstructing calculus  Assessment & Plan  CT of abdomen/pelvis revealed an: "Obstructive oval 12 mm proximal right ureteral calculus located 5 to 6 cm distal to the right UPJ causes mild right hydroureteronephrosis"   Appreciate urology input -> s/p cystoscopy w/ right retrograde pyelogram and subsequent ureteral stenting on 11/24  PRN pain/emesis control - continue IV fluids  Urinalysis revealed small pyuria with occasional bacteria -> urine culture negative, hence,  discontinued antibiotics  Supportive care otherwise  Outpatient follow-up w/ urology in approximately four weeks for stent removal and definitive stone treatment     Intractable lower back pain  Assessment & Plan  XR lumbar spine revealing: "Lower lumbar disc space narrowing with flowing osteophytes from L1 through L4 suggestive of DISH "  Has denied saddle anesthesia, bowel/urinary incontinence, numbness/tingling or fever/chills  Pain management consultation ordered on admission as was seen by their service at transferring facility  Continue current multimodal pain regimen including around the clock Tylenol, lidocaine patch - Oxycodone discontinued and transitioned to oral Dilaudid for moderate/severe pain, along w/ IV Dilaudid for breakthrough events - continue Robaxin for spasms and conservative aqua K heating pad -> plan for discharge today w/ a script for oral Dilaudid  No needs per OT - PT recommending continued outpatient PT on discharge      Obstructive sleep apnea  Assessment & Plan  CPAP QHS  Weight loss counseling     Morbid obesity  Assessment & Plan  BMI of 38  Lifestyle/diet modifications     CKD (chronic kidney disease) stage 3  Assessment & Plan  Baseline creatinine approximately 1 4-1 6 - currently stable @ 1 36 on discharge day     Diabetes mellitus type 2   Assessment & Plan        Lab Results   Component Value Date     HGBA1C 10 1 (H) 10/16/2022      Held oral hypoglycemics while hospitalized - resume home regimen on discharge  Maintained on basal insulin w/ additional SSI coverage per Accu-Cheks during hospital course  Carbohydrate restricted diet     Essential hypertension  Assessment & Plan  Continue Norvasc/Catapres/ACEI - PRN IV Hydralazine on board for BP spikes during hospital course  Optimized pain control  Low-sodium diet encourage     Depression  Assessment & Plan  Continue Wellbutrin/Zoloft     Abnormal CT of the abdomen  Assessment & Plan  Insulin to finding on CT imaging noting: "Small foci of gas within the anterior subcutaneous tissues in this patient's anterior abdominal wall pannus correlating to the findings seen on today's radiographs "  Preceding XR right hip noted: "Small focus of gas overlying the right inguinal region of uncertain etiology   This could represent a bowel-containing hernia or soft tissue gas "  Appreciate general surgery input -> no clinical significance to findings as may relate to area of subcutaneous heparin injections          Condition at Discharge: fair       Discharge Day Visit / Exam:     Vitals: Blood Pressure: 153/76 (11/26/22 0612)  Pulse: 63 (11/26/22 0612)  Temperature: 97 9 °F (36 6 °C) (11/26/22 0612)  Temp Source: Temporal (11/24/22 1100)  Respirations: 18 (11/26/22 0612)  SpO2: 91 % (11/26/22 0900)      Physical exam - I had a face-to-face encounter with the patient on day of discharge  Discussion with Patient and/or Family:  The patient has been advised to return to the ER immediately if any symptoms recur or worsen  Discharge instructions/Information to Patient and/or Family:   See after visit summary for information provided to patient and/or family  Provisions for Follow-Up Care:  See after visit summary for information related to follow-up care and any pertinent home health orders  Disposition:   Home with outpatient PT      Discharge Medications:  See after visit summary for reconciled discharge medications provided to patient and/or family  Discharge Statement:  I spent 38 minutes discharging the patient  This time was spent on the day of discharge  I had direct contact with the patient on the day of discharge  Greater than 50% of the total time was spent examining patient, answering all patient questions, arranging and discussing plan of care with patient as well as directly providing post-discharge instructions  Additional time then spent on discharge activities  ** Please Note: This note is constructed using a voice recognition dictation system  An occasional wrong word/phrase or “sound-a-like” substitution may have been picked up by dictation device due to the inherent limitations of voice recognition software  Read the chart carefully and recognize, using reasonable context, where substitutions may have occurred  **

## 2022-11-27 PROBLEM — M54.9 COSTOVERTEBRAL ANGLE TENDERNESS: Status: ACTIVE | Noted: 2022-11-27

## 2022-11-27 PROBLEM — R93.89 ABNORMAL CT SCAN: Status: ACTIVE | Noted: 2022-11-27

## 2022-11-27 LAB
BACTERIA UR QL AUTO: ABNORMAL /HPF
BILIRUB UR QL STRIP: NEGATIVE
CLARITY UR: CLEAR
COLOR UR: ABNORMAL
GLUCOSE SERPL-MCNC: 101 MG/DL (ref 65–140)
GLUCOSE SERPL-MCNC: 127 MG/DL (ref 65–140)
GLUCOSE SERPL-MCNC: 129 MG/DL (ref 65–140)
GLUCOSE SERPL-MCNC: 131 MG/DL (ref 65–140)
GLUCOSE SERPL-MCNC: 155 MG/DL (ref 65–140)
GLUCOSE SERPL-MCNC: 186 MG/DL (ref 65–140)
GLUCOSE UR STRIP-MCNC: NEGATIVE MG/DL
HGB UR QL STRIP.AUTO: ABNORMAL
KETONES UR STRIP-MCNC: NEGATIVE MG/DL
LEUKOCYTE ESTERASE UR QL STRIP: NEGATIVE
NITRITE UR QL STRIP: POSITIVE
NON-SQ EPI CELLS URNS QL MICRO: ABNORMAL /HPF
PH UR STRIP.AUTO: 6.5 [PH]
PROT UR STRIP-MCNC: ABNORMAL MG/DL
RBC #/AREA URNS AUTO: ABNORMAL /HPF
SP GR UR STRIP.AUTO: 1.01 (ref 1–1.03)
UROBILINOGEN UR STRIP-ACNC: <2 MG/DL
WBC #/AREA URNS AUTO: ABNORMAL /HPF

## 2022-11-27 PROCEDURE — 96376 TX/PRO/DX INJ SAME DRUG ADON: CPT

## 2022-11-27 PROCEDURE — 82948 REAGENT STRIP/BLOOD GLUCOSE: CPT

## 2022-11-27 PROCEDURE — 81001 URINALYSIS AUTO W/SCOPE: CPT | Performed by: EMERGENCY MEDICINE

## 2022-11-27 PROCEDURE — 99220 PR INITIAL OBSERVATION CARE/DAY 70 MINUTES: CPT | Performed by: INTERNAL MEDICINE

## 2022-11-27 PROCEDURE — 96365 THER/PROPH/DIAG IV INF INIT: CPT

## 2022-11-27 PROCEDURE — 99214 OFFICE O/P EST MOD 30 MIN: CPT | Performed by: STUDENT IN AN ORGANIZED HEALTH CARE EDUCATION/TRAINING PROGRAM

## 2022-11-27 PROCEDURE — 96366 THER/PROPH/DIAG IV INF ADDON: CPT

## 2022-11-27 PROCEDURE — 96375 TX/PRO/DX INJ NEW DRUG ADDON: CPT

## 2022-11-27 RX ORDER — NYSTATIN 100000 [USP'U]/G
POWDER TOPICAL 3 TIMES DAILY PRN
Status: DISCONTINUED | OUTPATIENT
Start: 2022-11-27 | End: 2022-12-01 | Stop reason: HOSPADM

## 2022-11-27 RX ORDER — INSULIN GLARGINE 100 [IU]/ML
34 INJECTION, SOLUTION SUBCUTANEOUS EVERY MORNING
Status: DISCONTINUED | OUTPATIENT
Start: 2022-11-27 | End: 2022-12-01 | Stop reason: HOSPADM

## 2022-11-27 RX ORDER — HYDROMORPHONE HYDROCHLORIDE 4 MG/1
4 TABLET ORAL EVERY 4 HOURS PRN
Status: DISCONTINUED | OUTPATIENT
Start: 2022-11-27 | End: 2022-12-01 | Stop reason: HOSPADM

## 2022-11-27 RX ORDER — ATORVASTATIN CALCIUM 10 MG/1
10 TABLET, FILM COATED ORAL DAILY
Status: DISCONTINUED | OUTPATIENT
Start: 2022-11-27 | End: 2022-12-01 | Stop reason: HOSPADM

## 2022-11-27 RX ORDER — NICOTINE 21 MG/24HR
1 PATCH, TRANSDERMAL 24 HOURS TRANSDERMAL DAILY
Status: DISCONTINUED | OUTPATIENT
Start: 2022-11-27 | End: 2022-12-01 | Stop reason: HOSPADM

## 2022-11-27 RX ORDER — INSULIN LISPRO 100 [IU]/ML
1-5 INJECTION, SOLUTION INTRAVENOUS; SUBCUTANEOUS
Status: DISCONTINUED | OUTPATIENT
Start: 2022-11-27 | End: 2022-12-01 | Stop reason: HOSPADM

## 2022-11-27 RX ORDER — METHOCARBAMOL 750 MG/1
750 TABLET, FILM COATED ORAL EVERY 8 HOURS SCHEDULED
Status: DISCONTINUED | OUTPATIENT
Start: 2022-11-27 | End: 2022-12-01 | Stop reason: HOSPADM

## 2022-11-27 RX ORDER — KETOROLAC TROMETHAMINE 30 MG/ML
30 INJECTION, SOLUTION INTRAMUSCULAR; INTRAVENOUS ONCE
Status: COMPLETED | OUTPATIENT
Start: 2022-11-27 | End: 2022-11-27

## 2022-11-27 RX ORDER — TAMSULOSIN HYDROCHLORIDE 0.4 MG/1
0.8 CAPSULE ORAL DAILY
Status: DISCONTINUED | OUTPATIENT
Start: 2022-11-28 | End: 2022-12-01 | Stop reason: HOSPADM

## 2022-11-27 RX ORDER — ONDANSETRON 2 MG/ML
4 INJECTION INTRAMUSCULAR; INTRAVENOUS EVERY 6 HOURS PRN
Status: DISCONTINUED | OUTPATIENT
Start: 2022-11-27 | End: 2022-12-01 | Stop reason: HOSPADM

## 2022-11-27 RX ORDER — PHENAZOPYRIDINE HYDROCHLORIDE 100 MG/1
100 TABLET, FILM COATED ORAL 3 TIMES DAILY PRN
Status: DISCONTINUED | OUTPATIENT
Start: 2022-11-27 | End: 2022-12-01 | Stop reason: HOSPADM

## 2022-11-27 RX ORDER — TAMSULOSIN HYDROCHLORIDE 0.4 MG/1
0.4 CAPSULE ORAL DAILY
Status: DISCONTINUED | OUTPATIENT
Start: 2022-11-27 | End: 2022-11-27

## 2022-11-27 RX ORDER — BUPROPION HYDROCHLORIDE 150 MG/1
150 TABLET ORAL DAILY
Status: DISCONTINUED | OUTPATIENT
Start: 2022-11-27 | End: 2022-12-01 | Stop reason: HOSPADM

## 2022-11-27 RX ORDER — HYDROMORPHONE HCL/PF 1 MG/ML
1 SYRINGE (ML) INJECTION ONCE
Status: COMPLETED | OUTPATIENT
Start: 2022-11-27 | End: 2022-11-27

## 2022-11-27 RX ORDER — ACETAMINOPHEN 325 MG/1
975 TABLET ORAL EVERY 8 HOURS SCHEDULED
Status: DISCONTINUED | OUTPATIENT
Start: 2022-11-27 | End: 2022-12-01 | Stop reason: HOSPADM

## 2022-11-27 RX ORDER — LISINOPRIL 20 MG/1
40 TABLET ORAL DAILY
Status: DISCONTINUED | OUTPATIENT
Start: 2022-11-27 | End: 2022-11-28

## 2022-11-27 RX ORDER — OXYBUTYNIN CHLORIDE 5 MG/1
5 TABLET ORAL 3 TIMES DAILY PRN
Status: DISCONTINUED | OUTPATIENT
Start: 2022-11-27 | End: 2022-12-01 | Stop reason: HOSPADM

## 2022-11-27 RX ORDER — HYDROMORPHONE HCL/PF 1 MG/ML
1 SYRINGE (ML) INJECTION
Status: DISCONTINUED | OUTPATIENT
Start: 2022-11-27 | End: 2022-12-01 | Stop reason: HOSPADM

## 2022-11-27 RX ORDER — MAGNESIUM HYDROXIDE/ALUMINUM HYDROXICE/SIMETHICONE 120; 1200; 1200 MG/30ML; MG/30ML; MG/30ML
30 SUSPENSION ORAL EVERY 6 HOURS PRN
Status: DISCONTINUED | OUTPATIENT
Start: 2022-11-27 | End: 2022-12-01 | Stop reason: HOSPADM

## 2022-11-27 RX ORDER — AMLODIPINE BESYLATE 10 MG/1
10 TABLET ORAL DAILY
Status: DISCONTINUED | OUTPATIENT
Start: 2022-11-27 | End: 2022-12-01 | Stop reason: HOSPADM

## 2022-11-27 RX ORDER — AMOXICILLIN AND CLAVULANATE POTASSIUM 875; 125 MG/1; MG/1
1 TABLET, FILM COATED ORAL EVERY 12 HOURS SCHEDULED
Status: DISCONTINUED | OUTPATIENT
Start: 2022-11-27 | End: 2022-11-27

## 2022-11-27 RX ORDER — HYDROMORPHONE HYDROCHLORIDE 2 MG/1
2 TABLET ORAL EVERY 4 HOURS PRN
Status: DISCONTINUED | OUTPATIENT
Start: 2022-11-27 | End: 2022-12-01 | Stop reason: HOSPADM

## 2022-11-27 RX ORDER — INSULIN LISPRO 100 [IU]/ML
2-12 INJECTION, SOLUTION INTRAVENOUS; SUBCUTANEOUS
Status: DISCONTINUED | OUTPATIENT
Start: 2022-11-27 | End: 2022-12-01 | Stop reason: HOSPADM

## 2022-11-27 RX ORDER — ENOXAPARIN SODIUM 100 MG/ML
40 INJECTION SUBCUTANEOUS DAILY
Status: DISCONTINUED | OUTPATIENT
Start: 2022-11-27 | End: 2022-12-01 | Stop reason: HOSPADM

## 2022-11-27 RX ORDER — KETOROLAC TROMETHAMINE 10 MG/1
10 TABLET, FILM COATED ORAL EVERY 6 HOURS
Status: DISCONTINUED | OUTPATIENT
Start: 2022-11-27 | End: 2022-11-28

## 2022-11-27 RX ORDER — SERTRALINE HYDROCHLORIDE 100 MG/1
100 TABLET, FILM COATED ORAL DAILY
Status: DISCONTINUED | OUTPATIENT
Start: 2022-11-27 | End: 2022-12-01 | Stop reason: HOSPADM

## 2022-11-27 RX ORDER — GABAPENTIN 100 MG/1
100 CAPSULE ORAL 2 TIMES DAILY
Status: DISCONTINUED | OUTPATIENT
Start: 2022-11-27 | End: 2022-12-01

## 2022-11-27 RX ORDER — DOCUSATE SODIUM 100 MG/1
100 CAPSULE, LIQUID FILLED ORAL 2 TIMES DAILY PRN
Status: DISCONTINUED | OUTPATIENT
Start: 2022-11-27 | End: 2022-12-01 | Stop reason: HOSPADM

## 2022-11-27 RX ADMIN — HYDROMORPHONE HYDROCHLORIDE 1 MG: 1 INJECTION, SOLUTION INTRAMUSCULAR; INTRAVENOUS; SUBCUTANEOUS at 06:29

## 2022-11-27 RX ADMIN — GABAPENTIN 100 MG: 100 CAPSULE ORAL at 17:03

## 2022-11-27 RX ADMIN — METHOCARBAMOL TABLETS 750 MG: 750 TABLET, COATED ORAL at 21:34

## 2022-11-27 RX ADMIN — KETOROLAC TROMETHAMINE 15 MG: 30 INJECTION, SOLUTION INTRAMUSCULAR; INTRAVENOUS at 00:13

## 2022-11-27 RX ADMIN — KETOROLAC TROMETHAMINE 30 MG: 30 INJECTION, SOLUTION INTRAMUSCULAR; INTRAVENOUS at 11:32

## 2022-11-27 RX ADMIN — SERTRALINE 100 MG: 100 TABLET, FILM COATED ORAL at 08:57

## 2022-11-27 RX ADMIN — ACETAMINOPHEN 975 MG: 325 TABLET ORAL at 21:34

## 2022-11-27 RX ADMIN — METHOCARBAMOL TABLETS 750 MG: 750 TABLET, COATED ORAL at 06:25

## 2022-11-27 RX ADMIN — HYDROMORPHONE HYDROCHLORIDE 0.5 MG: 1 INJECTION, SOLUTION INTRAMUSCULAR; INTRAVENOUS; SUBCUTANEOUS at 00:13

## 2022-11-27 RX ADMIN — CLONIDINE HYDROCHLORIDE 0.3 MG: 0.2 TABLET ORAL at 08:57

## 2022-11-27 RX ADMIN — HYDROMORPHONE HYDROCHLORIDE 4 MG: 4 TABLET ORAL at 17:02

## 2022-11-27 RX ADMIN — LISINOPRIL 40 MG: 20 TABLET ORAL at 08:57

## 2022-11-27 RX ADMIN — ACETAMINOPHEN 975 MG: 325 TABLET ORAL at 13:13

## 2022-11-27 RX ADMIN — KETOROLAC TROMETHAMINE 10 MG: 10 TABLET, FILM COATED ORAL at 17:02

## 2022-11-27 RX ADMIN — KETOROLAC TROMETHAMINE 10 MG: 10 TABLET, FILM COATED ORAL at 23:32

## 2022-11-27 RX ADMIN — METHOCARBAMOL TABLETS 750 MG: 750 TABLET, COATED ORAL at 13:13

## 2022-11-27 RX ADMIN — HYDROMORPHONE HYDROCHLORIDE 4 MG: 4 TABLET ORAL at 21:36

## 2022-11-27 RX ADMIN — HYDROMORPHONE HYDROCHLORIDE 4 MG: 4 TABLET ORAL at 08:57

## 2022-11-27 RX ADMIN — CLONIDINE HYDROCHLORIDE 0.3 MG: 0.2 TABLET ORAL at 17:02

## 2022-11-27 RX ADMIN — ENOXAPARIN SODIUM 40 MG: 40 INJECTION SUBCUTANEOUS at 08:58

## 2022-11-27 RX ADMIN — INSULIN LISPRO 1 UNITS: 100 INJECTION, SOLUTION INTRAVENOUS; SUBCUTANEOUS at 21:33

## 2022-11-27 RX ADMIN — HYDROMORPHONE HYDROCHLORIDE 1 MG: 1 INJECTION, SOLUTION INTRAMUSCULAR; INTRAVENOUS; SUBCUTANEOUS at 02:37

## 2022-11-27 RX ADMIN — AMLODIPINE BESYLATE 10 MG: 10 TABLET ORAL at 08:57

## 2022-11-27 RX ADMIN — HYDROMORPHONE HYDROCHLORIDE 4 MG: 4 TABLET ORAL at 03:42

## 2022-11-27 RX ADMIN — HYDROMORPHONE HYDROCHLORIDE 4 MG: 4 TABLET ORAL at 13:13

## 2022-11-27 RX ADMIN — OXYBUTYNIN CHLORIDE 5 MG: 5 TABLET ORAL at 08:57

## 2022-11-27 RX ADMIN — SODIUM CHLORIDE, SODIUM GLUCONATE, SODIUM ACETATE, POTASSIUM CHLORIDE, MAGNESIUM CHLORIDE, SODIUM PHOSPHATE, DIBASIC, AND POTASSIUM PHOSPHATE 1000 ML: .53; .5; .37; .037; .03; .012; .00082 INJECTION, SOLUTION INTRAVENOUS at 00:14

## 2022-11-27 RX ADMIN — BUPROPION HYDROCHLORIDE 150 MG: 150 TABLET, FILM COATED, EXTENDED RELEASE ORAL at 08:57

## 2022-11-27 RX ADMIN — INSULIN GLARGINE 34 UNITS: 100 INJECTION, SOLUTION SUBCUTANEOUS at 08:57

## 2022-11-27 RX ADMIN — HYDROMORPHONE HYDROCHLORIDE 1 MG: 1 INJECTION, SOLUTION INTRAMUSCULAR; INTRAVENOUS; SUBCUTANEOUS at 19:38

## 2022-11-27 RX ADMIN — ACETAMINOPHEN 975 MG: 325 TABLET ORAL at 06:25

## 2022-11-27 RX ADMIN — GABAPENTIN 100 MG: 100 CAPSULE ORAL at 11:32

## 2022-11-27 RX ADMIN — ATORVASTATIN CALCIUM 10 MG: 10 TABLET, FILM COATED ORAL at 08:57

## 2022-11-27 RX ADMIN — TAMSULOSIN HYDROCHLORIDE 0.4 MG: 0.4 CAPSULE ORAL at 08:57

## 2022-11-27 NOTE — PHYSICAL THERAPY NOTE
Physical Therapy Cancellation Note         11/27/22 0831   PT Last Visit   PT Visit Date 11/27/22   Note Type   Note type Evaluation   Cancel Reasons Refusal   Additional Comments secondary to pain     PT consult received  Chart reviewed  Arrived to see patient for evaluation and assisted with use of urinal in supine however patient refusing mobility secondary to pain  PT will continue to follow on caseload and perform initial evaluation as medically appropriate        ANUJ ROBERTSON PT, DPT

## 2022-11-27 NOTE — ED PROVIDER NOTES
History  Chief Complaint   Patient presents with   • Flank Pain     Pt brought in by ems ,was discharged today and had stent placed on Thursday for a 12 mm kidney stone and now Is having excruciating pain unable to sit down      HPI     40-year-old male with past medical history 12 mm right-sided obstructing kidney stone status post stent placement 2 days ago presents for evaluation of right-sided flank pain  Patient has been having right-sided flank pain for several days  He was recently admitted to the hospital and found to have a 12 mm obstructing stone  He had a stent placed 2 days ago  He was discharged from the hospital earlier today  He states pain was controlled at the time of discharge  Patient started to have worsening pain this evening  He states pain was so severe that he felt like he was going to pass out  Patient denies fevers or chills  Denies chest pain, shortness of breath, or cough  Denies abdominal pain, nausea, vomiting, or diarrhea  Denies any urinary symptoms  Prior to Admission Medications   Prescriptions Last Dose Informant Patient Reported? Taking?    BD Pen Needle Alicia 2nd Gen 32G X 4 MM MISC   No No   Sig: INJECT SUBCUTANEOUSLY DAILY   Dulaglutide 4 5 MG/0 5ML SOPN   No No   Sig: Inject 0 5 mL (4 5 mg total) under the skin once a week   HYDROmorphone (DILAUDID) 2 mg tablet   No No   Sig: Take 1 tablet (2 mg total) by mouth every 4 (four) hours as needed for moderate pain Take 2 tablets (4 mg total) by mouth every 4 (four) hours as needed for severe pain Max Daily Amount: 12 mg   Insulin Glargine Solostar (Basaglar KwikPen) 100 UNIT/ML SOPN   No No   Sig: Inject 0 34 mL (34 Units total) under the skin daily   Lidocaine Viscous HCl (XYLOCAINE) 2 % mucosal solution   No No   Sig: Swish and spit 15 mL 4 (four) times a day as needed for mouth pain or discomfort   acetaminophen (TYLENOL) 325 mg tablet   No No   Sig: Take 3 tablets (975 mg total) by mouth every 8 (eight) hours amLODIPine (NORVASC) 10 mg tablet   No No   Sig: TAKE 1 TABLET DAILY   atorvastatin (LIPITOR) 10 mg tablet   No No   Sig: TAKE 1 TABLET DAILY   buPROPion (WELLBUTRIN XL) 150 mg 24 hr tablet   No No   Sig: TAKE 1 TABLET DAILY   cloNIDine (CATAPRES) 0 3 mg tablet   No No   Sig: TAKE 1 TABLET TWICE A DAY   enalapril (VASOTEC) 20 mg tablet   No No   Sig: TAKE 1 TABLET TWICE A DAY   glimepiride (AMARYL) 4 mg tablet   No No   Sig: TAKE 1 TABLET TWICE A DAY   methocarbamol (ROBAXIN) 750 mg tablet   No No   Sig: Take 1 tablet (750 mg total) by mouth every 8 (eight) hours   nystatin (MYCOSTATIN) powder   No No   Sig: Apply topically 3 (three) times a day as needed (groin rash)   oxybutynin (DITROPAN) 5 mg tablet   No No   Sig: Take 1 tablet (5 mg total) by mouth 3 (three) times a day as needed (bladder spasms, stent irritation)   phenazopyridine (PYRIDIUM) 100 mg tablet   No No   Sig: Take 1 tablet (100 mg total) by mouth 3 (three) times a day as needed (Burning with urination due to stent irritation)   sertraline (ZOLOFT) 100 mg tablet   No No   Sig: TAKE 1 TABLET DAILY   tamsulosin (FLOMAX) 0 4 mg   No No   Sig: TAKE 1 CAPSULE DAILY      Facility-Administered Medications: None       Past Medical History:   Diagnosis Date   • Acute renal failure (ARF) ([facility])     last assessed - [id number]   • Chest pain     last assessed - [id number]   • CPAP (continuous positive airway pressure) dependence    • Depression    • Diabetes mellitus (HCC)    • Dyspnea on exertion     last assessed - [id number]   • Hypertension    • Nephrolithiasis    • Numbness of right foot     numbness on the sole of the right foot; since he was young d/t stenosis   • Obesity    • Onychomycosis     last assessed - [id number]   • JEFF on CPAP    • Sciatica     last assessed - [id number]   • Sleep apnea        Past Surgical History:   Procedure Laterality Date   • COLONOSCOPY     • CYSTOSCOPY W/ LASER LITHOTRIPSY     • FL RETROGRADE PYELOGRAM  11/24/2022   • KNEE ARTHROSCOPY Left 11/2013   • GA CYSTOURETHROSCOPY,URETER CATHETER Right 11/24/2022    Procedure: CYSTOSCOPY RETROGRADE PYELOGRAM WITH INSERTION STENT URETERAL;  Surgeon: Sheri Fox MD;  Location: [facility];  Service: Urology   • GA LAP,CHOLECYSTECTOMY N/A 2/8/2019    Procedure: ROBOTIC ASSISTED LAPAROSCOPIC CHOLECYSTECTOMY;  Surgeon: Sherri Currie DO;  Location: [facility];  Service: General   • PROSTATE BIOPSY      Needle Biopsy - Negative       Family History   Adopted: Yes   Problem Relation Age of Onset   • Alzheimer's disease Mother    • Alcohol abuse Neg Hx    • Drug abuse Neg Hx    • Depression Neg Hx    • Substance Abuse Neg Hx    • Mental illness Neg Hx      I have reviewed and agree with the history as documented  E-Cigarette/Vaping   • E-Cigarette Use Never User      E-Cigarette/Vaping Substances     Social History     Tobacco Use   • Smoking status: Some Days     Types: Cigars   • Smokeless tobacco: Never   • Tobacco comments:     3 cigars per week   Vaping Use   • Vaping Use: Never used   Substance Use Topics   • Alcohol use: Never   • Drug use: Never        Review of Systems   Constitutional: Negative for appetite change, chills and fever  HENT: Negative for congestion, rhinorrhea and sore throat  Respiratory: Negative for cough and shortness of breath  Cardiovascular: Negative for chest pain  Gastrointestinal: Negative for abdominal pain, diarrhea, nausea and vomiting  Genitourinary: Positive for flank pain  Negative for dysuria, frequency, hematuria and urgency  Musculoskeletal: Negative for arthralgias and myalgias  Skin: Negative for rash  Neurological: Negative for dizziness, weakness, light-headedness, numbness and headaches  All other systems reviewed and are negative        Physical Exam  ED Triage Vitals   Temperature Pulse Respirations Blood Pressure SpO2   11/26/22 2227 11/26/22 2142 11/26/22 2142 11/26/22 2142 11/26/22 2142   97 7 °F (36 5 °C) (!) 53 (!) 25 159/87 98 %      Temp Source Heart Rate Source Patient Position - Orthostatic VS BP Location FiO2 (%)   11/26/22 2227 11/26/22 2142 11/26/22 2142 11/26/22 2142 --   Oral Monitor Lying Right arm       Pain Score       11/26/22 2142       10 - Worst Possible Pain             Orthostatic Vital Signs  Vitals:    11/27/22 1530 11/27/22 2230 11/28/22 0714 11/28/22 0816   BP: 160/92 159/85 (!) 174/102 146/92   Pulse:  68 69    Patient Position - Orthostatic VS:    Lying       Physical Exam  Vitals and nursing note reviewed  Constitutional:       Appearance: Normal appearance  He is well-developed  He is obese  He is not ill-appearing, toxic-appearing or diaphoretic  Comments: Appears uncomfortable  HENT:      Head: Normocephalic and atraumatic  Right Ear: External ear normal       Left Ear: External ear normal       Nose: Nose normal       Mouth/Throat:      Mouth: Mucous membranes are moist       Pharynx: Oropharynx is clear  Eyes:      Extraocular Movements: Extraocular movements intact  Conjunctiva/sclera: Conjunctivae normal    Cardiovascular:      Rate and Rhythm: Regular rhythm  Tachycardia present  Pulses: Normal pulses  Heart sounds: Normal heart sounds  No murmur heard  No friction rub  No gallop  Pulmonary:      Effort: Pulmonary effort is normal  No respiratory distress  Breath sounds: Normal breath sounds  No wheezing or rales  Abdominal:      General: There is no distension  Palpations: Abdomen is soft  Tenderness: There is no abdominal tenderness  There is right CVA tenderness  There is no guarding or rebound  Musculoskeletal:         General: No tenderness  Cervical back: Neck supple  Right lower leg: No edema  Left lower leg: No edema  Skin:     General: Skin is warm and dry  Coloration: Skin is not pale  Findings: No rash  Neurological:      General: No focal deficit present        Mental Status: He is alert and oriented to person, place, and time  Cranial Nerves: No cranial nerve deficit  Sensory: No sensory deficit  Motor: No weakness     Psychiatric:         Mood and Affect: Mood normal          Behavior: Behavior normal          ED Medications  Medications   acetaminophen (TYLENOL) tablet 975 mg (975 mg Oral Given 11/28/22 1414)   amLODIPine (NORVASC) tablet 10 mg (10 mg Oral Given 11/28/22 0849)   atorvastatin (LIPITOR) tablet 10 mg (10 mg Oral Given 11/28/22 0850)   buPROPion (WELLBUTRIN XL) 24 hr tablet 150 mg (150 mg Oral Given 11/28/22 0849)   cloNIDine (CATAPRES) tablet 0 3 mg (0 3 mg Oral Given 11/28/22 0849)   lisinopril (ZESTRIL) tablet 40 mg (40 mg Oral Given 11/28/22 0849)   HYDROmorphone (DILAUDID) tablet 2 mg (has no administration in time range)   HYDROmorphone (DILAUDID) tablet 4 mg (4 mg Oral Given 11/27/22 2136)   HYDROmorphone (DILAUDID) injection 1 mg (1 mg Intravenous Given 11/27/22 1938)   insulin glargine (LANTUS) subcutaneous injection 34 Units 0 34 mL (34 Units Subcutaneous Given 11/28/22 0856)   methocarbamol (ROBAXIN) tablet 750 mg (750 mg Oral Given 11/28/22 1414)   nystatin (MYCOSTATIN) powder (has no administration in time range)   oxybutynin (DITROPAN) tablet 5 mg (5 mg Oral Given 11/27/22 0857)   phenazopyridine (PYRIDIUM) tablet 100 mg (has no administration in time range)   sertraline (ZOLOFT) tablet 100 mg (100 mg Oral Given 11/28/22 0850)   nicotine (NICODERM CQ) 21 mg/24 hr TD 24 hr patch 1 patch (1 patch Transdermal Not Given 11/28/22 0850)   aluminum-magnesium hydroxide-simethicone (MYLANTA) oral suspension 30 mL (has no administration in time range)   ondansetron (ZOFRAN) injection 4 mg (has no administration in time range)   docusate sodium (COLACE) capsule 100 mg (has no administration in time range)   enoxaparin (LOVENOX) subcutaneous injection 40 mg (40 mg Subcutaneous Given 11/28/22 0849)   insulin lispro (HumaLOG) 100 units/mL subcutaneous injection 2-12 Units (2 Units Subcutaneous Given 11/28/22 1149)   insulin lispro (HumaLOG) 100 units/mL subcutaneous injection 1-5 Units (1 Units Subcutaneous Given 11/27/22 2133)   gabapentin (NEURONTIN) capsule 100 mg (100 mg Oral Given 11/28/22 0849)   tamsulosin (FLOMAX) capsule 0 8 mg (0 8 mg Oral Given 11/28/22 0849)   HYDROmorphone (DILAUDID) injection 0 5 mg (0 5 mg Intravenous Given 11/26/22 2228)   ketorolac (TORADOL) injection 15 mg (15 mg Intravenous Given 11/27/22 0013)   HYDROmorphone (DILAUDID) injection 0 5 mg (0 5 mg Intravenous Given 11/27/22 0013)   multi-electrolyte (ISOLYTE-S PH 7 4) bolus 1,000 mL (0 mL Intravenous Stopped 11/27/22 0240)   HYDROmorphone (DILAUDID) injection 1 mg (1 mg Intravenous Given 11/27/22 0237)   ketorolac (TORADOL) injection 30 mg (30 mg Intravenous Given 11/27/22 1132)       Diagnostic Studies  Results Reviewed     Procedure Component Value Units Date/Time    Comprehensive metabolic panel [[id number]]  (Abnormal) Collected: 11/28/22 0447    Lab Status: Final result Specimen: Blood from Arm, Left Updated: 11/28/22 0607     Sodium 137 mmol/L      Potassium 3 7 mmol/L      Chloride 105 mmol/L      CO2 26 mmol/L      ANION GAP 6 mmol/L      BUN 20 mg/dL      Creatinine 1 38 mg/dL      Glucose 165 mg/dL      Calcium 8 8 mg/dL      Corrected Calcium 9 7 mg/dL      AST 56 U/L      ALT 83 U/L      Alkaline Phosphatase 66 U/L      Total Protein 7 2 g/dL      Albumin 2 9 g/dL      Total Bilirubin 0 91 mg/dL      eGFR 54 ml/min/1 73sq m     Narrative:      [place] guidelines for Chronic Kidney Disease (CKD):   •  Stage 1 with normal or high GFR (GFR > 90 mL/min/1 73 square meters)  •  Stage 2 Mild CKD (GFR = 60-89 mL/min/1 73 square meters)  •  Stage 3A Moderate CKD (GFR = 45-59 mL/min/1 73 square meters)  •  Stage 3B Moderate CKD (GFR = 30-44 mL/min/1 73 square meters)  •  Stage 4 Severe CKD (GFR = 15-29 mL/min/1 73 square meters)  •  Stage 5 End Stage CKD (GFR <15 mL/min/1 73 square meters)  Note: GFR calculation is accurate only with a steady state creatinine    Magnesium [[id number]]  (Normal) Collected: 11/28/22 0447    Lab Status: Final result Specimen: Blood from Arm, Left Updated: 11/28/22 0607     Magnesium 2 2 mg/dL     CBC and differential [[id number]] Collected: 11/28/22 0447    Lab Status: Final result Specimen: Blood from Arm, Left Updated: 11/28/22 0541     WBC 7 18 Thousand/uL      RBC 5 06 Million/uL      Hemoglobin 15 2 g/dL      Hematocrit 46 4 %      MCV 92 fL      MCH 30 0 pg      MCHC 32 8 g/dL      RDW 13 2 %      MPV 9 5 fL      Platelets 380 Thousands/uL      nRBC 0 /100 WBCs      Neutrophils Relative 58 %      Immat GRANS % 0 %      Lymphocytes Relative 26 %      Monocytes Relative 10 %      Eosinophils Relative 5 %      Basophils Relative 1 %      Neutrophils Absolute 4 13 Thousands/µL      Immature Grans Absolute 0 03 Thousand/uL      Lymphocytes Absolute 1 86 Thousands/µL      Monocytes Absolute 0 73 Thousand/µL      Eosinophils Absolute 0 37 Thousand/µL      Basophils Absolute 0 06 Thousands/µL     Urine Microscopic [[id number]]  (Abnormal) Collected: 11/27/22 0022    Lab Status: Final result Specimen: Urine, Clean Catch Updated: 11/27/22 0342     RBC, UA Innumerable /hpf      WBC, UA 1-2 /hpf      Epithelial Cells Occasional /hpf      Bacteria, UA Occasional /hpf     UA w Reflex to Microscopic w Reflex to Culture [[id number]]  (Abnormal) Collected: 11/27/22 0022    Lab Status: Final result Specimen: Urine, Clean Catch Updated: 11/27/22 0334     Color, UA Dark Yellow     Clarity, UA Clear     Specific Gravity, UA 1 014     pH, UA 6 5     Leukocytes, UA Negative     Nitrite, UA Positive     Protein, UA 50 (1+) mg/dl      Glucose, UA Negative mg/dl      Ketones, UA Negative mg/dl      Urobilinogen, UA <2 0 mg/dl      Bilirubin, UA Negative     Occult Blood, UA Moderate    Fingerstick Glucose (POCT) [[id number]]  (Abnormal) Collected: 11/27/22 0305    Lab Status: Final result Updated: 11/27/22 0306     POC Glucose 155 mg/dl     Basic metabolic panel [[id number]] Collected: 11/26/22 2228    Lab Status: Final result Specimen: Blood from Arm, Left Updated: 11/26/22 2314     Sodium 138 mmol/L      Potassium 3 6 mmol/L      Chloride 108 mmol/L      CO2 23 mmol/L      ANION GAP 7 mmol/L      BUN 19 mg/dL      Creatinine 1 14 mg/dL      Glucose 120 mg/dL      Calcium 9 7 mg/dL      eGFR 68 ml/min/1 73sq m     Narrative:      [place] guidelines for Chronic Kidney Disease (CKD):   •  Stage 1 with normal or high GFR (GFR > 90 mL/min/1 73 square meters)  •  Stage 2 Mild CKD (GFR = 60-89 mL/min/1 73 square meters)  •  Stage 3A Moderate CKD (GFR = 45-59 mL/min/1 73 square meters)  •  Stage 3B Moderate CKD (GFR = 30-44 mL/min/1 73 square meters)  •  Stage 4 Severe CKD (GFR = 15-29 mL/min/1 73 square meters)  •  Stage 5 End Stage CKD (GFR <15 mL/min/1 73 square meters)  Note: GFR calculation is accurate only with a steady state creatinine    CBC and differential [[id number]] Collected: 11/26/22 2228    Lab Status: Final result Specimen: Blood from Arm, Left Updated: 11/26/22 2251     WBC 9 68 Thousand/uL      RBC 5 37 Million/uL      Hemoglobin 16 1 g/dL      Hematocrit 47 9 %      MCV 89 fL      MCH 30 0 pg      MCHC 33 6 g/dL      RDW 13 1 %      MPV 10 0 fL      Platelets 539 Thousands/uL      nRBC 0 /100 WBCs      Neutrophils Relative 70 %      Immat GRANS % 0 %      Lymphocytes Relative 18 %      Monocytes Relative 9 %      Eosinophils Relative 2 %      Basophils Relative 1 %      Neutrophils Absolute 6 79 Thousands/µL      Immature Grans Absolute 0 03 Thousand/uL      Lymphocytes Absolute 1 71 Thousands/µL      Monocytes Absolute 0 89 Thousand/µL      Eosinophils Absolute 0 19 Thousand/µL      Basophils Absolute 0 07 Thousands/µL                  CT renal stone study abdomen pelvis wo contrast   Final Result by Bhakti Carter MD (11/26 8924)      Stable position of 11 mm stone at the right proximal ureter following interval ureteral stent placement  Findings consistent with mild acute uncomplicated sigmoid diverticulitis      Nonobstructing left nephrolithiasis         Workstation performed: [id number]               Procedures  Procedures      ED Course                                       MDM     80-year-old male with past medical history 12 mm right-sided obstructing kidney stone status post stent placement 2 days ago presents for evaluation of right-sided flank pain  Same pain patient has been having for the past few days  Will check Cbc, BMP, UA  wll obtain CT renal stone study to evaluate for stent position  Will treat with dilaudid and reassess      Reviewed labs, no marked abnormalities  Patient still having pain, will give additional dose of dilaudid and toradol  Patent still having pain after second dose of pain meds, will give another dose of pain meds and admit to the hospital for intractable pain  Discussed with SLIM for admission  Disposition  Final diagnoses:   Right flank pain   Renal colic     Time reflects when diagnosis was documented in both MDM as applicable and the Disposition within this note     Time User Action Codes Description Comment    11/27/2022  2:37 AM Laverne Scott S Add [N20 1] Calculus of ureter     11/27/2022  2:39 AM Dora Mylar Add [R10 9] Right flank pain     11/27/2022  2:39 AM Dora Mylar Add [E50] Renal colic       ED Disposition     ED Disposition   Admit    Condition   Stable    Date/Time   Sun Nov 27, 2022  2:39 AM    Comment   Case was discussed with PHONG and the patient's admission status was agreed to be Admission Status: observation status to the service of Dr Jean [place]             Follow-up Information    None         Current Discharge Medication List      CONTINUE these medications which have NOT CHANGED    Details   acetaminophen (TYLENOL) 325 mg tablet Take 3 tablets (975 mg total) by mouth every 8 (eight) hours  Qty: 100 tablet, Refills: 0    Associated Diagnoses: Intractable low back pain      amLODIPine (NORVASC) 10 mg tablet TAKE 1 TABLET DAILY  Qty: 90 tablet, Refills: 1    Associated Diagnoses: Essential hypertension      atorvastatin (LIPITOR) 10 mg tablet TAKE 1 TABLET DAILY  Qty: 90 tablet, Refills: 1    Associated Diagnoses: Mixed hyperlipidemia      BD Pen Needle Alicia 2nd Gen 32G X 4 MM MISC INJECT SUBCUTANEOUSLY DAILY  Qty: 100 each, Refills: 0    Associated Diagnoses: Type 2 diabetes mellitus with diabetic neuropathy, without long-term current use of insulin ([facility])      buPROPion (WELLBUTRIN XL) 150 mg 24 hr tablet TAKE 1 TABLET DAILY  Qty: 90 tablet, Refills: 1    Associated Diagnoses: Moderate episode of recurrent major depressive disorder ([facility])      cloNIDine (CATAPRES) 0 3 mg tablet TAKE 1 TABLET TWICE A DAY  Qty: 180 tablet, Refills: 0    Associated Diagnoses: Essential hypertension      Dulaglutide 4 5 MG/0 5ML SOPN Inject 0 5 mL (4 5 mg total) under the skin once a week  Qty: 6 mL, Refills: 1    Associated Diagnoses: Type 2 diabetes mellitus with diabetic neuropathy, without long-term current use of insulin ([facility])      enalapril (VASOTEC) 20 mg tablet TAKE 1 TABLET TWICE A DAY  Qty: 180 tablet, Refills: 1    Associated Diagnoses: Type 2 diabetes mellitus with diabetic neuropathy, without long-term current use of insulin ([address] );  Essential hypertension      glimepiride (AMARYL) 4 mg tablet TAKE 1 TABLET TWICE A DAY  Qty: 180 tablet, Refills: 0    Associated Diagnoses: Type 2 diabetes mellitus with diabetic neuropathy, without long-term current use of insulin ([facility])      HYDROmorphone (DILAUDID) 2 mg tablet Take 1 tablet (2 mg total) by mouth every 4 (four) hours as needed for moderate pain Take 2 tablets (4 mg total) by mouth every 4 (four) hours as needed for severe pain Max Daily Amount: 12 mg  Qty: 30 tablet, Refills: 0    Associated Diagnoses: Intractable low back pain      Insulin Glargine Solostar (Basaglar KwikPen) 100 UNIT/ML SOPN Inject 0 34 mL (34 Units total) under the skin daily  Qty: 30 mL, Refills: 1    Associated Diagnoses: Type 2 diabetes mellitus with diabetic neuropathy, without long-term current use of insulin (HCC)      Lidocaine Viscous HCl (XYLOCAINE) 2 % mucosal solution Swish and spit 15 mL 4 (four) times a day as needed for mouth pain or discomfort  Qty: 100 mL, Refills: 0    Associated Diagnoses: COVID-19 virus infection      methocarbamol (ROBAXIN) 750 mg tablet Take 1 tablet (750 mg total) by mouth every 8 (eight) hours  Qty: 45 tablet, Refills: 0    Associated Diagnoses: Intractable low back pain      nystatin (MYCOSTATIN) powder Apply topically 3 (three) times a day as needed (groin rash)  Qty: 60 g, Refills: 1    Associated Diagnoses: Candidiasis      oxybutynin (DITROPAN) 5 mg tablet Take 1 tablet (5 mg total) by mouth 3 (three) times a day as needed (bladder spasms, stent irritation)  Qty: 30 tablet, Refills: 2    Associated Diagnoses: Ureterolithiasis      phenazopyridine (PYRIDIUM) 100 mg tablet Take 1 tablet (100 mg total) by mouth 3 (three) times a day as needed (Burning with urination due to stent irritation)  Qty: 10 tablet, Refills: 0    Associated Diagnoses: Ureterolithiasis      sertraline (ZOLOFT) 100 mg tablet TAKE 1 TABLET DAILY  Qty: 90 tablet, Refills: 0    Associated Diagnoses: Depression with anxiety      tamsulosin (FLOMAX) 0 4 mg TAKE 1 CAPSULE DAILY  Qty: 90 capsule, Refills: 1    Associated Diagnoses: BPH without urinary obstruction           No discharge procedures on file  PDMP Review       Value Time User    PDMP Reviewed  Yes 11/25/2022  9:00 AM Aislinn Oneill MD           ED Provider  Attending physically available and evaluated Pauly Cespedes I managed the patient along with the ED Attending      Electronically Signed by         Darion Beltre MD  11/28/22 6602

## 2022-11-27 NOTE — H&P
[facility]  H&P- [place] Marielos 1960, 58 y o  male MRN: [id number]  Unit/Bed#: ED 24 Encounter: [id number]  Primary Care Provider: Shiraz Moe MD   Date and time admitted to hospital: 11/26/2022  9:36 PM    * Costovertebral angle tenderness  Assessment & Plan  Status post stent placement  Urology plans to have further intervention in 1 month's time  Patient's costovertebral angle tenderness recurrence 6 hours after being discharged  Currently unable to move  Dilaudid 1 mg every 3 hours as needed for breakthrough pain; continue Dilaudid out patient regimen at 2 mg every 4 hours as needed for moderate pain and 4 mg as needed for severe pain  Case management  Physical and occupational therapy consult for ambulatory dysfunction associated with right CVA tenderness  Urology consult  Right hydroureteronephrosis with obstructing calculus  Assessment & Plan  Status post stenting by Urology  With continued pain; will ask further urology opinion in this in hospital stay  Diabetes mellitus type 2 in [facility])  Assessment & Plan  Lab Results   Component Value Date    HGBA1C 10 1 (H) 10/16/2022   Continue sliding scale  Continue Lantus  Accu-Cheks per protocol  Recent Labs     11/25/22  1633 11/25/22  2130 11/26/22  0751 11/26/22  1120   POCGLU 154* 141* 104 152*       Blood Sugar Average: Last 72 hrs:      CKD (chronic kidney disease) stage 3  Assessment & Plan  Lab Results   Component Value Date    EGFR 68 11/26/2022    EGFR 55 11/26/2022    EGFR 53 11/25/2022    CREATININE 1 14 11/26/2022    CREATININE 1 36 (H) 11/26/2022    CREATININE 1 39 (H) 11/25/2022   Continue to monitor creatinine  Currently improved from previous  Essential hypertension  Assessment & Plan  On Norvasc, clonidine and lisinopril  Mixed hyperlipidemia  Assessment & Plan  Continue Lipitor 10 mg daily            VTE Prophylaxis: Enoxaparin (Lovenox)  / sequential compression device   Code Status: Level 1 - Full Code   POLST: There is no POLST form on file for this patient (pre-hospital)    Anticipated Length of Stay:  Patient will be admitted on an Observation basis with an anticipated length of stay of  less than 2 midnights  Justification for Hospital Stay: Please see detailed plans noted above  Stay essentially for pain control and urology consult  Patient is status post stenting  Chief Complaint:     Recurrence of right CVA tenderness and lower back pain  History of Present Illness:  Gaurang Kendrick is a 58 y o  male who has past medical history significant for recent admission for right ureterolithiasis with hydronephrosis  Patient is status post stenting  He also has diabetes mellitus, hypertension, obesity, hyperlipidemia, and depression  Patient comes to the emergency room this evening complaining of recurrence of right CVA tenderness  Please review the patient's history and course of events in hospital for this condition  Essentially, the patient was doing quite well pain wise and therefore was able to go home  However within 6 hours of discharge, there was recurrence of right costovertebral angle tenderness with radiation towards the right back  Patient states that each time that he moves the pain is excruciating  Patient does not have any fever or chills  CT scan of the abdomen reveals the presence of the same stone with stent in place  Likewise BUN creatinine is improved from previous  Currently, patient is lying flat on bed and unable to move much due to the excruciating pain  Also with movement of the leg, there is increasing pain at the lower back with no radiation towards leg      Review of Systems:    Constitutional:  Denies fever or chills   Eyes:  Denies change in visual acuity   HENT:  Denies nasal congestion or sore throat   Respiratory:  Denies cough or shortness of breath   Cardiovascular:  Denies chest pain or edema   GI:  Denies abdominal pain, nausea, vomiting, bloody stools or diarrhea   :  Denies dysuria   Musculoskeletal:  Denies back pain or joint pain   Integument:  Denies rash   Neurologic:  Denies headache, focal weakness or sensory changes   Endocrine:  Denies polyuria or polydipsia   Psychiatric:  Denies depression or anxiety     Past Medical and Surgical History:   Past Medical History:   Diagnosis Date   • [facility]     last assessed - [id number]   • Chest pain     last assessed - [id number]   • CPAP (continuous positive airway pressure) dependence    • Depression    • Diabetes mellitus ([facility] )    • Dyspnea on exertion     last assessed - [id number]   • Hypertension    • Nephrolithiasis    • Numbness of right foot     numbness on the sole of the right foot; since he was young d/t stenosis   • Obesity    • Onychomycosis     last assessed - [id number]   • JEFF on CPAP    • Sciatica     last assessed - [id number]   • Sleep apnea      Past Surgical History:   Procedure Laterality Date   • COLONOSCOPY     • CYSTOSCOPY W/ LASER LITHOTRIPSY     • FL RETROGRADE PYELOGRAM  11/24/2022   • KNEE ARTHROSCOPY Left 11/2013   • WI CYSTOURETHROSCOPY,URETER CATHETER Right 11/24/2022    Procedure: CYSTOSCOPY RETROGRADE PYELOGRAM WITH INSERTION STENT URETERAL;  Surgeon: Sakina Bruce MD;  Location: [facility];  Service: Urology   • WI LAP,CHOLECYSTECTOMY N/A 2/8/2019    Procedure: ROBOTIC ASSISTED LAPAROSCOPIC CHOLECYSTECTOMY;  Surgeon: Merline Terry DO;  Location: [facility];  Service: General   • PROSTATE BIOPSY      Needle Biopsy - Negative       Meds/Allergies:  (Not in a hospital admission)      Allergies: No Known Allergies  History:  Marital Status: /Civil Union   Occupation:   Patient Pre-hospital Living Situation:  Lives at home  Patient Pre-hospital Level of Mobility:  Ambulatory normally  Patient Pre-hospital Diet Restrictions:  Diabetic cardiac  Substance Use History:   Social History     Substance and Sexual Activity   Alcohol Use Yes     Social History     Tobacco Use   Smoking Status Some Days   • Types: Cigars   Smokeless Tobacco Never   Tobacco Comments    3 cigars per week     Social History     Substance and Sexual Activity   Drug Use No       Family History:  Family History   Adopted: Yes   Problem Relation Age of Onset   • Alzheimer's disease Mother    • Alcohol abuse Neg Hx    • Drug abuse Neg Hx    • Depression Neg Hx    • Substance Abuse Neg Hx    • Mental illness Neg Hx        Physical Exam:     Vitals:   Blood Pressure: (!) 183/80 (11/27/22 0015)  Pulse: 78 (11/27/22 0015)  Temperature: 97 7 °F (36 5 °C) (11/26/22 2227)  Temp Source: Oral (11/26/22 2227)  Respirations: 15 (11/27/22 0015)  SpO2: 94 % (11/27/22 0015)    Constitutional:  Well developed, well nourished, no acute distress, non-toxic appearance   Eyes:  PERRL, conjunctiva normal   HENT:  Atraumatic, external ears normal, nose normal, oropharynx moist, no pharyngeal exudates  Neck- normal range of motion, no tenderness, supple   Respiratory:  No respiratory distress, normal breath sounds, no rales, no wheezing   Cardiovascular:  Normal rate, normal rhythm, no murmurs, no gallops, no rubs   GI:  Soft, nondistended, normal bowel sounds, nontender, no organomegaly, no mass, no rebound, no guarding   :  Right costovertebral angle tenderness with radiation towards lower back  Musculoskeletal:  No edema, no tenderness, no deformities  Back- no tenderness  Integument:  Well hydrated, no rash   Lymphatic:  No lymphadenopathy noted   Neurologic:  Alert &awake, communicative, CN 2-12 normal, normal motor function, normal sensory function, no focal deficits noted   Psychiatric:  Speech and behavior appropriate but anxious       Lab Results: I have personally reviewed pertinent reports        Results from last 7 days   Lab Units 11/26/22 2228   WBC Thousand/uL 9 68   HEMOGLOBIN g/dL 16 1   HEMATOCRIT % 47 9   PLATELETS Thousands/uL 289   NEUTROS PCT % 70 LYMPHS PCT % 18   MONOS PCT % 9   EOS PCT % 2     Results from last 7 days   Lab Units 11/26/22  2228   POTASSIUM mmol/L 3 6   CHLORIDE mmol/L 108   CO2 mmol/L 23   BUN mg/dL 19   CREATININE mg/dL 1 14   CALCIUM mg/dL 9 7               Imaging: I have personally reviewed pertinent reports  XR spine lumbar 2 or 3 views injury    Result Date: 11/22/2022  Narrative: LUMBAR SPINE INDICATION:   pain  COMPARISON:  None VIEWS:  XR SPINE LUMBAR 2 OR 3 VIEWS INJURY FINDINGS: There are 5 non rib bearing lumbar vertebral bodies  There is no evidence of acute fracture or destructive osseous lesion  Alignment is unremarkable  There are flowing anterior osteophytes in the upper lumbar spine through L4 with preserved disc spaces, likely DISH  There is disc space narrowing at L4-5 and L5-S1  The pedicles appear intact  Soft tissues are unremarkable  Impression: Lower lumbar disc space narrowing with flowing osteophytes from L1 through L4 suggestive of DISH  Workstation performed: [id number]     XR hip/pelv 2-3 vws right if performed    Result Date: 11/22/2022  Narrative: RIGHT HIP INDICATION:   pain  COMPARISON:  9/6/2020 VIEWS:  XR HIP/PELV 2-3 VWS RIGHT W PELVIS IF PERFORMED FINDINGS: There is no acute fracture or dislocation  No significant hip degenerative changes  No lytic or blastic osseous lesion  There is a small focus of gas overlying the right inguinal region  Please see concurrent lumbar spine plain film report for evaluation of the lumbar spine  Impression: 1  Unremarkable right hip  2   Small focus of gas overlying the right inguinal region of uncertain etiology  This could represent a bowel-containing hernia or soft tissue gas  Clinical correlation is recommended  This would be better visualized with CT if clinically indicated  The study was marked in [facility] for immediate notification   Workstation performed: [id number]     XR abdomen 1 view kub    Result Date: 11/21/2022  Narrative: Ranulfo Londono INDICATION:   back pain  COMPARISON:  6/16/2018 VIEWS:  AP supine FINDINGS: There is a nonobstructive bowel gas pattern  No discernible free air on this supine study  Upright or left lateral decubitus imaging is more sensitive to detect subtle free air in the appropriate setting  No pathologic calcifications or soft tissue masses  Visualized lung bases are clear  Visualized osseous structures are unremarkable for the patient's age  Impression: Unremarkable abdomen  Workstation performed: [id number]     FL retrograde pyelogram    Result Date: 11/25/2022  Narrative: RIGHT RETROGRADE PYELOGRAM INDICATION:   nephrolithiasis  COMPARISON: 20/2/2022 IMAGES:  6 FLUOROSCOPY TIME:   6 5 seconds CONTRAST: 4 mL of iohexol (OMNIPAQUE) FINDINGS: Imaging guidance was provided during the urological procedure and deployment of a right ureteral stent    Osseous and soft tissue detail limited by technique  Impression: Fluoroscopic guidance provided for right retrograde pyelogram  Please see procedure report for further details  Workstation performed: [id number]     CT renal stone study abdomen pelvis wo contrast    Result Date: 11/26/2022  Narrative: CT ABDOMEN AND PELVIS WITHOUT IV CONTRAST - LOW DOSE RENAL STONE INDICATION:   Flank pain, kidney stone suspected flank pain  COMPARISON:  None  TECHNIQUE:  Low radiation dose thin section CT examination of the abdomen and pelvis was performed without intravenous or oral contrast according to a protocol specifically designed to evaluate for urinary tract calculus  Axial, sagittal, and coronal 2D  reformatted images were created from the source data and submitted for interpretation  Evaluation for pathology in the abdomen and pelvis that is unrelated to urinary tract calculi is limited  Radiation dose length product (DLP) for this visit:  1411 28 mGy-cm     This examination, like all CT scans performed in the [facility], was performed utilizing techniques to minimize radiation dose exposure, including the use of iterative reconstruction and automated exposure control  URINARY TRACT FINDINGS: RIGHT KIDNEY AND URETER:  11 mm on the right proximal ureter is unchanged in position following interval ureteral stent placement, at approximately the level of the L4-L5 disc space  One or more simple appearing renal cyst(s) is identified  LEFT KIDNEY AND URETER: There are nonobstructing intrarenal calculi measuring on the order of 4 mm at the lower pole  No hydronephrosis or hydroureter  One or more simple and hemorrhagic appearing renal cyst(s) is identified  URINARY BLADDER:  Unremarkable  ADDITIONAL FINDINGS: LOWER CHEST:  No clinically significant abnormality identified in the visualized lower chest  SOLID VISCERA: Limited low radiation dose noncontrast CT evaluation demonstrates no clinically significant abnormality of the imaged portions of the liver, spleen, pancreas, or adrenal glands  GALLBLADDER/BILIARY TREE:  Gallbladder is surgically absent  No biliary dilatation  STOMACH AND BOWEL:  Short segment wall thickening and edema at the sigmoid colon association with an inflamed diverticula (series 601, image 73)    APPENDIX:  No findings to suggest appendicitis  ABDOMINOPELVIC CAVITY:  No ascites  No pneumoperitoneum  No lymphadenopathy  REPRODUCTIVE ORGANS:  The prostate is enlarged  ABDOMINAL WALL/INGUINAL REGIONS:  There is a small fat-containing umbilical hernia  OSSEOUS STRUCTURES:  No acute fracture or destructive osseous lesion  Impression: Stable position of 11 mm stone at the right proximal ureter following interval ureteral stent placement   Findings consistent with mild acute uncomplicated sigmoid diverticulitis Nonobstructing left nephrolithiasis Workstation performed: [id number]     CT abdomen pelvis w contrast    Result Date: 11/22/2022  Narrative: CT ABDOMEN AND PELVIS WITH IV CONTRAST INDICATION:   hernia and soft tissue gas assessment   " Continues to denie saddle anesthesia, bowel or urinary incontinence, numbness, tingling, fever or chills  KUB did not show any stones  Patient states when he moves pain increases to 10/10 but is 4/10 at rest  Xray hip showed possible hernia vs small area of focal gas at inguinal area  CT abdomen pelvis ordered for further evaluation, if hernia or gas present, urgent consult to surgery" COMPARISON:  Right hip radiographs from earlier today TECHNIQUE:  CT examination of the abdomen and pelvis was performed  Axial, sagittal, and coronal 2D reformatted images were created from the source data and submitted for interpretation  Radiation dose length product (DLP) for this visit:  1774 mGy-cm   This examination, like all CT scans performed in the [facility], was performed utilizing techniques to minimize radiation dose exposure, including the use of iterative reconstruction and automated exposure control  IV Contrast:  100 mL of iohexol (OMNIPAQUE) Enteric Contrast:  Enteric contrast was not administered  FINDINGS: ABDOMEN LOWER CHEST:  No clinically significant abnormality identified in the visualized lower chest  LIVER/BILIARY TREE:  Unremarkable  GALLBLADDER:  No calcified gallstones  No pericholecystic inflammatory change  SPLEEN:  Unremarkable  PANCREAS:  Unremarkable  ADRENAL GLANDS:  Unremarkable  KIDNEYS/URETERS: Obstructive oval 12 mm proximal right ureteral calculus located 5 to 6 cm distal to the right UPJ causes mild right hydroureteronephrosis  Unchanged bilateral simple and hemorrhagic cysts  Nonobstructive bilateral renal calculi measuring up to 5 mm  STOMACH AND BOWEL:  There is colonic diverticulosis without evidence of acute diverticulitis  APPENDIX:  No findings to suggest appendicitis  ABDOMINOPELVIC CAVITY:  No ascites  No pneumoperitoneum  No lymphadenopathy  VESSELS:  Unremarkable for patient's age  PELVIS REPRODUCTIVE ORGANS:  Unremarkable for patient's age   URINARY BLADDER: Unremarkable  ABDOMINAL WALL/INGUINAL REGIONS:  Small foci of gas within the anterior subcutaneous tissues in this patient's anterior abdominal wall pannus correlating to the findings seen on today's radiographs  No inguinal hernia or collection  Tiny uncomplicated fat-containing umbilical hernia    OSSEOUS STRUCTURES:  No acute fracture or destructive osseous lesion  Impression: Obstructive oval 12 mm proximal right ureteral calculus located 5 to 6 cm distal to the right UPJ causes mild right hydroureteronephrosis  Small foci of gas within the anterior subcutaneous tissues in this patient's anterior abdominal wall pannus correlating to the findings seen on today's radiographs  The study was marked in [facility] for immediate notification  Workstation performed: [id number]         ** Please Note: Dragon 360 Dictation voice to text software was used in the creation of this document   **

## 2022-11-27 NOTE — ASSESSMENT & PLAN NOTE
Incidental finding of mild acute sigmoid diverticulitis  Pt completely asymptomatic; no abd pain, fever  Tolerating diet  Known hx of diverticulosis  Abx not indicated as discussed with pharmacy abx mariah   Will cont to monitor off abx

## 2022-11-27 NOTE — ASSESSMENT & PLAN NOTE
Lab Results   Component Value Date    HGBA1C 10 1 (H) 10/16/2022   Continue sliding scale  Continue Lantus  Accu-Cheks per protocol      Recent Labs     11/25/22  1633 11/25/22  2130 11/26/22  0751 11/26/22  1120   POCGLU 154* 141* 104 152*       Blood Sugar Average: Last 72 hrs:

## 2022-11-27 NOTE — ASSESSMENT & PLAN NOTE
Status post stent placement due to obstructive renal calculi w/ resulting hydronephrosis   Pain likely related to stent  S/p CT a/p, stent in place  Appreciate input per urology:flomax increased to bid, added gabapentin and scheduled toradol  Cont prn dilaudid  Cont supportive care  UA benign

## 2022-11-27 NOTE — OCCUPATIONAL THERAPY NOTE
Occupational Therapy Cancellation Note        Patient Name: Sidra Norton  WLQOI'N Date: 11/27/2022 11/27/22 0830   OT Last Visit   OT Visit Date 11/27/22   Note Type   Note type Evaluation   Cancel Reasons Medical status       OT orders received, chart reviewed  Spoke with pt who reports he is having a lot of pain and would not like to participate in therapy this date  OT will continue to follow and see as medically appropriate and able       ELISEO Boyd, OTR/L

## 2022-11-27 NOTE — ASSESSMENT & PLAN NOTE
Status post stent placement  Urology plans to have further intervention in 1 month's time  Patient's costovertebral angle tenderness recurrence 6 hours after being discharged  Currently unable to move  Dilaudid 1 mg every 3 hours as needed for breakthrough pain; continue Dilaudid out patient regimen at 2 mg every 4 hours as needed for moderate pain and 4 mg as needed for severe pain  Case management  Physical and occupational therapy consult for ambulatory dysfunction associated with right CVA tenderness  Urology consult

## 2022-11-27 NOTE — ED ATTENDING ATTESTATION
11/26/2022  IJacqueline DO, saw and evaluated the patient  I have discussed the patient with the resident/non-physician practitioner and agree with the resident's/non-physician practitioner's findings, Plan of Care, and MDM as documented in the resident's/non-physician practitioner's note, except where noted  All available labs and Radiology studies were reviewed  I was present for key portions of any procedure(s) performed by the resident/non-physician practitioner and I was immediately available to provide assistance  At this point I agree with the current assessment done in the Emergency Department  I have conducted an independent evaluation of this patient a history and physical is as follows:    Patient is a 79-year-old male, hospitalized November 20th through today for low back pain  Pain felt like a spasm, worse with twisting moving on the right-hand side  found to have a 12 mm right ureteral stone  Initially treated with antibiotics however urine culture showed no infection antibiotics were discontinued  He had ureteral stent placed, November 24th  Was feeling better after the stent placement  Has been on peridium after stent placement so urine has been orange but it is no visible blood or clots  He said he is feeling better today, his pain which is relatively mild, and was discharged home but then after getting home he had worsening spasm and discomfort in the right flank, similar to the pain he has been having all along  It is worse if he twists or bends or moves  He took Tylenol, Soma and dilaudid at home which gave him mild but not complete relief  He has no bladder or bowel incontinence, saddle anesthesia, no leg weakness      General:  Patient appears uncomfortable  Head:  Atraumatic  Eyes:  Conjunctiva pink  ENT:  Mucous membranes are moist  Neck:  Supple  Cardiac:  S1-S2, without murmurs  Lungs:  Clear to auscultation bilaterally  Abdomen:  Soft, nontender, normal bowel sounds, no CVA tenderness, no tympany, no rigidity, no guarding  Back:  He has some right-sided paraspinal muscular hypertonicity, no CVA tenderness  No warmth or redness, no spinal tenderness  Extremities:  Normal range of motion  Neurologic:  Awake, fluent speech, normal comprehension  AAOx3  Strength is 5/5 in his bilateral hips, knees, ankles  Normal sensation throughout both legs, no saddle anesthesia    Skin:  Pink warm and dry  Psychiatric:  Alert, pleasant, cooperative            ED Course     Labs Reviewed   CBC AND DIFFERENTIAL       Result Value Ref Range Status    WBC 9 68  4 31 - 10 16 Thousand/uL Final    RBC 5 37  3 88 - 5 62 Million/uL Final    Hemoglobin 16 1  12 0 - 17 0 g/dL Final    Hematocrit 47 9  36 5 - 49 3 % Final    MCV 89  82 - 98 fL Final    MCH 30 0  26 8 - 34 3 pg Final    MCHC 33 6  31 4 - 37 4 g/dL Final    RDW 13 1  11 6 - 15 1 % Final    MPV 10 0  8 9 - 12 7 fL Final    Platelets 661  139 - 390 Thousands/uL Final    nRBC 0  /100 WBCs Final    Neutrophils Relative 70  43 - 75 % Final    Immat GRANS % 0  0 - 2 % Final    Lymphocytes Relative 18  14 - 44 % Final    Monocytes Relative 9  4 - 12 % Final    Eosinophils Relative 2  0 - 6 % Final    Basophils Relative 1  0 - 1 % Final    Neutrophils Absolute 6 79  1 85 - 7 62 Thousands/µL Final    Immature Grans Absolute 0 03  0 00 - 0 20 Thousand/uL Final    Lymphocytes Absolute 1 71  0 60 - 4 47 Thousands/µL Final    Monocytes Absolute 0 89  0 17 - 1 22 Thousand/µL Final    Eosinophils Absolute 0 19  0 00 - 0 61 Thousand/µL Final    Basophils Absolute 0 07  0 00 - 0 10 Thousands/µL Final   BASIC METABOLIC PANEL    Sodium 841  135 - 147 mmol/L Final    Potassium 3 6  3 5 - 5 3 mmol/L Final    Chloride 108  96 - 108 mmol/L Final    CO2 23  21 - 32 mmol/L Final    ANION GAP 7  4 - 13 mmol/L Final    BUN 19  5 - 25 mg/dL Final    Creatinine 1 14  0 60 - 1 30 mg/dL Final    Comment: Standardized to IDMS reference method    Glucose 120  65 - 140 mg/dL Final    Comment: If the patient is fasting, the ADA then defines impaired fasting glucose as > 100 mg/dL and diabetes as > or equal to 123 mg/dL  Specimen collection should occur prior to Sulfasalazine administration due to the potential for falsely depressed results  Specimen collection should occur prior to Sulfapyridine administration due to the potential for falsely elevated results  Calcium 9 7  8 3 - 10 1 mg/dL Final    eGFR 68  ml/min/1 73sq m Final    Narrative:     [place] guidelines for Chronic Kidney Disease (CKD):   •  Stage 1 with normal or high GFR (GFR > 90 mL/min/1 73 square meters)  •  Stage 2 Mild CKD (GFR = 60-89 mL/min/1 73 square meters)  •  Stage 3A Moderate CKD (GFR = 45-59 mL/min/1 73 square meters)  •  Stage 3B Moderate CKD (GFR = 30-44 mL/min/1 73 square meters)  •  Stage 4 Severe CKD (GFR = 15-29 mL/min/1 73 square meters)  •  Stage 5 End Stage CKD (GFR <15 mL/min/1 73 square meters)  Note: GFR calculation is accurate only with a steady state creatinine   UA W REFLEX TO MICROSCOPIC WITH REFLEX TO CULTURE       Symptoms are suggestive of muscle spasm and pain given that his discomfort is worse with moving around  Plan is for CT abdomen and pelvis to evaluate for the possibility of stent migration, hydronephrosis or other acute pathology, as well as urinalysis  If patient is more comfortable with analgesics and workup is unremarkable, would consider discharge home  If workup shows acute pathology or patient unable to become comfortable, would consider admission  Signed out to Dr Terry Wilburn Time  Procedures

## 2022-11-27 NOTE — CONSULTS
UROLOGY INPATIENT CONSULT NOTE   Name: Andreas Arceo  : 1960  MRN: [id number]     Date of Service: 22  Service Requesting Consult: Pippa Graff   Reason for consultation: Severe stent related pain      History of Present Illness:     Andreas Arceo is a 58 y o  male who was admitted to the hospital 2022 with a large right ureteral stone and ANNI  He underwent right stent placement on 2022  He was discharged home 2022  Unfortunately within 6 hours of discharge he had severe debilitating pain that prevented him from standing straight up especially with urination  The pain is in the right flank  He has been taking Flomax which he was taking prior to admission at 0 4 mg daily  He states that when he has had stents in the past the pain was only during urination was not as severe  His ANNI has since resolved  Renal function is normal today  He denies any fever  He does have some mild burning with urination since the stent was placed  He had a CT on admission that demonstrated stent in appropriate location and stone in similar location alongside the stent  The hydronephrosis had completely resolved      PAST MEDICAL HISTORY:     Past Medical History:   Diagnosis Date   • Acute renal failure (ARF) ([facility] [address] )     last assessed - [id number]   • Chest pain     last assessed - [id number]   • CPAP (continuous positive airway pressure) dependence    • Depression    • Diabetes mellitus ([address] )    • Dyspnea on exertion     last assessed - [id number]   • Hypertension    • Nephrolithiasis    • Numbness of right foot     numbness on the sole of the right foot; since he was young d/t stenosis   • Obesity    • Onychomycosis     last assessed - [id number]   • JEFF on CPAP    • Sciatica     last assessed - [id number]   • Sleep apnea        PAST SURGICAL HISTORY:     Past Surgical History:   Procedure Laterality Date   • COLONOSCOPY     • CYSTOSCOPY W/ LASER LITHOTRIPSY     • FL RETROGRADE PYELOGRAM  11/24/2022   • KNEE ARTHROSCOPY Left 11/2013   • [place] CYSTOURETHROSCOPY,URETER CATHETER Right 11/24/2022    Procedure: CYSTOSCOPY RETROGRADE PYELOGRAM WITH INSERTION STENT URETERAL;  Surgeon: Robert Buckner MD;  Location: [facility];  Service: Urology   • [place] LAP,CHOLECYSTECTOMY N/A 2/8/2019    Procedure: ROBOTIC ASSISTED LAPAROSCOPIC CHOLECYSTECTOMY;  Surgeon: Jose Braun DO;  Location: [facility];  Service: General   • PROSTATE BIOPSY      Needle Biopsy - Negative       CURRENT MEDICATIONS:     Current Facility-Administered Medications   Medication Dose Route Frequency Provider Last Rate Last Admin   • acetaminophen (TYLENOL) tablet 975 mg  975 mg Oral Q8H [address] Darlene Schlatter, MD   975 mg at 11/27/22 2243   • aluminum-magnesium hydroxide-simethicone (MYLANTA) oral suspension 30 mL  30 mL Oral Q6H PRN Carissa Mattson MD       • amLODIPine (NORVASC) tablet 10 mg  10 mg Oral Daily Carissa Mattson MD   10 mg at 11/27/22 0857   • atorvastatin (LIPITOR) tablet 10 mg  10 mg Oral Daily Carissa Mattson MD   10 mg at 11/27/22 0857   • buPROPion (WELLBUTRIN XL) 24 hr tablet 150 mg  150 mg Oral Daily Carissa Mattson MD   150 mg at 11/27/22 0857   • cloNIDine (CATAPRES) tablet 0 3 mg  0 3 mg Oral BID Carissa Mattson MD   0 3 mg at 11/27/22 0857   • docusate sodium (COLACE) capsule 100 mg  100 mg Oral BID PRN Carissa Mattson MD       • enoxaparin (LOVENOX) subcutaneous injection 40 mg  40 mg Subcutaneous Daily Carissa Mattson MD   40 mg at 11/27/22 0858   • HYDROmorphone (DILAUDID) injection 1 mg  1 mg Intravenous Q3H PRN Carissa Mattson MD   1 mg at 11/27/22 4126   • HYDROmorphone (DILAUDID) tablet 2 mg  2 mg Oral Q4H PRN Carissa Mattson MD       • HYDROmorphone (DILAUDID) tablet 4 mg  4 mg Oral Q4H PRN Carissa Mattson MD   4 mg at 11/27/22 0857   • insulin glargine (LANTUS) subcutaneous injection 34 Units 0 34 mL  34 Units Subcutaneous QAM Molina Siy Tania Schilder, MD   34 Units at 11/27/22 0857   • insulin lispro (HumaLOG) 100 units/mL subcutaneous injection 1-5 Units  1-5 Units Subcutaneous HS Zaynab Manley MD       • insulin lispro (HumaLOG) 100 units/mL subcutaneous injection 2-12 Units  2-12 Units Subcutaneous TID AC Zaynab Manley MD       • lisinopril (ZESTRIL) tablet 40 mg  40 mg Oral Daily Zaynab Manley MD   40 mg at 11/27/22 0857   • methocarbamol (ROBAXIN) tablet 750 mg  750 mg Oral Q8H [facility] Zaynab Manley MD   750 mg at 11/27/22 3630   • nicotine (NICODERM CQ) 21 mg/24 hr TD 24 hr patch 1 patch  1 patch Transdermal Daily Zaynab Manley MD       • nystatin (MYCOSTATIN) powder   Topical TID PRN Zaynab Manley MD       • ondansetron [facility]) injection 4 mg  4 mg Intravenous Q6H PRN Zaynab Manley MD       • oxybutynin (DITROPAN) tablet 5 mg  5 mg Oral TID PRN Zaynab Manley MD   5 mg at 11/27/22 0857   • phenazopyridine (PYRIDIUM) tablet 100 mg  100 mg Oral TID PRN Zaynab Manley MD       • sertraline (ZOLOFT) tablet 100 mg  100 mg Oral Daily Zaynab Manley MD   100 mg at 11/27/22 0857   • tamsulosin (FLOMAX) capsule 0 4 mg  0 4 mg Oral Daily Zaynab Manley MD   0 4 mg at 11/27/22 0857       ALLERGIES:   No Known Allergies    SOCIAL HISTORY:     Social History     Socioeconomic History   • Marital status: /Civil Union     Spouse name: None   • Number of children: None   • Years of education: None   • Highest education level: None   Occupational History   • Occupation: Works Full-time   Tobacco Use   • Smoking status: Some Days     Types: Cigars   • Smokeless tobacco: Never   • Tobacco comments:     3 cigars per week   Vaping Use   • Vaping Use: Never used   Substance and Sexual Activity   • Alcohol use: Never   • Drug use: Never   • Sexual activity: Yes   Other Topics Concern   • None   Social History Narrative    Exercise habits - none        Works fulltime    Has a dog Social Determinants of Health     Financial Resource Strain: Not on file   Food Insecurity: No Food Insecurity   • Worried About [address] in the Last Year: Never true   • Ran Out of Food in the Last Year: Never true   Transportation Needs: No Transportation Needs   • Lack of Transportation (Medical): No   • Lack of Transportation (Non-Medical): No   Physical Activity: Not on file   Stress: Not on file   Social Connections: Not on file   Intimate Partner Violence: Not on file   Housing Stability: Low Risk    • Unable to Pay for Housing in the Last Year: No   • Number of Places Lived in the Last Year: 1   • Unstable Housing in the Last Year: No       FAMILY HISTORY:     Family History   Adopted: Yes   Problem Relation Age of Onset   • Alzheimer's disease Mother    • Alcohol abuse Neg Hx    • Drug abuse Neg Hx    • Depression Neg Hx    • Substance Abuse Neg Hx    • Mental illness Neg Hx        REVIEW OF SYSTEMS:     Review of Systems   Constitutional: Negative for chills, fever and unexpected weight change  HENT: Negative for hearing loss and sore throat  Eyes: Negative for redness and visual disturbance  Respiratory: Negative for cough and shortness of breath  Cardiovascular: Negative for chest pain, palpitations and leg swelling  Gastrointestinal: Negative for blood in stool, constipation, diarrhea, nausea and vomiting  Endocrine: Negative for cold intolerance and heat intolerance  Genitourinary:        Per HPI   Musculoskeletal: Positive for back pain (Severe right flank pain)  Negative for arthralgias and myalgias  Skin: Negative for color change and rash  Neurological: Negative for dizziness, seizures and headaches  Hematological: Does not bruise/bleed easily           PHYSICAL EXAM:     /82 (BP Location: Left arm)   Pulse 75   Temp 97 9 °F (36 6 °C)   Resp 16   Ht 5' 11" (1 803 m)   Wt 128 kg (281 lb 8 4 oz)   SpO2 94%   BMI 39 27 kg/m²     General:  Healthy appearing male in no acute distress  Head:  Normocephalic, atraumatic  Eyes: no scleral icterus  ENMT: Nares patent, moist mucous membranes  Cardiovascular:  Regular rate  Respiratory:  Patient has unlabored respirations  Abdomen:  Abdomen nondistended  Neurological: Grossly intact  Psych: Normal affect    LABS:     CBC:   Lab Results   Component Value Date    WBC 9 68 11/26/2022    HGB 16 1 11/26/2022    HCT 47 9 11/26/2022    MCV 89 11/26/2022     11/26/2022       BMP:   Lab Results   Component Value Date    GLUCOSE 301 (H) 02/01/2014    CALCIUM 9 7 11/26/2022     02/01/2014    K 3 6 11/26/2022    CO2 23 11/26/2022     11/26/2022    BUN 19 11/26/2022    CREATININE 1 14 11/26/2022       IMAGING:     CT ABDOMEN AND PELVIS WITHOUT IV CONTRAST - LOW DOSE RENAL STONE      INDICATION:   Flank pain, kidney stone suspected  flank pain      COMPARISON:  None      TECHNIQUE:  Low radiation dose thin section CT examination of the abdomen and pelvis was performed without intravenous or oral contrast according to a protocol specifically designed to evaluate for urinary tract calculus  Axial, sagittal, and coronal 2D   reformatted images were created from the source data and submitted for interpretation  Evaluation for pathology in the abdomen and pelvis that is unrelated to urinary tract calculi is limited        Radiation dose length product (DLP) for this visit:  1411 28 mGy-cm   This examination, like all CT scans performed in the [facility], was performed utilizing techniques to minimize radiation dose exposure, including the use of   iterative reconstruction and automated exposure control      URINARY TRACT FINDINGS:     RIGHT KIDNEY AND URETER:  11 mm on the right proximal ureter is unchanged in position following interval ureteral stent placement, at approximately the level of the L4-L5 disc space   One or more simple appearing renal cyst(s) is identified      LEFT KIDNEY AND URETER: There are nonobstructing intrarenal calculi measuring on the order of 4 mm at the lower pole  No hydronephrosis or hydroureter  One or more simple and hemorrhagic appearing renal cyst(s) is identified      URINARY BLADDER:  Unremarkable         ADDITIONAL FINDINGS:     LOWER CHEST:  No clinically significant abnormality identified in the visualized lower chest      SOLID VISCERA: Limited low radiation dose noncontrast CT evaluation demonstrates no clinically significant abnormality of the imaged portions of the liver, spleen, pancreas, or adrenal glands        GALLBLADDER/BILIARY TREE:  Gallbladder is surgically absent  No biliary dilatation      STOMACH AND BOWEL:  Short segment wall thickening and edema at the sigmoid colon association with an inflamed diverticula (series 601, image 73)        APPENDIX:  No findings to suggest appendicitis      ABDOMINOPELVIC CAVITY:  No ascites  No pneumoperitoneum  No lymphadenopathy      REPRODUCTIVE ORGANS:  The prostate is enlarged      ABDOMINAL WALL/INGUINAL REGIONS:  There is a small fat-containing umbilical hernia      OSSEOUS STRUCTURES:  No acute fracture or destructive osseous lesion      IMPRESSION:     Stable position of 11 mm stone at the right proximal ureter following interval ureteral stent placement      Findings consistent with mild acute uncomplicated sigmoid diverticulitis     Nonobstructing left nephrolithiasis       ASSESSMENT:     58 y o  male with large right proximal ureteral stone status post right stent placement on November 24, 2022 presenting with and retractable stent related pain  PLAN:     1  Continue around the clock Tylenol  2  Increase Flomax 0 8 mg daily or 0 4 mg b i d   3  Add gabapentin 100 mg b i d x 2 weeks   4  One time dose of Toradol 30 mg IV followed by Toradol p o  10 mg q 6 hours  5  Continue p r n   Dilaudid and Pyridium    Anna Lopez MD  10:45 AM  [facility]

## 2022-11-27 NOTE — ASSESSMENT & PLAN NOTE
Lab Results   Component Value Date    HGBA1C 10 1 (H) 10/16/2022   Continue sliding scale  Continue Lantus  Accu-Cheks per protocol      Recent Labs     11/27/22  0305 11/27/22  0811 11/27/22  0848 11/27/22  1117   POCGLU 155* 101 131 127       Blood Sugar Average: Last 72 hrs:  (P) 128 5

## 2022-11-27 NOTE — ASSESSMENT & PLAN NOTE
Lab Results   Component Value Date    EGFR 68 11/26/2022    EGFR 55 11/26/2022    EGFR 53 11/25/2022    CREATININE 1 14 11/26/2022    CREATININE 1 36 (H) 11/26/2022    CREATININE 1 39 (H) 11/25/2022   Continue to monitor creatinine  Currently improved from previous

## 2022-11-27 NOTE — ASSESSMENT & PLAN NOTE
Lab Results   Component Value Date    EGFR 68 11/26/2022    EGFR 55 11/26/2022    EGFR 53 11/25/2022    CREATININE 1 14 11/26/2022    CREATININE 1 36 (H) 11/26/2022    CREATININE 1 39 (H) 11/25/2022   Cr trended down to baseline range

## 2022-11-27 NOTE — PROGRESS NOTES
[address] [facility]  Progress Note - Claudette Cavanaugh 1960, 58 y o  male MRN: [id number]  Unit/Bed#: [facility] 808-01 Encounter: [id number]  Primary Care Provider: Ortiz Castro MD   Date and time admitted to hospital: 11/26/2022  9:36 PM    * Costovertebral angle tenderness  Assessment & Plan  Status post stent placement due to obstructive renal calculi w/ resulting hydronephrosis  Pain likely related to stent  S/p CT a/p, stent in place  Appreciate input per urology:flomax increased to bid, added gabapentin and scheduled toradol  Cont prn dilaudid  Cont supportive care  UA benign    Abnormal CT scan  Assessment & Plan  Incidental finding of mild acute sigmoid diverticulitis  Pt completely asymptomatic; no abd pain, fever  Tolerating diet  Known hx of diverticulosis  Abx not indicated as discussed with pharmacy abx mariah  Will cont to monitor off abx    CKD (chronic kidney disease) stage 3  Assessment & Plan  Lab Results   Component Value Date    EGFR 68 11/26/2022    EGFR 55 11/26/2022    EGFR 53 11/25/2022    CREATININE 1 14 11/26/2022    CREATININE 1 36 (H) 11/26/2022    CREATININE 1 39 (H) 11/25/2022   Cr trended down to baseline range      Essential hypertension  Assessment & Plan  On Norvasc, clonidine and lisinopril  Diabetes mellitus type 2 in obese [facility])  Assessment & Plan  Lab Results   Component Value Date    HGBA1C 10 1 (H) 10/16/2022   Continue sliding scale  Continue Lantus  Accu-Cheks per protocol  Recent Labs     11/27/22  0305 11/27/22  0811 11/27/22  0848 11/27/22  1117   POCGLU 155* 101 131 127       Blood Sugar Average: Last 72 hrs:  (P) 128 5      VTE Pharmacologic Prophylaxis:   Pharmacologic: Enoxaparin (Lovenox)  Mechanical VTE Prophylaxis in Place: No    Patient Centered Rounds: I have performed bedside rounds with nursing staff today      Discussions with Specialists or Other Care Team Provider:     Education and Discussions with Family / Patient:  Patient    Time Spent for Care: 30 minutes  More than 50% of total time spent on counseling and coordination of care as described above  Current Length of Stay: 0 day(s)    Current Patient Status: Observation   Certification Statement: The patient will continue to require additional inpatient hospital stay due to Acute illness, pain management    Discharge Plan:     Code Status: Level 1 - Full Code      Subjective:   + RT CVA tenderness but slightly improved since adjustment of meds  Denies abdominal pain, fever, chills, nausea, vomiting  Just had breakfast which is well tolerated  Objective:     Vitals:   Temp (24hrs), Av 8 °F (36 6 °C), Min:97 7 °F (36 5 °C), Max:97 9 °F (36 6 °C)    Temp:  [97 7 °F (36 5 °C)-97 9 °F (36 6 °C)] 97 9 °F (36 6 °C)  HR:  [51-78] 75  Resp:  [15-25] 16  BP: (152-183)/() 162/82  SpO2:  [92 %-98 %] 94 %  Body mass index is 39 27 kg/m²  Input and Output Summary (last 24 hours): Intake/Output Summary (Last 24 hours) at 2022 1514  Last data filed at 2022 1247  Gross per 24 hour   Intake 340 ml   Output 350 ml   Net -10 ml       Physical Exam:     Physical Exam  Constitutional:       Appearance: He is obese  Cardiovascular:      Rate and Rhythm: Normal rate and regular rhythm  Pulses: Normal pulses  Heart sounds: Normal heart sounds  No murmur heard  Pulmonary:      Effort: Pulmonary effort is normal  No respiratory distress  Breath sounds: Normal breath sounds  No wheezing  Abdominal:      General: Abdomen is flat  Bowel sounds are normal  There is no distension  Palpations: Abdomen is soft  Tenderness: There is no abdominal tenderness  There is right CVA tenderness  There is no left CVA tenderness or guarding  Musculoskeletal:         General: Normal range of motion  Cervical back: Normal range of motion and neck supple  Right lower leg: No edema  Left lower leg: No edema     Skin:     General: Skin is warm and dry  Neurological:      General: No focal deficit present  Mental Status: He is alert and oriented to person, place, and time  Mental status is at baseline  Cranial Nerves: No cranial nerve deficit  Motor: No weakness  Additional Data:     Labs:    Results from last 7 days   Lab Units 11/26/22  2228   WBC Thousand/uL 9 68   HEMOGLOBIN g/dL 16 1   HEMATOCRIT % 47 9   PLATELETS Thousands/uL 289   NEUTROS PCT % 70   LYMPHS PCT % 18   MONOS PCT % 9   EOS PCT % 2     Results from last 7 days   Lab Units 11/26/22  2228   SODIUM mmol/L 138   POTASSIUM mmol/L 3 6   CHLORIDE mmol/L 108   CO2 mmol/L 23   BUN mg/dL 19   CREATININE mg/dL 1 14   ANION GAP mmol/L 7   CALCIUM mg/dL 9 7   GLUCOSE RANDOM mg/dL 120         Results from last 7 days   Lab Units 11/27/22  1117 11/27/22  0848 11/27/22  0811 11/27/22  0305 11/26/22  1120 11/26/22  0751 11/25/22  2130 11/25/22  1633 11/25/22  1108 11/25/22  0753 11/24/22  2056 11/24/22  1631   POC GLUCOSE mg/dl 127 131 101 155* 152* 104 141* 154* 152* 102 117 210*         Results from last 7 days   Lab Units 11/23/22  0528 11/22/22  0553   PROCALCITONIN ng/ml 0 08 0 13           * I Have Reviewed All Lab Data Listed Above  * Additional Pertinent Lab Tests Reviewed:  All Labs Within Last 24 Hours Reviewed    Imaging:    Imaging Reports Reviewed Today Include:   Imaging Personally Reviewed by Myself Includes:      Recent Cultures (last 7 days):     Results from last 7 days   Lab Units 11/24/22  1045 11/23/22  0040   URINE CULTURE  No Growth <100 cfu/mL 50,000-59,000 cfu/ml       Last 24 Hours Medication List:   Current Facility-Administered Medications   Medication Dose Route Frequency Provider Last Rate   • acetaminophen  975 mg Oral Q8H Hood Rushing MD     • aluminum-magnesium hydroxide-simethicone  30 mL Oral Q6H PRN Rudy Reyna MD     • amLODIPine  10 mg Oral Daily Rudy Reyna MD     • atorvastatin  10 mg Oral Daily Bria Ayala MD     • buPROPion  150 mg Oral Daily Bria Ayala MD     • cloNIDine  0 3 mg Oral BID Bria Ayala MD     • docusate sodium  100 mg Oral BID PRN Bria Ayala MD     • enoxaparin  40 mg Subcutaneous Daily Bria Ayala MD     • gabapentin  100 mg Oral BID Galina Silva MD     • HYDROmorphone  1 mg Intravenous Q3H PRN Bria Ayala MD     • HYDROmorphone  2 mg Oral Q4H PRN Bria Ayala MD     • HYDROmorphone  4 mg Oral Q4H PRN Bria Ayala MD     • insulin glargine  34 Units Subcutaneous QAM Bria Ayala MD     • insulin lispro  1-5 Units Subcutaneous HS Bria Ayala MD     • insulin lispro  2-12 Units Subcutaneous TID AC Bria Ayala MD     • ketorolac  10 mg Oral Q6H Galina Silva MD     • lisinopril  40 mg Oral Daily Bria Ayala MD     • methocarbamol  750 mg Oral [facility] Bria Ayala MD     • nicotine  1 patch Transdermal Daily Bria Ayala MD     • nystatin   Topical TID PRN Bria Ayala MD     • ondansetron  4 mg Intravenous Q6H PRN Brai Ayala MD     • oxybutynin  5 mg Oral TID PRN Bria Ayala MD     • phenazopyridine  100 mg Oral TID PRN Bria Ayala MD     • sertraline  100 mg Oral Daily Bria Ayala MD     • [START ON 11/28/2022] tamsulosin  0 8 mg Oral Daily Galina Silva MD          Today, Patient Was Seen By: Jes Davis DO    ** Please Note: Dictation voice to text software may have been used in the creation of this document   **

## 2022-11-27 NOTE — PLAN OF CARE
Problem: PAIN - ADULT  Goal: Verbalizes/displays adequate comfort level or baseline comfort level  Description: Interventions:  - Encourage patient to monitor pain and request assistance  - Assess pain using appropriate pain scale  - Administer analgesics based on type and severity of pain and evaluate response  - Implement non-pharmacological measures as appropriate and evaluate response  - Consider cultural and social influences on pain and pain management  - Notify physician/advanced practitioner if interventions unsuccessful or patient reports new pain  Outcome: Progressing     Problem: GENITOURINARY - ADULT  Goal: Maintains or returns to baseline urinary function  Description: INTERVENTIONS:  - Assess urinary function  - Encourage oral fluids to ensure adequate hydration if ordered  - Administer IV fluids as ordered to ensure adequate hydration  - Administer ordered medications as needed  - Offer frequent toileting  - Follow urinary retention protocol if ordered  Outcome: Progressing     Problem: METABOLIC, FLUID AND ELECTROLYTES - ADULT  Goal: Electrolytes maintained within normal limits  Description: INTERVENTIONS:  - Monitor labs and assess patient for signs and symptoms of electrolyte imbalances  - Administer electrolyte replacement as ordered  - Monitor response to electrolyte replacements, including repeat lab results as appropriate  - Instruct patient on fluid and nutrition as appropriate  Outcome: Progressing  Goal: Fluid balance maintained  Description: INTERVENTIONS:  - Monitor labs   - Monitor I/O and WT  - Instruct patient on fluid and nutrition as appropriate  - Assess for signs & symptoms of volume excess or deficit  Outcome: Progressing     Problem: SAFETY ADULT  Goal: Patient will remain free of falls  Description: INTERVENTIONS:  - Educate patient/family on patient safety including physical limitations  - Instruct patient to call for assistance with activity   - Consult OT/PT to assist with strengthening/mobility   - Keep Call bell within reach  - Keep bed low and locked with side rails adjusted as appropriate  - Keep care items and personal belongings within reach  - Initiate and maintain comfort rounds  - Make Fall Risk Sign visible to staff  - Offer Toileting every 2 Hours, in advance of need  - Initiate/Maintain bed alarm  - Apply yellow socks and bracelet for high fall risk patients  - Consider moving patient to room near nurses station  Outcome: Progressing

## 2022-11-27 NOTE — ASSESSMENT & PLAN NOTE
Status post stenting by Urology  With continued pain; will ask further urology opinion in this in hospital stay

## 2022-11-27 NOTE — UTILIZATION REVIEW
Initial Clinical Review    Admission: Date/Time/Statement:   Admission Orders (From admission, onward)     Ordered        11/27/22 0240  Place in Observation  Once                      Orders Placed This Encounter   Procedures   • Place in Observation     Standing Status:   Standing     Number of Occurrences:   1     Order Specific Question:   Level of Care     Answer:   Med Surg [16]     ED Arrival Information     Expected   -    Arrival   11/26/2022 21:36    Acuity   Urgent            Means of arrival   Ambulance    Escorted by   [facility]    Service   Hospitalist    Admission type   Emergency            Arrival complaint   Kidney Stone           Chief Complaint   Patient presents with   • Flank Pain     Pt brought in by ems ,was discharged today and had stent placed on Thursday for a 12 mm kidney stone and now Is having excruciating pain unable to sit down      Initial Presentation: 58 y o  male   Hospitalized 11/23 - 11/26 for  Rt hydroureteronephrosis 2/2 obstructing stone  On 11/24 performed  cystoscopy w/ right retrograde pyelogram and subsequent ureteral stenting long standing h/o  Back pain :  Lumbar Spine XRAY revealing    "Lower lumbar disc space narrowing with flowing osteophytes from L1 through L4 suggestive of DISH "  No saddle anesthesia  DC to home on multimodal pain regimen  BMI 38 - JEFF on CPAP @  HS  CKD3  (baseline 1 4-1 6)  1 36 on dc  Within 6 hours of discharge, there was recurrence of right costovertebral angle tenderness with radiation towards the right back  Patient states that each time that he moves the pain is excruciating  Patient does not have any fever or chills  Returned to ER from home  Via EMS  On  11/26 late evening:  CTAP shows same stone w/stent in place,  Bun/crt stable  EXAM - excructiating costovertebral angle tenderness/pain    Will admit for pain management:  Dilaudid 1 mg every 3 hours as needed for breakthrough pain; continue Dilaudid out patient regimen at 2 mg every 4 hours as needed for moderate pain and 4 mg as needed for severe pain  Obtain PT/OT evals for ambulatory dysfunction associated w/renal colic   accucks qid w/SSI For DM management  Date: 11/27      Day 2:   11/27  UROLOGY CONSULT:     Continue around the clock Tylenol,  Increase Flomax,  Add gabapentin,  One time dose of Toradol IV followed by Toradol p o  q6H  Continue p r n  Dilaudid and Pyridium   (evening note:  Pt reports CVA tenderness slightly less since med adjustments)       ED Triage Vitals   Temperature Pulse Respirations Blood Pressure SpO2   11/26/22 2227 11/26/22 2142 11/26/22 2142 11/26/22 2142 11/26/22 2142   97 7 °F (36 5 °C) (!) 53 (!) 25 159/87 98 %      Temp Source Heart Rate Source Patient Position - Orthostatic VS BP Location FiO2 (%)   11/26/22 2227 11/26/22 2142 11/26/22 2142 11/26/22 2142 --   Oral Monitor Lying Right arm       Pain Score       11/26/22 2142       10 - Worst Possible Pain          Wt Readings from Last 1 Encounters:   11/27/22 128 kg (281 lb 8 4 oz)     Additional Vital Signs:   11/27/22 08:29:49 -- 75 16 -- -- 94 % -- --   11/27/22 0827 -- -- -- 166/100 -- -- -- --   11/27/22 03:22:36 97 9 °F (36 6 °C) 51 Abnormal  19 152/77 102 92 % -- --   11/27/22 0015 -- 78 15 183/80 Abnormal  115 94 % None (Room air) Lying       Pertinent Labs/Diagnostic Test Results:   ekg none     CT renal stone study abdomen pelvis wo contrast   Final Result by Ashley Wiley MD (11/26 2344)   Stable position of 11 mm stone at the right proximal ureter following interval ureteral stent placement     Findings consistent with mild acute uncomplicated sigmoid diverticulitis   Nonobstructing left nephrolithiasis          Results from last 7 days   Lab Units 11/26/22 2228 11/26/22  0436 11/25/22  0559 11/24/22  0526 11/23/22  0528   WBC Thousand/uL 9 68 7 43 7 41 7 78 7 77   HEMOGLOBIN g/dL 16 1 15 0 15 5 14 6 15 5   HEMATOCRIT % 47 9 47 1 48 4 45 7 46 3 PLATELETS Thousands/uL 289 266 279 276 263   NEUTROS ABS Thousands/µL 6 79 4 36 4 64 4 58 6 02         Results from last 7 days   Lab Units 11/26/22 2228 11/26/22  0436 11/25/22  0559 11/24/22  0526 11/23/22  0528   SODIUM mmol/L 138 137 138 138 136   POTASSIUM mmol/L 3 6 4 6 3 9 3 7 4 2   CHLORIDE mmol/L 108 110* 109* 107 106   CO2 mmol/L 23 21 24 24 21   ANION GAP mmol/L 7 6 5 7 9   BUN mg/dL 19 23 19 21 17   CREATININE mg/dL 1 14 1 36* 1 39* 1 27 1 13   EGFR ml/min/1 73sq m 68 55 53 60 69   CALCIUM mg/dL 9 7 8 8 8 6 8 8 9 0         Results from last 7 days   Lab Units 11/27/22  0848 11/27/22  0305 11/26/22  1120 11/26/22  0751 11/25/22  2130 11/25/22  1633 11/25/22  1108 11/25/22  0753 11/24/22  2056 11/24/22  1631 11/24/22  1147 11/24/22  1058   POC GLUCOSE mg/dl 131 155* 152* 104 141* 154* 152* 102 117 210* 94 99     Results from last 7 days   Lab Units 11/26/22 2228 11/26/22  0436 11/25/22  0559 11/24/22  0526 11/23/22  0528 11/22/22  0553 11/21/22  0542 11/20/22  2025   GLUCOSE RANDOM mg/dL 120 119 104 123 129 104 71 116       Results from last 7 days   Lab Units 11/23/22  0528 11/22/22  0553   PROCALCITONIN ng/ml 0 08 0 13            Results from last 7 days   Lab Units 11/27/22  0022 11/21/22  1416   CLARITY UA  Clear Clear   COLOR UA  Dark Yellow Yellow   SPEC GRAV UA  1 014 1 028   PH UA  6 5 5 5   GLUCOSE UA mg/dl Negative Negative   KETONES UA mg/dl Negative Trace*   BLOOD UA  Moderate* Negative   PROTEIN UA mg/dl 50 (1+)* 100 (2+)*   NITRITE UA  Positive* Negative   BILIRUBIN UA  Negative Negative   UROBILINOGEN UA (BE) mg/dl <2 0 2 0*   LEUKOCYTES UA  Negative Small*   WBC UA /hpf 1-2 10-20*   RBC UA /hpf Innumerable* 2-4*   BACTERIA UA /hpf Occasional Occasional   EPITHELIAL CELLS WET PREP /hpf Occasional Occasional   MUCUS THREADS   --  Occasional*       Results from last 7 days   Lab Units 11/24/22  1045 11/23/22  0040   URINE CULTURE  No Growth <100 cfu/mL 50,000-59,000 cfu/ml               ED Treatment:   Medication Administration from 11/26/2022 2136 to 11/27/2022 5896       Date/Time Order Dose Route Action     11/26/2022 2228 EST HYDROmorphone (DILAUDID) injection 0 5 mg 0 5 mg Intravenous Given     11/27/2022 0013 EST ketorolac (TORADOL) injection 15 mg 15 mg Intravenous Given     11/27/2022 0013 EST HYDROmorphone (DILAUDID) injection 0 5 mg 0 5 mg Intravenous Given     11/27/2022 0014 EST multi-electrolyte (ISOLYTE-S PH 7 4) bolus 1,000 mL 1,000 mL Intravenous New Bag     11/27/2022 0237 EST HYDROmorphone (DILAUDID) injection 1 mg 1 mg Intravenous Given        Past Medical History:   Diagnosis Date   • Acute renal failure (ARF) ([facility] [address] )     last assessed - [id number]   • Chest pain     last assessed - [id number]   • CPAP (continuous positive airway pressure) dependence    • Depression    • Diabetes mellitus ([facility] [address] )    • Dyspnea on exertion     last assessed - [id number]   • Hypertension    • Nephrolithiasis    • Numbness of right foot     numbness on the sole of the right foot; since he was young d/t stenosis   • Obesity    • Onychomycosis     last assessed - [id number]   • JEFF on CPAP    • Sciatica     last assessed - [id number]   • Sleep apnea      Present on Admission:  • CKD (chronic kidney disease) stage 3  • Diabetes mellitus type 2 in [facility])  • Essential hypertension  • Mixed hyperlipidemia  • Right hydroureteronephrosis with obstructing calculus      Admitting Diagnosis: Calculus of ureter [F91 1]  Renal colic [I62]  Flank pain [R10 9]  Right flank pain [R10 9]  Age/Sex: 58 y o  male  Admission Orders:    See above note:   I/O q shift    OOB as tolerated    Scheduled Medications:  acetaminophen, 975 mg, Oral, Q8H INOCENTE  amLODIPine, 10 mg, Oral, Daily  atorvastatin, 10 mg, Oral, Daily  buPROPion, 150 mg, Oral, Daily  cloNIDine, 0 3 mg, Oral, BID  enoxaparin, 40 mg, Subcutaneous, Daily  insulin glargine, 34 Units, Subcutaneous, QAM  insulin lispro, 1-5 Units, Subcutaneous, HS  insulin lispro, 2-12 Units, Subcutaneous, TID AC  lisinopril, 40 mg, Oral, Daily  methocarbamol, 750 mg, Oral, Q8H [facility]  nicotine, 1 patch, Transdermal, Daily  sertraline, 100 mg, Oral, Daily  tamsulosin, 0 4 mg, Oral, Daily      Continuous IV Infusions:     PRN Meds:    11/27  0345   PO dilaudid  0630   IV Dilaudid   0900   PO dilaudid  1315   PO dilaudid  1700   PO dilaudid     aluminum-magnesium hydroxide-simethicone, 30 mL, Oral, Q6H PRN  docusate sodium, 100 mg, Oral, BID PRN  HYDROmorphone, 1 mg, Intravenous, Q3H PRN  HYDROmorphone, 2 mg, Oral, Q4H PRN  HYDROmorphone, 4 mg, Oral, Q4H PRN  nystatin, , Topical, TID PRN  ondansetron, 4 mg, Intravenous, Q6H PRN  oxybutynin, 5 mg, Oral, TID PRN  phenazopyridine, 100 mg, Oral, TID PRN        IP CONSULT TO CASE MANAGEMENT  IP CONSULT TO UROLOGY    Network Utilization Review Department  ATTENTION: Please call with any questions or concerns to [phone] and carefully listen to the prompts so that you are directed to the right person  All voicemails are confidential   Luca Cralson all requests for admission clinical reviews, approved or denied determinations and any other requests to dedicated fax number below belonging to the campus where the patient is receiving treatment   List of dedicated fax numbers for the Facilities:  [address] DENIALS (Administrative/Medical Necessity) [phone]   [address] (Maternity/NICU/Pediatrics) [phone]   [address] [phone]   [facility] [phone]   [facility] [phone]   [address] [place] [id number] [place] [address] [address]     [facility] [address] [phone]

## 2022-11-27 NOTE — CASE MANAGEMENT
Case Management Assessment & Discharge Planning Note    Patient name Nataly Zimmer  Location [address] 808/PPHP 181-40 MRN [id number]  : 1960 Date 2022       Current Admission Date: 2022  Current Admission Diagnosis:Costovertebral angle tenderness   Patient Active Problem List    Diagnosis Date Noted   • Costovertebral angle tenderness 2022   • Abnormal CT of the abdomen 2022   • Candidal intertrigo 2022   • Abnormal urinalysis 2022   • Intractable lower back pain 2022   • COVID-19 virus infection 2022   • Vitamin B12 deficiency 2021   • CKD (chronic kidney disease) stage 3 2020   • Type 2 diabetes mellitus with stage 3a chronic kidney disease, with long-term current use of insulin ([facility] ) 2020   • Bilateral renal cysts 2019   • Onychomycosis 2018   • Corns 2018   • Acquired deformity of foot 2018   • Metatarsalgia of both feet 2018   • Pain in both feet 2018   • Diabetic polyneuropathy associated with type 2 diabetes mellitus ([facility] ) 2018   • Morbid obesity 2018   • Hypogonadism, male 2017   • Arthralgia 2017   • Right hydroureteronephrosis with obstructing calculus 2017   • Fatigue 2017   • Obstructive sleep apnea 2016   • Vitamin D deficiency 10/16/2015   • Diabetes mellitus type 2 in obese ([facility] ) 2015   • Hallux valgus 2015   • Bunion 2015   • Acquired pes planus 2015   • Mixed hyperlipidemia 10/23/2014   • Seasonal allergies 2014   • Depression 2012   • Essential hypertension 2012   • Spinal stenosis 2012      LOS (days): 0  Geometric Mean LOS (GMLOS) (days):   Days to GMLOS:     OBJECTIVE:      Current admission status: Observation    Preferred Pharmacy:   [place] [id number]  Phone: [phone] Fax: [phone]    [facility]/pharmacy #[id number]- LUCIA Jacobson - [address]  [address]  [address] [address]  Phone: [phone] Fax: [phone]    Primary Care Provider: Bina Wu MD    Primary Insurance: [facility]  Secondary Insurance:     ASSESSMENT:  [address] [place], [address] [facility] Representative - Spouse   Primary Phone: [phone] (Mobile)               Advance Directives  Does patient have a [address]?: No  Does patient have Advance Directives?: No  Primary Contact: Hermilo Rodriguez (JMWPVB)[phone]      Readmission Root Cause  30 Day Readmission: Yes  Who directed you to return to the hospital?: Self  Did you understand whom to contact if you had questions or problems?: Yes  Did you get your prescriptions before you left the hospital?: Yes  Were you able to get your prescriptions filled when you left the hospital?: Yes  Did you take your medications as prescribed?: Yes  Were you able to get to your follow-up appointments?: No  Reason[de-identified] Readmitted prior to appointment  Patient was readmitted due to: Costovertebral angle tenderness    Patient Information  Admitted from[de-identified] Home  Mental Status: Alert  During Assessment patient was accompanied by: Not accompanied during assessment  Assessment information provided by[de-identified] Patient  Primary Caregiver: Self  Support Systems: Spouse/significant other  Home entry access options   Select all that apply : Stairs  Number of steps to enter home : 3  Do the steps have railings?: Yes  Type of Current Residence: [facility]  Living Arrangements: Lives w/ Spouse/significant other    Activities of Daily Living Prior to Admission  Functional Status: Independent  Completes ADLs independently?: Yes  Ambulates independently?: Yes  Does patient use assisted devices?: No  Does patient currently own DME?: No  Does patient have a history of Outpatient Therapy (PT/OT)?: Yes ( OP PT: [facility])  Does the patient have a history of Short-Term Rehab?: No  Does patient have a history of HHC?: No  Does patient currently have [address]?: No    Patient Information Continued  Does patient have prescription coverage?: Yes  Does patient have a history of substance abuse?: No  Does patient have a history of Mental Health Diagnosis?: No    Means of Transportation  [facility] Self        DISCHARGE DETAILS:    Discharge planning discussed with[de-identified] Patient  Freedom of Choice: Yes  Comments - Freedom of Choice: Prior therapy recommendations discussed with patient, new PT/OT evaluations  pending  Were Treatment Team discharge recommendations reviewed with patient/caregiver?: Yes  Did patient/caregiver verbalize understanding of patient care needs?: Yes  Were patient/caregiver advised of the risks associated with not following Treatment Team discharge recommendations?: Yes     DME Referral Provided  Referral made for DME?: No (prior rec on last admission for RW vs SPC (pending progress) Pt is interested in RW order upon d/c if indicated )      Other Referral/Resources/Interventions Provided:  Referral Comments: Pt recommended for OP PT no OT needs on 11/25 on prior admission  New PT/OT evaluations ordered for this admission  Pt reports if OP therapy is still recommended he will [facility] location due to prior hx  Pt interested in RW order prior to d/c  CM team will continue to follow

## 2022-11-28 ENCOUNTER — TRANSITIONAL CARE MANAGEMENT (OUTPATIENT)
Dept: INTERNAL MEDICINE CLINIC | Facility: CLINIC | Age: 62
End: 2022-11-28

## 2022-11-28 DIAGNOSIS — N20.1 CALCULUS OF URETER: Primary | ICD-10-CM

## 2022-11-28 LAB
ALBUMIN SERPL BCP-MCNC: 2.9 G/DL (ref 3.5–5)
ALP SERPL-CCNC: 66 U/L (ref 46–116)
ALT SERPL W P-5'-P-CCNC: 83 U/L (ref 12–78)
ANION GAP SERPL CALCULATED.3IONS-SCNC: 6 MMOL/L (ref 4–13)
AST SERPL W P-5'-P-CCNC: 56 U/L (ref 5–45)
BASOPHILS # BLD AUTO: 0.06 THOUSANDS/ÂΜL (ref 0–0.1)
BASOPHILS NFR BLD AUTO: 1 % (ref 0–1)
BILIRUB SERPL-MCNC: 0.91 MG/DL (ref 0.2–1)
BUN SERPL-MCNC: 20 MG/DL (ref 5–25)
CALCIUM ALBUM COR SERPL-MCNC: 9.7 MG/DL (ref 8.3–10.1)
CALCIUM SERPL-MCNC: 8.8 MG/DL (ref 8.3–10.1)
CHLORIDE SERPL-SCNC: 105 MMOL/L (ref 96–108)
CO2 SERPL-SCNC: 26 MMOL/L (ref 21–32)
CREAT SERPL-MCNC: 1.38 MG/DL (ref 0.6–1.3)
EOSINOPHIL # BLD AUTO: 0.37 THOUSAND/ÂΜL (ref 0–0.61)
EOSINOPHIL NFR BLD AUTO: 5 % (ref 0–6)
ERYTHROCYTE [DISTWIDTH] IN BLOOD BY AUTOMATED COUNT: 13.2 % (ref 11.6–15.1)
GFR SERPL CREATININE-BSD FRML MDRD: 54 ML/MIN/1.73SQ M
GLUCOSE SERPL-MCNC: 154 MG/DL (ref 65–140)
GLUCOSE SERPL-MCNC: 165 MG/DL (ref 65–140)
GLUCOSE SERPL-MCNC: 178 MG/DL (ref 65–140)
GLUCOSE SERPL-MCNC: 178 MG/DL (ref 65–140)
GLUCOSE SERPL-MCNC: 208 MG/DL (ref 65–140)
HCT VFR BLD AUTO: 46.4 % (ref 36.5–49.3)
HGB BLD-MCNC: 15.2 G/DL (ref 12–17)
IMM GRANULOCYTES # BLD AUTO: 0.03 THOUSAND/UL (ref 0–0.2)
IMM GRANULOCYTES NFR BLD AUTO: 0 % (ref 0–2)
LYMPHOCYTES # BLD AUTO: 1.86 THOUSANDS/ÂΜL (ref 0.6–4.47)
LYMPHOCYTES NFR BLD AUTO: 26 % (ref 14–44)
MAGNESIUM SERPL-MCNC: 2.2 MG/DL (ref 1.6–2.6)
MCH RBC QN AUTO: 30 PG (ref 26.8–34.3)
MCHC RBC AUTO-ENTMCNC: 32.8 G/DL (ref 31.4–37.4)
MCV RBC AUTO: 92 FL (ref 82–98)
MONOCYTES # BLD AUTO: 0.73 THOUSAND/ÂΜL (ref 0.17–1.22)
MONOCYTES NFR BLD AUTO: 10 % (ref 4–12)
NEUTROPHILS # BLD AUTO: 4.13 THOUSANDS/ÂΜL (ref 1.85–7.62)
NEUTS SEG NFR BLD AUTO: 58 % (ref 43–75)
NRBC BLD AUTO-RTO: 0 /100 WBCS
PLATELET # BLD AUTO: 275 THOUSANDS/UL (ref 149–390)
PMV BLD AUTO: 9.5 FL (ref 8.9–12.7)
POTASSIUM SERPL-SCNC: 3.7 MMOL/L (ref 3.5–5.3)
PROT SERPL-MCNC: 7.2 G/DL (ref 6.4–8.4)
RBC # BLD AUTO: 5.06 MILLION/UL (ref 3.88–5.62)
SODIUM SERPL-SCNC: 137 MMOL/L (ref 135–147)
WBC # BLD AUTO: 7.18 THOUSAND/UL (ref 4.31–10.16)

## 2022-11-28 PROCEDURE — 82948 REAGENT STRIP/BLOOD GLUCOSE: CPT

## 2022-11-28 PROCEDURE — 97166 OT EVAL MOD COMPLEX 45 MIN: CPT

## 2022-11-28 PROCEDURE — 99225 PR SBSQ OBSERVATION CARE/DAY 25 MINUTES: CPT | Performed by: INTERNAL MEDICINE

## 2022-11-28 PROCEDURE — 85025 COMPLETE CBC W/AUTO DIFF WBC: CPT | Performed by: INTERNAL MEDICINE

## 2022-11-28 PROCEDURE — 80053 COMPREHEN METABOLIC PANEL: CPT | Performed by: INTERNAL MEDICINE

## 2022-11-28 PROCEDURE — 97163 PT EVAL HIGH COMPLEX 45 MIN: CPT

## 2022-11-28 PROCEDURE — 83735 ASSAY OF MAGNESIUM: CPT | Performed by: INTERNAL MEDICINE

## 2022-11-28 RX ADMIN — HYDROMORPHONE HYDROCHLORIDE 4 MG: 4 TABLET ORAL at 15:55

## 2022-11-28 RX ADMIN — ACETAMINOPHEN 975 MG: 325 TABLET ORAL at 21:22

## 2022-11-28 RX ADMIN — ATORVASTATIN CALCIUM 10 MG: 10 TABLET, FILM COATED ORAL at 08:50

## 2022-11-28 RX ADMIN — METHOCARBAMOL TABLETS 750 MG: 750 TABLET, COATED ORAL at 14:14

## 2022-11-28 RX ADMIN — CLONIDINE HYDROCHLORIDE 0.3 MG: 0.2 TABLET ORAL at 08:49

## 2022-11-28 RX ADMIN — LISINOPRIL 40 MG: 20 TABLET ORAL at 08:49

## 2022-11-28 RX ADMIN — METHOCARBAMOL TABLETS 750 MG: 750 TABLET, COATED ORAL at 21:22

## 2022-11-28 RX ADMIN — GABAPENTIN 100 MG: 100 CAPSULE ORAL at 17:14

## 2022-11-28 RX ADMIN — KETOROLAC TROMETHAMINE 10 MG: 10 TABLET, FILM COATED ORAL at 05:38

## 2022-11-28 RX ADMIN — CLONIDINE HYDROCHLORIDE 0.3 MG: 0.2 TABLET ORAL at 17:14

## 2022-11-28 RX ADMIN — ACETAMINOPHEN 975 MG: 325 TABLET ORAL at 14:14

## 2022-11-28 RX ADMIN — SERTRALINE 100 MG: 100 TABLET, FILM COATED ORAL at 08:50

## 2022-11-28 RX ADMIN — INSULIN LISPRO 2 UNITS: 100 INJECTION, SOLUTION INTRAVENOUS; SUBCUTANEOUS at 11:49

## 2022-11-28 RX ADMIN — INSULIN GLARGINE 34 UNITS: 100 INJECTION, SOLUTION SUBCUTANEOUS at 08:56

## 2022-11-28 RX ADMIN — ACETAMINOPHEN 975 MG: 325 TABLET ORAL at 05:38

## 2022-11-28 RX ADMIN — INSULIN LISPRO 1 UNITS: 100 INJECTION, SOLUTION INTRAVENOUS; SUBCUTANEOUS at 21:22

## 2022-11-28 RX ADMIN — INSULIN LISPRO 2 UNITS: 100 INJECTION, SOLUTION INTRAVENOUS; SUBCUTANEOUS at 08:56

## 2022-11-28 RX ADMIN — INSULIN LISPRO 2 UNITS: 100 INJECTION, SOLUTION INTRAVENOUS; SUBCUTANEOUS at 17:16

## 2022-11-28 RX ADMIN — AMLODIPINE BESYLATE 10 MG: 10 TABLET ORAL at 08:49

## 2022-11-28 RX ADMIN — GABAPENTIN 100 MG: 100 CAPSULE ORAL at 08:49

## 2022-11-28 RX ADMIN — TAMSULOSIN HYDROCHLORIDE 0.8 MG: 0.4 CAPSULE ORAL at 08:49

## 2022-11-28 RX ADMIN — BUPROPION HYDROCHLORIDE 150 MG: 150 TABLET, FILM COATED, EXTENDED RELEASE ORAL at 08:49

## 2022-11-28 RX ADMIN — ENOXAPARIN SODIUM 40 MG: 40 INJECTION SUBCUTANEOUS at 08:49

## 2022-11-28 RX ADMIN — METHOCARBAMOL TABLETS 750 MG: 750 TABLET, COATED ORAL at 05:38

## 2022-11-28 NOTE — UTILIZATION REVIEW
NOTIFICATION OF ADMISSION DISCHARGE   This is a Notification of Discharge from [address]  Please be advised that this patient has been discharge from our facility  Below you will find the admission and discharge date and time including the patient’s disposition  UTILIZATION REVIEW CONTACT:  Mayank Flower  Utilization   Network Utilization Review Department  Phone: [phone] x carefully listen to the prompts  All voicemails are confidential   Email: [email or web address]     ADMISSION INFORMATION  PRESENTATION DATE: 11/23/2022  4:00 PM  OBERVATION ADMISSION DATE:   INPATIENT ADMISSION DATE: 11/23/22  4:00 PM   DISCHARGE DATE: 11/26/2022  1:58 PM   DISPOSITION:Home/Self Care    IMPORTANT INFORMATION:  Send all requests for admission clinical reviews, approved or denied determinations and any other requests to dedicated fax number below belonging to the campus where the patient is receiving treatment   List of dedicated fax numbers:  [address] DENIALS (Administrative/Medical Necessity) [phone]   [address] (Maternity/NICU/Pediatrics) [phone]   [facility] [phone]   [facility] [phone]   [address] [phone]   220 [facility] [phone]   90 [facility] [phone]   [address] [place] [address] 119 [phone]   [facility]  [phone]   [address] [facility] [phone]   412 [facility] 850 [facility] [phone]

## 2022-11-28 NOTE — ASSESSMENT & PLAN NOTE
Status post stent placement due to obstructive renal calculi w/ resulting hydronephrosis   Pain likely related to stent  S/p CT a/p, stent in place  Urology on board; flomax increased to bid, added gabapentin and was on scheduled toradol now on hold due to bump in cr  Cont prn dilaudid  UA benign  Awaiting input from urology to see if any other intervention

## 2022-11-28 NOTE — ASSESSMENT & PLAN NOTE
Lab Results   Component Value Date    HGBA1C 10 1 (H) 10/16/2022   Continue sliding scale  Continue Lantus  Accu-Cheks per protocol      Recent Labs     11/27/22  2042 11/28/22  0714 11/28/22  1144 11/28/22  1608   POCGLU 186* 154* 178* 178*       Blood Sugar Average: Last 72 hrs:  (P) 882 [id number]

## 2022-11-28 NOTE — PLAN OF CARE
Problem: OCCUPATIONAL THERAPY ADULT  Goal: Performs self-care activities at highest level of function for planned discharge setting  See evaluation for individualized goals  Description: Treatment Interventions: ADL retraining, Functional transfer training, UE strengthening/ROM, Endurance training, Patient/family training, Equipment evaluation/education, Compensatory technique education, Energy conservation, Activityengagement          See flowsheet documentation for full assessment, interventions and recommendations  Note: Limitation: Decreased ADL status, Decreased Safe judgement during ADL, Decreased UE strength, Decreased UE ROM, Decreased endurance, Decreased high-level ADLs, Decreased self-care trans  Prognosis: Fair  Assessment: Pt is a 59 yo Male who [facility] on 11/26/2022 with worsening flank pain  Pt with recent hospitalization from 11/20-11/26 and underwent procedure with urology on 11/24 for cystoscopy and R stent placement  Pt with diagnosis of costovertebral angle tenderness  Pt  has a past medical history of Acute renal failure (ARF) ([facility] [address] ), Chest pain, CPAP (continuous positive airway pressure) dependence, Depression, Diabetes mellitus ([facility] ), Dyspnea on exertion, Hypertension, Nephrolithiasis, Numbness of right foot, Obesity, Onychomycosis, JEFF on CPAP, Sciatica, and Sleep apnea  Pt greeted bedside for OT evaluation on 11/28/2022  Pt lives with spouse in a [facility] with 3 CHARLY  PTA, Pt reports being I with ADLs/IADLs/no AD/ +/FTE  Pt reports having a supportive spouse who works from home  Pt demonstrating the following occupational deficits: S with UB ADLs, mod A with LB ADLs, S with bed mobility, S with functional transfers, and S with functional mobility with RW  Limitations that impact functional performance include decreased ADL status, decreased safe judgement during ADLs, decreased endurance, decreased self care transfers, decreased high level ADLs and pain   Occupational performance areas to address ADL retraining, functional transfer training, endurance training, Pt/caregiver education, equipment evaluation/education, compensatory technique education, energy conservation and activity engagement   Pt would benefit from continued skilled OT services while in hospital to maximize independence with ADLs  Will continue to follow Pt's progress  Pt would benefit from returning home with increased social support upon DC to maximize safety and independence with ADLs and functional tasks of choice       OT Discharge Recommendation: No rehabilitation needs (Anticipate no OT needs pending progress)

## 2022-11-28 NOTE — ASSESSMENT & PLAN NOTE
Lab Results   Component Value Date    EGFR 54 11/28/2022    EGFR 68 11/26/2022    EGFR 55 11/26/2022    CREATININE 1 38 (H) 11/28/2022    CREATININE 1 14 11/26/2022    CREATININE 1 36 (H) 11/26/2022   Cr bump but still w/in baseline range

## 2022-11-28 NOTE — TELEPHONE ENCOUNTER
Patient still currently admitted, holding 1/13 at [facility] Dr Manuel Odell  Will contact patient once d/c

## 2022-11-28 NOTE — PROGRESS NOTES
[address] [facility]  Progress Note - Serena Dopp 1960, 58 y o  male MRN: [id number]  Unit/Bed#: [facility] 808-01 Encounter: [id number]  Primary Care Provider: Cheryle Mac MD   Date and time admitted to hospital: 11/26/2022  9:36 PM    * Costovertebral angle tenderness  Assessment & Plan  Status post stent placement due to obstructive renal calculi w/ resulting hydronephrosis  Pain likely related to stent  S/p CT a/p, stent in place  Urology on board; flomax increased to bid, added gabapentin and was on scheduled toradol now on hold due to bump in cr  Cont prn dilaudid  UA benign  Awaiting input from urology to see if any other intervention    Abnormal CT scan  Assessment & Plan  Incidental finding of mild acute sigmoid diverticulitis  Pt completely asymptomatic; no abd pain, fever  Tolerating diet  Known hx of diverticulosis  Abx not indicated as discussed with pharmacy abx mariah  Will cont to monitor off abx    CKD (chronic kidney disease) stage 3  Assessment & Plan  Lab Results   Component Value Date    EGFR 54 11/28/2022    EGFR 68 11/26/2022    EGFR 55 11/26/2022    CREATININE 1 38 (H) 11/28/2022    CREATININE 1 14 11/26/2022    CREATININE 1 36 (H) 11/26/2022   Cr bump but still w/in baseline range      Essential hypertension  Assessment & Plan  On Norvasc, clonidine, holding lisinopril due to bump in cr    Diabetes mellitus type 2 in obese [facility])  Assessment & Plan  Lab Results   Component Value Date    HGBA1C 10 1 (H) 10/16/2022   Continue sliding scale  Continue Lantus  Accu-Cheks per protocol      Recent Labs     11/27/22  2042 11/28/22  0714 11/28/22  1144 11/28/22  1608   POCGLU 186* 154* 178* 178*       Blood Sugar Average: Last 72 hrs:  (P) 911 [id number]      VTE Pharmacologic Prophylaxis:   Pharmacologic: Enoxaparin (Lovenox)  Mechanical VTE Prophylaxis in Place: No    Patient Centered Rounds: I have performed bedside rounds with nursing staff today     Discussions with Specialists or Other Care Team Provider:     Education and Discussions with Family / Patient: Patient    Time Spent for Care: 30 minutes  More than 50% of total time spent on counseling and coordination of care as described above  Current Length of Stay: 0 day(s)    Current Patient Status: Observation   Certification Statement: The patient will continue to require additional inpatient hospital stay due to persistent renal colic    Discharge Plan:     Code Status: Level 1 - Full Code      Subjective:   Still reports of renal colic related pain  Reports of worsening symptoms with activity  Continues to deny abd pain  Objective:     Vitals:   Temp (24hrs), Av 5 °F (36 4 °C), Min:97 3 °F (36 3 °C), Max:97 7 °F (36 5 °C)    Temp:  [97 3 °F (36 3 °C)-97 7 °F (36 5 °C)] 97 7 °F (36 5 °C)  HR:  [68-74] 74  Resp:  [16-18] 18  BP: (138-174)/() 138/96  SpO2:  [91 %-93 %] 92 %  Body mass index is 39 27 kg/m²  Input and Output Summary (last 24 hours): Intake/Output Summary (Last 24 hours) at 2022 1724  Last data filed at 2022 1555  Gross per 24 hour   Intake 338 ml   Output 2000 ml   Net -1662 ml       Physical Exam:     Physical Exam  Constitutional:       Appearance: He is obese  Cardiovascular:      Rate and Rhythm: Normal rate and regular rhythm  Pulses: Normal pulses  Heart sounds: Normal heart sounds  No murmur heard  Pulmonary:      Effort: Pulmonary effort is normal  No respiratory distress  Breath sounds: Normal breath sounds  No wheezing or rales  Abdominal:      General: Abdomen is flat  Bowel sounds are normal  There is no distension  Palpations: Abdomen is soft  Tenderness: There is no abdominal tenderness  There is right CVA tenderness  There is no guarding  Musculoskeletal:         General: Normal range of motion  Cervical back: Normal range of motion and neck supple  Right lower leg: No edema        Left lower leg: No edema  Skin:     General: Skin is warm and dry  Neurological:      General: No focal deficit present  Mental Status: He is alert and oriented to person, place, and time  Mental status is at baseline  Cranial Nerves: No cranial nerve deficit  Motor: No weakness  Additional Data:     Labs:    Results from last 7 days   Lab Units 11/28/22  0447   WBC Thousand/uL 7 18   HEMOGLOBIN g/dL 15 2   HEMATOCRIT % 46 4   PLATELETS Thousands/uL 275   NEUTROS PCT % 58   LYMPHS PCT % 26   MONOS PCT % 10   EOS PCT % 5     Results from last 7 days   Lab Units 11/28/22  0447   SODIUM mmol/L 137   POTASSIUM mmol/L 3 7   CHLORIDE mmol/L 105   CO2 mmol/L 26   BUN mg/dL 20   CREATININE mg/dL 1 38*   ANION GAP mmol/L 6   CALCIUM mg/dL 8 8   ALBUMIN g/dL 2 9*   TOTAL BILIRUBIN mg/dL 0 91   ALK PHOS U/L 66   ALT U/L 83*   AST U/L 56*   GLUCOSE RANDOM mg/dL 165*         Results from last 7 days   Lab Units 11/28/22  1608 11/28/22  1144 11/28/22  0714 11/27/22  2042 11/27/22  1610 11/27/22  1117 11/27/22  0848 11/27/22  0811 11/27/22  0305 11/26/22  1120 11/26/22  0751 11/25/22  2130   POC GLUCOSE mg/dl 178* 178* 154* 186* 129 127 131 101 155* 152* 104 141*         Results from last 7 days   Lab Units 11/23/22  0528 11/22/22  0553   PROCALCITONIN ng/ml 0 08 0 13           * I Have Reviewed All Lab Data Listed Above  * Additional Pertinent Lab Tests Reviewed:  All Labs Within Last 24 Hours Reviewed    Imaging:    Imaging Reports Reviewed Today Include:   Imaging Personally Reviewed by Myself Includes:      Recent Cultures (last 7 days):     Results from last 7 days   Lab Units 11/24/22  1045 11/23/22  0040   URINE CULTURE  No Growth <100 cfu/mL 50,000-59,000 cfu/ml       Last 24 Hours Medication List:   Current Facility-Administered Medications   Medication Dose Route Frequency Provider Last Rate   • acetaminophen  975 mg Oral Q8H Helena Noe MD     • aluminum-magnesium hydroxide-simethicone  30 mL Oral Q6H PRN Rudy Reyna MD     • amLODIPine  10 mg Oral Daily Rudy Reyna MD     • atorvastatin  10 mg Oral Daily Rudy Reyna MD     • buPROPion  150 mg Oral Daily Rudy Reyna MD     • cloNIDine  0 3 mg Oral BID Rudy Reyna MD     • docusate sodium  100 mg Oral BID PRN Rudy Reyna MD     • enoxaparin  40 mg Subcutaneous Daily Rudy Reyna MD     • gabapentin  100 mg Oral BID Holden Walker MD     • HYDROmorphone  1 mg Intravenous Q3H PRN Rudy Reyna MD     • HYDROmorphone  2 mg Oral Q4H PRN Rudy Reyna MD     • HYDROmorphone  4 mg Oral Q4H PRN Rudy Reyna MD     • insulin glargine  34 Units Subcutaneous QAM Rudy Reyna MD     • insulin lispro  1-5 Units Subcutaneous HS Rudy Reyna MD     • insulin lispro  2-12 Units Subcutaneous TID AC Rudy Reyna MD     • methocarbamol  750 mg Oral Q8H Hood Rushing MD     • nicotine  1 patch Transdermal Daily Rudy Reyna MD     • nystatin   Topical TID PRN Rudy Reyna MD     • ondansetron  4 mg Intravenous Q6H PRN Rudy Reyna MD     • oxybutynin  5 mg Oral TID PRN Rudy Reyna MD     • phenazopyridine  100 mg Oral TID PRN Rudy Reyna MD     • sertraline  100 mg Oral Daily Rudy Reyna MD     • tamsulosin  0 8 mg Oral Daily Holden Walker MD          Today, Patient Was Seen By: Katarzyna Romero DO    ** Please Note: Dictation voice to text software may have been used in the creation of this document   **

## 2022-11-28 NOTE — PHYSICAL THERAPY NOTE
Physical Therapy Evaluation     Patient's Name: Oj Hatch    Admitting Diagnosis  Calculus of ureter [Z75 1]  Renal colic [M64]  Flank pain [R10 9]  Right flank pain [R10 9]    Problem List  Patient Active Problem List   Diagnosis    Depression    Diabetes mellitus type 2 in obese ([facility] [address] )    Morbid obesity    Mixed hyperlipidemia    Essential hypertension    Hypogonadism, male    Right hydroureteronephrosis with obstructing calculus    Obstructive sleep apnea    Spinal stenosis    Vitamin D deficiency    Acquired deformity of foot    Metatarsalgia of both feet    Pain in both feet    Diabetic polyneuropathy associated with type 2 diabetes mellitus (HCC)    Fatigue    Seasonal allergies    Onychomycosis    Corns    Arthralgia    Hallux valgus    Bunion    Acquired pes planus    Bilateral renal cysts    Type 2 diabetes mellitus with stage 3a chronic kidney disease, with long-term current use of insulin ([facility])    CKD (chronic kidney disease) stage 3    Vitamin B12 deficiency    COVID-19 virus infection    Intractable lower back pain    Abnormal urinalysis    Candidal intertrigo    Abnormal CT of the abdomen    Costovertebral angle tenderness    Abnormal CT scan       Past Medical History  Past Medical History:   Diagnosis Date    Acute renal failure (ARF) ([facility] [address] )     last assessed - [id number]    Chest pain     last assessed - [id number]    CPAP (continuous positive airway pressure) dependence     Depression     Diabetes mellitus ([facility] [address] )     Dyspnea on exertion     last assessed - [id number]    Hypertension     Nephrolithiasis     Numbness of right foot     numbness on the sole of the right foot; since he was young d/t stenosis    Obesity     Onychomycosis     last assessed - [id number]    JEFF on CPAP     Sciatica     last assessed - [id number]    Sleep apnea        Past Surgical History  Past Surgical History:   Procedure Laterality Date    COLONOSCOPY      CYSTOSCOPY W/ LASER LITHOTRIPSY      FL RETROGRADE PYELOGRAM 11/24/2022    KNEE ARTHROSCOPY Left 11/2013    AR CYSTOURETHROSCOPY,URETER CATHETER Right 11/24/2022    Procedure: CYSTOSCOPY RETROGRADE PYELOGRAM WITH INSERTION STENT URETERAL;  Surgeon: Irina Lacy MD;  Location[facility];  Service: Urology    AR LAP,CHOLECYSTECTOMY N/A 2/8/2019    Procedure: ROBOTIC ASSISTED LAPAROSCOPIC CHOLECYSTECTOMY;  Surgeon: Harjit Chavira DO;  Location: [facility];  Service: General    PROSTATE BIOPSY      Needle Biopsy - Negative        11/28/22 0923   PT Last Visit   PT Visit Date 11/28/22   Note Type   Note type Evaluation   Pain Assessment   Pain Assessment Tool 0-10   Pain Score (S)  10 - Worst Possible Pain  (w/ sit<-->stand transfers)   Pain Location/Orientation Orientation: Right;Location: Back   Effect of Pain on Daily Activities not ambulating   Patient's Stated Pain Goal No pain   Hospital Pain Intervention(s) Ambulation/increased activity;Repositioned   Restrictions/Precautions   Weight Bearing Precautions Per Order No   Braces or Orthoses   (none)   Other Precautions Pain   Home Living   Type of [place] [address] to live on main level with bedroom/bathroom; Performs ADLs on one level;Stairs to enter with rails  (3 charly)   Bathroom Shower/Tub Tub/shower unit   Bathroom Toilet Standard   Bathroom Equipment Grab bars in [facility]   (denies)   Additional Comments Prior to this admission patient resided with spouse in a home with 1st floor set up (3 CHARLY)  At his baseline she is I with mobility (no use of AD), ADLs, and iADLS  + employment out of the house  Prior Function   Level of [place] Independent with ADLs   Lives With Spouse   Receives Help From Family   IADLs Independent with driving; Independent with meal prep; Independent with medication management   Falls in the last 6 months 0   Vocational Full time employment   General   Additional Pertinent History 58year old male admitted to [facility] on 11/26/2022 with ongoing lower back pain   Patient with recent admissions on 11/20 and 11/23 and on 11/24 patient underwent the following urologic procedure: cystoscopy with right retrograde pyelogram and ureteral stenting secondary to 12 mm ureteral calculus  Family/Caregiver Present No   Cognition   Overall Cognitive Status WFL   Arousal/Participation Alert   Attention Within functional limits   Orientation Level Oriented X4   Memory Within functional limits   Following Commands Follows all commands and directions without difficulty   Comments pleasant, cooperative, required encouragement + education for participation in mobility   Subjective   Subjective "I wish they would just blast the stone"   RUE Assessment   RUE Assessment WFL   LUE Assessment   LUE Assessment WFL   RLE Assessment   RLE Assessment WFL   Bed Mobility   Supine to Sit 5  Supervision   Additional items HOB elevated; Increased time required   Sit to Supine Unable to assess   Additional Comments OOB in recliner post eval   Transfers   Sit to Stand 5  Supervision   Additional items Increased time required   Stand to Sit 5  Supervision   Additional items Increased time required   Additional Comments no AD   Ambulation/Elevation   Gait pattern Excessively slow; Foward flexed   Gait Assistance 5  Supervision   Assistive Device Rolling walker   Distance 3 feet (bed to chair)   Balance   Static Sitting Good   Static Standing Fair   Ambulatory Fair   Endurance Deficit   Endurance Deficit Yes   Endurance Deficit Description pain in back (R side)   Activity Tolerance   Activity Tolerance Patient limited by pain   Medical Staff Made Aware 1) Perform bed mobility mod-I to participate in frequent repositioning and improve skin integrity; 2) Perform functional transfers mod-I to promote I with toileting and OOB mobility; 3) Ambulate 200 feet mod-I with least restrictive device to participate in household and community level mobility; 4) Navigate 3 steps S level in order to safely navigate in/out of home Nurse Made Aware malachi to seee per RN Nini   Assessment   Prognosis Fair   Problem List Decreased endurance;Decreased mobility;Obesity   Assessment PT completed evaluation of 58year old male admitted to [facility] on 11/26/2022 with ongoing lower back pain  Patient with recent admissions on 11/20 and 11/23 and on 11/24 patient underwent the following urologic procedure: cystoscopy with right retrograde pyelogram and ureteral stenting secondary to 12 mm ureteral calculus  Current status instabilities include pain, continuous O2/HR monitoring, and a regression in functional status from baseline  PMH is significant for JEFF, obesity, DM, and HTN  Prior to this admission patient resided with spouse in a home with 1st floor set up (3 CHARLY)  At his baseline she is I with mobility (no use of AD), ADLs, and iADLS  + employment out of the house  Current impairments include ongoing back pain with reduced participation in ADLS and iADLS, functional mobility tasks, and decreased activity tolerance  During PT evaluation patient was able to perform supine-->sit transfer, sit<-->stand transfers, and short distance ambulation  S level w/ increased time  With use of RW, he ambulated 3 feet (bed to chair)  Patient w/ "10/10" pain during sit-->stand transfer and not able to tolerate further mobility  Anticipate with improved pain control patient will return to functional baseline (independent)  At this time, recommend RW to assist with mobility  Do not anticipate f/u PT needs at time of d/c  Will continue to follow on caseload to have patient increase participation in mobility and for d/c planning     Goals   Patient Goals to have less pain   STG Expiration Date 12/12/22   Short Term Goal #1 1) Perform bed mobility mod-I to participate in frequent repositioning and improve skin integrity; 2) Perform functional transfers mod-I to promote I with toileting and OOB mobility; 3) Ambulate 200 feet mod-I with least restrictive device to participate in household and community level mobility; 4) Navigate 3 steps S level in order to safely navigate in/out of home   PT Treatment Day 0   Plan   Treatment/Interventions Functional transfer training;Elevations; Endurance training;Bed mobility;Gait training;OT;Spoke to nursing;Equipment eval/education   PT Frequency 2-3x/wk   Recommendation   PT Discharge Recommendation (S)  No rehabilitation needs  (return home w/ family support)   Equipment Recommended (S)  [address]  (using RW at this time and may require for d/c home- will cont to assess)   AM-PAC Basic Mobility Inpatient   Turning in Bed Without Bedrails 4   Lying on Back to Sitting on Edge of Flat Bed 4   Moving Bed to Chair 4   Standing Up From Chair 4   Walk in Room 3   Climb 3-5 Stairs 1   Basic Mobility Inpatient Raw Score 20   Basic Mobility Standardized Score 43 99   Highest Level Of Mobility   -[facility] Goal 6: Walk 10 steps or more     The patient's AM-PAC Basic Mobility Inpatient Standardized Score is greater than 42 9, suggesting this patient may benefit from discharge to home  Please also refer to the recommendation of the Physical Therapist for safe discharge planning      Laureano Herman, PT, DPT

## 2022-11-28 NOTE — PLAN OF CARE
Problem: PAIN - ADULT  Goal: Verbalizes/displays adequate comfort level or baseline comfort level  Description: Interventions:  - Encourage patient to monitor pain and request assistance  - Assess pain using appropriate pain scale  - Administer analgesics based on type and severity of pain and evaluate response  - Implement non-pharmacological measures as appropriate and evaluate response  - Consider cultural and social influences on pain and pain management  - Notify physician/advanced practitioner if interventions unsuccessful or patient reports new pain  Outcome: Progressing     Problem: INFECTION - ADULT  Goal: Absence or prevention of progression during hospitalization  Description: INTERVENTIONS:  - Assess and monitor for signs and symptoms of infection  - Monitor lab/diagnostic results  - Monitor all insertion sites, i e  indwelling lines, tubes, and drains  - Monitor endotracheal if appropriate and nasal secretions for changes in amount and color  - [place] appropriate cooling/warming therapies per order  - Administer medications as ordered  - Instruct and encourage patient and family to use good hand hygiene technique  - Identify and instruct in appropriate isolation precautions for identified infection/condition  Outcome: Progressing     Problem: DISCHARGE PLANNING  Goal: Discharge to home or other facility with appropriate resources  Description: INTERVENTIONS:  - Identify barriers to discharge w/patient and caregiver  - Arrange for needed discharge resources and transportation as appropriate  - Identify discharge learning needs (meds, wound care, etc )  - Arrange for interpretive services to assist at discharge as needed  - Refer to Case Management Department for coordinating discharge planning if the patient needs post-hospital services based on physician/advanced practitioner order or complex needs related to functional status, cognitive ability, or social support system  Outcome: Progressing Problem: Knowledge Deficit  Goal: Patient/family/caregiver demonstrates understanding of disease process, treatment plan, medications, and discharge instructions  Description: Complete learning assessment and assess knowledge base    Interventions:  - Provide teaching at level of understanding  - Provide teaching via preferred learning methods  Outcome: Progressing

## 2022-11-28 NOTE — OCCUPATIONAL THERAPY NOTE
Occupational Therapy Evaluation     Patient Name: Donnie CROWLEY Date: 11/28/2022  Problem List  Principal Problem:    Costovertebral angle tenderness  Active Problems:    Diabetes mellitus type 2 in obese [facility])    Essential hypertension    CKD (chronic kidney disease) stage 3    Abnormal CT scan    Past Medical History  Past Medical History:   Diagnosis Date    Acute renal failure (ARF) ([facility] [address] )     last assessed - [id number]    Chest pain     last assessed - [id number]    CPAP (continuous positive airway pressure) dependence     Depression     Diabetes mellitus ([facility] [address] )     Dyspnea on exertion     last assessed - [id number]    Hypertension     Nephrolithiasis     Numbness of right foot     numbness on the sole of the right foot; since he was young d/t stenosis    Obesity     Onychomycosis     last assessed - [id number]    JEFF on CPAP     Sciatica     last assessed - [id number]    Sleep apnea      Past Surgical History  Past Surgical History:   Procedure Laterality Date    COLONOSCOPY      CYSTOSCOPY W/ LASER LITHOTRIPSY      FL RETROGRADE PYELOGRAM  11/24/2022    KNEE ARTHROSCOPY Left 11/2013    NC CYSTOURETHROSCOPY,URETER CATHETER Right 11/24/2022    Procedure: CYSTOSCOPY RETROGRADE PYELOGRAM WITH INSERTION STENT URETERAL;  Surgeon: Luli Azevedo MD;  Location: [facility];  Service: Urology    NC LAP,CHOLECYSTECTOMY N/A 2/8/2019    Procedure: ROBOTIC ASSISTED LAPAROSCOPIC 9 [facility];  Surgeon: Darrel Koyanagi, DO;  Location: [facility];  Service: General    PROSTATE BIOPSY      Needle Biopsy - Negative           11/28/22 0924   OT Last Visit   OT Visit Date 11/28/22   Note Type   Note type Evaluation   Pain Assessment   Pain Assessment Tool 0-10   Pain Score 10 - Worst Possible Pain  (with STS tranfers)   [facility] Pain Intervention(s) Repositioned; Ambulation/increased activity   Restrictions/Precautions   Weight Bearing Precautions Per Order No   Other Precautions Pain   Home Living   Type of Home House   Home Layout One level   Bathroom Shower/Tub Tub/shower unit   Bathroom Toilet Standard   Bathroom Equipment Grab bars in [facility]   (sonya)   Additional Comments Pt lives with spouse in a [facility] with 3 CHARLY  Prior Function   Level of [place] Independent with functional mobility; Independent with ADLs; Independent with IADLS   Lives With Spouse   Receives Help From Family   IADLs Independent with driving; Independent with meal prep; Independent with medication management   Falls in the last 6 months 0   Vocational Full time employment   Comments PTA, Pt reports being I with ADLs/IADLs/no AD/ +/FTE  Pt reports having a supportive spouse who works from home  Lifestyle   Autonomy I with ADLs/IADLs/no AD/ +/FTE   Reciprocal Relationships supportive spouse   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)   Intrinsic Gratification enjoys spending time with family   Subjective   Subjective "I couldn't get off the toilet at home it hurt to much "   ADL   Where Assessed Edge of bed   Eating Assistance 7  Independent   Grooming Assistance 5  Supervision/Setup   UB Bathing Assistance 5  Supervision/Setup   LB Bathing Assistance 3  Moderate Assistance    [address] 5  Supervision/Setup   LB Dressing Assistance 3  Moderate Assistance   LB Dressing Deficit Setup;Don/doff R sock; Don/doff L sock   Toileting Assistance  4  Minimal Assistance   Functional Assistance 5  Supervision/Setup   Additional Comments Pt educated on LB dressing compensatory techniques, however, Pt declined to practice stating "I would be in too much pain " Therapist provided assistance with LB dressing  Bed Mobility   Supine to Sit 5  Supervision   Additional items HOB elevated; Bedrails; Increased time required   Sit to Supine Unable to assess   Additional Comments Pt greeted supine in bed     Transfers   Sit to Stand 5  Supervision   Additional items Increased time required   Stand to Sit 5 Supervision   Additional items Increased time required   Additional Comments with RW   Functional Mobility   Functional Mobility 5  Supervision   Additional Comments S with few steps from EOB to recliner chair- RW   Additional items Rolling walker   Balance   Static Sitting Good   Dynamic Sitting Fair +   Static Standing Fair   Dynamic Standing Fair   Ambulatory Fair -   Activity Tolerance   Activity Tolerance Patient limited by pain   Medical Staff Made Aware Co-eval with PT 2* to pt's medical complexity and decreased endurance  Nurse Made Aware RN cleared/updated  RUE Assessment   RUE Assessment WFL   LUE Assessment   LUE Assessment WFL   Hand Function   Gross Motor Coordination Functional   Fine Motor Coordination Functional   Sensation   Light Touch No apparent deficits   Vision-Basic Assessment   Current Vision Wears glasses all the time   Psychosocial   Psychosocial (WDL) WDL   Cognition   Overall Cognitive Status WFL   Arousal/Participation Alert; Cooperative   Attention Within functional limits   Orientation Level Oriented X4   Memory Within functional limits   Following Commands Follows all commands and directions without difficulty   Comments Pt pleasant and cooperative during OT session  Pt requires education and encouragement to participate in OT session  Assessment   Limitation Decreased ADL status; Decreased Safe judgement during ADL;Decreased UE strength;Decreased UE ROM; Decreased endurance;Decreased high-level ADLs; Decreased self-care trans   Prognosis Fair   Assessment Pt is a 59 yo Male who presented to B on 11/26/2022 with worsening flank pain  Pt with recent hospitalization from 11/20-11/26 and underwent procedure with urology on 11/24 for cystoscopy and R stent placement  Pt with diagnosis of costovertebral angle tenderness   Pt  has a past medical history of Acute renal failure (ARF) ([address] ), Chest pain, CPAP (continuous positive airway pressure) dependence, Depression, Diabetes mellitus ([facility] [address] ), Dyspnea on exertion, Hypertension, Nephrolithiasis, Numbness of right foot, Obesity, Onychomycosis, JEFF on CPAP, Sciatica, and Sleep apnea  Pt greeted bedside for OT evaluation on 11/28/2022  Pt lives with spouse in a [facility] with 3 CHARLY  PTA, Pt reports being I with ADLs/IADLs/no AD/ +/FTE  Pt reports having a supportive spouse who works from home  Pt demonstrating the following occupational deficits: S with UB ADLs, mod A with LB ADLs, S with bed mobility, S with functional transfers, and S with functional mobility with RW  Limitations that impact functional performance include decreased ADL status, decreased safe judgement during ADLs, decreased endurance, decreased self care transfers, decreased high level ADLs and pain  Occupational performance areas to address ADL retraining, functional transfer training, endurance training, Pt/caregiver education, equipment evaluation/education, compensatory technique education, energy conservation and activity engagement   Pt would benefit from continued skilled OT services while in hospital to maximize independence with ADLs  Will continue to follow Pt's progress  Pt would benefit from returning home with increased social support upon DC to maximize safety and independence with ADLs and functional tasks of choice  Goals   Patient Goals To decrease pain  LTG Time Frame 10-14   Long Term Goal #1 See goals listed below  Plan   Treatment Interventions ADL retraining;Functional transfer training;UE strengthening/ROM; Endurance training;Patient/family training;Equipment evaluation/education; Compensatory technique education; Energy conservation; Activityengagement   Goal Expiration Date 12/09/22   OT Treatment Day 1   OT Frequency Other (comment)  (2-4x/wk)   Recommendation   OT Discharge Recommendation No rehabilitation needs  (Anticipate no OT needs pending progress)   Additional Comments  The patient's raw score on the AM-PAC Daily Activity inpatient short form is 19, standardized score is 40 22, greater than 39 4  Patients at this level are likely to benefit from discharge to home  Please refer to the recommendation of the Occupational Therapist for safe discharge planning  AM-PAC Daily Activity Inpatient   Lower Body Dressing 2   Bathing 3   Toileting 3   Upper Body Dressing 3   Grooming 4   Eating 4   Daily Activity Raw Score 19   Daily Activity Standardized Score (Calc for Raw Score >=11) 40 22   AM-PAC Applied Cognition Inpatient   Following a Speech/Presentation 4   Understanding Ordinary Conversation 4   Taking Medications 4   Remembering Where Things Are Placed or Put Away 4   Remembering List of 4-5 Errands 4   Taking Care of Complicated Tasks 4   Applied Cognition Raw Score 24   Applied Cognition Standardized Score 62 21   End of Consult   Education Provided Yes   Patient Position at End of Consult Bedside chair; All needs within reach   Nurse Communication Nurse aware of consult       Goals:  Pt will complete UB ADLs with I in order to maximize participation with ADLs  Pt will complete LB ADLs with I in order to maximize safety with ADLs  Pt will complete toileting routine (transfer, hygiene, and clothing management) with I in order to return to prior level of function  Pt will complete bed mobility with I in order to maximize participation with ADLs  Pt will complete functional transfers at I level in order to increase participation with ADLs  Pt will increase dynamic standing balance to F+ in order to increase safety with ADLs  Pt will increase standing tolerance x10 min in order to increase participation with ADLs  Pt will complete functional mobility with AD PRN for item retrieval task at [place] level in order to increase participation with ADLs  Pt will complete IADL tasks/simulation of IADLs tasks with I in order to return to PLOF  Pt will demonstrate G energy conservation techniques with ADLs/IADLs in order to reduce the risk of falls    Pt will be attentive 100% of the time for ongoing functional/formal cognitive assessment to assist with safe dc planning prn          Courtney Stoner MS, OTR/L

## 2022-11-29 LAB
ANION GAP SERPL CALCULATED.3IONS-SCNC: 6 MMOL/L (ref 4–13)
BUN SERPL-MCNC: 22 MG/DL (ref 5–25)
CALCIUM SERPL-MCNC: 9.3 MG/DL (ref 8.3–10.1)
CHLORIDE SERPL-SCNC: 107 MMOL/L (ref 96–108)
CO2 SERPL-SCNC: 25 MMOL/L (ref 21–32)
CREAT SERPL-MCNC: 1.21 MG/DL (ref 0.6–1.3)
DME PARACHUTE DELIVERY DATE ACTUAL: NORMAL
DME PARACHUTE DELIVERY DATE REQUESTED: NORMAL
DME PARACHUTE ITEM DESCRIPTION: NORMAL
DME PARACHUTE ORDER STATUS: NORMAL
DME PARACHUTE SUPPLIER NAME: NORMAL
DME PARACHUTE SUPPLIER PHONE: NORMAL
GFR SERPL CREATININE-BSD FRML MDRD: 63 ML/MIN/1.73SQ M
GLUCOSE SERPL-MCNC: 138 MG/DL (ref 65–140)
GLUCOSE SERPL-MCNC: 139 MG/DL (ref 65–140)
GLUCOSE SERPL-MCNC: 165 MG/DL (ref 65–140)
GLUCOSE SERPL-MCNC: 188 MG/DL (ref 65–140)
GLUCOSE SERPL-MCNC: 197 MG/DL (ref 65–140)
POTASSIUM SERPL-SCNC: 3.6 MMOL/L (ref 3.5–5.3)
SODIUM SERPL-SCNC: 138 MMOL/L (ref 135–147)

## 2022-11-29 PROCEDURE — 99232 SBSQ HOSP IP/OBS MODERATE 35: CPT | Performed by: INTERNAL MEDICINE

## 2022-11-29 PROCEDURE — 82948 REAGENT STRIP/BLOOD GLUCOSE: CPT

## 2022-11-29 PROCEDURE — 80048 BASIC METABOLIC PNL TOTAL CA: CPT | Performed by: INTERNAL MEDICINE

## 2022-11-29 RX ORDER — LISINOPRIL 20 MG/1
40 TABLET ORAL DAILY
Status: DISCONTINUED | OUTPATIENT
Start: 2022-11-29 | End: 2022-12-01

## 2022-11-29 RX ADMIN — ENOXAPARIN SODIUM 40 MG: 40 INJECTION SUBCUTANEOUS at 08:38

## 2022-11-29 RX ADMIN — HYDROMORPHONE HYDROCHLORIDE 4 MG: 4 TABLET ORAL at 08:38

## 2022-11-29 RX ADMIN — HYDROMORPHONE HYDROCHLORIDE 4 MG: 4 TABLET ORAL at 17:06

## 2022-11-29 RX ADMIN — BUPROPION HYDROCHLORIDE 150 MG: 150 TABLET, FILM COATED, EXTENDED RELEASE ORAL at 08:38

## 2022-11-29 RX ADMIN — INSULIN GLARGINE 34 UNITS: 100 INJECTION, SOLUTION SUBCUTANEOUS at 08:47

## 2022-11-29 RX ADMIN — METHOCARBAMOL TABLETS 750 MG: 750 TABLET, COATED ORAL at 05:02

## 2022-11-29 RX ADMIN — ACETAMINOPHEN 975 MG: 325 TABLET ORAL at 13:05

## 2022-11-29 RX ADMIN — METHOCARBAMOL TABLETS 750 MG: 750 TABLET, COATED ORAL at 21:31

## 2022-11-29 RX ADMIN — TAMSULOSIN HYDROCHLORIDE 0.8 MG: 0.4 CAPSULE ORAL at 08:38

## 2022-11-29 RX ADMIN — HYDROMORPHONE HYDROCHLORIDE 1 MG: 1 INJECTION, SOLUTION INTRAMUSCULAR; INTRAVENOUS; SUBCUTANEOUS at 19:54

## 2022-11-29 RX ADMIN — GABAPENTIN 100 MG: 100 CAPSULE ORAL at 08:38

## 2022-11-29 RX ADMIN — INSULIN LISPRO 2 UNITS: 100 INJECTION, SOLUTION INTRAVENOUS; SUBCUTANEOUS at 11:26

## 2022-11-29 RX ADMIN — METHOCARBAMOL TABLETS 750 MG: 750 TABLET, COATED ORAL at 13:04

## 2022-11-29 RX ADMIN — LISINOPRIL 40 MG: 20 TABLET ORAL at 17:09

## 2022-11-29 RX ADMIN — INSULIN LISPRO 2 UNITS: 100 INJECTION, SOLUTION INTRAVENOUS; SUBCUTANEOUS at 17:07

## 2022-11-29 RX ADMIN — AMLODIPINE BESYLATE 10 MG: 10 TABLET ORAL at 08:38

## 2022-11-29 RX ADMIN — SERTRALINE 100 MG: 100 TABLET, FILM COATED ORAL at 08:39

## 2022-11-29 RX ADMIN — HYDROMORPHONE HYDROCHLORIDE 4 MG: 4 TABLET ORAL at 13:04

## 2022-11-29 RX ADMIN — ATORVASTATIN CALCIUM 10 MG: 10 TABLET, FILM COATED ORAL at 08:39

## 2022-11-29 RX ADMIN — INSULIN LISPRO 1 UNITS: 100 INJECTION, SOLUTION INTRAVENOUS; SUBCUTANEOUS at 21:36

## 2022-11-29 RX ADMIN — HYDROMORPHONE HYDROCHLORIDE 4 MG: 4 TABLET ORAL at 21:35

## 2022-11-29 RX ADMIN — CLONIDINE HYDROCHLORIDE 0.3 MG: 0.2 TABLET ORAL at 17:06

## 2022-11-29 RX ADMIN — GABAPENTIN 100 MG: 100 CAPSULE ORAL at 17:07

## 2022-11-29 RX ADMIN — ACETAMINOPHEN 975 MG: 325 TABLET ORAL at 21:31

## 2022-11-29 RX ADMIN — CLONIDINE HYDROCHLORIDE 0.3 MG: 0.2 TABLET ORAL at 08:39

## 2022-11-29 RX ADMIN — ACETAMINOPHEN 975 MG: 325 TABLET ORAL at 05:02

## 2022-11-29 RX ADMIN — HYDROMORPHONE HYDROCHLORIDE 4 MG: 4 TABLET ORAL at 04:55

## 2022-11-29 NOTE — PROGRESS NOTES
[facility]  Progress Note - Eliazar Goltz 1960, 58 y o  male MRN: [id number]  Unit/Bed#: [facility] 808-01 Encounter: [id number]  Primary Care Provider: Rj Thomas MD   Date and time admitted to hospital: 11/26/2022  9:36 PM    * Costovertebral angle tenderness  Assessment & Plan  Status post stent placement due to obstructive renal calculi w/ resulting hydronephrosis  Pain likely related to stent  S/p CT a/p, stent in place  Urology on board; D/w team, pt reports of no relief  I have asked them to re-evaluate  and look into if can consider stone removal sooner  Cont regimen  Flomax increased to bid, added gabapentin and was on scheduled toradol now on hold due to bump in cr  Cont prn dilaudid  UA benign    Abnormal CT scan  Assessment & Plan  Incidental finding of mild acute sigmoid diverticulitis  Pt completely asymptomatic; no abd pain, fever  Tolerating diet  Known hx of diverticulosis  Abx not indicated as discussed with pharmacy abx stewardship  Will cont to monitor off abx    CKD (chronic kidney disease) stage 3  Assessment & Plan  Lab Results   Component Value Date    EGFR 63 11/29/2022    EGFR 54 11/28/2022    EGFR 68 11/26/2022    CREATININE 1 21 11/29/2022    CREATININE 1 38 (H) 11/28/2022    CREATININE 1 14 11/26/2022   Cr trended down    Essential hypertension  Assessment & Plan  On Norvasc, clonidine, lisinopril    Diabetes mellitus type 2 in obese [facility])  Assessment & Plan  Lab Results   Component Value Date    HGBA1C 10 1 (H) 10/16/2022   Continue sliding scale  Continue Lantus  Accu-Cheks per protocol      Recent Labs     11/28/22  1608 11/28/22  2036 11/29/22  0716 11/29/22  1106   POCGLU 178* 208* 138 197*       Blood Sugar Average: Last 72 hrs:  (P) 149 [id number]    VTE Pharmacologic Prophylaxis:   Pharmacologic: Enoxaparin (Lovenox)  Mechanical VTE Prophylaxis in Place: No    Patient Centered Rounds: I have performed bedside rounds with nursing staff today  Discussions with Specialists or Other Care Team Provider: Urology    Education and Discussions with Family / Patient: Patient  Time Spent for Care: 30 minutes  More than 50% of total time spent on counseling and coordination of care as described above  Current Length of Stay: 0 day(s)    Current Patient Status: Inpatient   Certification Statement: The patient will continue to require additional inpatient hospital stay due to acute illness; still with intractable pain requiring IV pain analgesics    Discharge Plan:     Code Status: Level 1 - Full Code      Subjective:   Reporting of no change in RT CVA tenderness  Rpt 8-10/10 and exacerbated with slight movement  Wishes to discuss with urology if they can consider removal of stone this admission rather than as outpt    Objective:     Vitals:   Temp (24hrs), Av 7 °F (36 5 °C), Min:97 3 °F (36 3 °C), Max:97 9 °F (36 6 °C)    Temp:  [97 3 °F (36 3 °C)-97 9 °F (36 6 °C)] 97 9 °F (36 6 °C)  HR:  [69-76] 72  Resp:  [14-18] 14  BP: (138-171)/(86-98) 171/98  SpO2:  [91 %-95 %] 95 %  Body mass index is 39 27 kg/m²  Input and Output Summary (last 24 hours): Intake/Output Summary (Last 24 hours) at 2022 1538  Last data filed at 2022 1426  Gross per 24 hour   Intake --   Output 1850 ml   Net -1850 ml       Physical Exam:     Physical Exam  Constitutional:       Appearance: He is obese  Cardiovascular:      Rate and Rhythm: Normal rate and regular rhythm  Pulses: Normal pulses  Heart sounds: Normal heart sounds  Pulmonary:      Effort: Pulmonary effort is normal  No respiratory distress  Breath sounds: Normal breath sounds  No wheezing or rales  Abdominal:      General: Bowel sounds are normal  There is no distension  Palpations: Abdomen is soft  Tenderness: There is no abdominal tenderness  There is right CVA tenderness  There is no left CVA tenderness or guarding     Musculoskeletal: General: Normal range of motion  Cervical back: Normal range of motion and neck supple  Right lower leg: No edema  Left lower leg: No edema  Skin:     General: Skin is warm and dry  Neurological:      General: No focal deficit present  Mental Status: He is alert and oriented to person, place, and time  Mental status is at baseline  Cranial Nerves: No cranial nerve deficit  Motor: No weakness  Additional Data:     Labs:    Results from last 7 days   Lab Units 11/28/22  0447   WBC Thousand/uL 7 18   HEMOGLOBIN g/dL 15 2   HEMATOCRIT % 46 4   PLATELETS Thousands/uL 275   NEUTROS PCT % 58   LYMPHS PCT % 26   MONOS PCT % 10   EOS PCT % 5     Results from last 7 days   Lab Units 11/29/22  0454 11/28/22  0447   SODIUM mmol/L 138 137   POTASSIUM mmol/L 3 6 3 7   CHLORIDE mmol/L 107 105   CO2 mmol/L 25 26   BUN mg/dL 22 20   CREATININE mg/dL 1 21 1 38*   ANION GAP mmol/L 6 6   CALCIUM mg/dL 9 3 8 8   ALBUMIN g/dL  --  2 9*   TOTAL BILIRUBIN mg/dL  --  0 91   ALK PHOS U/L  --  66   ALT U/L  --  83*   AST U/L  --  56*   GLUCOSE RANDOM mg/dL 139 165*         Results from last 7 days   Lab Units 11/29/22  1106 11/29/22  0716 11/28/22  2036 11/28/22  1608 11/28/22  1144 11/28/22  0714 11/27/22  2042 11/27/22  1610 11/27/22  1117 11/27/22  0848 11/27/22  0811 11/27/22  0305   POC GLUCOSE mg/dl 197* 138 208* 178* 178* 154* 186* 129 127 131 101 155*         Results from last 7 days   Lab Units 11/23/22  0528   PROCALCITONIN ng/ml 0 08           * I Have Reviewed All Lab Data Listed Above  * Additional Pertinent Lab Tests Reviewed:  All Labs Within Last 24 Hours Reviewed    Imaging:    Imaging Reports Reviewed Today Include:   Imaging Personally Reviewed by Myself Includes:      Recent Cultures (last 7 days):     Results from last 7 days   Lab Units 11/24/22  1045 11/23/22  0040   URINE CULTURE  No Growth <100 cfu/mL 50,000-59,000 cfu/ml       Last 24 Hours Medication List:   Current Facility-Administered Medications   Medication Dose Route Frequency Provider Last Rate   • acetaminophen  975 mg Oral Q8H Robinson Muñoz MD     • aluminum-magnesium hydroxide-simethicone  30 mL Oral Q6H PRN Hiren Mazariegos MD     • amLODIPine  10 mg Oral Daily Hiren Mazariegos MD     • atorvastatin  10 mg Oral Daily Hiren Mazariegos MD     • buPROPion  150 mg Oral Daily Hiren Mazariegos MD     • cloNIDine  0 3 mg Oral BID Hiren Mazariegos MD     • docusate sodium  100 mg Oral BID PRN Hiren Mazariegos MD     • enoxaparin  40 mg Subcutaneous Daily Hiren Mazariegos MD     • gabapentin  100 mg Oral BID Vince De La Rosa MD     • HYDROmorphone  1 mg Intravenous Q3H PRN Hiren Mazariegos MD     • HYDROmorphone  2 mg Oral Q4H PRN Hiren Mazariegos MD     • HYDROmorphone  4 mg Oral Q4H PRN Hiren Mazariegos MD     • insulin glargine  34 Units Subcutaneous QAM Hiren Mazariegos MD     • insulin lispro  1-5 Units Subcutaneous HS Hiren Mazariegos MD     • insulin lispro  2-12 Units Subcutaneous TID AC Hiren Mazariegos MD     • lisinopril  40 mg Oral Daily Yosi Loving DO     • methocarbamol  750 mg Oral Q8H [address] Hiren Mazariegos MD     • nicotine  1 patch Transdermal Daily Hiren Mazariegos MD     • nystatin   Topical TID PRN Hiren Mazariegos MD     • ondansetron  4 mg Intravenous Q6H PRN Hiren Mazariegos MD     • oxybutynin  5 mg Oral TID PRN Hiren Mazariegos MD     • phenazopyridine  100 mg Oral TID PRN Hiren Mazariegos MD     • sertraline  100 mg Oral Daily Hiren Mazariegos MD     • tamsulosin  0 8 mg Oral Daily Vince De La Rosa MD          Today, Patient Was Seen By: Yosi Loving DO    ** Please Note: Dictation voice to text software may have been used in the creation of this document   **

## 2022-11-29 NOTE — ASSESSMENT & PLAN NOTE
Incidental finding of mild acute sigmoid diverticulitis  Pt completely asymptomatic; no abd pain, fever  Tolerating diet  Known hx of diverticulosis  Abx not indicated as discussed with pharmacy abx stewardship   Will cont to monitor off abx

## 2022-11-29 NOTE — PLAN OF CARE
Problem: PAIN - ADULT  Goal: Verbalizes/displays adequate comfort level or baseline comfort level  Description: Interventions:  - Encourage patient to monitor pain and request assistance  - Assess pain using appropriate pain scale  - Administer analgesics based on type and severity of pain and evaluate response  - Implement non-pharmacological measures as appropriate and evaluate response  - Consider cultural and social influences on pain and pain management  - Notify physician/advanced practitioner if interventions unsuccessful or patient reports new pain  Outcome: Progressing     Problem: GENITOURINARY - ADULT  Goal: Maintains or returns to baseline urinary function  Description: INTERVENTIONS:  - Assess urinary function  - Encourage oral fluids to ensure adequate hydration if ordered  - Administer IV fluids as ordered to ensure adequate hydration  - Administer ordered medications as needed  - Offer frequent toileting  - Follow urinary retention protocol if ordered  Outcome: Progressing     Problem: METABOLIC, FLUID AND ELECTROLYTES - ADULT  Goal: Electrolytes maintained within normal limits  Description: INTERVENTIONS:  - Monitor labs and assess patient for signs and symptoms of electrolyte imbalances  - Administer electrolyte replacement as ordered  - Monitor response to electrolyte replacements, including repeat lab results as appropriate  - Instruct patient on fluid and nutrition as appropriate  Outcome: Progressing  Goal: Fluid balance maintained  Description: INTERVENTIONS:  - Monitor labs   - Monitor I/O and WT  - Instruct patient on fluid and nutrition as appropriate  - Assess for signs & symptoms of volume excess or deficit  Outcome: Progressing     Problem: INFECTION - ADULT  Goal: Absence or prevention of progression during hospitalization  Description: INTERVENTIONS:  - Assess and monitor for signs and symptoms of infection  - Monitor lab/diagnostic results  - Monitor all insertion sites, i e  indwelling lines, tubes, and drains  - Monitor endotracheal if appropriate and nasal secretions for changes in amount and color  - [place] appropriate cooling/warming therapies per order  - Administer medications as ordered  - Instruct and encourage patient and family to use good hand hygiene technique  - Identify and instruct in appropriate isolation precautions for identified infection/condition  Outcome: Progressing     Problem: DISCHARGE PLANNING  Goal: Discharge to home or other facility with appropriate resources  Description: INTERVENTIONS:  - Identify barriers to discharge w/patient and caregiver  - Arrange for needed discharge resources and transportation as appropriate  - Identify discharge learning needs (meds, wound care, etc )  - Arrange for interpretive services to assist at discharge as needed  - Refer to Case Management Department for coordinating discharge planning if the patient needs post-hospital services based on physician/advanced practitioner order or complex needs related to functional status, cognitive ability, or social support system  Outcome: Progressing     Problem: Knowledge Deficit  Goal: Patient/family/caregiver demonstrates understanding of disease process, treatment plan, medications, and discharge instructions  Description: Complete learning assessment and assess knowledge base    Interventions:  - Provide teaching at level of understanding  - Provide teaching via preferred learning methods  Outcome: Progressing

## 2022-11-29 NOTE — ASSESSMENT & PLAN NOTE
Lab Results   Component Value Date    HGBA1C 10 1 (H) 10/16/2022   Continue sliding scale  Continue Lantus  Accu-Cheks per protocol      Recent Labs     11/28/22  1608 11/28/22 2036 11/29/22  0716 11/29/22  1106   POCGLU 178* 208* 138 197*       Blood Sugar Average: Last 72 hrs:  (P) 804 [id number]

## 2022-11-29 NOTE — QUICK NOTE
Patient is a 35-year-old male presenting due to flank pain uncontrolled  Status post ureteral stent insertion for an 11 mm stone at the right proximal ureter  Multiple admissions since 11/20 and status post stent placement 11/24, currently still admitted due to uncontrolled pain  Urology asked to re-evaluate  Would recommend making patient NPO Will discussed with oncoming urology attending in regards to add on case and timing    Discussed with primary team     Urology to continue to follow    Kassy Reyes PA-C

## 2022-11-29 NOTE — ASSESSMENT & PLAN NOTE
Lab Results   Component Value Date    EGFR 63 11/29/2022    EGFR 54 11/28/2022    EGFR 68 11/26/2022    CREATININE 1 21 11/29/2022    CREATININE 1 38 (H) 11/28/2022    CREATININE 1 14 11/26/2022   Cr trended down

## 2022-11-29 NOTE — ASSESSMENT & PLAN NOTE
Status post stent placement due to obstructive renal calculi w/ resulting hydronephrosis  Pain likely related to stent  S/p CT a/p, stent in place  Urology on board; D/w team, pt reports of no relief  I have asked them to re-evaluate  and look into if can consider stone removal sooner  Cont regimen   Flomax increased to bid, added gabapentin and was on scheduled toradol now on hold due to bump in cr  Cont prn dilaudid  UA benign

## 2022-11-29 NOTE — UTILIZATION REVIEW
Continued Stay Review    Please see initial review completed on 11/27/22 by REGINALDO Greenfield RN  OBSERVATION WRITTEN 11/27/22 @ 0240 CONVERTED TO INPATIENT ADMISSION 11/29/22 @ 1538 DUE TO COSTOVERTEBRAL ANGLE TENDERNESS CAUSING INTRACTABLE PAIN, REQUIRING AT LEAST A 2 MIDNIGHT STAY  Admission Orders (From admission, onward)     Ordered        11/29/22 1538  Inpatient Admission  Once            11/27/22 0240  Place in Observation  Once                      Orders Placed This Encounter   Procedures   • Inpatient Admission     Standing Status:   Standing     Number of Occurrences:   1     Order Specific Question:   Level of Care     Answer:   Med Surg [16]     Order Specific Question:   Estimated length of stay     Answer:   More than 2 Midnights     Order Specific Question:   Certification     Answer:   I certify that inpatient services are medically necessary for this patient for a duration of greater than two midnights  See H&P and MD Progress Notes for additional information about the patient's course of treatment  Date: 11/29/22                          Current Patient Class: Inpatient Current Level of Care: med surg    HPI:62 y o  male initially admitted on 11/26/22 Costovertebral angle tenderness    Assessment/Plan: Pt reporting of no change in RT CVA tenderness, 8-10/10 and exacerbated with slight movement  Wishes to discuss with urology if they can consider removal of stone this admission rather than as outpt  Continue current pain regimen, dilaudid prn, urology following, UA benign  Continue to monitor off abx  Cr trending down  Continue accuchecks w/ SSI  Date:  11/30   Day 2:  S/p cystoscopy, pt drowsy but arousable  States just feels sore at the tip of his penis  Denies abd pain  Continue flomax and dilaudid prn  Cr within baseline range   Continue accuchecks w/ SSI     11/30 Urology OP Note:  Procedure(s) (LRB):  CYSTOSCOPY URETEROSCOPY WITH LITHOTRIPSY HOLMIUM LASER, RETROGRADE PYELOGRAM AND INSERTION STENT URETERAL, stone extraction (Right)  Anesthesia Type:   General  Operative Findings:  Large impacted right ureteral calculus lasered and extracted        Vital Signs:   Date/Time Temp Pulse Resp BP MAP (mmHg) SpO2 Calculated FIO2 (%) - Nasal Cannula O2 Flow Rate (L/min) Nasal Cannula O2 Flow Rate (L/min) O2 Device   11/30/22 15:24:14 97 °F (36 1 °C) Abnormal  77 16 143/84 104 84 % Abnormal  -- -- -- --   11/30/22 14:09:34 97 5 °F (36 4 °C) 83 -- 148/86 107 94 % -- -- -- --   11/30/22 12:19:54 97 3 °F (36 3 °C) Abnormal  72 14 149/91 110 89 % Abnormal  -- -- -- --   11/30/22 1200 -- 70 16 169/91 128 95 % 32 -- 3 L/min Nasal cannula   11/30/22 1145 -- 70 15 171/90 Abnormal  128 94 % 32 -- 3 L/min Nasal cannula   11/30/22 1130 -- 72 14 176/93 Abnormal  122 95 % 32 -- 3 L/min Nasal cannula   11/30/22 1115 -- 74 14 166/91 118 95 % 32 -- 3 L/min Nasal cannula   11/30/22 1100 -- 78 17 163/91 124 95 % 32 -- 3 L/min Nasal cannula   11/30/22 1048 -- 68 16 150/87 109 97 % -- -- -- --   11/30/22 1046 97 2 °F (36 2 °C) Abnormal  65 15 150/87 109 98 % -- 6 L/min -- Simple mask   11/30/22 07:42:39 97 3 °F (36 3 °C) Abnormal  67 14 165/97 120 94 % -- -- -- --   11/29/22 21:27:42 97 9 °F (36 6 °C) 74 16 139/86 104 90 % -- -- -- --   11/29/22 1557 -- -- -- 148/88 -- -- -- -- -- --       Date/Time Temp Pulse Resp BP MAP (mmHg) SpO2 O2 Device Patient Position - Orthostatic VS   11/29/22 15:19:37 97 9 °F (36 6 °C) 72 14 171/98 Abnormal  122 95 % -- --   11/29/22 07:42:53 97 3 °F (36 3 °C) Abnormal  69 14 169/98 122 93 % -- --   11/28/22 22:23:31 97 9 °F (36 6 °C) 76 18 142/86 -- 91 % -- --   11/28/22 1615 -- -- -- 138/96 -- -- -- --   11/28/22 16:10:09 97 7 °F (36 5 °C) 74 18 170/98 122 92 % -- --   11/28/22 0816 -- -- -- 146/92 -- -- -- Lying   11/28/22 07:14:11 97 3 °F (36 3 °C) Abnormal  69 16 174/102 Abnormal   126 91 % -- --   BP: alerted MODESTA capone going to retake at 11/28/22 0714   11/27/22 22:30:28 97 5 °F (36 4 °C) 68 18 159/85 110 93 % -- --   11/27/22 1530 -- -- -- 160/92 -- -- -- --   11/27/22 1526 -- -- -- 160/100 -- -- -- --   11/27/22 15:24:14 97 3 °F (36 3 °C) Abnormal  69 16 163/85 111 91 % -- --   11/27/22 08:29:49 -- 75 16 162/82 -- 94 % -- --   11/27/22 0827 -- -- -- 166/100 -- -- -- --   11/27/22 03:22:36 97 9 °F (36 6 °C) 51 Abnormal  19 152/77 102 92 % -- --   11/27/22 0015 -- 78 15 183/80 Abnormal  115 94 % None (Room air) Lying       Pertinent Labs/Diagnostic Results:       Results from last 7 days   Lab Units 11/28/22 0447 11/26/22 2228 11/26/22  0436 11/25/22  0559 11/24/22  0526   WBC Thousand/uL 7 18 9 68 7 43 7 41 7 78   HEMOGLOBIN g/dL 15 2 16 1 15 0 15 5 14 6   HEMATOCRIT % 46 4 47 9 47 1 48 4 45 7   PLATELETS Thousands/uL 275 289 266 279 276   NEUTROS ABS Thousands/µL 4 13 6 79 4 36 4 64 4 58         Results from last 7 days   Lab Units 11/30/22  0503 11/29/22  0454 11/28/22 0447 11/26/22 2228 11/26/22  0436   SODIUM mmol/L 138 138 137 138 137   POTASSIUM mmol/L 3 6 3 6 3 7 3 6 4 6   CHLORIDE mmol/L 107 107 105 108 110*   CO2 mmol/L 24 25 26 23 21   ANION GAP mmol/L 7 6 6 7 6   BUN mg/dL 28* 22 20 19 23   CREATININE mg/dL 1 32* 1 21 1 38* 1 14 1 36*   EGFR ml/min/1 73sq m 57 63 54 68 55   CALCIUM mg/dL 9 1 9 3 8 8 9 7 8 8   MAGNESIUM mg/dL  --   --  2 2  --   --      Results from last 7 days   Lab Units 11/28/22 0447   AST U/L 56*   ALT U/L 83*   ALK PHOS U/L 66   TOTAL PROTEIN g/dL 7 2   ALBUMIN g/dL 2 9*   TOTAL BILIRUBIN mg/dL 0 91     Results from last 7 days   Lab Units 11/30/22  0727 11/29/22  2037 11/29/22  1605 11/29/22  1106 11/29/22  0716 11/28/22  2036 11/28/22  1608 11/28/22  1144 11/28/22  0714 11/27/22  2042 11/27/22  1610 11/27/22  1117   POC GLUCOSE mg/dl 159* 188* 165* 197* 138 208* 178* 178* 154* 186* 129 127     Results from last 7 days   Lab Units 11/30/22  0503 11/29/22  0454 11/28/22  0447 11/26/22  2228 11/26/22  0436 11/25/22  0559 11/24/22  0526   GLUCOSE RANDOM mg/dL 182* 139 165* 120 119 104 123         Results from last 7 days   Lab Units 11/27/22  0022   CLARITY UA  Clear   COLOR UA  Dark Yellow   SPEC GRAV UA  1 014   PH UA  6 5   GLUCOSE UA mg/dl Negative   KETONES UA mg/dl Negative   BLOOD UA  Moderate*   PROTEIN UA mg/dl 50 (1+)*   NITRITE UA  Positive*   BILIRUBIN UA  Negative   UROBILINOGEN UA (BE) mg/dl <2 0   LEUKOCYTES UA  Negative   WBC UA /hpf 1-2   RBC UA /hpf Innumerable*   BACTERIA UA /hpf Occasional   EPITHELIAL CELLS WET PREP /hpf Occasional     Results from last 7 days   Lab Units 11/24/22  1045   URINE CULTURE  No Growth <100 cfu/mL     Medications:   Scheduled Medications:  acetaminophen, 975 mg, Oral, Q8H INOCENTE  amLODIPine, 10 mg, Oral, Daily  atorvastatin, 10 mg, Oral, Daily  buPROPion, 150 mg, Oral, Daily  cloNIDine, 0 3 mg, Oral, BID  enoxaparin, 40 mg, Subcutaneous, Daily  gabapentin, 100 mg, Oral, BID  insulin glargine, 34 Units, Subcutaneous, QAM  insulin lispro, 1-5 Units, Subcutaneous, HS  insulin lispro, 2-12 Units, Subcutaneous, TID AC  lisinopril, 40 mg, Oral, Daily  methocarbamol, 750 mg, Oral, Q8H INOCENTE  nicotine, 1 patch, Transdermal, Daily  sertraline, 100 mg, Oral, Daily  tamsulosin, 0 8 mg, Oral, Daily      Continuous IV Infusions: none  lactated ringers, 100 mL/hr, Intravenous, Continuous      PRN Meds:  aluminum-magnesium hydroxide-simethicone, 30 mL, Oral, Q6H PRN  docusate sodium, 100 mg, Oral, BID PRN  HYDROmorphone, 1 mg, Intravenous, Q3H PRN x2 thus far  HYDROmorphone, 2 mg, Oral, Q4H PRN  HYDROmorphone, 4 mg, Oral, Q4H PRN x9 thus far  nystatin, , Topical, TID PRN  ondansetron, 4 mg, Intravenous, Q6H PRN  oxybutynin, 5 mg, Oral, TID PRN  phenazopyridine, 100 mg, Oral, TID PRN    HYDROmorphone (DILAUDID) tablet 2 mg  Dose: 2 mg  Freq: Every 4 hours PRN Route: PO x2 then dc'd  PRN Reason: moderate pain  Start: 11/25/22 1710 End: 11/26/22 1558   Admin Instructions:   High Alert Medication   LOOK ALIKE SOUND ALIKE MED Or  HYDROmorphone (DILAUDID) tablet 4 mg  Dose: 4 mg  Freq: Every 4 hours PRN Route: PO x3 then dc'd   PRN Reason: severe pain  Start: 11/25/22 0917 End: 11/26/22 1558   Admin Instructions:   High Alert Medication  LOOK ALIKE SOUND ALIKE MED          Discharge Plan: tbd    Network Utilization Review Department  ATTENTION: Please call with any questions or concerns to [phone] and carefully listen to the prompts so that you are directed to the right person  All voicemails are confidential   Tamia Johnson all requests for admission clinical reviews, approved or denied determinations and any other requests to dedicated fax number below belonging to the campus where the patient is receiving treatment   List of dedicated fax numbers for the Facilities:  [address] DENIALS (Administrative/Medical Necessity) [phone]   [address] (Maternity/NICU/Pediatrics) [phone]   91 Valarie White [phone]   [facility] 77 [phone]   [address] [address] [facility] [address] [place] [address] [place] [address] [facility] 28 [phone]   [address] [facility] Drayton Olav [facility] [address] [phone]

## 2022-11-30 ENCOUNTER — ANESTHESIA EVENT (INPATIENT)
Dept: PERIOP | Facility: HOSPITAL | Age: 62
End: 2022-11-30

## 2022-11-30 ENCOUNTER — ANESTHESIA (INPATIENT)
Dept: PERIOP | Facility: HOSPITAL | Age: 62
End: 2022-11-30

## 2022-11-30 ENCOUNTER — APPOINTMENT (INPATIENT)
Dept: RADIOLOGY | Facility: HOSPITAL | Age: 62
DRG: 661 | End: 2022-11-30
Payer: COMMERCIAL

## 2022-11-30 LAB
ANION GAP SERPL CALCULATED.3IONS-SCNC: 7 MMOL/L (ref 4–13)
BUN SERPL-MCNC: 28 MG/DL (ref 5–25)
CALCIUM SERPL-MCNC: 9.1 MG/DL (ref 8.3–10.1)
CHLORIDE SERPL-SCNC: 107 MMOL/L (ref 96–108)
CO2 SERPL-SCNC: 24 MMOL/L (ref 21–32)
CREAT SERPL-MCNC: 1.32 MG/DL (ref 0.6–1.3)
GFR SERPL CREATININE-BSD FRML MDRD: 57 ML/MIN/1.73SQ M
GLUCOSE SERPL-MCNC: 159 MG/DL (ref 65–140)
GLUCOSE SERPL-MCNC: 182 MG/DL (ref 65–140)
GLUCOSE SERPL-MCNC: 253 MG/DL (ref 65–140)
GLUCOSE SERPL-MCNC: 320 MG/DL (ref 65–140)
POTASSIUM SERPL-SCNC: 3.6 MMOL/L (ref 3.5–5.3)
SODIUM SERPL-SCNC: 138 MMOL/L (ref 135–147)

## 2022-11-30 PROCEDURE — C1894 INTRO/SHEATH, NON-LASER: HCPCS | Performed by: UROLOGY

## 2022-11-30 PROCEDURE — 0TJB8ZZ INSPECTION OF BLADDER, VIA NATURAL OR ARTIFICIAL OPENING ENDOSCOPIC: ICD-10-PCS | Performed by: UROLOGY

## 2022-11-30 PROCEDURE — BT1D1ZZ FLUOROSCOPY OF RIGHT KIDNEY, URETER AND BLADDER USING LOW OSMOLAR CONTRAST: ICD-10-PCS | Performed by: UROLOGY

## 2022-11-30 PROCEDURE — 88300 SURGICAL PATH GROSS: CPT | Performed by: SPECIALIST

## 2022-11-30 PROCEDURE — 82948 REAGENT STRIP/BLOOD GLUCOSE: CPT

## 2022-11-30 PROCEDURE — C2617 STENT, NON-COR, TEM W/O DEL: HCPCS | Performed by: UROLOGY

## 2022-11-30 PROCEDURE — C1758 CATHETER, URETERAL: HCPCS | Performed by: UROLOGY

## 2022-11-30 PROCEDURE — 0T768DZ DILATION OF RIGHT URETER WITH INTRALUMINAL DEVICE, VIA NATURAL OR ARTIFICIAL OPENING ENDOSCOPIC: ICD-10-PCS | Performed by: UROLOGY

## 2022-11-30 PROCEDURE — 74420 UROGRAPHY RTRGR +-KUB: CPT

## 2022-11-30 PROCEDURE — 0TC68ZZ EXTIRPATION OF MATTER FROM RIGHT URETER, VIA NATURAL OR ARTIFICIAL OPENING ENDOSCOPIC: ICD-10-PCS | Performed by: UROLOGY

## 2022-11-30 PROCEDURE — 99232 SBSQ HOSP IP/OBS MODERATE 35: CPT | Performed by: INTERNAL MEDICINE

## 2022-11-30 PROCEDURE — 80048 BASIC METABOLIC PNL TOTAL CA: CPT | Performed by: INTERNAL MEDICINE

## 2022-11-30 PROCEDURE — C1769 GUIDE WIRE: HCPCS | Performed by: UROLOGY

## 2022-11-30 PROCEDURE — 52356 CYSTO/URETERO W/LITHOTRIPSY: CPT | Performed by: UROLOGY

## 2022-11-30 PROCEDURE — 99232 SBSQ HOSP IP/OBS MODERATE 35: CPT | Performed by: UROLOGY

## 2022-11-30 DEVICE — INLAY OPTIMA URETERAL STENT W/O GUIDEWIRE
Type: IMPLANTABLE DEVICE | Site: URETER | Status: FUNCTIONAL
Brand: BARD® INLAY OPTIMA® URETERAL STENT

## 2022-11-30 RX ORDER — LIDOCAINE HYDROCHLORIDE 10 MG/ML
INJECTION, SOLUTION EPIDURAL; INFILTRATION; INTRACAUDAL; PERINEURAL AS NEEDED
Status: DISCONTINUED | OUTPATIENT
Start: 2022-11-30 | End: 2022-11-30

## 2022-11-30 RX ORDER — ONDANSETRON 2 MG/ML
4 INJECTION INTRAMUSCULAR; INTRAVENOUS ONCE AS NEEDED
Status: DISCONTINUED | OUTPATIENT
Start: 2022-11-30 | End: 2022-11-30

## 2022-11-30 RX ORDER — CEFAZOLIN SODIUM 1 G/3ML
INJECTION, POWDER, FOR SOLUTION INTRAMUSCULAR; INTRAVENOUS AS NEEDED
Status: DISCONTINUED | OUTPATIENT
Start: 2022-11-30 | End: 2022-11-30

## 2022-11-30 RX ORDER — PROPOFOL 10 MG/ML
INJECTION, EMULSION INTRAVENOUS AS NEEDED
Status: DISCONTINUED | OUTPATIENT
Start: 2022-11-30 | End: 2022-11-30

## 2022-11-30 RX ORDER — FENTANYL CITRATE 50 UG/ML
INJECTION, SOLUTION INTRAMUSCULAR; INTRAVENOUS AS NEEDED
Status: DISCONTINUED | OUTPATIENT
Start: 2022-11-30 | End: 2022-11-30

## 2022-11-30 RX ORDER — FENTANYL CITRATE/PF 50 MCG/ML
50 SYRINGE (ML) INJECTION
Status: DISCONTINUED | OUTPATIENT
Start: 2022-11-30 | End: 2022-11-30

## 2022-11-30 RX ORDER — SODIUM CHLORIDE, SODIUM LACTATE, POTASSIUM CHLORIDE, CALCIUM CHLORIDE 600; 310; 30; 20 MG/100ML; MG/100ML; MG/100ML; MG/100ML
100 INJECTION, SOLUTION INTRAVENOUS CONTINUOUS
Status: DISCONTINUED | OUTPATIENT
Start: 2022-11-30 | End: 2022-12-01

## 2022-11-30 RX ORDER — MAGNESIUM HYDROXIDE 1200 MG/15ML
LIQUID ORAL AS NEEDED
Status: DISCONTINUED | OUTPATIENT
Start: 2022-11-30 | End: 2022-11-30 | Stop reason: HOSPADM

## 2022-11-30 RX ORDER — HYDROMORPHONE HCL IN WATER/PF 6 MG/30 ML
0.2 PATIENT CONTROLLED ANALGESIA SYRINGE INTRAVENOUS
Status: DISCONTINUED | OUTPATIENT
Start: 2022-11-30 | End: 2022-11-30

## 2022-11-30 RX ORDER — MIDAZOLAM HYDROCHLORIDE 2 MG/2ML
INJECTION, SOLUTION INTRAMUSCULAR; INTRAVENOUS AS NEEDED
Status: DISCONTINUED | OUTPATIENT
Start: 2022-11-30 | End: 2022-11-30

## 2022-11-30 RX ORDER — DEXAMETHASONE SODIUM PHOSPHATE 10 MG/ML
INJECTION, SOLUTION INTRAMUSCULAR; INTRAVENOUS AS NEEDED
Status: DISCONTINUED | OUTPATIENT
Start: 2022-11-30 | End: 2022-11-30

## 2022-11-30 RX ORDER — ONDANSETRON 2 MG/ML
INJECTION INTRAMUSCULAR; INTRAVENOUS AS NEEDED
Status: DISCONTINUED | OUTPATIENT
Start: 2022-11-30 | End: 2022-11-30

## 2022-11-30 RX ORDER — EPHEDRINE SULFATE 50 MG/ML
INJECTION INTRAVENOUS AS NEEDED
Status: DISCONTINUED | OUTPATIENT
Start: 2022-11-30 | End: 2022-11-30

## 2022-11-30 RX ORDER — SODIUM CHLORIDE, SODIUM LACTATE, POTASSIUM CHLORIDE, CALCIUM CHLORIDE 600; 310; 30; 20 MG/100ML; MG/100ML; MG/100ML; MG/100ML
INJECTION, SOLUTION INTRAVENOUS CONTINUOUS PRN
Status: DISCONTINUED | OUTPATIENT
Start: 2022-11-30 | End: 2022-11-30

## 2022-11-30 RX ADMIN — EPHEDRINE SULFATE 10 MG: 50 INJECTION INTRAVENOUS at 09:42

## 2022-11-30 RX ADMIN — SODIUM CHLORIDE, SODIUM LACTATE, POTASSIUM CHLORIDE, AND CALCIUM CHLORIDE 100 ML/HR: 600; 310; 30; 20 INJECTION, SOLUTION INTRAVENOUS at 19:16

## 2022-11-30 RX ADMIN — HYDROMORPHONE HYDROCHLORIDE 2 MG: 2 TABLET ORAL at 19:17

## 2022-11-30 RX ADMIN — INSULIN LISPRO 3 UNITS: 100 INJECTION, SOLUTION INTRAVENOUS; SUBCUTANEOUS at 22:08

## 2022-11-30 RX ADMIN — EPHEDRINE SULFATE 10 MG: 50 INJECTION INTRAVENOUS at 09:52

## 2022-11-30 RX ADMIN — ACETAMINOPHEN 975 MG: 325 TABLET ORAL at 22:06

## 2022-11-30 RX ADMIN — ATORVASTATIN CALCIUM 10 MG: 10 TABLET, FILM COATED ORAL at 08:26

## 2022-11-30 RX ADMIN — CLONIDINE HYDROCHLORIDE 0.3 MG: 0.2 TABLET ORAL at 08:26

## 2022-11-30 RX ADMIN — AMLODIPINE BESYLATE 10 MG: 10 TABLET ORAL at 08:26

## 2022-11-30 RX ADMIN — ACETAMINOPHEN 975 MG: 325 TABLET ORAL at 05:03

## 2022-11-30 RX ADMIN — ENOXAPARIN SODIUM 40 MG: 40 INJECTION SUBCUTANEOUS at 08:25

## 2022-11-30 RX ADMIN — DEXAMETHASONE SODIUM PHOSPHATE 5 MG: 10 INJECTION INTRAMUSCULAR; INTRAVENOUS at 09:35

## 2022-11-30 RX ADMIN — FENTANYL CITRATE 100 MCG: 50 INJECTION INTRAMUSCULAR; INTRAVENOUS at 09:26

## 2022-11-30 RX ADMIN — ACETAMINOPHEN 975 MG: 325 TABLET ORAL at 13:35

## 2022-11-30 RX ADMIN — CEFAZOLIN 3000 MG: 1 INJECTION, POWDER, FOR SOLUTION INTRAMUSCULAR; INTRAVENOUS at 09:29

## 2022-11-30 RX ADMIN — GABAPENTIN 100 MG: 100 CAPSULE ORAL at 17:06

## 2022-11-30 RX ADMIN — CLONIDINE HYDROCHLORIDE 0.3 MG: 0.2 TABLET ORAL at 17:06

## 2022-11-30 RX ADMIN — SERTRALINE 100 MG: 100 TABLET, FILM COATED ORAL at 08:26

## 2022-11-30 RX ADMIN — INSULIN LISPRO 6 UNITS: 100 INJECTION, SOLUTION INTRAVENOUS; SUBCUTANEOUS at 17:07

## 2022-11-30 RX ADMIN — GABAPENTIN 100 MG: 100 CAPSULE ORAL at 08:26

## 2022-11-30 RX ADMIN — BUPROPION HYDROCHLORIDE 150 MG: 150 TABLET, FILM COATED, EXTENDED RELEASE ORAL at 08:26

## 2022-11-30 RX ADMIN — PROPOFOL 200 MG: 10 INJECTION, EMULSION INTRAVENOUS at 09:24

## 2022-11-30 RX ADMIN — LIDOCAINE HYDROCHLORIDE 100 MG: 10 INJECTION, SOLUTION EPIDURAL; INFILTRATION; INTRACAUDAL; PERINEURAL at 09:24

## 2022-11-30 RX ADMIN — PHENYLEPHRINE HYDROCHLORIDE 50 MCG/MIN: 10 INJECTION INTRAVENOUS at 09:32

## 2022-11-30 RX ADMIN — SODIUM CHLORIDE, SODIUM LACTATE, POTASSIUM CHLORIDE, AND CALCIUM CHLORIDE: .6; .31; .03; .02 INJECTION, SOLUTION INTRAVENOUS at 09:17

## 2022-11-30 RX ADMIN — LISINOPRIL 40 MG: 20 TABLET ORAL at 08:26

## 2022-11-30 RX ADMIN — METHOCARBAMOL TABLETS 750 MG: 750 TABLET, COATED ORAL at 22:06

## 2022-11-30 RX ADMIN — METHOCARBAMOL TABLETS 750 MG: 750 TABLET, COATED ORAL at 13:35

## 2022-11-30 RX ADMIN — ONDANSETRON 4 MG: 2 INJECTION INTRAMUSCULAR; INTRAVENOUS at 09:35

## 2022-11-30 RX ADMIN — MIDAZOLAM 1 MG: 1 INJECTION INTRAMUSCULAR; INTRAVENOUS at 09:16

## 2022-11-30 RX ADMIN — TAMSULOSIN HYDROCHLORIDE 0.8 MG: 0.4 CAPSULE ORAL at 08:26

## 2022-11-30 RX ADMIN — METHOCARBAMOL TABLETS 750 MG: 750 TABLET, COATED ORAL at 05:03

## 2022-11-30 RX ADMIN — MIDAZOLAM 1 MG: 1 INJECTION INTRAMUSCULAR; INTRAVENOUS at 09:20

## 2022-11-30 NOTE — OP NOTE
OPERATIVE REPORT  PATIENT NAME: Andreas Arceo    :  1960  MRN: [id number]  Pt Location: [facility] CYSTO ROOM 01    SURGERY DATE: 2022    Surgeon(s) and Role:     Zoey Germain MD - Primary    Preop Diagnosis:  Calculus of ureter [N20 1]    Post-Op Diagnosis Codes:     * Calculus of ureter [N20 1]    Procedure(s) (LRB):  CYSTOSCOPY URETEROSCOPY WITH LITHOTRIPSY HOLMIUM LASER, RETROGRADE PYELOGRAM AND INSERTION STENT URETERAL, stone extraction (Right)    Specimen(s):  ID Type Source Tests Collected by Time Destination   1 : right ureteral stone  Tissue Ureter, Right TISSUE EXAM Rockie Lombard, MD 2022 1001        Estimated Blood Loss:   Minimal    Drains:  * No LDAs found *    Anesthesia Type:   General    Operative Indications:  Calculus of ureter [N20 1]      Operative Findings:  Large impacted right ureteral calculus lasered and extracted  Complications:   None    Procedure and Technique:  The patient was identified, brought to the operating room, and placed on the table in supine position  After induction of general anesthesia, the patient was placed in dorsal lithotomy position and prepped and draped in the usual sterile fashion  A complete formal timeout was performed  The 25 Sudanese rigid cystoscope was placed per urethra and cystoscopy was performed  There was no bladder abnormality identified  The right ureteral stent was identified and grasped with a grasper, brought out through the meatus, and cannulated with a Solo wire  A dual-lumen catheter was placed followed by a second guidewire and ureteral access sheath  The 8 5 Sudanese flexible ureteroscope was placed and the stone was identified impacted in the proximal ureter  A holmium laser fiber was placed and laser lithotripsy was performed  This was quite difficult due to the tightness of the ureter and lack of mobility of the scope    When the stone was of suitable size, a zero tip stone basket was placed and numerous stone fragments were retrieved  The entire kidney was then evaluated  There was 1 or 2 small fragments in the lower pole which could not be reached  Otherwise no residual fragments remained in the kidney or ureter  At this point, a retrograde pyelogram was performed delineating the upper urinary tract anatomy  No further filling defect identified  The ureteral length measured  The safety wire was backloaded into the cystoscope and a 26 cm x 6 Indonesian double-J stent was placed with string  The patient tolerated the procedure well and was transferred to the recovery room awake alert and in stable condition  Plan:  Stent/string for 1 week  May be discharged today if stable       I was present for the entire procedure    Patient Disposition:  PACU         SIGNATURE: Amilcar Escobar MD  DATE: November 30, 2022  TIME: 10:45 AM

## 2022-11-30 NOTE — PHYSICAL THERAPY NOTE
Physical Therapy Cancellation Note    Patient's Name: Eb Lay       11/30/22 1657   PT Last Visit   PT Visit Date 11/30/22   Note Type   Note Type Cancelled Session   Cancel Reasons Patient to operating room     Zak Ramos, PT, DPT

## 2022-11-30 NOTE — ASSESSMENT & PLAN NOTE
Status post stent placement due to obstructive renal calculi w/ resulting hydronephrosis  Pain likely related to stent  S/p CT a/p, stent in place  UA benign  Urology on board; s/p cystoscopy with lithotripsy and partial stone fragments retrieval  Stent to remain in place  Cont flomax decreased back to daily;  Cont gabapentin  Cont prn dilaudid  Cont supportive care

## 2022-11-30 NOTE — ANESTHESIA PREPROCEDURE EVALUATION
Procedure:  CYSTOSCOPY URETEROSCOPY WITH LITHOTRIPSY HOLMIUM LASER, RETROGRADE PYELOGRAM AND INSERTION STENT URETERAL (Right: Bladder)    Relevant Problems   CARDIO   (+) Essential hypertension   (+) Mixed hyperlipidemia      ENDO   (+) Diabetes mellitus type 2 in obese (HCC)   (+) Type 2 diabetes mellitus with stage 3a chronic kidney disease, with long-term current use of insulin (HCC)      /RENAL   (+) Bilateral renal cysts   (+) CKD (chronic kidney disease) stage 3   (+) Right hydroureteronephrosis with obstructing calculus      MUSCULOSKELETAL   (+) Intractable lower back pain      NEURO/PSYCH   (+) Depression   (+) Diabetic polyneuropathy associated with type 2 diabetes mellitus (HCC)   (+) Intractable lower back pain      PULMONARY   (+) Obstructive sleep apnea        Physical Exam    Airway    Mallampati score: III  TM Distance: <3 FB  Neck ROM: limited     Dental   No notable dental hx     Cardiovascular  Cardiovascular exam normal    Pulmonary  Pulmonary exam normal     Other Findings        Anesthesia Plan  ASA Score- 3 Emergent    Anesthesia Type- general with ASA Monitors  Additional Monitors:   Airway Plan: LMA  Comment: GA for emergent cysto 11/24  No problems with previous Gas  No current cardiac or respiratory complaints  Plan Factors-Exercise tolerance (METS): >4 METS  Chart reviewed  EKG reviewed  Existing labs reviewed  Patient summary reviewed  Patient is not a current smoker  Induction- intravenous  Postoperative Plan-     Informed Consent- Anesthetic plan and risks discussed with patient  I personally reviewed this patient with the CRNA  Discussed and agreed on the Anesthesia Plan with the CRNA  Aniket Sauer

## 2022-11-30 NOTE — PROGRESS NOTES
UROLOGY PROGRESS NOTE   Patient Identifiers: Ayesha Moreno (MRN [id number])  Date of Service: 11/30/2022    Subjective:     24 HR EVENTS:   no significant events  Patient has  complaints of Persistent pain related to ureteral stent and impacted stone  He does not tolerate this  Cannot be discharged in this condition          Objective:     VITALS:    Vitals:    11/30/22 0742   BP: 165/97   Pulse: 67   Resp: 14   Temp: (!) 97 3 °F (36 3 °C)   SpO2: 94%       INS & OUTS:  I/O last 24 hours:   In: -   Out: 825 [Urine:825]    LABS:  Lab Results   Component Value Date    HGB 15 2 11/28/2022    HCT 46 4 11/28/2022    WBC 7 18 11/28/2022     11/28/2022   ]    Lab Results   Component Value Date     02/01/2014    K 3 6 11/30/2022     11/30/2022    CO2 24 11/30/2022    BUN 28 (H) 11/30/2022    CREATININE 1 32 (H) 11/30/2022    CALCIUM 9 1 11/30/2022    GLUCOSE 301 (H) 02/01/2014   ]    INPATIENT MEDS:    Current Facility-Administered Medications:   •  [MAR Hold] acetaminophen (TYLENOL) tablet 975 mg, 975 mg, Oral, Q8H [address], Molinasd Camargo MD, 975 mg at 11/30/22 0503  •  [MAR Hold] aluminum-magnesium hydroxide-simethicone (MYLANTA) oral suspension 30 mL, 30 mL, Oral, Q6H PRN, Maria Isabel Witt MD  •  [facility] Hold] amLODIPine (NORVASC) tablet 10 mg, 10 mg, Oral, Daily, Maria Isabel Witt MD, 10 mg at 11/30/22 0826  •  [MAR Hold] atorvastatin (LIPITOR) tablet 10 mg, 10 mg, Oral, Daily, Maria Isabel Witt MD, 10 mg at 11/30/22 0826  •  [MAR Hold] buPROPion (WELLBUTRIN XL) 24 hr tablet 150 mg, 150 mg, Oral, Daily, Maria Isabel Witt MD, 150 mg at 11/30/22 0826  •  [MAR Hold] cloNIDine (CATAPRES) tablet 0 3 mg, 0 3 mg, Oral, BID, Maria Isabel Witt MD, 0 3 mg at 11/30/22 0826  •  [MAR Hold] docusate sodium (COLACE) capsule 100 mg, 100 mg, Oral, BID PRN, Maria Isabel Witt MD  •  [facility] Hold] enoxaparin (LOVENOX) subcutaneous injection 40 mg, 40 mg, Subcutaneous, Daily, Maria Isabel Witt, MD, 40 mg at 11/30/22 0825  •  [MAR Hold] gabapentin (NEURONTIN) capsule 100 mg, 100 mg, Oral, BID, Stanley Donnelly MD, 100 mg at 11/30/22 0826  •  [MAR Hold] HYDROmorphone (DILAUDID) injection 1 mg, 1 mg, Intravenous, Q3H PRN, Aicha Sanderson MD, 1 mg at 11/29/22 1954  •  [MAR Hold] HYDROmorphone (DILAUDID) tablet 2 mg, 2 mg, Oral, Q4H PRN, Aicha Sanderson MD  •  [facility] Hold] HYDROmorphone (DILAUDID) tablet 4 mg, 4 mg, Oral, Q4H PRN, Aicha Sanderson MD, 4 mg at 11/29/22 2135  •  [MAR Hold] insulin glargine (LANTUS) subcutaneous injection 34 Units 0 34 mL, 34 Units, Subcutaneous, QAM, Aciha Sanderson MD, 34 Units at 11/29/22 0847  •  [MAR Hold] insulin lispro (HumaLOG) 100 units/mL subcutaneous injection 1-5 Units, 1-5 Units, Subcutaneous, HS, Aicha Sanderson MD, 1 Units at 11/29/22 2136  •  [MAR Hold] insulin lispro (HumaLOG) 100 units/mL subcutaneous injection 2-12 Units, 2-12 Units, Subcutaneous, TID AC, 2 Units at 11/29/22 1707 **AND** Fingerstick Glucose (POCT), , , TID AC, Aicha Sanderson MD  •  [facility] Hold] lisinopril (ZESTRIL) tablet 40 mg, 40 mg, Oral, Daily, Luigi Almaraz DO, 40 mg at 11/30/22 0826  •  [MAR Hold] methocarbamol (ROBAXIN) tablet 750 mg, 750 mg, Oral, Q8H [address], Aicha Sanderson MD, 750 mg at 11/30/22 0503  •  [MAR Hold] nicotine (NICODERM CQ) 21 mg/24 hr TD 24 hr patch 1 patch, 1 patch, Transdermal, Daily, Aicha Sanderson MD  •  [facility] Hold] nystatin (MYCOSTATIN) powder, , Topical, TID PRN, Aicha Sanderson MD  •  [facility] Hold] ondansetron (ZOFRAN) injection 4 mg, 4 mg, Intravenous, Q6H PRN, Aicha Sanderson MD  •  [facility] Hold] oxybutynin (DITROPAN) tablet 5 mg, 5 mg, Oral, TID PRN, Aicha Sanderson MD, 5 mg at 11/27/22 0857  •  [MAR Hold] phenazopyridine (PYRIDIUM) tablet 100 mg, 100 mg, Oral, TID PRN, Aicha Sanderson MD  •  [facility] Hold] sertraline (ZOLOFT) tablet 100 mg, 100 mg, Oral, Daily, Aicha Sanderson MD, 100 mg at 11/30/22 6544  •  [MAR Hold] tamsulosin (FLOMAX) capsule 0 8 mg, 0 8 mg, Oral, Daily, Shy Back MD, 0 8 mg at 11/30/22 5220      Physical Exam:     GEN: alert and oriented x 3    RESP: breathing comfortably with no accessory muscle use    ABD: soft, non-tender, non-distended   INCISION: none   EXT: no significant peripheral edema   DRAINS: none  SWEENEY: none     RADIOLOGY:   CT reviewed  Shelley Sweet remains impacted in the proximal ureter with adjacent stent in place  Assessment:   Refractory stent colic with obstructing proximal right ureteral calculus    Plan:   -patient unable to tolerate interval management with stent for planned second-stage ureteroscopy in 2 weeks  Discussed options  He wished to proceed with ureteroscopy  Technique, risks, benefits reviewed  Understands risks of persistent colic after the procedure, as well as hematuria related to enlarged prostate and stone manipulation  Also understands he will have a stent after the procedure regardless of outcome  Hopefully if successful, will have stent on a string which will remain in place only for few days  Understands all the above and consent obtained

## 2022-11-30 NOTE — ASSESSMENT & PLAN NOTE
Lab Results   Component Value Date    EGFR 57 11/30/2022    EGFR 63 11/29/2022    EGFR 54 11/28/2022    CREATININE 1 32 (H) 11/30/2022    CREATININE 1 21 11/29/2022    CREATININE 1 38 (H) 11/28/2022   Cr w/in baseline range

## 2022-11-30 NOTE — PLAN OF CARE
Problem: PAIN - ADULT  Goal: Verbalizes/displays adequate comfort level or baseline comfort level  Description: Interventions:  - Encourage patient to monitor pain and request assistance  - Assess pain using appropriate pain scale  - Administer analgesics based on type and severity of pain and evaluate response  - Implement non-pharmacological measures as appropriate and evaluate response  - Consider cultural and social influences on pain and pain management  - Notify physician/advanced practitioner if interventions unsuccessful or patient reports new pain  Outcome: Progressing     Problem: INFECTION - ADULT  Goal: Absence or prevention of progression during hospitalization  Description: INTERVENTIONS:  - Assess and monitor for signs and symptoms of infection  - Monitor lab/diagnostic results  - Monitor all insertion sites, i e  indwelling lines, tubes, and drains  - Monitor endotracheal if appropriate and nasal secretions for changes in amount and color  - [place] appropriate cooling/warming therapies per order  - Administer medications as ordered  - Instruct and encourage patient and family to use good hand hygiene technique  - Identify and instruct in appropriate isolation precautions for identified infection/condition  Outcome: Progressing     Problem: DISCHARGE PLANNING  Goal: Discharge to home or other facility with appropriate resources  Description: INTERVENTIONS:  - Identify barriers to discharge w/patient and caregiver  - Arrange for needed discharge resources and transportation as appropriate  - Identify discharge learning needs (meds, wound care, etc )  - Arrange for interpretive services to assist at discharge as needed  - Refer to Case Management Department for coordinating discharge planning if the patient needs post-hospital services based on physician/advanced practitioner order or complex needs related to functional status, cognitive ability, or social support system  Outcome: Progressing Problem: Knowledge Deficit  Goal: Patient/family/caregiver demonstrates understanding of disease process, treatment plan, medications, and discharge instructions  Description: Complete learning assessment and assess knowledge base    Interventions:  - Provide teaching at level of understanding  - Provide teaching via preferred learning methods  Outcome: Progressing     Problem: GENITOURINARY - ADULT  Goal: Maintains or returns to baseline urinary function  Description: INTERVENTIONS:  - Assess urinary function  - Encourage oral fluids to ensure adequate hydration if ordered  - Administer IV fluids as ordered to ensure adequate hydration  - Administer ordered medications as needed  - Offer frequent toileting  - Follow urinary retention protocol if ordered  Outcome: Progressing

## 2022-11-30 NOTE — PROGRESS NOTES
[facility]  Progress Note - Suzan Crespo 1960, 58 y o  male MRN: [id number]  Unit/Bed#: [facility] 808-01 Encounter: [id number]  Primary Care Provider: Rodolfo Chung MD   Date and time admitted to hospital: 11/26/2022  9:36 PM    * Costovertebral angle tenderness  Assessment & Plan  Status post stent placement due to obstructive renal calculi w/ resulting hydronephrosis  Pain likely related to stent  S/p CT a/p, stent in place  UA benign  Urology on board; s/p cystoscopy with lithotripsy and partial stone fragments retrieval  Stent to remain in place  Cont flomax decreased back to daily; Cont gabapentin  Cont prn dilaudid  Cont supportive care    Abnormal CT scan  Assessment & Plan  Incidental finding of mild acute sigmoid diverticulitis  Pt completely asymptomatic; no abd pain, fever  Tolerating diet  Known hx of diverticulosis  Abx not indicated as discussed with pharmacy abx stewardship  Will cont to monitor off abx    CKD (chronic kidney disease) stage 3  Assessment & Plan  Lab Results   Component Value Date    EGFR 57 11/30/2022    EGFR 63 11/29/2022    EGFR 54 11/28/2022    CREATININE 1 32 (H) 11/30/2022    CREATININE 1 21 11/29/2022    CREATININE 1 38 (H) 11/28/2022   Cr w/in baseline range    Essential hypertension  Assessment & Plan  On Norvasc, clonidine, lisinopril    Diabetes mellitus type 2 in obese [facility])  Assessment & Plan  Lab Results   Component Value Date    HGBA1C 10 1 (H) 10/16/2022   Continue sliding scale  Continue Lantus  Accu-Cheks per protocol  Recent Labs     11/29/22  1106 11/29/22  1605 11/29/22 2037 11/30/22  0727   POCGLU 197* 165* 188* 159*       Blood Sugar Average: Last 72 hrs:  (P) 159 6      VTE Pharmacologic Prophylaxis:   Pharmacologic: Enoxaparin (Lovenox)  Mechanical VTE Prophylaxis in Place: Yes    Patient Centered Rounds: I have performed bedside rounds with nursing staff today      Discussions with Specialists or Other Care Team Provider:     Education and Discussions with Family / Patient: Patient and also called his wife Shyam Oconnor    Time Spent for Care: 30 minutes  More than 50% of total time spent on counseling and coordination of care as described above  Current Length of Stay: 1 day(s)    Current Patient Status: Inpatient   Certification Statement: The patient will continue to require additional inpatient hospital stay due to s/p cystoscopy    Discharge Plan: D/c planning in 1-2 days     Code Status: Level 1 - Full Code      Subjective:   Just returned from OR, drowsy but arousable  States just feels sore at the tip of his penis  Denies abd pain  Objective:     Vitals:   Temp (24hrs), Av 4 °F (36 3 °C), Min:97 °F (36 1 °C), Max:97 9 °F (36 6 °C)    Temp:  [97 °F (36 1 °C)-97 9 °F (36 6 °C)] 97 °F (36 1 °C)  HR:  [65-83] 77  Resp:  [14-17] 16  BP: (139-176)/(84-97) 143/84  SpO2:  [84 %-98 %] 84 %  Body mass index is 39 27 kg/m²  Input and Output Summary (last 24 hours): Intake/Output Summary (Last 24 hours) at 2022 1534  Last data filed at 2022 1040  Gross per 24 hour   Intake 800 ml   Output 225 ml   Net 575 ml       Physical Exam:     Physical Exam  Constitutional:       Appearance: He is obese  Comments: Drowsy but arousable by verbal stimuli   Cardiovascular:      Rate and Rhythm: Normal rate and regular rhythm  Pulses: Normal pulses  Heart sounds: Normal heart sounds  Pulmonary:      Effort: Pulmonary effort is normal  No respiratory distress  Breath sounds: Normal breath sounds  No wheezing or rales  Abdominal:      General: Bowel sounds are normal  There is no distension  Palpations: Abdomen is soft  Tenderness: There is no abdominal tenderness  There is no guarding  Musculoskeletal:         General: Normal range of motion  Right lower leg: No edema  Left lower leg: No edema  Skin:     General: Skin is warm and dry     Neurological: General: No focal deficit present  Mental Status: He is oriented to person, place, and time  Mental status is at baseline  Cranial Nerves: No cranial nerve deficit  Motor: No weakness  Additional Data:     Labs:    Results from last 7 days   Lab Units 11/28/22  0447   WBC Thousand/uL 7 18   HEMOGLOBIN g/dL 15 2   HEMATOCRIT % 46 4   PLATELETS Thousands/uL 275   NEUTROS PCT % 58   LYMPHS PCT % 26   MONOS PCT % 10   EOS PCT % 5     Results from last 7 days   Lab Units 11/30/22  0503 11/29/22  0454 11/28/22  0447   SODIUM mmol/L 138   < > 137   POTASSIUM mmol/L 3 6   < > 3 7   CHLORIDE mmol/L 107   < > 105   CO2 mmol/L 24   < > 26   BUN mg/dL 28*   < > 20   CREATININE mg/dL 1 32*   < > 1 38*   ANION GAP mmol/L 7   < > 6   CALCIUM mg/dL 9 1   < > 8 8   ALBUMIN g/dL  --   --  2 9*   TOTAL BILIRUBIN mg/dL  --   --  0 91   ALK PHOS U/L  --   --  66   ALT U/L  --   --  83*   AST U/L  --   --  56*   GLUCOSE RANDOM mg/dL 182*   < > 165*    < > = values in this interval not displayed  Results from last 7 days   Lab Units 11/30/22  0727 11/29/22  2037 11/29/22  1605 11/29/22  1106 11/29/22  0716 11/28/22  2036 11/28/22  1608 11/28/22  1144 11/28/22  0714 11/27/22  2042 11/27/22  1610 11/27/22  1117   POC GLUCOSE mg/dl 159* 188* 165* 197* 138 208* 178* 178* 154* 186* 129 127                   * I Have Reviewed All Lab Data Listed Above  * Additional Pertinent Lab Tests Reviewed:  All Labs Within Last 24 Hours Reviewed    Imaging:    Imaging Reports Reviewed Today Include:   Imaging Personally Reviewed by Myself Includes:      Recent Cultures (last 7 days):     Results from last 7 days   Lab Units 11/24/22  1045   URINE CULTURE  No Growth <100 cfu/mL       Last 24 Hours Medication List:   Current Facility-Administered Medications   Medication Dose Route Frequency Provider Last Rate   • acetaminophen  975 mg Oral Q8H [facility] Kim Cavazos MD     • aluminum-magnesium hydroxide-simethicone  30 mL Oral Q6H PRN Morris Kelsey MD     • amLODIPine  10 mg Oral Daily Morris Kelsey MD     • atorvastatin  10 mg Oral Daily Morris Kelsey MD     • buPROPion  150 mg Oral Daily Morris Kelsey MD     • cloNIDine  0 3 mg Oral BID Morris Kelsey MD     • docusate sodium  100 mg Oral BID PRN Morris Kelsey MD     • enoxaparin  40 mg Subcutaneous Daily Morris Kelsey MD     • gabapentin  100 mg Oral BID Morris Kelsey MD     • HYDROmorphone  1 mg Intravenous Q3H PRN Morris Kelsey MD     • HYDROmorphone  2 mg Oral Q4H PRN Morris Kelsey MD     • HYDROmorphone  4 mg Oral Q4H PRN Morris Kelsey MD     • insulin glargine  34 Units Subcutaneous QAM Morris Kelsey MD     • insulin lispro  1-5 Units Subcutaneous HS Morris Kelsey MD     • insulin lispro  2-12 Units Subcutaneous TID AC Morris Kelsey MD     • lactated ringers  100 mL/hr Intravenous Continuous Morris Kelsey  mL/hr (11/30/22 1046)   • lisinopril  40 mg Oral Daily Morris Kelsey MD     • methocarbamol  750 mg Oral [facility] Morris Kelsey MD     • nicotine  1 patch Transdermal Daily Morris Kelsey MD     • nystatin   Topical TID PRN Morris Kelsey MD     • ondansetron  4 mg Intravenous Q6H PRN Morris Kelsey MD     • oxybutynin  5 mg Oral TID PRN Morris Kelsey MD     • phenazopyridine  100 mg Oral TID PRN Morris Kelsey MD     • sertraline  100 mg Oral Daily Morris Kelsey MD     • tamsulosin  0 8 mg Oral Daily Morris Kelsey MD          Today, Patient Was Seen By: Riceky Jacobsen DO    ** Please Note: Dictation voice to text software may have been used in the creation of this document   **

## 2022-11-30 NOTE — ANESTHESIA POSTPROCEDURE EVALUATION
Post-Op Assessment Note    CV Status:  Stable    Pain management: adequate     Mental Status:  Sleepy   Hydration Status:  Euvolemic   PONV Controlled:  Controlled   Airway Patency:  Patent      Post Op Vitals Reviewed: Yes      Staff: Anesthesiologist, CRNA         No notable events documented      BP      Temp     Pulse     Resp      SpO2

## 2022-11-30 NOTE — ASSESSMENT & PLAN NOTE
Lab Results   Component Value Date    HGBA1C 10 1 (H) 10/16/2022   Continue sliding scale  Continue Lantus  Accu-Cheks per protocol      Recent Labs     11/29/22  1106 11/29/22  1605 11/29/22 2037 11/30/22  0727   POCGLU 197* 165* 188* 159*       Blood Sugar Average: Last 72 hrs:  (P) 159 6

## 2022-11-30 NOTE — OCCUPATIONAL THERAPY NOTE
Occupational Therapy Cancel Note     11/30/22 1225   OT Last Visit   OT Visit Date 11/30/22   Note Type   Note type Cancelled Session   Cancel Reasons Patient to operating room       OT orders received and chart reviewed  Pt is currently off at OR  Will continue to follow and complete OT treatment when appropriate         Cassie Millard MS, OTR/L

## 2022-12-01 LAB
ANION GAP SERPL CALCULATED.3IONS-SCNC: 7 MMOL/L (ref 4–13)
BUN SERPL-MCNC: 34 MG/DL (ref 5–25)
CALCIUM SERPL-MCNC: 9.1 MG/DL (ref 8.3–10.1)
CHLORIDE SERPL-SCNC: 106 MMOL/L (ref 96–108)
CO2 SERPL-SCNC: 22 MMOL/L (ref 21–32)
CREAT SERPL-MCNC: 1.42 MG/DL (ref 0.6–1.3)
ERYTHROCYTE [DISTWIDTH] IN BLOOD BY AUTOMATED COUNT: 13.1 % (ref 11.6–15.1)
GFR SERPL CREATININE-BSD FRML MDRD: 52 ML/MIN/1.73SQ M
GLUCOSE SERPL-MCNC: 185 MG/DL (ref 65–140)
GLUCOSE SERPL-MCNC: 190 MG/DL (ref 65–140)
GLUCOSE SERPL-MCNC: 204 MG/DL (ref 65–140)
GLUCOSE SERPL-MCNC: 234 MG/DL (ref 65–140)
HCT VFR BLD AUTO: 44 % (ref 36.5–49.3)
HGB BLD-MCNC: 14.7 G/DL (ref 12–17)
MCH RBC QN AUTO: 30.3 PG (ref 26.8–34.3)
MCHC RBC AUTO-ENTMCNC: 33.4 G/DL (ref 31.4–37.4)
MCV RBC AUTO: 91 FL (ref 82–98)
PLATELET # BLD AUTO: 307 THOUSANDS/UL (ref 149–390)
PMV BLD AUTO: 9.3 FL (ref 8.9–12.7)
POTASSIUM SERPL-SCNC: 4.5 MMOL/L (ref 3.5–5.3)
RBC # BLD AUTO: 4.85 MILLION/UL (ref 3.88–5.62)
SODIUM SERPL-SCNC: 135 MMOL/L (ref 135–147)
WBC # BLD AUTO: 9.42 THOUSAND/UL (ref 4.31–10.16)

## 2022-12-01 PROCEDURE — 88300 SURGICAL PATH GROSS: CPT | Performed by: SPECIALIST

## 2022-12-01 PROCEDURE — 80048 BASIC METABOLIC PNL TOTAL CA: CPT | Performed by: INTERNAL MEDICINE

## 2022-12-01 PROCEDURE — 97116 GAIT TRAINING THERAPY: CPT

## 2022-12-01 PROCEDURE — 82948 REAGENT STRIP/BLOOD GLUCOSE: CPT

## 2022-12-01 PROCEDURE — 85027 COMPLETE CBC AUTOMATED: CPT | Performed by: INTERNAL MEDICINE

## 2022-12-01 PROCEDURE — 97530 THERAPEUTIC ACTIVITIES: CPT

## 2022-12-01 PROCEDURE — 99239 HOSP IP/OBS DSCHRG MGMT >30: CPT | Performed by: INTERNAL MEDICINE

## 2022-12-01 PROCEDURE — 99232 SBSQ HOSP IP/OBS MODERATE 35: CPT | Performed by: UROLOGY

## 2022-12-01 RX ORDER — DOCUSATE SODIUM 100 MG/1
100 CAPSULE, LIQUID FILLED ORAL 2 TIMES DAILY PRN
Refills: 0
Start: 2022-12-01

## 2022-12-01 RX ADMIN — SODIUM CHLORIDE, SODIUM LACTATE, POTASSIUM CHLORIDE, AND CALCIUM CHLORIDE 100 ML/HR: 600; 310; 30; 20 INJECTION, SOLUTION INTRAVENOUS at 15:31

## 2022-12-01 RX ADMIN — CLONIDINE HYDROCHLORIDE 0.3 MG: 0.2 TABLET ORAL at 08:29

## 2022-12-01 RX ADMIN — SERTRALINE 100 MG: 100 TABLET, FILM COATED ORAL at 08:29

## 2022-12-01 RX ADMIN — CLONIDINE HYDROCHLORIDE 0.3 MG: 0.2 TABLET ORAL at 17:34

## 2022-12-01 RX ADMIN — ACETAMINOPHEN 975 MG: 325 TABLET ORAL at 13:37

## 2022-12-01 RX ADMIN — INSULIN GLARGINE 34 UNITS: 100 INJECTION, SOLUTION SUBCUTANEOUS at 08:39

## 2022-12-01 RX ADMIN — INSULIN LISPRO 2 UNITS: 100 INJECTION, SOLUTION INTRAVENOUS; SUBCUTANEOUS at 08:28

## 2022-12-01 RX ADMIN — ATORVASTATIN CALCIUM 10 MG: 10 TABLET, FILM COATED ORAL at 08:29

## 2022-12-01 RX ADMIN — HYDROMORPHONE HYDROCHLORIDE 4 MG: 4 TABLET ORAL at 08:39

## 2022-12-01 RX ADMIN — TAMSULOSIN HYDROCHLORIDE 0.8 MG: 0.4 CAPSULE ORAL at 08:29

## 2022-12-01 RX ADMIN — INSULIN LISPRO 4 UNITS: 100 INJECTION, SOLUTION INTRAVENOUS; SUBCUTANEOUS at 17:34

## 2022-12-01 RX ADMIN — LISINOPRIL 40 MG: 20 TABLET ORAL at 08:29

## 2022-12-01 RX ADMIN — HYDROMORPHONE HYDROCHLORIDE 2 MG: 2 TABLET ORAL at 13:40

## 2022-12-01 RX ADMIN — INSULIN LISPRO 2 UNITS: 100 INJECTION, SOLUTION INTRAVENOUS; SUBCUTANEOUS at 11:56

## 2022-12-01 RX ADMIN — METHOCARBAMOL TABLETS 750 MG: 750 TABLET, COATED ORAL at 13:37

## 2022-12-01 RX ADMIN — SODIUM CHLORIDE, SODIUM LACTATE, POTASSIUM CHLORIDE, AND CALCIUM CHLORIDE 100 ML/HR: 600; 310; 30; 20 INJECTION, SOLUTION INTRAVENOUS at 05:27

## 2022-12-01 RX ADMIN — ENOXAPARIN SODIUM 40 MG: 40 INJECTION SUBCUTANEOUS at 08:32

## 2022-12-01 RX ADMIN — AMLODIPINE BESYLATE 10 MG: 10 TABLET ORAL at 08:29

## 2022-12-01 RX ADMIN — METHOCARBAMOL TABLETS 750 MG: 750 TABLET, COATED ORAL at 05:26

## 2022-12-01 RX ADMIN — GABAPENTIN 100 MG: 100 CAPSULE ORAL at 08:30

## 2022-12-01 RX ADMIN — ACETAMINOPHEN 975 MG: 325 TABLET ORAL at 05:26

## 2022-12-01 RX ADMIN — BUPROPION HYDROCHLORIDE 150 MG: 150 TABLET, FILM COATED, EXTENDED RELEASE ORAL at 08:29

## 2022-12-01 NOTE — ASSESSMENT & PLAN NOTE
Lab Results   Component Value Date    HGBA1C 10 1 (H) 10/16/2022     Recent Labs     11/30/22  1556 11/30/22  2051 12/01/22  0726 12/01/22  1056   POCGLU 253* 320* 190* 185*       Blood Sugar Average: Last 72 hrs:  (P) 874 [id number]   Cont insulin regimen

## 2022-12-01 NOTE — PLAN OF CARE
Problem: PAIN - ADULT  Goal: Verbalizes/displays adequate comfort level or baseline comfort level  Description: Interventions:  - Encourage patient to monitor pain and request assistance  - Assess pain using appropriate pain scale  - Administer analgesics based on type and severity of pain and evaluate response  - Implement non-pharmacological measures as appropriate and evaluate response  - Consider cultural and social influences on pain and pain management  - Notify physician/advanced practitioner if interventions unsuccessful or patient reports new pain  Outcome: Progressing     Problem: GENITOURINARY - ADULT  Goal: Maintains or returns to baseline urinary function  Description: INTERVENTIONS:  - Assess urinary function  - Encourage oral fluids to ensure adequate hydration if ordered  - Administer IV fluids as ordered to ensure adequate hydration  - Administer ordered medications as needed  - Offer frequent toileting  - Follow urinary retention protocol if ordered  Outcome: Progressing     Problem: METABOLIC, FLUID AND ELECTROLYTES - ADULT  Goal: Electrolytes maintained within normal limits  Description: INTERVENTIONS:  - Monitor labs and assess patient for signs and symptoms of electrolyte imbalances  - Administer electrolyte replacement as ordered  - Monitor response to electrolyte replacements, including repeat lab results as appropriate  - Instruct patient on fluid and nutrition as appropriate  Outcome: Progressing  Goal: Fluid balance maintained  Description: INTERVENTIONS:  - Monitor labs   - Monitor I/O and WT  - Instruct patient on fluid and nutrition as appropriate  - Assess for signs & symptoms of volume excess or deficit  Outcome: Progressing     Problem: INFECTION - ADULT  Goal: Absence or prevention of progression during hospitalization  Description: INTERVENTIONS:  - Assess and monitor for signs and symptoms of infection  - Monitor lab/diagnostic results  - Monitor all insertion sites, i e  indwelling lines, tubes, and drains  - Monitor endotracheal if appropriate and nasal secretions for changes in amount and color  - [place] appropriate cooling/warming therapies per order  - Administer medications as ordered  - Instruct and encourage patient and family to use good hand hygiene technique  - Identify and instruct in appropriate isolation precautions for identified infection/condition  Outcome: Progressing     Problem: DISCHARGE PLANNING  Goal: Discharge to home or other facility with appropriate resources  Description: INTERVENTIONS:  - Identify barriers to discharge w/patient and caregiver  - Arrange for needed discharge resources and transportation as appropriate  - Identify discharge learning needs (meds, wound care, etc )  - Arrange for interpretive services to assist at discharge as needed  - Refer to Case Management Department for coordinating discharge planning if the patient needs post-hospital services based on physician/advanced practitioner order or complex needs related to functional status, cognitive ability, or social support system  Outcome: Progressing     Problem: Knowledge Deficit  Goal: Patient/family/caregiver demonstrates understanding of disease process, treatment plan, medications, and discharge instructions  Description: Complete learning assessment and assess knowledge base    Interventions:  - Provide teaching at level of understanding  - Provide teaching via preferred learning methods  Outcome: Progressing     Problem: MOBILITY - ADULT  Goal: Maintain or return to baseline ADL function  Description: INTERVENTIONS:  -  Assess patient's ability to carry out ADLs; assess patient's baseline for ADL function and identify physical deficits which impact ability to perform ADLs (bathing, care of mouth/teeth, toileting, grooming, dressing, etc )  - Assess/evaluate cause of self-care deficits   - Assess range of motion  - Assess patient's mobility; develop plan if impaired  - Assess patient's need for assistive devices and provide as appropriate  - Encourage maximum independence but intervene and supervise when necessary  - Involve family in performance of ADLs  - Assess for home care needs following discharge   - Consider OT consult to assist with ADL evaluation and planning for discharge  - Provide patient education as appropriate  Outcome: Progressing  Goal: Maintains/Returns to pre admission functional level  Description: INTERVENTIONS:  - Perform BMAT or MOVE assessment daily    - Set and communicate daily mobility goal to care team and patient/family/caregiver     - Collaborate with rehabilitation services on mobility goals if consulted  - Out of bed for toileting  - Record patient progress and toleration of activity level   Outcome: Progressing

## 2022-12-01 NOTE — PLAN OF CARE
Problem: PHYSICAL THERAPY ADULT  Goal: Performs mobility at highest level of function for planned discharge setting  See evaluation for individualized goals  Description: Treatment/Interventions: Functional transfer training, Elevations, Endurance training, Bed mobility, Gait training, OT, Spoke to nursing, Equipment eval/education  Equipment Recommended: (S) Mirian Dsouza (using RW at this time and may require for d/c home- will cont to assess)       See flowsheet documentation for full assessment, interventions and recommendations  Outcome: Adequate for Discharge  Note: Prognosis: Good  Problem List: Pain  Assessment: Pt seen for PT session today with a focus on functional transfers, gait, and endurance  Pt has made good progress towards mobility goals since initial evaluation, reporting much less pain with movement  At this time, pt is able to perform bed mobility, STS transfers, and ambulation with RW at mod (I) level  Attempted ambulation without AD during which pt's gait slowed, but he was overall steady- recommend use of RW for longer distances, but pt safe to ambulate short distances without any AD  Pt politely declining need for stair trial today, reporting he has no concerns about navigating his steps at home  Pt with no further acute PT needs, PT signing off  Pt would benefit from continued ambulation with nursing and restorative staff as able  Recommending home with no PT needs  PT Discharge Recommendation: No rehabilitation needs    See flowsheet documentation for full assessment

## 2022-12-01 NOTE — ASSESSMENT & PLAN NOTE
On Norvasc, clonidine  Holding enalapril given bump in cr  Will have added bp support with the addition of flomax anyway

## 2022-12-01 NOTE — TELEPHONE ENCOUNTER
Patient now underwent definitive ureteroscopy while inpatient as he had severe stent colic resulting in difficulty with discharge  He has a stent with a string can remove this in 1 week  Please call to verify that this was removed  He can follow up in additional 3 months with ultrasound  & KUB

## 2022-12-01 NOTE — DISCHARGE SUMMARY
[address] [facility]  Discharge- Yola Lennon 1960, 58 y o  male MRN: [id number]  Unit/Bed#: [facility] 808-01 Encounter: [id number]  Primary Care Provider: Stuart Wilcox MD   Date and time admitted to hospital: 11/26/2022  9:36 PM    * Costovertebral angle tenderness  Assessment & Plan  Status post stent placement due to obstructive renal calculi w/ resulting hydronephrosis  Pain likely related to stent  S/p CT a/p, stent in place  UA benign  Urology on board; s/p cystoscopy with lithotripsy and partial stone fragments retrieval  Stent to remain in place  Pt instructed to be careful not to accidentally dislodge  Ok per urology for discharge  Recommend to cont flomax, prn oxybutynin  D/c gabapentin, pyridium  Pt to f/u with urology as outpt  Cont flomax decreased back to daily; Cont gabapentin  Much improved pain to the point of actual resolve per pt  Regardless he still has previously rx from last admission of dilaudid prn  Confirmed with pt that he has enough    Abnormal CT scan  Assessment & Plan  Incidental finding of mild acute sigmoid diverticulitis  Pt completely asymptomatic; no abd pain, fever  Tolerating diet  Known hx of diverticulosis  Abx not indicated as discussed with pharmacy abx stewardship   Monitored off abx    CKD (chronic kidney disease) stage 3  Assessment & Plan  Lab Results   Component Value Date    EGFR 52 12/01/2022    EGFR 57 11/30/2022    EGFR 63 11/29/2022    CREATININE 1 42 (H) 12/01/2022    CREATININE 1 32 (H) 11/30/2022    CREATININE 1 21 11/29/2022   Slight bump though still w/in range compared to prior labs  Will hold lisinopril for now until repeat labs next week at which pt will be instructed per PCP when to resume    Essential hypertension  Assessment & Plan  On Norvasc, clonidine  Holding enalapril given bump in cr  Will have added bp support with the addition of flomax anyway    Diabetes mellitus type 2 in obese [facility])  Assessment & Plan  Lab Results   Component Value Date    HGBA1C 10 1 (H) 10/16/2022     Recent Labs     11/30/22  1556 11/30/22  2051 12/01/22  0726 12/01/22  1056   POCGLU 253* 320* 190* 185*       Blood Sugar Average: Last 72 hrs:  (P) 592 [id number]   Cont insulin regimen    Discharging Physician / Practitioner: Kimber Donaldson DO  PCP: Radha Guillaume MD  Admission Date:   Admission Orders (From admission, onward)     Ordered        11/29/22 1538  Inpatient Admission  Once            11/27/22 0240  Place in Observation  Once                      Discharge Date: 12/01/22    Medical Problems     Resolved Problems  Date Reviewed: 12/1/2022   None         Consultations During Hospital Stay:  · Urology    Procedures Performed:   · Cystoscopy w/ lithotripsy  · CT a/p    Significant Findings / Test Results:   · See above    Incidental Findings:   ·     Test Results Pending at Discharge (will require follow up):   ·      Outpatient Tests Requested:    Complications:  None    Reason for Admission: RT CVA tenderness    Hospital Course:     HPI:   Mei Caldwell is a 58 y o  male who has past medical history significant for recent admission for right ureterolithiasis with hydronephrosis  Patient is status post stenting  He also has diabetes mellitus, hypertension, obesity, hyperlipidemia, and depression  Patient comes to the emergency room this evening complaining of recurrence of right CVA tenderness  Please review the patient's history and course of events in hospital for this condition  Essentially, the patient was doing quite well pain wise and therefore was able to go home  However within 6 hours of discharge, there was recurrence of right costovertebral angle tenderness with radiation towards the right back  Patient states that each time that he moves the pain is excruciating  Patient does not have any fever or chills    CT scan of the abdomen reveals the presence of the same stone with stent in place   Likewise BUN creatinine is improved from previous  Currently, patient is lying flat on bed and unable to move much due to the excruciating pain  Also with movement of the leg, there is increasing pain at the lower back with no radiation towards leg  Please see above list of diagnoses and related plan for additional information  Condition at Discharge: stable     Discharge Day Visit / Exam:     Subjective:  Pt seen and examined at bedside  Continues to deny abd pain  Much improved CVA tenderness, states almost resolved even with movement  Able to sit up in chair and move with PT  Vitals: Blood Pressure: 120/70 (12/01/22 1521)  Pulse: 72 (12/01/22 1521)  Temperature: (!) 97 3 °F (36 3 °C) (12/01/22 1521)  Temp Source: Oral (11/26/22 2227)  Respirations: 20 (12/01/22 1521)  Height: 5' 11" (180 3 cm) (11/27/22 0322)  Weight - Scale: 128 kg (281 lb 8 4 oz) (11/27/22 0322)  SpO2: 93 % (12/01/22 1521)  Exam:   Physical Exam  Constitutional:       Appearance: He is obese  Cardiovascular:      Rate and Rhythm: Normal rate and regular rhythm  Pulses: Normal pulses  Heart sounds: Normal heart sounds  Pulmonary:      Effort: Pulmonary effort is normal  No respiratory distress  Breath sounds: Normal breath sounds  No wheezing  Abdominal:      General: Bowel sounds are normal  There is no distension  Palpations: Abdomen is soft  Tenderness: There is no abdominal tenderness  There is no right CVA tenderness, left CVA tenderness or guarding  Musculoskeletal:         General: Normal range of motion  Cervical back: Normal range of motion and neck supple  Right lower leg: No edema  Left lower leg: No edema  Skin:     General: Skin is warm and dry  Neurological:      General: No focal deficit present  Mental Status: He is alert and oriented to person, place, and time  Mental status is at baseline  Cranial Nerves: No cranial nerve deficit        Motor: No weakness  Discussion with Family:Plan of care d/w patient and I also called his wife    Discharge instructions/Information to patient and family:   See after visit summary for information provided to patient and family  Provisions for Follow-Up Care:  See after visit summary for information related to follow-up care and any pertinent home health orders  Disposition:     Home    For Discharges to [facility] SNF:   · Not Applicable to this Patient - Not Applicable to this Patient    Planned Readmission: No     Discharge Statement:  I spent 35 minutes discharging the patient  This time was spent on the day of discharge  I had direct contact with the patient on the day of discharge  Greater than 50% of the total time was spent examining patient, answering all patient questions, arranging and discussing plan of care with patient as well as directly providing post-discharge instructions  Additional time then spent on discharge activities  Discharge Medications:  See after visit summary for reconciled discharge medications provided to patient and family        ** Please Note: This note has been constructed using a voice recognition system **

## 2022-12-01 NOTE — ASSESSMENT & PLAN NOTE
Status post stent placement due to obstructive renal calculi w/ resulting hydronephrosis  Pain likely related to stent  S/p CT a/p, stent in place  UA benign  Urology on board; s/p cystoscopy with lithotripsy and partial stone fragments retrieval  Stent to remain in place  Pt instructed to be careful not to accidentally dislodge  Ok per urology for discharge  Recommend to cont flomax, prn oxybutynin  D/c gabapentin, pyridium  Pt to f/u with urology as outpt  Cont flomax decreased back to daily; Cont gabapentin  Much improved pain to the point of actual resolve per pt  Regardless he still has previously rx from last admission of dilaudid prn   Confirmed with pt that he has enough

## 2022-12-01 NOTE — PHYSICAL THERAPY NOTE
Physical Therapy Treatment Note    Patient's Name: Oscar Francis  : 22 1038   PT Last Visit   PT Visit Date 22   Note Type   Note Type Treatment   Pain Assessment   Pain Assessment Tool 0-10   Pain Score 4   Hospital Pain Intervention(s) Repositioned; Ambulation/increased activity   Restrictions/Precautions   Weight Bearing Precautions Per Order No   Other Precautions Multiple lines;Pain   General   Chart Reviewed Yes   Family/Caregiver Present No   Cognition   Overall Cognitive Status WFL   Attention Within functional limits   Orientation Level Oriented X4   Memory Within functional limits   Following Commands Follows all commands and directions without difficulty   Bed Mobility   Supine to Sit 6  Modified independent   Additional items HOB elevated; Bedrails   Transfers   Sit to Stand 6  Modified independent   Stand to Sit 6  Modified independent   Additional Comments transfers with RW   Ambulation/Elevation   Gait pattern Excessively slow   Gait Assistance 6  Modified independent   Additional items Assist x 1   Assistive Device Rolling walker;None   Distance 260ft with RW at mod (I) level + an additional 20ft with no AD at the supervision level  Recommend continued use of RW for longer distances, safe to ambulate withuot AD for short household distances  Stair Management Assistance Not tested  (pt politely declining, denies concerns about navigating his 3 CHARLY at home)   Balance   Static Sitting Good   Dynamic Sitting Good   Static Standing Fair +   Dynamic Standing Fair   Ambulatory Fair   Activity Tolerance   Activity Tolerance Patient tolerated treatment well   Nurse Made Aware ok to see per RN   Exercises   Squat   (STS x5 with no UE use)   Assessment   Prognosis Good   Problem List Pain   Assessment Pt seen for PT session today with a focus on functional transfers, gait, and endurance   Pt has made good progress towards mobility goals since initial evaluation, reporting much less pain with movement  At this time, pt is able to perform bed mobility, STS transfers, and ambulation with RW at mod (I) level  Attempted ambulation without AD during which pt's gait slowed, but he was overall steady- recommend use of RW for longer distances, but pt safe to ambulate short distances without any AD  Pt politely declining need for stair trial today, reporting he has no concerns about navigating his steps at home  Pt with no further acute PT needs, PT signing off  Pt would benefit from continued ambulation with nursing and restorative staff as able  Recommending home with no PT needs  Goals   Patient Goals to go home   PT Treatment Day 1   Plan   Progress Improving as expected   PT Frequency   (no further acute PT needs identified, PT signing off )   Recommendation   PT Discharge Recommendation No rehabilitation needs   Equipment Recommended [facility]   Turning in Bed Without Bedrails 4   Lying on Back to Sitting on Edge of Flat Bed 4   Moving Bed to Chair 4   Standing Up From Chair 4   Walk in Room 3   Climb 3-5 Stairs 3   Basic Mobility Inpatient Raw Score 22   Basic Mobility Standardized Score 47 4   Highest Level Of Mobility   JH-HLM Goal 7: Walk 25 feet or more   JH-HLM Achieved 8: Walk 250 feet ot more   End of Consult   Patient Position at End of Consult Bedside chair; All needs within reach       Jessica Ayala, PT, DPT

## 2022-12-01 NOTE — ASSESSMENT & PLAN NOTE
Lab Results   Component Value Date    EGFR 52 12/01/2022    EGFR 57 11/30/2022    EGFR 63 11/29/2022    CREATININE 1 42 (H) 12/01/2022    CREATININE 1 32 (H) 11/30/2022    CREATININE 1 21 11/29/2022   Slight bump though still w/in range compared to prior labs  Will hold lisinopril for now until repeat labs next week at which pt will be instructed per PCP when to resume

## 2022-12-01 NOTE — PLAN OF CARE
Problem: PAIN - ADULT  Goal: Verbalizes/displays adequate comfort level or baseline comfort level  Description: Interventions:  - Encourage patient to monitor pain and request assistance  - Assess pain using appropriate pain scale  - Administer analgesics based on type and severity of pain and evaluate response  - Implement non-pharmacological measures as appropriate and evaluate response  - Consider cultural and social influences on pain and pain management  - Notify physician/advanced practitioner if interventions unsuccessful or patient reports new pain  Outcome: Progressing     Problem: INFECTION - ADULT  Goal: Absence or prevention of progression during hospitalization  Description: INTERVENTIONS:  - Assess and monitor for signs and symptoms of infection  - Monitor lab/diagnostic results  - Monitor all insertion sites, i e  indwelling lines, tubes, and drains  - Monitor endotracheal if appropriate and nasal secretions for changes in amount and color  - [place] appropriate cooling/warming therapies per order  - Administer medications as ordered  - Instruct and encourage patient and family to use good hand hygiene technique  - Identify and instruct in appropriate isolation precautions for identified infection/condition  Outcome: Progressing

## 2022-12-01 NOTE — OCCUPATIONAL THERAPY NOTE
Occupational Therapy Progress Note     Patient Name: Britney Mansfield  VMFUX'O Date: 12/1/2022  Problem List  Principal Problem:    Costovertebral angle tenderness  Active Problems:    Diabetes mellitus type 2 in obese [facility])    Essential hypertension    CKD (chronic kidney disease) stage 3    Abnormal CT scan          12/01/22 1050   OT Last Visit   OT Visit Date 12/01/22   Note Type   Note Type Treatment   Pain Assessment   Pain Assessment Tool 0-10   Pain Score No Pain   Hospital Pain Intervention(s) Repositioned; Ambulation/increased activity; Emotional support   Restrictions/Precautions   Weight Bearing Precautions Per Order No   Other Precautions Pain   Lifestyle   Autonomy I with ADLs/IADLs/no AD/ +/FTE   Reciprocal Relationships supportive spouse   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)   Ellis Beltran enjoys spending time with family   Bed Mobility   Supine to Sit Unable to assess   Sit to Supine Unable to assess   Additional Comments Upon arrival, pt found sitting OOB in recliner; @ end of session, pt left sitting upright in recliner with all functional needs in reach   Transfers   Sit to Stand 6  Modified independent   Stand to Sit 6  Modified independent   Additional Comments No DME   Functional Mobility   Functional Mobility 6  Modified independent   Additional Comments Pt completing long household functional mobility distances @ Mod I level w/ no DME   Subjective   Subjective "I feel much better than I have been feeling  I know it's good to be up and moving "   Cognition   Overall Cognitive Status [facility]   Arousal/Participation Alert; Responsive;Arousable; Cooperative   Attention Within functional limits   Orientation Level Oriented X4   Memory Within functional limits   Following Commands Follows all commands and directions without difficulty   Comments Pt very pleasant and cooperative; expresses no concerns regarding ADL/IADL/ functional mobility @ this time, reporting that his wife is able to assist with functional needs prn   Activity Tolerance   Activity Tolerance Patient tolerated treatment well   Medical Staff Made Aware RN cleared   Assessment   Assessment Pt is a 57 yo male who actively participated in skilled OT session on 12/1/2022  Discussed role and scope of OT in which pt was receptive  At this time, pt is not demonstrating any significant occupational deficits and is functioning at a level of Mod I level for functional transfers/mobility w/ no DME  From an OT standpoint, recommend discharge to home with increased social support once medically stable  Pt was receptive regarding education on returning home safely with energy conservation techniques and demonstrated good carryover during occupational/functional performance  At this time, pt demonstrates good insight/safety awareness and does not express any concerns regarding performing ADL/IADL/functional mobility tasks  No further skilled acute care OT services are needed at this time  The patient's raw score on the AM-PAC Daily Activity inpatient short form is 24, standardized score is 57 54, greater than 39 4  Patients at this level are likely to benefit from discharge to home  Please refer to the recommendation of the Occupational Therapist for safe discharge planning  Recommend continued engagement in ADL/IADL/functional mobility tasks with nursing and restorative therapy staff as appropriate to promote the highest level of independence prior to discharge  OT is discharging pt from caseload at this time, please reconsult if needed     Recommendation   OT Discharge Recommendation No rehabilitation needs  (Increased social support/functional assistance prn upon d/c)   AM-PAC Daily Activity Inpatient   Lower Body Dressing 4   Bathing 4   Toileting 4   Upper Body Dressing 4   Grooming 4   Eating 4   Daily Activity Raw Score 24   Daily Activity Standardized Score (Calc for Raw Score >=11) 57 54   AM-PAC Applied Cognition Inpatient   Following a Speech/Presentation 4   Understanding Ordinary Conversation 4   Taking Medications 4   Remembering Where Things Are Placed or Put Away 4   Remembering List of 4-5 Errands 4   Taking Care of Complicated Tasks 4   Applied Cognition Raw Score 24   Applied Cognition Standardized Score 62 21   End of Consult   Education Provided Yes   Patient Position at End of Consult Bedside chair; All needs within reach   Nurse Communication Nurse aware of consult     Ranulfo Caba MS, OTR/L

## 2022-12-01 NOTE — ASSESSMENT & PLAN NOTE
Incidental finding of mild acute sigmoid diverticulitis  Pt completely asymptomatic; no abd pain, fever  Tolerating diet  Known hx of diverticulosis  Abx not indicated as discussed with pharmacy abx stewardship   Monitored off abx

## 2022-12-01 NOTE — PROGRESS NOTES
Progress Note -urology  Rodger Wyman 58 y o  male MRN: [id number]  Unit/Bed#: [facility] 808-01 Encounter: [id number]    Assessment & Plan:  Nephrolithiasis:  -CT scan initially on prior admission revealed a 11 mm ureteral calculus on the right status post stent insertion on 11/24   -patient was persistent stent colic resulting extended hospital stay   -patient now status post cystoscopy ureteroscopy holmium laser lithotripsy basket extraction ureteral stent insertion on the right, yesterday  -patient currently progressing well, he currently reporting that his pain has significantly improved  Is a stent with a string which will be removed in 1 weeks time  Discussed with patient would expect ureteral stent in place including intermittent bleeding, frequency, urgency, flank pain with urination and dysuria   -discussed with primary team discharging of Flomax, oxybutynin for any stent discomfort/colic along with pain medication  -mild elevation creatinine 1 42 most likely due to Pyridium and recent surgical intervention expect to resolve on its own   -no leukocytosis  -hemoglobin stable  -patient hemodynamically stable    Patient clear from urologic standpoint for discharge, office to contact on outpatient for follow-up appointment 3 months with ultrasound KUB    Subjective/Objective   Chief Complaint:  None    Subjective:   Patient currently sitting comfortably in chair in no acute distress  Current reporting that he feels significantly better compared to prior  He reports pain is currently controlled and has not been taking/asking for significant amount of pain medication  He is asking if he is able to go home  Objective:     Blood pressure 120/70, pulse 72, temperature (!) 97 3 °F (36 3 °C), resp  rate 20, height 5' 11" (1 803 m), weight 128 kg (281 lb 8 4 oz), SpO2 93 %  ,Body mass index is 39 27 kg/m²        Intake/Output Summary (Last 24 hours) at 12/1/2022 1640  Last data filed at 12/1/2022 1521  Gross per 24 hour   Intake 3368 33 ml   Output 1525 ml   Net 1843 33 ml       Invasive Devices     Peripheral Intravenous Line  Duration           Peripheral IV 11/30/22 Right;Ventral (anterior) Forearm <1 day              Physical Exam  Constitutional:       General: He is not in acute distress  Appearance: He is normal weight  He is not ill-appearing, toxic-appearing or diaphoretic  HENT:      Head: Normocephalic and atraumatic  Right Ear: External ear normal       Left Ear: External ear normal       Nose: Nose normal       Mouth/Throat:      Pharynx: Oropharynx is clear  Eyes:      General: No scleral icterus  Conjunctiva/sclera: Conjunctivae normal    Cardiovascular:      Rate and Rhythm: Normal rate and regular rhythm  Pulses: Normal pulses  Heart sounds: No murmur heard  No friction rub  No gallop  Pulmonary:      Effort: Pulmonary effort is normal  No respiratory distress  Breath sounds: No wheezing, rhonchi or rales  Abdominal:      General: Bowel sounds are normal  There is no distension  Tenderness: There is no abdominal tenderness  Genitourinary:     Comments: Stent with string noted  Musculoskeletal:         General: Normal range of motion  Cervical back: Normal range of motion  Skin:     General: Skin is warm and dry  Neurological:      General: No focal deficit present  Mental Status: He is alert and oriented to person, place, and time  Psychiatric:         Mood and Affect: Mood normal          Behavior: Behavior normal          Thought Content: Thought content normal          Judgment: Judgment normal          Lab, Imaging and other studies:I have personally reviewed pertinent lab results      Lab Results   Component Value Date    WBC 9 42 12/01/2022    HGB 14 7 12/01/2022    HCT 44 0 12/01/2022    MCV 91 12/01/2022     12/01/2022     Lab Results   Component Value Date    SODIUM 135 12/01/2022    K 4 5 12/01/2022     12/01/2022    CO2 22 12/01/2022    BUN 34 (H) 12/01/2022    CREATININE 1 42 (H) 12/01/2022    GLUC 234 (H) 12/01/2022    CALCIUM 9 1 12/01/2022       VTE Pharmacologic Prophylaxis: Enoxaparin (Lovenox)  VTE Mechanical Prophylaxis: sequential compression device      Nba Sanabria PA-C

## 2022-12-01 NOTE — PLAN OF CARE
Problem: OCCUPATIONAL THERAPY ADULT  Goal: Performs self-care activities at highest level of function for planned discharge setting  See evaluation for individualized goals  Description: Treatment Interventions: ADL retraining, Functional transfer training, UE strengthening/ROM, Endurance training, Patient/family training, Equipment evaluation/education, Compensatory technique education, Energy conservation, Activityengagement          See flowsheet documentation for full assessment, interventions and recommendations  Outcome: Adequate for Discharge  Note: Limitation: Decreased ADL status, Decreased Safe judgement during ADL, Decreased UE strength, Decreased UE ROM, Decreased endurance, Decreased high-level ADLs, Decreased self-care trans  Prognosis: Fair  Assessment: Pt is a 59 yo male who actively participated in skilled OT session on 12/1/2022  Discussed role and scope of OT in which pt was receptive  At this time, pt is not demonstrating any significant occupational deficits and is functioning at a level of Mod I level for functional transfers/mobility w/ no DME  From an OT standpoint, recommend discharge to home with increased social support once medically stable  Pt was receptive regarding education on returning home safely with energy conservation techniques and demonstrated good carryover during occupational/functional performance  At this time, pt demonstrates good insight/safety awareness and does not express any concerns regarding performing ADL/IADL/functional mobility tasks  No further skilled acute care OT services are needed at this time  The patient's raw score on the AM-PAC Daily Activity inpatient short form is 24, standardized score is 57 54, greater than 39 4  Patients at this level are likely to benefit from discharge to home  Please refer to the recommendation of the Occupational Therapist for safe discharge planning   Recommend continued engagement in ADL/IADL/functional mobility tasks with nursing and restorative therapy staff as appropriate to promote the highest level of independence prior to discharge  OT is discharging pt from caseload at this time, please reconsult if needed       OT Discharge Recommendation: No rehabilitation needs (Increased social support/functional assistance prn upon d/c)

## 2022-12-02 ENCOUNTER — TRANSITIONAL CARE MANAGEMENT (OUTPATIENT)
Dept: INTERNAL MEDICINE CLINIC | Facility: CLINIC | Age: 62
End: 2022-12-02

## 2022-12-02 VITALS
WEIGHT: 281.53 LBS | BODY MASS INDEX: 39.41 KG/M2 | HEART RATE: 83 BPM | RESPIRATION RATE: 20 BRPM | TEMPERATURE: 97.7 F | HEIGHT: 71 IN | DIASTOLIC BLOOD PRESSURE: 69 MMHG | OXYGEN SATURATION: 96 % | SYSTOLIC BLOOD PRESSURE: 143 MMHG

## 2022-12-02 NOTE — TELEPHONE ENCOUNTER
Post Op Note    Mikal Bullock is a 58 y o  male s/p CYSTOSCOPY URETEROSCOPY WITH LITHOTRIPSY HOLMIUM LASER, RETROGRADE PYELOGRAM AND INSERTION STENT URETERAL, stone extraction (Right: Bladder) performed 11/30/2022  Mikal Bullock had surgery by Dr Arjun Huddleston and was last seen at the [facility]  How would you rate your pain on a scale from 1 to 10, 10 being the worst pain ever? Patient states he has 8-9/10 pain when he is standing or moving around and his pain level is a 4-5/10 when he is laying down  Patient states he is taking the stronger pain medication that was prescribed for him and Tylenol  Advised of alternating inbetween medications to assist with pain management  Advised of a warm heating pad that can also assist, along with hydration of water  Advised if patient's pain is not resolving and continues, to go to the ER to be evaluated  Patient is understanding  Do you have any difficulty urinating? No  Patient states he is going a lot  Do you have any other questions or concerns that I can address at this time? Other than pain that is noted, patient has no trouble urinating  Advised if he is comfortable removing the stent on how own, and he states he is not and is requesting an appointment at the [facility] for stent removal  Appointment has been scheduled  Location confirmed  Advised to contact the office with any questions or concerns

## 2022-12-02 NOTE — UTILIZATION REVIEW
NOTIFICATION OF ADMISSION DISCHARGE   This is a Notification of Discharge from [address]  Please be advised that this patient has been discharge from our facility  Below you will find the admission and discharge date and time including the patient’s disposition  UTILIZATION REVIEW CONTACT:  Andi Rivero  Utilization   Network Utilization Review Department  Phone: [phone] x carefully listen to the prompts  All voicemails are confidential   Email: [email or web address]     ADMISSION INFORMATION  PRESENTATION DATE: 11/26/2022  9:36 PM  OBERVATION ADMISSION DATE:   INPATIENT ADMISSION DATE: 11/29/22  3:38 PM   DISCHARGE DATE: 12/1/2022  7:36 PM   DISPOSITION:Home/Self Care    IMPORTANT INFORMATION:  Send all requests for admission clinical reviews, approved or denied determinations and any other requests to dedicated fax number below belonging to the campus where the patient is receiving treatment   List of dedicated fax numbers:  [address] DENIALS (Administrative/Medical Necessity) [phone]   [address] (Maternity/NICU/Pediatrics) [phone]   [facility] [phone]   [place] [address] [phone]   [address] [phone]   [address] [facility] [phone] 90 [facility] [phone]   [address] [place] [address] 119 [phone]   [facility]  [phone]   [address] [facility] [phone]   [address] [facility] [address] [facility] [phone]

## 2022-12-02 NOTE — TELEPHONE ENCOUNTER
Left message per communication form advising patient this call is to see how he is doing after recent hospitalization, as well as Dr Sheri Castro recommendation of removing the stent in 1 week, which will be on 12/7  Education provided of stent removal and s/s he may experience after removal  Advised he can also schedule an appointment in the office for removal, if he is not comfortable performing this  Office number provided

## 2022-12-02 NOTE — TELEPHONE ENCOUNTER
Pt returning a call to nurse regarding his procedure yesterday       Pt can be reached at [phone]

## 2022-12-07 ENCOUNTER — PROCEDURE VISIT (OUTPATIENT)
Dept: UROLOGY | Facility: CLINIC | Age: 62
End: 2022-12-07

## 2022-12-07 DIAGNOSIS — N13.2 HYDRONEPHROSIS WITH OBSTRUCTING CALCULUS: Primary | ICD-10-CM

## 2022-12-07 NOTE — PROGRESS NOTES
12/7/2022  Jadyn Lockhart is a 58 y o  male  [id number]        Diagnosis  Chief Complaint    Post-op         Patient is s/p right ureteroscopy with stone extraction on 11/30/22 with Dr Guille Noguera  Patient will return as scheduled for follow up appt  Knows to get imaging prior      Procedure Stent with String Removal    There were no vitals filed for this visit  Stent with string removed intact without difficulty  Reviewed post stent removal symptoms including flank pain, dysuria, and hematuria  Instructed patient to increase oral fluid intake  Encouraged the use of NSAIDS and other prescribed pain medication as needed for discomfort  Patient instructed to call the office or report to the ER for uncontrolled pain, fever, chills, nausea or vomiting         Jennifer Sullivan RN

## 2022-12-08 LAB
COLOR STONE: NORMAL
COM MFR STONE: 100 %
COMMENT-STONE3: NORMAL
COMPOSITION: NORMAL
LABORATORY COMMENT REPORT: NORMAL
PHOTO: NORMAL
SIZE STONE: NORMAL MM
SPEC SOURCE SUBJ: NORMAL
STONE ANALYSIS-IMP: NORMAL
STONE ANALYSIS-IMP: NORMAL
WT STONE: 211 MG

## 2022-12-12 ENCOUNTER — OFFICE VISIT (OUTPATIENT)
Dept: INTERNAL MEDICINE CLINIC | Facility: CLINIC | Age: 62
End: 2022-12-12

## 2022-12-12 VITALS
DIASTOLIC BLOOD PRESSURE: 86 MMHG | WEIGHT: 273 LBS | BODY MASS INDEX: 38.22 KG/M2 | HEART RATE: 101 BPM | OXYGEN SATURATION: 96 % | HEIGHT: 71 IN | SYSTOLIC BLOOD PRESSURE: 136 MMHG | TEMPERATURE: 97.1 F

## 2022-12-12 DIAGNOSIS — N13.2 HYDRONEPHROSIS WITH OBSTRUCTING CALCULUS: Primary | ICD-10-CM

## 2022-12-12 DIAGNOSIS — N18.30 STAGE 3 CHRONIC KIDNEY DISEASE, UNSPECIFIED WHETHER STAGE 3A OR 3B CKD (HCC): ICD-10-CM

## 2022-12-12 DIAGNOSIS — I10 ESSENTIAL HYPERTENSION: ICD-10-CM

## 2022-12-12 RX ORDER — ENALAPRIL MALEATE 20 MG/1
20 TABLET ORAL DAILY
COMMUNITY

## 2022-12-12 NOTE — PROGRESS NOTES
Assessment & Plan     1  Right hydroureteronephrosis with obstructing calculus  Assessment & Plan:  Stent removed last week  Still with pain but improving  On flomax, ditropan, and hydromorphone  2  Essential hypertension  Assessment & Plan: On amlodipine and clonidine  Enalapril was held during his hospital stay due to ANNI  He restarted it a few days ago  Check bmp today  3  Stage 3 chronic kidney disease, unspecified whether stage 3a or 3b CKD [facility])  Assessment & Plan:  Lab Results   Component Value Date    EGFR 52 12/01/2022    EGFR 57 11/30/2022    EGFR 63 11/29/2022    CREATININE 1 42 (H) 12/01/2022    CREATININE 1 32 (H) 11/30/2022    CREATININE 1 21 11/29/2022   check bmp today  Subjective     Transitional Care Management Review:   Joanne Lindsay is a 58 y o  male here for TCM follow up  He was hospitalized from 11/26-12/1 with right obstructive renal calculi with hydronephrosis  He had complaints of right flank pain  He had a stent placed  He is on flomax, oxybutynin, and gabapentin  Incidentally on the CT of the abdomen he was found to have mild sigmoid diverticulitis  He remained asymptomatic therefore no antibiotics were started  His stent was removed by urology on 12/7  He is doing better  He still has some low back discomfort  He is taking ibuprofen during the day and hydromorphone at night  His appetite is good  He is urinating normally  No issues with his bowels  He has restarted his enalapril a few days ago  He did not get labs done prior to restarting        During the TCM phone call patient stated:  [facility] Call     Date and time call was made  12/2/2022  1:21 PM    Hospital care reviewed  Records reviewed    Patient was hospitial[facility] at  [facility]    Date of Admission  11/26/22    Date of discharge  12/01/22    Diagnosis  Costovertebral angle tenderness    Disposition  Home    Were the patients medications reviewed and updated  Yes    Current Symptoms None      TCM Call     Post hospital issues  None    Should patient be enrolled in anticoag monitoring? No    Scheduled for follow up? Yes    Did you obtain your prescribed medications  Yes    Do you need help managing your prescriptions or medications  No    Is transportation to your appointment needed  No    I have advised the patient to call PCP with any new or worsening symptoms  jeannine        Review of Systems   Constitutional: Negative for activity change, appetite change, fatigue and fever  Respiratory: Negative for cough and shortness of breath  Cardiovascular: Negative for chest pain  Gastrointestinal: Negative for abdominal pain, constipation and diarrhea  Genitourinary: Positive for flank pain  Negative for difficulty urinating, frequency, hematuria and urgency  Musculoskeletal: Positive for back pain  Neurological: Negative for dizziness, light-headedness and headaches  Objective     /86   Pulse 101   Temp (!) 97 1 °F (36 2 °C)   Ht 5' 11" (1 803 m)   Wt 124 kg (273 lb)   SpO2 96%   BMI 38 08 kg/m²      Physical Exam  Vitals reviewed  Constitutional:       Appearance: Normal appearance  HENT:      Head: Atraumatic  Cardiovascular:      Rate and Rhythm: Normal rate and regular rhythm  Heart sounds: Normal heart sounds  Pulmonary:      Effort: Pulmonary effort is normal       Breath sounds: Normal breath sounds  Abdominal:      General: Bowel sounds are normal       Palpations: Abdomen is soft  Tenderness: There is no abdominal tenderness  There is no right CVA tenderness or left CVA tenderness  Skin:     General: Skin is warm and dry  Neurological:      Mental Status: He is alert and oriented to person, place, and time     Psychiatric:         Mood and Affect: Mood normal          Behavior: Behavior normal        EVON Krishnan

## 2022-12-12 NOTE — ASSESSMENT & PLAN NOTE
Lab Results   Component Value Date    EGFR 52 12/01/2022    EGFR 57 11/30/2022    EGFR 63 11/29/2022    CREATININE 1 42 (H) 12/01/2022    CREATININE 1 32 (H) 11/30/2022    CREATININE 1 21 11/29/2022   check bmp today

## 2022-12-12 NOTE — ASSESSMENT & PLAN NOTE
On amlodipine and clonidine  Enalapril was held during his hospital stay due to ANNI  He restarted it a few days ago  Check bmp today

## 2022-12-13 NOTE — PLAN OF CARE
Problem: PAIN - ADULT  Goal: Verbalizes/displays adequate comfort level or baseline comfort level  Description: Interventions:  - Encourage patient to monitor pain and request assistance  - Assess pain using appropriate pain scale  - Administer analgesics based on type and severity of pain and evaluate response  - Implement non-pharmacological measures as appropriate and evaluate response  - Consider cultural and social influences on pain and pain management  - Notify physician/advanced practitioner if interventions unsuccessful or patient reports new pain  Outcome: Progressing     Problem: GENITOURINARY - ADULT  Goal: Maintains or returns to baseline urinary function  Description: INTERVENTIONS:  - Assess urinary function  - Encourage oral fluids to ensure adequate hydration if ordered  - Administer IV fluids as ordered to ensure adequate hydration  - Administer ordered medications as needed  - Offer frequent toileting  - Follow urinary retention protocol if ordered  Outcome: Progressing     Problem: METABOLIC, FLUID AND ELECTROLYTES - ADULT  Goal: Electrolytes maintained within normal limits  Description: INTERVENTIONS:  - Monitor labs and assess patient for signs and symptoms of electrolyte imbalances  - Administer electrolyte replacement as ordered  - Monitor response to electrolyte replacements, including repeat lab results as appropriate  - Instruct patient on fluid and nutrition as appropriate  Outcome: Progressing  Goal: Fluid balance maintained  Description: INTERVENTIONS:  - Monitor labs   - Monitor I/O and WT  - Instruct patient on fluid and nutrition as appropriate  - Assess for signs & symptoms of volume excess or deficit  Outcome: Progressing     Problem: INFECTION - ADULT  Goal: Absence or prevention of progression during hospitalization  Description: INTERVENTIONS:  - Assess and monitor for signs and symptoms of infection  - Monitor lab/diagnostic results  - Monitor all insertion sites, i e  indwelling lines, tubes, and drains  - Monitor endotracheal if appropriate and nasal secretions for changes in amount and color  - [place] appropriate cooling/warming therapies per order  - Administer medications as ordered  - Instruct and encourage patient and family to use good hand hygiene technique  - Identify and instruct in appropriate isolation precautions for identified infection/condition  Outcome: Progressing     Problem: DISCHARGE PLANNING  Goal: Discharge to home or other facility with appropriate resources  Description: INTERVENTIONS:  - Identify barriers to discharge w/patient and caregiver  - Arrange for needed discharge resources and transportation as appropriate  - Identify discharge learning needs (meds, wound care, etc )  - Arrange for interpretive services to assist at discharge as needed  - Refer to Case Management Department for coordinating discharge planning if the patient needs post-hospital services based on physician/advanced practitioner order or complex needs related to functional status, cognitive ability, or social support system  Outcome: Progressing     Problem: Knowledge Deficit  Goal: Patient/family/caregiver demonstrates understanding of disease process, treatment plan, medications, and discharge instructions  Description: Complete learning assessment and assess knowledge base    Interventions:  - Provide teaching at level of understanding  - Provide teaching via preferred learning methods  Outcome: Progressing Complex Repair Preamble Text (Leave Blank If You Do Not Want): Extensive wide undermining was performed.

## 2022-12-18 ENCOUNTER — APPOINTMENT (OUTPATIENT)
Dept: LAB | Facility: CLINIC | Age: 62
End: 2022-12-18

## 2022-12-18 DIAGNOSIS — N18.30 STAGE 3 CHRONIC KIDNEY DISEASE, UNSPECIFIED WHETHER STAGE 3A OR 3B CKD (HCC): ICD-10-CM

## 2022-12-18 LAB
ANION GAP SERPL CALCULATED.3IONS-SCNC: 10 MMOL/L (ref 4–13)
BUN SERPL-MCNC: 17 MG/DL (ref 5–25)
CALCIUM SERPL-MCNC: 9.4 MG/DL (ref 8.4–10.2)
CHLORIDE SERPL-SCNC: 107 MMOL/L (ref 96–108)
CO2 SERPL-SCNC: 23 MMOL/L (ref 21–32)
CREAT SERPL-MCNC: 1.29 MG/DL (ref 0.6–1.3)
GFR SERPL CREATININE-BSD FRML MDRD: 59 ML/MIN/1.73SQ M
GLUCOSE SERPL-MCNC: 132 MG/DL (ref 65–140)
POTASSIUM SERPL-SCNC: 3.7 MMOL/L (ref 3.5–5.3)
SODIUM SERPL-SCNC: 140 MMOL/L (ref 135–147)

## 2023-01-10 ENCOUNTER — OFFICE VISIT (OUTPATIENT)
Dept: PODIATRY | Facility: CLINIC | Age: 63
End: 2023-01-10

## 2023-01-10 VITALS
BODY MASS INDEX: 38.22 KG/M2 | SYSTOLIC BLOOD PRESSURE: 136 MMHG | DIASTOLIC BLOOD PRESSURE: 86 MMHG | RESPIRATION RATE: 17 BRPM | HEIGHT: 71 IN | WEIGHT: 273 LBS

## 2023-01-10 DIAGNOSIS — M77.42 METATARSALGIA OF BOTH FEET: ICD-10-CM

## 2023-01-10 DIAGNOSIS — M77.41 METATARSALGIA OF BOTH FEET: ICD-10-CM

## 2023-01-10 DIAGNOSIS — E11.42 DIABETIC POLYNEUROPATHY ASSOCIATED WITH TYPE 2 DIABETES MELLITUS (HCC): Primary | ICD-10-CM

## 2023-01-10 DIAGNOSIS — M21.969 ACQUIRED DEFORMITY OF FOOT, UNSPECIFIED LATERALITY: ICD-10-CM

## 2023-01-10 DIAGNOSIS — B35.1 ONYCHOMYCOSIS: ICD-10-CM

## 2023-01-10 NOTE — PROGRESS NOTES
Assessment   Pain upon ambulation   Peripheral artery disease   Diabetic neuropathy   Callus formation   Mycosis of nail   Peroneal tendinitis right foot   Dermatophytosis      Plan   Foot exam performed   Nails debrided   Calluses debrided   Patient will stretch daily   Procedures performed without pain or complication   Patient educated on care of the diabetic foot         Chief Complaint   Patient needs full diabetic foot exam   Patient has pain upon ambulation   Patient complains of pain in his toes when he wears shoes   He gets pain in the ball of the foot   No history of trauma      History of Present Illness   HPI: Patient presents for pedal evaluation  Patient complains of pain in his feet and toes with ambulation   Patient is a morbidly obese diabetic who has some electric sensations in his feet on occasion       Review of Systems        Constitutional: not feeling tired       Eyes: no eyesight problems       ENT: no nasal discharge       Cardiovascular: no chest pain,-- no palpitations-- and-- no extremity edema       Respiratory: no shortness of breath-- and-- no cough       Gastrointestinal: no abdominal pain-- and-- no constipation       Genitourinary: no dysuria-- and-- no urinary hesitancy       Integumentary: no skin wound       Neurological: no tingling-- and-- no dizziness       Psychiatric: sleeping better, stopped taking trazodone, but-- no sleep disturbances       Endocrine: no feelings of weakness       Active Problems   1  Acquired pes planus (734) (M21 40)   2  Acute renal failure (584 9) (N17 9)   3  Adhesive capsulitis of both shoulders (726 0) (M75 01,M75 02)   4  Arthralgia (719 40) (M25 50)   5  BPH without urinary obstruction (600 00) (N40 0)   6  Bunion (727 1) (M21 619)   7  Callus (700) (L84)   0  GIGEZIY systolic congestive heart failure (428 22,428 0) (I50 22)   9  Depression with anxiety (300 4) (F41 8)   10  Diabetes mellitus with neurological manifestation (250 60) (E11 49)   11  Diabetes type 2, uncontrolled (250 02) (E11 65)   12  Difficulty concentrating (799 51) (R41 840)   13  Difficulty walking (719 7) (R26 2)   14  Dyspnea on exertion (786 09) (R06 09)   15  Encounter for prostate cancer screening (V76 44) (Z12 5)   16  Encounter for screening for malignant neoplasm of colon (V76 51) (Z12 11)   17  Fatigue (780 79) (R53 83)   18  Foot pain, bilateral (729 5) (M79 671,M79 672)   19  Hallux valgus (735 0) (M20 10)   20  Hematuria (599 70) (R31 9)   21  Hyperlipidemia (272 4) (E78 5)   22  Hypertension (401 9) (I10)   23  Hypogonadism, male (257 2) (E29 1)   24  Insomnia (780 52) (G47 00)   25  Morbid or severe obesity due to excess calories (278 01) (E66 01)   26  Need for pneumococcal vaccination (V03 82) (Z23)   27  Need for prophylactic vaccination and inoculation against influenza (V04 81) (Z23)   28  Nephrolithiasis (592 0) (N20 0)   29  Onychomycosis (110 1) (B35 1)   30  JEFF (obstructive sleep apnea) (327 23) (G47 33)   31  Sciatica (724 3) (M54 30)   32  Screening for colon cancer (V76 51) (Z12 11)   33  Seasonal allergies (477 9) (J30 2)   34  Shoulder pain, right (719 41) (M25 511)   35  Spinal stenosis (724 00) (M48 00)   36  Tinea pedis (110 4) (B35 3)   37  Type 2 diabetes mellitus (250 00) (E11 9)   38  Vitamin D deficiency (268 9) (E55 9)     Past Medical History    · History of Cellulitis of left leg (682 6) (L03 116)   · History of chest pain (V13 89) (L65 740)   · History of diarrhea (V12 79) (M70 980)   · History of onychomycosis (V12 09) (Z86 19)   · History of onychomycosis (V12 09) (Z86 19)   · History of Limb pain (729 5) (M79 609)   · History of Neck pain (723 1) (M54 2)   · Need for pneumococcal vaccination (V03 82) (Z23)   · History of Nephrolithiasis (V13 01)   · History of Numbness On The Sole Of The Right Foot   · History of Pain and swelling of left lower leg (729 5,729 81) (M79 662,M79 89)   · History of Pain of lower extremity (729 5) (M79 606)   · History of Pain, hand joint, right (719 44) (M79 641)   · Sciatica (724 3) (M54 30)     The active problems and past medical history were reviewed and updated today       Surgical History    · History of Knee Arthroscopy (Therapeutic)   · History of Knee Surgery   · History of Needle Biopsy Of Prostate     Family History   Mother    · Family history of Alzheimer Disease   · Family history of Family Health Status Of Mother - Alive  Father    · Family history of Family Health Status Of Father -   Family History    · Family history of Adopted   · Denied: Family history of Colon Cancer   · Denied: Family history of Crohn's Disease   · Denied: Family history of Liver Cancer     Social History    · Denied: History of Alcohol Use (History)   · Current Some Day Smoker (305 1)   · Denied: History of Exercise Habits   · Marital History - Currently    · Tobacco use (305 1) (Z72 0)   · Working Full Time  The social history was reviewed and is unchanged       The medication list was reviewed and updated today       Allergies   1  No Known Drug Allergies   Physical Exam   Left Foot: Appearance: Normal except as noted: excessive pronation-- and-- pes planus  Great toe deformities include a bunion  Tenderness: None except the great toe-- and-- distal first metatarsal     Right Foot: Appearance: Normal except as noted: excessive pronation-- and-- pes planus  Great toe deformities include a bunion  Tenderness: None except the great toe-- and-- distal first metatarsal     Left Ankle: ROM: limited ROM in all planes    Right Ankle: ROM: limited ROM in all planes    Neurological Exam: performed  Light touch was decreased bilaterally  Vibratory sensation was decreased in both first metatarsophalangeal joints     Vascular Exam: performed Dorsalis pedis pulses were present bilaterally  Posterior tibial pulses were present bilaterally  Elevation Pallor: absent bilaterally  Dependence rubor was absent bilaterally  Edema: none     Toenails: All of the toenails were elongated,-- hypertrophied,-- discolored-- and-- Ptotic  Both first toenails were tender-- and-- Positive onychogryphosis          Socks and shoes removed, Right Foot Findings: swollen, erythematous and dry       The sensory exam showed diminished vibratory sensation at the level of the toes  Diminished tactile sensation with monofilament testing throughout the right foot       Socks and shoes removed, Left Foot Findings: swollen, erythematous and dry       The sensory exam showed diminished vibratory sensation at the level of the toes  Diminished tactile sensation with monofilament testing throughout the left foot      Capillary refills findings on the right were normal in the toes       Pulses:      2+ in the posterior tibialis on the right      2+ in the dorsalis pedis on the right       Capillary refills findings on the left were normal in the toes       Pulses:      1+ in the posterior tibialis on the left      1+ in the dorsalis pedis on the left       Assign Risk Category: 2: Loss of protective sensation with or without weakness, deformity, callus, pre-ulcer, or history of ulceration  High risk     Hyperkeratosis: present on both first toes,-- present on both first sub metatarsals,-- present on both third sub metatarsals-- and-- Severe profound moccasin tinea pedis bilateral     Shoe Gear Evaluation: performed ()   Recommendation(s): SAS style      Right Foot/Ankle   Right Foot Inspection        Sensory   Vibration: diminished  Proprioception: diminished  Monofilament testing: diminished     Vascular  Capillary refills: < 3 seconds              Left Foot/Ankle  Left Foot Inspection        Sensory   Vibration: diminished  Proprioception: diminished  Monofilament testing: diminished     Vascular  Capillary refills: < 3 seconds           Assign Risk Category  Deformity present  Loss of protective sensation  No weak pulses  Risk: 1

## 2023-01-31 ENCOUNTER — OFFICE VISIT (OUTPATIENT)
Dept: INTERNAL MEDICINE CLINIC | Facility: CLINIC | Age: 63
End: 2023-01-31

## 2023-01-31 VITALS
TEMPERATURE: 97 F | HEART RATE: 91 BPM | BODY MASS INDEX: 39.76 KG/M2 | DIASTOLIC BLOOD PRESSURE: 90 MMHG | WEIGHT: 284 LBS | OXYGEN SATURATION: 96 % | HEIGHT: 71 IN | SYSTOLIC BLOOD PRESSURE: 140 MMHG

## 2023-01-31 DIAGNOSIS — Z00.00 HEALTH MAINTENANCE EXAMINATION: Primary | ICD-10-CM

## 2023-01-31 DIAGNOSIS — G47.33 OSA ON CPAP: ICD-10-CM

## 2023-01-31 DIAGNOSIS — Z79.4 TYPE 2 DIABETES MELLITUS WITH STAGE 3A CHRONIC KIDNEY DISEASE, WITH LONG-TERM CURRENT USE OF INSULIN (HCC): ICD-10-CM

## 2023-01-31 DIAGNOSIS — N18.31 TYPE 2 DIABETES MELLITUS WITH STAGE 3A CHRONIC KIDNEY DISEASE, WITH LONG-TERM CURRENT USE OF INSULIN (HCC): ICD-10-CM

## 2023-01-31 DIAGNOSIS — K57.92 ACUTE DIVERTICULITIS: ICD-10-CM

## 2023-01-31 DIAGNOSIS — E78.2 MIXED HYPERLIPIDEMIA: ICD-10-CM

## 2023-01-31 DIAGNOSIS — N13.2 HYDRONEPHROSIS WITH OBSTRUCTING CALCULUS: ICD-10-CM

## 2023-01-31 DIAGNOSIS — E11.22 TYPE 2 DIABETES MELLITUS WITH STAGE 3A CHRONIC KIDNEY DISEASE, WITH LONG-TERM CURRENT USE OF INSULIN (HCC): ICD-10-CM

## 2023-01-31 DIAGNOSIS — E66.01 MORBID OBESITY (HCC): ICD-10-CM

## 2023-01-31 DIAGNOSIS — N18.30 STAGE 3 CHRONIC KIDNEY DISEASE, UNSPECIFIED WHETHER STAGE 3A OR 3B CKD (HCC): ICD-10-CM

## 2023-01-31 DIAGNOSIS — Z99.89 OSA ON CPAP: ICD-10-CM

## 2023-01-31 PROBLEM — U07.1 COVID-19 VIRUS INFECTION: Status: RESOLVED | Noted: 2022-05-09 | Resolved: 2023-01-31

## 2023-01-31 NOTE — PATIENT INSTRUCTIONS
High Fiber Diet   WHAT YOU NEED TO KNOW:   A high-fiber diet includes foods that have a high amount of fiber  Fiber is the part of fruits, vegetables, and grains that is not broken down by your body  Fiber keeps your bowel movements regular  Fiber can also help lower your cholesterol level, control blood sugar in people with diabetes, and relieve constipation  Fiber can also help you control your weight because it helps you feel full faster  Most adults should eat 25 to 35 grams of fiber each day  Talk to your dietitian or healthcare provider about the amount of fiber you need  Good sources of fiber:       Foods with at least 4 grams of fiber per serving:      ? to ½ cup of high-fiber cereal (check the nutrition label on the box)    ½ cup of blackberries or raspberries    4 dried prunes    1 cooked artichoke    ½ cup of cooked legumes, such as lentils, or red, kidney, and charles beans    Foods with 1 to 3 grams of fiber per servin slice of whole-wheat, pumpernickel, or rye bread    ½ cup of cooked brown rice    4 whole-wheat crackers    1 cup of oatmeal    ½ cup of cereal with 1 to 3 grams of fiber per serving (check the nutrition label on the box)    1 small piece of fruit, such as an apple, banana, pear, kiwi, or orange    3 dates    ½ cup of canned apricots, fruit cocktail, peaches, or pears    ½ cup of raw or cooked vegetables, such as carrots, cauliflower, cabbage, spinach, squash, or corn    Ways that you can increase fiber in your diet:   Choose brown or wild rice instead of white rice  Use whole wheat flour in recipes instead of white or all-purpose flour  Add beans and peas to casseroles or soups  Choose fresh fruit and vegetables with peels or skins on instead of juices  Other diet guidelines to follow: Add fiber to your diet slowly  You may have abdominal discomfort, bloating, and gas if you add fiber to your diet too quickly       Drink plenty of liquids as you add fiber to your diet   You may have nausea or develop constipation if you do not drink enough water  Ask how much liquid to drink each day and which liquids are best for you  © Copyright [employer] 2022 Information is for End User's use only and may not be sold, redistributed or otherwise used for commercial purposes  All illustrations and images included in CareNotes® are the copyrighted property of [employer]  or [facility] Rohit Ahmadi   The above information is an  only  It is not intended as medical advice for individual conditions or treatments  Talk to your doctor, nurse or pharmacist before following any medical regimen to see if it is safe and effective for you

## 2023-01-31 NOTE — PROGRESS NOTES
Assessment/Plan:    Right hydroureteronephrosis with obstructing calculus  Stent removed last month  Pain intermittent, has improved  Off oxybutynin,  taking tamsulosin  Drinking at least 60 oz of fluids daily  Follow up with Urology as scheduled  CKD (chronic kidney disease) stage 3  Lab Results   Component Value Date    EGFR 59 12/18/2022    EGFR 52 12/01/2022    EGFR 57 11/30/2022    CREATININE 1 29 12/18/2022    CREATININE 1 42 (H) 12/01/2022    CREATININE 1 32 (H) 11/30/2022     Stable  Avoid NSAIDs  Essential hypertension  BP controlled: on amlodipine, clonidine bid and enalapril  Type 2 diabetes mellitus with stage 3a chronic kidney disease, with long-term current use of insulin (HCC)    Lab Results   Component Value Date    HGBA1C 10 1 (H) 10/16/2022     Repeat A1c  On Basaglar 36 units, Trulicity 4 5 mg weekly  Plan to stop glimepiride  Mixed hyperlipidemia  Lipids due  Depression  On bupropion and sertraline  Morbid obesity ([address] )  Weight stable  Obstructive sleep apnea  Using new CPAP  Diagnoses and all orders for this visit:    Health maintenance examination  Comments:  Updated  Right hydroureteronephrosis with obstructing calculus    Acute diverticulitis  Comments: On CT last 11/22  Schedule colonoscopy  Stage 3 chronic kidney disease, unspecified whether stage 3a or 3b CKD (HCC)  -     CBC and differential    Type 2 diabetes mellitus with stage 3a chronic kidney disease, with long-term current use of insulin (HCC)  -     Hemoglobin A1C  -     Hemoglobin A1C; Future    Morbid obesity (HCC)    Mixed hyperlipidemia  -     Lipid panel  -     Comprehensive metabolic panel    Obstructive sleep apnea    Follow up in 4 months or as needed  Subjective:      Patient ID: Caroline Arauz is a 58 y o  male here for a physical     He has been slowly recovering from his kidney stone 2 months ago  He reports low back pain is gradually improving, still sore    He is not taking any medication for this  He is trying to stay hydrated  He had stopped drinking dark beverages including soda  He is still drinking ginger ale, Gatorade  He reports no urinary symptoms  He still feels tired, frustrated that it is taking his so long to recover  He reports his bowel movements are still not regular  He would experience loose stools or constipation  He does not eat a lot of fiber regularly  He started a new job back in July, says it is less stress  He does not usually check his sugars  He last checked it earlier this month and it was 170  He is using his insulin regularly  The following portions of the patient's history were reviewed and updated as appropriate: allergies, current medications, past medical history, past social history and problem list     Review of Systems   Constitutional: Positive for fatigue  Negative for activity change and appetite change  HENT: Negative for congestion and hearing loss  Eyes: Negative  Negative for visual disturbance  Respiratory: Negative for cough, chest tightness and shortness of breath  Cardiovascular: Negative for chest pain, palpitations and leg swelling  Gastrointestinal: Negative for abdominal pain and constipation  Genitourinary: Negative for dysuria, frequency, hematuria and urgency  Musculoskeletal: Positive for back pain  Negative for arthralgias  Skin: Negative for rash and wound  Neurological: Negative for dizziness, numbness and headaches  Psychiatric/Behavioral: Negative for sleep disturbance  Objective:      /90   Pulse 91   Temp (!) 97 °F (36 1 °C)   Ht 5' 11" (1 803 m)   Wt 129 kg (284 lb)   SpO2 96%   BMI 39 61 kg/m²          Physical Exam  Vitals and nursing note reviewed  Constitutional:       Appearance: He is well-developed  HENT:      Head: Normocephalic and atraumatic     Eyes:      Conjunctiva/sclera: Conjunctivae normal       Pupils: Pupils are equal, round, and reactive to light  Cardiovascular:      Rate and Rhythm: Normal rate and regular rhythm  Heart sounds: Normal heart sounds  Pulmonary:      Effort: Pulmonary effort is normal       Breath sounds: No wheezing or rhonchi  Abdominal:      General: Abdomen is protuberant  Bowel sounds are normal       Palpations: Abdomen is soft  Tenderness: There is no right CVA tenderness or left CVA tenderness  Musculoskeletal:         General: No swelling  Cervical back: Neck supple  Right lower leg: No edema  Left lower leg: No edema  Skin:     General: Skin is warm  Findings: No rash  Neurological:      General: No focal deficit present  Mental Status: He is alert and oriented to person, place, and time  Psychiatric:         Mood and Affect: Mood and affect normal          Behavior: Behavior normal            Labs & imaging results reviewed with patient  BMI Counseling: Body mass index is 39 61 kg/m²  The BMI is above normal  Nutrition recommendations include decreasing overall calorie intake, 3-5 servings of fruits/vegetables daily, decreasing soda and/or juice intake and moderation in carbohydrate intake  Exercise recommendations include exercising 3-5 times per week and strength training exercises

## 2023-01-31 NOTE — ASSESSMENT & PLAN NOTE
Stent removed last month  Pain intermittent, has improved  Off oxybutynin,  taking tamsulosin  Drinking at least 60 oz of fluids daily  Follow up with Urology as scheduled

## 2023-01-31 NOTE — ASSESSMENT & PLAN NOTE
Lab Results   Component Value Date    EGFR 59 12/18/2022    EGFR 52 12/01/2022    EGFR 57 11/30/2022    CREATININE 1 29 12/18/2022    CREATININE 1 42 (H) 12/01/2022    CREATININE 1 32 (H) 11/30/2022     Stable  Avoid NSAIDs

## 2023-01-31 NOTE — ASSESSMENT & PLAN NOTE
Lab Results   Component Value Date    HGBA1C 10 1 (H) 10/16/2022     Repeat A1c  On Basaglar 36 units, Trulicity 4 5 mg weekly  Plan to stop glimepiride

## 2023-02-01 ENCOUNTER — TELEPHONE (OUTPATIENT)
Dept: ADMINISTRATIVE | Facility: OTHER | Age: 63
End: 2023-02-01

## 2023-02-01 NOTE — TELEPHONE ENCOUNTER
Upon review of the In Basket request we were able to identify that the patient had the requested item(s) completed internally  Internal labs/procedures/tests are not able to be linked to   The item(s) requested can be found within the Chart Review tabs  Because of this we are requesting that you forward this request/concern to the appropriate education email address  The Quality team members assigned to this email will be more than happy to assist you  Any additional questions or concerns should be emailed to the Practice Liaisons via the appropriate education email address, please do not reply via In Basket      Thank you  Anjali Calvin MA

## 2023-02-01 NOTE — TELEPHONE ENCOUNTER
----- Message from Tata Jimenez sent at 1/31/2023  4:43 PM EST -----  Regarding: care gap request  01/31/23 4:43 PM    Hello, our patient attached above has had Diabetic Foot Exam completed/performed  Please assist in updating the patient chart by making an External outreach to Dr [facility] facility located in [place], [place]   The date of service is 2022/2023    Thank you,  Tata Jimenez  [facility]

## 2023-02-07 DIAGNOSIS — F33.1 MODERATE EPISODE OF RECURRENT MAJOR DEPRESSIVE DISORDER (HCC): ICD-10-CM

## 2023-02-07 DIAGNOSIS — E11.40 TYPE 2 DIABETES MELLITUS WITH DIABETIC NEUROPATHY, WITHOUT LONG-TERM CURRENT USE OF INSULIN (HCC): ICD-10-CM

## 2023-02-07 DIAGNOSIS — I10 ESSENTIAL HYPERTENSION: ICD-10-CM

## 2023-02-07 DIAGNOSIS — N40.0 BPH WITHOUT URINARY OBSTRUCTION: ICD-10-CM

## 2023-02-07 DIAGNOSIS — F41.8 DEPRESSION WITH ANXIETY: ICD-10-CM

## 2023-02-08 DIAGNOSIS — I10 ESSENTIAL HYPERTENSION: Primary | ICD-10-CM

## 2023-02-08 RX ORDER — AMLODIPINE BESYLATE 10 MG/1
10 TABLET ORAL DAILY
Qty: 90 TABLET | Refills: 0 | Status: SHIPPED | OUTPATIENT
Start: 2023-02-08 | End: 2023-02-15 | Stop reason: SDUPTHER

## 2023-02-08 RX ORDER — SERTRALINE HYDROCHLORIDE 100 MG/1
100 TABLET, FILM COATED ORAL DAILY
Qty: 90 TABLET | Refills: 0 | Status: SHIPPED | OUTPATIENT
Start: 2023-02-08 | End: 2023-02-15 | Stop reason: SDUPTHER

## 2023-02-08 RX ORDER — GLIMEPIRIDE 4 MG/1
4 TABLET ORAL 2 TIMES DAILY
Qty: 180 TABLET | Refills: 0 | Status: SHIPPED | OUTPATIENT
Start: 2023-02-08 | End: 2023-02-15 | Stop reason: SDUPTHER

## 2023-02-08 RX ORDER — ENALAPRIL MALEATE 20 MG/1
20 TABLET ORAL DAILY
Qty: 90 TABLET | Refills: 1 | Status: SHIPPED | OUTPATIENT
Start: 2023-02-08 | End: 2023-02-15 | Stop reason: SDUPTHER

## 2023-02-08 RX ORDER — CLONIDINE HYDROCHLORIDE 0.3 MG/1
0.3 TABLET ORAL 2 TIMES DAILY
Qty: 180 TABLET | Refills: 0 | Status: SHIPPED | OUTPATIENT
Start: 2023-02-08 | End: 2023-02-15 | Stop reason: SDUPTHER

## 2023-02-08 RX ORDER — BUPROPION HYDROCHLORIDE 150 MG/1
150 TABLET ORAL DAILY
Qty: 90 TABLET | Refills: 0 | Status: SHIPPED | OUTPATIENT
Start: 2023-02-08 | End: 2023-02-15 | Stop reason: SDUPTHER

## 2023-02-08 RX ORDER — TAMSULOSIN HYDROCHLORIDE 0.4 MG/1
0.4 CAPSULE ORAL DAILY
Qty: 90 CAPSULE | Refills: 0 | Status: SHIPPED | OUTPATIENT
Start: 2023-02-08 | End: 2023-02-15 | Stop reason: SDUPTHER

## 2023-02-15 DIAGNOSIS — N40.0 BPH WITHOUT URINARY OBSTRUCTION: ICD-10-CM

## 2023-02-15 DIAGNOSIS — E11.40 TYPE 2 DIABETES MELLITUS WITH DIABETIC NEUROPATHY, WITHOUT LONG-TERM CURRENT USE OF INSULIN (HCC): ICD-10-CM

## 2023-02-15 DIAGNOSIS — F33.1 MODERATE EPISODE OF RECURRENT MAJOR DEPRESSIVE DISORDER (HCC): ICD-10-CM

## 2023-02-15 DIAGNOSIS — F41.8 DEPRESSION WITH ANXIETY: ICD-10-CM

## 2023-02-15 DIAGNOSIS — I10 ESSENTIAL HYPERTENSION: ICD-10-CM

## 2023-02-15 RX ORDER — CLONIDINE HYDROCHLORIDE 0.3 MG/1
0.3 TABLET ORAL 2 TIMES DAILY
Qty: 180 TABLET | Refills: 1 | Status: SHIPPED | OUTPATIENT
Start: 2023-02-15

## 2023-02-15 RX ORDER — SERTRALINE HYDROCHLORIDE 100 MG/1
100 TABLET, FILM COATED ORAL DAILY
Qty: 90 TABLET | Refills: 1 | Status: SHIPPED | OUTPATIENT
Start: 2023-02-15

## 2023-02-15 RX ORDER — INSULIN GLARGINE 100 [IU]/ML
34 INJECTION, SOLUTION SUBCUTANEOUS DAILY
Qty: 30 ML | Refills: 1 | Status: SHIPPED | OUTPATIENT
Start: 2023-02-15

## 2023-02-15 RX ORDER — GLIMEPIRIDE 4 MG/1
4 TABLET ORAL 2 TIMES DAILY
Qty: 180 TABLET | Refills: 1 | Status: SHIPPED | OUTPATIENT
Start: 2023-02-15

## 2023-02-15 RX ORDER — PEN NEEDLE, DIABETIC 32GX 5/32"
NEEDLE, DISPOSABLE MISCELLANEOUS DAILY
Qty: 100 EACH | Refills: 1 | Status: SHIPPED | OUTPATIENT
Start: 2023-02-15

## 2023-02-15 RX ORDER — BUPROPION HYDROCHLORIDE 150 MG/1
150 TABLET ORAL DAILY
Qty: 90 TABLET | Refills: 1 | Status: SHIPPED | OUTPATIENT
Start: 2023-02-15

## 2023-02-15 RX ORDER — ENALAPRIL MALEATE 20 MG/1
20 TABLET ORAL DAILY
Qty: 90 TABLET | Refills: 1 | Status: SHIPPED | OUTPATIENT
Start: 2023-02-15

## 2023-02-15 RX ORDER — AMLODIPINE BESYLATE 10 MG/1
10 TABLET ORAL DAILY
Qty: 90 TABLET | Refills: 1 | Status: SHIPPED | OUTPATIENT
Start: 2023-02-15

## 2023-02-15 RX ORDER — TAMSULOSIN HYDROCHLORIDE 0.4 MG/1
0.4 CAPSULE ORAL DAILY
Qty: 90 CAPSULE | Refills: 1 | Status: SHIPPED | OUTPATIENT
Start: 2023-02-15

## 2023-02-25 ENCOUNTER — HOSPITAL ENCOUNTER (OUTPATIENT)
Dept: ULTRASOUND IMAGING | Facility: HOSPITAL | Age: 63
Discharge: HOME | End: 2023-02-25

## 2023-02-25 ENCOUNTER — APPOINTMENT (OUTPATIENT)
Dept: LAB | Facility: CLINIC | Age: 63
End: 2023-02-25

## 2023-02-25 ENCOUNTER — HOSPITAL ENCOUNTER (OUTPATIENT)
Dept: RADIOLOGY | Facility: HOSPITAL | Age: 63
Discharge: HOME | End: 2023-02-25

## 2023-02-25 DIAGNOSIS — N13.2 HYDRONEPHROSIS WITH OBSTRUCTING CALCULUS: ICD-10-CM

## 2023-02-25 LAB
ALBUMIN SERPL BCP-MCNC: 3.9 G/DL (ref 3.5–5)
ALP SERPL-CCNC: 72 U/L (ref 34–104)
ALT SERPL W P-5'-P-CCNC: 25 U/L (ref 7–52)
ANION GAP SERPL CALCULATED.3IONS-SCNC: 6 MMOL/L (ref 4–13)
AST SERPL W P-5'-P-CCNC: 19 U/L (ref 13–39)
BASOPHILS # BLD AUTO: 0.07 THOUSANDS/ÂΜL (ref 0–0.1)
BASOPHILS NFR BLD AUTO: 1 % (ref 0–1)
BILIRUB SERPL-MCNC: 0.61 MG/DL (ref 0.2–1)
BUN SERPL-MCNC: 27 MG/DL (ref 5–25)
CALCIUM SERPL-MCNC: 9.2 MG/DL (ref 8.4–10.2)
CHLORIDE SERPL-SCNC: 106 MMOL/L (ref 96–108)
CHOLEST SERPL-MCNC: 122 MG/DL
CO2 SERPL-SCNC: 26 MMOL/L (ref 21–32)
CREAT SERPL-MCNC: 1.43 MG/DL (ref 0.6–1.3)
EOSINOPHIL # BLD AUTO: 0.24 THOUSAND/ÂΜL (ref 0–0.61)
EOSINOPHIL NFR BLD AUTO: 3 % (ref 0–6)
ERYTHROCYTE [DISTWIDTH] IN BLOOD BY AUTOMATED COUNT: 12.9 % (ref 11.6–15.1)
EST. AVERAGE GLUCOSE BLD GHB EST-MCNC: 183 MG/DL
GFR SERPL CREATININE-BSD FRML MDRD: 52 ML/MIN/1.73SQ M
GLUCOSE P FAST SERPL-MCNC: 175 MG/DL (ref 65–99)
HBA1C MFR BLD: 8 %
HCT VFR BLD AUTO: 46 % (ref 36.5–49.3)
HDLC SERPL-MCNC: 35 MG/DL
HGB BLD-MCNC: 15.1 G/DL (ref 12–17)
IMM GRANULOCYTES # BLD AUTO: 0.02 THOUSAND/UL (ref 0–0.2)
IMM GRANULOCYTES NFR BLD AUTO: 0 % (ref 0–2)
LDLC SERPL CALC-MCNC: 63 MG/DL (ref 0–100)
LYMPHOCYTES # BLD AUTO: 1.71 THOUSANDS/ÂΜL (ref 0.6–4.47)
LYMPHOCYTES NFR BLD AUTO: 24 % (ref 14–44)
MCH RBC QN AUTO: 30.8 PG (ref 26.8–34.3)
MCHC RBC AUTO-ENTMCNC: 32.8 G/DL (ref 31.4–37.4)
MCV RBC AUTO: 94 FL (ref 82–98)
MONOCYTES # BLD AUTO: 0.57 THOUSAND/ÂΜL (ref 0.17–1.22)
MONOCYTES NFR BLD AUTO: 8 % (ref 4–12)
NEUTROPHILS # BLD AUTO: 4.65 THOUSANDS/ÂΜL (ref 1.85–7.62)
NEUTS SEG NFR BLD AUTO: 64 % (ref 43–75)
NONHDLC SERPL-MCNC: 87 MG/DL
NRBC BLD AUTO-RTO: 0 /100 WBCS
PLATELET # BLD AUTO: 287 THOUSANDS/UL (ref 149–390)
PMV BLD AUTO: 9.2 FL (ref 8.9–12.7)
POTASSIUM SERPL-SCNC: 4.7 MMOL/L (ref 3.5–5.3)
PROT SERPL-MCNC: 7.1 G/DL (ref 6.4–8.4)
RBC # BLD AUTO: 4.9 MILLION/UL (ref 3.88–5.62)
SODIUM SERPL-SCNC: 138 MMOL/L (ref 135–147)
TRIGL SERPL-MCNC: 118 MG/DL
WBC # BLD AUTO: 7.26 THOUSAND/UL (ref 4.31–10.16)

## 2023-02-26 ENCOUNTER — TELEPHONE (OUTPATIENT)
Dept: INTERNAL MEDICINE CLINIC | Facility: CLINIC | Age: 63
End: 2023-02-26

## 2023-02-26 NOTE — TELEPHONE ENCOUNTER
Lab results: Your sugars have improved, A1c 8%  No medication changes  Kidney function stable    Lipids within normal     The rest of your labs were normal

## 2023-03-10 ENCOUNTER — OFFICE VISIT (OUTPATIENT)
Dept: UROLOGY | Facility: CLINIC | Age: 63
End: 2023-03-10

## 2023-03-10 VITALS
BODY MASS INDEX: 38.08 KG/M2 | OXYGEN SATURATION: 96 % | WEIGHT: 272 LBS | RESPIRATION RATE: 18 BRPM | HEIGHT: 71 IN | HEART RATE: 96 BPM | DIASTOLIC BLOOD PRESSURE: 80 MMHG | SYSTOLIC BLOOD PRESSURE: 136 MMHG

## 2023-03-10 DIAGNOSIS — Z12.5 SCREENING FOR PROSTATE CANCER: ICD-10-CM

## 2023-03-10 DIAGNOSIS — N20.0 RECURRENT NEPHROLITHIASIS: Primary | ICD-10-CM

## 2023-03-10 NOTE — PROGRESS NOTES
1  Recurrent nephrolithiasis  Ambulatory Referral to Nephrology    XR abdomen 1 view kub      2  Screening for prostate cancer  PSA, Total Screen    PSA, Total Screen            Assessment and plan:       1  BPH with lower urinary tract symptoms  -continue tamsulosin    2  Nephrolithiasis  - imaging reviewed  - nephrology referral for metabolic workup  - RTC 1 year KUB prior    3  Prostate cancer screening  -previous history of elevated PSA status post negative biopsy approximately 10 years ago per patient's report  - update PSA now, RTC 1 year PSA prior tov [address]      Chief Complaint     BPH with lower urinary tract symptoms    History of Present Illness     Xena Caballero is a 58 y o  male presenting today as a new patient for follow-up of of urosepsis    Patient had presented to the emergency department September 2020 with dysuria and urinary frequency  Had a history of nephrolithiasis however asymptomatic of an obstructing stone  Patient was noted to have evidence of sepsis and was admitted for IV antibiotics  Blood cultures were positive with Klebsiella  Patient was treated on a prolonged course of antibiotics  Incidentally, patient was started on Jardiance 1 month prior to his episode of urosepsis  Hemoglobin A1c of approximately 8 6 at that time  His as SGLT 2 inhibitor has since been discontinued and started on insulin  He has noted a marked improvement of his urinary symptoms with a change in diabetic medication  He continues on tamsulosin daily and is overall very happy with urination at this time  Denies any dysuria, gross hematuria, urinary incontinence suprapubic pressure, flank pain, fevers, or chills  Patient had a CT of the abdomen pelvis 11/22/22 with a 12mm right UPJ stone with hydronephrosis  S/p ureteral stent placement 11/24/22 with Dr Javier Merrill and right ureteroscopy 11/30/22 with Dr Paz Records  Stent with string and removed shortly thereafter   Yajaira Lopez analysis 100%  KUB & US (2/25/23) negative for obstruction  nonobstruting small left stone  Simple b/l renal cysts  Patient reports a history of elevated PSA previously requiring a prostate biopsy approximately 10 years ago in his report  This was reportedly negative for malignancy  I do not have any documents at this time for review  His last PSA in the chart is 2 3 (3/7/22)  Laboratory     Lab Results   Component Value Date    CREATININE 1 43 (H) 02/25/2023       Lab Results   Component Value Date    PSA 2 3 03/07/2022    PSA 2 4 11/20/2020    PSA 1 6 02/01/2014       Review of Systems     Review of Systems   Constitutional: Negative for activity change, appetite change, chills, diaphoresis, fatigue, fever and unexpected weight change  Respiratory: Negative for chest tightness and shortness of breath  Cardiovascular: Negative for chest pain, palpitations and leg swelling  Gastrointestinal: Negative for abdominal distention, abdominal pain, constipation, diarrhea, nausea and vomiting  Genitourinary: Negative for decreased urine volume, difficulty urinating, dysuria, enuresis, flank pain, frequency, genital sores, hematuria and urgency  Musculoskeletal: Negative for back pain, gait problem and myalgias  Skin: Negative for color change, pallor, rash and wound  Psychiatric/Behavioral: Negative for behavioral problems  The patient is not nervous/anxious  Allergies     No Known Allergies    Physical Exam     Physical Exam  Constitutional:       General: He is not in acute distress  Appearance: Normal appearance  He is obese  He is not ill-appearing, toxic-appearing or diaphoretic  HENT:      Head: Normocephalic and atraumatic  Eyes:      General:         Right eye: No discharge  Left eye: No discharge  Conjunctiva/sclera: Conjunctivae normal    Pulmonary:      Effort: Pulmonary effort is normal  No respiratory distress     Genitourinary:     Comments: Good rectal tone  Prostate 35-40g, smooth, symmetric, no palpable nodules  Skin:     General: Skin is warm and dry  Coloration: Skin is not jaundiced or pale  Neurological:      General: No focal deficit present  Mental Status: He is alert and oriented to person, place, and time  Psychiatric:         Mood and Affect: Mood normal          Behavior: Behavior normal          Thought Content:  Thought content normal          Judgment: Judgment normal            Vital Signs     Vitals:    03/10/23 1459   BP: 136/80   BP Location: Right arm   Patient Position: Sitting   Cuff Size: Standard   Pulse: 96   Resp: 18   SpO2: 96%   Weight: 123 kg (272 lb)   Height: 5' 11" (1 803 m)         Current Medications       Current Outpatient Medications:   •  amLODIPine (NORVASC) 10 mg tablet, Take 1 tablet (10 mg total) by mouth daily, Disp: 90 tablet, Rfl: 1  •  atorvastatin (LIPITOR) 10 mg tablet, TAKE 1 TABLET DAILY, Disp: 90 tablet, Rfl: 1  •  buPROPion (WELLBUTRIN XL) 150 mg 24 hr tablet, Take 1 tablet (150 mg total) by mouth daily, Disp: 90 tablet, Rfl: 1  •  cloNIDine (CATAPRES) 0 3 mg tablet, Take 1 tablet (0 3 mg total) by mouth 2 (two) times a day, Disp: 180 tablet, Rfl: 1  •  docusate sodium (COLACE) 100 mg capsule, Take 1 capsule (100 mg total) by mouth 2 (two) times a day as needed for constipation, Disp: , Rfl: 0  •  dulaglutide (Trulicity) 4 5 XJ/5 5ON injection, Inject 0 5 mL (4 5 mg total) under the skin once a week, Disp: 6 mL, Rfl: 1  •  enalapril (VASOTEC) 20 mg tablet, Take 1 tablet (20 mg total) by mouth daily, Disp: 90 tablet, Rfl: 1  •  glimepiride (AMARYL) 4 mg tablet, Take 1 tablet (4 mg total) by mouth 2 (two) times a day, Disp: 180 tablet, Rfl: 1  •  Insulin Glargine Solostar (Basaglar KwikPen) 100 UNIT/ML SOPN, Inject 0 34 mL (34 Units total) under the skin daily, Disp: 30 mL, Rfl: 1  •  Insulin Pen Needle (BD Pen Needle Alicia 2nd Gen) 32G X 4 MM MISC, Inject under the skin daily, Disp: 100 each, Rfl: 1  •  Lidocaine Viscous HCl (XYLOCAINE) 2 % mucosal solution, Swish and spit 15 mL 4 (four) times a day as needed for mouth pain or discomfort, Disp: 100 mL, Rfl: 0  •  nystatin (MYCOSTATIN) powder, Apply topically 3 (three) times a day as needed (groin rash), Disp: 60 g, Rfl: 1  •  sertraline (ZOLOFT) 100 mg tablet, Take 1 tablet (100 mg total) by mouth daily, Disp: 90 tablet, Rfl: 1  •  tamsulosin (FLOMAX) 0 4 mg, Take 1 capsule (0 4 mg total) by mouth daily, Disp: 90 capsule, Rfl: 1      Active Problems     Patient Active Problem List   Diagnosis   • Depression   • Diabetes mellitus type 2 in obese ([facility] )   • Morbid obesity ([place] [address] )   • Mixed hyperlipidemia   • Essential hypertension   • Hypogonadism, male   • Right hydroureteronephrosis with obstructing calculus   • Obstructive sleep apnea   • Spinal stenosis   • Vitamin D deficiency   • Acquired deformity of foot   • Metatarsalgia of both feet   • Pain in both feet   • Diabetic polyneuropathy associated with type 2 diabetes mellitus (HCC)   • Fatigue   • Seasonal allergies   • Onychomycosis   • Corns   • Arthralgia   • Hallux valgus   • Bunion   • Acquired pes planus   • Bilateral renal cysts   • Type 2 diabetes mellitus with stage 3a chronic kidney disease, with long-term current use of insulin ([facility])   • CKD (chronic kidney disease) stage 3   • Vitamin B12 deficiency   • Intractable lower back pain   • Abnormal urinalysis   • Candidal intertrigo   • Abnormal CT of the abdomen   • Costovertebral angle tenderness   • Abnormal CT scan         Past Medical History     Past Medical History:   Diagnosis Date   • Acute renal failure (ARF) ([place] [address] )     last assessed - [id number]   • Chest pain     last assessed - [id number]   • CPAP (continuous positive airway pressure) dependence    • Depression    • Diabetes mellitus ([place] [address] )    • Dyspnea on exertion     last assessed - [id number]   • Hypertension    • Kidney stone    • Nephrolithiasis    • Numbness of right foot numbness on the sole of the right foot; since he was young d/t stenosis   • Obesity    • Onychomycosis     last assessed - [id number]   • JEFF on CPAP    • Sciatica     last assessed - [id number]   • Sleep apnea          Surgical History     Past Surgical History:   Procedure Laterality Date   • CHOLECYSTECTOMY  8/2017   • COLONOSCOPY     • CYSTOSCOPY W/ LASER LITHOTRIPSY     • FL RETROGRADE PYELOGRAM  11/24/2022   • FL RETROGRADE PYELOGRAM  11/30/2022   • KNEE ARTHROSCOPY Left 11/2013   • VA CYSTO BLADDER W/URETERAL CATHETERIZATION Right 11/24/2022    Procedure: CYSTOSCOPY RETROGRADE PYELOGRAM WITH INSERTION STENT URETERAL;  Surgeon: Cherrie Nuñez MD;  [facility];  Service: Urology   • VA CYSTO/URETERO W/LITHOTRIPSY &INDWELL STENT INSRT Right 11/30/2022    Procedure: CYSTOSCOPY URETEROSCOPY WITH LITHOTRIPSY HOLMIUM LASER, RETROGRADE PYELOGRAM AND INSERTION STENT URETERAL, stone extraction;  Surgeon: Chloe Caldera MD;  [facility];  Service: Urology   • VA LAPAROSCOPY SURG CHOLECYSTECTOMY N/A 02/08/2019    Procedure: ROBOTIC ASSISTED LAPAROSCOPIC CHOLECYSTECTOMY;  Surgeon: Kaykay Elliott DO;  Location[facility];  Service: General   • PROSTATE BIOPSY      Needle Biopsy - Negative         Family History     Family History   Adopted: Yes   Problem Relation Age of Onset   • Alzheimer's disease Mother    • Alcohol abuse Neg Hx    • Drug abuse Neg Hx    • Depression Neg Hx    • Substance Abuse Neg Hx    • Mental illness Neg Hx          Social History     Social History       Radiology

## 2023-03-13 ENCOUNTER — TELEPHONE (OUTPATIENT)
Dept: NEPHROLOGY | Facility: CLINIC | Age: 63
End: 2023-03-13

## 2023-03-13 NOTE — TELEPHONE ENCOUNTER
New Patient Intake Form   Patient Details   Otf Cannon     1960     [id number]     Insurance Information   Name of Joaquin & Briana   Does the patient need an insurance referral? no   If patient has Pitvickie Saunders, please ask if they will be using their Pitvickie Nicky  Appointment Information   Who is calling to schedule? If not patient, what is callers name? [address]   Referring Provider  Rodger No   Reason for Appt (Diagnosis) nephrolithiasis    Does Patient have labs/urine done at [facility]? If not, where do they go? List the date of last lab / urine yes   Has patient been hospitalized recently? If yes, list name and location of hospital they were in no   Has patient been seen by a Nephrologist before? If yes, list name, location and phone number no   Has the patient had renal imaging done? If so, list the most recent date and type of imaging yes in EPIC   Does patient have a history of Kidney Stones? yes   Appointment Details   Is there a referral on file?  yes    Appointment Date 4/11    Location  [place]   Miscellaneous

## 2023-03-14 ENCOUNTER — OFFICE VISIT (OUTPATIENT)
Dept: PODIATRY | Facility: CLINIC | Age: 63
End: 2023-03-14

## 2023-03-14 VITALS
RESPIRATION RATE: 16 BRPM | DIASTOLIC BLOOD PRESSURE: 80 MMHG | SYSTOLIC BLOOD PRESSURE: 136 MMHG | WEIGHT: 272 LBS | HEIGHT: 71 IN | BODY MASS INDEX: 38.08 KG/M2

## 2023-03-14 DIAGNOSIS — M21.962 ACQUIRED DEFORMITY OF BOTH FEET: ICD-10-CM

## 2023-03-14 DIAGNOSIS — M21.961 ACQUIRED DEFORMITY OF BOTH FEET: ICD-10-CM

## 2023-03-14 DIAGNOSIS — M77.42 METATARSALGIA OF BOTH FEET: Primary | ICD-10-CM

## 2023-03-14 DIAGNOSIS — B35.1 ONYCHOMYCOSIS: ICD-10-CM

## 2023-03-14 DIAGNOSIS — M77.41 METATARSALGIA OF BOTH FEET: Primary | ICD-10-CM

## 2023-03-14 DIAGNOSIS — E11.42 DIABETIC POLYNEUROPATHY ASSOCIATED WITH TYPE 2 DIABETES MELLITUS (HCC): ICD-10-CM

## 2023-03-14 NOTE — PROGRESS NOTES
Assessment   Pain upon ambulation   Peripheral artery disease   Diabetic neuropathy   Callus formation   Mycosis of nail   Peroneal tendinitis right foot   Dermatophytosis      Plan   Foot exam performed   Nails debrided   Calluses debrided   Patient will stretch daily   Procedures performed without pain or complication   Patient educated on care of the diabetic foot         Chief Complaint   Patient needs full diabetic foot exam   Patient has pain upon ambulation   Patient complains of pain in his toes when he wears shoes   He gets pain in the ball of the foot   No history of trauma      History of Present Illness   HPI: Patient presents for pedal evaluation  Patient complains of pain in his feet and toes with ambulation   Patient is a morbidly obese diabetic who has some electric sensations in his feet on occasion       Review of Systems        Constitutional: not feeling tired       Eyes: no eyesight problems       ENT: no nasal discharge       Cardiovascular: no chest pain,-- no palpitations-- and-- no extremity edema       Respiratory: no shortness of breath-- and-- no cough       Gastrointestinal: no abdominal pain-- and-- no constipation       Genitourinary: no dysuria-- and-- no urinary hesitancy       Integumentary: no skin wound       Neurological: no tingling-- and-- no dizziness       Psychiatric: sleeping better, stopped taking trazodone, but-- no sleep disturbances       Endocrine: no feelings of weakness       Active Problems   1  Acquired pes planus (734) (M21 40)   2  Acute renal failure (584 9) (N17 9)   3  Adhesive capsulitis of both shoulders (726 0) (M75 01,M75 02)   4  Arthralgia (719 40) (M25 50)   5  BPH without urinary obstruction (600 00) (N40 0)   6  Bunion (727 1) (M21 619)   7  Callus (700) (L84)   6  LSTPQXI systolic congestive heart failure (428 22,428 0) (I50 22)   9  Depression with anxiety (300 4) (F41 8)   10  Diabetes mellitus with neurological manifestation (250 60) (E11 49)   11  Diabetes type 2, uncontrolled (250 02) (E11 65)   12  Difficulty concentrating (799 51) (R41 840)   13  Difficulty walking (719 7) (R26 2)   14  Dyspnea on exertion (786 09) (R06 09)   15  Encounter for prostate cancer screening (V76 44) (Z12 5)   16  Encounter for screening for malignant neoplasm of colon (V76 51) (Z12 11)   17  Fatigue (780 79) (R53 83)   18  Foot pain, bilateral (729 5) (M79 671,M79 672)   19  Hallux valgus (735 0) (M20 10)   20  Hematuria (599 70) (R31 9)   21  Hyperlipidemia (272 4) (E78 5)   22  Hypertension (401 9) (I10)   23  Hypogonadism, male (257 2) (E29 1)   24  Insomnia (780 52) (G47 00)   25  Morbid or severe obesity due to excess calories (278 01) (E66 01)   26  Need for pneumococcal vaccination (V03 82) (Z23)   27  Need for prophylactic vaccination and inoculation against influenza (V04 81) (Z23)   28  Nephrolithiasis (592 0) (N20 0)   29  Onychomycosis (110 1) (B35 1)   30  JEFF (obstructive sleep apnea) (327 23) (G47 33)   31  Sciatica (724 3) (M54 30)   32  Screening for colon cancer (V76 51) (Z12 11)   33  Seasonal allergies (477 9) (J30 2)   34  Shoulder pain, right (719 41) (M25 511)   35  Spinal stenosis (724 00) (M48 00)   36  Tinea pedis (110 4) (B35 3)   37  Type 2 diabetes mellitus (250 00) (E11 9)   38  Vitamin D deficiency (268 9) (E55 9)     Past Medical History    · History of Cellulitis of left leg (682 6) (L03 116)   · History of chest pain (V13 89) (B29 804)   · History of diarrhea (V12 79) (V14 294)   · History of onychomycosis (V12 09) (Z86 19)   · History of onychomycosis (V12 09) (Z86 19)   · History of Limb pain (729 5) (M79 609)   · History of Neck pain (723 1) (M54 2)   · Need for pneumococcal vaccination (V03 82) (Z23)   · History of Nephrolithiasis (V13 01)   · History of Numbness On The Sole Of The Right Foot   · History of Pain and swelling of left lower leg (729 5,729 81) (M79 662,M79 89)   · History of Pain of lower extremity (729 5) (M79 606)   · History of Pain, hand joint, right (719 44) (M79 641)   · Sciatica (724 3) (M54 30)     The active problems and past medical history were reviewed and updated today       Surgical History    · History of Knee Arthroscopy (Therapeutic)   · History of Knee Surgery   · History of Needle Biopsy Of Prostate     Family History   Mother    · Family history of Alzheimer Disease   · Family history of Family Health Status Of Mother - Alive  Father    · Family history of Family Health Status Of Father -   Family History    · Family history of Adopted   · Denied: Family history of Colon Cancer   · Denied: Family history of Crohn's Disease   · Denied: Family history of Liver Cancer     Social History    · Denied: History of Alcohol Use (History)   · Current Some Day Smoker (305 1)   · Denied: History of Exercise Habits   · Marital History - Currently    · Tobacco use (305 1) (Z72 0)   · Working Full Time  The social history was reviewed and is unchanged       The medication list was reviewed and updated today       Allergies   1  No Known Drug Allergies   Physical Exam   Left Foot: Appearance: Normal except as noted: excessive pronation-- and-- pes planus  Great toe deformities include a bunion  Tenderness: None except the great toe-- and-- distal first metatarsal     Right Foot: Appearance: Normal except as noted: excessive pronation-- and-- pes planus  Great toe deformities include a bunion  Tenderness: None except the great toe-- and-- distal first metatarsal     Left Ankle: ROM: limited ROM in all planes    Right Ankle: ROM: limited ROM in all planes    Neurological Exam: performed  Light touch was decreased bilaterally  Vibratory sensation was decreased in both first metatarsophalangeal joints     Vascular Exam: performed Dorsalis pedis pulses were present bilaterally  Posterior tibial pulses were present bilaterally  Elevation Pallor: absent bilaterally  Dependence rubor was absent bilaterally  Edema: none     Toenails: All of the toenails were elongated,-- hypertrophied,-- discolored-- and-- Ptotic  Both first toenails were tender-- and-- Positive onychogryphosis          Socks and shoes removed, Right Foot Findings: swollen, erythematous and dry       The sensory exam showed diminished vibratory sensation at the level of the toes  Diminished tactile sensation with monofilament testing throughout the right foot       Socks and shoes removed, Left Foot Findings: swollen, erythematous and dry       The sensory exam showed diminished vibratory sensation at the level of the toes  Diminished tactile sensation with monofilament testing throughout the left foot      Capillary refills findings on the right were normal in the toes       Pulses:      2+ in the posterior tibialis on the right      2+ in the dorsalis pedis on the right       Capillary refills findings on the left were normal in the toes       Pulses:      1+ in the posterior tibialis on the left      1+ in the dorsalis pedis on the left       Assign Risk Category: 2: Loss of protective sensation with or without weakness, deformity, callus, pre-ulcer, or history of ulceration  High risk     Hyperkeratosis: present on both first toes,-- present on both first sub metatarsals,-- present on both third sub metatarsals-- and-- Severe profound moccasin tinea pedis bilateral     Shoe Gear Evaluation: performed ()   Recommendation(s): SAS style      Right Foot/Ankle   Right Foot Inspection        Sensory   Vibration: diminished  Proprioception: diminished  Monofilament testing: diminished     Vascular  Capillary refills: < 3 seconds              Left Foot/Ankle  Left Foot Inspection        Sensory   Vibration: diminished  Proprioception: diminished  Monofilament testing: diminished     Vascular  Capillary refills: < 3 seconds         Patient's shoes and socks removed      Right Foot/Ankle   Right Foot Inspection        Sensory   Vibration: diminished  Proprioception: diminished  Monofilament testing: intact     Vascular  Capillary refills: < 3 seconds  The right DP pulse is 1+  The right PT pulse is 1+       Left Foot/Ankle  Left Foot Inspection        Sensory   Vibration: diminished  Proprioception: diminished  Monofilament testing: intact     Vascular  Capillary refills: < 3 seconds  The left DP pulse is 1+   The left PT pulse is 1+       Assign Risk Category  Deformity present  Loss of protective sensation  No weak pulses  Risk: 2

## 2023-03-23 ENCOUNTER — CONSULT (OUTPATIENT)
Dept: NEPHROLOGY | Facility: CLINIC | Age: 63
End: 2023-03-23

## 2023-03-23 VITALS
BODY MASS INDEX: 40.6 KG/M2 | DIASTOLIC BLOOD PRESSURE: 80 MMHG | SYSTOLIC BLOOD PRESSURE: 142 MMHG | WEIGHT: 290 LBS | HEIGHT: 71 IN

## 2023-03-23 DIAGNOSIS — N18.31 TYPE 2 DIABETES MELLITUS WITH STAGE 3A CHRONIC KIDNEY DISEASE, WITH LONG-TERM CURRENT USE OF INSULIN (HCC): Primary | ICD-10-CM

## 2023-03-23 DIAGNOSIS — N18.31 TYPE 2 DIABETES MELLITUS WITH STAGE 3A CHRONIC KIDNEY DISEASE AND HYPERTENSION (HCC): ICD-10-CM

## 2023-03-23 DIAGNOSIS — I12.9 TYPE 2 DIABETES MELLITUS WITH STAGE 3A CHRONIC KIDNEY DISEASE AND HYPERTENSION (HCC): ICD-10-CM

## 2023-03-23 DIAGNOSIS — N18.31 STAGE 3A CHRONIC KIDNEY DISEASE (HCC): ICD-10-CM

## 2023-03-23 DIAGNOSIS — E11.22 TYPE 2 DIABETES MELLITUS WITH STAGE 3A CHRONIC KIDNEY DISEASE AND HYPERTENSION (HCC): ICD-10-CM

## 2023-03-23 DIAGNOSIS — I10 ESSENTIAL HYPERTENSION: ICD-10-CM

## 2023-03-23 DIAGNOSIS — G47.33 OSA ON CPAP: ICD-10-CM

## 2023-03-23 DIAGNOSIS — N20.0 RECURRENT NEPHROLITHIASIS: ICD-10-CM

## 2023-03-23 DIAGNOSIS — N20.0 NEPHROLITHIASIS: ICD-10-CM

## 2023-03-23 DIAGNOSIS — Z99.89 OSA ON CPAP: ICD-10-CM

## 2023-03-23 DIAGNOSIS — Z79.4 TYPE 2 DIABETES MELLITUS WITH STAGE 3A CHRONIC KIDNEY DISEASE, WITH LONG-TERM CURRENT USE OF INSULIN (HCC): Primary | ICD-10-CM

## 2023-03-23 DIAGNOSIS — E11.22 TYPE 2 DIABETES MELLITUS WITH STAGE 3A CHRONIC KIDNEY DISEASE, WITH LONG-TERM CURRENT USE OF INSULIN (HCC): Primary | ICD-10-CM

## 2023-03-23 DIAGNOSIS — N28.1 BILATERAL RENAL CYSTS: ICD-10-CM

## 2023-03-23 PROBLEM — N13.2 HYDRONEPHROSIS WITH OBSTRUCTING CALCULUS: Status: RESOLVED | Noted: 2017-09-06 | Resolved: 2023-03-23

## 2023-03-23 LAB
SL AMB  POCT GLUCOSE, UA: NORMAL
SL AMB LEUKOCYTE ESTERASE,UA: NORMAL
SL AMB POCT BILIRUBIN,UA: NORMAL
SL AMB POCT BLOOD,UA: NORMAL
SL AMB POCT CLARITY,UA: CLEAR
SL AMB POCT COLOR,UA: YELLOW
SL AMB POCT KETONES,UA: NORMAL
SL AMB POCT NITRITE,UA: NORMAL
SL AMB POCT PH,UA: 5
SL AMB POCT SPECIFIC GRAVITY,UA: 1.02
SL AMB POCT URINE PROTEIN: NORMAL
SL AMB POCT UROBILINOGEN: 0.2

## 2023-03-23 NOTE — PROGRESS NOTES
NEPHROLOGY OUTPATIENT CONSULTATION   Foreign Lyn 58 y o  male MRN: [id number]  Date: 3/23/2023  Reason for consultation:   Chief Complaint   Patient presents with   • Consult       ASSESSMENT and PLAN:    Thank you for the courtesy of this consultation  I had the pleasure of seeing Esthela Poole today in the renal clinic for the initial management of nephrolithiasis from a medical management standpoint and chronic kidney disease  Chronic Kidney Disease Stage IIIA with macroalbuminuria (G3A2)  --Imaging: Renal ultrasound from February 2023: Right kidney 13 7 cm, left kidney 12 6 cm  Simple cyst measuring 1 6 cm on the left kidney  Right renal cyst septation unchanged  --Urinalysis: Positive for microscopic hematuria, and mild proteinuria  --Proteinuria: Microalbumin/creatinine ratio 0 1 g/g  --Baseline Kidney Function: mid 1's (1 3-1 6 mg/dL)  --Etiology: Presumed to be secondary to diabetic kidney disease, hypertensive nephrosclerosis, obesity related renal dysfunction  --Biopsy Proven: No  --Serologies: No clinical indication for serologies  --RAAS Blockade: Yes  --Reducing Cardiovascular Risk Factors:  Simvastatin+ Ezetimibe reduced atherosclerotic events in CKD (SHARP trial); Low dose aspirin safe (if no contraindications exist); smoking is an independent risk factor for Chronic Kidney Disease and progression, strongly recommend smoking cessation  --Sodium-Glucose Cotransporter-2 (SGLT2) Inhibitors: May see an acute drop in the eGFR initially when starting the medication but then a stabilization of the renal function, with slower loss of renal function as compared to placebo  Relative risk of end-stage renal disease, doubling of serum creatinine or death from renal causes were also found to be lower as compared to placebo  (CREDENCE)    DAPA-CKD study, showed that patients with chronic kidney disease regardless of the presence or absence of diabetes the risk of composite of a sustained decline in the estimated GFR of at least 50%, end-stage renal disease or death from renal or cardiovascular causes was significantly lower with Dapagliflozin than with placebo  EMPEROR-Reduced study also showed beneficial effects  EMPA-CKD, among wide range of patients with CKD, who were at risk for progression, empagliflozin led a lower risk of kidney disease progression or death from cardiovascular causes than placebo  If no contraindications exist I would recommend this medication added to the regiment  If eGFR < 60 cc/min (avoid ertugliflozin); avoid or caution with GFR < 20 mL/min, not an absolute contraindication, likely lower benefits  --Finerenone: In patients with chronic kidney disease with type 2 diabetes, treatment led to lower risks of CKD progression and cardiovascular events than placebo  (FIDELIO-DKD)  --Status: Renal function currently stable and at baseline with a creatinine of 1 4 mg/dL with a GFR greater than 50 mL/min  --Management/Recommendations: Strongly recommend weight loss exercise and diet to reduce the rate to help with his blood pressure and diabetes  He needs better diabetic control  Had been taking Jardiance in the past but had a significant urinary infection  I did discuss with him about potentially starting Hannah Borden in the future  Continue blood pressure control with an ACE inhibitor  Nephrolithiasis  - low 2 g sodium diet  - drink at least 2 5-3 L of water a day to maintain a urine output greater than 2 L per day  - high citrate diet  Fresh lemon juice, can dilute 2 oz of lemon juice with 6 oz of water and drink twice a day  Try to incorporate more fresh lemon and limes into diet    Can use freshly squeeze lemon or lime on fruit salad and [place] salads   - 5 or more fruits and vegetables daily  -Check Litholink for stone risk profile  -Continue Flomax  -Following with urology  -Has been having stones since the age of 21, usually gets 1 stone every 4 to 5 years  -Last stone was 8 years ago prior to the episode that he had in November 2022  -CT scan on November 22 showed a 12 mm right UPJ stone with hydronephrosis  Status post ureteral stent placement on November 24, 2022 with right ureteroscopy on November 30, 2022  Stent with string was removed shortly afterwards   -Urine analysis: 100% calcium oxalate  -Recommend reduced oxalate in the diet    Hypertension  -- Stage I (American College of Cardiology/American Heart Association)  -- with underlying chronic kidney disease and obesity  --Diagnosed with hypertension at the age of 23, thought to be related to obesity  -- Body mass index is 40 45 kg/m²  -- Volume status: Euvolemic  -- Etiology: Primary hypertension with obesity and underlying obstructive sleep apnea  -- Secondary Work-Up: Has underlying obstructive sleep apnea and obesity  -- Target Goal: < 130/80 (ACC/AHA, CKD with proteinuria, CKD in diabetics) ; if tolerated can target SBP < 120 mmHg in high cardiovascular risk ( Age > 76, CKD, CVD, No Diabetics SPRINT)  -- Lifestyle Modifications: DASH Diet, Weight Loss for ideal body weight, 45 mins of cardiovascular exercise 3 times a week as tolerated, and if no contraindications exist, smoking cessation, limit alcohol use, avoid NSAIDS, monitor blood pressure at home  -- Status: Blood pressure slightly above target  -- Current antihypertensive regiment: Enalapril 20 mg daily  -- Changes: Continue current regiment, strongly recommend weight loss exercise and diet    Obesity  -- BMI 40 45  --Recommend decreasing portion sizes, encouraging healthy choices of fruits and vegetables, consuming healthier snacks and moderation in carbohydrate intake  Exercise recommendations; 3-5 times a week, the American Heart Association recommends 150 minutes a week moderate exercise  --Strongly recommend evaluation by nutritionist  --Overweight and obesity have been associated with increased mortality and morbidity    Associated with a reduction in life expectancy, associated with increased all cause and cardiovascular mortality  --Metabolic risks, including increased risk of diabetes type 2, insulin resistance with hyperinsulinemia (weight loss of 5 to 7% associated with a decreased risk of type 2 diabetes among individuals with prediabetes and weight loss of 15% can lead to dramatic improvement of diabetes in nearly half of people)  Dyslipidemia, hypertension and heart disease are all increased in patients with obesity  Along with increased risk of stroke increased risk of multiple cancer types  Can also be associated with increased renal dysfunction, strongest risk factor for new onset chronic kidney disease  There is also a degree of compensatory hyperfiltration to meet high in the metabolic demands of increased body weight  This can also result in increased increased risk for nephrolithiasis  Diabetes mellitus type 2  -hemoglobin A1c: 8 (A1C values may be falsely elevated or decreased in those with CKD, assay method should be certified by [employer])  Target A1C between 7-8 5 (will need to be individualized for each patient), and would utilize A1C  in conjunction with continuous glucose monitoring (CGM)  It should also be noted that the A1c with more advanced kidney disease would be inaccurate due to decreased red blood cell life along with anemia   -current medications: Glimepiride, insulin glargine, Trulicity  -proteinuria: Yes  -retinopathy: No  -neuropathy: No  -please follow with an ophthalmologist and podiatrist every year  -continued self monitoring of blood glucose at home  -Management in CKD Recommendations:   • Metformin:  Avoid if creatinine clearance is less than 30 cc/min (concern for lactic acidosis)  • Sodium-Glucose Cotransporter-2 (SGLT2) Inhibitors:   May see an acute drop in the eGFR initially when starting the medication but then a stabilization of the renal function, with slower loss of renal function as compared to placebo  Relative risk of end-stage renal disease, doubling of serum creatinine or death from renal causes were also found to be lower as compared to placebo  (CREDENCE)  DAPA-CKD study, showed that patients with chronic kidney disease regardless of the presence or absence of diabetes the risk of composite of a sustained decline in the estimated GFR of at least 50%, end-stage renal disease or death from renal or cardiovascular causes was significantly lower with Dapagliflozin than with placebo  EMPEROR-Reduced study also showed beneficial effects  EMPA-CKD, among wide range of patients with CKD, who were at risk for progression, empagliflozin led a lower risk of kidney disease progression or death from cardiovascular causes than placebo  If no contraindications exist I would recommend this medication added to the regiment  If eGFR < 60 cc/min (avoid ertugliflozin); avoid or caution with GFR < 20 mL/min, not an absolute contraindication, likely lower benefits   • Sulfonylureas:  Preferred short-acting (glipizide, glimepiride, repaglinide), relatively safe in patients with non dialysis CKD  • Thiazolidinedones/Alpha-Gluosidase inhibitors/Dipeptidyl peptidase-4 inhibitors:  Generally not considered first-line agents in CKD (limited data in long-term safety and efficacy)  • Insulin:  Starting dose may need to be lower than what would ordinarily be used, as there is a decrease metabolism of insulin (no dose adjustment if the GFR > 50 mL/min, dose should be reduced to 75% of baseline if GFR 10-50 mL/min, and 50% baseline if GFR < 10 mL/min)  • Finerenone: Patients with CKD with type 2 diabetes, treatment led to lower risks of CKD progression and cardiovascular events than placebo (FIDELIO-DKD)  Avoid or caution if serum potassium more then 4 8  Bilateral renal cysts  -- Simple left renal cyst   Requires no further follow-up    -- The right renal cyst with echogenic septation is unchanged    Vitamin D insufficiency  -Would recommend starting vitamin D every other day  -Vitamin D 25-hydroxy level 28    Obstructive sleep apnea  -- Continued use of CPAP    PATIENT INSTRUCTIONS:    Patient Instructions   1 )  Low 2 g sodium diet    2 )  Monitor weights at home    3 )  Avoid NSAIDs (ibuprofen, Motrin, Advil, Aleve, naproxen)    4 )  Monitor blood pressure at home, call if blood pressure greater than 150/90 persistently    5 ) I will plan to discuss all results including blood work, and/or imaging at our next visit, unless there is an urgent indication, in which case I will call you earlier  If you have any questions or concerns about your results, please feel free to call our office  6 ) Check Litholink stone profile    7 ) Drink enough water to maintain high urine output of at least 2 liters per day    8 ) Can try Moonstone     9 ) Can take Vitamin D every other day      HISTORY OF PRESENT ILLNESS:  Requesting Physician: Xavier Ward MD    Renetta Arreola is a 58 y o  male who has a history of chronic kidney disease stage III since 2014 which has been quite stable, hypertension diabetes and nephrolithiasis who presents for a medical management evaluation of nephrolithiasis  I am also doing an evaluation of chronic kidney disease  His stones do not appear to be frequent his last stone prior to his November stone was about 8 years ago  He reports he gets stone attack every 3 to 5 years its not very frequent  He has had it since the age of 21  He is never undergone a metabolic work-up  He drinks plenty of water  He does not smoke cigarettes and occasional cigar  He had chronic kidney disease since 2014 at least and his creatinine ranges in the mid ones  In November of last year he had a flank pain and found to have a 12 mm stone with a resulting hydronephrosis for which she underwent ureteroscopic and stent placement  Stent has been removed    He has had no flank pain since then or no gross hematuria  He had diabetes for about 10 years with no reported retinopathy or neuropathy he has had hypertension since age of 23  He is obese and has obstructive sleep apnea and uses CPAP      PAST MEDICAL HISTORY:  Past Medical History:   Diagnosis Date   • Acute renal failure (ARF) ([facility] [address] )     last assessed - [id number]   • Chest pain     last assessed - [id number]   • CPAP (continuous positive airway pressure) dependence    • Depression    • Diabetes mellitus ([facility] [address] )    • Dyspnea on exertion     last assessed - [id number]   • Hypertension    • Kidney stone    • Nephrolithiasis    • Numbness of right foot     numbness on the sole of the right foot; since he was young d/t stenosis   • Obesity    • Onychomycosis     last assessed - [id number]   • JEFF on CPAP    • Sciatica     last assessed - [id number]   • Sleep apnea        PAST SURGICAL HISTORY:  Past Surgical History:   Procedure Laterality Date   • CHOLECYSTECTOMY  8/2017   • COLONOSCOPY     • CYSTOSCOPY W/ LASER LITHOTRIPSY     • FL RETROGRADE PYELOGRAM  11/24/2022   • FL RETROGRADE PYELOGRAM  11/30/2022   • KNEE ARTHROSCOPY Left 11/2013   • HI CYSTO BLADDER W/URETERAL CATHETERIZATION Right 11/24/2022    Procedure: CYSTOSCOPY RETROGRADE PYELOGRAM WITH INSERTION STENT URETERAL;  Surgeon: Emmanuel Vaughn MD;  Location: [facility];  Service: Urology   • HI CYSTO/URETERO W/LITHOTRIPSY &INDWELL STENT INSRT Right 11/30/2022    Procedure: CYSTOSCOPY URETEROSCOPY WITH LITHOTRIPSY HOLMIUM LASER, RETROGRADE PYELOGRAM AND INSERTION STENT URETERAL, stone extraction;  Surgeon: Devyn Teran MD;  Location: [facility];  Service: Urology   • HI LAPAROSCOPY SURG CHOLECYSTECTOMY N/A 02/08/2019    Procedure: ROBOTIC Laneta Pleva;  Surgeon: Jennifer Walter DO;  Location[facility];  Service: General   • PROSTATE BIOPSY      Needle Biopsy - Negative       ALLERGIES:  No Known Allergies    SOCIAL HISTORY:  Social History     Substance and Sexual Activity   Alcohol Use Yes   • Alcohol/week: 1 0 standard drink   • Types: 1 Standard drinks or equivalent per week     Social History     Substance and Sexual Activity   Drug Use No     Social History     Tobacco Use   Smoking Status Some Days   • Types: Cigars   Smokeless Tobacco Never   Tobacco Comments    3 cigars per week       FAMILY HISTORY:  Family History   Adopted: Yes   Problem Relation Age of Onset   • Alzheimer's disease Mother    • Alcohol abuse Neg Hx    • Drug abuse Neg Hx    • Depression Neg Hx    • Substance Abuse Neg Hx    • Mental illness Neg Hx        MEDICATIONS:    Current Outpatient Medications:   •  amLODIPine (NORVASC) 10 mg tablet, Take 1 tablet (10 mg total) by mouth daily, Disp: 90 tablet, Rfl: 1  •  atorvastatin (LIPITOR) 10 mg tablet, TAKE 1 TABLET DAILY, Disp: 90 tablet, Rfl: 1  •  buPROPion (WELLBUTRIN XL) 150 mg 24 hr tablet, Take 1 tablet (150 mg total) by mouth daily, Disp: 90 tablet, Rfl: 1  •  cloNIDine (CATAPRES) 0 3 mg tablet, Take 1 tablet (0 3 mg total) by mouth 2 (two) times a day, Disp: 180 tablet, Rfl: 1  •  docusate sodium (COLACE) 100 mg capsule, Take 1 capsule (100 mg total) by mouth 2 (two) times a day as needed for constipation, Disp: , Rfl: 0  •  dulaglutide (Trulicity) 4 5 LQ/1 2ZT injection, Inject 0 5 mL (4 5 mg total) under the skin once a week, Disp: 6 mL, Rfl: 1  •  enalapril (VASOTEC) 20 mg tablet, Take 1 tablet (20 mg total) by mouth daily, Disp: 90 tablet, Rfl: 1  •  glimepiride (AMARYL) 4 mg tablet, Take 1 tablet (4 mg total) by mouth 2 (two) times a day, Disp: 180 tablet, Rfl: 1  •  Insulin Glargine Solostar (Basaglar KwikPen) 100 UNIT/ML SOPN, Inject 0 34 mL (34 Units total) under the skin daily, Disp: 30 mL, Rfl: 1  •  Insulin Pen Needle (BD Pen Needle Alicia 2nd Gen) 32G X 4 MM MISC, Inject under the skin daily, Disp: 100 each, Rfl: 1  •  Lidocaine Viscous HCl (XYLOCAINE) 2 % mucosal solution, Swish and spit 15 mL 4 (four) times a day as needed for mouth pain or discomfort, Disp: 100 mL, Rfl: 0  •  nystatin (MYCOSTATIN) powder, Apply topically 3 (three) times a day as needed (groin rash), Disp: 60 g, Rfl: 1  •  sertraline (ZOLOFT) 100 mg tablet, Take 1 tablet (100 mg total) by mouth daily, Disp: 90 tablet, Rfl: 1  •  tamsulosin (FLOMAX) 0 4 mg, Take 1 capsule (0 4 mg total) by mouth daily, Disp: 90 capsule, Rfl: 1    REVIEW OF SYSTEMS:  Review of Systems   Constitutional: Negative for activity change and fever  Respiratory: Negative for cough, chest tightness, shortness of breath and wheezing  Cardiovascular: Negative for chest pain and leg swelling  Gastrointestinal: Negative for abdominal pain, diarrhea, nausea and vomiting  Endocrine: Negative for polyuria  Genitourinary: Negative for difficulty urinating, dysuria, flank pain, frequency and urgency  Skin: Negative for rash  Neurological: Negative for dizziness, syncope, light-headedness and headaches  All the systems were reviewed and were negative except as documented on the HPI  PHYSICAL EXAM:  Current Weight: Body mass index is 40 45 kg/m²  Vitals:    03/23/23 0851 03/23/23 0917   BP:  142/80   Weight: 132 kg (290 lb)    Height: 5' 11" (1 803 m)        Physical Exam  Vitals and nursing note reviewed  Constitutional:       General: He is not in acute distress  Appearance: He is well-developed  He is obese  He is not ill-appearing  HENT:      Head: Normocephalic and atraumatic  Eyes:      General: No scleral icterus  Conjunctiva/sclera: Conjunctivae normal       Pupils: Pupils are equal, round, and reactive to light  Cardiovascular:      Rate and Rhythm: Normal rate and regular rhythm  Heart sounds: S1 normal and S2 normal  No murmur heard  No friction rub  No gallop  Pulmonary:      Effort: Pulmonary effort is normal  No respiratory distress  Breath sounds: Normal breath sounds  No wheezing or rales     Abdominal:      General: Bowel sounds are normal       Palpations: Abdomen is soft  Tenderness: There is no abdominal tenderness  There is no rebound  Musculoskeletal:         General: Normal range of motion  Cervical back: Normal range of motion and neck supple  Skin:     Findings: No rash  Neurological:      Mental Status: He is alert and oriented to person, place, and time     Psychiatric:         Behavior: Behavior normal          Laboratory results:   Results for orders placed or performed in visit on 03/23/23   POCT urine dip   Result Value Ref Range    LEUKOCYTE ESTERASE,UA NEG     NITRITE,UA NEG     SL AMB POCT UROBILINOGEN 0 2     POCT URINE PROTEIN 200+      PH,UA 5 0     BLOOD,UA NEG     SPECIFIC GRAVITY,UA 1 025     KETONES,UA NEG     BILIRUBIN,UA NEG     GLUCOSE, UA NEG      COLOR,UA YELLOW     CLARITY,UA CLEAR

## 2023-03-23 NOTE — PATIENT INSTRUCTIONS
1  )  Low 2 g sodium diet    2 )  Monitor weights at home    3 )  Avoid NSAIDs (ibuprofen, Motrin, Advil, Aleve, naproxen)    4 )  Monitor blood pressure at home, call if blood pressure greater than 150/90 persistently    5 ) I will plan to discuss all results including blood work, and/or imaging at our next visit, unless there is an urgent indication, in which case I will call you earlier  If you have any questions or concerns about your results, please feel free to call our office      6 ) Check Litholink stone profile    7 ) Drink enough water to maintain high urine output of at least 2 liters per day    8 ) Can try Moonstone     9 ) Can take Vitamin D every other day

## 2023-05-18 ENCOUNTER — RA CDI HCC (OUTPATIENT)
Dept: OTHER | Facility: HOSPITAL | Age: 63
End: 2023-05-18

## 2023-05-23 ENCOUNTER — OFFICE VISIT (OUTPATIENT)
Dept: PODIATRY | Facility: CLINIC | Age: 63
End: 2023-05-23

## 2023-05-23 VITALS
BODY MASS INDEX: 40.6 KG/M2 | WEIGHT: 290 LBS | DIASTOLIC BLOOD PRESSURE: 80 MMHG | RESPIRATION RATE: 17 BRPM | HEIGHT: 71 IN | SYSTOLIC BLOOD PRESSURE: 142 MMHG

## 2023-05-23 DIAGNOSIS — M21.962 ACQUIRED DEFORMITY OF BOTH FEET: ICD-10-CM

## 2023-05-23 DIAGNOSIS — E11.42 DIABETIC POLYNEUROPATHY ASSOCIATED WITH TYPE 2 DIABETES MELLITUS (HCC): ICD-10-CM

## 2023-05-23 DIAGNOSIS — M79.672 PAIN IN BOTH FEET: ICD-10-CM

## 2023-05-23 DIAGNOSIS — M21.969 ACQUIRED DEFORMITY OF FOOT, UNSPECIFIED LATERALITY: ICD-10-CM

## 2023-05-23 DIAGNOSIS — B35.1 ONYCHOMYCOSIS: ICD-10-CM

## 2023-05-23 DIAGNOSIS — M79.671 PAIN IN BOTH FEET: ICD-10-CM

## 2023-05-23 DIAGNOSIS — M77.41 METATARSALGIA OF BOTH FEET: Primary | ICD-10-CM

## 2023-05-23 DIAGNOSIS — M77.42 METATARSALGIA OF BOTH FEET: Primary | ICD-10-CM

## 2023-05-23 DIAGNOSIS — M21.961 ACQUIRED DEFORMITY OF BOTH FEET: ICD-10-CM

## 2023-05-23 NOTE — PROGRESS NOTES
Assessment   Pain upon ambulation   Peripheral artery disease   Diabetic neuropathy   Callus formation   Mycosis of nail  Acquired deformity foot bilateral   Peroneal tendinitis right foot   Dermatophytosis      Plan   Chart reviewed  Foot exam performed   Nails debrided   Calluses debrided   Patient will stretch daily   Procedures performed without pain or complication   Patient educated on care of the diabetic foot         Chief Complaint   Patient needs full diabetic foot exam   Patient has pain upon ambulation   Patient complains of pain in his toes when he wears shoes   He gets pain in the ball of the foot   No history of trauma      History of Present Illness   HPI: Patient presents for pedal evaluation  Patient complains of pain in his feet and toes with ambulation   Patient is a morbidly obese diabetic who has some electric sensations in his feet on occasion       Review of Systems        Constitutional: not feeling tired       Eyes: no eyesight problems       ENT: no nasal discharge       Cardiovascular: no chest pain,-- no palpitations-- and-- no extremity edema       Respiratory: no shortness of breath-- and-- no cough       Gastrointestinal: no abdominal pain-- and-- no constipation       Genitourinary: no dysuria-- and-- no urinary hesitancy       Integumentary: no skin wound       Neurological: no tingling-- and-- no dizziness       Psychiatric: sleeping better, stopped taking trazodone, but-- no sleep disturbances       Endocrine: no feelings of weakness       Active Problems   1  Acquired pes planus (734) (M21 40)   2  Acute renal failure (584 9) (N17 9)   3  Adhesive capsulitis of both shoulders (726 0) (M75 01,M75 02)   4  Arthralgia (719 40) (M25 50)   5  BPH without urinary obstruction (600 00) (N40 0)   6  Bunion (727 1) (M21 619)   7  Callus (700) (L84)   2  PSOFKBS systolic congestive heart failure (428 22,428 0) (I50 22)   9  Depression with anxiety (300 4) (F41 8)   10  Diabetes mellitus with neurological manifestation (250 60) (E11 49)   11  Diabetes type 2, uncontrolled (250 02) (E11 65)   12  Difficulty concentrating (799 51) (R41 840)   13  Difficulty walking (719 7) (R26 2)   14  Dyspnea on exertion (786 09) (R06 09)   15  Encounter for prostate cancer screening (V76 44) (Z12 5)   16  Encounter for screening for malignant neoplasm of colon (V76 51) (Z12 11)   17  Fatigue (780 79) (R53 83)   18  Foot pain, bilateral (729 5) (M79 671,M79 672)   19  Hallux valgus (735 0) (M20 10)   20  Hematuria (599 70) (R31 9)   21  Hyperlipidemia (272 4) (E78 5)   22  Hypertension (401 9) (I10)   23  Hypogonadism, male (257 2) (E29 1)   24  Insomnia (780 52) (G47 00)   25  Morbid or severe obesity due to excess calories (278 01) (E66 01)   26  Need for pneumococcal vaccination (V03 82) (Z23)   27  Need for prophylactic vaccination and inoculation against influenza (V04 81) (Z23)   28  Nephrolithiasis (592 0) (N20 0)   29  Onychomycosis (110 1) (B35 1)   30  JEFF (obstructive sleep apnea) (327 23) (G47 33)   31  Sciatica (724 3) (M54 30)   32  Screening for colon cancer (V76 51) (Z12 11)   33  Seasonal allergies (477 9) (J30 2)   34  Shoulder pain, right (719 41) (M25 511)   35  Spinal stenosis (724 00) (M48 00)   36  Tinea pedis (110 4) (B35 3)   37  Type 2 diabetes mellitus (250 00) (E11 9)   38  Vitamin D deficiency (268 9) (E55 9)     Past Medical History    · History of Cellulitis of left leg (682 6) (L03 116)   · History of chest pain (V13 89) (J62 438)   · History of diarrhea (V12 79) (E07 660)   · History of onychomycosis (V12 09) (Z86 19)   · History of onychomycosis (V12 09) (Z86 19)   · History of Limb pain (729 5) (M79 609)   · History of Neck pain (723 1) (M54 2)   · Need for pneumococcal vaccination (V03 82) (Z23)   · History of Nephrolithiasis (V13 01)   · History of Numbness On The Sole Of The Right Foot   · History of Pain and swelling of left lower leg (729 5,729 81) (M79 662,M79 89)   · History of Pain of lower extremity (729 5) (M79 606)   · History of Pain, hand joint, right (719 44) (M79 641)   · Sciatica (724 3) (M54 30)     The active problems and past medical history were reviewed and updated today       Surgical History    · History of Knee Arthroscopy (Therapeutic)   · History of Knee Surgery   · History of Needle Biopsy Of Prostate     Family History   Mother    · Family history of Alzheimer Disease   · Family history of Family Health Status Of Mother - Alive  Father    · Family history of Family Health Status Of Father -   Family History    · Family history of Adopted   · Denied: Family history of Colon Cancer   · Denied: Family history of Crohn's Disease   · Denied: Family history of Liver Cancer     Social History    · Denied: History of Alcohol Use (History)   · Current Some Day Smoker (305 1)   · Denied: History of Exercise Habits   · Marital History - Currently    · Tobacco use (305 1) (Z72 0)   · Working Full Time  The social history was reviewed and is unchanged       The medication list was reviewed and updated today       Allergies   1  No Known Drug Allergies   Physical Exam   Left Foot: Appearance: Normal except as noted: excessive pronation-- and-- pes planus  Great toe deformities include a bunion  Tenderness: None except the great toe-- and-- distal first metatarsal     Right Foot: Appearance: Normal except as noted: excessive pronation-- and-- pes planus  Great toe deformities include a bunion  Tenderness: None except the great toe-- and-- distal first metatarsal     Left Ankle: ROM: limited ROM in all planes    Right Ankle: ROM: limited ROM in all planes    Neurological Exam: performed  Light touch was decreased bilaterally  Vibratory sensation was decreased in both first metatarsophalangeal joints     Vascular Exam: performed Dorsalis pedis pulses were present bilaterally  Posterior tibial pulses were present bilaterally  Elevation Pallor: absent bilaterally  Dependence rubor was absent bilaterally  Edema: none     Toenails: All of the toenails were elongated,-- hypertrophied,-- discolored-- and-- Ptotic  Both first toenails were tender-- and-- Positive onychogryphosis          Socks and shoes removed, Right Foot Findings: swollen, erythematous and dry       The sensory exam showed diminished vibratory sensation at the level of the toes  Diminished tactile sensation with monofilament testing throughout the right foot       Socks and shoes removed, Left Foot Findings: swollen, erythematous and dry       The sensory exam showed diminished vibratory sensation at the level of the toes  Diminished tactile sensation with monofilament testing throughout the left foot      Capillary refills findings on the right were normal in the toes       Pulses:      2+ in the posterior tibialis on the right      2+ in the dorsalis pedis on the right       Capillary refills findings on the left were normal in the toes       Pulses:      1+ in the posterior tibialis on the left      1+ in the dorsalis pedis on the left       Assign Risk Category: 2: Loss of protective sensation with or without weakness, deformity, callus, pre-ulcer, or history of ulceration  High risk     Hyperkeratosis: present on both first toes,-- present on both first sub metatarsals,-- present on both third sub metatarsals-- and-- Severe profound moccasin tinea pedis bilateral     Shoe Gear Evaluation: performed ()   Recommendation(s): SAS style      Right Foot/Ankle   Right Foot Inspection        Sensory   Vibration: diminished  Proprioception: diminished  Monofilament testing: diminished     Vascular  Capillary refills: < 3 seconds              Left Foot/Ankle  Left Foot Inspection        Sensory   Vibration: diminished  Proprioception: diminished  Monofilament testing: diminished     Vascular  Capillary refills: < 3 seconds         Patient's shoes and socks removed      Right Foot/Ankle   Right Foot Inspection        Juan M Richer  Proprioception: diminished  Monofilament testing: intact     Vascular  Capillary refills: < 3 seconds  The right DP pulse is 1+  The right PT pulse is 1+       Left Foot/Ankle  Left Foot Inspection        Sensory   Vibration: diminished  Proprioception: diminished  Monofilament testing: intact     Vascular  Capillary refills: < 3 seconds  The left DP pulse is 1+   The left PT pulse is 1+       Assign Risk Category  Deformity present  Loss of protective sensation  No weak pulses  Risk: 2

## 2023-05-25 ENCOUNTER — OFFICE VISIT (OUTPATIENT)
Dept: INTERNAL MEDICINE CLINIC | Facility: CLINIC | Age: 63
End: 2023-05-25

## 2023-05-25 VITALS
DIASTOLIC BLOOD PRESSURE: 84 MMHG | WEIGHT: 286 LBS | HEIGHT: 71 IN | BODY MASS INDEX: 40.04 KG/M2 | OXYGEN SATURATION: 96 % | HEART RATE: 94 BPM | TEMPERATURE: 97.2 F | SYSTOLIC BLOOD PRESSURE: 140 MMHG

## 2023-05-25 DIAGNOSIS — R06.09 DYSPNEA ON EXERTION: Primary | ICD-10-CM

## 2023-05-25 DIAGNOSIS — N18.31 STAGE 3A CHRONIC KIDNEY DISEASE (HCC): ICD-10-CM

## 2023-05-25 DIAGNOSIS — Z99.89 OSA ON CPAP: ICD-10-CM

## 2023-05-25 DIAGNOSIS — F33.41 RECURRENT MAJOR DEPRESSIVE DISORDER, IN PARTIAL REMISSION (HCC): ICD-10-CM

## 2023-05-25 DIAGNOSIS — Z79.4 TYPE 2 DIABETES MELLITUS WITH STAGE 3A CHRONIC KIDNEY DISEASE, WITH LONG-TERM CURRENT USE OF INSULIN (HCC): ICD-10-CM

## 2023-05-25 DIAGNOSIS — N18.31 TYPE 2 DIABETES MELLITUS WITH STAGE 3A CHRONIC KIDNEY DISEASE, WITH LONG-TERM CURRENT USE OF INSULIN (HCC): ICD-10-CM

## 2023-05-25 DIAGNOSIS — Z79.899 ENCOUNTER FOR LONG-TERM CURRENT USE OF MEDICATION: ICD-10-CM

## 2023-05-25 DIAGNOSIS — E78.2 MIXED HYPERLIPIDEMIA: ICD-10-CM

## 2023-05-25 DIAGNOSIS — E11.22 TYPE 2 DIABETES MELLITUS WITH STAGE 3A CHRONIC KIDNEY DISEASE, WITH LONG-TERM CURRENT USE OF INSULIN (HCC): ICD-10-CM

## 2023-05-25 DIAGNOSIS — J30.2 SEASONAL ALLERGIES: ICD-10-CM

## 2023-05-25 DIAGNOSIS — G47.33 OSA ON CPAP: ICD-10-CM

## 2023-05-25 DIAGNOSIS — I10 ESSENTIAL HYPERTENSION: ICD-10-CM

## 2023-05-25 PROBLEM — R53.83 FATIGUE: Status: RESOLVED | Noted: 2017-09-06 | Resolved: 2023-05-25

## 2023-05-25 RX ORDER — FLUTICASONE PROPIONATE 50 MCG
1 SPRAY, SUSPENSION (ML) NASAL DAILY
Qty: 48 G | Refills: 1 | Status: SHIPPED | OUTPATIENT
Start: 2023-05-25

## 2023-05-25 NOTE — ASSESSMENT & PLAN NOTE
Lab Results   Component Value Date    HGBA1C 8 0 (H) 02/25/2023     Repeat A1c  Using Tresiba 34 units, Trulicity 4 5 mg and glimepiride 4 mg bid  Plan to wean off glimepiride

## 2023-05-25 NOTE — ASSESSMENT & PLAN NOTE
Lab Results   Component Value Date    CREATININE 1 43 (H) 02/25/2023    CREATININE 1 29 12/18/2022    CREATININE 1 42 (H) 12/01/2022    EGFR 52 02/25/2023    EGFR 59 12/18/2022    EGFR 52 12/01/2022     Stable  Sees nephrology

## 2023-05-25 NOTE — PROGRESS NOTES
Assessment/Plan:    Nephrolithiasis  Saw Urology recently  Maintain adequate fluid intake  Still on tamsulosin  Type 2 diabetes mellitus with stage 3a chronic kidney disease, with long-term current use of insulin ([facility])    Lab Results   Component Value Date    HGBA1C 8 0 (H) 02/25/2023     Repeat A1c  Using Tresiba 34 units, Trulicity 4 5 mg and glimepiride 4 mg bid  Plan to wean off glimepiride  Seasonal allergies  Start steroid nasal spray  Obstructive sleep apnea  Using CPAP  Essential hypertension  BP stable  On amlodipine, clonidine and enalapril  Depression  Stable  On bupropion and sertraline  CKD (chronic kidney disease) stage 3  Lab Results   Component Value Date    CREATININE 1 43 (H) 02/25/2023    CREATININE 1 29 12/18/2022    CREATININE 1 42 (H) 12/01/2022    EGFR 52 02/25/2023    EGFR 59 12/18/2022    EGFR 52 12/01/2022     Stable  Sees nephrology  Mixed hyperlipidemia  On atorvastatin  Diagnoses and all orders for this visit:    Dyspnea on exertion  Comments:  More frequent symptoms recently  (+) risk factors  Agrees to do stress test   Orders:  -     NM myocardial perfusion spect (rx stress and/or rest); Future  -     ECG 12 lead; Future    Seasonal allergies  -     fluticasone (FLONASE) 50 mcg/act nasal spray; 1 spray into each nostril daily    Stage 3a chronic kidney disease (HCC)  -     Vitamin D 25 hydroxy  -     Comprehensive metabolic panel    Type 2 diabetes mellitus with stage 3a chronic kidney disease, with long-term current use of insulin ([facility])  -     Cancel: Hemoglobin A1C  -     Hemoglobin A1C; Future    Obstructive sleep apnea    Mixed hyperlipidemia    Essential hypertension  -     NM myocardial perfusion spect (rx stress and/or rest);  Future  -     TSH, 3rd generation with Free T4 reflex    Encounter for long-term current use of medication  -     Vitamin B12    Recurrent major depressive disorder, in partial remission ([facility] )      Follow up in 4 months or "as needed  Subjective:      Patient ID: Fady Rivas is a 61 y o  male here for follow up  He complains of shortness of breath  He was mowing the lawn and needed to stop several times  Recently, he is able to mow his lawn without resting  He had recently visited [place]  He was on his feet at least 5 hours a day  He felt very tired at that time  He reports no cough, no chest pressure or pain, no palpitations  He reports leg swelling has resolved  He denies any headache or dizziness  He is staying hydrated  He admits still drinking at least a bottle of soda on a daily basis  He continues to work, does not feel as stressed  He stopped taking a kidney supplement and his GI symptoms went away  He reports loose stools, abdominal pain and cramping had resolved a few days later  The following portions of the patient's history were reviewed and updated as appropriate: allergies, current medications, past medical history, past social history and problem list     Review of Systems   Constitutional: Positive for fatigue  Negative for appetite change  HENT: Negative for congestion, hearing loss and postnasal drip  Eyes: Negative  Respiratory: Positive for shortness of breath  Negative for cough, chest tightness and wheezing  Cardiovascular: Negative for chest pain, palpitations and leg swelling  Gastrointestinal: Negative for abdominal pain and constipation  Genitourinary: Negative for dysuria, frequency and urgency  Musculoskeletal: Negative for arthralgias  Skin: Negative for rash and wound  Neurological: Negative for dizziness, numbness and headaches  Psychiatric/Behavioral: Negative for sleep disturbance  The patient is not nervous/anxious  Objective:      /84   Pulse 94   Temp (!) 97 2 °F (36 2 °C)   Ht 5' 11\" (1 803 m)   Wt 130 kg (286 lb)   SpO2 96%   BMI 39 89 kg/m²          Physical Exam  Vitals and nursing note reviewed     Constitutional:      " Appearance: He is well-developed  HENT:      Head: Normocephalic and atraumatic  Eyes:      Conjunctiva/sclera: Conjunctivae normal       Pupils: Pupils are equal, round, and reactive to light  Cardiovascular:      Rate and Rhythm: Normal rate and regular rhythm  Heart sounds: Normal heart sounds  Pulmonary:      Effort: Pulmonary effort is normal       Breath sounds: No wheezing or rhonchi  Abdominal:      General: Bowel sounds are normal       Palpations: Abdomen is soft  Musculoskeletal:         General: No swelling  Cervical back: Neck supple  Right lower leg: No edema  Left lower leg: No edema  Skin:     General: Skin is warm  Findings: No rash  Neurological:      General: No focal deficit present  Mental Status: He is alert and oriented to person, place, and time  Psychiatric:         Mood and Affect: Mood and affect normal          Behavior: Behavior normal            Labs & imaging results reviewed with patient

## 2023-05-28 ENCOUNTER — APPOINTMENT (OUTPATIENT)
Dept: LAB | Facility: CLINIC | Age: 63
End: 2023-05-28

## 2023-05-28 ENCOUNTER — OFFICE VISIT (OUTPATIENT)
Dept: LAB | Facility: CLINIC | Age: 63
End: 2023-05-28

## 2023-05-28 DIAGNOSIS — Z79.4 TYPE 2 DIABETES MELLITUS WITH STAGE 3A CHRONIC KIDNEY DISEASE, WITH LONG-TERM CURRENT USE OF INSULIN (HCC): ICD-10-CM

## 2023-05-28 DIAGNOSIS — E11.22 TYPE 2 DIABETES MELLITUS WITH STAGE 3A CHRONIC KIDNEY DISEASE, WITH LONG-TERM CURRENT USE OF INSULIN (HCC): ICD-10-CM

## 2023-05-28 DIAGNOSIS — Z12.5 SCREENING FOR PROSTATE CANCER: ICD-10-CM

## 2023-05-28 DIAGNOSIS — R06.09 DYSPNEA ON EXERTION: ICD-10-CM

## 2023-05-28 DIAGNOSIS — N18.31 TYPE 2 DIABETES MELLITUS WITH STAGE 3A CHRONIC KIDNEY DISEASE, WITH LONG-TERM CURRENT USE OF INSULIN (HCC): ICD-10-CM

## 2023-05-28 LAB
25(OH)D3 SERPL-MCNC: 18.1 NG/ML (ref 30–100)
ALBUMIN SERPL BCP-MCNC: 4 G/DL (ref 3.5–5)
ALP SERPL-CCNC: 75 U/L (ref 34–104)
ALT SERPL W P-5'-P-CCNC: 15 U/L (ref 7–52)
ANION GAP SERPL CALCULATED.3IONS-SCNC: 7 MMOL/L (ref 4–13)
AST SERPL W P-5'-P-CCNC: 13 U/L (ref 13–39)
BILIRUB SERPL-MCNC: 1.08 MG/DL (ref 0.2–1)
BUN SERPL-MCNC: 27 MG/DL (ref 5–25)
CALCIUM SERPL-MCNC: 9.1 MG/DL (ref 8.4–10.2)
CHLORIDE SERPL-SCNC: 106 MMOL/L (ref 96–108)
CO2 SERPL-SCNC: 24 MMOL/L (ref 21–32)
CREAT SERPL-MCNC: 1.47 MG/DL (ref 0.6–1.3)
EST. AVERAGE GLUCOSE BLD GHB EST-MCNC: 183 MG/DL
GFR SERPL CREATININE-BSD FRML MDRD: 50 ML/MIN/1.73SQ M
GLUCOSE P FAST SERPL-MCNC: 150 MG/DL (ref 65–99)
HBA1C MFR BLD: 8 %
POTASSIUM SERPL-SCNC: 4.2 MMOL/L (ref 3.5–5.3)
PROT SERPL-MCNC: 7.2 G/DL (ref 6.4–8.4)
PSA SERPL-MCNC: 2.64 NG/ML (ref 0–4)
SODIUM SERPL-SCNC: 137 MMOL/L (ref 135–147)
TSH SERPL DL<=0.05 MIU/L-ACNC: 0.51 UIU/ML (ref 0.45–4.5)
VIT B12 SERPL-MCNC: 307 PG/ML (ref 180–914)

## 2023-05-29 ENCOUNTER — TELEPHONE (OUTPATIENT)
Dept: INTERNAL MEDICINE CLINIC | Facility: CLINIC | Age: 63
End: 2023-05-29

## 2023-05-29 NOTE — TELEPHONE ENCOUNTER
Lab results  Sugars stable, A1c at  8%  No medication change  Please make sure you are taking vitamin D3 2000 units daily, vitamin B12 500 mcg daily  Kidney function stable

## 2023-05-30 LAB
ATRIAL RATE: 96 BPM
P AXIS: 25 DEGREES
PR INTERVAL: 156 MS
QRS AXIS: 17 DEGREES
QRSD INTERVAL: 86 MS
QT INTERVAL: 366 MS
QTC INTERVAL: 462 MS
T WAVE AXIS: -15 DEGREES
VENTRICULAR RATE: 96 BPM

## 2023-06-14 ENCOUNTER — HOSPITAL ENCOUNTER (OUTPATIENT)
Dept: NUCLEAR MEDICINE | Facility: HOSPITAL | Age: 63
Discharge: HOME | End: 2023-06-14
Payer: COMMERCIAL

## 2023-06-14 ENCOUNTER — HOSPITAL ENCOUNTER (OUTPATIENT)
Dept: NON INVASIVE DIAGNOSTICS | Facility: HOSPITAL | Age: 63
Discharge: HOME | End: 2023-06-14
Payer: COMMERCIAL

## 2023-06-14 VITALS
RESPIRATION RATE: 16 BRPM | SYSTOLIC BLOOD PRESSURE: 172 MMHG | OXYGEN SATURATION: 97 % | HEART RATE: 82 BPM | DIASTOLIC BLOOD PRESSURE: 93 MMHG | BODY MASS INDEX: 40.94 KG/M2 | WEIGHT: 286 LBS | HEIGHT: 70 IN

## 2023-06-14 DIAGNOSIS — I10 ESSENTIAL HYPERTENSION: ICD-10-CM

## 2023-06-14 DIAGNOSIS — R06.09 DYSPNEA ON EXERTION: ICD-10-CM

## 2023-06-14 LAB
MAX HR PERCENT: 68 %
MAX HR: 103 BPM
NUC STRESS EJECTION FRACTION: 48 %
RATE PRESSURE PRODUCT: NORMAL
SL CV REST NUCLEAR ISOTOPE DOSE: 16.5 MCI
SL CV STRESS NUCLEAR ISOTOPE DOSE: 49.5 MCI
SL CV STRESS RECOVERY BP: NORMAL MMHG
SL CV STRESS RECOVERY HR: 80 BPM
SL CV STRESS RECOVERY O2 SAT: 98 %
STRESS ANGINA INDEX: 0
STRESS BASELINE BP: NORMAL MMHG
STRESS BASELINE HR: 82 BPM
STRESS O2 SAT REST: 97 %
STRESS PEAK HR: 103 BPM
STRESS POST ESTIMATED WORKLOAD: 1 METS
STRESS POST EXERCISE DUR MIN: 3 MIN
STRESS POST EXERCISE DUR SEC: 0 SEC
STRESS POST O2 SAT PEAK: 97 %
STRESS POST PEAK BP: 200 MMHG
STRESS/REST PERFUSION RATIO: 1.11

## 2023-06-14 PROCEDURE — 93017 CV STRESS TEST TRACING ONLY: CPT

## 2023-06-14 PROCEDURE — 93016 CV STRESS TEST SUPVJ ONLY: CPT | Performed by: INTERNAL MEDICINE

## 2023-06-14 PROCEDURE — G1004 CDSM NDSC: HCPCS

## 2023-06-14 PROCEDURE — 78452 HT MUSCLE IMAGE SPECT MULT: CPT | Performed by: INTERNAL MEDICINE

## 2023-06-14 PROCEDURE — A9502 TC99M TETROFOSMIN: HCPCS

## 2023-06-14 PROCEDURE — 93018 CV STRESS TEST I&R ONLY: CPT | Performed by: INTERNAL MEDICINE

## 2023-06-14 PROCEDURE — 78452 HT MUSCLE IMAGE SPECT MULT: CPT

## 2023-06-14 RX ORDER — REGADENOSON 0.08 MG/ML
0.4 INJECTION, SOLUTION INTRAVENOUS ONCE
Status: COMPLETED | OUTPATIENT
Start: 2023-06-14 | End: 2023-06-14

## 2023-06-14 RX ADMIN — REGADENOSON 0.4 MG: 0.08 INJECTION, SOLUTION INTRAVENOUS at 14:26

## 2023-06-16 ENCOUNTER — TELEPHONE (OUTPATIENT)
Dept: INTERNAL MEDICINE CLINIC | Facility: CLINIC | Age: 63
End: 2023-06-16

## 2023-06-16 DIAGNOSIS — R06.09 DYSPNEA ON EXERTION: Primary | ICD-10-CM

## 2023-06-16 DIAGNOSIS — I10 ESSENTIAL HYPERTENSION: ICD-10-CM

## 2023-06-16 NOTE — TELEPHONE ENCOUNTER
Please call patient  Stress test was normal   However, heart function is low normal  I would like you to see cardiology to see if any more testing is recommended   Ok to send referral?

## 2023-06-20 LAB
CHEST PAIN STATEMENT: NORMAL
CHEST PAIN STATEMENT: NORMAL
MAX DIASTOLIC BP: 108 MMHG
MAX DIASTOLIC BP: 108 MMHG
MAX HEART RATE: 107 BPM
MAX HEART RATE: 107 BPM
MAX PREDICTED HEART RATE: 157 BPM
MAX PREDICTED HEART RATE: 157 BPM
MAX. SYSTOLIC BP: 200 MMHG
MAX. SYSTOLIC BP: 200 MMHG
PROTOCOL NAME: NORMAL
PROTOCOL NAME: NORMAL
REASON FOR TERMINATION: NORMAL
REASON FOR TERMINATION: NORMAL
TARGET HR FORMULA: NORMAL
TARGET HR FORMULA: NORMAL
TEST INDICATION: NORMAL
TEST INDICATION: NORMAL
TIME IN EXERCISE PHASE: NORMAL
TIME IN EXERCISE PHASE: NORMAL

## 2023-06-28 ENCOUNTER — CONSULT (OUTPATIENT)
Dept: GASTROENTEROLOGY | Facility: AMBULARY SURGERY CENTER | Age: 63
End: 2023-06-28
Payer: COMMERCIAL

## 2023-06-28 ENCOUNTER — TELEPHONE (OUTPATIENT)
Dept: GASTROENTEROLOGY | Facility: AMBULARY SURGERY CENTER | Age: 63
End: 2023-06-28

## 2023-06-28 VITALS
DIASTOLIC BLOOD PRESSURE: 75 MMHG | OXYGEN SATURATION: 100 % | HEART RATE: 80 BPM | WEIGHT: 282.4 LBS | BODY MASS INDEX: 40.43 KG/M2 | SYSTOLIC BLOOD PRESSURE: 130 MMHG | RESPIRATION RATE: 18 BRPM | HEIGHT: 70 IN

## 2023-06-28 DIAGNOSIS — R19.4 CHANGE IN BOWEL HABITS: ICD-10-CM

## 2023-06-28 DIAGNOSIS — K62.5 BRBPR (BRIGHT RED BLOOD PER RECTUM): Primary | ICD-10-CM

## 2023-06-28 DIAGNOSIS — Z87.19 HISTORY OF DIVERTICULITIS: ICD-10-CM

## 2023-06-28 PROCEDURE — 99204 OFFICE O/P NEW MOD 45 MIN: CPT | Performed by: PHYSICIAN ASSISTANT

## 2023-06-28 RX ORDER — BISACODYL 5 MG/1
10 TABLET, DELAYED RELEASE ORAL ONCE
Qty: 2 TABLET | Refills: 0 | Status: SHIPPED | OUTPATIENT
Start: 2023-06-28 | End: 2023-06-28

## 2023-06-28 NOTE — PROGRESS NOTES
[facility]  Vaishali Justin 61 y o  male MRN: [id number]  Encounter: [id number]          ASSESSMENT AND PLAN:      1  Change in bowel habits correlated with hospitalization approximately 6 months ago with kidney stones, had also been noted with diverticulitis  Alternating diarrhea and constipation  Also notes intermittent bright red blood per rectum which clinically appears hemorrhoidal but rule out colorectal lesions  Rule out early inflammatory bowel disease, colorectal malignancy, celiac disease  His last colonoscopy in 2014 was noted with poor prep    -We will schedule patient for colonoscopy    -Procedure was explained in detail to the patient at this time including associated risks and benefits, risks including but not limited to infection, perforation and bleeding    -Prescription and instructions provided for colonoscopy prep    -Advised patient about daily fiber supplementation and regular fluid intake, as well as increasing physical activity    -Check celiac disease antibody profile; if this shows significant abnormality then would recommend adding EGD to patient's upcoming colonoscopy    ______________________________________________________________________    HPI: 49-year-old male with history of type 2 diabetes, obstructive sleep apnea requiring CPAP, BMI 40 5, chronic kidney disease stage III who presents for evaluation; he actually saw Dr Tico Boston in 2014 when he had colonoscopy done showing a lot of thick liquid stool, and some left-sided diverticulosis, as well as what appeared to be some slight inflammation in the sigmoid colon although biopsies were negative for findings of microscopic colitis or inflammatory bowel disease  In any case patient says he is due for colonoscopy for screening purposes but has also been experiencing some stomach issues lately    He says that he was hospitalized over Thanksgiving 2022 with kidney stones, he says he was also told about diverticulitis which is substantiated by CT scan report from that hospitalization showing evidence of uncomplicated diverticulitis of the sigmoid colon  He says he was treated with antibiotics, he says that after the incident he was started on some sort of supplement for his kidneys which caused a lot of problems with irregular bowel habits and abdominal pressure like discomfort; he stopped this supplement in January and had some improvement in issues but still nonetheless experiences intermittent diarrhea and constipation  Says he will go up to 2 to 3 days without a bowel movement, will have harder stools followed by loose watery stools  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 and admittedly does not have a lot of physical activity  He does have to push for his bowel movements sometimes, and notes bright red blood mixed in with brown-colored stools on occasion  He does not take any over-the-counter fiber supplements or laxatives  Denies any known family history of colon cancer  No unintentional weight loss  He is postcholecystectomy  He did recently see his family doctor about some issues with dyspnea on exertion and had a nuclear stress test done 2 weeks ago showing no ischemic changes, low normal ejection fraction, says he has an appointment coming up to address this  REVIEW OF SYSTEMS:    CONSTITUTIONAL: Denies any fever, chills, rigors, and weight loss  HEENT: No earache or tinnitus  Denies hearing loss or visual disturbances  CARDIOVASCULAR: No chest pain or palpitations  RESPIRATORY: Denies any cough, hemoptysis, shortness of breath or dyspnea on exertion  GASTROINTESTINAL: As noted in the History of Present Illness  GENITOURINARY: No problems with urination  Denies any hematuria or dysuria  NEUROLOGIC: No dizziness or vertigo, denies headaches  MUSCULOSKELETAL: Denies any muscle or joint pain  SKIN: Denies skin rashes or itching     ENDOCRINE: Denies excessive thirst  Denies intolerance to heat or cold  PSYCHOSOCIAL: Denies depression or anxiety  Denies any recent memory loss         Historical Information   Past Medical History:   Diagnosis Date   • Acute renal failure (ARF) ([facility] [address] )     last assessed - [id number]   • Anxiety 5/90   • Chest pain     last assessed - [id number]   • CPAP (continuous positive airway pressure) dependence    • Depression    • Diabetes mellitus ([facility] [address] )    • Diverticulitis of colon 0/00   • Dyspnea on exertion     last assessed - [id number]   • Hypertension    • Kidney stone    • Nephrolithiasis    • Numbness of right foot     numbness on the sole of the right foot; since he was young d/t stenosis   • Obesity    • Onychomycosis     last assessed - [id number]   • JEFF on CPAP    • Sciatica     last assessed - [id number]   • Sleep apnea    • Urinary tract infection 9/2020     Past Surgical History:   Procedure Laterality Date   • CHOLECYSTECTOMY  8/2017   • COLONOSCOPY     • CYSTOSCOPY W/ LASER LITHOTRIPSY     • FL RETROGRADE PYELOGRAM  11/24/2022   • FL RETROGRADE PYELOGRAM  11/30/2022   • KNEE ARTHROSCOPY Left 11/2013   • PA CYSTO BLADDER W/URETERAL CATHETERIZATION Right 11/24/2022    Procedure: CYSTOSCOPY RETROGRADE PYELOGRAM WITH INSERTION STENT URETERAL;  Surgeon: Tom Schroeder MD;  [facility];  Service: Urology   • PA CYSTO/URETERO W/LITHOTRIPSY &INDWELL STENT INSRT Right 11/30/2022    Procedure: CYSTOSCOPY URETEROSCOPY WITH LITHOTRIPSY HOLMIUM LASER, RETROGRADE PYELOGRAM AND INSERTION STENT URETERAL, stone extraction;  Surgeon: Liss Cevallos MD;  Location[facility];  Service: Urology   • PA LAPAROSCOPY SURG CHOLECYSTECTOMY N/A 02/08/2019    Procedure: ROBOTIC ASSISTED LAPAROSCOPIC CHOLECYSTECTOMY;  Surgeon: Jenny Avila DO;  Location: [facility];  Service: General   • PROSTATE BIOPSY      Needle Biopsy - Negative     Social History   Social History     Substance and Sexual Activity   Alcohol Use Not Currently     Social History "    Substance and Sexual Activity   Drug Use No     Social History     Tobacco Use   Smoking Status Some Days   • Types: Cigars   Smokeless Tobacco Never   Tobacco Comments    3 cigars per week     Family History   Adopted: Yes   Problem Relation Age of Onset   • Alzheimer's disease Mother    • Alcohol abuse Neg Hx    • Drug abuse Neg Hx    • Depression Neg Hx    • Substance Abuse Neg Hx    • Mental illness Neg Hx        Meds/Allergies       Current Outpatient Medications:   •  amLODIPine (NORVASC) 10 mg tablet  •  atorvastatin (LIPITOR) 10 mg tablet  •  bisacodyl (DULCOLAX) 5 mg EC tablet  •  buPROPion (WELLBUTRIN XL) 150 mg 24 hr tablet  •  cloNIDine (CATAPRES) 0 3 mg tablet  •  docusate sodium (COLACE) 100 mg capsule  •  dulaglutide (Trulicity) 4 5 KA/4 9BT injection  •  enalapril (VASOTEC) 20 mg tablet  •  fluticasone (FLONASE) 50 mcg/act nasal spray  •  glimepiride (AMARYL) 4 mg tablet  •  Insulin Glargine Solostar (Basaglar KwikPen) 100 UNIT/ML SOPN  •  Insulin Pen Needle (BD Pen Needle Alicia 2nd Gen) 32G X 4 MM MISC  •  Lidocaine Viscous HCl (XYLOCAINE) 2 % mucosal solution  •  nystatin (MYCOSTATIN) powder  •  polyethylene glycol (GOLYTELY) 4000 mL solution  •  sertraline (ZOLOFT) 100 mg tablet  •  tamsulosin (FLOMAX) 0 4 mg    No Known Allergies        Objective     Blood pressure 130/75, pulse 80, resp  rate 18, height 5' 10\" (1 778 m), weight 128 kg (282 lb 6 4 oz), SpO2 100 %  Body mass index is 40 52 kg/m²  PHYSICAL EXAM:      General Appearance:   Alert, cooperative, no distress   HEENT:   Normocephalic, atraumatic, anicteric      Neck:  Supple, symmetrical, trachea midline   Lungs:   Clear to auscultation bilaterally; no rales, rhonchi or wheezing; respirations unlabored    Heart[de-identified]   Regular rate and rhythm; no murmur, rub, or gallop     Abdomen:   Soft, non-tender, non-distended; normal bowel sounds; no masses, no organomegaly    Genitalia:   Deferred    Rectal:   Deferred    Extremities:  No " cyanosis, clubbing or edema    Pulses:  2+ and symmetric    Skin:  No jaundice, rashes, or lesions    Lymph nodes:  No palpable cervical lymphadenopathy        Lab Results:   No visits with results within 1 Day(s) from this visit  Latest known visit with results is:   Hospital Outpatient Visit on 06/14/2023   Component Date Value   • Protocol Name 06/14/2023 JAKY SIT    • Time In Exercise Phase 06/14/2023 00:03:00    • MAX  SYSTOLIC BP 98/23/2685 105    • Max Diastolic Bp 59/63/2687 523    • Max Heart Rate 06/14/2023 107    • Max Predicted Heart Rate 06/14/2023 157    • Reason for Termination 06/14/2023 Dyspnea    • Test Indication 06/14/2023 Dyspnea    • Target Hr Formular 06/14/2023 (220 - Age)*100%    • Chest Pain Statement 06/14/2023 none    • Protocol Name 06/14/2023 JAKY SIT    • Time In Exercise Phase 06/14/2023 00:03:00    • MAX  SYSTOLIC BP 53/25/8975 529    • Max Diastolic Bp 78/69/8838 085    • Max Heart Rate 06/14/2023 107    • Max Predicted Heart Rate 06/14/2023 157    • Reason for Termination 06/14/2023 Dyspnea    • Test Indication 06/14/2023 Dyspnea    • Target Hr Formular 06/14/2023 (220 - Age)*100%    • Chest Pain Statement 06/14/2023 none          Radiology Results:   Stress strip    Result Date: 6/20/2023  Narrative: Confirmed by Fide Luke ([id number]), editor Cecille Brown ([phone]) on 7/60/3748 1:08:10 PM    Stress strip    Result Date: 6/20/2023  Narrative: Confirmed by Fide Luke ([id number]), editor Cecille Brown [phone] on 5/09/5891 1:07:39 PM    NM myocardial perfusion spect (rx stress and/or rest)    Result Date: 6/14/2023  Narrative: •  A pharmacological stress test was performed using regadenoson  The patient after exercising for 3 min and 0 sec and had a maximal HR of 103 bpm (68 % of MPHR) and 1 0 METS  The patient experienced no angina during the test  •  Stress ECG: The stress ECG is negative for ischemia after pharmacologic stress   •  Perfusion: There are no significant perfusion defects  •  Stress Function: Left ventricular function post-stress is low normal  Stress ejection fraction is 48 %

## 2023-06-28 NOTE — PATIENT INSTRUCTIONS
Scheduled date of colonoscopy (as of today): tbd -  to call when hospital dates open  Physician performing colonoscopy: dr Delvis Pollack  Location of colonoscopy: [facility] AT [facility]  Bowel prep reviewed with patient: [email or web address]  Instructions reviewed with patient by: pepper  Clearances:  diabetic

## 2023-07-10 DIAGNOSIS — E11.40 TYPE 2 DIABETES MELLITUS WITH DIABETIC NEUROPATHY, WITHOUT LONG-TERM CURRENT USE OF INSULIN (HCC): ICD-10-CM

## 2023-07-10 RX ORDER — PEN NEEDLE, DIABETIC 32GX 5/32"
NEEDLE, DISPOSABLE MISCELLANEOUS DAILY
Qty: 90 EACH | Refills: 3 | Status: SHIPPED | OUTPATIENT
Start: 2023-07-10

## 2023-07-18 ENCOUNTER — OFFICE VISIT (OUTPATIENT)
Dept: PODIATRY | Facility: CLINIC | Age: 63
End: 2023-07-18
Payer: COMMERCIAL

## 2023-07-18 VITALS
DIASTOLIC BLOOD PRESSURE: 75 MMHG | SYSTOLIC BLOOD PRESSURE: 130 MMHG | WEIGHT: 282 LBS | BODY MASS INDEX: 40.37 KG/M2 | RESPIRATION RATE: 17 BRPM | HEIGHT: 70 IN

## 2023-07-18 DIAGNOSIS — E11.42 DIABETIC POLYNEUROPATHY ASSOCIATED WITH TYPE 2 DIABETES MELLITUS (HCC): ICD-10-CM

## 2023-07-18 DIAGNOSIS — M21.969 ACQUIRED DEFORMITY OF FOOT, UNSPECIFIED LATERALITY: ICD-10-CM

## 2023-07-18 DIAGNOSIS — B35.1 ONYCHOMYCOSIS: ICD-10-CM

## 2023-07-18 DIAGNOSIS — B35.3 TINEA PEDIS OF BOTH FEET: ICD-10-CM

## 2023-07-18 DIAGNOSIS — M77.41 METATARSALGIA OF BOTH FEET: Primary | ICD-10-CM

## 2023-07-18 DIAGNOSIS — M77.42 METATARSALGIA OF BOTH FEET: Primary | ICD-10-CM

## 2023-07-18 DIAGNOSIS — M21.962 ACQUIRED DEFORMITY OF BOTH FEET: ICD-10-CM

## 2023-07-18 DIAGNOSIS — M21.961 ACQUIRED DEFORMITY OF BOTH FEET: ICD-10-CM

## 2023-07-18 PROCEDURE — 99213 OFFICE O/P EST LOW 20 MIN: CPT | Performed by: PODIATRIST

## 2023-07-18 NOTE — PROGRESS NOTES
Assessment.  Pain upon ambulation.  Peripheral artery disease.  Diabetic neuropathy.  Callus formation.  Mycosis of nail. Acquired deformity foot bilateral.  Peroneal tendinitis right foot.  Dermatophytosis.     Plan.  Chart reviewed. Foot exam performed.  Nails debrided.  Calluses debrided.  Patient will stretch daily.  Procedures performed without pain or complication.  Patient educated on care of the diabetic foot.        Chief Complaint   Patient needs full diabetic foot exam.  Patient has pain upon ambulation.  Patient complains of pain in his toes when he wears shoes.  He gets pain in the ball of the foot.  No history of trauma.     History of Present Illness   HPI: Patient presents for pedal evaluation. Patient complains of pain in his feet and toes with ambulation.  Patient is a morbidly obese diabetic who has some electric sensations in his feet on occasion.      Review of Systems        Constitutional: not feeling tired.      Eyes: no eyesight problems.      ENT: no nasal discharge.      Cardiovascular: no chest pain,-- no palpitations-- and-- no extremity edema.      Respiratory: no shortness of breath-- and-- no cough.      Gastrointestinal: no abdominal pain-- and-- no constipation.      Genitourinary: no dysuria-- and-- no urinary hesitancy.      Integumentary: no skin wound.      Neurological: no tingling-- and-- no dizziness.      Psychiatric: sleeping better, stopped taking trazodone, but-- no sleep disturbances.      Endocrine: no feelings of weakness.      Active Problems   1. Acquired pes planus (734) (M21.40)   2. Acute renal failure (584.9) (N17.9)   3. Adhesive capsulitis of both shoulders (726.0) (M75.01,M75.02)   4. Arthralgia (719.40) (M25.50)   5. BPH without urinary obstruction (600.00) (N40.0)   6. Bunion (727.1) (M21.619)   7. Callus (700) (L84)   2. RLGJPJT systolic congestive heart failure (428.22,428.0) (I50.22)   9. Depression with anxiety (300.4) (F41.8)   10. Diabetes mellitus with neurological manifestation (250.60) (E11.49)   11. Diabetes type 2, uncontrolled (250.02) (E11.65)   12. Difficulty concentrating (799.51) (R41.840)   13. Difficulty walking (719.7) (R26.2)   14. Dyspnea on exertion (786.09) (R06.09)   15. Encounter for prostate cancer screening (V76.44) (Z12.5)   16. Encounter for screening for malignant neoplasm of colon (V76.51) (Z12.11)   17. Fatigue (780.79) (R53.83)   18. Foot pain, bilateral (729.5) (M79.671,M79.672)   19. Hallux valgus (735.0) (M20.10)   20. Hematuria (599.70) (R31.9)   21. Hyperlipidemia (272.4) (E78.5)   22. Hypertension (401.9) (I10)   23. Hypogonadism, male (257.2) (E29.1)   24. Insomnia (780.52) (G47.00)   25. Morbid or severe obesity due to excess calories (278.01) (E66.01)   26. Need for pneumococcal vaccination (V03.82) (Z23)   27. Need for prophylactic vaccination and inoculation against influenza (V04.81) (Z23)   28. Nephrolithiasis (592.0) (N20.0)   29. Onychomycosis (110.1) (B35.1)   30. JEFF (obstructive sleep apnea) (327.23) (G47.33)   31. Sciatica (724.3) (M54.30)   32. Screening for colon cancer (V76.51) (Z12.11)   33. Seasonal allergies (477.9) (J30.2)   34. Shoulder pain, right (719.41) (M25.511)   35. Spinal stenosis (724.00) (M48.00)   36. Tinea pedis (110.4) (B35.3)   37. Type 2 diabetes mellitus (250.00) (E11.9)   38. Vitamin D deficiency (268.9) (E55.9)     Past Medical History    · History of Cellulitis of left leg (682.6) (L03.116)   · History of chest pain (V13.89) (N00.162)   · History of diarrhea (V12.79) (R17.694)   · History of onychomycosis (V12.09) (Z86.19)   · History of onychomycosis (V12.09) (Z86.19)   · History of Limb pain (729.5) (M79.609)   · History of Neck pain (723.1) (M54.2)   · Need for pneumococcal vaccination (V03.82) (Z23)   · History of Nephrolithiasis (V13.01)   · History of Numbness On The Sole Of The Right Foot   · History of Pain and swelling of left lower leg (729.5,729.81) (M79.662,M79.89)   · History of Pain of lower extremity (729.5) (M79.606)   · History of Pain, hand joint, right (719.44) (M79.641)   · Sciatica (724.3) (M54.30)     The active problems and past medical history were reviewed and updated today.      Surgical History    · History of Knee Arthroscopy (Therapeutic)   · History of Knee Surgery   · History of Needle Biopsy Of Prostate     Family History   Mother    · Family history of Alzheimer Disease   · Family history of Family Health Status Of Mother - Alive  Father    · Family history of Family Health Status Of Father -   Family History    · Family history of Adopted   · Denied: Family history of Colon Cancer   · Denied: Family history of Crohn's Disease   · Denied: Family history of Liver Cancer     Social History    · Denied: History of Alcohol Use (History)   · Current Some Day Smoker (305.1)   · Denied: History of Exercise Habits   · Marital History - Currently    · Tobacco use (305.1) (Z72.0)   · Working Full Time  The social history was reviewed and is unchanged.      The medication list was reviewed and updated today.      Allergies   1. No Known Drug Allergies   Physical Exam   Left Foot: Appearance: Normal except as noted: excessive pronation-- and-- pes planus. Great toe deformities include a bunion. Tenderness: None except the great toe-- and-- distal first metatarsal.    Right Foot: Appearance: Normal except as noted: excessive pronation-- and-- pes planus. Great toe deformities include a bunion. Tenderness: None except the great toe-- and-- distal first metatarsal.    Left Ankle: ROM: limited ROM in all planes    Right Ankle: ROM: limited ROM in all planes    Neurological Exam: performed. Light touch was decreased bilaterally. Vibratory sensation was decreased in both first metatarsophalangeal joints.    Vascular Exam: performed Dorsalis pedis pulses were present bilaterally. Posterior tibial pulses were present bilaterally. Elevation Pallor: absent bilaterally. Dependence rubor was absent bilaterally. Edema: none.    Toenails: All of the toenails were elongated,-- hypertrophied,-- discolored-- and-- Ptotic. Both first toenails were tender-- and-- Positive onychogryphosis.         Socks and shoes removed, Right Foot Findings: swollen, erythematous and dry.      The sensory exam showed diminished vibratory sensation at the level of the toes. Diminished tactile sensation with monofilament testing throughout the right foot.      Socks and shoes removed, Left Foot Findings: swollen, erythematous and dry.      The sensory exam showed diminished vibratory sensation at the level of the toes. Diminished tactile sensation with monofilament testing throughout the left foot.     Capillary refills findings on the right were normal in the toes.      Pulses:      2+ in the posterior tibialis on the right      2+ in the dorsalis pedis on the right.      Capillary refills findings on the left were normal in the toes.      Pulses:      1+ in the posterior tibialis on the left      1+ in the dorsalis pedis on the left.      Assign Risk Category: 2: Loss of protective sensation with or without weakness, deformity, callus, pre-ulcer, or history of ulceration. High risk.    Hyperkeratosis: present on both first toes,-- present on both first sub metatarsals,-- present on both third sub metatarsals-- and-- Severe profound moccasin tinea pedis bilateral.    Shoe Gear Evaluation: performed ().  Recommendation(s): SAS style.     Right Foot/Ankle   Right Foot Inspection        Sensory   Vibration: diminished  Proprioception: diminished  Monofilament testing: diminished     Vascular  Capillary refills: < 3 seconds              Left Foot/Ankle  Left Foot Inspection        Sensory   Vibration: diminished  Proprioception: diminished  Monofilament testing: diminished     Vascular  Capillary refills: < 3 seconds         Patient's shoes and socks removed.     Right Foot/Ankle   Right Foot Inspection        Leanne Schmid  Proprioception: diminished  Monofilament testing: intact     Vascular  Capillary refills: < 3 seconds  The right DP pulse is 1+. The right PT pulse is 1+.      Left Foot/Ankle  Left Foot Inspection        Sensory   Vibration: diminished  Proprioception: diminished  Monofilament testing: intact     Vascular  Capillary refills: < 3 seconds  The left DP pulse is 1+.  The left PT pulse is 1+.      Assign Risk Category  Deformity present  Loss of protective sensation  No weak pulses  Risk: 2

## 2023-07-25 DIAGNOSIS — E11.40 TYPE 2 DIABETES MELLITUS WITH DIABETIC NEUROPATHY, WITHOUT LONG-TERM CURRENT USE OF INSULIN (HCC): ICD-10-CM

## 2023-07-25 RX ORDER — INSULIN GLARGINE 100 [IU]/ML
INJECTION, SOLUTION SUBCUTANEOUS
Qty: 30 ML | Refills: 3 | Status: SHIPPED | OUTPATIENT
Start: 2023-07-25

## 2023-07-27 DIAGNOSIS — F41.8 DEPRESSION WITH ANXIETY: ICD-10-CM

## 2023-07-27 DIAGNOSIS — F33.1 MODERATE EPISODE OF RECURRENT MAJOR DEPRESSIVE DISORDER (HCC): ICD-10-CM

## 2023-07-27 DIAGNOSIS — I10 ESSENTIAL HYPERTENSION: ICD-10-CM

## 2023-07-27 DIAGNOSIS — E11.40 TYPE 2 DIABETES MELLITUS WITH DIABETIC NEUROPATHY, WITHOUT LONG-TERM CURRENT USE OF INSULIN (HCC): ICD-10-CM

## 2023-07-27 DIAGNOSIS — N40.0 BPH WITHOUT URINARY OBSTRUCTION: ICD-10-CM

## 2023-07-27 RX ORDER — GLIMEPIRIDE 4 MG/1
4 TABLET ORAL 2 TIMES DAILY
Qty: 180 TABLET | Refills: 3 | Status: SHIPPED | OUTPATIENT
Start: 2023-07-27

## 2023-07-27 RX ORDER — SERTRALINE HYDROCHLORIDE 100 MG/1
100 TABLET, FILM COATED ORAL DAILY
Qty: 90 TABLET | Refills: 3 | Status: SHIPPED | OUTPATIENT
Start: 2023-07-27

## 2023-07-27 RX ORDER — TAMSULOSIN HYDROCHLORIDE 0.4 MG/1
0.4 CAPSULE ORAL DAILY
Qty: 90 CAPSULE | Refills: 3 | Status: SHIPPED | OUTPATIENT
Start: 2023-07-27

## 2023-07-27 RX ORDER — BUPROPION HYDROCHLORIDE 150 MG/1
150 TABLET ORAL DAILY
Qty: 90 TABLET | Refills: 3 | Status: SHIPPED | OUTPATIENT
Start: 2023-07-27

## 2023-07-27 RX ORDER — ENALAPRIL MALEATE 20 MG/1
20 TABLET ORAL DAILY
Qty: 90 TABLET | Refills: 3 | Status: SHIPPED | OUTPATIENT
Start: 2023-07-27

## 2023-07-27 RX ORDER — CLONIDINE HYDROCHLORIDE 0.3 MG/1
0.3 TABLET ORAL 2 TIMES DAILY
Qty: 180 TABLET | Refills: 3 | Status: SHIPPED | OUTPATIENT
Start: 2023-07-27

## 2023-07-27 RX ORDER — AMLODIPINE BESYLATE 10 MG/1
10 TABLET ORAL DAILY
Qty: 90 TABLET | Refills: 3 | Status: SHIPPED | OUTPATIENT
Start: 2023-07-27

## 2023-07-29 ENCOUNTER — ANESTHESIA (OUTPATIENT)
Dept: GASTROENTEROLOGY | Facility: HOSPITAL | Age: 63
End: 2023-07-29

## 2023-07-29 ENCOUNTER — ANESTHESIA EVENT (OUTPATIENT)
Dept: GASTROENTEROLOGY | Facility: HOSPITAL | Age: 63
End: 2023-07-29

## 2023-07-29 ENCOUNTER — HOSPITAL ENCOUNTER (OUTPATIENT)
Dept: GASTROENTEROLOGY | Facility: HOSPITAL | Age: 63
Setting detail: OUTPATIENT SURGERY
Discharge: HOME | End: 2023-07-29
Payer: COMMERCIAL

## 2023-07-29 VITALS
SYSTOLIC BLOOD PRESSURE: 141 MMHG | DIASTOLIC BLOOD PRESSURE: 79 MMHG | BODY MASS INDEX: 38.22 KG/M2 | HEART RATE: 71 BPM | TEMPERATURE: 97.7 F | HEIGHT: 71 IN | WEIGHT: 273 LBS | OXYGEN SATURATION: 94 % | RESPIRATION RATE: 16 BRPM

## 2023-07-29 DIAGNOSIS — K62.5 BRBPR (BRIGHT RED BLOOD PER RECTUM): ICD-10-CM

## 2023-07-29 DIAGNOSIS — R19.4 CHANGE IN BOWEL HABITS: ICD-10-CM

## 2023-07-29 DIAGNOSIS — Z87.19 HISTORY OF DIVERTICULITIS: ICD-10-CM

## 2023-07-29 LAB
GLUCOSE SERPL-MCNC: 56 MG/DL (ref 65–140)
GLUCOSE SERPL-MCNC: 68 MG/DL (ref 65–140)

## 2023-07-29 PROCEDURE — 45378 DIAGNOSTIC COLONOSCOPY: CPT | Performed by: INTERNAL MEDICINE

## 2023-07-29 PROCEDURE — 82948 REAGENT STRIP/BLOOD GLUCOSE: CPT

## 2023-07-29 RX ORDER — LIDOCAINE HYDROCHLORIDE 20 MG/ML
INJECTION, SOLUTION EPIDURAL; INFILTRATION; INTRACAUDAL; PERINEURAL AS NEEDED
Status: DISCONTINUED | OUTPATIENT
Start: 2023-07-29 | End: 2023-07-29

## 2023-07-29 RX ORDER — DEXTROSE MONOHYDRATE 25 G/50ML
INJECTION, SOLUTION INTRAVENOUS AS NEEDED
Status: DISCONTINUED | OUTPATIENT
Start: 2023-07-29 | End: 2023-07-29

## 2023-07-29 RX ORDER — PROPOFOL 10 MG/ML
INJECTION, EMULSION INTRAVENOUS AS NEEDED
Status: DISCONTINUED | OUTPATIENT
Start: 2023-07-29 | End: 2023-07-29

## 2023-07-29 RX ORDER — SODIUM CHLORIDE 9 MG/ML
INJECTION, SOLUTION INTRAVENOUS CONTINUOUS PRN
Status: DISCONTINUED | OUTPATIENT
Start: 2023-07-29 | End: 2023-07-29

## 2023-07-29 RX ORDER — SODIUM CHLORIDE, SODIUM LACTATE, POTASSIUM CHLORIDE, CALCIUM CHLORIDE 600; 310; 30; 20 MG/100ML; MG/100ML; MG/100ML; MG/100ML
INJECTION, SOLUTION INTRAVENOUS CONTINUOUS PRN
Status: DISCONTINUED | OUTPATIENT
Start: 2023-07-29 | End: 2023-07-29

## 2023-07-29 RX ADMIN — SODIUM CHLORIDE, SODIUM LACTATE, POTASSIUM CHLORIDE, AND CALCIUM CHLORIDE: .6; .31; .03; .02 INJECTION, SOLUTION INTRAVENOUS at 08:09

## 2023-07-29 RX ADMIN — PROPOFOL 100 MG: 10 INJECTION, EMULSION INTRAVENOUS at 08:12

## 2023-07-29 RX ADMIN — PROPOFOL 20 MG: 10 INJECTION, EMULSION INTRAVENOUS at 08:25

## 2023-07-29 RX ADMIN — PROPOFOL 30 MG: 10 INJECTION, EMULSION INTRAVENOUS at 08:18

## 2023-07-29 RX ADMIN — LIDOCAINE HYDROCHLORIDE 100 MG: 20 INJECTION, SOLUTION EPIDURAL; INFILTRATION; INTRACAUDAL at 08:10

## 2023-07-29 RX ADMIN — PROPOFOL 20 MG: 10 INJECTION, EMULSION INTRAVENOUS at 08:22

## 2023-07-29 RX ADMIN — DEXTROSE MONOHYDRATE 25 ML: 25 INJECTION, SOLUTION INTRAVENOUS at 07:57

## 2023-07-29 NOTE — ANESTHESIA PREPROCEDURE EVALUATION
Procedure:  COLONOSCOPY    Relevant Problems   CARDIO   (+) Essential hypertension   (+) Mixed hyperlipidemia      ENDO   (+) Diabetes mellitus type 2 in obese (HCC)   (+) Type 2 diabetes mellitus with stage 3a chronic kidney disease and hypertension (HCC)   (+) Type 2 diabetes mellitus with stage 3a chronic kidney disease, with long-term current use of insulin (HCC)      /RENAL   (+) Bilateral renal cysts   (+) CKD (chronic kidney disease) stage 3   (+) Nephrolithiasis      MUSCULOSKELETAL   (+) Intractable lower back pain      NEURO/PSYCH   (+) Depression   (+) Diabetic polyneuropathy associated with type 2 diabetes mellitus (HCC)   (+) Intractable lower back pain      PULMONARY   (+) Obstructive sleep apnea      Stress test 06/14/2023:  •  A pharmacological stress test was performed using regadenoson. The patient after exercising for 3 min and 0 sec and had a maximal HR of 103 bpm (68 % of MPHR) and 1.0 METS. The patient experienced no angina during the test.  •  Stress ECG: The stress ECG is negative for ischemia after pharmacologic stress. •  Perfusion: There are no significant perfusion defects. •  Stress Function: Left ventricular function post-stress is low normal. Stress ejection fraction is 48 %. Physical Exam    Airway    Mallampati score: II  TM Distance: >3 FB  Neck ROM: full     Dental   No notable dental hx     Cardiovascular      Pulmonary      Other Findings        Anesthesia Plan  ASA Score- 3     Anesthesia Type- IV sedation with anesthesia with ASA Monitors. Additional Monitors:   Airway Plan:           Plan Factors-Exercise tolerance (METS): >4 METS. Chart reviewed. Patient summary reviewed. Induction- intravenous. Postoperative Plan-     Informed Consent- Anesthetic plan and risks discussed with patient.

## 2023-07-29 NOTE — ANESTHESIA POSTPROCEDURE EVALUATION
Post-Op Assessment Note    CV Status:  Stable    Pain management: adequate     Hydration Status:  Stable   Airway Patency:  Patent      Post Op Vitals Reviewed: Yes      Staff: Anesthesiologist         No notable events documented.     /69         Pulse  79   Resp   18   SpO2   94%

## 2023-07-29 NOTE — H&P
History and Physical - [facility]  Yamila Human 61 y.o. male MRN: [id number]        HPI: 27-year-old male with symptoms of intermittent rectal bleeding. He also reports having episodes of diarrhea.     Historical Information   Past Medical History:   Diagnosis Date   • Acute renal failure (ARF) ([address])     last assessed - 06Sep2017   • Anxiety 5/90   • Chest pain     last assessed - [id number]   • CPAP (continuous positive airway pressure) dependence    • Depression    • Diabetes mellitus ([address])    • Diverticulitis of colon 0/00   • Dyspnea on exertion     last assessed - 18Sep2017   • Hypertension    • Kidney stone    • Nephrolithiasis    • Numbness of right foot     numbness on the sole of the right foot; since he was young d/t stenosis   • Obesity    • Onychomycosis     last assessed - 26Aug2016   • JEFF on CPAP    • Sciatica     last assessed - [id number]   • Sleep apnea    • Urinary tract infection 9/2020     Past Surgical History:   Procedure Laterality Date   • CHOLECYSTECTOMY  8/2017   • COLONOSCOPY     • CYSTOSCOPY W/ LASER LITHOTRIPSY     • FL RETROGRADE PYELOGRAM  11/24/2022   • FL RETROGRADE PYELOGRAM  11/30/2022   • KNEE ARTHROSCOPY Left 11/2013   • AZ CYSTO BLADDER W/URETERAL CATHETERIZATION Right 11/24/2022    Procedure: CYSTOSCOPY RETROGRADE PYELOGRAM WITH INSERTION STENT URETERAL;  Surgeon: Thaddeus Heimlich, MD;  [facility];  Service: Urology   • AZ CYSTO/URETERO W/LITHOTRIPSY &INDWELL STENT INSRT Right 11/30/2022    Procedure: CYSTOSCOPY URETEROSCOPY WITH LITHOTRIPSY HOLMIUM LASER, RETROGRADE PYELOGRAM AND INSERTION STENT URETERAL, stone extraction;  Surgeon: Jane Arriaga MD;  [facility];  Service: Urology   • AZ LAPAROSCOPY SURG CHOLECYSTECTOMY N/A 02/08/2019    Procedure: ROBOTIC ASSISTED LAPAROSCOPIC CHOLECYSTECTOMY;  Surgeon: Shirley Kam DO;  [facility];  Service: General   • PROSTATE BIOPSY      Needle Biopsy - Negative     Social History   Social History     Substance and Sexual Activity   Alcohol Use Not Currently     Social History     Substance and Sexual Activity   Drug Use No     Social History     Tobacco Use   Smoking Status Some Days   • Types: Cigars   Smokeless Tobacco Never   Tobacco Comments    3 cigars per week     Family History   Adopted: Yes   Problem Relation Age of Onset   • Alzheimer's disease Mother    • Alcohol abuse Neg Hx    • Drug abuse Neg Hx    • Depression Neg Hx    • Substance Abuse Neg Hx    • Mental illness Neg Hx        Meds/Allergies     (Not in a hospital admission)      No Known Allergies    Objective     Blood pressure (!) 177/97, pulse 80, temperature 97.5 °F (36.4 °C), temperature source Temporal, resp. rate 18, SpO2 96 %.     PHYSICAL EXAM:    Gen: NAD  CV: S1 & S2 normal, RRR  CHEST: Clear to auscultate  ABD: soft, NT/ND, good bowel sounds  EXT: no edema    ASSESSMENT:     Rectal bleeding, episodes of diarrhea    PLAN:    Colonoscopy

## 2023-08-07 ENCOUNTER — OFFICE VISIT (OUTPATIENT)
Dept: CARDIOLOGY CLINIC | Facility: CLINIC | Age: 63
End: 2023-08-07
Payer: COMMERCIAL

## 2023-08-07 VITALS
RESPIRATION RATE: 20 BRPM | HEIGHT: 71 IN | WEIGHT: 283 LBS | HEART RATE: 73 BPM | DIASTOLIC BLOOD PRESSURE: 94 MMHG | BODY MASS INDEX: 39.62 KG/M2 | SYSTOLIC BLOOD PRESSURE: 156 MMHG | OXYGEN SATURATION: 98 %

## 2023-08-07 DIAGNOSIS — I49.3 PVC (PREMATURE VENTRICULAR CONTRACTION): ICD-10-CM

## 2023-08-07 DIAGNOSIS — N18.31 STAGE 3A CHRONIC KIDNEY DISEASE (HCC): ICD-10-CM

## 2023-08-07 DIAGNOSIS — I10 ESSENTIAL HYPERTENSION: ICD-10-CM

## 2023-08-07 DIAGNOSIS — E78.2 MIXED HYPERLIPIDEMIA: Primary | ICD-10-CM

## 2023-08-07 DIAGNOSIS — R06.09 DYSPNEA ON EXERTION: ICD-10-CM

## 2023-08-07 PROCEDURE — 99205 OFFICE O/P NEW HI 60 MIN: CPT | Performed by: INTERNAL MEDICINE

## 2023-08-07 RX ORDER — OMEGA-3S/DHA/EPA/FISH OIL/D3 300MG-1000
400 CAPSULE ORAL DAILY
COMMUNITY

## 2023-08-07 RX ORDER — CYANOCOBALAMIN (VITAMIN B-12) 500 MCG
TABLET ORAL
COMMUNITY

## 2023-08-07 NOTE — PATIENT INSTRUCTIONS
Echocardiogram planned to evaluate heart function and rule out significant valvular heart disease. Continue efforts at weight loss including diet modification, increased physical activity and avoidance of calorie dense foods. Mediterranean style plant based diet and calorie restriction will be beneficial.    Avoid cholesterol rich foods such as egg yolk, red meat, high fat dairy and fried food. Can consider Cardiac CT Calcium Score:  A CT calcium score exam, also known as a coronary calcium scan, is a quick, convenient and noninvasive way of evaluating the amount of calcified (hard) plaque in your heart vessels. The level of calcium equates to the extent of plaque build-up in your arteries. Plaque in the arteries can cause heart attacks. The radiologist reads the images and sends your cardiologist a report with a calcium score. Patients with higher scores have a greater risk for a heart attack, heart disease or stroke. Knowing your score can help us decide on blood pressure and cholesterol goals that will minimize your risk as much as possible. The [employer] found that Coronary artery calcification (CAC) is an excellent cardiovascular disease risk marker and can help guide the decision to use cholesterol reducing medications such as statins. A negative calcium score may reduce the need for statins in otherwise eligible patients. Knowing your score could save your life. The exam takes less than 10 minutes, is painless and does not require any IV or oral contrast. The exam is typically not covered by insurance.  The fee at [facility] is $99.

## 2023-08-07 NOTE — PROGRESS NOTES
[facility]  [address]  [address]  [facility] [address]  Phone#  [phone]  Fax#  [phone]  [facility] [place] Consultation             NAME: Osbaldo Breaux  AGE: 61 y.o. SEX: male   : 1960   MRN: [id number]    DATE: 2023  TIME: 8:40 AM    Cardiology Problem list:  Dyspnea:  Abn EKG: Old inferior infarct, poor R wave progression, PVC, sinus rhythm  : SPECT: EF 60, no ischemia, EF 48%  Frequent PVCs: Asymptomatic  HTN  DM  CKD IIIa    Assessment/plan:    Dyspnea  He has had chronic dyspnea with recent worsening over the spring. Reported a sedentary lifestyle due to being laid off from his job for about 8 months. Now back at work. Able to exercise further. Dyspnea has improved. Nuclear stress test showed no areas of reversible ischemia. EF was 48%. Frequent PVCs were seen on the prior EKG in . He is asymptomatic from the PVCs. Echocardiogram planned to evaluate heart function and rule out significant valvular heart disease. Also discussed calcium scoring for further risk stratification. Frequent PVCs  Prior EKG was abnormal with inferior Q waves and frequent PVCs. Currently has no PVCs on examination. Echo requested. If abnormal, obtain Holter monitor. HTN  BP Readings from Last 3 Encounters:   23 156/94   23 141/79   23 130/75   Blood pressure was elevated on arrival.  Currently on high dose of clonidine and amlodipine. He is not on a diuretic. He does have CKD. Continue current medications. Lifestyle modification. Low-sodium diet and regular exercise. Close blood pressure monitoring. CKD  Lab Results   Component Value Date    CREATININE 1.47 (H) 2023   Renal function has been stable. JEFF  On CPAP with good compliance. Obesity  Continue efforts at weight loss including diet modification, increased physical activity and avoidance of calorie dense foods.     Mediterranean style plant based diet and calorie restriction will be beneficial.    Avoid cholesterol rich foods such as egg yolk, red meat, high fat dairy and fried food. Diabetes with dyslipidemia  On statin therapy. Intolerant to multiple diabetic medications including Jardiance which caused him to have UTI/sepsis. Chief Complaint   Patient presents with   • Shortness of Breath     With activity       HPI:    Ernesto Musa is a 61y.o.-year-old male who presents to the cardiology clinic for initial consultation. He is here for evaluation of dyspnea and fatigue. He has had mild dyspnea over the last several years but over the last several months it has worsened and his functional capacity has gone down. He attributed this to his sedentary lifestyle and being out of work. He had no exertional chest pain, diaphoresis or syncope. He has no family history of premature coronary artery disease. He occasionally smokes cigars but no cigarettes. No drug use. Denies any alcohol use. He is more active now. He is able to mow his yard without having any chest pain or shortness of breath. He is intolerant to multiple oral diabetic medications. Jardiance caused him to have UTI/sepsis. No recent cardiac hospitalizations. His weight has trended up and has been a constant struggle. He underwent a recent nuclear stress test which showed no reversible ischemia. Prior EKG showed frequent PVCs. He is asymptomatic from the PVCs. Stress test also showed PACs and PVCs. This was a pharmacologic stress test.    Past history, family history, social history, current medications, vital signs, recent lab and imaging studies and  prior cardiology studies reviewed independently on this visit.   Wt Readings from Last 3 Encounters:   08/07/23 128 kg (283 lb)   07/29/23 124 kg (273 lb)   07/18/23 128 kg (282 lb)     Pulse Readings from Last 3 Encounters:   08/07/23 73   07/29/23 71   06/28/23 80     BP Readings from Last 3 Encounters:   08/07/23 156/94   07/29/23 141/79   07/18/23 130/75       No Known Allergies    Current Outpatient Medications:   •  amLODIPine (NORVASC) 10 mg tablet, TAKE 1 TABLET DAILY, Disp: 90 tablet, Rfl: 3  •  atorvastatin (LIPITOR) 10 mg tablet, TAKE 1 TABLET DAILY, Disp: 90 tablet, Rfl: 1  •  Basaglar KwikPen 100 units/mL SOPN, INJECT 34 UNITS UNDER THE SKIN DAILY, Disp: 30 mL, Rfl: 3  •  buPROPion (WELLBUTRIN XL) 150 mg 24 hr tablet, TAKE 1 TABLET DAILY, Disp: 90 tablet, Rfl: 3  •  cholecalciferol (VITAMIN D3) 400 units tablet, Take 400 Units by mouth daily, Disp: , Rfl:   •  cloNIDine (CATAPRES) 0.3 mg tablet, TAKE 1 TABLET TWICE A DAY, Disp: 180 tablet, Rfl: 3  •  Cyanocobalamin (Vitamin B 12) 500 MCG TABS, Take by mouth, Disp: , Rfl:   •  dulaglutide (Trulicity) 4.5 TX/5.6BH injection, Inject 0.5 mL (4.5 mg total) under the skin once a week, Disp: 6 mL, Rfl: 1  •  enalapril (VASOTEC) 20 mg tablet, TAKE 1 TABLET DAILY, Disp: 90 tablet, Rfl: 3  •  fluticasone (FLONASE) 50 mcg/act nasal spray, 1 spray into each nostril daily, Disp: 48 g, Rfl: 1  •  glimepiride (AMARYL) 4 mg tablet, TAKE 1 TABLET TWICE A DAY, Disp: 180 tablet, Rfl: 3  •  NON FORMULARY, 250 mg 3 (three) times a day Ox bile, Disp: , Rfl:   •  nystatin (MYCOSTATIN) powder, Apply topically 3 (three) times a day as needed (groin rash), Disp: 60 g, Rfl: 1  •  polyethylene glycol (GOLYTELY) 4000 mL solution, Take 4,000 mL by mouth once for 1 dose, Disp: 4000 mL, Rfl: 0  •  sertraline (ZOLOFT) 100 mg tablet, TAKE 1 TABLET DAILY, Disp: 90 tablet, Rfl: 3  •  tamsulosin (FLOMAX) 0.4 mg, TAKE 1 CAPSULE DAILY, Disp: 90 capsule, Rfl: 3  •  BD Pen Needle Alicia 2nd Gen 32G X 4 MM MISC, INJECT UNDER THE SKIN DAILY, Disp: 90 each, Rfl: 3  •  bisacodyl (DULCOLAX) 5 mg EC tablet, Take 2 tablets (10 mg total) by mouth once for 1 dose, Disp: 2 tablet, Rfl: 0  •  docusate sodium (COLACE) 100 mg capsule, Take 1 capsule (100 mg total) by mouth 2 (two) times a day as needed for constipation (Patient not taking: Reported on 8/7/2023), Disp: , Rfl: 0    Past Medical History:   Diagnosis Date   • Acute renal failure (ARF) ([address])     last assessed - 06Sep2017   • Anxiety 5/90   • Chest pain     last assessed - [id number]   • CPAP (continuous positive airway pressure) dependence    • Depression    • Diabetes mellitus ([address])    • Diverticulitis of colon 0/00   • Dyspnea on exertion     last assessed - 18Sep2017   • Hypertension    • Kidney stone    • Nephrolithiasis    • Numbness of right foot     numbness on the sole of the right foot; since he was young d/t stenosis   • Obesity    • Onychomycosis     last assessed - [id number]   • JEFF on CPAP    • Sciatica     last assessed - [id number]   • Sleep apnea    • Urinary tract infection 9/2020     Past Surgical History:   Procedure Laterality Date   • CHOLECYSTECTOMY  8/2017   • COLONOSCOPY     • CYSTOSCOPY W/ LASER LITHOTRIPSY     • FL RETROGRADE PYELOGRAM  11/24/2022   • FL RETROGRADE PYELOGRAM  11/30/2022   • KNEE ARTHROSCOPY Left 11/2013   • OH CYSTO BLADDER W/URETERAL CATHETERIZATION Right 11/24/2022    Procedure: CYSTOSCOPY RETROGRADE PYELOGRAM WITH INSERTION STENT URETERAL;  Surgeon: Ilya Armstrong MD;  Location: [facility];  Service: Urology   • OH CYSTO/URETERO W/LITHOTRIPSY &INDWELL STENT INSRT Right 11/30/2022    Procedure: CYSTOSCOPY URETEROSCOPY WITH LITHOTRIPSY HOLMIUM LASER, RETROGRADE PYELOGRAM AND INSERTION STENT URETERAL, stone extraction;  Surgeon: Emmanuel Beck MD;  Location: [facility];  Service: Urology   • OH LAPAROSCOPY SURG CHOLECYSTECTOMY N/A 02/08/2019    Procedure: ROBOTIC ASSISTED LAPAROSCOPIC CHOLECYSTECTOMY;  Surgeon: Zachariah Murphy DO;  Location: [facility];  Service: General   • PROSTATE BIOPSY      Needle Biopsy - Negative     Family History   Adopted: Yes   Problem Relation Age of Onset   • Alzheimer's disease Mother    • Alcohol abuse Neg Hx    • Drug abuse Neg Hx    • Depression Neg Hx    • Substance Abuse Neg Hx    • Mental illness Neg Hx      Social History   reports that he has been smoking cigars. He has never used smokeless tobacco. He reports that he does not currently use alcohol. He reports that he does not use drugs. Review of Systems   Constitutional: Positive for fatigue and unexpected weight change. Negative for fever. Respiratory: Positive for apnea and shortness of breath. Negative for chest tightness and wheezing. Cardiovascular: Negative for chest pain, palpitations and leg swelling. Endocrine: Negative. Musculoskeletal: Positive for arthralgias. Skin: Negative for rash. Allergic/Immunologic: Negative. Neurological: Negative for syncope. Hematological: Does not bruise/bleed easily. All other systems reviewed and are negative. Objective:     Vitals:    08/07/23 0810   BP: 156/94   Pulse: 73   Resp: 20   SpO2: 98%     Physical Exam  Vitals reviewed. HENT:      Head: Normocephalic. Eyes:      General: No scleral icterus. Neck:      Vascular: No carotid bruit. Cardiovascular:      Rate and Rhythm: Normal rate and regular rhythm. Heart sounds: No murmur heard. Pulmonary:      Breath sounds: No wheezing or rhonchi. Musculoskeletal:      Right lower leg: No edema. Left lower leg: No edema. Skin:     General: Skin is warm. Neurological:      General: No focal deficit present. Mental Status: He is alert.    Psychiatric:         Mood and Affect: Mood normal.         Pertinent Laboratory/Diagnostic Studies:    Laboratory studies reviewed personally by Melissa Lombardi MD    BMP:   Lab Results   Component Value Date    SODIUM 137 05/28/2023    K 4.2 05/28/2023     05/28/2023    CO2 24 05/28/2023    BUN 27 (H) 05/28/2023    CREATININE 1.47 (H) 05/28/2023    GLUC 132 12/18/2022    CALCIUM 9.1 05/28/2023     CBC:  Lab Results   Component Value Date    WBC 7.26 02/25/2023    RBC 4.90 02/25/2023    HGB 15.1 02/25/2023    HCT 46.0 02/25/2023    MCV 94 02/25/2023    MCH 30.8 02/25/2023    RDW 12.9 02/25/2023     02/25/2023     Coags:    Lipid Profile:   Lab Results   Component Value Date    CHOL 181 02/01/2014     Lab Results   Component Value Date    HDL 35 (L) 02/25/2023     Lab Results   Component Value Date    LDLCALC 63 02/25/2023     Lab Results   Component Value Date    TRIG 118 02/25/2023      Other labs:  Lab Results   Component Value Date    [id number] 0.505 05/28/2023     Lab Results   Component Value Date    ALT 15 05/28/2023    AST 13 05/28/2023       Imaging Studies:     Pertinent cardiac studies and imaging studies were personally reviewed on this visit and results summarized. Visit diagnoses:  1. Mixed hyperlipidemia        2. Essential hypertension  Ambulatory Referral to Cardiology      3. Dyspnea on exertion  Ambulatory Referral to Cardiology    Echo complete w/ contrast if indicated      4. Stage 3a chronic kidney disease ([address])        5. PVC (premature ventricular contraction)            Portions of the record may have been created with voice recognition software. Occasional wrong word or "sound alike" substitutions may have occurred due to the inherent limitations of voice recognition software. Read the chart carefully and recognize, using context, where substitutions have occurred. Please reach out to me directly for any clarifications.

## 2023-08-07 NOTE — LETTER
2023     Johanna Terrazas MD  [facility]  [address] [address]    Patient: Osbaldo Breaux   YOB: 1960   Date of Visit: 2023       Dear Dr. Toshia Oviedo: Thank you for referring Demond Cedeño to me for evaluation. Below are my notes for this consultation. If you have questions, please do not hesitate to call me. I look forward to following your patient along with you. Sincerely,        Ryanne Humphreys MD        CC: No Recipients    Ryanne Humphreys MD  2023  8:40 AM  [facility] [address] [address]  Phone#  [phone]  Fax#  [phone]  [facility] Cardiology Office Consultation             NAME: Osbaldo Breaux  AGE: 61 y.o. SEX: male   : 1960   MRN: [id number]    DATE: 2023  TIME: 8:40 AM    Cardiology Problem list:  Dyspnea:  Abn EKG: Old inferior infarct, poor R wave progression, PVC, sinus rhythm  : SPECT: EF 60, no ischemia, EF 48%  Frequent PVCs: Asymptomatic  HTN  DM  CKD IIIa    Assessment/plan:    Dyspnea  He has had chronic dyspnea with recent worsening over the spring. Reported a sedentary lifestyle due to being laid off from his job for about 8 months. Now back at work. Able to exercise further. Dyspnea has improved. Nuclear stress test showed no areas of reversible ischemia. EF was 48%. Frequent PVCs were seen on the prior EKG in . He is asymptomatic from the PVCs. Echocardiogram planned to evaluate heart function and rule out significant valvular heart disease. Also discussed calcium scoring for further risk stratification. Frequent PVCs  Prior EKG was abnormal with inferior Q waves and frequent PVCs. Currently has no PVCs on examination. Echo requested. If abnormal, obtain Holter monitor.     HTN  BP Readings from Last 3 Encounters:   23 156/94   23 141/79   23 130/75   Blood pressure was elevated on arrival.  Currently on high dose of clonidine and amlodipine. He is not on a diuretic. He does have CKD. Continue current medications. Lifestyle modification. Low-sodium diet and regular exercise. Close blood pressure monitoring. CKD  Lab Results   Component Value Date    CREATININE 1.47 (H) 05/28/2023   Renal function has been stable. JEFF  On CPAP with good compliance. Obesity  Continue efforts at weight loss including diet modification, increased physical activity and avoidance of calorie dense foods. Mediterranean style plant based diet and calorie restriction will be beneficial.    Avoid cholesterol rich foods such as egg yolk, red meat, high fat dairy and fried food. Diabetes with dyslipidemia  On statin therapy. Intolerant to multiple diabetic medications including Jardiance which caused him to have UTI/sepsis. Chief Complaint   Patient presents with   • Shortness of Breath     With activity       HPI:    Kim Maynard is a 61y.o.-year-old male who presents to the cardiology clinic for initial consultation. He is here for evaluation of dyspnea and fatigue. He has had mild dyspnea over the last several years but over the last several months it has worsened and his functional capacity has gone down. He attributed this to his sedentary lifestyle and being out of work. He had no exertional chest pain, diaphoresis or syncope. He has no family history of premature coronary artery disease. He occasionally smokes cigars but no cigarettes. No drug use. Denies any alcohol use. He is more active now. He is able to mow his yard without having any chest pain or shortness of breath. He is intolerant to multiple oral diabetic medications. Jardiance caused him to have UTI/sepsis. No recent cardiac hospitalizations. His weight has trended up and has been a constant struggle. He underwent a recent nuclear stress test which showed no reversible ischemia.   Prior EKG showed frequent PVCs.  He is asymptomatic from the PVCs. Stress test also showed PACs and PVCs. This was a pharmacologic stress test.    Past history, family history, social history, current medications, vital signs, recent lab and imaging studies and  prior cardiology studies reviewed independently on this visit.   Wt Readings from Last 3 Encounters:   08/07/23 128 kg (283 lb)   07/29/23 124 kg (273 lb)   07/18/23 128 kg (282 lb)     Pulse Readings from Last 3 Encounters:   08/07/23 73   07/29/23 71   06/28/23 80     BP Readings from Last 3 Encounters:   08/07/23 156/94   07/29/23 141/79   07/18/23 130/75       No Known Allergies    Current Outpatient Medications:   •  amLODIPine (NORVASC) 10 mg tablet, TAKE 1 TABLET DAILY, Disp: 90 tablet, Rfl: 3  •  atorvastatin (LIPITOR) 10 mg tablet, TAKE 1 TABLET DAILY, Disp: 90 tablet, Rfl: 1  •  Basaglar KwikPen 100 units/mL SOPN, INJECT 34 UNITS UNDER THE SKIN DAILY, Disp: 30 mL, Rfl: 3  •  buPROPion (WELLBUTRIN XL) 150 mg 24 hr tablet, TAKE 1 TABLET DAILY, Disp: 90 tablet, Rfl: 3  •  cholecalciferol (VITAMIN D3) 400 units tablet, Take 400 Units by mouth daily, Disp: , Rfl:   •  cloNIDine (CATAPRES) 0.3 mg tablet, TAKE 1 TABLET TWICE A DAY, Disp: 180 tablet, Rfl: 3  •  Cyanocobalamin (Vitamin B 12) 500 MCG TABS, Take by mouth, Disp: , Rfl:   •  dulaglutide (Trulicity) 4.5 REI/6.6DT injection, Inject 0.5 mL (4.5 mg total) under the skin once a week, Disp: 6 mL, Rfl: 1  •  enalapril (VASOTEC) 20 mg tablet, TAKE 1 TABLET DAILY, Disp: 90 tablet, Rfl: 3  •  fluticasone (FLONASE) 50 mcg/act nasal spray, 1 spray into each nostril daily, Disp: 48 g, Rfl: 1  •  glimepiride (AMARYL) 4 mg tablet, TAKE 1 TABLET TWICE A DAY, Disp: 180 tablet, Rfl: 3  •  NON FORMULARY, 250 mg 3 (three) times a day Ox bile, Disp: , Rfl:   •  nystatin (MYCOSTATIN) powder, Apply topically 3 (three) times a day as needed (groin rash), Disp: 60 g, Rfl: 1  •  polyethylene glycol (GOLYTELY) 4000 mL solution, Take 4,000 mL by mouth once for 1 dose, Disp: 4000 mL, Rfl: 0  •  sertraline (ZOLOFT) 100 mg tablet, TAKE 1 TABLET DAILY, Disp: 90 tablet, Rfl: 3  •  tamsulosin (FLOMAX) 0.4 mg, TAKE 1 CAPSULE DAILY, Disp: 90 capsule, Rfl: 3  •  BD Pen Needle Alicia 2nd Gen 32G X 4 MM MISC, INJECT UNDER THE SKIN DAILY, Disp: 90 each, Rfl: 3  •  bisacodyl (DULCOLAX) 5 mg EC tablet, Take 2 tablets (10 mg total) by mouth once for 1 dose, Disp: 2 tablet, Rfl: 0  •  docusate sodium (COLACE) 100 mg capsule, Take 1 capsule (100 mg total) by mouth 2 (two) times a day as needed for constipation (Patient not taking: Reported on 8/7/2023), Disp: , Rfl: 0    Past Medical History:   Diagnosis Date   • Acute renal failure (ARF) ([address])     last assessed - [id number]   • Anxiety 5/90   • Chest pain     last assessed - [id number]   • CPAP (continuous positive airway pressure) dependence    • Depression    • Diabetes mellitus (HCC)    • Diverticulitis of colon 0/00   • Dyspnea on exertion     last assessed - [id number]   • Hypertension    • Kidney stone    • Nephrolithiasis    • Numbness of right foot     numbness on the sole of the right foot; since he was young d/t stenosis   • Obesity    • Onychomycosis     last assessed - [id number]   • JEFF on CPAP    • Sciatica     last assessed - [id number]   • Sleep apnea    • Urinary tract infection 9/2020     Past Surgical History:   Procedure Laterality Date   • CHOLECYSTECTOMY  8/2017   • COLONOSCOPY     • CYSTOSCOPY W/ LASER LITHOTRIPSY     • FL RETROGRADE PYELOGRAM  11/24/2022   • FL RETROGRADE PYELOGRAM  11/30/2022   • KNEE ARTHROSCOPY Left 11/2013   • MN CYSTO BLADDER W/URETERAL CATHETERIZATION Right 11/24/2022    Procedure: CYSTOSCOPY RETROGRADE PYELOGRAM WITH INSERTION STENT URETERAL;  Surgeon: Torri Chapman MD;  [facility];  Service: Urology   • MN CYSTO/URETERO W/LITHOTRIPSY &INDWELL STENT INSRT Right 11/30/2022    Procedure: CYSTOSCOPY URETEROSCOPY WITH LITHOTRIPSY HOLMIUM LASER, RETROGRADE PYELOGRAM AND INSERTION STENT URETERAL, stone extraction;  Surgeon: Pako Villalobos MD;  [facility];  Service: Urology   • MD LAPAROSCOPY SURG CHOLECYSTECTOMY N/A 02/08/2019    Procedure: ROBOTIC ASSISTED LAPAROSCOPIC CHOLECYSTECTOMY;  Surgeon: Sinclair Lanes, DO;  [facility];  Service: General   • PROSTATE BIOPSY      Needle Biopsy - Negative     Family History   Adopted: Yes   Problem Relation Age of Onset   • Alzheimer's disease Mother    • Alcohol abuse Neg Hx    • Drug abuse Neg Hx    • Depression Neg Hx    • Substance Abuse Neg Hx    • Mental illness Neg Hx      Social History   reports that he has been smoking cigars. He has never used smokeless tobacco. He reports that he does not currently use alcohol. He reports that he does not use drugs. Review of Systems   Constitutional: Positive for fatigue and unexpected weight change. Negative for fever. Respiratory: Positive for apnea and shortness of breath. Negative for chest tightness and wheezing. Cardiovascular: Negative for chest pain, palpitations and leg swelling. Endocrine: Negative. Musculoskeletal: Positive for arthralgias. Skin: Negative for rash. Allergic/Immunologic: Negative. Neurological: Negative for syncope. Hematological: Does not bruise/bleed easily. All other systems reviewed and are negative. Objective:     Vitals:    08/07/23 0810   BP: 156/94   Pulse: 73   Resp: 20   SpO2: 98%     Physical Exam  Vitals reviewed. HENT:      Head: Normocephalic. Eyes:      General: No scleral icterus. Neck:      Vascular: No carotid bruit. Cardiovascular:      Rate and Rhythm: Normal rate and regular rhythm. Heart sounds: No murmur heard. Pulmonary:      Breath sounds: No wheezing or rhonchi. Musculoskeletal:      Right lower leg: No edema. Left lower leg: No edema. Skin:     General: Skin is warm. Neurological:      General: No focal deficit present. Mental Status: He is alert.    Psychiatric: Mood and Affect: Mood normal.         Pertinent Laboratory/Diagnostic Studies:    Laboratory studies reviewed personally by Scot Rico MD    BMP:   Lab Results   Component Value Date    SODIUM 137 05/28/2023    K 4.2 05/28/2023     05/28/2023    CO2 24 05/28/2023    BUN 27 (H) 05/28/2023    CREATININE 1.47 (H) 05/28/2023    GLUC 132 12/18/2022    CALCIUM 9.1 05/28/2023     CBC:  Lab Results   Component Value Date    WBC 7.26 02/25/2023    RBC 4.90 02/25/2023    HGB 15.1 02/25/2023    HCT 46.0 02/25/2023    MCV 94 02/25/2023    MCH 30.8 02/25/2023    RDW 12.9 02/25/2023     02/25/2023     Coags:    Lipid Profile:   Lab Results   Component Value Date    CHOL 181 02/01/2014     Lab Results   Component Value Date    HDL 35 (L) 02/25/2023     Lab Results   Component Value Date    LDLCALC 63 02/25/2023     Lab Results   Component Value Date    TRIG 118 02/25/2023      Other labs:  Lab Results   Component Value Date    [id number] 0.505 05/28/2023     Lab Results   Component Value Date    ALT 15 05/28/2023    AST 13 05/28/2023       Imaging Studies:     Pertinent cardiac studies and imaging studies were personally reviewed on this visit and results summarized. Visit diagnoses:  1. Mixed hyperlipidemia        2. Essential hypertension  Ambulatory Referral to Cardiology      3. Dyspnea on exertion  Ambulatory Referral to Cardiology    Echo complete w/ contrast if indicated      4. Stage 3a chronic kidney disease ([address])        5. PVC (premature ventricular contraction)            Portions of the record may have been created with voice recognition software. Occasional wrong word or "sound alike" substitutions may have occurred due to the inherent limitations of voice recognition software. Read the chart carefully and recognize, using context, where substitutions have occurred. Please reach out to me directly for any clarifications.     Scot Rico MD  8/7/2023  8:34 AM  Sign when Signing Visit  [facility] [address]  [address]  [facility] [address]  Phone#  [phone]  Fax#  [phone]  [facility] Cardiology Office Consultation             NAME: Yamila Merida  AGE: 61 y.o. SEX: male   : 1960   MRN: [id number]    DATE: 2023  TIME: 8:17 AM    Cardiology Problem list:  HTN  DM  CKD IIIa    Assessment/plan:    Dyspnea  Echocardiogram planned to evaluate heart function and rule out significant valvular heart disease. HTN  BP Readings from Last 3 Encounters:   23 156/94   23 141/79   23 130/75   Continue current medications. Lifestyle modification. Close blood pressure monitoring. CKD  Lab Results   Component Value Date    CREATININE 1.47 (H) 2023     JEFF  On CPAP    Obesity  Continue efforts at weight loss including diet modification, increased physical activity and avoidance of calorie dense foods. Mediterranean style plant based diet and calorie restriction will be beneficial.    Avoid cholesterol rich foods such as egg yolk, red meat, high fat dairy and fried food. Chief Complaint   Patient presents with   • Shortness of Breath     With activity       HPI:    Yamila Merida is a 61y.o.-year-old male who presents to the cardiology clinic for initial consultation. Past history, family history, social history, current medications, vital signs, recent lab and imaging studies and  prior cardiology studies reviewed independently on this visit.   Wt Readings from Last 3 Encounters:   23 128 kg (283 lb)   23 124 kg (273 lb)   23 128 kg (282 lb)     Pulse Readings from Last 3 Encounters:   23 73   23 71   23 80     BP Readings from Last 3 Encounters:   23 156/94   23 141/79   23 130/75       No Known Allergies    Current Outpatient Medications:   •  amLODIPine (NORVASC) 10 mg tablet, TAKE 1 TABLET DAILY, Disp: 90 tablet, Rfl: 3  •  atorvastatin (LIPITOR) 10 mg tablet, TAKE 1 TABLET DAILY, Disp: 90 tablet, Rfl: 1  •  Basaglar KwikPen 100 units/mL SOPN, INJECT 34 UNITS UNDER THE SKIN DAILY, Disp: 30 mL, Rfl: 3  •  buPROPion (WELLBUTRIN XL) 150 mg 24 hr tablet, TAKE 1 TABLET DAILY, Disp: 90 tablet, Rfl: 3  •  cholecalciferol (VITAMIN D3) 400 units tablet, Take 400 Units by mouth daily, Disp: , Rfl:   •  cloNIDine (CATAPRES) 0.3 mg tablet, TAKE 1 TABLET TWICE A DAY, Disp: 180 tablet, Rfl: 3  •  Cyanocobalamin (Vitamin B 12) 500 MCG TABS, Take by mouth, Disp: , Rfl:   •  dulaglutide (Trulicity) 4.5 NY/6.2JY injection, Inject 0.5 mL (4.5 mg total) under the skin once a week, Disp: 6 mL, Rfl: 1  •  enalapril (VASOTEC) 20 mg tablet, TAKE 1 TABLET DAILY, Disp: 90 tablet, Rfl: 3  •  fluticasone (FLONASE) 50 mcg/act nasal spray, 1 spray into each nostril daily, Disp: 48 g, Rfl: 1  •  glimepiride (AMARYL) 4 mg tablet, TAKE 1 TABLET TWICE A DAY, Disp: 180 tablet, Rfl: 3  •  NON FORMULARY, 250 mg 3 (three) times a day Ox bile, Disp: , Rfl:   •  nystatin (MYCOSTATIN) powder, Apply topically 3 (three) times a day as needed (groin rash), Disp: 60 g, Rfl: 1  •  polyethylene glycol (GOLYTELY) 4000 mL solution, Take 4,000 mL by mouth once for 1 dose, Disp: 4000 mL, Rfl: 0  •  sertraline (ZOLOFT) 100 mg tablet, TAKE 1 TABLET DAILY, Disp: 90 tablet, Rfl: 3  •  tamsulosin (FLOMAX) 0.4 mg, TAKE 1 CAPSULE DAILY, Disp: 90 capsule, Rfl: 3  •  BD Pen Needle Alicia 2nd Gen 32G X 4 MM MISC, INJECT UNDER THE SKIN DAILY, Disp: 90 each, Rfl: 3  •  bisacodyl (DULCOLAX) 5 mg EC tablet, Take 2 tablets (10 mg total) by mouth once for 1 dose, Disp: 2 tablet, Rfl: 0  •  docusate sodium (COLACE) 100 mg capsule, Take 1 capsule (100 mg total) by mouth 2 (two) times a day as needed for constipation (Patient not taking: Reported on 8/7/2023), Disp: , Rfl: 0    Past Medical History:   Diagnosis Date   • Acute renal failure (ARF) ([address])     last assessed - [id number]   • Anxiety 5/90   • Chest pain     last assessed - [id number]   • CPAP (continuous positive airway pressure) dependence    • Depression    • Diabetes mellitus ([address])    • Diverticulitis of colon 0/00   • Dyspnea on exertion     last assessed - [id number]   • Hypertension    • Kidney stone    • Nephrolithiasis    • Numbness of right foot     numbness on the sole of the right foot; since he was young d/t stenosis   • Obesity    • Onychomycosis     last assessed - [id number]   • JEFF on CPAP    • Sciatica     last assessed - [id number]   • Sleep apnea    • Urinary tract infection 9/2020     Past Surgical History:   Procedure Laterality Date   • CHOLECYSTECTOMY  8/2017   • COLONOSCOPY     • CYSTOSCOPY W/ LASER LITHOTRIPSY     • FL RETROGRADE PYELOGRAM  11/24/2022   • FL RETROGRADE PYELOGRAM  11/30/2022   • KNEE ARTHROSCOPY Left 11/2013   • RI CYSTO BLADDER W/URETERAL CATHETERIZATION Right 11/24/2022    Procedure: CYSTOSCOPY RETROGRADE PYELOGRAM WITH INSERTION STENT URETERAL;  Surgeon: Casandra Gamboa MD;  [facility];  Service: Urology   • RI CYSTO/URETERO W/LITHOTRIPSY &INDWELL STENT INSRT Right 11/30/2022    Procedure: CYSTOSCOPY URETEROSCOPY WITH LITHOTRIPSY HOLMIUM LASER, RETROGRADE PYELOGRAM AND INSERTION STENT URETERAL, stone extraction;  Surgeon: My Jones MD;  [facility];  Service: Urology   • RI LAPAROSCOPY SURG CHOLECYSTECTOMY N/A 02/08/2019    Procedure: ROBOTIC ASSISTED LAPAROSCOPIC CHOLECYSTECTOMY;  Surgeon: Sandrea Severs, DO;  [facility];  Service: General   • PROSTATE BIOPSY      Needle Biopsy - Negative     Family History   Adopted: Yes   Problem Relation Age of Onset   • Alzheimer's disease Mother    • Alcohol abuse Neg Hx    • Drug abuse Neg Hx    • Depression Neg Hx    • Substance Abuse Neg Hx    • Mental illness Neg Hx      Social History   reports that he has been smoking cigars. He has never used smokeless tobacco. He reports that he does not currently use alcohol. He reports that he does not use drugs.      Review of Systems   Constitutional: Positive for fatigue. Negative for fever. Respiratory: Positive for shortness of breath. Negative for chest tightness and wheezing. Cardiovascular: Negative for chest pain, palpitations and leg swelling. Skin: Negative for rash. Neurological: Negative for syncope. Hematological: Does not bruise/bleed easily. Objective:     Vitals:    08/07/23 0810   BP: 156/94   Pulse: 73   Resp: 20   SpO2: 98%     Physical Exam  Vitals reviewed. HENT:      Head: Normocephalic. Eyes:      General: No scleral icterus. Neck:      Vascular: No carotid bruit. Cardiovascular:      Rate and Rhythm: Normal rate and regular rhythm. Heart sounds: No murmur heard. Pulmonary:      Breath sounds: No wheezing or rhonchi. Musculoskeletal:      Right lower leg: No edema. Left lower leg: No edema. Skin:     General: Skin is warm. Neurological:      General: No focal deficit present. Mental Status: He is alert.    Psychiatric:         Mood and Affect: Mood normal.         Pertinent Laboratory/Diagnostic Studies:    Laboratory studies reviewed personally by Micky Nichols MD    BMP:   Lab Results   Component Value Date    SODIUM 137 05/28/2023    K 4.2 05/28/2023     05/28/2023    CO2 24 05/28/2023    BUN 27 (H) 05/28/2023    CREATININE 1.47 (H) 05/28/2023    GLUC 132 12/18/2022    CALCIUM 9.1 05/28/2023     CBC:  Lab Results   Component Value Date    WBC 7.26 02/25/2023    RBC 4.90 02/25/2023    HGB 15.1 02/25/2023    HCT 46.0 02/25/2023    MCV 94 02/25/2023    MCH 30.8 02/25/2023    RDW 12.9 02/25/2023     02/25/2023     Coags:    Lipid Profile:   Lab Results   Component Value Date    CHOL 181 02/01/2014     Lab Results   Component Value Date    HDL 35 (L) 02/25/2023     Lab Results   Component Value Date    LDLCALC 63 02/25/2023     Lab Results   Component Value Date    TRIG 118 02/25/2023      Other labs:  Lab Results   Component Value Date    [id number] 0.505 05/28/2023     Lab Results   Component Value Date    ALT 15 05/28/2023    AST 13 05/28/2023       Imaging Studies:     Pertinent cardiac studies and imaging studies were personally reviewed on this visit and results summarized. Visit diagnoses:  1. Essential hypertension  Ambulatory Referral to Cardiology      2. Dyspnea on exertion  Ambulatory Referral to Cardiology          Portions of the record may have been created with voice recognition software. Occasional wrong word or "sound alike" substitutions may have occurred due to the inherent limitations of voice recognition software. Read the chart carefully and recognize, using context, where substitutions have occurred. Please reach out to me directly for any clarifications.

## 2023-09-07 ENCOUNTER — HOSPITAL ENCOUNTER (OUTPATIENT)
Dept: NON INVASIVE DIAGNOSTICS | Facility: CLINIC | Age: 63
Discharge: HOME | End: 2023-09-07
Payer: COMMERCIAL

## 2023-09-07 VITALS
HEIGHT: 71 IN | SYSTOLIC BLOOD PRESSURE: 156 MMHG | DIASTOLIC BLOOD PRESSURE: 94 MMHG | WEIGHT: 283 LBS | HEART RATE: 73 BPM | BODY MASS INDEX: 39.62 KG/M2

## 2023-09-07 DIAGNOSIS — R06.09 DYSPNEA ON EXERTION: ICD-10-CM

## 2023-09-07 LAB
AORTIC ROOT: 3.3 CM
APICAL FOUR CHAMBER EJECTION FRACTION: 59 %
ASCENDING AORTA: 3.4 CM
AV LVOT MEAN GRADIENT: 1 MMHG
AV LVOT PEAK GRADIENT: 3 MMHG
AV PEAK GRADIENT: 9 MMHG
DOP CALC LVOT PEAK VEL VTI: 19.07 CM
DOP CALC LVOT PEAK VEL: 0.79 M/S
E WAVE DECELERATION TIME: 167 MS
FRACTIONAL SHORTENING: 33 (ref 28–44)
INTERVENTRICULAR SEPTUM IN DIASTOLE (PARASTERNAL SHORT AXIS VIEW): 1.3 CM
INTERVENTRICULAR SEPTUM: 1.3 CM (ref 0.6–1.1)
LAAS-AP2: 17.3 CM2
LAAS-AP4: 17.6 CM2
LEFT ATRIUM AREA SYSTOLE SINGLE PLANE A4C: 17.2 CM2
LEFT ATRIUM SIZE: 3.8 CM
LEFT ATRIUM VOLUME (MOD BIPLANE): 47 ML
LEFT INTERNAL DIMENSION IN SYSTOLE: 3 CM (ref 2.1–4)
LEFT VENTRICULAR INTERNAL DIMENSION IN DIASTOLE: 4.5 CM (ref 3.5–6)
LEFT VENTRICULAR POSTERIOR WALL IN END DIASTOLE: 1.2 CM
LEFT VENTRICULAR STROKE VOLUME: 56 ML
LVSV (TEICH): 56 ML
MV E'TISSUE VEL-SEP: 7 CM/S
MV PEAK A VEL: 1.17 M/S
MV PEAK E VEL: 88 CM/S
MV STENOSIS PRESSURE HALF TIME: 49 MS
MV VALVE AREA P 1/2 METHOD: 4.49
RIGHT ATRIAL 2D VOLUME: 45 ML
RIGHT ATRIUM AREA SYSTOLE A4C: 14.9 CM2
RIGHT VENTRICLE ID DIMENSION: 4.4 CM
SL CV LEFT ATRIUM LENGTH A2C: 5.3 CM
SL CV LV EF: 60
SL CV PED ECHO LEFT VENTRICLE DIASTOLIC VOLUME (MOD BIPLANE) 2D: 92 ML
SL CV PED ECHO LEFT VENTRICLE SYSTOLIC VOLUME (MOD BIPLANE) 2D: 36 ML
TRICUSPID ANNULAR PLANE SYSTOLIC EXCURSION: 3 CM

## 2023-09-07 PROCEDURE — 93306 TTE W/DOPPLER COMPLETE: CPT

## 2023-09-07 PROCEDURE — 93306 TTE W/DOPPLER COMPLETE: CPT | Performed by: INTERNAL MEDICINE

## 2023-09-07 NOTE — RESULT ENCOUNTER NOTE
ECHO reviewed. Pumping strength (ejection fraction) is normal at 60%. Valves: Mild calcium deposition in the aortic valve but no significant narrowing or backflow. Please call with test results.

## 2023-09-26 ENCOUNTER — OFFICE VISIT (OUTPATIENT)
Dept: PODIATRY | Facility: CLINIC | Age: 63
End: 2023-09-26
Payer: COMMERCIAL

## 2023-09-26 VITALS — RESPIRATION RATE: 17 BRPM | HEIGHT: 71 IN | WEIGHT: 283 LBS | BODY MASS INDEX: 39.62 KG/M2

## 2023-09-26 DIAGNOSIS — M21.962 ACQUIRED DEFORMITY OF BOTH FEET: ICD-10-CM

## 2023-09-26 DIAGNOSIS — B35.1 ONYCHOMYCOSIS: ICD-10-CM

## 2023-09-26 DIAGNOSIS — E11.42 DIABETIC POLYNEUROPATHY ASSOCIATED WITH TYPE 2 DIABETES MELLITUS (HCC): ICD-10-CM

## 2023-09-26 DIAGNOSIS — M21.961 ACQUIRED DEFORMITY OF BOTH FEET: ICD-10-CM

## 2023-09-26 DIAGNOSIS — M77.42 METATARSALGIA OF BOTH FEET: Primary | ICD-10-CM

## 2023-09-26 DIAGNOSIS — M21.969 ACQUIRED DEFORMITY OF FOOT, UNSPECIFIED LATERALITY: ICD-10-CM

## 2023-09-26 DIAGNOSIS — M77.41 METATARSALGIA OF BOTH FEET: Primary | ICD-10-CM

## 2023-09-26 PROCEDURE — 99213 OFFICE O/P EST LOW 20 MIN: CPT | Performed by: PODIATRIST

## 2023-09-26 NOTE — PROGRESS NOTES
Assessment.  Pain upon ambulation.  Peripheral artery disease.  Diabetic neuropathy.  Callus formation.  Mycosis of nail.  Acquired deformity foot bilateral.  Peroneal tendinitis right foot.  Dermatophytosis.     Plan.  Chart reviewed.  Foot exam performed.  Nails debrided.  Calluses debrided.  Patient will stretch daily.  Procedures performed without pain or complication.  Patient educated on care of the diabetic foot.        Chief Complaint   Patient needs full diabetic foot exam.  Patient has pain upon ambulation.  Patient complains of pain in his toes when he wears shoes.  He gets pain in the ball of the foot.  No history of trauma.     History of Present Illness   HPI: Patient presents for pedal evaluation. Patient complains of pain in his feet and toes with ambulation.  Patient is a morbidly obese diabetic who has some electric sensations in his feet on occasion.      Review of Systems        Constitutional: not feeling tired.      Eyes: no eyesight problems.      ENT: no nasal discharge.      Cardiovascular: no chest pain,-- no palpitations-- and-- no extremity edema.      Respiratory: no shortness of breath-- and-- no cough.      Gastrointestinal: no abdominal pain-- and-- no constipation.      Genitourinary: no dysuria-- and-- no urinary hesitancy.      Integumentary: no skin wound.      Neurological: no tingling-- and-- no dizziness.      Psychiatric: sleeping better, stopped taking trazodone, but-- no sleep disturbances.      Endocrine: no feelings of weakness.      Active Problems   1. Acquired pes planus (734) (M21.40)   2. Acute renal failure (584.9) (N17.9)   3. Adhesive capsulitis of both shoulders (726.0) (M75.01,M75.02)   4. Arthralgia (719.40) (M25.50)   5. BPH without urinary obstruction (600.00) (N40.0)   6. Bunion (727.1) (M21.619)   7. Callus (700) (L84)   9. UBMKYRJ systolic congestive heart failure (428.22,428.0) (I50.22)   9. Depression with anxiety (300.4) (F41.8)   10. Diabetes mellitus with neurological manifestation (250.60) (E11.49)   11. Diabetes type 2, uncontrolled (250.02) (E11.65)   12. Difficulty concentrating (799.51) (R41.840)   13. Difficulty walking (719.7) (R26.2)   14. Dyspnea on exertion (786.09) (R06.09)   15. Encounter for prostate cancer screening (V76.44) (Z12.5)   16. Encounter for screening for malignant neoplasm of colon (V76.51) (Z12.11)   17. Fatigue (780.79) (R53.83)   18. Foot pain, bilateral (729.5) (M79.671,M79.672)   19. Hallux valgus (735.0) (M20.10)   20. Hematuria (599.70) (R31.9)   21. Hyperlipidemia (272.4) (E78.5)   22. Hypertension (401.9) (I10)   23. Hypogonadism, male (257.2) (E29.1)   24. Insomnia (780.52) (G47.00)   25. Morbid or severe obesity due to excess calories (278.01) (E66.01)   26. Need for pneumococcal vaccination (V03.82) (Z23)   27. Need for prophylactic vaccination and inoculation against influenza (V04.81) (Z23)   28. Nephrolithiasis (592.0) (N20.0)   29. Onychomycosis (110.1) (B35.1)   30. JEFF (obstructive sleep apnea) (327.23) (G47.33)   31. Sciatica (724.3) (M54.30)   32. Screening for colon cancer (V76.51) (Z12.11)   33. Seasonal allergies (477.9) (J30.2)   34. Shoulder pain, right (719.41) (M25.511)   35. Spinal stenosis (724.00) (M48.00)   36. Tinea pedis (110.4) (B35.3)   37. Type 2 diabetes mellitus (250.00) (E11.9)   38. Vitamin D deficiency (268.9) (E55.9)     Past Medical History    · History of Cellulitis of left leg (682.6) (L03.116)   · History of chest pain (V13.89) (I92.672)   · History of diarrhea (V12.79) (G77.336)   · History of onychomycosis (V12.09) (Z86.19)   · History of onychomycosis (V12.09) (Z86.19)   · History of Limb pain (729.5) (M79.609)   · History of Neck pain (723.1) (M54.2)   · Need for pneumococcal vaccination (V03.82) (Z23)   · History of Nephrolithiasis (V13.01)   · History of Numbness On The Sole Of The Right Foot   · History of Pain and swelling of left lower leg (729.5,729.81) (M79.662,M79.89)   · History of Pain of lower extremity (729.5) (M79.606)   · History of Pain, hand joint, right (719.44) (M79.641)   · Sciatica (724.3) (M54.30)     The active problems and past medical history were reviewed and updated today.      Surgical History    · History of Knee Arthroscopy (Therapeutic)   · History of Knee Surgery   · History of Needle Biopsy Of Prostate     Family History   Mother    · Family history of Alzheimer Disease   · Family history of Family Health Status Of Mother - Alive  Father    · Family history of Family Health Status Of Father -   Family History    · Family history of Adopted   · Denied: Family history of Colon Cancer   · Denied: Family history of Crohn's Disease   · Denied: Family history of Liver Cancer     Social History    · Denied: History of Alcohol Use (History)   · Current Some Day Smoker (305.1)   · Denied: History of Exercise Habits   · Marital History - Currently    · Tobacco use (305.1) (Z72.0)   · Working Full Time  The social history was reviewed and is unchanged.      The medication list was reviewed and updated today.      Allergies   1. No Known Drug Allergies   Physical Exam   Left Foot: Appearance: Normal except as noted: excessive pronation-- and-- pes planus. Great toe deformities include a bunion. Tenderness: None except the great toe-- and-- distal first metatarsal.    Right Foot: Appearance: Normal except as noted: excessive pronation-- and-- pes planus. Great toe deformities include a bunion. Tenderness: None except the great toe-- and-- distal first metatarsal.    Left Ankle: ROM: limited ROM in all planes    Right Ankle: ROM: limited ROM in all planes    Neurological Exam: performed. Light touch was decreased bilaterally. Vibratory sensation was decreased in both first metatarsophalangeal joints.    Vascular Exam: performed Dorsalis pedis pulses were present bilaterally. Posterior tibial pulses were present bilaterally. Elevation Pallor: absent bilaterally. Dependence rubor was absent bilaterally. Edema: none.    Toenails: All of the toenails were elongated,-- hypertrophied,-- discolored-- and-- Ptotic. Both first toenails were tender-- and-- Positive onychogryphosis.         Socks and shoes removed, Right Foot Findings: swollen, erythematous and dry.      The sensory exam showed diminished vibratory sensation at the level of the toes. Diminished tactile sensation with monofilament testing throughout the right foot.      Socks and shoes removed, Left Foot Findings: swollen, erythematous and dry.      The sensory exam showed diminished vibratory sensation at the level of the toes. Diminished tactile sensation with monofilament testing throughout the left foot.     Capillary refills findings on the right were normal in the toes.      Pulses:      2+ in the posterior tibialis on the right      2+ in the dorsalis pedis on the right.      Capillary refills findings on the left were normal in the toes.      Pulses:      1+ in the posterior tibialis on the left      1+ in the dorsalis pedis on the left.      Assign Risk Category: 2: Loss of protective sensation with or without weakness, deformity, callus, pre-ulcer, or history of ulceration. High risk.    Hyperkeratosis: present on both first toes,-- present on both first sub metatarsals,-- present on both third sub metatarsals-- and-- Severe profound moccasin tinea pedis bilateral.    Shoe Gear Evaluation: performed ().  Recommendation(s): SAS style.     Right Foot/Ankle   Right Foot Inspection        Sensory   Vibration: diminished  Proprioception: diminished  Monofilament testing: diminished     Vascular  Capillary refills: < 3 seconds              Left Foot/Ankle  Left Foot Inspection        Sensory   Vibration: diminished  Proprioception: diminished  Monofilament testing: diminished     Vascular  Capillary refills: < 3 seconds         Patient's shoes and socks removed.     Right Foot/Ankle   Right Foot Inspection        [facility]  Proprioception: diminished  Monofilament testing: intact     Vascular  Capillary refills: < 3 seconds  The right DP pulse is 1+. The right PT pulse is 1+.      Left Foot/Ankle  Left Foot Inspection        Sensory   Vibration: diminished  Proprioception: diminished  Monofilament testing: intact     Vascular  Capillary refills: < 3 seconds  The left DP pulse is 1+.  The left PT pulse is 1+.      Assign Risk Category  Deformity present  Loss of protective sensation  No weak pulses  Risk: 2

## 2023-09-27 LAB
LEFT EYE DIABETIC RETINOPATHY: NORMAL
RIGHT EYE DIABETIC RETINOPATHY: NORMAL

## 2023-09-28 ENCOUNTER — OFFICE VISIT (OUTPATIENT)
Dept: NEPHROLOGY | Facility: CLINIC | Age: 63
End: 2023-09-28
Payer: COMMERCIAL

## 2023-09-28 VITALS
HEART RATE: 80 BPM | WEIGHT: 284 LBS | BODY MASS INDEX: 39.76 KG/M2 | DIASTOLIC BLOOD PRESSURE: 84 MMHG | SYSTOLIC BLOOD PRESSURE: 138 MMHG | HEIGHT: 71 IN

## 2023-09-28 DIAGNOSIS — N18.31 TYPE 2 DIABETES MELLITUS WITH STAGE 3A CHRONIC KIDNEY DISEASE AND HYPERTENSION (HCC): Primary | ICD-10-CM

## 2023-09-28 DIAGNOSIS — I12.9 TYPE 2 DIABETES MELLITUS WITH STAGE 3A CHRONIC KIDNEY DISEASE AND HYPERTENSION (HCC): Primary | ICD-10-CM

## 2023-09-28 DIAGNOSIS — E66.01 MORBID OBESITY (HCC): ICD-10-CM

## 2023-09-28 DIAGNOSIS — E55.9 VITAMIN D DEFICIENCY: ICD-10-CM

## 2023-09-28 DIAGNOSIS — E11.22 TYPE 2 DIABETES MELLITUS WITH STAGE 3A CHRONIC KIDNEY DISEASE AND HYPERTENSION (HCC): Primary | ICD-10-CM

## 2023-09-28 PROCEDURE — 99213 OFFICE O/P EST LOW 20 MIN: CPT | Performed by: INTERNAL MEDICINE

## 2023-09-28 NOTE — PROGRESS NOTES
NEPHROLOGY OFFICE VISIT   Nestor Frances 61 y.o. male MRN: [id number]  9/28/2023    Reason for Visit: Chronic kidney disease    History of Present Illness (HPI):    I had the pleasure of seeing Venkata Susan today in the renal clinic for the continued management of chronic kidney disease. Since our last visit, there has been no ER visits or hospitilizations. He currently has no complaints at this time and is feeling well. Patient denies any chest pain, shortness of breath and swelling. The last blood work was done on May which included a CMP, which we have reviewed together. He is planned for repeat blood work at this time. Lost 6 pounds since her last visit. Blood pressure well controlled    Currently on Trulicity, hemoglobin X6S stable at 8. No complaints today. ASSESSMENT and PLAN:    Chronic Kidney Disease Stage IIIA with macroalbuminuria (G3A2)  --Imaging: Renal ultrasound from February 2023: Right kidney 13.7 cm, left kidney 12.6 cm. Simple cyst measuring 1.6 cm on the left kidney. Right renal cyst septation unchanged. --Urinalysis: Positive for microscopic hematuria, and mild proteinuria  --Proteinuria:  Follow-up repeat microalbumin/creatinine ratio  --Baseline Kidney Function: mid 1's (1.3-1.6 mg/dL)  --Etiology: Presumed to be secondary to diabetic kidney disease, hypertensive nephrosclerosis, obesity related renal dysfunction  --Biopsy Proven: No  --Serologies: No clinical indication for serologies  --RAAS Blockade: Yes  --Reducing Cardiovascular Risk Factors:  Simvastatin+ Ezetimibe reduced atherosclerotic events in CKD (SHARP trial); Low dose aspirin safe (if no contraindications exist); smoking is an independent risk factor for Chronic Kidney Disease and progression, strongly recommend smoking cessation  --Sodium-Glucose Cotransporter-2 (SGLT2) Inhibitors:   May see an acute drop in the eGFR initially when starting the medication but then a stabilization of the renal function, with slower loss of renal function as compared to placebo. Relative risk of end-stage renal disease, doubling of serum creatinine or death from renal causes were also found to be lower as compared to placebo. ([facility]ENCE). DAPA-CKD study, showed that patients with chronic kidney disease regardless of the presence or absence of diabetes the risk of composite of a sustained decline in the estimated GFR of at least 50%, end-stage renal disease or death from renal or cardiovascular causes was significantly lower with Dapagliflozin than with placebo. EMPEROR-Reduced study also showed beneficial effects. EMPA-CKD, among wide range of patients with CKD, who were at risk for progression, empagliflozin led a lower risk of kidney disease progression or death from cardiovascular causes than placebo. If no contraindications exist I would recommend this medication added to the regiment. If eGFR < 60 cc/min (avoid ertugliflozin); avoid or caution with GFR < 20 mL/min, not an absolute contraindication, likely lower benefits. --Finerenone: In patients with chronic kidney disease with type 2 diabetes, treatment led to lower risks of CKD progression and cardiovascular events than placebo. (HANNAH-DKVIRGILIO)  --Status:  Renal function from May was stable but will follow-up repeat blood work  --Management/Recommendations:  Better diabetic control, exercise weight loss and diet. Continue blood pressure control with an ACE inhibitor. Patient had a bad response to an SGLT2 inhibitor causing urosepsis at this point wants to avoid using that medication. Recommended considering Ferdie Every     Nephrolithiasis  - low 2 g sodium diet  - drink at least 2.5-3 L of water a day to maintain a urine output greater than 2 L per day  - high citrate diet. Fresh lemon juice, can dilute 2 oz of lemon juice with 6 oz of water and drink twice a day. Try to incorporate more fresh lemon and limes into diet.   Can use freshly squeeze lemon or lime on fruit salad and [place] salads.  - 5 or more fruits and vegetables daily  -Check Litholink for stone risk profile  -Continue Flomax  -Following with urology  -Has been having stones since the age of 21, usually gets 1 stone every 4 to 5 years  -Last stone was 8 years ago prior to the episode that he had in November 2022  -CT scan on November 22 showed a 12 mm right UPJ stone with hydronephrosis. Status post ureteral stent placement on November 24, 2022 with right ureteroscopy on November 30, 2022. Stent with string was removed shortly afterwards.  -Urine analysis: 100% calcium oxalate  -Recommend reduced oxalate in the diet     Hypertension  -- Stage I (American College of Cardiology/American Heart Association)  -- with underlying chronic kidney disease and obesity  --Diagnosed with hypertension at the age of 23, thought to be related to obesity  -- Body mass index is 40.45 kg/m². -- Volume status: Euvolemic  -- Etiology: Primary hypertension with obesity and underlying obstructive sleep apnea  -- Secondary Work-Up: Has underlying obstructive sleep apnea and obesity. -- Target Goal: < 130/80 (ACC/AHA, CKD with proteinuria, CKD in diabetics) ; if tolerated can target SBP < 120 mmHg in high cardiovascular risk ( Age > 76, CKD, CVD, No Diabetics SPRINT)  -- Lifestyle Modifications: DASH Diet, Weight Loss for ideal body weight, 45 mins of cardiovascular exercise 3 times a week as tolerated, and if no contraindications exist, smoking cessation, limit alcohol use, avoid NSAIDS, monitor blood pressure at home  -- Status: Blood pressure slightly above target  -- Current antihypertensive regiment: Enalapril 20 mg daily  -- Changes:  Blood pressure close to target continue current management     Obesity  -- BMI  39.6  --Recommend decreasing portion sizes, encouraging healthy choices of fruits and vegetables, consuming healthier snacks and moderation in carbohydrate intake.  Exercise recommendations; 3-5 times a week, the American Heart Association recommends 150 minutes a week moderate exercise  --Strongly recommend evaluation by nutritionist  --Overweight and obesity have been associated with increased mortality and morbidity. Associated with a reduction in life expectancy, associated with increased all cause and cardiovascular mortality  --Metabolic risks, including increased risk of diabetes type 2, insulin resistance with hyperinsulinemia (weight loss of 5 to 7% associated with a decreased risk of type 2 diabetes among individuals with prediabetes and weight loss of 15% can lead to dramatic improvement of diabetes in nearly half of people). Dyslipidemia, hypertension and heart disease are all increased in patients with obesity. Along with increased risk of stroke increased risk of multiple cancer types. Can also be associated with increased renal dysfunction, strongest risk factor for new onset chronic kidney disease. There is also a degree of compensatory hyperfiltration to meet high in the metabolic demands of increased body weight. This can also result in increased increased risk for nephrolithiasis.     Diabetes mellitus type 2  -hemoglobin A1c: 8 (A1C values may be falsely elevated or decreased in those with CKD, assay method should be certified by [employer]). Target A1C between 7-8.5 (will need to be individualized for each patient), and would utilize A1C  in conjunction with continuous glucose monitoring (CGM).   It should also be noted that the A1c with more advanced kidney disease would be inaccurate due to decreased red blood cell life along with anemia.  -current medications: Glimepiride, insulin glargine, Trulicity  -proteinuria: Yes  -retinopathy: No  -neuropathy: No  -please follow with an ophthalmologist and podiatrist every year  -continued self monitoring of blood glucose at home  -Management in CKD Recommendations:   • Metformin:  Avoid if creatinine clearance is less than 30 cc/min (concern for lactic acidosis)  • Sodium-Glucose Cotransporter-2 (SGLT2) Inhibitors: May see an acute drop in the eGFR initially when starting the medication but then a stabilization of the renal function, with slower loss of renal function as compared to placebo. Relative risk of end-stage renal disease, doubling of serum creatinine or death from renal causes were also found to be lower as compared to placebo. (CREDENCE). DAPA-CKD study, showed that patients with chronic kidney disease regardless of the presence or absence of diabetes the risk of composite of a sustained decline in the estimated GFR of at least 50%, end-stage renal disease or death from renal or cardiovascular causes was significantly lower with Dapagliflozin than with placebo. EMPEROR-Reduced study also showed beneficial effects. EMPA-CKD, among wide range of patients with CKD, who were at risk for progression, empagliflozin led a lower risk of kidney disease progression or death from cardiovascular causes than placebo. If no contraindications exist I would recommend this medication added to the regiment.  If eGFR < 60 cc/min (avoid ertugliflozin); avoid or caution with GFR < 20 mL/min, not an absolute contraindication, likely lower benefits   • Sulfonylureas:  Preferred short-acting (glipizide, glimepiride, repaglinide), relatively safe in patients with non dialysis CKD  • Thiazolidinedones/Alpha-Gluosidase inhibitors/Dipeptidyl peptidase-4 inhibitors:  Generally not considered first-line agents in CKD (limited data in long-term safety and efficacy)  • Insulin:  Starting dose may need to be lower than what would ordinarily be used, as there is a decrease metabolism of insulin (no dose adjustment if the GFR > 50 mL/min, dose should be reduced to 75% of baseline if GFR 10-50 mL/min, and 50% baseline if GFR < 10 mL/min)  • Finerenone: Patients with CKD with type 2 diabetes, treatment led to lower risks of CKD progression and cardiovascular events than placebo (FIDELIO-DKD). Avoid or caution if serum potassium more then 4.8.     Bilateral renal cysts  -- Simple left renal cyst.  Requires no further follow-up. -- The right renal cyst with echogenic septation is unchanged     Vitamin D insufficiency  -Continue vitamin D  -Vitamin D 25-hydroxy level 18     Obstructive sleep apnea  -- Continued use of CPAP      PATIENT INSTRUCTIONS:    Patient Instructions   1.)  Low 2 g sodium diet    2.)  Monitor weights at home    3.)  Avoid NSAIDs (ibuprofen, Motrin, Advil, Aleve, naproxen)    4.)  Monitor blood pressure at home, call if blood pressure greater than 150/90 persistently    5.) I will plan to discuss all results including blood work, and/or imaging at our next visit, unless there is an urgent indication, in which case I will call you earlier. If you have any questions or concerns about your results, please feel free to call our office. 6.) Consider Luigi Kirkpatrickanmol in future          Orders Placed This Encounter   Procedures   • Albumin / creatinine urine ratio     Standing Status:   Future     Standing Expiration Date:   9/28/2024   • Comprehensive metabolic panel     This is a patient instruction: Patient fasting for 8 hours or longer recommended. Standing Status:   Future     Standing Expiration Date:   9/28/2024   • Hemoglobin A1C     Standing Status:   Future     Standing Expiration Date:   9/28/2024   • CBC     Standing Status:   Future     Standing Expiration Date:   9/28/2024       OBJECTIVE:  Current Weight: Weight - Scale: 129 kg (284 lb)  Vitals:    09/28/23 1405   BP: 138/84   BP Location: Left arm   Patient Position: Sitting   Cuff Size: Large   Pulse: 80   Weight: 129 kg (284 lb)   Height: 5' 11" (1.803 m)    Body mass index is 39.61 kg/m². REVIEW OF SYSTEMS:    Review of Systems   Constitutional: Negative for activity change and fever. Respiratory: Negative for cough, chest tightness, shortness of breath and wheezing.     Cardiovascular: Negative for chest pain and leg swelling. Gastrointestinal: Negative for abdominal pain, diarrhea, nausea and vomiting. Endocrine: Negative for polyuria. Genitourinary: Negative for difficulty urinating, dysuria, flank pain, frequency and urgency. Skin: Negative for rash. Neurological: Negative for dizziness, syncope, light-headedness and headaches. PHYSICAL EXAM:      Physical Exam  Vitals and nursing note reviewed. Constitutional:       General: He is not in acute distress. Appearance: He is well-developed. He is obese. HENT:      Head: Normocephalic and atraumatic. Eyes:      General: No scleral icterus. Conjunctiva/sclera: Conjunctivae normal.      Pupils: Pupils are equal, round, and reactive to light. Cardiovascular:      Rate and Rhythm: Normal rate and regular rhythm. Heart sounds: S1 normal and S2 normal. No murmur heard. No friction rub. No gallop. Pulmonary:      Effort: Pulmonary effort is normal. No respiratory distress. Breath sounds: Normal breath sounds. No wheezing or rales. Abdominal:      General: Bowel sounds are normal.      Palpations: Abdomen is soft. Tenderness: There is no abdominal tenderness. There is no rebound. Musculoskeletal:         General: Normal range of motion. Cervical back: Normal range of motion and neck supple. Skin:     Findings: No rash. Neurological:      Mental Status: He is alert and oriented to person, place, and time.    Psychiatric:         Behavior: Behavior normal.         Medications:    Current Outpatient Medications:   •  amLODIPine (NORVASC) 10 mg tablet, TAKE 1 TABLET DAILY, Disp: 90 tablet, Rfl: 3  •  atorvastatin (LIPITOR) 10 mg tablet, TAKE 1 TABLET DAILY, Disp: 90 tablet, Rfl: 1  •  Basaglar KwikPen 100 units/mL SOPN, INJECT 34 UNITS UNDER THE SKIN DAILY, Disp: 30 mL, Rfl: 3  •  buPROPion (WELLBUTRIN XL) 150 mg 24 hr tablet, TAKE 1 TABLET DAILY, Disp: 90 tablet, Rfl: 3  •  cholecalciferol (VITAMIN D3) 400 units tablet, Take 400 Units by mouth daily, Disp: , Rfl:   •  cloNIDine (CATAPRES) 0.3 mg tablet, TAKE 1 TABLET TWICE A DAY, Disp: 180 tablet, Rfl: 3  •  Cyanocobalamin (Vitamin B 12) 500 MCG TABS, Take by mouth, Disp: , Rfl:   •  dulaglutide (Trulicity) 4.5 LN/5.9KV injection, Inject 0.5 mL (4.5 mg total) under the skin once a week, Disp: 6 mL, Rfl: 1  •  enalapril (VASOTEC) 20 mg tablet, TAKE 1 TABLET DAILY, Disp: 90 tablet, Rfl: 3  •  glimepiride (AMARYL) 4 mg tablet, TAKE 1 TABLET TWICE A DAY, Disp: 180 tablet, Rfl: 3  •  NON FORMULARY, 250 mg 3 (three) times a day Ox bile, Disp: , Rfl:   •  sertraline (ZOLOFT) 100 mg tablet, TAKE 1 TABLET DAILY, Disp: 90 tablet, Rfl: 3  •  tamsulosin (FLOMAX) 0.4 mg, TAKE 1 CAPSULE DAILY, Disp: 90 capsule, Rfl: 3  •  BD Pen Needle Alicia 2nd Gen 32G X 4 MM MISC, INJECT UNDER THE SKIN DAILY, Disp: 90 each, Rfl: 3  •  bisacodyl (DULCOLAX) 5 mg EC tablet, Take 2 tablets (10 mg total) by mouth once for 1 dose, Disp: 2 tablet, Rfl: 0  •  docusate sodium (COLACE) 100 mg capsule, Take 1 capsule (100 mg total) by mouth 2 (two) times a day as needed for constipation (Patient not taking: Reported on 8/7/2023), Disp: , Rfl: 0  •  fluticasone (FLONASE) 50 mcg/act nasal spray, 1 spray into each nostril daily, Disp: 48 g, Rfl: 1  •  nystatin (MYCOSTATIN) powder, Apply topically 3 (three) times a day as needed (groin rash), Disp: 60 g, Rfl: 1  •  polyethylene glycol (GOLYTELY) 4000 mL solution, Take 4,000 mL by mouth once for 1 dose, Disp: 4000 mL, Rfl: 0    Laboratory Results:        Invalid input(s): "ALBUMIN"    Results for orders placed or performed during the hospital encounter of 09/07/23   Echo complete w/ contrast if indicated   Result Value Ref Range    A4C EF 59 %    LVIDd 4.50 cm    LVIDS 3.00 cm    IVSd 1.30 cm    LVPWd 1.20 cm    FS 33 28 - 44    MV E' Tissue Velocity Septal 7 cm/s    E wave deceleration time 167 ms    MV Peak E Matheus 88 cm/s    MV Peak A Matheus 1.17 m/s    AV LVOT peak gradient 3 mmHg    LVOT peak VTI 19.07 cm    LVOT peak mario 0.79 m/s    RVID d 4.4 cm    Tricuspid annular plane systolic excursion 6.38 cm    LA size 3.8 cm    LA length (A2C) 5.30 cm    ALEJANDRA A4C 17.2 cm2    LA volume (BP) 47 mL    RA 2D Volume 45.0 mL    RAA A4C 14.9 cm2    LVOT mn grad 1.0 mmHg    AV peak gradient 9 mmHg    MV stenosis pressure 1/2 time 49 ms    MV valve area p 1/2 method 4.49     Ao root 3.30 cm    Asc Ao 3.4 cm    Left ventricular stroke volume (2D) 56.00 mL    IVS 1.3 cm    LEFT VENTRICLE SYSTOLIC VOLUME (MOD BIPLANE) 2D 36 mL    LV DIASTOLIC VOLUME (MOD BIPLANE) 2D 92 mL    Left Atrium Area-systolic Four Chamber 18.1 cm2    Left Atrium Area-systolic Apical Two Chamber 17.3 cm2    LVSV, 2D 56 mL    LV EF 60

## 2023-09-28 NOTE — PATIENT INSTRUCTIONS
1. )  Low 2 g sodium diet    2.)  Monitor weights at home    3.)  Avoid NSAIDs (ibuprofen, Motrin, Advil, Aleve, naproxen)    4.)  Monitor blood pressure at home, call if blood pressure greater than 150/90 persistently    5.) I will plan to discuss all results including blood work, and/or imaging at our next visit, unless there is an urgent indication, in which case I will call you earlier. If you have any questions or concerns about your results, please feel free to call our office.     6.) Consider Margoth Grace in future

## 2023-10-02 ENCOUNTER — OFFICE VISIT (OUTPATIENT)
Dept: INTERNAL MEDICINE CLINIC | Facility: CLINIC | Age: 63
End: 2023-10-02
Payer: COMMERCIAL

## 2023-10-02 ENCOUNTER — HOSPITAL ENCOUNTER (OUTPATIENT)
Dept: RADIOLOGY | Facility: HOSPITAL | Age: 63
Discharge: HOME | End: 2023-10-02
Payer: COMMERCIAL

## 2023-10-02 VITALS
DIASTOLIC BLOOD PRESSURE: 92 MMHG | WEIGHT: 285 LBS | BODY MASS INDEX: 39.9 KG/M2 | SYSTOLIC BLOOD PRESSURE: 170 MMHG | TEMPERATURE: 97.9 F | HEIGHT: 71 IN | HEART RATE: 80 BPM | OXYGEN SATURATION: 98 %

## 2023-10-02 DIAGNOSIS — E66.01 MORBID OBESITY (HCC): ICD-10-CM

## 2023-10-02 DIAGNOSIS — Z79.4 TYPE 2 DIABETES MELLITUS WITH STAGE 3A CHRONIC KIDNEY DISEASE, WITH LONG-TERM CURRENT USE OF INSULIN (HCC): ICD-10-CM

## 2023-10-02 DIAGNOSIS — E78.2 MIXED HYPERLIPIDEMIA: ICD-10-CM

## 2023-10-02 DIAGNOSIS — Z71.9 HEALTH COUNSELING: ICD-10-CM

## 2023-10-02 DIAGNOSIS — N18.31 TYPE 2 DIABETES MELLITUS WITH STAGE 3A CHRONIC KIDNEY DISEASE, WITH LONG-TERM CURRENT USE OF INSULIN (HCC): ICD-10-CM

## 2023-10-02 DIAGNOSIS — F33.41 RECURRENT MAJOR DEPRESSIVE DISORDER, IN PARTIAL REMISSION (HCC): ICD-10-CM

## 2023-10-02 DIAGNOSIS — I10 ESSENTIAL HYPERTENSION: ICD-10-CM

## 2023-10-02 DIAGNOSIS — E11.40 TYPE 2 DIABETES MELLITUS WITH DIABETIC NEUROPATHY, WITHOUT LONG-TERM CURRENT USE OF INSULIN (HCC): ICD-10-CM

## 2023-10-02 DIAGNOSIS — M54.12 CERVICAL RADICULOPATHY: Primary | ICD-10-CM

## 2023-10-02 DIAGNOSIS — N18.31 STAGE 3A CHRONIC KIDNEY DISEASE (HCC): ICD-10-CM

## 2023-10-02 DIAGNOSIS — M54.12 CERVICAL RADICULOPATHY: ICD-10-CM

## 2023-10-02 DIAGNOSIS — E11.22 TYPE 2 DIABETES MELLITUS WITH STAGE 3A CHRONIC KIDNEY DISEASE, WITH LONG-TERM CURRENT USE OF INSULIN (HCC): ICD-10-CM

## 2023-10-02 DIAGNOSIS — R06.09 DYSPNEA ON EXERTION: ICD-10-CM

## 2023-10-02 PROBLEM — I12.9 TYPE 2 DIABETES MELLITUS WITH STAGE 3A CHRONIC KIDNEY DISEASE AND HYPERTENSION (HCC): Status: RESOLVED | Noted: 2023-03-23 | Resolved: 2023-10-02

## 2023-10-02 PROBLEM — M54.9 COSTOVERTEBRAL ANGLE TENDERNESS: Status: RESOLVED | Noted: 2022-11-27 | Resolved: 2023-10-02

## 2023-10-02 PROBLEM — M54.59 INTRACTABLE LOW BACK PAIN: Status: RESOLVED | Noted: 2022-11-20 | Resolved: 2023-10-02

## 2023-10-02 LAB — SL AMB POCT HEMOGLOBIN AIC: 7.9 (ref ?–6.5)

## 2023-10-02 PROCEDURE — 72050 X-RAY EXAM NECK SPINE 4/5VWS: CPT

## 2023-10-02 PROCEDURE — 83036 HEMOGLOBIN GLYCOSYLATED A1C: CPT | Performed by: INTERNAL MEDICINE

## 2023-10-02 PROCEDURE — 99214 OFFICE O/P EST MOD 30 MIN: CPT | Performed by: INTERNAL MEDICINE

## 2023-10-02 RX ORDER — GABAPENTIN 100 MG/1
100 CAPSULE ORAL 3 TIMES DAILY PRN
Qty: 90 CAPSULE | Refills: 0 | Status: SHIPPED | OUTPATIENT
Start: 2023-10-02

## 2023-10-02 RX ORDER — INSULIN GLARGINE 100 [IU]/ML
40 INJECTION, SOLUTION SUBCUTANEOUS DAILY
Qty: 30 ML | Refills: 3
Start: 2023-10-02

## 2023-10-02 RX ORDER — METHYLPREDNISOLONE 4 MG/1
TABLET ORAL
Qty: 21 EACH | Refills: 0 | Status: SHIPPED | OUTPATIENT
Start: 2023-10-02

## 2023-10-02 NOTE — ASSESSMENT & PLAN NOTE
BP elevated today, suspect due to pain. Monitor at home. No medication changes, on amlodipine, clonidine and enalapril.

## 2023-10-02 NOTE — ASSESSMENT & PLAN NOTE
Lab Results   Component Value Date    HGBA1C 7.9 (A) 10/02/2023     Increase Basaglar to 40 units, Trulicity 4.5 mg. Would like to wean off glimepiride 4 mg bid.

## 2023-10-02 NOTE — ASSESSMENT & PLAN NOTE
Lab Results   Component Value Date    EGFR 50 05/28/2023    EGFR 52 02/25/2023    EGFR 59 12/18/2022    CREATININE 1.47 (H) 05/28/2023    CREATININE 1.43 (H) 02/25/2023    CREATININE 1.29 12/18/2022     Stable. No NSAIDs. Saw nephorlogy.

## 2023-10-02 NOTE — PROGRESS NOTES
Assessment/Plan:    Dyspnea on exertion  Improved. Reviewed stress test, has echo recently. Saw cardiology. Obstructive sleep apnea  Using CPAP. Mixed hyperlipidemia  On atorvastatin 10 mg.    Essential hypertension  BP elevated today, suspect due to pain. Monitor at home. No medication changes, on amlodipine, clonidine and enalapril. CKD (chronic kidney disease) stage 3  Lab Results   Component Value Date    EGFR 50 05/28/2023    EGFR 52 02/25/2023    EGFR 59 12/18/2022    CREATININE 1.47 (H) 05/28/2023    CREATININE 1.43 (H) 02/25/2023    CREATININE 1.29 12/18/2022     Stable. No NSAIDs. Saw nephorlogy. Nephrolithiasis  Adequate fluid intake. Sees urology, nephrology. On Flomax. Recurrent major depressive disorder, in partial remission (HCC)  Stable. On sertraline, bupropion. Morbid obesity ([address])  No weight change. Type 2 diabetes mellitus with stage 3a chronic kidney disease, with long-term current use of insulin ([facility])    Lab Results   Component Value Date    HGBA1C 7.9 (A) 10/02/2023     Increase Basaglar to 40 units, Trulicity 4.5 mg. Would like to wean off glimepiride 4 mg bid. Diagnoses and all orders for this visit:    Cervical radiculopathy  -     methylPREDNISolone 4 MG tablet therapy pack; Use as directed on package  -     gabapentin (Neurontin) 100 mg capsule; Take 1 capsule (100 mg total) by mouth 3 (three) times a day as needed (pain)  -     XR spine cervical complete 4 or 5 vw non injury; Future    Type 2 diabetes mellitus with diabetic neuropathy, without long-term current use of insulin ([facility])  -     dulaglutide (Trulicity) 4.5 QJ/3.1MM injection;  Inject 0.5 mL (4.5 mg total) under the skin once a week  -     Insulin Glargine Solostar (Basaglar KwikPen) 100 UNIT/ML SOPN; Inject 0.4 mL (40 Units total) under the skin daily    Recurrent major depressive disorder, in partial remission ([address])    Type 2 diabetes mellitus with stage 3a chronic kidney disease, with long-term current use of insulin (HCC)  -     POCT hemoglobin A1c    Dyspnea on exertion    Mixed hyperlipidemia    Stage 3a chronic kidney disease ([address])    Essential hypertension    Morbid obesity ([address])    Health counseling  Comments:  Repeat colonoscopy in 10 years. Follow up in 4 months or as needed. Subjective:      Patient ID: Yovana Stover is a 61 y.o. male here for a follow up. He complains of neck pain which started about a week ago. No known injury, just started on its own. Pain is becoming more frequent and constant, had to leave work earlier today. Pain would radiate up to his arm. Denies any headaches. He feels it is like his sciatica pain when he which he had when he was younger. He has not taken any medication for it. He is unable to find a comfortable position when seated, laying down and sleep. He started taking Metamucil twice a day to help with the constipation. He reports this had helped with the white specks in his stool, attributes it to his lack of gallbladder. He reports his blood pressure was good when he saw the kidney doctor. He did all the tests: stress test, colonoscopy and eye exam and all of it are all within normal.    The following portions of the patient's history were reviewed and updated as appropriate: allergies, current medications, past medical history, past social history and problem list.    Review of Systems   Constitutional: Negative for appetite change and fatigue. HENT: Negative for congestion, hearing loss and postnasal drip. Eyes: Negative. Respiratory: Negative for cough, chest tightness and shortness of breath. Cardiovascular: Negative for chest pain, palpitations and leg swelling. Gastrointestinal: Negative for abdominal pain and constipation. Genitourinary: Negative for dysuria, frequency and urgency. Musculoskeletal: Positive for neck pain and neck stiffness. Negative for arthralgias.    Skin: Negative for rash and wound.   Neurological: Negative for dizziness, weakness, numbness and headaches. Hematological: Negative for adenopathy. Does not bruise/bleed easily. Psychiatric/Behavioral: Negative for sleep disturbance. The patient is not nervous/anxious. Objective:      /92   Pulse 80   Temp 97.9 °F (36.6 °C)   Ht 5' 11" (1.803 m)   Wt 129 kg (285 lb)   SpO2 98%   BMI 39.75 kg/m²          Physical Exam  Vitals and nursing note reviewed. Constitutional:       Appearance: He is well-developed. HENT:      Head: Normocephalic and atraumatic. Eyes:      Conjunctiva/sclera: Conjunctivae normal.      Pupils: Pupils are equal, round, and reactive to light. Cardiovascular:      Rate and Rhythm: Normal rate and regular rhythm. Heart sounds: Normal heart sounds. Pulmonary:      Effort: Pulmonary effort is normal.      Breath sounds: No wheezing or rhonchi. Abdominal:      General: Bowel sounds are normal.      Palpations: Abdomen is soft. Musculoskeletal:         General: No swelling. Cervical back: Neck supple. Spasms present. No tenderness. Decreased range of motion. Thoracic back: Spasms present. Right lower leg: No edema. Left lower leg: No edema. Skin:     General: Skin is warm. Findings: No rash. Neurological:      General: No focal deficit present. Mental Status: He is alert and oriented to person, place, and time. Psychiatric:         Mood and Affect: Mood and affect normal.         Behavior: Behavior normal.           Labs & imaging results reviewed with patient. BMI Counseling: Body mass index is 39.75 kg/m². The BMI is above normal. Nutrition recommendations include 3-5 servings of fruits/vegetables daily. Exercise recommendations include strength training exercises.

## 2023-10-02 NOTE — ASSESSMENT & PLAN NOTE
Lab Results   Component Value Date    HGBA1C 8.0 (H) 05/28/2023     Repeat A1c due. Increase Basaglar to 40 units, Trulicity 4.5 mg. Would like to wean off glimepiride 4 mg bid.

## 2023-10-12 ENCOUNTER — TELEPHONE (OUTPATIENT)
Dept: INTERNAL MEDICINE CLINIC | Facility: CLINIC | Age: 63
End: 2023-10-12

## 2023-10-12 DIAGNOSIS — M54.12 CERVICAL RADICULOPATHY: Primary | ICD-10-CM

## 2023-10-12 NOTE — TELEPHONE ENCOUNTER
Pt called -aware in agreement with physical therapy. Aware he will get a call from scheduling. Would like the gabapentin increased . Still with the pain. Uses [facility] jose eduardo alicea

## 2023-10-12 NOTE — TELEPHONE ENCOUNTER
Please call patient. Neck x-ray showed arthritis and narrowing, causing your symptoms. How is your pain? We can continue or increase gabapentin if it is helping. Also, would recommend physical therapy. Let me know.

## 2023-10-13 RX ORDER — GABAPENTIN 300 MG/1
300 CAPSULE ORAL 3 TIMES DAILY PRN
Qty: 90 CAPSULE | Refills: 1 | Status: SHIPPED | OUTPATIENT
Start: 2023-10-13

## 2023-10-17 ENCOUNTER — VBI (OUTPATIENT)
Dept: ADMINISTRATIVE | Facility: OTHER | Age: 63
End: 2023-10-17

## 2023-10-20 ENCOUNTER — EVALUATION (OUTPATIENT)
Dept: PHYSICAL THERAPY | Facility: REHABILITATION | Age: 63
End: 2023-10-20
Payer: COMMERCIAL

## 2023-10-20 DIAGNOSIS — M54.12 CERVICAL RADICULOPATHY: Primary | ICD-10-CM

## 2023-10-20 PROCEDURE — 97161 PT EVAL LOW COMPLEX 20 MIN: CPT | Performed by: PHYSICAL THERAPIST

## 2023-10-20 PROCEDURE — 97530 THERAPEUTIC ACTIVITIES: CPT | Performed by: PHYSICAL THERAPIST

## 2023-10-20 NOTE — PROGRESS NOTES
PT Evaluation     Today's date: 10/20/2023  Patient name: Liberty Cedeno  : 1960  MRN: [id number]  Referring provider: Kat Shannon MD  Dx:   Encounter Diagnosis     ICD-10-CM    1. Cervical radiculopathy  M54.12 Ambulatory Referral to Physical Therapy          Start Time: 1024  Stop Time: 1100  Total time in clinic (min): 36 minutes    Assessment  Assessment details: Liberty Cedeno is a 61y.o. year old male who presents to  with Cervical radiculopathy. Signs and symptoms are consistent with referring diagnosis with pain reproduction with cervical range of motion in all directions. Symptoms consistent with C5-C7 cervical radiculopathy radiating to the lateral shoulder. Some weakness in the left shoulder noted however likely secondary to pain limitations. He will benefit from a trial of cervical traction/distraction program in conjunction with cervical and postural strengthening as tolerated. Terra Vera presents with the impairments as listed above and would benefit from Physical Therapy to address these impairments, improved quality of life and to maximize function. Impairments: abnormal muscle tone, abnormal or restricted ROM, activity intolerance, impaired physical strength, lacks appropriate home exercise program, pain with function and poor posture     Goals  Short-Term Goals: 4-6 weeks  1. Patient will report 50% reduction in pain or <5/10 pain for improved QoL. 2. Patient will demonstrate centralization of symptoms. 3. Patient will demonstrate improved cervical range of motion without limitation from pain. Long-Term Goals: 6-8 weeks  1. Patient will achieve at least 4+/5 in LUE strength. 2. Patient will be able to sleep at night without limitation from pain for improved QoL. 3. Patient independent with HEP at time of discharge.        Plan  Patient would benefit from: skilled physical therapy  Planned modality interventions: cryotherapy, TENS and thermotherapy: hydrocollator packs  Planned therapy interventions: home exercise program, therapeutic exercise, therapeutic activities, stretching, strengthening, postural training, patient education, neuromuscular re-education, manual therapy, massage and joint mobilization  Frequency: 2x week  Duration in visits: 12  Duration in weeks: 6  Plan of Care beginning date: 10/20/2023  Plan of Care expiration date: 2023  Treatment plan discussed with: patient        Subjective Evaluation    History of Present Illness  Mechanism of injury: Patient is a 61 y.o. presenting to physical therapy with complaints of neck pain that radiates into the left shoulder beginning about 3-4 weeks ago. He reports waking up with pain one day but reports no specific injury or incident. He reports sitting is ok until he stands it aggravates it but also standing/walking for about 30 minutes helps it subside. He completed a methylprednisone pack which helped briefly but did not have lasting relief. He has been taking Gabapentin which does help control pain. Will also sit at his desk chair with moist heat and TENs unit on to get some relief but does not last.     Patient has a history of sciatica since 22 yo but improved in his 52's. N/T/Radicular pain: Radiates to the lateral shoulder, very occasionally will radiate to the elbow but not past. Denies N/T.   Imaging: x-rays - degenerative changes C5-C6 and C6-C7  Patient Goals  Patient goals for therapy: decreased pain, increased strength, independence with ADLs/IADLs and return to sport/leisure activities  Patient goal: get rid of the pain  Pain  Current pain ratin  At best pain ratin (sitting in sofa/recliner)  At worst pain ratin  Quality: sharp and dull ache (aching but intense pain is sharp)  Relieving factors: change in position and medications (sitting in high back chair, walking/moving for about 30 minutes until it relieves itself; has tried heat packs/TENs with minimal benefit)  Aggravating factors: overhead activity (sleeping on right side, looking up, turning head, raising arm)    Social Support    Employment status: not working  Hand dominance: right    Treatments  Current treatment: medication and physical therapy      Objective     Concurrent Complaints  Positive for disturbed sleep. Negative for dizziness, headaches, tinnitus and visual change    Additional Special Questions  Usually sleeps on right side but has been limited from pain into the left shoulder       Static Posture     Head  Forward. Postural Observations  Seated posture: fair  Standing posture: fair      Palpation   Left   Hypertonic in the levator scapulae, sternocleidomastoid and upper trapezius. Tenderness of the levator scapulae, sternocleidomastoid and upper trapezius. Right   No palpable tenderness to the levator scapulae. Hypertonic in the upper trapezius. Tenderness of the upper trapezius. Tenderness   Cervical Spine   Tenderness in the facet joint. Additional Tenderness Details  L C5-C7    Active Range of Motion   Cervical/Thoracic Spine       Cervical    Flexion: 75 degrees  with pain  Extension: 20 degrees     with pain  Left lateral flexion: 40 degrees     with pain  Right lateral flexion: 40 degrees     with pain  Left rotation: 50 degrees with pain  Right rotation: 45 degrees    with pain  Left Shoulder   Flexion: WFL and with pain  Abduction: WFL and with pain  External rotation BTH: C4 with pain  Internal rotation BTB: sacrum     Right Shoulder   Normal active range of motion  External rotation BTH: T1   Internal rotation BTB: sacrum     Additional Active Range of Motion Details  Flexion + radiating to shoulder  Extension + radiating to shoulder     Joint Play     Comments: Cervical lateral glides hypomobile C2-C7 L (radiation to L shoulder);  WNL C2-C7 R    Mechanical Assessment    Cervical    Seated Flexion:  sustained positions  Pain location: peripheralized  Pain intensity: worse  Pain level: increased  Seated Extension: sustained positions  Pain location: peripheralized  Pain intensity: worse  Pain level: increased    Thoracic      Lumbar      Strength/Myotome Testing     Left Shoulder     Planes of Motion   Flexion: 4   Abduction: 4-   External rotation at 0°: 4+   Internal rotation at 0°: 4+     Isolated Muscles   Biceps: 4+   Teres minor: 4+     Right Shoulder     Planes of Motion   Flexion: 4+   Abduction: 4+   External rotation at 0°: 4+   Internal rotation at 0°: 4+     Isolated Muscles   Biceps: 4+   Triceps: 4+     Tests   Cervical   Positive cervical distraction test.  Negative vertical compression. Left   Positive Spurling's Test A. Right   Positive Spurling's Test A. Lumbar   Negative vertical compression. General Comments:      Cervical/Thoracic Comments  Supine chin tuck + p!     Neuro Exam:     Headaches   Patient reports headaches: No.              Precautions: Diabetes mellitus, HTN, history of sciatica, dyspnea on exertion      Manuals 10/20            Manual cervical distraction nv            L UT/levator STM nv            L cervical lateral glides (as tolerated)             Cervical traction unit nv                         Neuro Re-Ed             Assess neural tension nv            Cervical isos flexion/extension nv            Cervical isos sidebending nv            Cervical isos rotation nv                                                   Ther Ex             Scapular retractions nv                         TB low rows             TB mid rows             Chin tuck seated                                                    Ther Activity             Pt education done                         Modalities             MHP prn

## 2023-10-23 ENCOUNTER — OFFICE VISIT (OUTPATIENT)
Dept: PHYSICAL THERAPY | Facility: REHABILITATION | Age: 63
End: 2023-10-23
Payer: COMMERCIAL

## 2023-10-23 DIAGNOSIS — M54.12 CERVICAL RADICULOPATHY: Primary | ICD-10-CM

## 2023-10-23 PROCEDURE — 97140 MANUAL THERAPY 1/> REGIONS: CPT

## 2023-10-23 PROCEDURE — 97112 NEUROMUSCULAR REEDUCATION: CPT

## 2023-10-23 NOTE — PROGRESS NOTES
Daily Note     Today's date: 10/23/2023  Patient name: Yovana Stover  : 1960  MRN: [id number]  Referring provider: Nelson Melendez MD  Dx:   Encounter Diagnosis     ICD-10-CM    1. Cervical radiculopathy  M54.12                      Subjective: Patient noted sharp pain centralized  pain in L shoulder from sitting in soft chair then standing 1/2 hour to 45 min to decrease to diminished after moving around. Pain in L shoulder post exercises and manual treatment today. Objective: See treatment diary below      Assessment: Tolerated treatment fair. STM performed to patient tolerance patient noted pain in L shoulder post 5' massage to L UT therefore stopped STM to L UT and performed STM to L  levator patient noted decreased to diminished pain in L shoulder. Patient noted pain in L shoulder with manual traction therefore stopped and did not perform mechanical traction today. Patient noted fair tolerance to exercises performed. VC for patient to perform to tolerance and to let therapist know of any increase of pain. Patient noted pain in L shoulder with ext isometric trialed in seated position and against wall no change in pain. Patient noted some increase of pain in L shoulder and some soreness neck pain post exercises and manual treatment. Patient would benefit from continued PT      Plan: Continue per plan of care.       Precautions: Diabetes mellitus, HTN, history of sciatica, dyspnea on exertion      Manuals 10/20 10/23           Manual cervical distraction nv AC           L UT/levator STM nv Done to pt. anjelica           L cervical lateral glides (as tolerated)             Cervical traction unit nv NP                        Neuro Re-Ed             Assess neural tension nv            Cervical isos flexion/extension nv 5" x 10 flex  P! ext           Cervical isos sidebending nv 5" x 10 ea            Cervical isos rotation nv 5" x 10                                                  Ther Ex Scapular retractions nv 5" x 10                         TB low rows             TB mid rows             Chin tuck seated                                                    Ther Activity             Pt education done                         Modalities             MHP prn

## 2023-10-25 ENCOUNTER — OFFICE VISIT (OUTPATIENT)
Dept: PHYSICAL THERAPY | Facility: REHABILITATION | Age: 63
End: 2023-10-25
Payer: COMMERCIAL

## 2023-10-25 DIAGNOSIS — M54.12 CERVICAL RADICULOPATHY: Primary | ICD-10-CM

## 2023-10-25 PROCEDURE — 97112 NEUROMUSCULAR REEDUCATION: CPT

## 2023-10-25 PROCEDURE — 97140 MANUAL THERAPY 1/> REGIONS: CPT

## 2023-10-25 PROCEDURE — 97110 THERAPEUTIC EXERCISES: CPT

## 2023-10-25 NOTE — PROGRESS NOTES
Daily Note     Today's date: 10/25/2023  Patient name: Issa Bejarano  : 1960  MRN: [id number]  Referring provider: Maxine Kee MD  Dx:   Encounter Diagnosis     ICD-10-CM    1. Cervical radiculopathy  M54.12           Start Time: 08  Stop Time: 930  Total time in clinic (min): 45 minutes    Subjective: Patient reports neck as "about the same" at start of session today. He reports soreness after previous session lasting less than 24 hours. He continues to report discomfort into shoulder. Objective: See treatment diary below      Assessment: Tolerated treatment fair. Patient demonstrated fatigue post treatment, exhibited good technique with therapeutic exercises, and would benefit from continued PT Patient limited by discomfort with most TE, cues required for gentle contractions with all isometrics. Tightness noted with STM to L UT, with guarding noted with manual cervical distraction, cues required for relaxation. Plan: Continue per plan of care. Progress treatment as tolerated.        Precautions: Diabetes mellitus, HTN, history of sciatica, dyspnea on exertion      Manuals 10/20 10/23 10/25          Manual cervical distraction nv AC Done           L UT/levator STM nv Done to pt. anjelica Done           L cervical lateral glides (as tolerated)             Cervical traction unit nv NP                        Neuro Re-Ed             Assess neural tension nv            Cervical isos flexion/extension nv 5" x 10 flex  P! ext 5''x10 flex          Cervical isos sidebending nv 5" x 10 ea  5''x10 ea          Cervical isos rotation nv 5" x 10 5''x10 ROM only                                                 Ther Ex             Scapular retractions nv 5" x 10  5''x20                       TB low rows             TB mid rows             Chin tuck seated   Supine 10x3'' gentle          Cervical SNAGs rotation- supine   10x ea                                     Ther Activity             Pt education done                         Modalities             MHP prn

## 2023-10-30 ENCOUNTER — OFFICE VISIT (OUTPATIENT)
Dept: PHYSICAL THERAPY | Facility: REHABILITATION | Age: 63
End: 2023-10-30
Payer: COMMERCIAL

## 2023-10-30 DIAGNOSIS — M54.12 CERVICAL RADICULOPATHY: Primary | ICD-10-CM

## 2023-10-30 PROCEDURE — 97110 THERAPEUTIC EXERCISES: CPT | Performed by: PHYSICAL THERAPIST

## 2023-10-30 PROCEDURE — 97112 NEUROMUSCULAR REEDUCATION: CPT | Performed by: PHYSICAL THERAPIST

## 2023-10-30 PROCEDURE — 97140 MANUAL THERAPY 1/> REGIONS: CPT | Performed by: PHYSICAL THERAPIST

## 2023-10-30 NOTE — PROGRESS NOTES
Daily Note     Today's date: 10/30/2023  Patient name: Bryan Lazar  : 1960  MRN: [id number]  Referring provider: Catrachito Rothman MD  Dx:   Encounter Diagnosis     ICD-10-CM    1. Cervical radiculopathy  M54.12           Start Time: 08  Stop Time: 8092  Total time in clinic (min): 40 minutes    Subjective: Patient reports the neck has been feeling better. He reports being virtually pain free on Saturday and was able to do yard work. He reports some soreness after last session. Objective: See treatment diary below      Assessment: Tolerated treatment well. Completed cervical isometrics in supine this visit for gravity eliminated position and greater spine stabilization with improved tolerance. Patient reports minimal discomfort in the shoulder and neck post-treatment. Patient demonstrated fatigue post treatment, exhibited good technique with therapeutic exercises, and would benefit from continued PT. Plan: Continue per plan of care.       Precautions: Diabetes mellitus, HTN, history of sciatica, dyspnea on exertion      Manuals 10/20 10/23 10/25 10/30         Manual cervical distraction nv AC Done  Done         L UT/levator STM nv Done to pt. anjelica Done  Done         L cervical lateral glides (as tolerated)             Cervical traction unit nv NP                        Neuro Re-Ed             Assess neural tension nv            Cervical isos flexion/extension nv 5" x 10 flex  P! ext 5''x10 flex 10x5" flex supine         Cervical isos sidebending nv 5" x 10 ea  5''x10 ea Supine 10x5" b/l         Cervical isos rotation nv 5" x 10 5''x10 ROM only Supine 10x5" b/l                                                Ther Ex             Scapular retractions nv 5" x 10  5''x20 2x10x5"                      TB low rows    nv         TB mid rows    nv         Chin tuck seated   Supine 10x3'' gentle Supine 2x10x5"         Cervical SNAGs rotation- supine   10x ea  10x ea towel Ther Activity             Pt education done                         Modalities             MHP prn

## 2023-11-01 ENCOUNTER — OFFICE VISIT (OUTPATIENT)
Dept: PHYSICAL THERAPY | Facility: REHABILITATION | Age: 63
End: 2023-11-01
Payer: COMMERCIAL

## 2023-11-01 DIAGNOSIS — M54.12 CERVICAL RADICULOPATHY: Primary | ICD-10-CM

## 2023-11-01 PROCEDURE — 97112 NEUROMUSCULAR REEDUCATION: CPT

## 2023-11-01 PROCEDURE — 97110 THERAPEUTIC EXERCISES: CPT

## 2023-11-01 PROCEDURE — 97140 MANUAL THERAPY 1/> REGIONS: CPT

## 2023-11-01 NOTE — PROGRESS NOTES
Daily Note     Today's date: 2023  Patient name: Matthew Dorado  : 1960  MRN: [id number]  Referring provider: Fanny Cespedes MD  Dx:   Encounter Diagnosis     ICD-10-CM    1. Cervical radiculopathy  M54.12           Start Time: 801  Stop Time:   Total time in clinic (min): 42 minutes    Subjective: Patient reports feeling "not too bad today" at start of session. He reports no complaints after previous session, less soreness noted. Objective: See treatment diary below      Assessment: Tolerated treatment well. Patient demonstrated fatigue post treatment, exhibited good technique with therapeutic exercises, and would benefit from continued PT Good tolerance to increased reps and additional TE, no increased pain or increase in symptoms. Plan: Continue per plan of care. Progress treatment as tolerated.        Precautions: Diabetes mellitus, HTN, history of sciatica, dyspnea on exertion      Manuals 10/20 10/23 10/25 10/30 11/1        Manual cervical distraction nv AC Done  Done Done         L UT/levator STM nv Done to pt. anjelica Done  Done Done         L cervical lateral glides (as tolerated)             Cervical traction unit nv NP                        Neuro Re-Ed             Assess neural tension nv            Cervical isos flexion/extension nv 5" x 10 flex  P! ext 5''x10 flex 10x5" flex supine 20x5'' flex supine        Cervical isos sidebending nv 5" x 10 ea  5''x10 ea Supine 10x5" b/l Supine 20x5'' b/l        Cervical isos rotation nv 5" x 10 5''x10 ROM only Supine 10x5" b/l Supine 20x5'' b/l                                               Ther Ex             Scapular retractions nv 5" x 10  5''x20 2x10x5" 2x10x5''                     TB low rows    nv Pink 2x10        TB mid rows    nv Pink 2x10        Chin tuck seated   Supine 10x3'' gentle Supine 2x10x5" Supine 2x10x5''        Cervical SNAGs rotation- supine   10x ea  10x ea towel 10x ea towel Ther Activity             Pt education done                         Modalities             MHP prn

## 2023-11-06 ENCOUNTER — OFFICE VISIT (OUTPATIENT)
Dept: PHYSICAL THERAPY | Facility: REHABILITATION | Age: 63
End: 2023-11-06
Payer: COMMERCIAL

## 2023-11-06 DIAGNOSIS — M54.12 CERVICAL RADICULOPATHY: Primary | ICD-10-CM

## 2023-11-06 PROCEDURE — 97140 MANUAL THERAPY 1/> REGIONS: CPT

## 2023-11-06 PROCEDURE — 97110 THERAPEUTIC EXERCISES: CPT

## 2023-11-06 PROCEDURE — 97112 NEUROMUSCULAR REEDUCATION: CPT

## 2023-11-06 NOTE — PROGRESS NOTES
Daily Note     Today's date: 2023  Patient name: Luz Mohamud  : 1960  MRN: [id number]  Referring provider: Courtney Prado MD  Dx:   Encounter Diagnosis     ICD-10-CM    1. Cervical radiculopathy  M54.12                      Subjective: Pt reports he is doing ok, had a good weekend. His pain was down to a 2/10 at worst.       Objective: See treatment diary below      Assessment: Tolerated treatment well. Pt performed all exercises without issue, continue to progress to tolerance. Pt responded positively to STM and cerv. Distraction, reported he felt "loosened up". Pt would benefit from continued focus on stretching and strengthening exercises to decrease pain and increase function. Patient demonstrated fatigue post treatment, exhibited good technique with therapeutic exercises, and would benefit from continued PT      Plan: Continue per plan of care.       Precautions: Diabetes mellitus, HTN, history of sciatica, dyspnea on exertion      Manuals 10/20 10/23 10/25 10/30 11/1 11/6       Manual cervical distraction nv AC Done  Done Done  KM       L UT/levator STM nv Done to pt. anjelica Done  Done Done  KM       L cervical lateral glides (as tolerated)             Cervical traction unit nv NP                        Neuro Re-Ed             Assess neural tension nv            Cervical isos flexion/extension nv 5" x 10 flex  P! ext 5''x10 flex 10x5" flex supine 20x5'' flex supine 20x5" flex supine       Cervical isos sidebending nv 5" x 10 ea  5''x10 ea Supine 10x5" b/l Supine 20x5'' b/l Supine 20x5" b/l       Cervical isos rotation nv 5" x 10 5''x10 ROM only Supine 10x5" b/l Supine 20x5'' b/l Supine 20x5"                                              Ther Ex             Scapular retractions nv 5" x 10  5''x20 2x10x5" 2x10x5'' 2x10x5"                    TB low rows    nv Pink 2x10 Pink 2x10       TB mid rows    nv Pink 2x10 Pink 2x10       Chin tuck seated   Supine 10x3'' gentle Supine 2x10x5" Supine 2x10x5'' Supine 10x5"       Cervical SNAGs rotation- supine   10x ea  10x ea towel 10x ea towel 10x ea towel                                 Ther Activity             Pt education done                         Modalities             MHP prn

## 2023-11-08 ENCOUNTER — OFFICE VISIT (OUTPATIENT)
Dept: PHYSICAL THERAPY | Facility: REHABILITATION | Age: 63
End: 2023-11-08
Payer: COMMERCIAL

## 2023-11-08 DIAGNOSIS — M54.12 CERVICAL RADICULOPATHY: Primary | ICD-10-CM

## 2023-11-08 PROCEDURE — 97112 NEUROMUSCULAR REEDUCATION: CPT | Performed by: PHYSICAL THERAPIST

## 2023-11-08 PROCEDURE — 97110 THERAPEUTIC EXERCISES: CPT | Performed by: PHYSICAL THERAPIST

## 2023-11-08 PROCEDURE — 97140 MANUAL THERAPY 1/> REGIONS: CPT | Performed by: PHYSICAL THERAPIST

## 2023-11-08 NOTE — PROGRESS NOTES
Daily Note     Today's date: 2023  Patient name: Aleena Borja  : 1960  MRN: [id number]  Referring provider: Anna Roche MD  Dx:   Encounter Diagnosis     ICD-10-CM    1. Cervical radiculopathy  M54.12           Start Time: 0800  Stop Time: 0840  Total time in clinic (min): 40 minutes    Subjective: Patient reports the neck has been feeling good. He reports pain into the left shoulder has settled down. Objective: See treatment diary below      Assessment: Tolerated treatment well. Completes all TE this visit with no complaints of pain. Progress ROM and strengthening to seated and standing positions nv. Patient demonstrated fatigue post treatment, exhibited good technique with therapeutic exercises, and would benefit from continued PT. Plan: Continue per plan of care.       Precautions: Diabetes mellitus, HTN, history of sciatica, dyspnea on exertion      Manuals 10/20 10/23 10/25 10/30 11/1 11/6 11/8      Manual cervical distraction nv AC Done  Done Done  KM       L UT/levator STM nv Done to pt. anjelica Done  Done Done  KM Done      L cervical lateral glides (as tolerated)             Cervical traction unit nv NP                        Neuro Re-Ed             Assess neural tension nv            Cervical isos flexion/extension nv 5" x 10 flex  P! ext 5''x10 flex 10x5" flex supine 20x5'' flex supine 20x5" flex supine       Cervical isos sidebending nv 5" x 10 ea  5''x10 ea Supine 10x5" b/l Supine 20x5'' b/l Supine 20x5" b/l Supine 20x5" b/l      Cervical isos rotation nv 5" x 10 5''x10 ROM only Supine 10x5" b/l Supine 20x5'' b/l Supine 20x5" Supine 20x5"                                             Ther Ex             Scapular retractions nv 5" x 10  5''x20 2x10x5" 2x10x5'' 2x10x5" 3x10x5"                   TB low rows    nv Pink 2x10 Pink 2x10 GTB 3x10      TB mid rows    nv Pink 2x10 Pink 2x10 BTB 3x10      Chin tuck seated   Supine 10x3'' gentle Supine 2x10x5" Supine 2x10x5'' Supine 10x5" Supine 20x5"      Cervical SNAGs rotation- supine   10x ea  10x ea towel 10x ea towel 10x ea towel 10x ea towel  10x ea AROM      TB ER with scap retraction       Pink 3x10                   Ther Activity             Pt education done                         Modalities             MHP prn

## 2023-11-13 ENCOUNTER — OFFICE VISIT (OUTPATIENT)
Dept: PHYSICAL THERAPY | Facility: REHABILITATION | Age: 63
End: 2023-11-13
Payer: COMMERCIAL

## 2023-11-13 DIAGNOSIS — M54.12 CERVICAL RADICULOPATHY: Primary | ICD-10-CM

## 2023-11-13 PROCEDURE — 97140 MANUAL THERAPY 1/> REGIONS: CPT

## 2023-11-13 PROCEDURE — 97112 NEUROMUSCULAR REEDUCATION: CPT

## 2023-11-13 PROCEDURE — 97110 THERAPEUTIC EXERCISES: CPT

## 2023-11-13 NOTE — PROGRESS NOTES
Daily Note     Today's date: 2023  Patient name: Manolo Kitchen  : 1960  MRN: [id number]  Referring provider: Peter Martinez MD  Dx:   Encounter Diagnosis     ICD-10-CM    1. Cervical radiculopathy  M54.12                      Subjective: Patient reports cont progress, his L UT has not been as painful. Objective: See treatment diary below      Assessment: Tolerated treatment well. Small cues for technique to ensure proper mechanics. with good carryover. He was able to progress chin tucks to seated with good form. Resistance progressed with bilateral ER with some discomfort to L lateral UE that returns to baseline at rest. L UT>R compensation with rows and ext, he is able to self correct at times and others requires tactile cues to relax UT. Added supine serratus punch to reduce scapular elevation and improve scapular control, good challenge noted with hold times. Cont to feel relief in tension following manuals. Patient demonstrated fatigue post treatment and would benefit from continued PT      Plan: Progress treatment as tolerated.        Precautions: Diabetes mellitus, HTN, history of sciatica, dyspnea on exertion      Manuals 10/20 10/23 10/25 10/30 11/1 11/6 11/8 11/13     Manual cervical distraction nv AC Done  Done Done  KM       L UT/levator STM nv Done to pt. anjelica Done  Done Done  KM Done done     L cervical lateral glides (as tolerated)             Cervical traction unit nv NP                        Neuro Re-Ed             Assess neural tension nv            Cervical isos flexion/extension nv 5" x 10 flex  P! ext 5''x10 flex 10x5" flex supine 20x5'' flex supine 20x5" flex supine  20x5" flex supine     Cervical isos sidebending nv 5" x 10 ea  5''x10 ea Supine 10x5" b/l Supine 20x5'' b/l Supine 20x5" b/l Supine 20x5" b/l Supine 20x 5" B     Cervical isos rotation nv 5" x 10 5''x10 ROM only Supine 10x5" b/l Supine 20x5'' b/l Supine 20x5" Supine 20x5" Supine 20x 5" B     Serratus punch          Supine 20x5" B                               Ther Ex             Scapular retractions nv 5" x 10  5''x20 2x10x5" 2x10x5'' 2x10x5" 3x10x5" 3x10 5"                  TB low rows    nv Pink 2x10 Pink 2x10 GTB 3x10 GTB 3x10     TB mid rows    nv Pink 2x10 Pink 2x10 BTB 3x10 BTB 3x10      Chin tuck seated   Supine 10x3'' gentle Supine 2x10x5" Supine 2x10x5'' Supine 10x5" Supine 20x5" Seated 20x 5"     Cervical SNAGs rotation- supine   10x ea  10x ea towel 10x ea towel 10x ea towel 10x ea towel  10x ea AROM Seated 10x ea AROM  10x10" ea towel     TB ER with scap retraction       Pink 3x10 Seated OTB 3x10                   Ther Activity             Pt education done                         Modalities             MHP prn

## 2023-11-15 ENCOUNTER — OFFICE VISIT (OUTPATIENT)
Dept: PHYSICAL THERAPY | Facility: REHABILITATION | Age: 63
End: 2023-11-15
Payer: COMMERCIAL

## 2023-11-15 DIAGNOSIS — M54.12 CERVICAL RADICULOPATHY: Primary | ICD-10-CM

## 2023-11-15 PROCEDURE — 97112 NEUROMUSCULAR REEDUCATION: CPT | Performed by: PHYSICAL THERAPIST

## 2023-11-15 PROCEDURE — 97110 THERAPEUTIC EXERCISES: CPT | Performed by: PHYSICAL THERAPIST

## 2023-11-15 PROCEDURE — 97140 MANUAL THERAPY 1/> REGIONS: CPT | Performed by: PHYSICAL THERAPIST

## 2023-11-15 NOTE — PROGRESS NOTES
Daily Note/Discharge Note     Today's date: 11/15/2023  Patient name: David Pereira  : 1960  MRN: [id number]  Referring provider: Taj Huitron MD  Dx:   Encounter Diagnosis     ICD-10-CM    1. Cervical radiculopathy  M54.12           Start Time: 08  Stop Time: 0845  Total time in clinic (min): 43 minutes    Subjective: No new complaints at start of session with patient reporting the has been feeling pretty good. Objective: See treatment diary below        Assessment: Lamont Blanco has made the following improvements since beginning PT: decreased pain, increased ROM, increased strength, and increased tolerance to activities. Lamont Blanco and PT mutually agree to transition to HEP at this time secondary to gains made in PT. he has been given updated HEP with verbalized understanding and compliance. Lamont Blanco is encouraged to contact PT with any questions or concerns in the future. Plan: Plan to discharge with comprehensive HEP for continued and maintained gains made in PT.        Precautions: Diabetes mellitus, HTN, history of sciatica, dyspnea on exertion      Manuals 10/20 10/23 10/25 10/30 11/1 11/6 11/8 11/13 11/15    Manual cervical distraction nv AC Done  Done Done  KM       L UT/levator STM nv Done to pt. anjelica Done  Done Done  KM Done done done    L cervical lateral glides (as tolerated)             Cervical traction unit nv NP                        Neuro Re-Ed             Assess neural tension nv            Cervical isos flexion/extension nv 5" x 10 flex  P! ext 5''x10 flex 10x5" flex supine 20x5'' flex supine 20x5" flex supine  20x5" flex supine Seated flex 20x5"    Cervical isos sidebending nv 5" x 10 ea  5''x10 ea Supine 10x5" b/l Supine 20x5'' b/l Supine 20x5" b/l Supine 20x5" b/l Supine 20x 5" B Seated 20x5"    Cervical isos rotation nv 5" x 10 5''x10 ROM only Supine 10x5" b/l Supine 20x5'' b/l Supine 20x5" Supine 20x5" Supine 20x 5" B Seated 20x5"    Serratus punch          Supine 20x5" B 8# 3x10                              Ther Ex             Scapular retractions nv 5" x 10  5''x20 2x10x5" 2x10x5'' 2x10x5" 3x10x5" 3x10 5"                  TB low rows    nv Pink 2x10 Pink 2x10 GTB 3x10 GTB 3x10 BTB 3x12    TB mid rows    nv Pink 2x10 Pink 2x10 BTB 3x10 BTB 3x10  Purple 3x12    Chin tuck seated   Supine 10x3'' gentle Supine 2x10x5" Supine 2x10x5'' Supine 10x5" Supine 20x5" Seated 20x 5" Seated 20x5"    Cervical SNAGs rotation- supine   10x ea  10x ea towel 10x ea towel 10x ea towel 10x ea towel  10x ea AROM Seated 10x ea AROM  10x10" ea towel     TB ER with scap retraction       Pink 3x10 Seated OTB 3x10  Pink 3x12                 Ther Activity             Pt education done                         Modalities             MHP prn

## 2023-11-17 ENCOUNTER — TELEPHONE (OUTPATIENT)
Age: 63
End: 2023-11-17

## 2023-11-17 NOTE — TELEPHONE ENCOUNTER
[facility] Cardiology NPI [id number]  Dr Nick Michaud [id number]  [address]  Phone [phone] 11/28/2023  DX R06.09, Shirley Estevez NPI [id number]  Dr Ronan Stubbs  [address] [facility] [address] [facility]  Phone [phone]  DOS 11/30/2023  DX N85.380    Im sorry, Im still confused who and what dept gets what so Im sending to [facility] and office.     Thanks

## 2023-11-28 ENCOUNTER — OFFICE VISIT (OUTPATIENT)
Dept: CARDIOLOGY CLINIC | Facility: CLINIC | Age: 63
End: 2023-11-28
Payer: COMMERCIAL

## 2023-11-28 VITALS
SYSTOLIC BLOOD PRESSURE: 138 MMHG | OXYGEN SATURATION: 100 % | HEART RATE: 65 BPM | HEIGHT: 71 IN | WEIGHT: 278.3 LBS | BODY MASS INDEX: 38.96 KG/M2 | DIASTOLIC BLOOD PRESSURE: 82 MMHG

## 2023-11-28 DIAGNOSIS — I49.3 PVC (PREMATURE VENTRICULAR CONTRACTION): ICD-10-CM

## 2023-11-28 DIAGNOSIS — N18.31 TYPE 2 DIABETES MELLITUS WITH STAGE 3A CHRONIC KIDNEY DISEASE, WITH LONG-TERM CURRENT USE OF INSULIN (HCC): ICD-10-CM

## 2023-11-28 DIAGNOSIS — Z79.4 TYPE 2 DIABETES MELLITUS WITH STAGE 3A CHRONIC KIDNEY DISEASE, WITH LONG-TERM CURRENT USE OF INSULIN (HCC): ICD-10-CM

## 2023-11-28 DIAGNOSIS — E78.2 MIXED HYPERLIPIDEMIA: ICD-10-CM

## 2023-11-28 DIAGNOSIS — E11.22 TYPE 2 DIABETES MELLITUS WITH STAGE 3A CHRONIC KIDNEY DISEASE, WITH LONG-TERM CURRENT USE OF INSULIN (HCC): ICD-10-CM

## 2023-11-28 DIAGNOSIS — I10 ESSENTIAL HYPERTENSION: Primary | ICD-10-CM

## 2023-11-28 PROCEDURE — 99214 OFFICE O/P EST MOD 30 MIN: CPT | Performed by: INTERNAL MEDICINE

## 2023-11-28 NOTE — PATIENT INSTRUCTIONS
24 hour holter monitor to assess PVCs. Continue current cardiac medications. Continue modification of cardiac risk factors including control of blood glucose, cholesterol, blood pressure and body weight. Cardiac risk can be lowered with increase in physical activity, plant-based or Mediterranean style start diet, and avoidance of tobacco products. Report any worsening cardiac symptoms (chest pain,shortness of breath with exertion or fainting). Keep follow-up as planned.

## 2023-11-28 NOTE — PROGRESS NOTES
[facility]  [address]  [address]  [facility] [address]  Phone#  [phone]  Fax#  [phone]  [facility] Cardiology Office Follow-up Visit             NAME: Osvaldo Power  AGE: 61 y.o. SEX: male   : 1960   MRN: [id number]    DATE: 2023  TIME: 7:58 AM    Cardiology Problem list:  Dyspnea:  Abn EKG: Old inferior infarct, poor R wave progression, PVC, sinus rhythm  : SPECT: EF 60, no ischemia, EF 48%  Frequent PVCs: Asymptomatic  : Echo EF 60%, mild LVH, aortic sclerosis  HTN  DM  CKD IIIa  Sleep apnea: On CPAP    Assessment and plan:    Dyspnea  Chronic in nature. Overall sedentary lifestyle. Doing some yard work but will transition to treadmill soon. No ischemia on nuclear stress test.  Echo revealed preserved LV function, mild LVH and mild aortic sclerosis. PVCs  Asymptomatic from the PVCs. LV function normal on echo. Frequent ectopy on exam today. 24 hour holter monitor to assess PVC burden. HR too low for BB. Hypertension  BP Readings from Last 3 Encounters:   23 138/82   10/02/23 170/92   23 138/84   Continue current medications. Lifestyle modification. Close blood pressure monitoring. Diabetes with dyslipidemia  Intolerant to Fiez which caused UTI/sepsis. Continue statin therapy. Obesity  Weight down by 7lbs. Chief Complaint   Patient presents with    Follow-up     3 month f/u        HPI:    Osvaldo Power is a 61y.o.-year-old male who presents to the cardiology clinic for follow up for the above-listed problems. He returns to the clinic on follow-up on dyspnea and fatigue. Initially attributed this to sedentary lifestyle. Nuclear stress test showed no ischemia. No definite exertional chest pain. Denies palpitations. No orthopnea or PND reported. Poststress ejection fraction was 48% on nuclear stress test. Follow-up echo EF was normal at 60%. Mild aortic sclerosis also was seen.  On CPAP for JEFF.    Past history, family history, social history, current medications, vital signs, recent lab and imaging studies and  prior cardiology studies reviewed independently on this visit. No Known Allergies    Current Outpatient Medications:     amLODIPine (NORVASC) 10 mg tablet, TAKE 1 TABLET DAILY, Disp: 90 tablet, Rfl: 3    atorvastatin (LIPITOR) 10 mg tablet, TAKE 1 TABLET DAILY, Disp: 90 tablet, Rfl: 1    BD Pen Needle Alicia 2nd Gen 32G X 4 MM MISC, INJECT UNDER THE SKIN DAILY, Disp: 90 each, Rfl: 3    buPROPion (WELLBUTRIN XL) 150 mg 24 hr tablet, TAKE 1 TABLET DAILY, Disp: 90 tablet, Rfl: 3    cholecalciferol (VITAMIN D3) 400 units tablet, Take 400 Units by mouth daily, Disp: , Rfl:     cloNIDine (CATAPRES) 0.3 mg tablet, TAKE 1 TABLET TWICE A DAY, Disp: 180 tablet, Rfl: 3    Cyanocobalamin (Vitamin B 12) 500 MCG TABS, Take by mouth, Disp: , Rfl:     dulaglutide (Trulicity) 4.5 ME/7.1PY injection, Inject 0.5 mL (4.5 mg total) under the skin once a week, Disp: 6 mL, Rfl: 1    enalapril (VASOTEC) 20 mg tablet, TAKE 1 TABLET DAILY, Disp: 90 tablet, Rfl: 3    fluticasone (FLONASE) 50 mcg/act nasal spray, 1 spray into each nostril daily, Disp: 48 g, Rfl: 1    gabapentin (Neurontin) 300 mg capsule, Take 1 capsule (300 mg total) by mouth 3 (three) times a day as needed (pain), Disp: 90 capsule, Rfl: 1    glimepiride (AMARYL) 4 mg tablet, TAKE 1 TABLET TWICE A DAY, Disp: 180 tablet, Rfl: 3    Insulin Glargine Solostar (Basaglar KwikPen) 100 UNIT/ML SOPN, Inject 0.4 mL (40 Units total) under the skin daily, Disp: 30 mL, Rfl: 3    nystatin (MYCOSTATIN) powder, Apply topically 3 (three) times a day as needed (groin rash), Disp: 60 g, Rfl: 1    sertraline (ZOLOFT) 100 mg tablet, TAKE 1 TABLET DAILY, Disp: 90 tablet, Rfl: 3    tamsulosin (FLOMAX) 0.4 mg, TAKE 1 CAPSULE DAILY, Disp: 90 capsule, Rfl: 3    Review of Systems   Constitutional:  Positive for fatigue. Negative for fever.    Respiratory:  Negative for cough and shortness of breath. Cardiovascular:  Negative for chest pain, palpitations and leg swelling. Musculoskeletal:  Positive for arthralgias. Skin:  Negative for rash. Neurological:  Negative for syncope. Hematological:  Does not bruise/bleed easily. All other systems reviewed and are negative. Objective:     Vitals:    11/28/23 0745   BP: 138/82   Pulse: 65   SpO2: 100%     Wt Readings from Last 3 Encounters:   11/28/23 126 kg (278 lb 4.8 oz)   10/02/23 129 kg (285 lb)   09/28/23 129 kg (284 lb)     Pulse Readings from Last 3 Encounters:   11/28/23 65   10/02/23 80   09/28/23 80     BP Readings from Last 3 Encounters:   11/28/23 138/82   10/02/23 170/92   09/28/23 138/84     Physical Exam  Vitals reviewed. Constitutional:       General: He is not in acute distress. Appearance: He is obese. HENT:      Head: Normocephalic. Neck:      Vascular: No carotid bruit. Cardiovascular:      Rate and Rhythm: Normal rate and regular rhythm. Frequent Extrasystoles are present. Heart sounds: S1 normal and S2 normal. No murmur heard. Pulmonary:      Breath sounds: No wheezing or rhonchi. Musculoskeletal:      Right lower leg: No edema. Left lower leg: No edema. Skin:     General: Skin is warm. Neurological:      Mental Status: He is alert. Mental status is at baseline.    Psychiatric:         Mood and Affect: Mood normal.         Pertinent Laboratory/Diagnostic Studies:    Laboratory studies reviewed personally by Myra Talamantes MD    BMP:   Lab Results   Component Value Date    SODIUM 137 05/28/2023    K 4.2 05/28/2023     05/28/2023    CO2 24 05/28/2023    BUN 27 (H) 05/28/2023    CREATININE 1.47 (H) 05/28/2023    GLUC 132 12/18/2022    CALCIUM 9.1 05/28/2023     CBC:  Lab Results   Component Value Date    WBC 7.26 02/25/2023    HGB 15.1 02/25/2023    HCT 46.0 02/25/2023    MCV 94 02/25/2023     02/25/2023     Lipid Profile:   Lab Results   Component Value Date    HDL 35 (L) 02/25/2023     Lab Results   Component Value Date    LDLCALC 63 02/25/2023     Lab Results   Component Value Date    TRIG 118 02/25/2023      Other labs:  Lab Results   Component Value Date    ZNO4QZNSHGGO 0.505 05/28/2023     Lab Results   Component Value Date    ALT 15 05/28/2023    AST 13 05/28/2023       Imaging Studies:     Pertinent imaging studies and cardiac studies were independently reviewed on this visit and findings summarized. Visit diagnoses  1. Essential hypertension        2. PVC (premature ventricular contraction)        3. Mixed hyperlipidemia        4. Type 2 diabetes mellitus with stage 3a chronic kidney disease, with long-term current use of insulin (HCC)            Alex Pop MD, [facility]    Portions of the record may have been created with voice recognition software. Occasional wrong word or "sound alike" substitutions may have occurred due to the inherent limitations of voice recognition software. Read the chart carefully and recognize, using context, where substitutions have occurred. Please reach out to me directly for any clarifications.

## 2023-11-30 ENCOUNTER — OFFICE VISIT (OUTPATIENT)
Age: 63
End: 2023-11-30
Payer: COMMERCIAL

## 2023-11-30 VITALS
WEIGHT: 278 LBS | DIASTOLIC BLOOD PRESSURE: 95 MMHG | SYSTOLIC BLOOD PRESSURE: 168 MMHG | RESPIRATION RATE: 16 BRPM | HEIGHT: 71 IN | HEART RATE: 89 BPM | BODY MASS INDEX: 38.92 KG/M2

## 2023-11-30 DIAGNOSIS — M21.962 ACQUIRED DEFORMITY OF BOTH FEET: ICD-10-CM

## 2023-11-30 DIAGNOSIS — M77.42 METATARSALGIA OF BOTH FEET: Primary | ICD-10-CM

## 2023-11-30 DIAGNOSIS — M21.961 ACQUIRED DEFORMITY OF BOTH FEET: ICD-10-CM

## 2023-11-30 DIAGNOSIS — M77.41 METATARSALGIA OF BOTH FEET: Primary | ICD-10-CM

## 2023-11-30 DIAGNOSIS — M21.969 ACQUIRED DEFORMITY OF FOOT, UNSPECIFIED LATERALITY: ICD-10-CM

## 2023-11-30 DIAGNOSIS — B35.1 ONYCHOMYCOSIS: ICD-10-CM

## 2023-11-30 DIAGNOSIS — E11.42 DIABETIC POLYNEUROPATHY ASSOCIATED WITH TYPE 2 DIABETES MELLITUS (HCC): ICD-10-CM

## 2023-11-30 PROCEDURE — 99213 OFFICE O/P EST LOW 20 MIN: CPT | Performed by: PODIATRIST

## 2023-11-30 NOTE — PROGRESS NOTES
Assessment. Pain upon ambulation. Peripheral artery disease. Diabetic neuropathy. Callus formation. Mycosis of nail. Acquired deformity foot bilateral.  Peroneal tendinitis right foot. Dermatophytosis. Plan. Chart reviewed. Foot exam performed. Nails debrided. Calluses debrided. Patient will stretch daily. Procedures performed without pain or complication. Patient educated on care of the diabetic foot. Chief Complaint   Patient needs full diabetic foot exam.  Patient has pain upon ambulation. Patient complains of pain in his toes when he wears shoes. He gets pain in the ball of the foot. No history of trauma. History of Present Illness   HPI: Patient presents for pedal evaluation. Patient complains of pain in his feet and toes with ambulation. Patient is a morbidly obese diabetic who has some electric sensations in his feet on occasion. Review of Systems        Constitutional: not feeling tired. Eyes: no eyesight problems. ENT: no nasal discharge. Cardiovascular: no chest pain,-- no palpitations-- and-- no extremity edema. Respiratory: no shortness of breath-- and-- no cough. Gastrointestinal: no abdominal pain-- and-- no constipation. Genitourinary: no dysuria-- and-- no urinary hesitancy. Integumentary: no skin wound. Neurological: no tingling-- and-- no dizziness. Psychiatric: sleeping better, stopped taking trazodone, but-- no sleep disturbances. Endocrine: no feelings of weakness. Active Problems   1. Acquired pes planus (734) (M21.40)   2. Acute renal failure (584.9) (N17.9)   3. Adhesive capsulitis of both shoulders (726.0) (M75.01,M75.02)   4. Arthralgia (719.40) (M25.50)   5. BPH without urinary obstruction (600.00) (N40.0)   6. Bunion (727.1) (M21.619)   7. Callus (700) (L84)   8. Chronic systolic congestive heart failure (428.22,428.0) (I50.22)   9. Depression with anxiety (300.4) (F41.8)   10.  Diabetes mellitus with neurological manifestation (250.60) (E11.49)   11. Diabetes type 2, uncontrolled (250.02) (E11.65)   12. Difficulty concentrating (799.51) (R41.840)   13. Difficulty walking (719.7) (R26.2)   14. Dyspnea on exertion (786.09) (R06.09)   15. Encounter for prostate cancer screening (V76.44) (Z12.5)   16. Encounter for screening for malignant neoplasm of colon (V76.51) (Z12.11)   17. Fatigue (780.79) (R53.83)   18. Foot pain, bilateral (729.5) (M79.671,M79.672)   19. Hallux valgus (735.0) (M20.10)   20. Hematuria (599.70) (R31.9)   21. Hyperlipidemia (272.4) (E78.5)   22. Hypertension (401.9) (I10)   23. Hypogonadism, male (257.2) (E29.1)   24. Insomnia (780.52) (G47.00)   25. Morbid or severe obesity due to excess calories (278.01) (E66.01)   26. Need for pneumococcal vaccination (V03.82) (Z23)   27. Need for prophylactic vaccination and inoculation against influenza (V04.81) (Z23)   28. Nephrolithiasis (592.0) (N20.0)   29. Onychomycosis (110.1) (B35.1)   30. JEFF (obstructive sleep apnea) (327.23) (G47.33)   31. Sciatica (724.3) (M54.30)   32. Screening for colon cancer (V76.51) (Z12.11)   33. Seasonal allergies (477.9) (J30.2)   34. Shoulder pain, right (719.41) (M25.511)   35. Spinal stenosis (724.00) (M48.00)   36. Tinea pedis (110.4) (B35.3)   37. Type 2 diabetes mellitus (250.00) (E11.9)   38.  Vitamin D deficiency (268.9) (E55.9)     Past Medical History    · History of Cellulitis of left leg (682.6) (L03.116)   · History of chest pain (V13.89) (O79.615)   · History of diarrhea (V12.79) (X23.234)   · History of onychomycosis (V12.09) (Z86.19)   · History of onychomycosis (V12.09) (Z86.19)   · History of Limb pain (729.5) (M79.609)   · History of Neck pain (723.1) (M54.2)   · Need for pneumococcal vaccination (V03.82) (Z23)   · History of Nephrolithiasis (V13.01)   · History of Numbness On The Sole Of The Right Foot   · History of Pain and swelling of left lower leg (729.5,729.81) (M79.662,M79.89)   · History of Pain of lower extremity (729.5) (M79.606)   · History of Pain, hand joint, right (719.44) (M79.641)   · Sciatica (724.3) (M54.30)     The active problems and past medical history were reviewed and updated today. Surgical History    · History of Knee Arthroscopy (Therapeutic)   · History of Knee Surgery   · History of Needle Biopsy Of Prostate     Family History   Mother    · Family history of Alzheimer Disease   · Family history of Family Health Status Of Mother - Alive  Father    · Family history of Family Health Status Of Father -   Family History    · Family history of Adopted   · Denied: Family history of Colon Cancer   · Denied: Family history of Crohn's Disease   · Denied: Family history of Liver Cancer     Social History    · Denied: History of Alcohol Use (History)   · Current Some Day Smoker (305.1)   · Denied: History of Exercise Habits   · Marital History - Currently    · Tobacco use (305.1) (Z72.0)   · Working Full Time  The social history was reviewed and is unchanged. The medication list was reviewed and updated today. Allergies   1. No Known Drug Allergies   Physical Exam   Left Foot: Appearance: Normal except as noted: excessive pronation-- and-- pes planus. Great toe deformities include a bunion. Tenderness: None except the great toe-- and-- distal first metatarsal.    Right Foot: Appearance: Normal except as noted: excessive pronation-- and-- pes planus. Great toe deformities include a bunion. Tenderness: None except the great toe-- and-- distal first metatarsal.    Left Ankle: ROM: limited ROM in all planes    Right Ankle: ROM: limited ROM in all planes    Neurological Exam: performed. Light touch was decreased bilaterally. Vibratory sensation was decreased in both first metatarsophalangeal joints. Vascular Exam: performed Dorsalis pedis pulses were present bilaterally. Posterior tibial pulses were present bilaterally. Elevation Pallor: absent bilaterally. Dependence rubor was absent bilaterally. Edema: none. Toenails: All of the toenails were elongated,-- hypertrophied,-- discolored-- and-- Ptotic. Both first toenails were tender-- and-- Positive onychogryphosis. Socks and shoes removed, Right Foot Findings: swollen, erythematous and dry. The sensory exam showed diminished vibratory sensation at the level of the toes. Diminished tactile sensation with monofilament testing throughout the right foot. Socks and shoes removed, Left Foot Findings: swollen, erythematous and dry. The sensory exam showed diminished vibratory sensation at the level of the toes. Diminished tactile sensation with monofilament testing throughout the left foot. Capillary refills findings on the right were normal in the toes. Pulses:      2+ in the posterior tibialis on the right      2+ in the dorsalis pedis on the right. Capillary refills findings on the left were normal in the toes. Pulses:      1+ in the posterior tibialis on the left      1+ in the dorsalis pedis on the left. Assign Risk Category: 2: Loss of protective sensation with or without weakness, deformity, callus, pre-ulcer, or history of ulceration. High risk. Hyperkeratosis: present on both first toes,-- present on both first sub metatarsals,-- present on both third sub metatarsals-- and-- Severe profound moccasin tinea pedis bilateral.    Shoe Gear Evaluation: performed (). Recommendation(s): SAS style. Right Foot/Ankle   Right Foot Inspection        Sensory   Vibration: diminished  Proprioception: diminished  Monofilament testing: diminished     Vascular  Capillary refills: < 3 seconds              Left Foot/Ankle  Left Foot Inspection        Sensory   Vibration: diminished  Proprioception: diminished  Monofilament testing: diminished     Vascular  Capillary refills: < 3 seconds         Patient's shoes and socks removed.      Right Foot/Ankle   Right Foot Inspection Sensory   Vibration: diminished  Proprioception: diminished  Monofilament testing: intact     Vascular  Capillary refills: < 3 seconds  The right DP pulse is 1+. The right PT pulse is 1+. Left Foot/Ankle  Left Foot Inspection        Sensory   Vibration: diminished  Proprioception: diminished  Monofilament testing: intact     Vascular  Capillary refills: < 3 seconds  The left DP pulse is 1+. The left PT pulse is 1+. Patient's shoes and socks removed. Right Foot/Ankle   Right Foot Inspection      Toe Exam: right toe deformity. Sensory   Vibration: diminished      Vascular  Capillary refills: < 3 seconds      Left Foot/Ankle  Left Foot Inspection      Toe Exam: left toe deformity.      Sensory   Vibration: diminished      Vascular  Capillary refills: < 3 seconds      Assign Risk Category  Deformity present  Loss of protective sensation  No weak pulses  Risk: 2

## 2023-12-15 ENCOUNTER — HOSPITAL ENCOUNTER (OUTPATIENT)
Dept: NON INVASIVE DIAGNOSTICS | Facility: CLINIC | Age: 63
Discharge: HOME | End: 2023-12-15
Payer: COMMERCIAL

## 2023-12-15 DIAGNOSIS — I49.3 PVC (PREMATURE VENTRICULAR CONTRACTION): ICD-10-CM

## 2023-12-15 PROCEDURE — 93226 XTRNL ECG REC<48 HR SCAN A/R: CPT

## 2023-12-15 PROCEDURE — 93225 XTRNL ECG REC<48 HRS REC: CPT

## 2023-12-18 PROCEDURE — 93227 XTRNL ECG REC<48 HR R&I: CPT | Performed by: INTERNAL MEDICINE

## 2023-12-18 NOTE — RESULT ENCOUNTER NOTE
Holter monitor shows frequent PVCs-these are skipped beats from the lower cardiac chamber as was noted previously on examination.  Fortunately heart function was normal on the previous echo.  We will continue to monitor echo on annual basis to ensure that these PVCs do not lead to weakness of the heart muscle.  Continue present medications.  Please call patient with  test results.

## 2023-12-20 DIAGNOSIS — E11.40 TYPE 2 DIABETES MELLITUS WITH DIABETIC NEUROPATHY, WITHOUT LONG-TERM CURRENT USE OF INSULIN (HCC): ICD-10-CM

## 2023-12-20 DIAGNOSIS — M54.12 CERVICAL RADICULOPATHY: ICD-10-CM

## 2023-12-20 DIAGNOSIS — F33.1 MODERATE EPISODE OF RECURRENT MAJOR DEPRESSIVE DISORDER (HCC): ICD-10-CM

## 2023-12-20 DIAGNOSIS — F41.8 DEPRESSION WITH ANXIETY: ICD-10-CM

## 2023-12-20 DIAGNOSIS — I10 ESSENTIAL HYPERTENSION: ICD-10-CM

## 2023-12-20 DIAGNOSIS — E78.2 MIXED HYPERLIPIDEMIA: ICD-10-CM

## 2023-12-20 DIAGNOSIS — N40.0 BPH WITHOUT URINARY OBSTRUCTION: ICD-10-CM

## 2023-12-20 RX ORDER — SERTRALINE HYDROCHLORIDE 100 MG/1
100 TABLET, FILM COATED ORAL DAILY
Qty: 90 TABLET | Refills: 1 | Status: SHIPPED | OUTPATIENT
Start: 2023-12-20

## 2023-12-20 RX ORDER — GABAPENTIN 300 MG/1
300 CAPSULE ORAL 3 TIMES DAILY PRN
Qty: 90 CAPSULE | Refills: 1 | Status: SHIPPED | OUTPATIENT
Start: 2023-12-20

## 2023-12-20 RX ORDER — ENALAPRIL MALEATE 20 MG/1
20 TABLET ORAL DAILY
Qty: 90 TABLET | Refills: 1 | Status: SHIPPED | OUTPATIENT
Start: 2023-12-20

## 2023-12-20 RX ORDER — CLONIDINE HYDROCHLORIDE 0.3 MG/1
0.3 TABLET ORAL 2 TIMES DAILY
Qty: 180 TABLET | Refills: 0 | Status: SHIPPED | OUTPATIENT
Start: 2023-12-20

## 2023-12-20 RX ORDER — INSULIN GLARGINE 100 [IU]/ML
40 INJECTION, SOLUTION SUBCUTANEOUS DAILY
Qty: 30 ML | Refills: 0 | Status: SHIPPED | OUTPATIENT
Start: 2023-12-20

## 2023-12-20 RX ORDER — TAMSULOSIN HYDROCHLORIDE 0.4 MG/1
0.4 CAPSULE ORAL DAILY
Qty: 90 CAPSULE | Refills: 1 | Status: SHIPPED | OUTPATIENT
Start: 2023-12-20

## 2023-12-20 RX ORDER — BUPROPION HYDROCHLORIDE 150 MG/1
150 TABLET ORAL DAILY
Qty: 90 TABLET | Refills: 1 | Status: SHIPPED | OUTPATIENT
Start: 2023-12-20

## 2023-12-20 RX ORDER — ATORVASTATIN CALCIUM 10 MG/1
10 TABLET, FILM COATED ORAL DAILY
Qty: 90 TABLET | Refills: 1 | Status: SHIPPED | OUTPATIENT
Start: 2023-12-20

## 2023-12-20 RX ORDER — GLIMEPIRIDE 4 MG/1
4 TABLET ORAL 2 TIMES DAILY
Qty: 180 TABLET | Refills: 0 | Status: SHIPPED | OUTPATIENT
Start: 2023-12-20

## 2023-12-20 RX ORDER — AMLODIPINE BESYLATE 10 MG/1
10 TABLET ORAL DAILY
Qty: 90 TABLET | Refills: 1 | Status: SHIPPED | OUTPATIENT
Start: 2023-12-20

## 2024-01-17 DIAGNOSIS — Z00.6 ENCOUNTER FOR EXAMINATION FOR NORMAL COMPARISON OR CONTROL IN CLINICAL RESEARCH PROGRAM: ICD-10-CM

## 2024-01-18 ENCOUNTER — APPOINTMENT (OUTPATIENT)
Dept: LAB | Facility: CLINIC | Age: 64
End: 2024-01-18

## 2024-01-18 DIAGNOSIS — Z00.6 ENCOUNTER FOR EXAMINATION FOR NORMAL COMPARISON OR CONTROL IN CLINICAL RESEARCH PROGRAM: ICD-10-CM

## 2024-01-18 PROCEDURE — 36415 COLL VENOUS BLD VENIPUNCTURE: CPT

## 2024-02-08 ENCOUNTER — OFFICE VISIT (OUTPATIENT)
Age: 64
End: 2024-02-08
Payer: COMMERCIAL

## 2024-02-08 VITALS
BODY MASS INDEX: 39.4 KG/M2 | HEIGHT: 71 IN | WEIGHT: 281.4 LBS | SYSTOLIC BLOOD PRESSURE: 136 MMHG | DIASTOLIC BLOOD PRESSURE: 86 MMHG | HEART RATE: 76 BPM | RESPIRATION RATE: 17 BRPM

## 2024-02-08 DIAGNOSIS — M21.961 ACQUIRED DEFORMITY OF BOTH FEET: ICD-10-CM

## 2024-02-08 DIAGNOSIS — M77.41 METATARSALGIA OF BOTH FEET: Primary | ICD-10-CM

## 2024-02-08 DIAGNOSIS — B35.1 ONYCHOMYCOSIS: ICD-10-CM

## 2024-02-08 DIAGNOSIS — M21.962 ACQUIRED DEFORMITY OF BOTH FEET: ICD-10-CM

## 2024-02-08 DIAGNOSIS — E11.42 DIABETIC POLYNEUROPATHY ASSOCIATED WITH TYPE 2 DIABETES MELLITUS (HCC): ICD-10-CM

## 2024-02-08 DIAGNOSIS — M77.42 METATARSALGIA OF BOTH FEET: Primary | ICD-10-CM

## 2024-02-08 PROCEDURE — 99213 OFFICE O/P EST LOW 20 MIN: CPT | Performed by: PODIATRIST

## 2024-02-08 NOTE — PROGRESS NOTES
Assessment.  Pain upon ambulation.  Peripheral artery disease.  Diabetic neuropathy.  Callus formation.  Mycosis of nail.  Acquired deformity foot bilateral.  Peroneal tendinitis right foot.  Dermatophytosis.     Plan.  Chart reviewed.  PCP notes reviewed.  Lab work reviewed.  Foot exam performed.  Patient advised on proper shoe style and sizing.  Watch for signs of infection.  Foot hygiene instruction given.  Patient will moisturize daily.  Patient will stretch daily.  Procedures performed without pain or complication.  Patient educated on care of the diabetic foot.  Care instruction given.  Return for follow-up.  Patient will consider vitamin B complex.        Chief Complaint   Patient needs full diabetic foot exam.  Patient has pain upon ambulation.  Patient complains of pain in his toes when he wears shoes.  He gets pain in the ball of the foot.  No history of trauma.     History of Present Illness   HPI: Patient presents for pedal evaluation. Patient complains of pain in his feet and toes with ambulation. Patient is a morbidly obese diabetic who has some electric sensations in his feet on occasion.      Review of Systems        Constitutional: not feeling tired.      Eyes: no eyesight problems.      ENT: no nasal discharge.      Cardiovascular: no chest pain,-- no palpitations-- and-- no extremity edema.      Respiratory: no shortness of breath-- and-- no cough.      Gastrointestinal: no abdominal pain-- and-- no constipation.      Genitourinary: no dysuria-- and-- no urinary hesitancy.      Integumentary: no skin wound.      Neurological: no tingling-- and-- no dizziness.      Psychiatric: sleeping better, stopped taking trazodone, but-- no sleep disturbances.      Endocrine: no feelings of weakness.      Active Problems   1. Acquired pes planus (734) (M21.40)   2. Acute renal failure (584.9) (N17.9)   3. Adhesive capsulitis of both shoulders (726.0) (M75.01,M75.02)   4. Arthralgia (719.40) (M25.50)   5. BPH  without urinary obstruction (600.00) (N40.0)   6. Bunion (727.1) (M21.619)   7. Callus (700) (L84)   8. Chronic systolic congestive heart failure (428.22,428.0) (I50.22)   9. Depression with anxiety (300.4) (F41.8)   10. Diabetes mellitus with neurological manifestation (250.60) (E11.49)   11. Diabetes type 2, uncontrolled (250.02) (E11.65)   12. Difficulty concentrating (799.51) (R41.840)   13. Difficulty walking (719.7) (R26.2)   14. Dyspnea on exertion (786.09) (R06.09)   15. Encounter for prostate cancer screening (V76.44) (Z12.5)   16. Encounter for screening for malignant neoplasm of colon (V76.51) (Z12.11)   17. Fatigue (780.79) (R53.83)   18. Foot pain, bilateral (729.5) (M79.671,M79.672)   19. Hallux valgus (735.0) (M20.10)   20. Hematuria (599.70) (R31.9)   21. Hyperlipidemia (272.4) (E78.5)   22. Hypertension (401.9) (I10)   23. Hypogonadism, male (257.2) (E29.1)   24. Insomnia (780.52) (G47.00)   25. Morbid or severe obesity due to excess calories (278.01) (E66.01)   26. Need for pneumococcal vaccination (V03.82) (Z23)   27. Need for prophylactic vaccination and inoculation against influenza (V04.81) (Z23)   28. Nephrolithiasis (592.0) (N20.0)   29. Onychomycosis (110.1) (B35.1)   30. JEFF (obstructive sleep apnea) (327.23) (G47.33)   31. Sciatica (724.3) (M54.30)   32. Screening for colon cancer (V76.51) (Z12.11)   33. Seasonal allergies (477.9) (J30.2)   34. Shoulder pain, right (719.41) (M25.511)   35. Spinal stenosis (724.00) (M48.00)   36. Tinea pedis (110.4) (B35.3)   37. Type 2 diabetes mellitus (250.00) (E11.9)   38. Vitamin D deficiency (268.9) (E55.9)     Past Medical History    · History of Cellulitis of left leg (682.6) (L03.116)   · History of chest pain (V13.89) (Z87.898)   · History of diarrhea (V12.79) (Z87.898)   · History of onychomycosis (V12.09) (Z86.19)   · History of onychomycosis (V12.09) (Z86.19)   · History of Limb pain (729.5) (M79.609)   · History of Neck pain (723.1) (M54.2)    · Need for pneumococcal vaccination (V03.82) (Z23)   · History of Nephrolithiasis (V13.01)   · History of Numbness On The Sole Of The Right Foot   · History of Pain and swelling of left lower leg (729.5,729.81) (M79.662,M79.89)   · History of Pain of lower extremity (729.5) (M79.606)   · History of Pain, hand joint, right (719.44) (M79.641)   · Sciatica (724.3) (M54.30)     The active problems and past medical history were reviewed and updated today.      Surgical History    · History of Knee Arthroscopy (Therapeutic)   · History of Knee Surgery   · History of Needle Biopsy Of Prostate     Family History   Mother    · Family history of Alzheimer Disease   · Family history of Family Health Status Of Mother - Alive  Father    · Family history of Family Health Status Of Father -   Family History    · Family history of Adopted   · Denied: Family history of Colon Cancer   · Denied: Family history of Crohn's Disease   · Denied: Family history of Liver Cancer     Social History    · Denied: History of Alcohol Use (History)   · Current Some Day Smoker (305.1)   · Denied: History of Exercise Habits   · Marital History - Currently    · Tobacco use (305.1) (Z72.0)   · Working Full Time  The social history was reviewed and is unchanged.      The medication list was reviewed and updated today.      Allergies   1. No Known Drug Allergies   Physical Exam   Left Foot: Appearance: Normal except as noted: excessive pronation-- and-- pes planus. Great toe deformities include a bunion. Tenderness: None except the great toe-- and-- distal first metatarsal.    Right Foot: Appearance: Normal except as noted: excessive pronation-- and-- pes planus. Great toe deformities include a bunion. Tenderness: None except the great toe-- and-- distal first metatarsal.    Left Ankle: ROM: limited ROM in all planes    Right Ankle: ROM: limited ROM in all planes    Neurological Exam: performed. Light touch was decreased bilaterally.  Vibratory sensation was decreased in both first metatarsophalangeal joints.    Vascular Exam: performed Dorsalis pedis pulses were present bilaterally. Posterior tibial pulses were present bilaterally. Elevation Pallor: absent bilaterally. Dependence rubor was absent bilaterally. Edema: none.    Toenails: All of the toenails were elongated,-- hypertrophied,-- discolored-- and-- Ptotic. Both first toenails were tender-- and-- Positive onychogryphosis.         Socks and shoes removed, Right Foot Findings: swollen, erythematous and dry.      The sensory exam showed diminished vibratory sensation at the level of the toes. Diminished tactile sensation with monofilament testing throughout the right foot.      Socks and shoes removed, Left Foot Findings: swollen, erythematous and dry.      The sensory exam showed diminished vibratory sensation at the level of the toes. Diminished tactile sensation with monofilament testing throughout the left foot.     Capillary refills findings on the right were normal in the toes.      Pulses:      2+ in the posterior tibialis on the right      2+ in the dorsalis pedis on the right.      Capillary refills findings on the left were normal in the toes.      Pulses:      1+ in the posterior tibialis on the left      1+ in the dorsalis pedis on the left.      Assign Risk Category: 2: Loss of protective sensation with or without weakness, deformity, callus, pre-ulcer, or history of ulceration. High risk.    Hyperkeratosis: present on both first toes,-- present on both first sub metatarsals,-- present on both third sub metatarsals-- and-- Severe profound moccasin tinea pedis bilateral.    Shoe Gear Evaluation: performed (). Recommendation(s): SAS style.     Right Foot/Ankle   Right Foot Inspection        Sensory   Vibration: diminished  Proprioception: diminished  Monofilament testing: diminished     Vascular  Capillary refills: < 3 seconds              Left Foot/Ankle  Left Foot  Inspection        Sensory   Vibration: diminished  Proprioception: diminished  Monofilament testing: diminished     Vascular  Capillary refills: < 3 seconds         Patient's shoes and socks removed.     Right Foot/Ankle   Right Foot Inspection        Sensory   Vibration: diminished  Proprioception: diminished  Monofilament testing: intact     Vascular  Capillary refills: < 3 seconds  The right DP pulse is 1+. The right PT pulse is 1+.      Left Foot/Ankle  Left Foot Inspection        Sensory   Vibration: diminished  Proprioception: diminished  Monofilament testing: intact     Vascular  Capillary refills: < 3 seconds  The left DP pulse is 1+. The left PT pulse is 1+.      Patient's shoes and socks removed.     Right Foot/Ankle   Right Foot Inspection        Toe Exam: right toe deformity.      Sensory   Vibration: diminished        Vascular  Capillary refills: < 3 seconds        Left Foot/Ankle  Left Foot Inspection        Toe Exam: left toe deformity.      Sensory   Vibration: diminished        Vascular  Capillary refills: < 3 seconds        Assign Risk Category  Deformity present  Loss of protective sensation  No weak pulses  Risk: 2

## 2024-02-14 ENCOUNTER — TELEPHONE (OUTPATIENT)
Age: 64
End: 2024-02-14

## 2024-02-14 NOTE — TELEPHONE ENCOUNTER
Nuvia from  PT is asking for an insurance referral.    Patient is requesting an insurance referral for the following specialty:      Test Name / Order Name: PT    DX Code: M54.12    Date Of Service: 11/01/2023    Location/Facility Name/Address/Phone #: [facility], [facility] [address], [place] [address], [phone]    Location / Facility NPI: [id number]    Best Phone # To Reach The Patient:  [phone]

## 2024-02-15 LAB
APOB+LDLR+PCSK9 GENE MUT ANL BLD/T: NOT DETECTED
BRCA1+BRCA2 DEL+DUP + FULL MUT ANL BLD/T: NOT DETECTED
MLH1+MSH2+MSH6+PMS2 GN DEL+DUP+FUL M: NOT DETECTED

## 2024-02-19 NOTE — TELEPHONE ENCOUNTER
Nuvia was calling to check on the status of the prior auth, please advise if there is any updates thank you. The phone number is [phone]

## 2024-03-04 ENCOUNTER — APPOINTMENT (OUTPATIENT)
Dept: LAB | Facility: CLINIC | Age: 64
End: 2024-03-04
Payer: COMMERCIAL

## 2024-03-04 ENCOUNTER — HOSPITAL ENCOUNTER (OUTPATIENT)
Dept: RADIOLOGY | Facility: HOSPITAL | Age: 64
Discharge: HOME | End: 2024-03-04
Payer: COMMERCIAL

## 2024-03-04 DIAGNOSIS — N20.0 RECURRENT NEPHROLITHIASIS: ICD-10-CM

## 2024-03-04 DIAGNOSIS — Z12.5 SCREENING FOR PROSTATE CANCER: ICD-10-CM

## 2024-03-04 LAB — PSA SERPL-MCNC: 2.88 NG/ML (ref 0–4)

## 2024-03-04 PROCEDURE — 36415 COLL VENOUS BLD VENIPUNCTURE: CPT

## 2024-03-04 PROCEDURE — G0103 PSA SCREENING: HCPCS

## 2024-03-04 PROCEDURE — 74018 RADEX ABDOMEN 1 VIEW: CPT

## 2024-03-05 NOTE — PROGRESS NOTES
3/6/2024      Chief Complaint   Patient presents with    Follow-up         Assessment and Plan    63 y.o. male     1. BPH with lower urinary tract symptoms  -continue tamsulosin     2. Nephrolithiasis  - KUB (3/4/24) showing bilateral intrarenal stones  - Asymptomatic  - KUB in 1 year    3. Prostate cancer screening  -previous history of elevated PSA status post negative biopsy approximately 10 years ago per patient's report  - PSA 2.88, stable  - TERRY today  - PSA in 1 year  - Call with any questions or concerns in the meantime  - All questions answered; patient understands and agrees with plan       History of Present Illness  Pollo Whitney is a 63 y.o. male patient with history of urosepsis, elevated PSA s/p negative prostate biopsy 10 years ago, nephrolithiasis, BPH with LUTS here for follow up.     Patient had presented to the emergency department September 2020 with dysuria and urinary frequency.  Had a history of nephrolithiasis however asymptomatic of an obstructing stone.  Patient was noted to have evidence of sepsis and was admitted for IV antibiotics.  Blood cultures were positive with Klebsiella.  Patient was treated on a prolonged course of antibiotics.     Incidentally, patient was started on Jardiance 1 month prior to his episode of urosepsis.  Hemoglobin A1c of approximately 8.6 at that time.  His as SGLT 2 inhibitor has since been discontinued and started on insulin.  He has noted a marked improvement of his urinary symptoms with a change in diabetic medication.  He continues on tamsulosin daily and is overall very happy with urination at this time.  Denies any dysuria, gross hematuria, urinary incontinence suprapubic pressure, flank pain, fevers, or chills.     Patient had a CT of the abdomen pelvis 11/22/22 with a 12mm right UPJ stone with hydronephrosis. S/p ureteral stent placement 11/24/22 with Dr. Capone and right ureteroscopy 11/30/22 with Dr. Chapman. Stent with string and removed  "shortly thereafter.     KUB & US (2/25/23) negative for obstruction. nonobstruting small left stone. Simple b/l renal cysts. KUB recently stable. Denies stone passage over the past year.      Patient reports a history of elevated PSA previously requiring a prostate biopsy approximately 10 years ago in his report.  This was reportedly negative for malignancy.  I do not have any documents at this time for review.  His last PSA in the chart is 2.88. Denies any new or worsening symptoms.     Component  Ref Range & Units 3/4/24 11:18 AM 5/28/23  8:11 AM 3/7/22  8:30 AM 11/20/20  2:23 PM   PSA  0.00 - 4.00 ng/mL 2.88 2.64 CM 2.3 R, CM 2.4 R, CM       Review of Systems   Constitutional:  Negative for activity change, appetite change, chills and fever.   HENT:  Negative for congestion and trouble swallowing.    Respiratory:  Negative for cough and shortness of breath.    Cardiovascular:  Negative for chest pain, palpitations and leg swelling.   Gastrointestinal:  Negative for abdominal pain, constipation, diarrhea, nausea and vomiting.   Genitourinary:  Negative for difficulty urinating, dysuria, flank pain, frequency, hematuria and urgency.   Musculoskeletal:  Negative for back pain and gait problem.   Skin:  Negative for wound.   Allergic/Immunologic: Negative for immunocompromised state.   Neurological:  Negative for dizziness and syncope.   Hematological:  Does not bruise/bleed easily.   Psychiatric/Behavioral:  Negative for confusion.    All other systems reviewed and are negative.       Vitals  Vitals:    03/06/24 0742   BP: 128/78   BP Location: Left arm   Patient Position: Sitting   Cuff Size: Standard   Pulse: 80   Resp: 14   SpO2: 97%   Weight: 127 kg (280 lb 4.8 oz)   Height: 5' 11\" (1.803 m)       Physical Exam  Constitutional:       General: He is not in acute distress.     Appearance: Normal appearance. He is not ill-appearing, toxic-appearing or diaphoretic.   HENT:      Head: Normocephalic.      Nose: No " congestion.   Eyes:      General: No scleral icterus.        Right eye: No discharge.         Left eye: No discharge.      Conjunctiva/sclera: Conjunctivae normal.      Pupils: Pupils are equal, round, and reactive to light.   Pulmonary:      Effort: Pulmonary effort is normal.   Musculoskeletal:      Cervical back: Normal range of motion.   Skin:     General: Skin is warm and dry.      Coloration: Skin is not jaundiced or pale.      Findings: No bruising, erythema, lesion or rash.   Neurological:      General: No focal deficit present.      Mental Status: He is alert and oriented to person, place, and time. Mental status is at baseline.      Gait: Gait normal.   Psychiatric:         Mood and Affect: Mood normal.         Behavior: Behavior normal.         Thought Content: Thought content normal.         Judgment: Judgment normal.           Past History  Past Medical History:   Diagnosis Date    Acute renal failure (ARF) (formerly [facility])     last assessed - [id number]    Anxiety 5/90    Chest pain     last assessed - [id number]    CPAP (continuous positive airway pressure) dependence     Depression     Diabetes mellitus (formerly [facility])     Diverticulitis of colon 0/00    Dyspnea on exertion     last assessed - [id number]    Hypertension     Kidney stone     Nephrolithiasis     Numbness of right foot     numbness on the sole of the right foot; since he was young d/t stenosis    Obesity     Onychomycosis     last assessed - [id number]    JEFF on CPAP     Sciatica     last assessed - [id number]    Sleep apnea     Urinary tract infection 9/2020     Social History     Socioeconomic History    Marital status: /Civil Union     Spouse name: None    Number of children: None    Years of education: None    Highest education level: None   Occupational History    Occupation: Works Full-time   Tobacco Use    Smoking status: Some Days     Types: Cigars     Passive exposure: Past    Smokeless tobacco: Never    Tobacco comments:     3 cigars per week    Vaping Use    Vaping status: Never Used   Substance and Sexual Activity    Alcohol use: Not Currently    Drug use: No    Sexual activity: Not Currently     Partners: Female     Birth control/protection: Abstinence   Other Topics Concern    None   Social History Narrative    Exercise habits - none        Works fulltime    Has a dog     Social Determinants of Health     Financial Resource Strain: Not on file   Food Insecurity: No Food Insecurity (11/26/2022)    Hunger Vital Sign     Worried About Running Out of Food in the Last Year: Never true     Ran Out of Food in the Last Year: Never true   Transportation Needs: No Transportation Needs (11/26/2022)    PRAPARE - Transportation     Lack of Transportation (Medical): No     Lack of Transportation (Non-Medical): No   Physical Activity: Not on file   Stress: Not on file   Social Connections: Not on file   Intimate Partner Violence: Not on file   Housing Stability: Low Risk  (11/26/2022)    Housing Stability Vital Sign     Unable to Pay for Housing in the Last Year: No     Number of Places Lived in the Last Year: 1     Unstable Housing in the Last Year: No     Social History     Tobacco Use   Smoking Status Some Days    Types: Cigars    Passive exposure: Past   Smokeless Tobacco Never   Tobacco Comments    3 cigars per week     Family History   Adopted: Yes   Problem Relation Age of Onset    Alzheimer's disease Mother     Alcohol abuse Neg Hx     Drug abuse Neg Hx     Depression Neg Hx     Substance Abuse Neg Hx     Mental illness Neg Hx        The following portions of the patient's history were reviewed and updated as appropriate: allergies, current medications, past medical history, past social history, past surgical history and problem list.    Results  No results found for this or any previous visit (from the past 1 hour(s)).]  Lab Results   Component Value Date    PSA 2.88 03/04/2024    PSA 2.64 05/28/2023    PSA 2.3 03/07/2022    PSA 2.4 11/20/2020      Lab Results   Component Value Date    GLUCOSE 301 (H) 02/01/2014    CALCIUM 9.1 05/28/2023     02/01/2014    K 4.2 05/28/2023    CO2 24 05/28/2023     05/28/2023    BUN 27 (H) 05/28/2023    CREATININE 1.47 (H) 05/28/2023     Lab Results   Component Value Date    WBC 7.26 02/25/2023    HGB 15.1 02/25/2023    HCT 46.0 02/25/2023    MCV 94 02/25/2023     02/25/2023       Mary Hayes PA-C

## 2024-03-06 ENCOUNTER — OFFICE VISIT (OUTPATIENT)
Dept: UROLOGY | Facility: CLINIC | Age: 64
End: 2024-03-06
Payer: COMMERCIAL

## 2024-03-06 VITALS
SYSTOLIC BLOOD PRESSURE: 128 MMHG | WEIGHT: 280.3 LBS | BODY MASS INDEX: 39.24 KG/M2 | DIASTOLIC BLOOD PRESSURE: 78 MMHG | RESPIRATION RATE: 14 BRPM | HEIGHT: 71 IN | OXYGEN SATURATION: 97 % | HEART RATE: 80 BPM

## 2024-03-06 DIAGNOSIS — N20.0 RECURRENT NEPHROLITHIASIS: Primary | ICD-10-CM

## 2024-03-06 DIAGNOSIS — Z12.5 SCREENING FOR PROSTATE CANCER: ICD-10-CM

## 2024-03-06 PROCEDURE — 99213 OFFICE O/P EST LOW 20 MIN: CPT | Performed by: PHYSICIAN ASSISTANT

## 2024-03-13 ENCOUNTER — APPOINTMENT (OUTPATIENT)
Dept: LAB | Facility: CLINIC | Age: 64
End: 2024-03-13
Payer: COMMERCIAL

## 2024-03-13 DIAGNOSIS — E11.22 TYPE 2 DIABETES MELLITUS WITH STAGE 3A CHRONIC KIDNEY DISEASE, WITH LONG-TERM CURRENT USE OF INSULIN (HCC): ICD-10-CM

## 2024-03-13 DIAGNOSIS — N18.31 TYPE 2 DIABETES MELLITUS WITH STAGE 3A CHRONIC KIDNEY DISEASE AND HYPERTENSION (HCC): ICD-10-CM

## 2024-03-13 DIAGNOSIS — Z79.4 TYPE 2 DIABETES MELLITUS WITH STAGE 3A CHRONIC KIDNEY DISEASE, WITH LONG-TERM CURRENT USE OF INSULIN (HCC): ICD-10-CM

## 2024-03-13 DIAGNOSIS — E11.22 TYPE 2 DIABETES MELLITUS WITH STAGE 3A CHRONIC KIDNEY DISEASE AND HYPERTENSION (HCC): ICD-10-CM

## 2024-03-13 DIAGNOSIS — N18.31 STAGE 3A CHRONIC KIDNEY DISEASE (HCC): ICD-10-CM

## 2024-03-13 DIAGNOSIS — G47.33 OSA ON CPAP: ICD-10-CM

## 2024-03-13 DIAGNOSIS — I10 ESSENTIAL HYPERTENSION: ICD-10-CM

## 2024-03-13 DIAGNOSIS — N20.0 NEPHROLITHIASIS: ICD-10-CM

## 2024-03-13 DIAGNOSIS — N28.1 BILATERAL RENAL CYSTS: ICD-10-CM

## 2024-03-13 DIAGNOSIS — E55.9 VITAMIN D DEFICIENCY: ICD-10-CM

## 2024-03-13 DIAGNOSIS — I12.9 TYPE 2 DIABETES MELLITUS WITH STAGE 3A CHRONIC KIDNEY DISEASE AND HYPERTENSION (HCC): ICD-10-CM

## 2024-03-13 DIAGNOSIS — N20.0 RECURRENT NEPHROLITHIASIS: ICD-10-CM

## 2024-03-13 DIAGNOSIS — E66.01 MORBID OBESITY (HCC): ICD-10-CM

## 2024-03-13 DIAGNOSIS — N18.31 TYPE 2 DIABETES MELLITUS WITH STAGE 3A CHRONIC KIDNEY DISEASE, WITH LONG-TERM CURRENT USE OF INSULIN (HCC): ICD-10-CM

## 2024-03-13 LAB
ALBUMIN SERPL BCP-MCNC: 4 G/DL (ref 3.5–5)
ALP SERPL-CCNC: 65 U/L (ref 34–104)
ALT SERPL W P-5'-P-CCNC: 13 U/L (ref 7–52)
ANION GAP SERPL CALCULATED.3IONS-SCNC: 7 MMOL/L (ref 4–13)
AST SERPL W P-5'-P-CCNC: 13 U/L (ref 13–39)
BILIRUB SERPL-MCNC: 1.1 MG/DL (ref 0.2–1)
BUN SERPL-MCNC: 23 MG/DL (ref 5–25)
CA-I BLD-SCNC: 1.14 MMOL/L (ref 1.12–1.32)
CALCIUM SERPL-MCNC: 8.9 MG/DL (ref 8.4–10.2)
CHLORIDE SERPL-SCNC: 105 MMOL/L (ref 96–108)
CO2 SERPL-SCNC: 25 MMOL/L (ref 21–32)
CREAT SERPL-MCNC: 1.45 MG/DL (ref 0.6–1.3)
ERYTHROCYTE [DISTWIDTH] IN BLOOD BY AUTOMATED COUNT: 13.5 % (ref 11.6–15.1)
EST. AVERAGE GLUCOSE BLD GHB EST-MCNC: 169 MG/DL
GFR SERPL CREATININE-BSD FRML MDRD: 50 ML/MIN/1.73SQ M
GLUCOSE P FAST SERPL-MCNC: 83 MG/DL (ref 65–99)
HBA1C MFR BLD: 7.5 %
HCT VFR BLD AUTO: 47.9 % (ref 36.5–49.3)
HGB BLD-MCNC: 16 G/DL (ref 12–17)
MCH RBC QN AUTO: 30.4 PG (ref 26.8–34.3)
MCHC RBC AUTO-ENTMCNC: 33.4 G/DL (ref 31.4–37.4)
MCV RBC AUTO: 91 FL (ref 82–98)
PLATELET # BLD AUTO: 245 THOUSANDS/UL (ref 149–390)
PMV BLD AUTO: 9.4 FL (ref 8.9–12.7)
POTASSIUM SERPL-SCNC: 4.1 MMOL/L (ref 3.5–5.3)
PROT SERPL-MCNC: 7.1 G/DL (ref 6.4–8.4)
PTH-INTACT SERPL-MCNC: 116 PG/ML (ref 12–88)
RBC # BLD AUTO: 5.27 MILLION/UL (ref 3.88–5.62)
SODIUM SERPL-SCNC: 137 MMOL/L (ref 135–147)
URATE SERPL-MCNC: 5.8 MG/DL (ref 3.5–8.5)
WBC # BLD AUTO: 6.72 THOUSAND/UL (ref 4.31–10.16)

## 2024-03-13 PROCEDURE — 80053 COMPREHEN METABOLIC PANEL: CPT

## 2024-03-13 PROCEDURE — 83970 ASSAY OF PARATHORMONE: CPT

## 2024-03-13 PROCEDURE — 84550 ASSAY OF BLOOD/URIC ACID: CPT

## 2024-03-13 PROCEDURE — 82330 ASSAY OF CALCIUM: CPT

## 2024-03-13 PROCEDURE — 85027 COMPLETE CBC AUTOMATED: CPT

## 2024-03-13 PROCEDURE — 82610 CYSTATIN C: CPT

## 2024-03-13 PROCEDURE — 83036 HEMOGLOBIN GLYCOSYLATED A1C: CPT

## 2024-03-13 PROCEDURE — 36415 COLL VENOUS BLD VENIPUNCTURE: CPT

## 2024-03-14 ENCOUNTER — APPOINTMENT (OUTPATIENT)
Dept: LAB | Facility: CLINIC | Age: 64
End: 2024-03-14
Payer: COMMERCIAL

## 2024-03-14 LAB
CREAT UR-MCNC: 106 MG/DL
MICROALBUMIN UR-MCNC: 228.7 MG/L
MICROALBUMIN/CREAT 24H UR: 216 MG/G CREATININE (ref 0–30)

## 2024-03-14 PROCEDURE — 82043 UR ALBUMIN QUANTITATIVE: CPT

## 2024-03-14 PROCEDURE — 82570 ASSAY OF URINE CREATININE: CPT

## 2024-03-15 ENCOUNTER — OFFICE VISIT (OUTPATIENT)
Dept: NEPHROLOGY | Facility: CLINIC | Age: 64
End: 2024-03-15
Payer: COMMERCIAL

## 2024-03-15 VITALS
DIASTOLIC BLOOD PRESSURE: 70 MMHG | HEIGHT: 71 IN | BODY MASS INDEX: 39.34 KG/M2 | WEIGHT: 281 LBS | SYSTOLIC BLOOD PRESSURE: 134 MMHG

## 2024-03-15 DIAGNOSIS — M89.9 CHRONIC KIDNEY DISEASE-MINERAL AND BONE DISORDER: ICD-10-CM

## 2024-03-15 DIAGNOSIS — E11.22 TYPE 2 DIABETES MELLITUS WITH STAGE 3 CHRONIC KIDNEY DISEASE AND HYPERTENSION (HCC): ICD-10-CM

## 2024-03-15 DIAGNOSIS — E55.9 VITAMIN D DEFICIENCY: Primary | ICD-10-CM

## 2024-03-15 DIAGNOSIS — N18.9 CHRONIC KIDNEY DISEASE-MINERAL AND BONE DISORDER: ICD-10-CM

## 2024-03-15 DIAGNOSIS — N18.30 TYPE 2 DIABETES MELLITUS WITH STAGE 3 CHRONIC KIDNEY DISEASE AND HYPERTENSION (HCC): ICD-10-CM

## 2024-03-15 DIAGNOSIS — I12.9 TYPE 2 DIABETES MELLITUS WITH STAGE 3 CHRONIC KIDNEY DISEASE AND HYPERTENSION (HCC): ICD-10-CM

## 2024-03-15 DIAGNOSIS — E66.01 MORBID OBESITY (HCC): ICD-10-CM

## 2024-03-15 DIAGNOSIS — E83.9 CHRONIC KIDNEY DISEASE-MINERAL AND BONE DISORDER: ICD-10-CM

## 2024-03-15 PROBLEM — R82.90 ABNORMAL URINALYSIS: Status: RESOLVED | Noted: 2022-11-22 | Resolved: 2024-03-15

## 2024-03-15 LAB
CYSTATIN C SERPL-MCNC: 1.67 MG/L (ref 0.72–1.16)
GFR/BSA.PRED SERPLBLD CYS-BASED-ARV: 39 ML/MIN/1.73

## 2024-03-15 PROCEDURE — 99214 OFFICE O/P EST MOD 30 MIN: CPT | Performed by: INTERNAL MEDICINE

## 2024-03-15 NOTE — PATIENT INSTRUCTIONS
1.)  Low 2 g sodium diet    2.)  Monitor weights at home    3.)  Avoid NSAIDs (ibuprofen, Motrin, Advil, Aleve, naproxen)    4.)  Monitor blood pressure at home, call if blood pressure greater than 150/90 persistently    5.) I will plan to discuss all results including blood work, and/or imaging at our next visit, unless there is an urgent indication, in which case I will call you earlier. If you have any questions or concerns about your results, please feel free to call our office.

## 2024-03-15 NOTE — PROGRESS NOTES
NEPHROLOGY OFFICE VISIT   Pollo Whitney 63 y.o. male MRN: [id number]  3/15/2024    Reason for Visit: Chronic kidney disease stage III    History of Present Illness (HPI):    I had the pleasure of seeing Marcin today in the renal clinic for the continued management of chronic kidney disease stage III and hypertension. Since our last visit, there has been no ER visits or hospitilizations. He currently has no complaints at this time and is feeling well. Patient denies any chest pain, shortness of breath and swelling. The last blood work was done on March 13 which included CBC CMP Cystatin C albumin/creatinine ratio, which we have reviewed together.    His weight continues to improve he is lost 4 pounds since her last visit.  He is currently staying active at home working on home-improvement projects and chores that his wife has for him.  His diabetes continues to improve with his hemoglobin A1c continue to trend down.  His blood pressure is at target.  No new medications since her last visit no new medical issues.    Overall he feels good and has no new complaints.    Was seen by urology last week for his follow-up.  Everything was stable from their standpoint.  PSA was 2.88 and stable.  Underwent a digital rectal exam.  He is planned for repeat PSA next year.    ASSESSMENT and PLAN:    Chronic Kidney Disease Stage IIIA with macroalbuminuria (G3A2)  --Imaging: Renal ultrasound from February 2023: Right kidney 13.7 cm, left kidney 12.6 cm.  Simple cyst measuring 1.6 cm on the left kidney.  Right renal cyst septation unchanged.  --Urinalysis: Positive for microscopic hematuria, and mild proteinuria  --Proteinuria: Microalbumin/creatinine ratio 0.2 g/g and stable and at target  --Baseline Kidney Function: mid 1's (1.3-1.6 mg/dL).  Cystatin C likely will not be very accurate for this gentleman given his obesity.  --Etiology: Presumed to be secondary to diabetic kidney disease, hypertensive nephrosclerosis, obesity  related renal dysfunction  --Biopsy Proven: No  --Serologies: No clinical indication for serologies  --RAAS Blockade: Yes  --Reducing Cardiovascular Risk Factors:  Simvastatin+ Ezetimibe reduced atherosclerotic events in CKD (SHARP trial); Low dose aspirin safe (if no contraindications exist); smoking is an independent risk factor for Chronic Kidney Disease and progression, strongly recommend smoking cessation  --Sodium-Glucose Cotransporter-2 (SGLT2) Inhibitors:  May see an acute drop in the eGFR initially when starting the medication but then a stabilization of the renal function, with slower loss of renal function as compared to placebo.  Relative risk of end-stage renal disease, doubling of serum creatinine or death from renal causes were also found to be lower as compared to placebo. (CREDENCE).  DAPA-CKD study, showed that patients with chronic kidney disease regardless of the presence or absence of diabetes the risk of composite of a sustained decline in the estimated GFR of at least 50%, end-stage renal disease or death from renal or cardiovascular causes was significantly lower with Dapagliflozin than with placebo. EMPEROR-Reduced study also showed beneficial effects. EMPA-CKD, among wide range of patients with CKD, who were at risk for progression, empagliflozin led a lower risk of kidney disease progression or death from cardiovascular causes than placebo.  If no contraindications exist I would recommend this medication added to the regiment. If eGFR < 60 cc/min (avoid ertugliflozin); avoid or caution with GFR < 20 mL/min, not an absolute contraindication, likely lower benefits.   --Finerenone: In patients with chronic kidney disease with type 2 diabetes, treatment led to lower risks of CKD progression and cardiovascular events than placebo. (FIDELIO-DKD)  --Status: Renal function remained stable creatinine at baseline with a GFR greater than 50 mL/min.  Proteinuria is controlled.  Blood pressure is at  target.  Diabetes management is improving.  --Management/Recommendations: Continue to good diabetic control, continue blood pressure control with an ACE inhibitor.  Continue to lose weight low 2 g sodium diet.  Avoid NSAIDs.  Continue current management and follow-up in 8 months.     Nephrolithiasis  - low 2 g sodium diet  - drink at least 2.5-3 L of water a day to maintain a urine output greater than 2 L per day  - high citrate diet.  Fresh lemon juice, can dilute 2 oz of lemon juice with 6 oz of water and drink twice a day.  Try to incorporate more fresh lemon and limes into diet.  Can use freshly squeeze lemon or lime on fruit salad and Somali salads.  - 5 or more fruits and vegetables daily  -Continue Flomax  -Following with urology  -Uric acid level is normal  -Has been having stones since the age of 20, usually gets 1 stone every 4 to 5 years  -Last stone was 8 years ago prior to the episode that he had in November 2022  -CT scan on November 22 showed a 12 mm right UPJ stone with hydronephrosis.  Status post ureteral stent placement on November 24, 2022 with right ureteroscopy on November 30, 2022.  Stent with string was removed shortly afterwards.  -Urine analysis: 100% calcium oxalate  -Recommend reduced oxalate in the diet  -Continue current management currently asymptomatic     Hypertension  -- Stage I (American College of Cardiology/American Heart Association)  -- with underlying chronic kidney disease and obesity  --Diagnosed with hypertension at the age of 19, thought to be related to obesity  -- Body mass index is 39.19 kg/m².  -- Volume status: Euvolemic  -- Etiology: Primary hypertension with obesity and underlying obstructive sleep apnea  -- Secondary Work-Up: Has underlying obstructive sleep apnea and obesity.   -- Target Goal: < 130/80 (ACC/AHA, CKD with proteinuria, CKD in diabetics) ; if tolerated can target SBP < 120 mmHg in high cardiovascular risk ( Age > 75, CKD, CVD, No Diabetics  SPRINT)  -- Lifestyle Modifications: DASH Diet, Weight Loss for ideal body weight, 45 mins of cardiovascular exercise 3 times a week as tolerated, and if no contraindications exist, smoking cessation, limit alcohol use, avoid NSAIDS, monitor blood pressure at home  -- Status: Blood pressure overall at target  -- Current antihypertensive regiment: Enalapril 20 mg daily, amlodipine 10 mg daily  -- Changes: Blood pressure at target continue current management encouraged lifestyle modifications including weight loss low 2 g sodium diet.  If blood pressure remains persistently above target can increase enalapril or add a diuretic     Obesity  -- BMI  39.19, this is improving  --Recommend decreasing portion sizes, encouraging healthy choices of fruits and vegetables, consuming healthier snacks and moderation in carbohydrate intake. Exercise recommendations; 3-5 times a week, the American Heart Association recommends 150 minutes a week moderate exercise  --Strongly recommend evaluation by nutritionist  --Overweight and obesity have been associated with increased mortality and morbidity.  Associated with a reduction in life expectancy, associated with increased all cause and cardiovascular mortality  --Metabolic risks, including increased risk of diabetes type 2, insulin resistance with hyperinsulinemia (weight loss of 5 to 7% associated with a decreased risk of type 2 diabetes among individuals with prediabetes and weight loss of 15% can lead to dramatic improvement of diabetes in nearly half of people).  Dyslipidemia, hypertension and heart disease are all increased in patients with obesity.  Along with increased risk of stroke increased risk of multiple cancer types.  Can also be associated with increased renal dysfunction, strongest risk factor for new onset chronic kidney disease.  There is also a degree of compensatory hyperfiltration to meet high in the metabolic demands of increased body weight.  This can also  result in increased increased risk for nephrolithiasis.     Diabetes mellitus type 2  -hemoglobin A1c: 7.5 (A1C values may be falsely elevated or decreased in those with CKD, assay method should be certified by National glycohemoglobin standardization Program). Target A1C between 7-8.5 (will need to be individualized for each patient), and would utilize A1C  in conjunction with continuous glucose monitoring (CGM).  It should also be noted that the A1c with more advanced kidney disease would be inaccurate due to decreased red blood cell life along with anemia.  -current medications: Glimepiride, insulin glargine, Trulicity  -proteinuria: Yes, controlled  -retinopathy: No  -neuropathy: No  -please follow with an ophthalmologist and podiatrist every year  -continued self monitoring of blood glucose at home  -Management in CKD Recommendations:   Metformin:  Avoid if creatinine clearance is less than 30 cc/min (concern for lactic acidosis)  Sodium-Glucose Cotransporter-2 (SGLT2) Inhibitors:  May see an acute drop in the eGFR initially when starting the medication but then a stabilization of the renal function, with slower loss of renal function as compared to placebo.  Relative risk of end-stage renal disease, doubling of serum creatinine or death from renal causes were also found to be lower as compared to placebo. (CREDENCE).  DAPA-CKD study, showed that patients with chronic kidney disease regardless of the presence or absence of diabetes the risk of composite of a sustained decline in the estimated GFR of at least 50%, end-stage renal disease or death from renal or cardiovascular causes was significantly lower with Dapagliflozin than with placebo. EMPEROR-Reduced study also showed beneficial effects. EMPA-CKD, among wide range of patients with CKD, who were at risk for progression, empagliflozin led a lower risk of kidney disease progression or death from cardiovascular causes than placebo.  If no contraindications  exist I would recommend this medication added to the regiment. If eGFR < 60 cc/min (avoid ertugliflozin); avoid or caution with GFR < 20 mL/min, not an absolute contraindication, likely lower benefits   Sulfonylureas:  Preferred short-acting (glipizide, glimepiride, repaglinide), relatively safe in patients with non dialysis CKD  Thiazolidinedones/Alpha-Gluosidase inhibitors/Dipeptidyl peptidase-4 inhibitors:  Generally not considered first-line agents in CKD (limited data in long-term safety and efficacy)  Insulin:  Starting dose may need to be lower than what would ordinarily be used, as there is a decrease metabolism of insulin (no dose adjustment if the GFR > 50 mL/min, dose should be reduced to 75% of baseline if GFR 10-50 mL/min, and 50% baseline if GFR < 10 mL/min)  Finerenone: Patients with CKD with type 2 diabetes, treatment led to lower risks of CKD progression and cardiovascular events than placebo (FIDELIO-DKD). Avoid or caution if serum potassium more then 4.8.     Bilateral renal cysts  -- Simple left renal cyst.  Requires no further follow-up.  -- The right renal cyst with echogenic septation is unchanged     Vitamin D deficiency  -Continue vitamin D  -Vitamin D 25-hydroxy level 18  -Check a repeat vitamin D 25-hydroxy level next year     Obstructive sleep apnea  -- Continued use of CPAP  --Weight loss exercise and diet    Mineral bone disorder-chronic kidney disease  -- Ionized calcium is normal, phosphorus is normal, PTH slightly elevated at 116 with a history of vitamin D deficiency  -- Check a repeat PTH at the next visit and a vitamin D 25-hydroxy level.  If vitamin D level is low we will plan to increase the vitamin D which will help address the PTH      PATIENT INSTRUCTIONS:    Patient Instructions   1.)  Low 2 g sodium diet    2.)  Monitor weights at home    3.)  Avoid NSAIDs (ibuprofen, Motrin, Advil, Aleve, naproxen)    4.)  Monitor blood pressure at home, call if blood pressure greater than  "150/90 persistently    5.) I will plan to discuss all results including blood work, and/or imaging at our next visit, unless there is an urgent indication, in which case I will call you earlier. If you have any questions or concerns about your results, please feel free to call our office.            Orders Placed This Encounter   Procedures    Albumin / creatinine urine ratio     Standing Status:   Future     Standing Expiration Date:   3/15/2025    Comprehensive metabolic panel     This is a patient instruction: Patient fasting for 8 hours or longer recommended.     Standing Status:   Future     Standing Expiration Date:   3/15/2025    Hemoglobin A1C     Standing Status:   Future     Standing Expiration Date:   3/15/2025    Vitamin D 25 hydroxy     Standing Status:   Future     Standing Expiration Date:   3/15/2025    PTH, intact     Standing Status:   Future     Standing Expiration Date:   3/15/2025    CBC     Standing Status:   Future     Standing Expiration Date:   3/15/2025       OBJECTIVE:  Current Weight: Weight - Scale: 127 kg (281 lb)  Vitals:    03/15/24 0831 03/15/24 0844   BP:  134/70   Weight: 127 kg (281 lb)    Height: 5' 11\" (1.803 m)     Body mass index is 39.19 kg/m².      REVIEW OF SYSTEMS:    Review of Systems   Constitutional:  Negative for activity change and fever.   Respiratory:  Negative for cough, chest tightness, shortness of breath and wheezing.    Cardiovascular:  Negative for chest pain and leg swelling.   Gastrointestinal:  Negative for abdominal pain, diarrhea, nausea and vomiting.   Endocrine: Negative for polyuria.   Genitourinary:  Negative for difficulty urinating, dysuria, flank pain, frequency and urgency.   Skin:  Negative for rash.   Neurological:  Negative for dizziness, syncope, light-headedness and headaches.       PHYSICAL EXAM:      Physical Exam  Vitals and nursing note reviewed.   Constitutional:       General: He is not in acute distress.     Appearance: He is " well-developed. He is obese.   HENT:      Head: Normocephalic and atraumatic.   Eyes:      General: No scleral icterus.     Conjunctiva/sclera: Conjunctivae normal.      Pupils: Pupils are equal, round, and reactive to light.   Cardiovascular:      Rate and Rhythm: Normal rate and regular rhythm.      Heart sounds: S1 normal and S2 normal. No murmur heard.     No friction rub. No gallop.   Pulmonary:      Effort: Pulmonary effort is normal. No respiratory distress.      Breath sounds: Normal breath sounds. No wheezing or rales.   Abdominal:      General: Bowel sounds are normal.      Palpations: Abdomen is soft.      Tenderness: There is no abdominal tenderness. There is no rebound.   Musculoskeletal:         General: Normal range of motion.      Cervical back: Normal range of motion and neck supple.   Skin:     Findings: No rash.   Neurological:      Mental Status: He is alert and oriented to person, place, and time.   Psychiatric:         Behavior: Behavior normal.         Medications:    Current Outpatient Medications:     amLODIPine (NORVASC) 10 mg tablet, Take 1 tablet (10 mg total) by mouth daily, Disp: 90 tablet, Rfl: 1    atorvastatin (LIPITOR) 10 mg tablet, Take 1 tablet (10 mg total) by mouth daily, Disp: 90 tablet, Rfl: 1    BD Pen Needle Alicia 2nd Gen 32G X 4 MM MISC, INJECT UNDER THE SKIN DAILY, Disp: 90 each, Rfl: 3    buPROPion (WELLBUTRIN XL) 150 mg 24 hr tablet, Take 1 tablet (150 mg total) by mouth daily, Disp: 90 tablet, Rfl: 1    cholecalciferol (VITAMIN D3) 400 units tablet, Take 400 Units by mouth daily, Disp: , Rfl:     cloNIDine (CATAPRES) 0.3 mg tablet, Take 1 tablet (0.3 mg total) by mouth 2 (two) times a day, Disp: 180 tablet, Rfl: 0    Cyanocobalamin (Vitamin B 12) 500 MCG TABS, Take by mouth, Disp: , Rfl:     dulaglutide (Trulicity) 4.5 MG/0.5ML injection, Inject 0.5 mL (4.5 mg total) under the skin once a week, Disp: 6 mL, Rfl: 0    enalapril (VASOTEC) 20 mg tablet, Take 1 tablet (20 mg  total) by mouth daily, Disp: 90 tablet, Rfl: 1    fluticasone (FLONASE) 50 mcg/act nasal spray, 1 spray into each nostril daily, Disp: 48 g, Rfl: 1    gabapentin (Neurontin) 300 mg capsule, Take 1 capsule (300 mg total) by mouth 3 (three) times a day as needed (pain), Disp: 90 capsule, Rfl: 1    glimepiride (AMARYL) 4 mg tablet, Take 1 tablet (4 mg total) by mouth 2 (two) times a day, Disp: 180 tablet, Rfl: 0    Insulin Glargine Solostar (Basaglar KwikPen) 100 UNIT/ML SOPN, Inject 0.4 mL (40 Units total) under the skin daily, Disp: 30 mL, Rfl: 0    nystatin (MYCOSTATIN) powder, Apply topically 3 (three) times a day as needed (groin rash), Disp: 60 g, Rfl: 1    sertraline (ZOLOFT) 100 mg tablet, Take 1 tablet (100 mg total) by mouth daily, Disp: 90 tablet, Rfl: 1    tamsulosin (FLOMAX) 0.4 mg, Take 1 capsule (0.4 mg total) by mouth daily, Disp: 90 capsule, Rfl: 1    Laboratory Results:  Results from last 7 days   Lab Units 03/13/24  0814   WBC Thousand/uL 6.72   HEMOGLOBIN g/dL 16.0   HEMATOCRIT % 47.9   PLATELETS Thousands/uL 245   POTASSIUM mmol/L 4.1   CHLORIDE mmol/L 105   CO2 mmol/L 25   BUN mg/dL 23   CREATININE mg/dL 1.45*   CALCIUM mg/dL 8.9       Results for orders placed or performed in visit on 03/13/24   PTH, intact   Result Value Ref Range    .0 (H) 12.0 - 88.0 pg/mL   Cystatin C With eGFR   Result Value Ref Range    CYSTATIN C 1.67 (H) 0.72 - 1.16 mg/L    eGFR 39 (L) >59 mL/min/1.73   Comprehensive metabolic panel   Result Value Ref Range    Sodium 137 135 - 147 mmol/L    Potassium 4.1 3.5 - 5.3 mmol/L    Chloride 105 96 - 108 mmol/L    CO2 25 21 - 32 mmol/L    ANION GAP 7 4 - 13 mmol/L    BUN 23 5 - 25 mg/dL    Creatinine 1.45 (H) 0.60 - 1.30 mg/dL    Glucose, Fasting 83 65 - 99 mg/dL    Calcium 8.9 8.4 - 10.2 mg/dL    AST 13 13 - 39 U/L    ALT 13 7 - 52 U/L    Alkaline Phosphatase 65 34 - 104 U/L    Total Protein 7.1 6.4 - 8.4 g/dL    Albumin 4.0 3.5 - 5.0 g/dL    Total Bilirubin 1.10 (H) 0.20  - 1.00 mg/dL    eGFR 50 ml/min/1.73sq m   Uric acid   Result Value Ref Range    Uric Acid 5.8 3.5 - 8.5 mg/dL   Calcium, ionized   Result Value Ref Range    Calcium, Ionized 1.14 1.12 - 1.32 mmol/L   CBC   Result Value Ref Range    WBC 6.72 4.31 - 10.16 Thousand/uL    RBC 5.27 3.88 - 5.62 Million/uL    Hemoglobin 16.0 12.0 - 17.0 g/dL    Hematocrit 47.9 36.5 - 49.3 %    MCV 91 82 - 98 fL    MCH 30.4 26.8 - 34.3 pg    MCHC 33.4 31.4 - 37.4 g/dL    RDW 13.5 11.6 - 15.1 %    Platelets 245 149 - 390 Thousands/uL    MPV 9.4 8.9 - 12.7 fL   Microalbumin / creatinine urine ratio   Result Value Ref Range    Creatinine, Ur 106.0 Reference range not established. mg/dL    Albumin,U,Random 228.7 (H) <20.0 mg/L    Albumin Creat Ratio 216 (H) 0 - 30 mg/g creatinine   Hemoglobin A1C   Result Value Ref Range    Hemoglobin A1C 7.5 (H) Normal 4.0-5.6%; PreDiabetic 5.7-6.4%; Diabetic >=6.5%; Glycemic control for adults with diabetes <7.0% %     mg/dl

## 2024-04-18 ENCOUNTER — OFFICE VISIT (OUTPATIENT)
Age: 64
End: 2024-04-18
Payer: COMMERCIAL

## 2024-04-18 VITALS
BODY MASS INDEX: 39.34 KG/M2 | DIASTOLIC BLOOD PRESSURE: 83 MMHG | HEIGHT: 71 IN | RESPIRATION RATE: 17 BRPM | SYSTOLIC BLOOD PRESSURE: 143 MMHG | WEIGHT: 281 LBS | HEART RATE: 67 BPM

## 2024-04-18 DIAGNOSIS — M21.969 ACQUIRED DEFORMITY OF FOOT, UNSPECIFIED LATERALITY: ICD-10-CM

## 2024-04-18 DIAGNOSIS — M77.41 METATARSALGIA OF BOTH FEET: Primary | ICD-10-CM

## 2024-04-18 DIAGNOSIS — M79.671 PAIN IN BOTH FEET: ICD-10-CM

## 2024-04-18 DIAGNOSIS — E11.42 DIABETIC POLYNEUROPATHY ASSOCIATED WITH TYPE 2 DIABETES MELLITUS (HCC): ICD-10-CM

## 2024-04-18 DIAGNOSIS — M77.42 METATARSALGIA OF BOTH FEET: Primary | ICD-10-CM

## 2024-04-18 DIAGNOSIS — M79.672 PAIN IN BOTH FEET: ICD-10-CM

## 2024-04-18 DIAGNOSIS — M21.961 ACQUIRED DEFORMITY OF BOTH FEET: ICD-10-CM

## 2024-04-18 DIAGNOSIS — M21.962 ACQUIRED DEFORMITY OF BOTH FEET: ICD-10-CM

## 2024-04-18 PROCEDURE — 99213 OFFICE O/P EST LOW 20 MIN: CPT | Performed by: PODIATRIST

## 2024-05-19 DIAGNOSIS — E11.40 TYPE 2 DIABETES MELLITUS WITH DIABETIC NEUROPATHY, WITHOUT LONG-TERM CURRENT USE OF INSULIN (HCC): ICD-10-CM

## 2024-05-19 DIAGNOSIS — N40.0 BPH WITHOUT URINARY OBSTRUCTION: ICD-10-CM

## 2024-05-19 DIAGNOSIS — M54.12 CERVICAL RADICULOPATHY: ICD-10-CM

## 2024-05-19 DIAGNOSIS — I10 ESSENTIAL HYPERTENSION: ICD-10-CM

## 2024-05-19 RX ORDER — GABAPENTIN 300 MG/1
300 CAPSULE ORAL 3 TIMES DAILY PRN
Qty: 90 CAPSULE | Refills: 0 | Status: SHIPPED | OUTPATIENT
Start: 2024-05-19

## 2024-05-19 RX ORDER — GLIMEPIRIDE 4 MG/1
4 TABLET ORAL 2 TIMES DAILY
Qty: 180 TABLET | Refills: 0 | Status: SHIPPED | OUTPATIENT
Start: 2024-05-19

## 2024-05-19 RX ORDER — TAMSULOSIN HYDROCHLORIDE 0.4 MG/1
0.4 CAPSULE ORAL DAILY
Qty: 90 CAPSULE | Refills: 0 | Status: SHIPPED | OUTPATIENT
Start: 2024-05-19

## 2024-05-19 RX ORDER — CLONIDINE HYDROCHLORIDE 0.3 MG/1
0.3 TABLET ORAL 2 TIMES DAILY
Qty: 180 TABLET | Refills: 0 | Status: SHIPPED | OUTPATIENT
Start: 2024-05-19

## 2024-06-13 ENCOUNTER — OFFICE VISIT (OUTPATIENT)
Age: 64
End: 2024-06-13
Payer: COMMERCIAL

## 2024-06-13 VITALS — WEIGHT: 281 LBS | HEIGHT: 71 IN | BODY MASS INDEX: 39.34 KG/M2 | RESPIRATION RATE: 17 BRPM

## 2024-06-13 DIAGNOSIS — E11.42 DIABETIC POLYNEUROPATHY ASSOCIATED WITH TYPE 2 DIABETES MELLITUS (HCC): ICD-10-CM

## 2024-06-13 DIAGNOSIS — M77.41 METATARSALGIA OF BOTH FEET: Primary | ICD-10-CM

## 2024-06-13 DIAGNOSIS — M21.969 ACQUIRED DEFORMITY OF FOOT, UNSPECIFIED LATERALITY: ICD-10-CM

## 2024-06-13 DIAGNOSIS — M21.962 ACQUIRED DEFORMITY OF BOTH FEET: ICD-10-CM

## 2024-06-13 DIAGNOSIS — B35.1 ONYCHOMYCOSIS: ICD-10-CM

## 2024-06-13 DIAGNOSIS — M79.672 PAIN IN BOTH FEET: ICD-10-CM

## 2024-06-13 DIAGNOSIS — M21.961 ACQUIRED DEFORMITY OF BOTH FEET: ICD-10-CM

## 2024-06-13 DIAGNOSIS — M79.671 PAIN IN BOTH FEET: ICD-10-CM

## 2024-06-13 DIAGNOSIS — M77.42 METATARSALGIA OF BOTH FEET: Primary | ICD-10-CM

## 2024-06-13 PROCEDURE — 99213 OFFICE O/P EST LOW 20 MIN: CPT | Performed by: PODIATRIST

## 2024-08-06 ENCOUNTER — TELEPHONE (OUTPATIENT)
Dept: NEPHROLOGY | Facility: CLINIC | Age: 64
End: 2024-08-06

## 2024-08-19 DIAGNOSIS — N40.0 BPH WITHOUT URINARY OBSTRUCTION: ICD-10-CM

## 2024-08-19 DIAGNOSIS — I10 ESSENTIAL HYPERTENSION: ICD-10-CM

## 2024-08-19 DIAGNOSIS — F33.1 MODERATE EPISODE OF RECURRENT MAJOR DEPRESSIVE DISORDER (HCC): ICD-10-CM

## 2024-08-19 DIAGNOSIS — M54.12 CERVICAL RADICULOPATHY: ICD-10-CM

## 2024-08-19 DIAGNOSIS — E11.40 TYPE 2 DIABETES MELLITUS WITH DIABETIC NEUROPATHY, WITHOUT LONG-TERM CURRENT USE OF INSULIN (HCC): ICD-10-CM

## 2024-08-19 DIAGNOSIS — F41.8 DEPRESSION WITH ANXIETY: ICD-10-CM

## 2024-08-20 RX ORDER — INSULIN GLARGINE 100 [IU]/ML
40 INJECTION, SOLUTION SUBCUTANEOUS DAILY
Qty: 30 ML | Refills: 0 | Status: SHIPPED | OUTPATIENT
Start: 2024-08-20

## 2024-08-20 RX ORDER — TAMSULOSIN HYDROCHLORIDE 0.4 MG/1
0.4 CAPSULE ORAL DAILY
Qty: 90 CAPSULE | Refills: 0 | Status: SHIPPED | OUTPATIENT
Start: 2024-08-20

## 2024-08-20 RX ORDER — SERTRALINE HYDROCHLORIDE 100 MG/1
100 TABLET, FILM COATED ORAL DAILY
Qty: 90 TABLET | Refills: 0 | Status: SHIPPED | OUTPATIENT
Start: 2024-08-20

## 2024-08-20 RX ORDER — GLIMEPIRIDE 4 MG/1
4 TABLET ORAL 2 TIMES DAILY
Qty: 180 TABLET | Refills: 0 | Status: SHIPPED | OUTPATIENT
Start: 2024-08-20

## 2024-08-20 RX ORDER — BUPROPION HYDROCHLORIDE 150 MG/1
150 TABLET ORAL DAILY
Qty: 90 TABLET | Refills: 0 | Status: SHIPPED | OUTPATIENT
Start: 2024-08-20

## 2024-08-20 RX ORDER — CLONIDINE HYDROCHLORIDE 0.3 MG/1
0.3 TABLET ORAL 2 TIMES DAILY
Qty: 180 TABLET | Refills: 0 | Status: SHIPPED | OUTPATIENT
Start: 2024-08-20

## 2024-08-20 RX ORDER — ENALAPRIL MALEATE 20 MG/1
20 TABLET ORAL DAILY
Qty: 90 TABLET | Refills: 0 | Status: SHIPPED | OUTPATIENT
Start: 2024-08-20

## 2024-08-20 RX ORDER — GABAPENTIN 300 MG/1
300 CAPSULE ORAL 3 TIMES DAILY PRN
Qty: 90 CAPSULE | Refills: 0 | Status: SHIPPED | OUTPATIENT
Start: 2024-08-20

## 2024-08-20 RX ORDER — AMLODIPINE BESYLATE 10 MG/1
10 TABLET ORAL DAILY
Qty: 90 TABLET | Refills: 0 | Status: SHIPPED | OUTPATIENT
Start: 2024-08-20

## 2024-08-20 RX ORDER — PEN NEEDLE, DIABETIC 32GX 5/32"
NEEDLE, DISPOSABLE MISCELLANEOUS DAILY
Qty: 90 EACH | Refills: 0 | Status: SHIPPED | OUTPATIENT
Start: 2024-08-20

## 2024-08-20 NOTE — TELEPHONE ENCOUNTER
Last seen 10.2023- No upcoming appointment  Patient needs an appointment. Please contact the patient to schedule an appointment.   Mail Order 90D Courtesy refill provided.

## 2024-08-22 ENCOUNTER — OFFICE VISIT (OUTPATIENT)
Age: 64
End: 2024-08-22
Payer: COMMERCIAL

## 2024-08-22 VITALS
HEART RATE: 73 BPM | HEIGHT: 71 IN | SYSTOLIC BLOOD PRESSURE: 148 MMHG | WEIGHT: 281 LBS | DIASTOLIC BLOOD PRESSURE: 87 MMHG | BODY MASS INDEX: 39.34 KG/M2 | RESPIRATION RATE: 17 BRPM

## 2024-08-22 DIAGNOSIS — M77.41 METATARSALGIA OF BOTH FEET: Primary | ICD-10-CM

## 2024-08-22 DIAGNOSIS — B35.1 ONYCHOMYCOSIS: ICD-10-CM

## 2024-08-22 DIAGNOSIS — M79.671 PAIN IN BOTH FEET: ICD-10-CM

## 2024-08-22 DIAGNOSIS — M79.672 PAIN IN BOTH FEET: ICD-10-CM

## 2024-08-22 DIAGNOSIS — M77.42 METATARSALGIA OF BOTH FEET: Primary | ICD-10-CM

## 2024-08-22 DIAGNOSIS — M21.961 ACQUIRED DEFORMITY OF BOTH FEET: ICD-10-CM

## 2024-08-22 DIAGNOSIS — M21.962 ACQUIRED DEFORMITY OF BOTH FEET: ICD-10-CM

## 2024-08-22 DIAGNOSIS — E11.42 DIABETIC POLYNEUROPATHY ASSOCIATED WITH TYPE 2 DIABETES MELLITUS (HCC): ICD-10-CM

## 2024-08-22 DIAGNOSIS — M21.969 ACQUIRED DEFORMITY OF FOOT, UNSPECIFIED LATERALITY: ICD-10-CM

## 2024-08-22 PROCEDURE — 99213 OFFICE O/P EST LOW 20 MIN: CPT | Performed by: PODIATRIST

## 2024-09-06 ENCOUNTER — TELEPHONE (OUTPATIENT)
Dept: INTERNAL MEDICINE CLINIC | Facility: CLINIC | Age: 64
End: 2024-09-06

## 2024-09-06 DIAGNOSIS — E11.40 TYPE 2 DIABETES MELLITUS WITH DIABETIC NEUROPATHY, WITHOUT LONG-TERM CURRENT USE OF INSULIN (HCC): ICD-10-CM

## 2024-09-06 RX ORDER — INSULIN GLARGINE 100 [IU]/ML
40 INJECTION, SOLUTION SUBCUTANEOUS DAILY
Qty: 30 ML | Refills: 0 | Status: SHIPPED | OUTPATIENT
Start: 2024-09-06

## 2024-09-07 ENCOUNTER — TELEPHONE (OUTPATIENT)
Dept: OTHER | Facility: HOSPITAL | Age: 64
End: 2024-09-07

## 2024-09-10 ENCOUNTER — TELEPHONE (OUTPATIENT)
Age: 64
End: 2024-09-10

## 2024-09-10 NOTE — TELEPHONE ENCOUNTER
PA for Insulin glargine solostar (basaglar kwik pen ) 100 units SUBMITTED     via    [x]CMM-KEY: [id number]  []Surescripts-Case ID #   []Faxed to plan   []Other website   []Phone call Case ID #     Office notes sent, clinical questions answered. Awaiting determination    Turnaround time for your insurance to make a decision on your Prior Authorization can take 7-21 business days.

## 2024-09-11 NOTE — TELEPHONE ENCOUNTER
Pa for insulin glargine 100 units appeal submitted     []CMM  []SS  [x]Letter sent to insurance via fax [facility] [phone]  []Other site or means     All necessary records sent. Will await response from insurance company    Turnaround time for a decision to be made on an appeal could take up to 30 business days

## 2024-09-11 NOTE — TELEPHONE ENCOUNTER
Duplicate encounter created, please see telephone encounter from 9- regarding Insulin Glargine Solostar (Humera Michael) PA status.

## 2024-09-11 NOTE — TELEPHONE ENCOUNTER
PA for INSULIN GLARGINE 100 UNITS  DENIED    Reason:(Screenshot if applicable)        Message sent to office clinical pool Yes    Denial letter scanned into Media Yes    Appeal started Yes (Provider will need to decide if appeal is warranted and send clinical documentation to Prior Authorization Team for initiation.)    **Please follow up with your patient regarding denial and next steps**

## 2024-09-12 NOTE — TELEPHONE ENCOUNTER
PA APPEAL for Insulin Glargine Solostar (Basaglar JulianLor) 100 UNIT/ML SOPN APPROVED     Date(s) approved 9- - 9-        Patient advised by          [x][employer] Message  [x]Phone call   []LMOM  []L/M to call office as no active Communication consent on file  []Unable to leave detailed message as VM not approved on Communication consent       Pharmacy advised by    [x]Fax  []Phone call    Approval letter scanned into Media Yes

## 2024-09-26 DIAGNOSIS — E11.40 TYPE 2 DIABETES MELLITUS WITH DIABETIC NEUROPATHY, WITHOUT LONG-TERM CURRENT USE OF INSULIN (HCC): ICD-10-CM

## 2024-09-27 DIAGNOSIS — M54.12 CERVICAL RADICULOPATHY: ICD-10-CM

## 2024-09-27 NOTE — TELEPHONE ENCOUNTER
Please call patient.  Ask if he has changed PCP.  If not, overdue for follow up and labs.  Let me know.

## 2024-09-30 RX ORDER — INSULIN GLARGINE 100 [IU]/ML
40 INJECTION, SOLUTION SUBCUTANEOUS DAILY
Qty: 30 ML | Refills: 0 | OUTPATIENT
Start: 2024-09-30

## 2024-09-30 RX ORDER — GABAPENTIN 300 MG/1
CAPSULE ORAL
Qty: 90 CAPSULE | Refills: 0 | OUTPATIENT
Start: 2024-09-30

## 2024-10-01 ENCOUNTER — TELEPHONE (OUTPATIENT)
Age: 64
End: 2024-10-01

## 2024-10-01 DIAGNOSIS — I12.9 TYPE 2 DIABETES MELLITUS WITH STAGE 3 CHRONIC KIDNEY DISEASE AND HYPERTENSION (HCC): Primary | ICD-10-CM

## 2024-10-01 DIAGNOSIS — Z79.899 ENCOUNTER FOR LONG-TERM CURRENT USE OF MEDICATION: ICD-10-CM

## 2024-10-01 DIAGNOSIS — N18.30 TYPE 2 DIABETES MELLITUS WITH STAGE 3 CHRONIC KIDNEY DISEASE AND HYPERTENSION (HCC): Primary | ICD-10-CM

## 2024-10-01 DIAGNOSIS — I10 ESSENTIAL HYPERTENSION: ICD-10-CM

## 2024-10-01 DIAGNOSIS — N18.31 STAGE 3A CHRONIC KIDNEY DISEASE (HCC): ICD-10-CM

## 2024-10-01 DIAGNOSIS — E11.22 TYPE 2 DIABETES MELLITUS WITH STAGE 3 CHRONIC KIDNEY DISEASE AND HYPERTENSION (HCC): Primary | ICD-10-CM

## 2024-10-01 DIAGNOSIS — E78.2 MIXED HYPERLIPIDEMIA: ICD-10-CM

## 2024-10-01 NOTE — TELEPHONE ENCOUNTER
Patient called scheduled a yearly physical and would like to know if blood work can be ordered that would be needed for physical. Will be going to [facility] lab.

## 2024-10-21 ENCOUNTER — APPOINTMENT (OUTPATIENT)
Dept: LAB | Facility: CLINIC | Age: 64
End: 2024-10-21
Payer: COMMERCIAL

## 2024-10-21 ENCOUNTER — RA CDI HCC (OUTPATIENT)
Dept: OTHER | Facility: HOSPITAL | Age: 64
End: 2024-10-21

## 2024-10-21 DIAGNOSIS — N18.9 CHRONIC KIDNEY DISEASE-MINERAL AND BONE DISORDER: ICD-10-CM

## 2024-10-21 DIAGNOSIS — E78.2 MIXED HYPERLIPIDEMIA: ICD-10-CM

## 2024-10-21 DIAGNOSIS — E11.22 TYPE 2 DIABETES MELLITUS WITH STAGE 3 CHRONIC KIDNEY DISEASE AND HYPERTENSION (HCC): ICD-10-CM

## 2024-10-21 DIAGNOSIS — E66.01 MORBID OBESITY (HCC): ICD-10-CM

## 2024-10-21 DIAGNOSIS — Z79.899 ENCOUNTER FOR LONG-TERM CURRENT USE OF MEDICATION: ICD-10-CM

## 2024-10-21 DIAGNOSIS — M89.9 CHRONIC KIDNEY DISEASE-MINERAL AND BONE DISORDER: ICD-10-CM

## 2024-10-21 DIAGNOSIS — I12.9 TYPE 2 DIABETES MELLITUS WITH STAGE 3 CHRONIC KIDNEY DISEASE AND HYPERTENSION (HCC): ICD-10-CM

## 2024-10-21 DIAGNOSIS — E55.9 VITAMIN D DEFICIENCY: ICD-10-CM

## 2024-10-21 DIAGNOSIS — Z12.5 SCREENING FOR PROSTATE CANCER: ICD-10-CM

## 2024-10-21 DIAGNOSIS — N18.31 STAGE 3A CHRONIC KIDNEY DISEASE (HCC): ICD-10-CM

## 2024-10-21 DIAGNOSIS — I10 ESSENTIAL HYPERTENSION: ICD-10-CM

## 2024-10-21 DIAGNOSIS — N18.30 TYPE 2 DIABETES MELLITUS WITH STAGE 3 CHRONIC KIDNEY DISEASE AND HYPERTENSION (HCC): ICD-10-CM

## 2024-10-21 DIAGNOSIS — E83.9 CHRONIC KIDNEY DISEASE-MINERAL AND BONE DISORDER: ICD-10-CM

## 2024-10-21 LAB
25(OH)D3 SERPL-MCNC: 30.1 NG/ML (ref 30–100)
ALBUMIN SERPL BCG-MCNC: 4 G/DL (ref 3.5–5)
ALP SERPL-CCNC: 84 U/L (ref 34–104)
ALT SERPL W P-5'-P-CCNC: 18 U/L (ref 7–52)
ANION GAP SERPL CALCULATED.3IONS-SCNC: 7 MMOL/L (ref 4–13)
AST SERPL W P-5'-P-CCNC: 11 U/L (ref 13–39)
BASOPHILS # BLD AUTO: 0.06 THOUSANDS/ΜL (ref 0–0.1)
BASOPHILS NFR BLD AUTO: 1 % (ref 0–1)
BILIRUB SERPL-MCNC: 1.62 MG/DL (ref 0.2–1)
BUN SERPL-MCNC: 19 MG/DL (ref 5–25)
CALCIUM SERPL-MCNC: 9.1 MG/DL (ref 8.4–10.2)
CHLORIDE SERPL-SCNC: 105 MMOL/L (ref 96–108)
CHOLEST SERPL-MCNC: 138 MG/DL
CO2 SERPL-SCNC: 26 MMOL/L (ref 21–32)
CREAT SERPL-MCNC: 1.54 MG/DL (ref 0.6–1.3)
EOSINOPHIL # BLD AUTO: 0.4 THOUSAND/ΜL (ref 0–0.61)
EOSINOPHIL NFR BLD AUTO: 5 % (ref 0–6)
ERYTHROCYTE [DISTWIDTH] IN BLOOD BY AUTOMATED COUNT: 13.2 % (ref 11.6–15.1)
EST. AVERAGE GLUCOSE BLD GHB EST-MCNC: 180 MG/DL
GFR SERPL CREATININE-BSD FRML MDRD: 46 ML/MIN/1.73SQ M
GLUCOSE P FAST SERPL-MCNC: 196 MG/DL (ref 65–99)
HBA1C MFR BLD: 7.9 %
HCT VFR BLD AUTO: 48 % (ref 36.5–49.3)
HDLC SERPL-MCNC: 37 MG/DL
HGB BLD-MCNC: 16.2 G/DL (ref 12–17)
IMM GRANULOCYTES # BLD AUTO: 0.02 THOUSAND/UL (ref 0–0.2)
IMM GRANULOCYTES NFR BLD AUTO: 0 % (ref 0–2)
LDLC SERPL CALC-MCNC: 70 MG/DL (ref 0–100)
LYMPHOCYTES # BLD AUTO: 2.1 THOUSANDS/ΜL (ref 0.6–4.47)
LYMPHOCYTES NFR BLD AUTO: 25 % (ref 14–44)
MCH RBC QN AUTO: 31 PG (ref 26.8–34.3)
MCHC RBC AUTO-ENTMCNC: 33.8 G/DL (ref 31.4–37.4)
MCV RBC AUTO: 92 FL (ref 82–98)
MONOCYTES # BLD AUTO: 0.65 THOUSAND/ΜL (ref 0.17–1.22)
MONOCYTES NFR BLD AUTO: 8 % (ref 4–12)
NEUTROPHILS # BLD AUTO: 5.2 THOUSANDS/ΜL (ref 1.85–7.62)
NEUTS SEG NFR BLD AUTO: 61 % (ref 43–75)
NONHDLC SERPL-MCNC: 101 MG/DL
NRBC BLD AUTO-RTO: 0 /100 WBCS
PLATELET # BLD AUTO: 281 THOUSANDS/UL (ref 149–390)
PMV BLD AUTO: 9.5 FL (ref 8.9–12.7)
POTASSIUM SERPL-SCNC: 4 MMOL/L (ref 3.5–5.3)
PROT SERPL-MCNC: 7.3 G/DL (ref 6.4–8.4)
PSA SERPL-MCNC: 2.44 NG/ML (ref 0–4)
PTH-INTACT SERPL-MCNC: 78.1 PG/ML (ref 12–88)
RBC # BLD AUTO: 5.22 MILLION/UL (ref 3.88–5.62)
SODIUM SERPL-SCNC: 138 MMOL/L (ref 135–147)
TRIGL SERPL-MCNC: 156 MG/DL
TSH SERPL DL<=0.05 MIU/L-ACNC: 1.45 UIU/ML (ref 0.45–4.5)
VIT B12 SERPL-MCNC: 1143 PG/ML (ref 180–914)
WBC # BLD AUTO: 8.43 THOUSAND/UL (ref 4.31–10.16)

## 2024-10-21 PROCEDURE — 82306 VITAMIN D 25 HYDROXY: CPT

## 2024-10-21 PROCEDURE — 84443 ASSAY THYROID STIM HORMONE: CPT

## 2024-10-21 PROCEDURE — 80061 LIPID PANEL: CPT

## 2024-10-21 PROCEDURE — 84153 ASSAY OF PSA TOTAL: CPT

## 2024-10-21 PROCEDURE — 82607 VITAMIN B-12: CPT

## 2024-10-21 PROCEDURE — 83970 ASSAY OF PARATHORMONE: CPT

## 2024-10-21 PROCEDURE — 83036 HEMOGLOBIN GLYCOSYLATED A1C: CPT

## 2024-10-21 PROCEDURE — 80053 COMPREHEN METABOLIC PANEL: CPT

## 2024-10-21 PROCEDURE — 85025 COMPLETE CBC W/AUTO DIFF WBC: CPT

## 2024-10-21 PROCEDURE — 36415 COLL VENOUS BLD VENIPUNCTURE: CPT

## 2024-10-22 ENCOUNTER — APPOINTMENT (OUTPATIENT)
Dept: LAB | Facility: CLINIC | Age: 64
End: 2024-10-22
Payer: COMMERCIAL

## 2024-10-22 LAB
CREAT UR-MCNC: 151.6 MG/DL
MICROALBUMIN UR-MCNC: 374.8 MG/L
MICROALBUMIN/CREAT 24H UR: 247 MG/G CREATININE (ref 0–30)

## 2024-10-22 PROCEDURE — 82570 ASSAY OF URINE CREATININE: CPT

## 2024-10-22 PROCEDURE — 82043 UR ALBUMIN QUANTITATIVE: CPT

## 2024-10-23 LAB
LEFT EYE DIABETIC RETINOPATHY: NORMAL
RIGHT EYE DIABETIC RETINOPATHY: NORMAL

## 2024-10-24 ENCOUNTER — OFFICE VISIT (OUTPATIENT)
Dept: INTERNAL MEDICINE CLINIC | Facility: CLINIC | Age: 64
End: 2024-10-24
Payer: COMMERCIAL

## 2024-10-24 ENCOUNTER — OFFICE VISIT (OUTPATIENT)
Age: 64
End: 2024-10-24
Payer: COMMERCIAL

## 2024-10-24 ENCOUNTER — TELEPHONE (OUTPATIENT)
Dept: ADMINISTRATIVE | Facility: OTHER | Age: 64
End: 2024-10-24

## 2024-10-24 VITALS
WEIGHT: 279 LBS | BODY MASS INDEX: 39.06 KG/M2 | HEART RATE: 62 BPM | HEIGHT: 71 IN | RESPIRATION RATE: 17 BRPM | SYSTOLIC BLOOD PRESSURE: 132 MMHG | DIASTOLIC BLOOD PRESSURE: 76 MMHG

## 2024-10-24 VITALS
DIASTOLIC BLOOD PRESSURE: 76 MMHG | HEIGHT: 71 IN | WEIGHT: 279.8 LBS | OXYGEN SATURATION: 95 % | TEMPERATURE: 96.6 F | SYSTOLIC BLOOD PRESSURE: 132 MMHG | HEART RATE: 62 BPM | BODY MASS INDEX: 39.17 KG/M2

## 2024-10-24 DIAGNOSIS — I12.9 TYPE 2 DIABETES MELLITUS WITH STAGE 3 CHRONIC KIDNEY DISEASE AND HYPERTENSION (HCC): ICD-10-CM

## 2024-10-24 DIAGNOSIS — B35.3 TINEA PEDIS OF BOTH FEET: ICD-10-CM

## 2024-10-24 DIAGNOSIS — M21.961 ACQUIRED DEFORMITY OF BOTH FEET: ICD-10-CM

## 2024-10-24 DIAGNOSIS — N18.30 TYPE 2 DIABETES MELLITUS WITH STAGE 3 CHRONIC KIDNEY DISEASE AND HYPERTENSION (HCC): ICD-10-CM

## 2024-10-24 DIAGNOSIS — M77.42 METATARSALGIA OF BOTH FEET: Primary | ICD-10-CM

## 2024-10-24 DIAGNOSIS — M79.671 PAIN IN BOTH FEET: ICD-10-CM

## 2024-10-24 DIAGNOSIS — Z23 NEED FOR COVID-19 VACCINE: ICD-10-CM

## 2024-10-24 DIAGNOSIS — B35.1 ONYCHOMYCOSIS: ICD-10-CM

## 2024-10-24 DIAGNOSIS — M77.41 METATARSALGIA OF BOTH FEET: Primary | ICD-10-CM

## 2024-10-24 DIAGNOSIS — M79.672 PAIN IN BOTH FEET: ICD-10-CM

## 2024-10-24 DIAGNOSIS — E11.42 DIABETIC POLYNEUROPATHY ASSOCIATED WITH TYPE 2 DIABETES MELLITUS (HCC): ICD-10-CM

## 2024-10-24 DIAGNOSIS — Z00.00 HEALTH MAINTENANCE EXAMINATION: ICD-10-CM

## 2024-10-24 DIAGNOSIS — F33.41 RECURRENT MAJOR DEPRESSIVE DISORDER, IN PARTIAL REMISSION (HCC): ICD-10-CM

## 2024-10-24 DIAGNOSIS — M21.969 ACQUIRED DEFORMITY OF FOOT, UNSPECIFIED LATERALITY: ICD-10-CM

## 2024-10-24 DIAGNOSIS — M21.962 ACQUIRED DEFORMITY OF BOTH FEET: ICD-10-CM

## 2024-10-24 DIAGNOSIS — G47.33 OSA ON CPAP: ICD-10-CM

## 2024-10-24 DIAGNOSIS — E66.01 MORBID OBESITY (HCC): ICD-10-CM

## 2024-10-24 DIAGNOSIS — N18.31 STAGE 3A CHRONIC KIDNEY DISEASE (HCC): ICD-10-CM

## 2024-10-24 DIAGNOSIS — I10 ESSENTIAL HYPERTENSION: Primary | ICD-10-CM

## 2024-10-24 DIAGNOSIS — E53.8 VITAMIN B12 DEFICIENCY: ICD-10-CM

## 2024-10-24 DIAGNOSIS — E55.9 VITAMIN D DEFICIENCY: ICD-10-CM

## 2024-10-24 DIAGNOSIS — Z23 NEED FOR INFLUENZA VACCINATION: ICD-10-CM

## 2024-10-24 DIAGNOSIS — N20.0 NEPHROLITHIASIS: ICD-10-CM

## 2024-10-24 DIAGNOSIS — E11.22 TYPE 2 DIABETES MELLITUS WITH STAGE 3 CHRONIC KIDNEY DISEASE AND HYPERTENSION (HCC): ICD-10-CM

## 2024-10-24 DIAGNOSIS — E78.2 MIXED HYPERLIPIDEMIA: ICD-10-CM

## 2024-10-24 PROCEDURE — 90673 RIV3 VACCINE NO PRESERV IM: CPT | Performed by: INTERNAL MEDICINE

## 2024-10-24 PROCEDURE — 91320 SARSCV2 VAC 30MCG TRS-SUC IM: CPT | Performed by: INTERNAL MEDICINE

## 2024-10-24 PROCEDURE — 99214 OFFICE O/P EST MOD 30 MIN: CPT | Performed by: INTERNAL MEDICINE

## 2024-10-24 PROCEDURE — 90480 ADMN SARSCOV2 VAC 1/ONLY CMP: CPT | Performed by: INTERNAL MEDICINE

## 2024-10-24 PROCEDURE — 90471 IMMUNIZATION ADMIN: CPT | Performed by: INTERNAL MEDICINE

## 2024-10-24 PROCEDURE — 99213 OFFICE O/P EST LOW 20 MIN: CPT | Performed by: PODIATRIST

## 2024-10-24 PROCEDURE — 99396 PREV VISIT EST AGE 40-64: CPT | Performed by: INTERNAL MEDICINE

## 2024-10-24 NOTE — ASSESSMENT & PLAN NOTE
BP controlled on current regimen: amlodipine 10 mg, clonidine 0.3 mg bid, enalapril 20 mg daily.

## 2024-10-24 NOTE — PROGRESS NOTES
Ambulatory Visit  Name: Pollo Whitney      : 1960      MRN: [id number]  Encounter Provider: Monica Villarreal MD  Encounter Date: 10/24/2024   Encounter department: [facility] INTERNAL MEDICINE    Assessment & Plan  Essential hypertension  BP controlled on current regimen: amlodipine 10 mg, clonidine 0.3 mg bid, enalapril 20 mg daily.         Stage 3a chronic kidney disease (HCC)  Lab Results   Component Value Date    EGFR 46 10/21/2024    EGFR 39 (L) 2024    EGFR 50 2024    CREATININE 1.54 (H) 10/21/2024    CREATININE 1.45 (H) 2024    CREATININE 1.47 (H) 2023     Stable. Predicted GFR lower, elevated cystatin C.  Avoid NSAIDs.    Orders:  •  Comprehensive metabolic panel; Future    Mixed hyperlipidemia  TG elevated, on atorvastatin 10 mg.    Orders:  •  Lipid panel; Future    Obstructive sleep apnea  Using CPAP.         Type 2 diabetes mellitus with stage 3 chronic kidney disease and hypertension (HCC)    Lab Results   Component Value Date    HGBA1C 7.9 (H) 10/21/2024     A1c slightly worse.  Taking Basaglar to 40 units and Trulicity 4.5 mg weekly, glimepiride 4 mg bid.  Discussed increasing insuline and decrease glimepiride to daily dose.  (+) microalb, on ACE.    Orders:  •  Hemoglobin A1C; Future    Nephrolithiasis  Adequate fluid intake.  On tamsulosin.         Recurrent major depressive disorder, in partial remission (HCC)  Stable.  On sertraline 100 mg daily and bupropion 150 mg daily.         Vitamin B12 deficiency  Taking B12.         Vitamin D deficiency  Taking D3.         Morbid obesity (HCC)  Lost 6 lbs since a year ago.           Need for influenza vaccination    Orders:  •  influenza vaccine, recombinant, PF, 0.5 mL IM (Flublok)    Need for COVID-19 vaccine    Orders:  •  COVID-19 Pfizer mRNA vaccine 12 yr and older (Comirnaty pre-filled syringe)    Health maintenance examination            History of Present Illness     He has been doing alright.  He  "lost his job a year ago, found a new one 7 months ago. It is a different kind of job, he is more active at work.  He reports is different but doing all right.    He reports her sugars are up and down, very dependent on what he eats for dinner.  He reports eating out the night before and fasting sugars was in the 180s.  If he does not eat out it is usually below 120.  He reports an episode of low sugars in the 75, he did not feel well.  He does have glucose tablets with him at work.  He is using his insulin regularly.    He feels sleep is not as good, he is using his CPAP.      Review of Systems   Constitutional:  Negative for appetite change and fatigue.   HENT:  Negative for congestion, hearing loss and postnasal drip.    Eyes: Negative.    Respiratory:  Negative for cough, chest tightness and shortness of breath.    Cardiovascular:  Negative for chest pain, palpitations and leg swelling.   Gastrointestinal:  Negative for abdominal pain and constipation.   Genitourinary:  Negative for dysuria, frequency and urgency.   Musculoskeletal:  Negative for arthralgias.   Skin:  Negative for rash and wound.   Neurological:  Negative for dizziness, numbness and headaches.   Hematological:  Negative for adenopathy. Does not bruise/bleed easily.   Psychiatric/Behavioral:  Negative for sleep disturbance. The patient is not nervous/anxious.            Objective     /76   Pulse 62   Temp (!) 96.6 °F (35.9 °C)   Ht 5' 11\" (1.803 m)   Wt 127 kg (279 lb 12.8 oz)   SpO2 95%   BMI 39.02 kg/m²     Physical Exam  Vitals and nursing note reviewed.   Constitutional:       General: He is not in acute distress.     Appearance: He is well-developed.   HENT:      Head: Normocephalic and atraumatic.      Mouth/Throat:      Mouth: Mucous membranes are moist.   Eyes:      Conjunctiva/sclera: Conjunctivae normal.   Cardiovascular:      Rate and Rhythm: Normal rate and regular rhythm.      Heart sounds: No murmur heard.  Pulmonary:     "  Effort: Pulmonary effort is normal. No respiratory distress.      Breath sounds: Normal breath sounds.   Abdominal:      Palpations: Abdomen is soft.      Tenderness: There is no abdominal tenderness.   Musculoskeletal:         General: No swelling.      Cervical back: Neck supple.   Skin:     General: Skin is warm and dry.   Neurological:      General: No focal deficit present.      Mental Status: He is alert and oriented to person, place, and time.   Psychiatric:         Mood and Affect: Mood normal.         Behavior: Behavior normal.         Labs & imaging results reviewed with patient.    Depression Screening Follow-up Plan: Patient's depression screening was positive with a PHQ-2 score of . Their PHQ-9 score was 5. Patient with underlying depression and was advised to continue current medications as prescribed.

## 2024-10-24 NOTE — LETTER
Diabetic Eye Exam Form    Date Requested: 10/25/24  Patient: Pollo Whitney  Patient : 1960   Referring Provider: Monica Villarreal MD      DIABETIC Eye Exam Date _______________________________      Type of Exam MUST be documented for Diabetic Eye Exams. Please CHECK ONE.     Retinal Exam       Dilated Retinal Exam       OCT       Optomap-Iris Exam      Fundus Photography       Left Eye - Please check Retinopathy or No Retinopathy        Exam did show retinopathy    Exam did not show retinopathy       Right Eye - Please check Retinopathy or No Retinopathy       Exam did show retinopathy    Exam did not show retinopathy       Comments __________________________________________________________    Practice Providing Exam ______________________________________________    Exam Performed By (print name) _______________________________________      Provider Signature ___________________________________________________      These reports are needed for  compliance.  Please fax this completed form and a copy of the Diabetic Eye Exam report to our office located at [address] [place] [address] as soon as possible via Fax [phone] yovany Vincent: Phone [phone]  We thank you for your assistance in treating our mutual patient.

## 2024-10-24 NOTE — ASSESSMENT & PLAN NOTE
Lab Results   Component Value Date    EGFR 46 10/21/2024    EGFR 39 (L) 03/13/2024    EGFR 50 03/13/2024    CREATININE 1.54 (H) 10/21/2024    CREATININE 1.45 (H) 03/13/2024    CREATININE 1.47 (H) 05/28/2023     Stable. Predicted GFR lower, elevated cystatin C.  Avoid NSAIDs.    Orders:    Comprehensive metabolic panel; Future

## 2024-10-24 NOTE — TELEPHONE ENCOUNTER
----- Message from Almaz KWAN sent at 10/24/2024 10:25 AM EDT -----  Regarding: Care Gap Request  10/24/24 10:25 AM    Hello, our patient above has had Diabetic Eye Exam completed/performed. Please assist in updating the patient chart by making an External outreach to [facility] facility located in [place], [place]. The date of service is 10/23/2024.    Thank you,  Almaz Bullock  [facility] INTERNAL MED

## 2024-10-25 DIAGNOSIS — E78.2 MIXED HYPERLIPIDEMIA: ICD-10-CM

## 2024-10-25 RX ORDER — ATORVASTATIN CALCIUM 10 MG/1
10 TABLET, FILM COATED ORAL DAILY
Qty: 90 TABLET | Refills: 0 | Status: SHIPPED | OUTPATIENT
Start: 2024-10-25

## 2024-10-25 NOTE — TELEPHONE ENCOUNTER
Upon review of the In Basket request we  found  is up to date with 10-23-24 eye exam     Any additional questions or concerns should be emailed to the Practice Liaisons via the appropriate education email address, please do not reply via In Basket.    Thank you  Carlton Crum MA   [facility]

## 2024-10-25 NOTE — TELEPHONE ENCOUNTER
Upon review of the In Basket request and the patient's chart, initial outreach has been made via fax to facility. Please see Contacts section for details.     Thank you  Carlton Crum MA

## 2024-12-09 DIAGNOSIS — M54.12 CERVICAL RADICULOPATHY: ICD-10-CM

## 2024-12-09 DIAGNOSIS — F41.8 DEPRESSION WITH ANXIETY: ICD-10-CM

## 2024-12-09 DIAGNOSIS — N40.0 BPH WITHOUT URINARY OBSTRUCTION: ICD-10-CM

## 2024-12-09 DIAGNOSIS — E78.2 MIXED HYPERLIPIDEMIA: ICD-10-CM

## 2024-12-09 DIAGNOSIS — E11.40 TYPE 2 DIABETES MELLITUS WITH DIABETIC NEUROPATHY, WITHOUT LONG-TERM CURRENT USE OF INSULIN (HCC): ICD-10-CM

## 2024-12-09 DIAGNOSIS — F33.1 MODERATE EPISODE OF RECURRENT MAJOR DEPRESSIVE DISORDER (HCC): ICD-10-CM

## 2024-12-09 DIAGNOSIS — I10 ESSENTIAL HYPERTENSION: ICD-10-CM

## 2024-12-11 RX ORDER — GLIMEPIRIDE 4 MG/1
4 TABLET ORAL 2 TIMES DAILY
Qty: 180 TABLET | Refills: 1 | Status: SHIPPED | OUTPATIENT
Start: 2024-12-11

## 2024-12-11 RX ORDER — TAMSULOSIN HYDROCHLORIDE 0.4 MG/1
0.4 CAPSULE ORAL DAILY
Qty: 90 CAPSULE | Refills: 1 | Status: SHIPPED | OUTPATIENT
Start: 2024-12-11

## 2024-12-11 RX ORDER — INSULIN GLARGINE 100 [IU]/ML
40 INJECTION, SOLUTION SUBCUTANEOUS DAILY
Qty: 30 ML | Refills: 1 | Status: SHIPPED | OUTPATIENT
Start: 2024-12-11

## 2024-12-11 RX ORDER — ATORVASTATIN CALCIUM 10 MG/1
10 TABLET, FILM COATED ORAL DAILY
Qty: 90 TABLET | Refills: 1 | Status: SHIPPED | OUTPATIENT
Start: 2024-12-11

## 2024-12-11 RX ORDER — SERTRALINE HYDROCHLORIDE 100 MG/1
100 TABLET, FILM COATED ORAL DAILY
Qty: 90 TABLET | Refills: 1 | Status: SHIPPED | OUTPATIENT
Start: 2024-12-11

## 2024-12-11 RX ORDER — BUPROPION HYDROCHLORIDE 150 MG/1
150 TABLET ORAL DAILY
Qty: 90 TABLET | Refills: 1 | Status: SHIPPED | OUTPATIENT
Start: 2024-12-11

## 2024-12-11 RX ORDER — ENALAPRIL MALEATE 20 MG/1
20 TABLET ORAL DAILY
Qty: 90 TABLET | Refills: 1 | Status: SHIPPED | OUTPATIENT
Start: 2024-12-11

## 2024-12-11 RX ORDER — GABAPENTIN 300 MG/1
300 CAPSULE ORAL 3 TIMES DAILY PRN
Qty: 90 CAPSULE | Refills: 0 | Status: SHIPPED | OUTPATIENT
Start: 2024-12-11

## 2024-12-11 RX ORDER — CLONIDINE HYDROCHLORIDE 0.3 MG/1
0.3 TABLET ORAL 2 TIMES DAILY
Qty: 180 TABLET | Refills: 1 | Status: SHIPPED | OUTPATIENT
Start: 2024-12-11

## 2024-12-11 RX ORDER — AMLODIPINE BESYLATE 10 MG/1
10 TABLET ORAL DAILY
Qty: 90 TABLET | Refills: 1 | Status: SHIPPED | OUTPATIENT
Start: 2024-12-11

## 2025-01-04 ENCOUNTER — HOSPITAL ENCOUNTER (OUTPATIENT)
Dept: RADIOLOGY | Facility: HOSPITAL | Age: 65
Discharge: HOME | End: 2025-01-04
Payer: COMMERCIAL

## 2025-01-04 DIAGNOSIS — N20.0 RECURRENT NEPHROLITHIASIS: ICD-10-CM

## 2025-01-04 PROCEDURE — 74018 RADEX ABDOMEN 1 VIEW: CPT

## 2025-01-07 ENCOUNTER — OFFICE VISIT (OUTPATIENT)
Age: 65
End: 2025-01-07
Payer: COMMERCIAL

## 2025-01-07 VITALS — WEIGHT: 279 LBS | BODY MASS INDEX: 39.06 KG/M2 | HEIGHT: 71 IN | RESPIRATION RATE: 17 BRPM

## 2025-01-07 DIAGNOSIS — M79.672 PAIN IN BOTH FEET: ICD-10-CM

## 2025-01-07 DIAGNOSIS — M79.671 PAIN IN BOTH FEET: ICD-10-CM

## 2025-01-07 DIAGNOSIS — M21.961 ACQUIRED DEFORMITY OF BOTH FEET: ICD-10-CM

## 2025-01-07 DIAGNOSIS — M77.42 METATARSALGIA OF BOTH FEET: Primary | ICD-10-CM

## 2025-01-07 DIAGNOSIS — B35.1 ONYCHOMYCOSIS: ICD-10-CM

## 2025-01-07 DIAGNOSIS — M21.969 ACQUIRED DEFORMITY OF FOOT, UNSPECIFIED LATERALITY: ICD-10-CM

## 2025-01-07 DIAGNOSIS — E11.42 DIABETIC POLYNEUROPATHY ASSOCIATED WITH TYPE 2 DIABETES MELLITUS (HCC): ICD-10-CM

## 2025-01-07 DIAGNOSIS — M77.41 METATARSALGIA OF BOTH FEET: Primary | ICD-10-CM

## 2025-01-07 DIAGNOSIS — M21.962 ACQUIRED DEFORMITY OF BOTH FEET: ICD-10-CM

## 2025-01-07 PROCEDURE — 99213 OFFICE O/P EST LOW 20 MIN: CPT | Performed by: PODIATRIST

## 2025-01-07 NOTE — PROGRESS NOTES
Assessment.  Pain upon ambulation.  Peripheral artery disease.  Diabetic neuropathy.  Callus formation.  Mycosis of nail.  Acquired deformity foot bilateral.  Peroneal tendinitis right foot.  Dermatophytosis.     Plan.  Chart reviewed.  PCP notes reviewed.  Lab work reviewed.  Foot exam performed.  Patient advised on proper shoe style and sizing.  Watch for signs of infection.  Foot hygiene instruction given.  Patient will moisturize daily.  Patient will stretch daily.  Procedures performed without pain or complication.  Patient educated on care of the diabetic foot.  Care instruction given.  Return for follow-up.  Patient will consider vitamin B complex.        Chief Complaint   Patient needs full diabetic foot exam.  Patient has pain upon ambulation.  Patient complains of pain in his toes when he wears shoes.  He gets pain in the ball of the foot.  No history of trauma.     History of Present Illness   HPI: Patient presents for pedal evaluation. Patient complains of pain in his feet and toes with ambulation. Patient is a morbidly obese diabetic who has some electric sensations in his feet on occasion.      Review of Systems        Constitutional: not feeling tired.      Eyes: no eyesight problems.      ENT: no nasal discharge.      Cardiovascular: no chest pain,-- no palpitations-- and-- no extremity edema.      Respiratory: no shortness of breath-- and-- no cough.      Gastrointestinal: no abdominal pain-- and-- no constipation.      Genitourinary: no dysuria-- and-- no urinary hesitancy.      Integumentary: no skin wound.      Neurological: no tingling-- and-- no dizziness.      Psychiatric: sleeping better, stopped taking trazodone, but-- no sleep disturbances.      Endocrine: no feelings of weakness.      Active Problems   1. Acquired pes planus (734) (M21.40)   2. Acute renal failure (584.9) (N17.9)   3. Adhesive capsulitis of both shoulders (726.0) (M75.01,M75.02)   4. Arthralgia (719.40) (M25.50)   5. BPH  without urinary obstruction (600.00) (N40.0)   6. Bunion (727.1) (M21.619)   7. Callus (700) (L84)   8. Chronic systolic congestive heart failure (428.22,428.0) (I50.22)   9. Depression with anxiety (300.4) (F41.8)   10. Diabetes mellitus with neurological manifestation (250.60) (E11.49)   11. Diabetes type 2, uncontrolled (250.02) (E11.65)   12. Difficulty concentrating (799.51) (R41.840)   13. Difficulty walking (719.7) (R26.2)   14. Dyspnea on exertion (786.09) (R06.09)   15. Encounter for prostate cancer screening (V76.44) (Z12.5)   16. Encounter for screening for malignant neoplasm of colon (V76.51) (Z12.11)   17. Fatigue (780.79) (R53.83)   18. Foot pain, bilateral (729.5) (M79.671,M79.672)   19. Hallux valgus (735.0) (M20.10)   20. Hematuria (599.70) (R31.9)   21. Hyperlipidemia (272.4) (E78.5)   22. Hypertension (401.9) (I10)   23. Hypogonadism, male (257.2) (E29.1)   24. Insomnia (780.52) (G47.00)   25. Morbid or severe obesity due to excess calories (278.01) (E66.01)   26. Need for pneumococcal vaccination (V03.82) (Z23)   27. Need for prophylactic vaccination and inoculation against influenza (V04.81) (Z23)   28. Nephrolithiasis (592.0) (N20.0)   29. Onychomycosis (110.1) (B35.1)   30. JEFF (obstructive sleep apnea) (327.23) (G47.33)   31. Sciatica (724.3) (M54.30)   32. Screening for colon cancer (V76.51) (Z12.11)   33. Seasonal allergies (477.9) (J30.2)   34. Shoulder pain, right (719.41) (M25.511)   35. Spinal stenosis (724.00) (M48.00)   36. Tinea pedis (110.4) (B35.3)   37. Type 2 diabetes mellitus (250.00) (E11.9)   38. Vitamin D deficiency (268.9) (E55.9)     Past Medical History    · History of Cellulitis of left leg (682.6) (L03.116)   · History of chest pain (V13.89) (Z87.898)   · History of diarrhea (V12.79) (Z87.898)   · History of onychomycosis (V12.09) (Z86.19)   · History of onychomycosis (V12.09) (Z86.19)   · History of Limb pain (729.5) (M79.609)   · History of Neck pain (723.1) (M54.2)    · Need for pneumococcal vaccination (V03.82) (Z23)   · History of Nephrolithiasis (V13.01)   · History of Numbness On The Sole Of The Right Foot   · History of Pain and swelling of left lower leg (729.5,729.81) (M79.662,M79.89)   · History of Pain of lower extremity (729.5) (M79.606)   · History of Pain, hand joint, right (719.44) (M79.641)   · Sciatica (724.3) (M54.30)     The active problems and past medical history were reviewed and updated today.      Surgical History    · History of Knee Arthroscopy (Therapeutic)   · History of Knee Surgery   · History of Needle Biopsy Of Prostate     Family History   Mother    · Family history of Alzheimer Disease   · Family history of Family Health Status Of Mother - Alive  Father    · Family history of Family Health Status Of Father -   Family History    · Family history of Adopted   · Denied: Family history of Colon Cancer   · Denied: Family history of Crohn's Disease   · Denied: Family history of Liver Cancer     Social History    · Denied: History of Alcohol Use (History)   · Current Some Day Smoker (305.1)   · Denied: History of Exercise Habits   · Marital History - Currently    · Tobacco use (305.1) (Z72.0)   · Working Full Time  The social history was reviewed and is unchanged.      The medication list was reviewed and updated today.      Allergies   1. No Known Drug Allergies   Physical Exam   Left Foot: Appearance: Normal except as noted: excessive pronation-- and-- pes planus. Great toe deformities include a bunion. Tenderness: None except the great toe-- and-- distal first metatarsal.    Right Foot: Appearance: Normal except as noted: excessive pronation-- and-- pes planus. Great toe deformities include a bunion. Tenderness: None except the great toe-- and-- distal first metatarsal.    Left Ankle: ROM: limited ROM in all planes    Right Ankle: ROM: limited ROM in all planes    Neurological Exam: performed. Light touch was decreased bilaterally.  Vibratory sensation was decreased in both first metatarsophalangeal joints.    Vascular Exam: performed Dorsalis pedis pulses were present bilaterally. Posterior tibial pulses were present bilaterally. Elevation Pallor: absent bilaterally. Dependence rubor was absent bilaterally. Edema: none.    Toenails: All of the toenails were elongated,-- hypertrophied,-- discolored-- and-- Ptotic. Both first toenails were tender-- and-- Positive onychogryphosis.         Socks and shoes removed, Right Foot Findings: swollen, erythematous and dry.      The sensory exam showed diminished vibratory sensation at the level of the toes. Diminished tactile sensation with monofilament testing throughout the right foot.      Socks and shoes removed, Left Foot Findings: swollen, erythematous and dry.      The sensory exam showed diminished vibratory sensation at the level of the toes. Diminished tactile sensation with monofilament testing throughout the left foot.     Capillary refills findings on the right were normal in the toes.      Pulses:      2+ in the posterior tibialis on the right      2+ in the dorsalis pedis on the right.      Capillary refills findings on the left were normal in the toes.      Pulses:      1+ in the posterior tibialis on the left      1+ in the dorsalis pedis on the left.      Assign Risk Category: 2: Loss of protective sensation with or without weakness, deformity, callus, pre-ulcer, or history of ulceration. High risk.    Hyperkeratosis: present on both first toes,-- present on both first sub metatarsals,-- present on both third sub metatarsals-- and-- Severe profound moccasin tinea pedis bilateral.    Shoe Gear Evaluation: performed (). Recommendation(s): SAS style.     Right Foot/Ankle   Right Foot Inspection        Sensory   Vibration: diminished  Proprioception: diminished  Monofilament testing: diminished     Vascular  Capillary refills: < 3 seconds              Left Foot/Ankle  Left Foot  Inspection        Sensory   Vibration: diminished  Proprioception: diminished  Monofilament testing: diminished     Vascular  Capillary refills: < 3 seconds         Patient's shoes and socks removed.     Right Foot/Ankle   Right Foot Inspection        Sensory   Vibration: diminished  Proprioception: diminished  Monofilament testing: intact     Vascular  Capillary refills: < 3 seconds  The right DP pulse is 1+. The right PT pulse is 1+.      Left Foot/Ankle  Left Foot Inspection        Sensory   Vibration: diminished  Proprioception: diminished  Monofilament testing: intact     Vascular  Capillary refills: < 3 seconds  The left DP pulse is 1+. The left PT pulse is 1+.      Patient's shoes and socks removed.     Right Foot/Ankle   Right Foot Inspection        Toe Exam: right toe deformity.      Sensory   Vibration: diminished        Vascular  Capillary refills: < 3 seconds        Left Foot/Ankle  Left Foot Inspection        Toe Exam: left toe deformity.      Sensory   Vibration: diminished        Vascular  Capillary refills: < 3 seconds      Patient's shoes and socks removed.    Right Foot/Ankle   Right Foot Inspection  Skin Exam: dry skin and pre-ulcer.     Toe Exam: swelling and right toe deformity.     Sensory   Vibration: diminished  Proprioception: intact  Monofilament testing: intact    Vascular  Capillary refills: < 3 seconds  The right DP pulse is 2+. The right PT pulse is 2+.     Left Foot/Ankle  Left Foot Inspection  Skin Exam: dry skin and pre-ulcer.     Toe Exam: swelling and left toe deformity.     Sensory   Vibration: diminished  Proprioception: intact  Monofilament testing: intact    Vascular  Capillary refills: < 3 seconds  The left DP pulse is 2+. The left PT pulse is 2+.     Assign Risk Category  Deformity present  No loss of protective sensation  No weak pulses  Risk: 0

## 2025-01-22 ENCOUNTER — TELEPHONE (OUTPATIENT)
Age: 65
End: 2025-01-22

## 2025-01-22 ENCOUNTER — OFFICE VISIT (OUTPATIENT)
Age: 65
End: 2025-01-22
Payer: COMMERCIAL

## 2025-01-22 VITALS — RESPIRATION RATE: 16 BRPM | HEIGHT: 71 IN | HEART RATE: 62 BPM | BODY MASS INDEX: 39.06 KG/M2 | WEIGHT: 279 LBS

## 2025-01-22 DIAGNOSIS — E11.42 DIABETIC POLYNEUROPATHY ASSOCIATED WITH TYPE 2 DIABETES MELLITUS (HCC): ICD-10-CM

## 2025-01-22 DIAGNOSIS — B35.3 TINEA PEDIS OF RIGHT FOOT: ICD-10-CM

## 2025-01-22 DIAGNOSIS — L03.115 CELLULITIS OF FOOT, RIGHT: Primary | ICD-10-CM

## 2025-01-22 PROCEDURE — 99213 OFFICE O/P EST LOW 20 MIN: CPT | Performed by: PODIATRIST

## 2025-01-22 RX ORDER — KETOCONAZOLE 20 MG/G
CREAM TOPICAL DAILY
Qty: 60 G | Refills: 1 | Status: SHIPPED | OUTPATIENT
Start: 2025-01-22 | End: 2025-02-21

## 2025-01-22 RX ORDER — CEFADROXIL 500 MG/1
500 CAPSULE ORAL EVERY 12 HOURS SCHEDULED
Qty: 20 CAPSULE | Refills: 0 | Status: SHIPPED | OUTPATIENT
Start: 2025-01-22 | End: 2025-02-01

## 2025-01-22 NOTE — TELEPHONE ENCOUNTER
Lmom we have an appt today at 1;00. I told him to cb if  he can't make it. I put him in the schedule.

## 2025-01-22 NOTE — PROGRESS NOTES
Assessment/Plan: Complex tinea pedis right foot with secondary cellulitis lateral column.  Pain.  Diabetic neuropathy.    Plan.  Chart reviewed.  PCP notes reviewed.  Patient evaluated.  At this time patient has cellulitis.  No evidence of abscess.  This is secondary to complex interdigital tinea pedis.  Patient be started on oral antibiotic.  Today gentian violet applied to digits.  In addition patient be started on topical antifungal.  Return for follow-up         Diagnoses and all orders for this visit:    Cellulitis of foot, right  -     cefadroxil (DURICEF) 500 mg capsule; Take 1 capsule (500 mg total) by mouth every 12 (twelve) hours for 10 days    Tinea pedis of right foot  -     ketoconazole (NIZORAL) 2 % cream; Apply topically daily    Diabetic polyneuropathy associated with type 2 diabetes mellitus (HCC)          Subjective: Urgent visit.  Patient has pain of his right foot.  Approximately 2 days ago he developed a blister of his foot.  No history of trauma    No Known Allergies      Current Outpatient Medications:     amLODIPine (NORVASC) 10 mg tablet, Take 1 tablet (10 mg total) by mouth daily, Disp: 90 tablet, Rfl: 1    atorvastatin (LIPITOR) 10 mg tablet, Take 1 tablet (10 mg total) by mouth daily, Disp: 90 tablet, Rfl: 1    buPROPion (WELLBUTRIN XL) 150 mg 24 hr tablet, Take 1 tablet (150 mg total) by mouth daily, Disp: 90 tablet, Rfl: 1    cefadroxil (DURICEF) 500 mg capsule, Take 1 capsule (500 mg total) by mouth every 12 (twelve) hours for 10 days, Disp: 20 capsule, Rfl: 0    cholecalciferol (VITAMIN D3) 400 units tablet, Take 400 Units by mouth daily, Disp: , Rfl:     cloNIDine (CATAPRES) 0.3 mg tablet, Take 1 tablet (0.3 mg total) by mouth 2 (two) times a day, Disp: 180 tablet, Rfl: 1    Cyanocobalamin (Vitamin B 12) 500 MCG TABS, Take by mouth, Disp: , Rfl:     dulaglutide (Trulicity) 4.5 MG/0.5ML injection, Inject 0.5 mL (4.5 mg total) under the skin once a week, Disp: 6 mL, Rfl: 0    enalapril  (VASOTEC) 20 mg tablet, Take 1 tablet (20 mg total) by mouth daily, Disp: 90 tablet, Rfl: 1    fluticasone (FLONASE) 50 mcg/act nasal spray, 1 spray into each nostril daily, Disp: 48 g, Rfl: 1    gabapentin (Neurontin) 300 mg capsule, Take 1 capsule (300 mg total) by mouth 3 (three) times a day as needed (pain), Disp: 90 capsule, Rfl: 0    glimepiride (AMARYL) 4 mg tablet, Take 1 tablet (4 mg total) by mouth 2 (two) times a day, Disp: 180 tablet, Rfl: 1    Insulin Glargine Solostar (Basaglar KwikPen) 100 UNIT/ML SOPN, Inject 0.4 mL (40 Units total) under the skin daily, Disp: 30 mL, Rfl: 1    Insulin Pen Needle (BD Pen Needle Alicia 2nd Gen) 32G X 4 MM MISC, Inject under the skin daily, Disp: 90 each, Rfl: 0    ketoconazole (NIZORAL) 2 % cream, Apply topically daily, Disp: 60 g, Rfl: 1    nystatin (MYCOSTATIN) powder, Apply topically 3 (three) times a day as needed (groin rash), Disp: 60 g, Rfl: 1    sertraline (ZOLOFT) 100 mg tablet, Take 1 tablet (100 mg total) by mouth daily, Disp: 90 tablet, Rfl: 1    tamsulosin (FLOMAX) 0.4 mg, Take 1 capsule (0.4 mg total) by mouth daily, Disp: 90 capsule, Rfl: 1    Patient Active Problem List   Diagnosis    Recurrent major depressive disorder, in partial remission (HCC)    Type 2 diabetes mellitus with obesity  (HCC)    Morbid obesity (HCC)    Mixed hyperlipidemia    Essential hypertension    Hypogonadism, male    Obstructive sleep apnea    Spinal stenosis    Vitamin D deficiency    Acquired deformity of foot    Metatarsalgia of both feet    Pain in both feet    Diabetic polyneuropathy associated with type 2 diabetes mellitus (HCC)    Seasonal allergies    Onychomycosis    Corns    Arthralgia    Hallux valgus    Bunion    Acquired pes planus    Bilateral renal cysts    Type 2 diabetes mellitus with stage 3 chronic kidney disease and hypertension (HCC)    CKD (chronic kidney disease) stage 3    Vitamin B12 deficiency    Candidal intertrigo    Abnormal CT of the abdomen     Abnormal CT scan    Nephrolithiasis    Dyspnea on exertion    PVC (premature ventricular contraction)    Cervical radiculopathy    Chronic kidney disease-mineral and bone disorder          Patient ID: Pollo Whitney is a 64 y.o. male.    HPI    The following portions of the patient's history were reviewed and updated as appropriate:     family history includes Alzheimer's disease in his mother. He was adopted.      reports that he has been smoking cigars. He has been exposed to tobacco smoke. He has never used smokeless tobacco. He reports that he does not currently use alcohol after a past usage of about 1.0 standard drink of alcohol per week. He reports that he does not use drugs.    Vitals:    01/22/25 1301   Pulse: 62   Resp: 16       Review of Systems      Objective:  Patient's shoes and socks removed.   Foot ExamPhysical Exam  Vitals and nursing note reviewed.   Constitutional:       Appearance: Normal appearance.   Cardiovascular:      Rate and Rhythm: Normal rate and regular rhythm.   Skin:     Capillary Refill: Capillary refill takes less than 2 seconds.      Comments: Patient demonstrates interdigital tinea pedis.  Most pacifically there is maceration of the fourth space of the right foot.  Patient demonstrates dorsal cellulitis.  Negative abscess or sinus.   Neurological:      Mental Status: He is alert.   Psychiatric:         Mood and Affect: Mood normal.         Behavior: Behavior normal.         Thought Content: Thought content normal.         Judgment: Judgment normal.

## 2025-02-24 DIAGNOSIS — M54.12 CERVICAL RADICULOPATHY: ICD-10-CM

## 2025-02-25 RX ORDER — GABAPENTIN 300 MG/1
300 CAPSULE ORAL 3 TIMES DAILY PRN
Qty: 90 CAPSULE | Refills: 1 | Status: SHIPPED | OUTPATIENT
Start: 2025-02-25

## 2025-03-07 ENCOUNTER — OFFICE VISIT (OUTPATIENT)
Dept: UROLOGY | Facility: CLINIC | Age: 65
End: 2025-03-07
Payer: COMMERCIAL

## 2025-03-07 VITALS
HEART RATE: 45 BPM | BODY MASS INDEX: 38.92 KG/M2 | DIASTOLIC BLOOD PRESSURE: 78 MMHG | HEIGHT: 71 IN | SYSTOLIC BLOOD PRESSURE: 158 MMHG | WEIGHT: 278 LBS | OXYGEN SATURATION: 98 %

## 2025-03-07 DIAGNOSIS — N40.1 BPH WITH LOWER URINARY TRACT SYMPTOMS WITHOUT URINARY OBSTRUCTION: Primary | ICD-10-CM

## 2025-03-07 DIAGNOSIS — Z12.5 SCREENING FOR PROSTATE CANCER: ICD-10-CM

## 2025-03-07 DIAGNOSIS — N20.0 NEPHROLITHIASIS: ICD-10-CM

## 2025-03-07 PROCEDURE — 99213 OFFICE O/P EST LOW 20 MIN: CPT

## 2025-03-07 NOTE — PROGRESS NOTES
Name: Pollo Whitney      : 1960      MRN: [id number]  Encounter Provider: EVON Kang  Encounter Date: 3/7/2025   Encounter department: [facility]  :  Assessment & Plan  BPH with lower urinary tract symptoms without urinary obstruction  -Continue 0.4 mg daily tamsulosin.       Nephrolithiasis  -KUB completed on 3-4-24 showing stable bilateral renal calculi showing up to 7 mm.  -Return in 1 year with repeat KUB prior to visit.  -Flank pain.  Orders:    XR abdomen 1 view kub; Future    Screening for prostate cancer  -PSA stable at 2.44 completed on 10-2-24.  Orders:    PSA, Total Screen; Future    -Patient will return in 1 year for follow-up with AP with PSA and KUB prior to visit.  All questions addressed.  Please not hesitate to reach out with further questions or concerns.      History of Present Illness   Pollo Whitney is a 64 y.o. male who presents with history of urosepsis (), elevated PSA s/p negative prostate biopsy 11 years ago, nephrolithiasis, BPH with LUTS here for follow up.      Has had to have previous kidney stone interventions.  He states he has spontaneously passed a kidney stone previously.    Patient had a CT of the abdomen pelvis 22 with a 12mm right UPJ stone with hydronephrosis. S/p ureteral stent placement 22 with Dr. Capone and right ureteroscopy 22 with Dr. Chapman. Stent with string and removed shortly thereafter.     Patient states that he previously drank 2 L of iced tea and diet soda daily.  He states he is reduced the amount of dark liquids that he is drinking.  He is asymptomatic of flank pain, dysuria, gross hematuria.  KUB stable.    Patient denies voiding complaints.  He is unsure of family history of  malignancy as states he is adopted.            AUA SYMPTOM SCORE      Flowsheet Row Most Recent Value   AUA SYMPTOM SCORE    How often have you had a sensation of not emptying your bladder completely after  you finished urinating? 0 (P)     How often have you had to urinate again less than two hours after you finished urinating? 1 (P)     How often have you found you stopped and started again several times when you urinate? 2 (P)     How often have you found it difficult to postpone urination? 3 (P)     How often have you had a weak urinary stream? 3 (P)     How often have you had to push or strain to begin urination? 0 (P)     How many times did you most typically get up to urinate from the time you went to bed at night until the time you got up in the morning? 1 (P)     Quality of Life: If you were to spend the rest of your life with your urinary condition just the way it is now, how would you feel about that? 2 (P)     AUA SYMPTOM SCORE 10 (P)            Review of Systems   Constitutional:  Negative for activity change, chills, fatigue and fever.   HENT:  Negative for congestion, rhinorrhea and sore throat.    Eyes:  Negative for photophobia, redness and visual disturbance.   Respiratory:  Negative for cough, shortness of breath and wheezing.    Cardiovascular:  Negative for chest pain, palpitations and leg swelling.   Gastrointestinal:  Negative for abdominal pain, diarrhea, nausea and vomiting.   Genitourinary:  Negative for difficulty urinating, dysuria, flank pain, frequency, hematuria and urgency.   Neurological:  Negative for weakness, light-headedness and headaches.          Objective   There were no vitals taken for this visit.    Physical Exam  Vitals and nursing note reviewed.   Constitutional:       General: He is not in acute distress.     Appearance: He is well-developed.   HENT:      Head: Normocephalic and atraumatic.   Eyes:      Conjunctiva/sclera: Conjunctivae normal.   Cardiovascular:      Rate and Rhythm: Normal rate and regular rhythm.      Heart sounds: No murmur heard.  Pulmonary:      Effort: Pulmonary effort is normal. No respiratory distress.      Breath sounds: Normal breath sounds.    Abdominal:      Palpations: Abdomen is soft.      Tenderness: There is no abdominal tenderness.   Musculoskeletal:         General: No swelling.      Cervical back: Neck supple.   Skin:     General: Skin is warm and dry.      Capillary Refill: Capillary refill takes less than 2 seconds.   Neurological:      Mental Status: He is alert.   Psychiatric:         Mood and Affect: Mood normal.          Results   Lab Results   Component Value Date    PSA 2.441 10/21/2024    PSA 2.88 03/04/2024    PSA 2.64 05/28/2023     Lab Results   Component Value Date    GLUCOSE 301 (H) 02/01/2014    CALCIUM 9.1 10/21/2024     02/01/2014    K 4.0 10/21/2024    CO2 26 10/21/2024     10/21/2024    BUN 19 10/21/2024    CREATININE 1.54 (H) 10/21/2024     Lab Results   Component Value Date    WBC 8.43 10/21/2024    HGB 16.2 10/21/2024    HCT 48.0 10/21/2024    MCV 92 10/21/2024     10/21/2024       Office Urine Dip  No results found for this or any previous visit (from the past hour).

## 2025-03-07 NOTE — ASSESSMENT & PLAN NOTE
-KUB completed on 3-4-24 showing stable bilateral renal calculi showing up to 7 mm.  -Return in 1 year with repeat KUB prior to visit.  -Flank pain.  Orders:    XR abdomen 1 view kub; Future

## 2025-03-17 ENCOUNTER — RA CDI HCC (OUTPATIENT)
Dept: OTHER | Facility: HOSPITAL | Age: 65
End: 2025-03-17

## 2025-03-17 NOTE — PROGRESS NOTES
HCC coding opportunities          Chart Reviewed number of suggestions sent to Provider: 1     Patients Insurance        Commercial Insurance: [employer]       E11.22: Type 2 diabetes mellitus with diabetic chronic kidney disease (HCC)     As per ICD 10 coding guidelines, DM & CKD are presumed to have a causal-effect relationship unless documented as unrelated please review and assess. if applicable

## 2025-03-18 ENCOUNTER — OFFICE VISIT (OUTPATIENT)
Age: 65
End: 2025-03-18
Payer: COMMERCIAL

## 2025-03-18 VITALS — RESPIRATION RATE: 17 BRPM | WEIGHT: 278 LBS | BODY MASS INDEX: 38.92 KG/M2 | HEIGHT: 71 IN

## 2025-03-18 DIAGNOSIS — M21.961 ACQUIRED DEFORMITY OF BOTH FEET: ICD-10-CM

## 2025-03-18 DIAGNOSIS — E11.42 DIABETIC POLYNEUROPATHY ASSOCIATED WITH TYPE 2 DIABETES MELLITUS (HCC): ICD-10-CM

## 2025-03-18 DIAGNOSIS — M79.672 PAIN IN BOTH FEET: ICD-10-CM

## 2025-03-18 DIAGNOSIS — M77.41 METATARSALGIA OF BOTH FEET: Primary | ICD-10-CM

## 2025-03-18 DIAGNOSIS — M21.969 ACQUIRED DEFORMITY OF FOOT, UNSPECIFIED LATERALITY: ICD-10-CM

## 2025-03-18 DIAGNOSIS — M79.671 PAIN IN BOTH FEET: ICD-10-CM

## 2025-03-18 DIAGNOSIS — B35.1 ONYCHOMYCOSIS: ICD-10-CM

## 2025-03-18 DIAGNOSIS — M77.42 METATARSALGIA OF BOTH FEET: Primary | ICD-10-CM

## 2025-03-18 DIAGNOSIS — M21.962 ACQUIRED DEFORMITY OF BOTH FEET: ICD-10-CM

## 2025-03-18 PROCEDURE — 99213 OFFICE O/P EST LOW 20 MIN: CPT | Performed by: PODIATRIST

## 2025-03-18 NOTE — PROGRESS NOTES
Assessment/Plan: Complex tinea pedis right foot with secondary cellulitis lateral column.  Pain.  Diabetic neuropathy.     Plan.  Chart reviewed.  PCP notes reviewed.  Patient evaluated.  At this time patient has cellulitis.  No evidence of abscess.  This is secondary to complex interdigital tinea pedis.  Patient be started on oral antibiotic.  Today gentian violet applied to digits.  In addition patient be started on topical antifungal.  Return for follow-up           Assessment  Diagnoses and all orders for this visit:     Cellulitis of foot, right  -     cefadroxil (DURICEF) 500 mg capsule; Take 1 capsule (500 mg total) by mouth every 12 (twelve) hours for 10 days     Tinea pedis of right foot  -     ketoconazole (NIZORAL) 2 % cream; Apply topically daily     Diabetic polyneuropathy associated with type 2 diabetes mellitus (HCC)              Subjective: Urgent visit.  Patient has pain of his right foot.  Approximately 2 days ago he developed a blister of his foot.  No history of trauma     No Known Allergies       Current Medications      Current Outpatient Medications:     amLODIPine (NORVASC) 10 mg tablet, Take 1 tablet (10 mg total) by mouth daily, Disp: 90 tablet, Rfl: 1    atorvastatin (LIPITOR) 10 mg tablet, Take 1 tablet (10 mg total) by mouth daily, Disp: 90 tablet, Rfl: 1    buPROPion (WELLBUTRIN XL) 150 mg 24 hr tablet, Take 1 tablet (150 mg total) by mouth daily, Disp: 90 tablet, Rfl: 1    cefadroxil (DURICEF) 500 mg capsule, Take 1 capsule (500 mg total) by mouth every 12 (twelve) hours for 10 days, Disp: 20 capsule, Rfl: 0    cholecalciferol (VITAMIN D3) 400 units tablet, Take 400 Units by mouth daily, Disp: , Rfl:     cloNIDine (CATAPRES) 0.3 mg tablet, Take 1 tablet (0.3 mg total) by mouth 2 (two) times a day, Disp: 180 tablet, Rfl: 1    Cyanocobalamin (Vitamin B 12) 500 MCG TABS, Take by mouth, Disp: , Rfl:     dulaglutide (Trulicity) 4.5 MG/0.5ML injection, Inject 0.5 mL (4.5 mg total) under the  skin once a week, Disp: 6 mL, Rfl: 0    enalapril (VASOTEC) 20 mg tablet, Take 1 tablet (20 mg total) by mouth daily, Disp: 90 tablet, Rfl: 1    fluticasone (FLONASE) 50 mcg/act nasal spray, 1 spray into each nostril daily, Disp: 48 g, Rfl: 1    gabapentin (Neurontin) 300 mg capsule, Take 1 capsule (300 mg total) by mouth 3 (three) times a day as needed (pain), Disp: 90 capsule, Rfl: 0    glimepiride (AMARYL) 4 mg tablet, Take 1 tablet (4 mg total) by mouth 2 (two) times a day, Disp: 180 tablet, Rfl: 1    Insulin Glargine Solostar (Basaglar KwikPen) 100 UNIT/ML SOPN, Inject 0.4 mL (40 Units total) under the skin daily, Disp: 30 mL, Rfl: 1    Insulin Pen Needle (BD Pen Needle Alicia 2nd Gen) 32G X 4 MM MISC, Inject under the skin daily, Disp: 90 each, Rfl: 0    ketoconazole (NIZORAL) 2 % cream, Apply topically daily, Disp: 60 g, Rfl: 1    nystatin (MYCOSTATIN) powder, Apply topically 3 (three) times a day as needed (groin rash), Disp: 60 g, Rfl: 1    sertraline (ZOLOFT) 100 mg tablet, Take 1 tablet (100 mg total) by mouth daily, Disp: 90 tablet, Rfl: 1    tamsulosin (FLOMAX) 0.4 mg, Take 1 capsule (0.4 mg total) by mouth daily, Disp: 90 capsule, Rfl: 1        Problem List       Patient Active Problem List   Diagnosis    Recurrent major depressive disorder, in partial remission (HCC)    Type 2 diabetes mellitus with obesity  (HCC)    Morbid obesity (HCC)    Mixed hyperlipidemia    Essential hypertension    Hypogonadism, male    Obstructive sleep apnea    Spinal stenosis    Vitamin D deficiency    Acquired deformity of foot    Metatarsalgia of both feet    Pain in both feet    Diabetic polyneuropathy associated with type 2 diabetes mellitus (HCC)    Seasonal allergies    Onychomycosis    Corns    Arthralgia    Hallux valgus    Bunion    Acquired pes planus    Bilateral renal cysts    Type 2 diabetes mellitus with stage 3 chronic kidney disease and hypertension (HCC)    CKD (chronic kidney disease) stage 3    Vitamin B12  deficiency    Candidal intertrigo    Abnormal CT of the abdomen    Abnormal CT scan    Nephrolithiasis    Dyspnea on exertion    PVC (premature ventricular contraction)    Cervical radiculopathy    Chronic kidney disease-mineral and bone disorder               Subjective  Patient ID: Pollo Whitney is a 64 y.o. male.     HPI     The following portions of the patient's history were reviewed and updated as appropriate:      family history includes Alzheimer's disease in his mother. He was adopted.       reports that he has been smoking cigars. He has been exposed to tobacco smoke. He has never used smokeless tobacco. He reports that he does not currently use alcohol after a past usage of about 1.0 standard drink of alcohol per week. He reports that he does not use drugs.      Objective:  Patient's shoes and socks removed.    Foot ExamPhysical Exam  Vitals and nursing note reviewed.   Constitutional:       Appearance: Normal appearance.   Cardiovascular:      Rate and Rhythm: Normal rate and regular rhythm.   Skin:     Capillary Refill: Capillary refill takes less than 2 seconds.      Comments: Patient demonstrates interdigital tinea pedis.  Most pacifically there is maceration of the fourth space of the right foot.  Patient demonstrates dorsal cellulitis.  Negative abscess or sinus.   Neurological:      Mental Status: He is alert.   Psychiatric:         Mood and Affect: Mood normal.         Behavior: Behavior normal.         Thought Content: Thought content normal.         Judgment: Judgment normal.

## 2025-03-24 ENCOUNTER — OFFICE VISIT (OUTPATIENT)
Dept: INTERNAL MEDICINE CLINIC | Facility: CLINIC | Age: 65
End: 2025-03-24
Payer: COMMERCIAL

## 2025-03-24 VITALS
SYSTOLIC BLOOD PRESSURE: 130 MMHG | RESPIRATION RATE: 14 BRPM | OXYGEN SATURATION: 98 % | TEMPERATURE: 97.9 F | HEIGHT: 71 IN | HEART RATE: 76 BPM | WEIGHT: 279 LBS | DIASTOLIC BLOOD PRESSURE: 90 MMHG | BODY MASS INDEX: 39.06 KG/M2

## 2025-03-24 DIAGNOSIS — E66.9 TYPE 2 DIABETES MELLITUS WITH OBESITY  (HCC): ICD-10-CM

## 2025-03-24 DIAGNOSIS — E11.40 TYPE 2 DIABETES MELLITUS WITH DIABETIC NEUROPATHY, WITHOUT LONG-TERM CURRENT USE OF INSULIN (HCC): ICD-10-CM

## 2025-03-24 DIAGNOSIS — N18.31 STAGE 3A CHRONIC KIDNEY DISEASE (HCC): ICD-10-CM

## 2025-03-24 DIAGNOSIS — I10 ESSENTIAL HYPERTENSION: ICD-10-CM

## 2025-03-24 DIAGNOSIS — M54.50 CHRONIC BILATERAL LOW BACK PAIN WITHOUT SCIATICA: ICD-10-CM

## 2025-03-24 DIAGNOSIS — G47.33 OSA ON CPAP: ICD-10-CM

## 2025-03-24 DIAGNOSIS — E78.2 MIXED HYPERLIPIDEMIA: ICD-10-CM

## 2025-03-24 DIAGNOSIS — F33.41 RECURRENT MAJOR DEPRESSIVE DISORDER, IN PARTIAL REMISSION (HCC): ICD-10-CM

## 2025-03-24 DIAGNOSIS — G89.29 CHRONIC BILATERAL LOW BACK PAIN WITHOUT SCIATICA: ICD-10-CM

## 2025-03-24 DIAGNOSIS — N40.0 BENIGN PROSTATIC HYPERPLASIA, UNSPECIFIED WHETHER LOWER URINARY TRACT SYMPTOMS PRESENT: ICD-10-CM

## 2025-03-24 DIAGNOSIS — M54.12 CERVICAL RADICULOPATHY: ICD-10-CM

## 2025-03-24 DIAGNOSIS — N18.30 TYPE 2 DIABETES MELLITUS WITH STAGE 3 CHRONIC KIDNEY DISEASE AND HYPERTENSION (HCC): Primary | ICD-10-CM

## 2025-03-24 DIAGNOSIS — N20.0 NEPHROLITHIASIS: ICD-10-CM

## 2025-03-24 DIAGNOSIS — E11.69 TYPE 2 DIABETES MELLITUS WITH OBESITY  (HCC): ICD-10-CM

## 2025-03-24 DIAGNOSIS — E11.22 TYPE 2 DIABETES MELLITUS WITH STAGE 3 CHRONIC KIDNEY DISEASE AND HYPERTENSION (HCC): Primary | ICD-10-CM

## 2025-03-24 DIAGNOSIS — E66.01 MORBID OBESITY (HCC): ICD-10-CM

## 2025-03-24 DIAGNOSIS — I12.9 TYPE 2 DIABETES MELLITUS WITH STAGE 3 CHRONIC KIDNEY DISEASE AND HYPERTENSION (HCC): Primary | ICD-10-CM

## 2025-03-24 PROCEDURE — 99214 OFFICE O/P EST MOD 30 MIN: CPT | Performed by: INTERNAL MEDICINE

## 2025-03-24 RX ORDER — INSULIN GLARGINE 100 [IU]/ML
40 INJECTION, SOLUTION SUBCUTANEOUS DAILY
Qty: 30 ML | Refills: 1 | Status: SHIPPED | OUTPATIENT
Start: 2025-03-24 | End: 2025-03-25

## 2025-03-24 NOTE — ASSESSMENT & PLAN NOTE
Lab Results   Component Value Date    EGFR 46 10/21/2024    EGFR 39 (L) 03/13/2024    EGFR 50 03/13/2024    CREATININE 1.54 (H) 10/21/2024    CREATININE 1.45 (H) 03/13/2024    CREATININE 1.47 (H) 05/28/2023     Repeat labs due.  Avoid NSAIDs.  Schedule follow up with nephrology.

## 2025-03-24 NOTE — ASSESSMENT & PLAN NOTE
Lab Results   Component Value Date    HGBA1C 7.9 (H) 10/21/2024     Repeat A1c due.  On Basaglar to 40 units and Trulicity 4.5 mg weekly, glimepiride 4 mg bid.     Discussed changing GLP-1.

## 2025-03-24 NOTE — ASSESSMENT & PLAN NOTE
Lab Results   Component Value Date    HGBA1C 7.9 (H) 10/21/2024       Orders:  •  Diabetic foot exam; Future

## 2025-03-24 NOTE — PATIENT INSTRUCTIONS
"Patient Education     Back exercises   The Basics   Written by the doctors and editors at [facility]   Why do I need to do back exercises? -- Back exercises can help ease back pain and might help prevent future back pain. Long term, it is important to strengthen the muscles in your lower back, buttocks, and belly. These are your \"core muscles.\" Stretching exercises are also important to keep your muscles flexible.  Below are some stretching and strengthening exercises that might help you. Other forms of movement can help ease or prevent back pain, too. For example, some people like to walk, do aerobic exercise, or do yoga or ana luisa chi. The most important thing is to move your body. Your doctor, nurse, or physical therapist can help you find different types of activity that work for you.  What stretching exercises should I do? -- Below are some examples of stretching exercises. Warm up your muscles before stretching to help prevent injury. To warm up, you can walk, jog, or cycle for a few minutes.  Start by repeating each of these stretches 2 to 3 times. Try to hold each stretch for 5 to 10 seconds, and try to do the stretches 2 to 3 times each day. Breathe slowly and deeply as you do the exercises. Never bounce when doing stretches.   Cat-cow stretch (figure 1) - Start on all fours on the floor, with your hands under your shoulders, knees under your hips, and your back flat. First, tuck your chin and tighten your stomach muscles as you round your back (like a \"cat\"). Hold the stretch for about 10 seconds. Rest for a few seconds as you flatten your back. Next, lift your chin and let your belly and lower back sink toward the floor (like a \"cow\"). Hold the stretch for about 10 seconds.   Single knee-to-chest stretches (figure 2) ? While lying on your back, bend your knees with your feet flat on the floor. Pull 1 knee toward your chest until you feel a stretch in your lower back and buttock area. Lower, and repeat with the " other knee. If you have knee problems, pull your knee up by grabbing the back of your thigh instead of the front of your knee. You can also do this exercise by grabbing both knees at the same time.   Lower trunk rotations (figure 3) ? While lying on your back, bend your knees with your feet flat on the floor. Keep your knees and ankles together, and then drop them to 1 side. Keep both of your shoulders touching the floor until you feel a stretch in the muscles at the side of the back. Repeat on the other side.   Lower back stretches seated (figure 4) ? Sit in a chair with your feet spread about shoulder width apart. Then, lean forward until you feel a stretch in your lower back.  What strengthening exercises should I do? -- Below are some examples of strengthening exercises.  Start by doing each exercise 2 to 3 times. Work up to doing each exercise 10 times. Hold each exercise for 3 to 5 seconds, and try to do the exercises 2 to 3 times each day. Do all exercises slowly.   Shoulder blade squeezes (figure 5) ? Pinch your shoulder blades together on your upper back, and hold 3 to 5 seconds. You can also do these 1 side at a time. Sit with good posture, and make sure that your shoulders do not rise up when you do this exercise. Relax.   Pelvic tilts (figure 6) ? Lie on your back with your knees bent and feet flat on the floor. Tighten your stomach muscles, and press your lower back down to the floor. Relax. You should be able to breathe comfortably during this exercise.   Hip lifts (figure 7) ? Lie on your back with your knees bent and feet flat on the floor. Tighten your stomach muscles, keep your back flat, and lift your buttocks off of the floor. Relax. You should feel this in your buttocks, not in your lower back.  What else should I know?    Exercise, including stretching, might be slightly uncomfortable. But you should not have sharp or severe pain. If you do get severe pain, stop what you are doing. If severe  "pain continues, call your doctor or nurse.   Do not hold your breath when exercising. If you tend to hold your breath, try counting out loud when exercising. If any exercise bothers you, stop right away.   Always warm up before exercising. Warm muscles stretch much easier than cool muscles. Stretching cool muscles can lead to injury.   Doing exercises before a meal can be a good way to get into a routine.  All topics are updated as new evidence becomes available and our peer review process is complete.  This topic retrieved from [employer] on: Apr 03, 2024.  Topic 179341 Version 2.0  Release: 32.2.4 - C32.92  © 2024 UpToDate, Inc. and/or its affiliates. All rights reserved.  figure 1: Cat-cow stretch     Start on all fours on the floor, with hands under your shoulders, knees under your hips, and your back flat. First, tuck your chin and tighten your stomach muscles as you round your back (like a \"cat\"). Hold the stretch for about 10 seconds. Rest for a few seconds as you flatten your back. Next, lift your chin and let your belly and lower back sink toward the floor (like a \"cow\"). Hold the stretch for about 10 seconds.  Graphic [id number] Version 1.0  figure 2: Single knee-to-chest stretch     Lie on your back, bend your knees, and have your feet flat on the floor. Pull 1 knee toward your chest until you feel a stretch in your lower back and buttock area. Repeat with the other knee. If you have knee problems, pull your knee up by grabbing the back of your thigh instead of the front of your knee. You can also do this exercise by grabbing both knees at the same time.  Graphic [id number] Version 1.0  figure 3: Lower trunk rotation     While lying on your back, bend your knees and have your feet flat on the floor. Keep your legs together and then drop them to 1 side. Keep both of your shoulders touching the floor until you feel a stretch in the muscles at the side of the back. Repeat on the other side.  Graphic [id number] Version " 1.0  figure 4: Lower back stretch     Sit in a chair with your feet spread about shoulder width apart. Then, lean forward until you feel a stretch in your lower back.  Graphic [id number] Version 1.0  figure 5: Shoulder blade squeezes     Pinch your shoulder blades together on your upper back and hold for 3 to 5 seconds. Make sure that you are sitting with good posture and that your shoulders do not raise up when you do this exercise. Relax.  Graphic [id number] Version 1.0  figure 6: Pelvic tilts     Lie on your back with your knees bent and feet flat on the floor. Tighten your stomach muscles and press your lower back down to the floor. Relax.  Graphic 321100 Version 1.0  figure 7: Hip lifts     Lie on your back with your knees bent and feet flat on the floor. Tighten your stomach muscles and lift your buttocks off of the floor. Relax.  Graphic 449381 Version 1.0  Consumer Information Use and Disclaimer   Disclaimer: This generalized information is a limited summary of diagnosis, treatment, and/or medication information. It is not meant to be comprehensive and should be used as a tool to help the user understand and/or assess potential diagnostic and treatment options. It does NOT include all information about conditions, treatments, medications, side effects, or risks that may apply to a specific patient. It is not intended to be medical advice or a substitute for the medical advice, diagnosis, or treatment of a health care provider based on the health care provider's examination and assessment of a patient's specific and unique circumstances. Patients must speak with a health care provider for complete information about their health, medical questions, and treatment options, including any risks or benefits regarding use of medications. This information does not endorse any treatments or medications as safe, effective, or approved for treating a specific patient. UpToDate, Inc. and its affiliates disclaim any warranty or  liability relating to this information or the use thereof.The use of this information is governed by the Terms of Use, available at https://www.wolterskluwer.com/en/know/clinical-effectiveness-terms. 2024© UpToDate, Inc. and its affiliates and/or licensors. All rights reserved.  Copyright   © 2024 [employer] and/or its affiliates. All rights reserved.

## 2025-03-24 NOTE — ASSESSMENT & PLAN NOTE
DBP slightly elevated today. On amlodipine 10 mg, clonidine 0.3 mg bid, enalapril 20 mg daily.

## 2025-03-24 NOTE — PROGRESS NOTES
Diabetic Foot Exam    Patient's shoes and socks removed.    Right Foot/Ankle   Right Foot Inspection  Skin Exam: skin normal, skin intact, callus and callus. No dry skin, no warmth, no erythema, no maceration, no abnormal color, no pre-ulcer and no ulcer.     Toe Exam: ROM and strength within normal limits.     Sensory   Monofilament testing: intact    Vascular  Capillary refills: < 3 seconds  The right DP pulse is 1+.     Left Foot/Ankle  Left Foot Inspection  Skin Exam: skin normal, skin intact and callus. No dry skin, no warmth, no erythema, no maceration, normal color, no pre-ulcer and no ulcer.     Toe Exam: ROM and strength within normal limits.     Sensory   Monofilament testing: intact    Vascular  Capillary refills: < 3 seconds  The left DP pulse is 1+.     Assign Risk Category  No deformity present  Loss of protective sensation  Weak pulses  Risk: 2      Name: Pollo Whitney      : 1960      MRN: [id number]  Encounter Provider: Monica Villarreal MD  Encounter Date: 3/24/2025   Encounter department: [facility] INTERNAL MEDICINE  :  Assessment & Plan  Type 2 diabetes mellitus with stage 3 chronic kidney disease and hypertension (HCC)    Lab Results   Component Value Date    HGBA1C 7.9 (H) 10/21/2024     Repeat A1c due.  On Basaglar to 40 units and Trulicity 4.5 mg weekly, glimepiride 4 mg bid.     Discussed changing GLP-1.       Stage 3a chronic kidney disease (HCC)  Lab Results   Component Value Date    EGFR 46 10/21/2024    EGFR 39 (L) 2024    EGFR 50 2024    CREATININE 1.54 (H) 10/21/2024    CREATININE 1.45 (H) 2024    CREATININE 1.47 (H) 2023     Repeat labs due.  Avoid NSAIDs.  Schedule follow up with nephrology.       Essential hypertension  DBP slightly elevated today. On amlodipine 10 mg, clonidine 0.3 mg bid, enalapril 20 mg daily.        Mixed hyperlipidemia  On atorvastatin 10 mg.       Nephrolithiasis  No recent issues.  Will see Urology yearly.        Obstructive sleep apnea  Using CPAP.       Recurrent major depressive disorder, in partial remission (HCC)  Stable. On sertraline 100 mg and bupropion 150 mg daily.       Cervical radiculopathy  Takes gabapentin prn.       Benign prostatic hyperplasia, unspecified whether lower urinary tract symptoms present  On tamsulosin.       Morbid obesity (HCC)  No weight change.  Encouraged to start regular exercise, has a stationary bicycle at home.       Type 2 diabetes mellitus with diabetic neuropathy, without long-term current use of insulin ([facility])    Lab Results   Component Value Date    HGBA1C 7.9 (H) 10/21/2024       Orders:  •  Insulin Glargine Solostar (Basaglar KwikPen) 100 UNIT/ML SOPN; Inject 0.4 mL (40 Units total) under the skin daily    Chronic bilateral low back pain without sciatica  Reviewed proper body mechanics.  Recommend to start stretching exercises at home.       Type 2 diabetes mellitus with obesity  ([facility])    Lab Results   Component Value Date    HGBA1C 7.9 (H) 10/21/2024       Orders:  •  Diabetic foot exam; Future    Follow up in 5 months or as needed.       History of Present Illness   He reports feeling well, no new complaints.  He reports more frequent low back pain since he started this job almost a year ago.  He has since adjusted the way he moves and carries things.  He has a TENS unit which he occasionally uses for his neck, would sometimes use it for his back.  He recalls having bad sciatica on the right side when he was much younger, that is why he has the TENS unit.  He does a stretching exercises for his neck occasionally.  No falls.  Denies any pain radiating down his leg, no weakness.    He takes Metamucil twice a day, would move his bowels at least every other day.    He reports no chest pain, shortness of breath, palpitations or other symptoms.      Review of Systems   Constitutional:  Negative for appetite change and fatigue.   HENT:  Negative for congestion, hearing loss and  "postnasal drip.    Eyes: Negative.    Respiratory:  Negative for cough, chest tightness and shortness of breath.    Cardiovascular:  Negative for chest pain, palpitations and leg swelling.   Gastrointestinal:  Positive for constipation. Negative for abdominal pain.   Genitourinary:  Negative for dysuria, frequency and urgency.   Musculoskeletal:  Negative for arthralgias.   Skin:  Negative for rash and wound.   Neurological:  Negative for dizziness, numbness and headaches.   Hematological:  Does not bruise/bleed easily.   Psychiatric/Behavioral:  Negative for dysphoric mood and sleep disturbance. The patient is not nervous/anxious.        Objective   /90 (BP Location: Left arm, Patient Position: Sitting, Cuff Size: Large)   Pulse 76   Temp 97.9 °F (36.6 °C)   Resp 14   Ht 5' 11\" (1.803 m)   Wt 127 kg (279 lb)   SpO2 98%   BMI 38.91 kg/m²      Physical Exam  Vitals and nursing note reviewed.   Constitutional:       General: He is not in acute distress.     Appearance: He is well-developed.   HENT:      Head: Normocephalic and atraumatic.      Right Ear: External ear normal.      Left Ear: External ear normal.      Mouth/Throat:      Mouth: Mucous membranes are moist.   Eyes:      Conjunctiva/sclera: Conjunctivae normal.   Cardiovascular:      Rate and Rhythm: Normal rate and regular rhythm.      Pulses: Pulses are weak.           Dorsalis pedis pulses are 1+ on the right side and 1+ on the left side.      Heart sounds: No murmur heard.  Pulmonary:      Effort: Pulmonary effort is normal. No respiratory distress.      Breath sounds: Normal breath sounds.   Abdominal:      Palpations: Abdomen is soft.      Tenderness: There is no abdominal tenderness.   Musculoskeletal:         General: No swelling.      Cervical back: Neck supple.   Feet:      Right foot:      Skin integrity: Callus present. No ulcer, skin breakdown, erythema, warmth or dry skin.      Left foot:      Skin integrity: Callus present. No " ulcer, skin breakdown, erythema, warmth or dry skin.   Skin:     General: Skin is warm and dry.   Neurological:      General: No focal deficit present.      Mental Status: He is alert and oriented to person, place, and time.   Psychiatric:         Mood and Affect: Mood normal.         Behavior: Behavior normal.           Labs & imaging results reviewed with patient.

## 2025-03-25 ENCOUNTER — TELEPHONE (OUTPATIENT)
Dept: INTERNAL MEDICINE CLINIC | Facility: CLINIC | Age: 65
End: 2025-03-25

## 2025-03-25 DIAGNOSIS — E11.69 TYPE 2 DIABETES MELLITUS WITH OBESITY  (HCC): Primary | ICD-10-CM

## 2025-03-25 DIAGNOSIS — E66.9 TYPE 2 DIABETES MELLITUS WITH OBESITY  (HCC): Primary | ICD-10-CM

## 2025-03-25 DIAGNOSIS — I12.9 TYPE 2 DIABETES MELLITUS WITH STAGE 3 CHRONIC KIDNEY DISEASE AND HYPERTENSION (HCC): ICD-10-CM

## 2025-03-25 DIAGNOSIS — E11.22 TYPE 2 DIABETES MELLITUS WITH STAGE 3 CHRONIC KIDNEY DISEASE AND HYPERTENSION (HCC): ICD-10-CM

## 2025-03-25 DIAGNOSIS — N18.30 TYPE 2 DIABETES MELLITUS WITH STAGE 3 CHRONIC KIDNEY DISEASE AND HYPERTENSION (HCC): ICD-10-CM

## 2025-03-25 RX ORDER — INSULIN GLARGINE 100 [IU]/ML
43 INJECTION, SOLUTION SUBCUTANEOUS DAILY
Qty: 45 ML | Refills: 1 | Status: SHIPPED | OUTPATIENT
Start: 2025-03-25

## 2025-03-25 NOTE — TELEPHONE ENCOUNTER
[facility] is asking for alternative to Basaglar Kwik Pen-insurance doesn't cover it. It will cover Lantus. Please send new script.

## 2025-03-31 ENCOUNTER — APPOINTMENT (OUTPATIENT)
Dept: LAB | Facility: AMBULARY SURGERY CENTER | Age: 65
End: 2025-03-31
Payer: COMMERCIAL

## 2025-03-31 DIAGNOSIS — E11.22 TYPE 2 DIABETES MELLITUS WITH STAGE 3 CHRONIC KIDNEY DISEASE AND HYPERTENSION (HCC): ICD-10-CM

## 2025-03-31 DIAGNOSIS — N18.30 TYPE 2 DIABETES MELLITUS WITH STAGE 3 CHRONIC KIDNEY DISEASE AND HYPERTENSION (HCC): ICD-10-CM

## 2025-03-31 DIAGNOSIS — N18.31 STAGE 3A CHRONIC KIDNEY DISEASE (HCC): ICD-10-CM

## 2025-03-31 DIAGNOSIS — E78.2 MIXED HYPERLIPIDEMIA: ICD-10-CM

## 2025-03-31 DIAGNOSIS — I12.9 TYPE 2 DIABETES MELLITUS WITH STAGE 3 CHRONIC KIDNEY DISEASE AND HYPERTENSION (HCC): ICD-10-CM

## 2025-03-31 LAB
ALBUMIN SERPL BCG-MCNC: 4.2 G/DL (ref 3.5–5)
ALP SERPL-CCNC: 83 U/L (ref 34–104)
ALT SERPL W P-5'-P-CCNC: 19 U/L (ref 7–52)
ANION GAP SERPL CALCULATED.3IONS-SCNC: 9 MMOL/L (ref 4–13)
AST SERPL W P-5'-P-CCNC: 19 U/L (ref 13–39)
BILIRUB SERPL-MCNC: 2.49 MG/DL (ref 0.2–1)
BUN SERPL-MCNC: 15 MG/DL (ref 5–25)
CALCIUM SERPL-MCNC: 9.7 MG/DL (ref 8.4–10.2)
CHLORIDE SERPL-SCNC: 105 MMOL/L (ref 96–108)
CHOLEST SERPL-MCNC: 117 MG/DL (ref ?–200)
CO2 SERPL-SCNC: 26 MMOL/L (ref 21–32)
CREAT SERPL-MCNC: 1.4 MG/DL (ref 0.6–1.3)
EST. AVERAGE GLUCOSE BLD GHB EST-MCNC: 220 MG/DL
GFR SERPL CREATININE-BSD FRML MDRD: 52 ML/MIN/1.73SQ M
GLUCOSE P FAST SERPL-MCNC: 107 MG/DL (ref 65–99)
HBA1C MFR BLD: 9.3 %
HDLC SERPL-MCNC: 42 MG/DL
LDLC SERPL CALC-MCNC: 46 MG/DL (ref 0–100)
NONHDLC SERPL-MCNC: 75 MG/DL
POTASSIUM SERPL-SCNC: 3.6 MMOL/L (ref 3.5–5.3)
PROT SERPL-MCNC: 7.9 G/DL (ref 6.4–8.4)
SODIUM SERPL-SCNC: 140 MMOL/L (ref 135–147)
TRIGL SERPL-MCNC: 144 MG/DL (ref ?–150)

## 2025-03-31 PROCEDURE — 36415 COLL VENOUS BLD VENIPUNCTURE: CPT

## 2025-03-31 PROCEDURE — 80061 LIPID PANEL: CPT

## 2025-03-31 PROCEDURE — 83036 HEMOGLOBIN GLYCOSYLATED A1C: CPT

## 2025-03-31 PROCEDURE — 80053 COMPREHEN METABOLIC PANEL: CPT

## 2025-04-02 ENCOUNTER — TELEMEDICINE (OUTPATIENT)
Dept: OTHER | Facility: HOSPITAL | Age: 65
End: 2025-04-02
Payer: COMMERCIAL

## 2025-04-02 ENCOUNTER — RESULTS FOLLOW-UP (OUTPATIENT)
Dept: INTERNAL MEDICINE CLINIC | Facility: CLINIC | Age: 65
End: 2025-04-02

## 2025-04-02 DIAGNOSIS — R53.1 GENERALIZED WEAKNESS: Primary | ICD-10-CM

## 2025-04-02 DIAGNOSIS — N18.30 TYPE 2 DIABETES MELLITUS WITH STAGE 3 CHRONIC KIDNEY DISEASE AND HYPERTENSION (HCC): ICD-10-CM

## 2025-04-02 DIAGNOSIS — E11.22 TYPE 2 DIABETES MELLITUS WITH STAGE 3 CHRONIC KIDNEY DISEASE AND HYPERTENSION (HCC): ICD-10-CM

## 2025-04-02 DIAGNOSIS — I10 ESSENTIAL HYPERTENSION: Primary | ICD-10-CM

## 2025-04-02 DIAGNOSIS — R26.81 UNSTEADY GAIT WHEN WALKING: ICD-10-CM

## 2025-04-02 DIAGNOSIS — E53.8 VITAMIN B12 DEFICIENCY: ICD-10-CM

## 2025-04-02 DIAGNOSIS — R53.83 FATIGUE, UNSPECIFIED TYPE: ICD-10-CM

## 2025-04-02 DIAGNOSIS — I12.9 TYPE 2 DIABETES MELLITUS WITH STAGE 3 CHRONIC KIDNEY DISEASE AND HYPERTENSION (HCC): ICD-10-CM

## 2025-04-02 DIAGNOSIS — N18.31 STAGE 3A CHRONIC KIDNEY DISEASE (HCC): ICD-10-CM

## 2025-04-02 DIAGNOSIS — E11.69 TYPE 2 DIABETES MELLITUS WITH OBESITY  (HCC): ICD-10-CM

## 2025-04-02 DIAGNOSIS — E55.9 VITAMIN D DEFICIENCY: ICD-10-CM

## 2025-04-02 DIAGNOSIS — E66.9 TYPE 2 DIABETES MELLITUS WITH OBESITY  (HCC): ICD-10-CM

## 2025-04-02 PROCEDURE — 99214 OFFICE O/P EST MOD 30 MIN: CPT | Performed by: PHYSICIAN ASSISTANT

## 2025-04-02 RX ORDER — TIRZEPATIDE 2.5 MG/.5ML
2.5 INJECTION, SOLUTION SUBCUTANEOUS WEEKLY
Qty: 2 ML | Refills: 0 | Status: SHIPPED | OUTPATIENT
Start: 2025-04-02

## 2025-04-02 RX ORDER — INSULIN GLARGINE 100 [IU]/ML
48 INJECTION, SOLUTION SUBCUTANEOUS DAILY
Start: 2025-04-02

## 2025-04-02 NOTE — RESULT ENCOUNTER NOTE
Lab results: A1c worse at 9.3. Please increase Lantus to 48 units.  Finish your remaining Trulicity and I will change it to Mounjaro. We will start a the lowest dose and increase it every month as long as you have no side effects. Sent to the pharmacy.    Your bilirubin was a bit high, please monitor for any abdominal pain or yellowing of the skin.  Cholesterol has improved.  Repeat labs ordered prior to next visit. Please print and mail to patient.

## 2025-04-02 NOTE — Clinical Note
"Pt seen virtually for b/l arm and leg weakness since about 1 week ago, generalized weakness, feeling off balanced \"because his legs aren't working right\" restless legs. Recommended f/u with you or proceed to ED for evaluation. No numbness, slurred speech, HA, vision changes. Had labs 1 week ago which were remarkable for elevated bili and hgb A1c 9. No abdominal pain.   "

## 2025-04-02 NOTE — PATIENT INSTRUCTIONS
Recommend patient schedule follow up with PCP today for re-evaluation/neuro exam, or go to ER for evaluation.

## 2025-04-02 NOTE — PROGRESS NOTES
"Virtual Regular Visit  Name: Pollo Whitney      : 1960      MRN: [id number]  Encounter Provider: Shannon D Severino, PA-C  Encounter Date: 2025   Encounter department: VIRTUAL CARE       Verification of patient location:  Patient is located at Other in the following state in which I hold an active license [place] :  Assessment & Plan  Generalized weakness         Unsteady gait when walking         Fatigue, unspecified type         Recommend patient schedule follow up with PCP today for re-evaluation/neuro exam, or go to ER for evaluation. Reassuringly, sx have been ongoing for 1 week without associated HA or unilateral deficit. No recent immunizations on file.    Encounter provider Shannon D Severino, PA-C    The patient was identified by name and date of birth. Pollo Whitney was informed that this is a telemedicine visit and that the visit is being conducted through the Epic Embedded platform. He agrees to proceed..  My office door was closed. No one else was in the room.  He acknowledged consent and understanding of privacy and security of the video platform. The patient has agreed to participate and understands they can discontinue the visit at any time.    Patient is aware this is a billable service.     History obtained from: patient  History of Present Illness     Pt reports last week he was seen by PCP for \"I feel my age\". This week he has decreased energy, b/l arm and leg weakness since about 1 week ago, off balanced because his legs aren't working right, restless legs. Glucose 235 now. No numbness, slurred speech, HA, vision changes. Had labs 1 week ago which were remarkable for elevated bili and hgb A1c 9. No abdominal pain.       Review of Systems   Constitutional:  Positive for fatigue. Negative for fever.   HENT:  Negative for nosebleeds.    Eyes:  Negative for redness.   Respiratory:  Negative for shortness of breath.    Cardiovascular:  Negative for chest pain.   Gastrointestinal:  " Negative for blood in stool.   Genitourinary:  Negative for hematuria.   Musculoskeletal:  Positive for gait problem.   Skin:  Negative for rash.   Neurological:  Positive for weakness. Negative for seizures.   Psychiatric/Behavioral:  Negative for behavioral problems.        Objective   There were no vitals taken for this visit.    Physical Exam  Constitutional:       General: He is not in acute distress.     Appearance: Normal appearance. He is not toxic-appearing.   HENT:      Head: Normocephalic and atraumatic.      Nose: No rhinorrhea.      Mouth/Throat:      Mouth: Mucous membranes are moist.   Eyes:      Conjunctiva/sclera: Conjunctivae normal.      Comments: Ptosis L lid baseline per patient   Pulmonary:      Effort: Pulmonary effort is normal. No respiratory distress.      Breath sounds: No wheezing (no gross audible wheeze through computer).   Musculoskeletal:      Cervical back: Normal range of motion.   Skin:     Findings: No rash (on face or neck).   Neurological:      Mental Status: He is alert.      Cranial Nerves: No dysarthria or facial asymmetry.      Coordination: Finger-Nose-Finger Test normal.   Psychiatric:         Mood and Affect: Mood normal.         Behavior: Behavior normal.         Visit Time  Total Visit Duration: 15 minutes not including the time spent for establishing the audio/video connection.

## 2025-04-09 ENCOUNTER — TELEPHONE (OUTPATIENT)
Dept: INTERNAL MEDICINE CLINIC | Facility: CLINIC | Age: 65
End: 2025-04-09

## 2025-04-09 NOTE — TELEPHONE ENCOUNTER
Please call patient.  Ask how he is feeling today, had some leg weakness a week ago, had virtual visit.    It may be because of high sugars. Please see results task for lab results.

## 2025-04-10 DIAGNOSIS — M54.12 CERVICAL RADICULOPATHY: ICD-10-CM

## 2025-04-10 RX ORDER — GABAPENTIN 300 MG/1
300 CAPSULE ORAL 3 TIMES DAILY PRN
Qty: 270 CAPSULE | Refills: 1 | Status: SHIPPED | OUTPATIENT
Start: 2025-04-10

## 2025-05-11 DIAGNOSIS — N40.0 BPH WITHOUT URINARY OBSTRUCTION: ICD-10-CM

## 2025-05-11 DIAGNOSIS — I10 ESSENTIAL HYPERTENSION: ICD-10-CM

## 2025-05-11 DIAGNOSIS — E11.40 TYPE 2 DIABETES MELLITUS WITH DIABETIC NEUROPATHY, WITHOUT LONG-TERM CURRENT USE OF INSULIN (HCC): ICD-10-CM

## 2025-05-11 DIAGNOSIS — F33.1 MODERATE EPISODE OF RECURRENT MAJOR DEPRESSIVE DISORDER (HCC): ICD-10-CM

## 2025-05-11 DIAGNOSIS — F41.8 DEPRESSION WITH ANXIETY: ICD-10-CM

## 2025-05-11 RX ORDER — CLONIDINE HYDROCHLORIDE 0.3 MG/1
0.3 TABLET ORAL 2 TIMES DAILY
Qty: 180 TABLET | Refills: 1 | Status: SHIPPED | OUTPATIENT
Start: 2025-05-11

## 2025-05-11 RX ORDER — GLIMEPIRIDE 4 MG/1
4 TABLET ORAL 2 TIMES DAILY
Qty: 180 TABLET | Refills: 1 | Status: SHIPPED | OUTPATIENT
Start: 2025-05-11

## 2025-05-11 RX ORDER — BUPROPION HYDROCHLORIDE 150 MG/1
150 TABLET ORAL DAILY
Qty: 90 TABLET | Refills: 1 | Status: SHIPPED | OUTPATIENT
Start: 2025-05-11

## 2025-05-11 RX ORDER — ENALAPRIL MALEATE 20 MG/1
20 TABLET ORAL DAILY
Qty: 90 TABLET | Refills: 1 | Status: SHIPPED | OUTPATIENT
Start: 2025-05-11

## 2025-05-11 RX ORDER — AMLODIPINE BESYLATE 10 MG/1
10 TABLET ORAL DAILY
Qty: 90 TABLET | Refills: 1 | Status: SHIPPED | OUTPATIENT
Start: 2025-05-11

## 2025-05-11 RX ORDER — SERTRALINE HYDROCHLORIDE 100 MG/1
100 TABLET, FILM COATED ORAL DAILY
Qty: 90 TABLET | Refills: 1 | Status: SHIPPED | OUTPATIENT
Start: 2025-05-11

## 2025-05-11 RX ORDER — TAMSULOSIN HYDROCHLORIDE 0.4 MG/1
0.4 CAPSULE ORAL DAILY
Qty: 90 CAPSULE | Refills: 1 | Status: SHIPPED | OUTPATIENT
Start: 2025-05-11

## 2025-05-21 ENCOUNTER — DOCUMENTATION (OUTPATIENT)
Dept: ADMINISTRATIVE | Facility: OTHER | Age: 65
End: 2025-05-21

## 2025-05-21 NOTE — PROGRESS NOTES
05/21/25 1:58 PM     DM A1c outreach is not required, patient was called within the last 3 months.  Also has an active order    Thank you.  Mattie Rouse MA  [facility]

## 2025-05-27 ENCOUNTER — OFFICE VISIT (OUTPATIENT)
Age: 65
End: 2025-05-27
Payer: COMMERCIAL

## 2025-05-27 VITALS — HEIGHT: 71 IN | WEIGHT: 279 LBS | RESPIRATION RATE: 17 BRPM | BODY MASS INDEX: 39.06 KG/M2

## 2025-05-27 DIAGNOSIS — M77.41 METATARSALGIA OF BOTH FEET: Primary | ICD-10-CM

## 2025-05-27 DIAGNOSIS — M77.42 METATARSALGIA OF BOTH FEET: Primary | ICD-10-CM

## 2025-05-27 DIAGNOSIS — M79.672 PAIN IN BOTH FEET: ICD-10-CM

## 2025-05-27 DIAGNOSIS — B35.3 TINEA PEDIS OF BOTH FEET: ICD-10-CM

## 2025-05-27 DIAGNOSIS — E11.42 DIABETIC POLYNEUROPATHY ASSOCIATED WITH TYPE 2 DIABETES MELLITUS (HCC): ICD-10-CM

## 2025-05-27 DIAGNOSIS — M21.961 ACQUIRED DEFORMITY OF BOTH FEET: ICD-10-CM

## 2025-05-27 DIAGNOSIS — M21.962 ACQUIRED DEFORMITY OF BOTH FEET: ICD-10-CM

## 2025-05-27 DIAGNOSIS — M79.671 PAIN IN BOTH FEET: ICD-10-CM

## 2025-05-27 PROCEDURE — 99213 OFFICE O/P EST LOW 20 MIN: CPT | Performed by: PODIATRIST

## 2025-08-05 ENCOUNTER — PROCEDURE VISIT (OUTPATIENT)
Age: 65
End: 2025-08-05
Payer: COMMERCIAL

## 2025-08-05 VITALS — BODY MASS INDEX: 39.06 KG/M2 | RESPIRATION RATE: 17 BRPM | HEIGHT: 71 IN | WEIGHT: 279 LBS

## 2025-08-05 DIAGNOSIS — M54.16 RADICULOPATHY OF LUMBAR REGION: ICD-10-CM

## 2025-08-05 DIAGNOSIS — M79.672 PAIN IN BOTH FEET: ICD-10-CM

## 2025-08-05 DIAGNOSIS — M21.962 ACQUIRED DEFORMITY OF BOTH FEET: ICD-10-CM

## 2025-08-05 DIAGNOSIS — M79.671 PAIN IN BOTH FEET: ICD-10-CM

## 2025-08-05 DIAGNOSIS — E11.42 DIABETIC POLYNEUROPATHY ASSOCIATED WITH TYPE 2 DIABETES MELLITUS (HCC): ICD-10-CM

## 2025-08-05 DIAGNOSIS — M77.41 METATARSALGIA OF BOTH FEET: Primary | ICD-10-CM

## 2025-08-05 DIAGNOSIS — M77.42 METATARSALGIA OF BOTH FEET: Primary | ICD-10-CM

## 2025-08-05 DIAGNOSIS — M21.969 ACQUIRED DEFORMITY OF FOOT, UNSPECIFIED LATERALITY: ICD-10-CM

## 2025-08-05 DIAGNOSIS — M21.961 ACQUIRED DEFORMITY OF BOTH FEET: ICD-10-CM

## 2025-08-05 PROCEDURE — 99213 OFFICE O/P EST LOW 20 MIN: CPT | Performed by: PODIATRIST

## (undated) DEVICE — 3000CC GUARDIAN II: Brand: GUARDIAN

## (undated) DEVICE — SHEATH URETERAL ACCESS 11/13FR 45CM PROXIS

## (undated) DEVICE — GUIDEWIRE STRGHT TIP 0.035 IN  SOLO PLUS

## (undated) DEVICE — PACK PBDS LAP CHOLE RF

## (undated) DEVICE — INVIEW CLEAR LEGGINGS: Brand: CONVERTORS

## (undated) DEVICE — SPONGE 4 X 4 XRAY 16 PLY STRL LF RFD

## (undated) DEVICE — SUT MONOCRYL 4-0 PS-2 27 IN Y426H

## (undated) DEVICE — ROBOT DRAPE INST ARM DA VINCI SI

## (undated) DEVICE — CADIERE FORCEPS: Brand: ENDOWRIST;DAVINCI SI

## (undated) DEVICE — 40595 XL TRENDELENBURG POSITIONING KIT: Brand: 40595 XL TRENDELENBURG POSITIONING KIT

## (undated) DEVICE — ASTOUND STANDARD SURGICAL GOWN, XL: Brand: CONVERTORS

## (undated) DEVICE — DRAPE SHEET THREE QUARTER

## (undated) DEVICE — MEDIUM-LARGE CLIP APPLIER: Brand: ENDOWRIST;DAVINCI SI

## (undated) DEVICE — PERMANENT CAUTERY HOOK: Brand: ENDOWRIST;DAVINCI SI

## (undated) DEVICE — ROBOT 8.5-13MM CANNULA SEALS

## (undated) DEVICE — ADHESIVE SKN CLSR HISTOACRYL FLEX 0.5ML LF

## (undated) DEVICE — ENDOPATH XCEL BLADELESS TROCARS WITH STABILITY SLEEVES: Brand: ENDOPATH XCEL

## (undated) DEVICE — GLOVE SRG BIOGEL ORTHOPEDIC 7.5

## (undated) DEVICE — HEAVY DUTY TABLE COVER: Brand: CONVERTORS

## (undated) DEVICE — SPECIMEN CONTAINER STERILE PEEL PACK

## (undated) DEVICE — GLOVE SRG BIOGEL ORTHOPEDIC 8

## (undated) DEVICE — STERILE CYSTO PACK: Brand: CARDINAL HEALTH

## (undated) DEVICE — CATH URETHERAL CONNECTOR

## (undated) DEVICE — CANNULA SEAL

## (undated) DEVICE — URETERAL DUAL LUMEN CATH

## (undated) DEVICE — SYRINGE 10ML LL

## (undated) DEVICE — ROBOT CAMERA DRAPE ARM

## (undated) DEVICE — CHLORAPREP HI-LITE 26ML ORANGE

## (undated) DEVICE — ENDOPOUCH RETRIEVER SPECIMEN RETRIEVAL BAGS: Brand: ENDOPOUCH RETRIEVER

## (undated) DEVICE — CATH URETERAL 5FR X 70 CM FLEX TIP POLYUR BARD

## (undated) DEVICE — LIGHT HANDLE COVER SLEEVE DISP BLUE STELLAR

## (undated) DEVICE — ANTI-FOG SOLUTION WITH FOAM PAD: Brand: DEVON

## (undated) DEVICE — BSKT SKYLITE STONE RETRV 3FR 120CM 4WIRE TIPLESS

## (undated) DEVICE — ROBOT CAMERA DRAPE HEAD

## (undated) DEVICE — INTENDED FOR TISSUE SEPARATION, AND OTHER PROCEDURES THAT REQUIRE A SHARP SURGICAL BLADE TO PUNCTURE OR CUT.: Brand: BARD-PARKER SAFETY BLADES SIZE 11, STERILE

## (undated) DEVICE — PACK TUR

## (undated) DEVICE — SCD SEQUENTIAL COMPRESSION COMFORT SLEEVE MEDIUM KNEE LENGTH: Brand: KENDALL SCD

## (undated) DEVICE — SUT VICRYL 0 UR-6 27 IN J603H

## (undated) DEVICE — VALVE SUCTION-IRR 2.5MM ADJ UROSEAL

## (undated) DEVICE — ROBOT INST CANNULA 8MM OBTURATOR BLUNT DISP

## (undated) DEVICE — GLOVE INDICATOR PI UNDERGLOVE SZ 7 BLUE

## (undated) DEVICE — BASIC SINGLE BASIN 2-LF: Brand: MEDLINE INDUSTRIES, INC.